# Patient Record
Sex: FEMALE | Race: BLACK OR AFRICAN AMERICAN | NOT HISPANIC OR LATINO | URBAN - METROPOLITAN AREA
[De-identification: names, ages, dates, MRNs, and addresses within clinical notes are randomized per-mention and may not be internally consistent; named-entity substitution may affect disease eponyms.]

---

## 2017-07-09 ENCOUNTER — INPATIENT (INPATIENT)
Facility: HOSPITAL | Age: 52
LOS: 4 days | Discharge: NURSING, REHAB OR LONG-TERM CARE | End: 2017-07-14
Attending: HOSPITALIST | Admitting: HOSPITALIST
Payer: MEDICAID

## 2017-07-09 VITALS
RESPIRATION RATE: 18 BRPM | SYSTOLIC BLOOD PRESSURE: 177 MMHG | OXYGEN SATURATION: 99 % | DIASTOLIC BLOOD PRESSURE: 89 MMHG | HEART RATE: 67 BPM | TEMPERATURE: 98 F

## 2017-07-09 DIAGNOSIS — N17.9 ACUTE KIDNEY FAILURE, UNSPECIFIED: ICD-10-CM

## 2017-07-09 LAB
ALBUMIN SERPL ELPH-MCNC: 3.3 G/DL — SIGNIFICANT CHANGE UP (ref 3.3–5)
ALP SERPL-CCNC: 107 U/L — SIGNIFICANT CHANGE UP (ref 40–120)
ALT FLD-CCNC: 11 U/L — SIGNIFICANT CHANGE UP (ref 4–33)
APPEARANCE UR: CLEAR — SIGNIFICANT CHANGE UP
AST SERPL-CCNC: 13 U/L — SIGNIFICANT CHANGE UP (ref 4–32)
BACTERIA # UR AUTO: SIGNIFICANT CHANGE UP
BASOPHILS # BLD AUTO: 0.04 K/UL — SIGNIFICANT CHANGE UP (ref 0–0.2)
BASOPHILS NFR BLD AUTO: 0.7 % — SIGNIFICANT CHANGE UP (ref 0–2)
BILIRUB SERPL-MCNC: 0.2 MG/DL — SIGNIFICANT CHANGE UP (ref 0.2–1.2)
BILIRUB UR-MCNC: NEGATIVE — SIGNIFICANT CHANGE UP
BLOOD UR QL VISUAL: HIGH
BUN SERPL-MCNC: 45 MG/DL — HIGH (ref 7–23)
CALCIUM SERPL-MCNC: 9.2 MG/DL — SIGNIFICANT CHANGE UP (ref 8.4–10.5)
CHLORIDE SERPL-SCNC: 105 MMOL/L — SIGNIFICANT CHANGE UP (ref 98–107)
CHLORIDE UR-SCNC: 83 MMOL/L — SIGNIFICANT CHANGE UP
CK MB BLD-MCNC: 2.73 NG/ML — SIGNIFICANT CHANGE UP (ref 1–4.7)
CK MB BLD-MCNC: SIGNIFICANT CHANGE UP (ref 0–2.5)
CK SERPL-CCNC: 87 U/L — SIGNIFICANT CHANGE UP (ref 25–170)
CO2 SERPL-SCNC: 19 MMOL/L — LOW (ref 22–31)
COLOR SPEC: SIGNIFICANT CHANGE UP
CREAT ?TM UR-MCNC: 53.38 MG/DL — SIGNIFICANT CHANGE UP
CREAT SERPL-MCNC: 4.7 MG/DL — HIGH (ref 0.5–1.3)
EOSINOPHIL # BLD AUTO: 0.31 K/UL — SIGNIFICANT CHANGE UP (ref 0–0.5)
EOSINOPHIL NFR BLD AUTO: 5.4 % — SIGNIFICANT CHANGE UP (ref 0–6)
GLUCOSE SERPL-MCNC: 306 MG/DL — HIGH (ref 70–99)
GLUCOSE UR-MCNC: 1000 — HIGH
HCT VFR BLD CALC: 34.2 % — LOW (ref 34.5–45)
HGB BLD-MCNC: 11.1 G/DL — LOW (ref 11.5–15.5)
IMM GRANULOCYTES # BLD AUTO: 0.02 # — SIGNIFICANT CHANGE UP
IMM GRANULOCYTES NFR BLD AUTO: 0.3 % — SIGNIFICANT CHANGE UP (ref 0–1.5)
KETONES UR-MCNC: NEGATIVE — SIGNIFICANT CHANGE UP
LEUKOCYTE ESTERASE UR-ACNC: NEGATIVE — SIGNIFICANT CHANGE UP
LYMPHOCYTES # BLD AUTO: 1.74 K/UL — SIGNIFICANT CHANGE UP (ref 1–3.3)
LYMPHOCYTES # BLD AUTO: 30.4 % — SIGNIFICANT CHANGE UP (ref 13–44)
MCHC RBC-ENTMCNC: 28.8 PG — SIGNIFICANT CHANGE UP (ref 27–34)
MCHC RBC-ENTMCNC: 32.5 % — SIGNIFICANT CHANGE UP (ref 32–36)
MCV RBC AUTO: 88.6 FL — SIGNIFICANT CHANGE UP (ref 80–100)
MONOCYTES # BLD AUTO: 0.34 K/UL — SIGNIFICANT CHANGE UP (ref 0–0.9)
MONOCYTES NFR BLD AUTO: 5.9 % — SIGNIFICANT CHANGE UP (ref 2–14)
NEUTROPHILS # BLD AUTO: 3.28 K/UL — SIGNIFICANT CHANGE UP (ref 1.8–7.4)
NEUTROPHILS NFR BLD AUTO: 57.3 % — SIGNIFICANT CHANGE UP (ref 43–77)
NITRITE UR-MCNC: NEGATIVE — SIGNIFICANT CHANGE UP
NRBC # FLD: 0 — SIGNIFICANT CHANGE UP
PH UR: 7 — SIGNIFICANT CHANGE UP (ref 4.6–8)
PLATELET # BLD AUTO: 197 K/UL — SIGNIFICANT CHANGE UP (ref 150–400)
PMV BLD: 11.1 FL — SIGNIFICANT CHANGE UP (ref 7–13)
POTASSIUM SERPL-MCNC: 5.6 MMOL/L — HIGH (ref 3.5–5.3)
POTASSIUM SERPL-SCNC: 5.6 MMOL/L — HIGH (ref 3.5–5.3)
PROT SERPL-MCNC: 7.2 G/DL — SIGNIFICANT CHANGE UP (ref 6–8.3)
PROT UR-MCNC: 481.5 MG/DL — SIGNIFICANT CHANGE UP
PROT UR-MCNC: >600 — SIGNIFICANT CHANGE UP
RBC # BLD: 3.86 M/UL — SIGNIFICANT CHANGE UP (ref 3.8–5.2)
RBC # FLD: 12.6 % — SIGNIFICANT CHANGE UP (ref 10.3–14.5)
RBC CASTS # UR COMP ASSIST: SIGNIFICANT CHANGE UP (ref 0–?)
SODIUM SERPL-SCNC: 140 MMOL/L — SIGNIFICANT CHANGE UP (ref 135–145)
SODIUM UR-SCNC: 104 MEQ/L — SIGNIFICANT CHANGE UP
SP GR SPEC: 1.01 — SIGNIFICANT CHANGE UP (ref 1–1.03)
SQUAMOUS # UR AUTO: SIGNIFICANT CHANGE UP
TROPONIN T SERPL-MCNC: < 0.06 NG/ML — SIGNIFICANT CHANGE UP (ref 0–0.06)
UROBILINOGEN FLD QL: NORMAL E.U. — SIGNIFICANT CHANGE UP (ref 0.1–0.2)
UUN UR-MCNC: 303.9 MG/DL — SIGNIFICANT CHANGE UP
WBC # BLD: 5.73 K/UL — SIGNIFICANT CHANGE UP (ref 3.8–10.5)
WBC # FLD AUTO: 5.73 K/UL — SIGNIFICANT CHANGE UP (ref 3.8–10.5)
WBC UR QL: SIGNIFICANT CHANGE UP (ref 0–?)

## 2017-07-09 PROCEDURE — 70450 CT HEAD/BRAIN W/O DYE: CPT | Mod: 26

## 2017-07-09 PROCEDURE — 76770 US EXAM ABDO BACK WALL COMP: CPT | Mod: 26

## 2017-07-09 PROCEDURE — 71020: CPT | Mod: 26

## 2017-07-09 PROCEDURE — 71100 X-RAY EXAM RIBS UNI 2 VIEWS: CPT | Mod: 26,RT

## 2017-07-09 RX ORDER — INSULIN LISPRO 100/ML
VIAL (ML) SUBCUTANEOUS AT BEDTIME
Qty: 0 | Refills: 0 | Status: DISCONTINUED | OUTPATIENT
Start: 2017-07-09 | End: 2017-07-14

## 2017-07-09 RX ORDER — ALLOPURINOL 300 MG
100 TABLET ORAL DAILY
Qty: 0 | Refills: 0 | Status: DISCONTINUED | OUTPATIENT
Start: 2017-07-09 | End: 2017-07-10

## 2017-07-09 RX ORDER — HEPARIN SODIUM 5000 [USP'U]/ML
5000 INJECTION INTRAVENOUS; SUBCUTANEOUS EVERY 8 HOURS
Qty: 0 | Refills: 0 | Status: DISCONTINUED | OUTPATIENT
Start: 2017-07-09 | End: 2017-07-14

## 2017-07-09 RX ORDER — QUETIAPINE FUMARATE 200 MG/1
50 TABLET, FILM COATED ORAL DAILY
Qty: 0 | Refills: 0 | Status: DISCONTINUED | OUTPATIENT
Start: 2017-07-09 | End: 2017-07-14

## 2017-07-09 RX ORDER — DEXTROSE 50 % IN WATER 50 %
1 SYRINGE (ML) INTRAVENOUS ONCE
Qty: 0 | Refills: 0 | Status: DISCONTINUED | OUTPATIENT
Start: 2017-07-09 | End: 2017-07-14

## 2017-07-09 RX ORDER — SIMVASTATIN 20 MG/1
40 TABLET, FILM COATED ORAL AT BEDTIME
Qty: 0 | Refills: 0 | Status: DISCONTINUED | OUTPATIENT
Start: 2017-07-09 | End: 2017-07-14

## 2017-07-09 RX ORDER — DEXTROSE 50 % IN WATER 50 %
12.5 SYRINGE (ML) INTRAVENOUS ONCE
Qty: 0 | Refills: 0 | Status: DISCONTINUED | OUTPATIENT
Start: 2017-07-09 | End: 2017-07-14

## 2017-07-09 RX ORDER — DEXTROSE 50 % IN WATER 50 %
25 SYRINGE (ML) INTRAVENOUS ONCE
Qty: 0 | Refills: 0 | Status: DISCONTINUED | OUTPATIENT
Start: 2017-07-09 | End: 2017-07-14

## 2017-07-09 RX ORDER — ACETAMINOPHEN 500 MG
650 TABLET ORAL EVERY 6 HOURS
Qty: 0 | Refills: 0 | Status: DISCONTINUED | OUTPATIENT
Start: 2017-07-09 | End: 2017-07-14

## 2017-07-09 RX ORDER — ASPIRIN/CALCIUM CARB/MAGNESIUM 324 MG
81 TABLET ORAL DAILY
Qty: 0 | Refills: 0 | Status: DISCONTINUED | OUTPATIENT
Start: 2017-07-09 | End: 2017-07-14

## 2017-07-09 RX ORDER — AMLODIPINE BESYLATE 2.5 MG/1
10 TABLET ORAL DAILY
Qty: 0 | Refills: 0 | Status: DISCONTINUED | OUTPATIENT
Start: 2017-07-09 | End: 2017-07-09

## 2017-07-09 RX ORDER — CLOPIDOGREL BISULFATE 75 MG/1
75 TABLET, FILM COATED ORAL DAILY
Qty: 0 | Refills: 0 | Status: DISCONTINUED | OUTPATIENT
Start: 2017-07-09 | End: 2017-07-14

## 2017-07-09 RX ORDER — CARVEDILOL PHOSPHATE 80 MG/1
12.5 CAPSULE, EXTENDED RELEASE ORAL EVERY 12 HOURS
Qty: 0 | Refills: 0 | Status: DISCONTINUED | OUTPATIENT
Start: 2017-07-09 | End: 2017-07-10

## 2017-07-09 RX ORDER — SODIUM CHLORIDE 9 MG/ML
1000 INJECTION INTRAMUSCULAR; INTRAVENOUS; SUBCUTANEOUS ONCE
Qty: 0 | Refills: 0 | Status: COMPLETED | OUTPATIENT
Start: 2017-07-09 | End: 2017-07-09

## 2017-07-09 RX ORDER — SODIUM CHLORIDE 9 MG/ML
1000 INJECTION, SOLUTION INTRAVENOUS
Qty: 0 | Refills: 0 | Status: DISCONTINUED | OUTPATIENT
Start: 2017-07-09 | End: 2017-07-14

## 2017-07-09 RX ORDER — ACETAMINOPHEN 500 MG
650 TABLET ORAL ONCE
Qty: 0 | Refills: 0 | Status: COMPLETED | OUTPATIENT
Start: 2017-07-09 | End: 2017-07-09

## 2017-07-09 RX ORDER — SERTRALINE 25 MG/1
200 TABLET, FILM COATED ORAL DAILY
Qty: 0 | Refills: 0 | Status: DISCONTINUED | OUTPATIENT
Start: 2017-07-09 | End: 2017-07-14

## 2017-07-09 RX ORDER — INSULIN LISPRO 100/ML
VIAL (ML) SUBCUTANEOUS
Qty: 0 | Refills: 0 | Status: DISCONTINUED | OUTPATIENT
Start: 2017-07-09 | End: 2017-07-14

## 2017-07-09 RX ORDER — GLUCAGON INJECTION, SOLUTION 0.5 MG/.1ML
1 INJECTION, SOLUTION SUBCUTANEOUS ONCE
Qty: 0 | Refills: 0 | Status: DISCONTINUED | OUTPATIENT
Start: 2017-07-09 | End: 2017-07-14

## 2017-07-09 RX ORDER — AMLODIPINE BESYLATE 2.5 MG/1
10 TABLET ORAL DAILY
Qty: 0 | Refills: 0 | Status: DISCONTINUED | OUTPATIENT
Start: 2017-07-09 | End: 2017-07-14

## 2017-07-09 RX ADMIN — Medication 650 MILLIGRAM(S): at 18:27

## 2017-07-09 RX ADMIN — SODIUM CHLORIDE 1000 MILLILITER(S): 9 INJECTION INTRAMUSCULAR; INTRAVENOUS; SUBCUTANEOUS at 18:27

## 2017-07-09 RX ADMIN — AMLODIPINE BESYLATE 10 MILLIGRAM(S): 2.5 TABLET ORAL at 23:19

## 2017-07-09 RX ADMIN — SIMVASTATIN 40 MILLIGRAM(S): 20 TABLET, FILM COATED ORAL at 23:19

## 2017-07-09 RX ADMIN — Medication 650 MILLIGRAM(S): at 23:58

## 2017-07-09 RX ADMIN — QUETIAPINE FUMARATE 50 MILLIGRAM(S): 200 TABLET, FILM COATED ORAL at 23:58

## 2017-07-09 NOTE — ED PROVIDER NOTE - CARE PLAN
Principal Discharge DX:	ABIH (acute kidney injury)  Secondary Diagnosis:	Near syncope Principal Discharge DX:	ABHI (acute kidney injury)  Secondary Diagnosis:	Near syncope

## 2017-07-09 NOTE — H&P ADULT - PROBLEM SELECTOR PLAN 7
- Continue sertraline and quetiapine - No acute issues presently  - Continue sertraline and quetiapine

## 2017-07-09 NOTE — H&P ADULT - ASSESSMENT
52 F with hx of CVA (residual L sided weakness), DMT2, HTN, Gout, CHF, CAD, depression, and ?CKD presenting after a fall. 52 F with hx of CVA (residual L sided weakness), DMT2, HTN, Gout, CHF, CAD, depression, and ?CKD presenting after a fall found to have acute renal failure.

## 2017-07-09 NOTE — ED PROVIDER NOTE - ATTENDING CONTRIBUTION TO CARE
DR Bloch- Patient interviewed and examined,  felt unsteady and fell on to right chest, did't hit head or lose consciousness. Has had intermittent unsteadiness and slurred speech, not taking insulin regularly.Well appearing NAD, HEENT nml Lungs clear, mild right lat mid rib pain. PERRL 2-12 intact, heent nml, mildly slurred speech. abd soft , nontender, ext no weakness able to ambulate, no cerebellar findings,

## 2017-07-09 NOTE — ED PROVIDER NOTE - PROGRESS NOTE DETAILS
Orthostatics: supine - /87, HR 61, standing - /99, HR 70. ; will give fluids for hyperglycemia. Gollogly: Attempted to call pt's PMD Dr Anderson Garcia at [facility] - told by staff there is no physician on call. I do not have an alternate number for Dr Garcia. Pt's Cr is 4.6 with  unknown baseline, will admit for suspected acute renal failure and near syncope. Will start workup to eval for cause of elevated Cr.

## 2017-07-09 NOTE — H&P ADULT - PROBLEM SELECTOR PLAN 5
- Patient is unaware of insulin dosage, will given Lantus 10 given glucose of 300 on BMP  - Will need proper medication reconciliation in AM  - Continue with ISS and FS  - Ha1c pending  - Will give gabapentin 300 once a day (instead of three times a day for renal dosing) - Uncontrolled, with significant glucosauria  - Patient is unaware of insulin dosage, will give Lantus 10 given glucose of 300 on BMP  - Will need proper medication reconciliation in AM  - Continue with ISS and FS  - Ha1c pending  - Will give gabapentin 300 once a day (instead of three times a day for renal dosing)

## 2017-07-09 NOTE — H&P ADULT - PROBLEM SELECTOR PLAN 3
Unknown type of heart failure  - Transthoracic echocardiogram to assess EF  - Continue Torsemide   - Strict I/O  - Daily Weight  - Head elevation Unknown type of heart failure  - Transthoracic echocardiogram to assess EF  - Continue Torsemide 20 mg BID (quite likely may need increased dosage in the setting of CKD)  - Strict I/O  - Daily Weight  - Head of bed elevation

## 2017-07-09 NOTE — H&P ADULT - PROBLEM SELECTOR PLAN 1
may have been a mechanical fall, however, given prodrome of lightheadedness and dizziness, must rule out cardiac etiology, especially with known CAD and CHF. Unlikely vasovagal (no hypotension) or neurologic/ seizure related. Head CT negative.   - Will check second set of cardiac enzymes  - Echocardiogram to assess for structural heart disease   - Follow up Rib xray  - Fall precautions  - Tylenol as needed for pain May be chronic kidney disease as patient reports she has known kidney disease. FeUrea 59.6 suggestive of intrinsic disease. Renal failure may be due to HTN or DM  - Renal ultrasound pending  - S/P 1 liter NS bolus in the ED  - Will continue Torsemide 20 BID  - Nephrology consult in the AM (please call)

## 2017-07-09 NOTE — H&P ADULT - NSHPPHYSICALEXAM_GEN_ALL_CORE
PHYSICAL EXAM:  GENERAL: NAD, well-groomed, well-developed  HEAD:  Atraumatic, Normocephalic  EYES: EOMI, conjunctiva and sclera clear  ENMT:  Moist mucous membranes  CHEST/LUNG: Clear to percussion bilaterally; No rales, rhonchi, wheezing, or rubs  HEART: Regular rate and rhythm; No murmurs, rubs, or gallops  ABDOMEN: Soft, Nontender, Nondistended; Bowel sounds present  EXTREMITIES:  No clubbing, cyanosis. 1+ nonpitting edema to knees   NERVOUS SYSTEM:  Alert & Oriented X3, Good concentration; Motor Strength 4/5 B/L upper and lower extremities; DTRs 2+ intact and symmetric PHYSICAL EXAM:  GENERAL: NAD, well-groomed, well-developed  HEAD:  Atraumatic, Normocephalic  EYES: EOMI, conjunctiva and sclera clear  ENMT:  Moist mucous membranes  CHEST/LUNG: Clear to percussion bilaterally; No rales, rhonchi, wheezing, or rubs  HEART: Regular rate and rhythm; No murmurs, rubs, or gallops  ABDOMEN: Soft, Nontender, Nondistended; Bowel sounds present  EXTREMITIES:  No clubbing, cyanosis. 1+ nonpitting edema to knees   NERVOUS SYSTEM:  Alert & Oriented X3, Good concentration; Motor Strength 4/5 B/L upper and lower extremities

## 2017-07-09 NOTE — ED ADULT NURSE NOTE - OBJECTIVE STATEMENT
pt in i. alert,oriented x3. states tripped on rug,fell on stairs. c/o back pain,l arm oain , med as ordered.  iv access,labs sent. eval by md at present. will continue to monitor

## 2017-07-09 NOTE — ED PROVIDER NOTE - OBJECTIVE STATEMENT
52F h/o HTN, HLD, DM, CHF (no lasix), CVA 1 year ago affecting L side p/w fall. Pt was walking up the stairs and felt dizzy (variously described as lightheadedness and vertigo), fell backwards landing on her R flank. No prodrome of palpitations, SOB, or CP. No head trauma or LOC. Pt takes ASA 81mg daily. Pt initially had a HA but this has resolved. Pt takes lantus once daily at night, took her usual dose last night, has not eaten anything all day. 52F h/o HTN, HLD, DM, CHF (no lasix), CKD, CVA 1 year ago affecting L side p/w fall. Pt was walking up the stairs and felt dizzy (variously described as lightheadedness and vertigo), fell backwards landing on her R flank. No prodrome of palpitations, SOB, or CP. No head trauma or LOC. Pt takes ASA 81mg daily. Pt initially had a HA but this has resolved. Pt takes lantus once daily at night, took her usual dose last night, has not eaten anything all day.

## 2017-07-09 NOTE — ED PROVIDER NOTE - PMH
CHF (congestive heart failure)    CVA (cerebral vascular accident)    DM (diabetes mellitus)    HTN (hypertension)

## 2017-07-09 NOTE — H&P ADULT - PROBLEM SELECTOR PLAN 2
May be chronic kidney disease as patient reports she has known kidney disease. FeUrea 59.6 suggestive of intrinsic disease. Renal failure may be due to HTN or DM  - Renal ultrasound pending  - Will continue Torsemide 20 BID may have been a mechanical fall.  However, given prodrome of lightheadedness and dizziness, must rule out cardiac etiology, especially with known CAD and CHF. Unlikely vasovagal (no hypotension) or neurologic/ seizure related. Head CT negative.   - Will check second set of cardiac enzymes  - Echocardiogram to assess for structural heart disease   - Follow up Rib xray  - Fall precautions  - Tylenol as needed for pain

## 2017-07-09 NOTE — H&P ADULT - HISTORY OF PRESENT ILLNESS
52 F with hx of CVA (residual L sided weakness), DMT2, HTN, Gout, CHF, CAD, depression, and ?CKD presenting after a fall. Patient was going up the stairs when the rug slipped from underneath her and she fell on her back. Patient reports she did not hit her head. Associated symptoms at the time of fall were lightheadedness and dizziness. Patient reports she has R back pain. Denies blurry vision, LOC, chest pain, SOB, palpitations, seizure-like activity urinary or fecal incontinence. Patient reports she had a heart attack previously and that no stents or interventions were done. Patient reports she has heart failure. At baseline, patient has a dry cough, lower extremity swelling, orthopnea, and PND. Patient reports she has kidney problems and was scheduled to see a nephrologist this week. Reports she was recently found to have blood in her urine and has taken 7 day course of unknown antibiotic. Patient is a poor historian and does not know the details of her medical problems.    In the ED, T 98.2 HR 67 /89--> 156/77 RR 18 SpO2 99. Patient was given 1L NS bolus and Tylenol

## 2017-07-09 NOTE — H&P ADULT - NSHPLABSRESULTS_GEN_ALL_CORE
140  |  105  |  45<H>  ----------------------------<  306<H>  5.6<H>   |  19<L>  |  4.70<H>    Ca    9.2      2017 18:20    TPro  7.2  /  Alb  3.3  /  TBili  0.2  /  DBili  x   /  AST  13  /  ALT  11  /  AlkPhos  107                      Urinalysis Basic - ( 2017 21:00 )    Color: COLORL / Appearance: CLEAR / S.008 / pH: 7.0  Gluc: 1000 / Ketone: NEGATIVE  / Bili: NEGATIVE / Urobili: NORMAL E.U.   Blood: SMALL / Protein: >600 / Nitrite: NEGATIVE   Leuk Esterase: NEGATIVE / RBC: 0-2 / WBC 2-5   Sq Epi: OCC / Non Sq Epi: x / Bacteria: FEW                              11.1   5.73  )-----------( 197      ( 2017 18:20 )             34.2     CAPILLARY BLOOD GLUCOSE  310 (2017 17:47)      < from: CT Head No Cont (17 @ 19:45) >    No acute intracranial hemorrhage or mass effect.     < end of copied text >     140  |  105  |  45<H>  ----------------------------<  306<H>  5.6<H>   |  19<L>  |  4.70<H>    Ca    9.2      2017 18:20    TPro  7.2  /  Alb  3.3  /  TBili  0.2  /  DBili  x   /  AST  13  /  ALT  11  /  AlkPhos  107                      Urinalysis Basic - ( 2017 21:00 )    Color: COLORL / Appearance: CLEAR / S.008 / pH: 7.0  Gluc: 1000 / Ketone: NEGATIVE  / Bili: NEGATIVE / Urobili: NORMAL E.U.   Blood: SMALL / Protein: >600 / Nitrite: NEGATIVE   Leuk Esterase: NEGATIVE / RBC: 0-2 / WBC 2-5   Sq Epi: OCC / Non Sq Epi: x / Bacteria: FEW                              11.1   5.73  )-----------( 197      ( 2017 18:20 )             34.2     CAPILLARY BLOOD GLUCOSE  310 (2017 17:47)      < from: CT Head No Cont (17 @ 19:45) >    No acute intracranial hemorrhage or mass effect.     < end of copied text >    EKG: normal sinus, T wave inversions of V5 and V6     140  |  105  |  45<H>  ----------------------------<  306<H>  5.6<H>   |  19<L>  |  4.70<H>    Ca    9.2      2017 18:20    TPro  7.2  /  Alb  3.3  /  TBili  0.2  /  DBili  x   /  AST  13  /  ALT  11  /  AlkPhos  107                      Urinalysis Basic - ( 2017 21:00 )    Color: COLORL / Appearance: CLEAR / S.008 / pH: 7.0  Gluc: 1000 / Ketone: NEGATIVE  / Bili: NEGATIVE / Urobili: NORMAL E.U.   Blood: SMALL / Protein: >600 / Nitrite: NEGATIVE   Leuk Esterase: NEGATIVE / RBC: 0-2 / WBC 2-5   Sq Epi: OCC / Non Sq Epi: x / Bacteria: FEW                              11.1   5.73  )-----------( 197      ( 2017 18:20 )             34.2     CAPILLARY BLOOD GLUCOSE  310 (2017 17:47)      < from: CT Head No Cont (17 @ 19:45) >    No acute intracranial hemorrhage or mass effect.     < end of copied text >    EKG: normal sinus at 68 pm, QTc = 428, T wave inversions of V5 and V6

## 2017-07-09 NOTE — H&P ADULT - PROBLEM SELECTOR PLAN 4
- Continuing aspirin, Plavix, carvedilol, simvastatin   - No ACE as patient has renal failure - Continuing aspirin, Plavix, carvedilol, simvastatin   - No ACE for now, as patient has renal failure

## 2017-07-09 NOTE — H&P ADULT - PMH
CHF (congestive heart failure)    Coronary artery disease involving native coronary artery of native heart without angina pectoris    CVA (cerebral vascular accident)    Depression    DM (diabetes mellitus)    Gout    HTN (hypertension)

## 2017-07-09 NOTE — ED PROVIDER NOTE - MEDICAL DECISION MAKING DETAILS
52F with fall, possibly due to hypoglycemia, dehydration, less likely arrythmia. Presentation not consistent with ACS. Pt with HA after the fall, takes ASA 81mg, concern for ICH as well as posterior rib fx. Plan: stat finger stick, labs, gentle fluids (possible dehydration but hx of CHF), CXR, rib series, CTH, pain control, EKG, reassess.

## 2017-07-09 NOTE — H&P ADULT - NSHPREVIEWOFSYSTEMS_GEN_ALL_CORE
REVIEW OF SYSTEMS:  CONSTITUTIONAL: No fever, weight loss, or fatigue  EYES: No eye pain, visual disturbances, or discharge  ENMT: No sinus or throat pain  RESPIRATORY: No wheezing, chills or hemoptysis; No shortness of breath  CARDIOVASCULAR: No chest pain, palpitations  GASTROINTESTINAL: No abdominal or epigastric pain. No nausea, vomiting; No diarrhea or constipation. No melena or hematochezia.  GENITOURINARY: No dysuria, frequency, hematuria, or incontinence  NEUROLOGICAL: No headaches, memory loss, loss of strength, numbness, or tremors  SKIN: No itching, burning, rashes, or lesions   LYMPH NODES: No enlarged glands  ENDOCRINE: No heat or cold intolerance; No hair loss  MUSCULOSKELETAL: No joint pain or swelling; No muscle, back, or extremity pain  ALLERY AND IMMUNOLOGIC: No hives or eczema

## 2017-07-10 DIAGNOSIS — F32.9 MAJOR DEPRESSIVE DISORDER, SINGLE EPISODE, UNSPECIFIED: ICD-10-CM

## 2017-07-10 DIAGNOSIS — Z29.9 ENCOUNTER FOR PROPHYLACTIC MEASURES, UNSPECIFIED: ICD-10-CM

## 2017-07-10 DIAGNOSIS — I10 ESSENTIAL (PRIMARY) HYPERTENSION: ICD-10-CM
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
## 2017-07-10 DIAGNOSIS — I25.10 ATHEROSCLEROTIC HEART DISEASE OF NATIVE CORONARY ARTERY WITHOUT ANGINA PECTORIS: ICD-10-CM

## 2017-07-10 DIAGNOSIS — N17.9 ACUTE KIDNEY FAILURE, UNSPECIFIED: ICD-10-CM

## 2017-07-10 DIAGNOSIS — M10.9 GOUT, UNSPECIFIED: ICD-10-CM

## 2017-07-10 DIAGNOSIS — E11.21 TYPE 2 DIABETES MELLITUS WITH DIABETIC NEPHROPATHY: ICD-10-CM

## 2017-07-10 DIAGNOSIS — I50.9 HEART FAILURE, UNSPECIFIED: ICD-10-CM

## 2017-07-10 LAB
BUN SERPL-MCNC: 45 MG/DL — HIGH (ref 7–23)
CALCIUM SERPL-MCNC: 9.1 MG/DL — SIGNIFICANT CHANGE UP (ref 8.4–10.5)
CHLORIDE SERPL-SCNC: 109 MMOL/L — HIGH (ref 98–107)
CK MB BLD-MCNC: 2.88 NG/ML — SIGNIFICANT CHANGE UP (ref 1–4.7)
CK SERPL-CCNC: 142 U/L — SIGNIFICANT CHANGE UP (ref 25–170)
CO2 SERPL-SCNC: 20 MMOL/L — LOW (ref 22–31)
CREAT SERPL-MCNC: 4.59 MG/DL — HIGH (ref 0.5–1.3)
GLUCOSE SERPL-MCNC: 203 MG/DL — HIGH (ref 70–99)
HBA1C BLD-MCNC: 11.2 % — HIGH (ref 4–5.6)
HCT VFR BLD CALC: 29.9 % — LOW (ref 34.5–45)
HGB BLD-MCNC: 9.7 G/DL — LOW (ref 11.5–15.5)
MAGNESIUM SERPL-MCNC: 1.7 MG/DL — SIGNIFICANT CHANGE UP (ref 1.6–2.6)
MCHC RBC-ENTMCNC: 28.6 PG — SIGNIFICANT CHANGE UP (ref 27–34)
MCHC RBC-ENTMCNC: 32.4 % — SIGNIFICANT CHANGE UP (ref 32–36)
MCV RBC AUTO: 88.2 FL — SIGNIFICANT CHANGE UP (ref 80–100)
NRBC # FLD: 0 — SIGNIFICANT CHANGE UP
PHOSPHATE SERPL-MCNC: 4.4 MG/DL — SIGNIFICANT CHANGE UP (ref 2.5–4.5)
PLATELET # BLD AUTO: 185 K/UL — SIGNIFICANT CHANGE UP (ref 150–400)
PMV BLD: 11.6 FL — SIGNIFICANT CHANGE UP (ref 7–13)
POTASSIUM SERPL-MCNC: 4.5 MMOL/L — SIGNIFICANT CHANGE UP (ref 3.5–5.3)
POTASSIUM SERPL-SCNC: 4.5 MMOL/L — SIGNIFICANT CHANGE UP (ref 3.5–5.3)
RBC # BLD: 3.39 M/UL — LOW (ref 3.8–5.2)
RBC # FLD: 12.8 % — SIGNIFICANT CHANGE UP (ref 10.3–14.5)
SODIUM SERPL-SCNC: 143 MMOL/L — SIGNIFICANT CHANGE UP (ref 135–145)
TROPONIN T SERPL-MCNC: < 0.06 NG/ML — SIGNIFICANT CHANGE UP (ref 0–0.06)
WBC # BLD: 5.52 K/UL — SIGNIFICANT CHANGE UP (ref 3.8–10.5)
WBC # FLD AUTO: 5.52 K/UL — SIGNIFICANT CHANGE UP (ref 3.8–10.5)

## 2017-07-10 PROCEDURE — 99223 1ST HOSP IP/OBS HIGH 75: CPT | Mod: GC

## 2017-07-10 PROCEDURE — 99233 SBSQ HOSP IP/OBS HIGH 50: CPT

## 2017-07-10 RX ORDER — GABAPENTIN 400 MG/1
300 CAPSULE ORAL AT BEDTIME
Qty: 0 | Refills: 0 | Status: DISCONTINUED | OUTPATIENT
Start: 2017-07-10 | End: 2017-07-14

## 2017-07-10 RX ORDER — CARVEDILOL PHOSPHATE 80 MG/1
12.5 CAPSULE, EXTENDED RELEASE ORAL EVERY 12 HOURS
Qty: 0 | Refills: 0 | Status: DISCONTINUED | OUTPATIENT
Start: 2017-07-10 | End: 2017-07-14

## 2017-07-10 RX ORDER — INSULIN GLARGINE 100 [IU]/ML
10 INJECTION, SOLUTION SUBCUTANEOUS AT BEDTIME
Qty: 0 | Refills: 0 | Status: DISCONTINUED | OUTPATIENT
Start: 2017-07-10 | End: 2017-07-14

## 2017-07-10 RX ADMIN — HEPARIN SODIUM 5000 UNIT(S): 5000 INJECTION INTRAVENOUS; SUBCUTANEOUS at 12:57

## 2017-07-10 RX ADMIN — Medication 1: at 17:49

## 2017-07-10 RX ADMIN — GABAPENTIN 300 MILLIGRAM(S): 400 CAPSULE ORAL at 00:28

## 2017-07-10 RX ADMIN — Medication 650 MILLIGRAM(S): at 15:22

## 2017-07-10 RX ADMIN — Medication 1: at 09:18

## 2017-07-10 RX ADMIN — Medication 81 MILLIGRAM(S): at 12:56

## 2017-07-10 RX ADMIN — SERTRALINE 200 MILLIGRAM(S): 25 TABLET, FILM COATED ORAL at 12:56

## 2017-07-10 RX ADMIN — INSULIN GLARGINE 10 UNIT(S): 100 INJECTION, SOLUTION SUBCUTANEOUS at 22:40

## 2017-07-10 RX ADMIN — CARVEDILOL PHOSPHATE 12.5 MILLIGRAM(S): 80 CAPSULE, EXTENDED RELEASE ORAL at 17:49

## 2017-07-10 RX ADMIN — Medication 650 MILLIGRAM(S): at 14:32

## 2017-07-10 RX ADMIN — INSULIN GLARGINE 10 UNIT(S): 100 INJECTION, SOLUTION SUBCUTANEOUS at 00:50

## 2017-07-10 RX ADMIN — Medication 20 MILLIGRAM(S): at 00:28

## 2017-07-10 RX ADMIN — Medication 650 MILLIGRAM(S): at 00:45

## 2017-07-10 RX ADMIN — AMLODIPINE BESYLATE 10 MILLIGRAM(S): 2.5 TABLET ORAL at 12:57

## 2017-07-10 RX ADMIN — QUETIAPINE FUMARATE 50 MILLIGRAM(S): 200 TABLET, FILM COATED ORAL at 12:57

## 2017-07-10 RX ADMIN — HEPARIN SODIUM 5000 UNIT(S): 5000 INJECTION INTRAVENOUS; SUBCUTANEOUS at 05:36

## 2017-07-10 RX ADMIN — HEPARIN SODIUM 5000 UNIT(S): 5000 INJECTION INTRAVENOUS; SUBCUTANEOUS at 22:40

## 2017-07-10 RX ADMIN — Medication 1: at 12:57

## 2017-07-10 RX ADMIN — GABAPENTIN 300 MILLIGRAM(S): 400 CAPSULE ORAL at 22:40

## 2017-07-10 RX ADMIN — SIMVASTATIN 40 MILLIGRAM(S): 20 TABLET, FILM COATED ORAL at 22:40

## 2017-07-10 RX ADMIN — CARVEDILOL PHOSPHATE 12.5 MILLIGRAM(S): 80 CAPSULE, EXTENDED RELEASE ORAL at 00:28

## 2017-07-10 RX ADMIN — CLOPIDOGREL BISULFATE 75 MILLIGRAM(S): 75 TABLET, FILM COATED ORAL at 12:56

## 2017-07-10 NOTE — DISCHARGE NOTE ADULT - HOSPITAL COURSE
53 y/o Female, with a PmHx of CVA (residual L sided weakness), DMT2, HTN, Gout, CHF, CAD, depression, and ?CKD, presenting after a fall. Patient was going up the stairs when the rug slipped from underneath her and she fell on her back. Patient reports she did not hit her head. Associated symptoms at the time of fall were lightheadedness and dizziness. Patient reports she has R back pain. Denies blurry vision, LOC, chest pain, SOB, palpitations, seizure-like activity urinary or fecal incontinence. Patient reports she had a heart attack previously and that no stents or interventions were done. Patient reports she has heart failure. At baseline, patient has a dry cough, lower extremity swelling, orthopnea, and PND. Patient reports she has kidney problems and was scheduled to see a nephrologist this week. Reports she was recently found to have blood in her urine and has taken 7 day course of unknown antibiotic. Patient is a poor historian and does not know the details of her medical problems.    In the ED, T 98.2 HR 67 /89--> 156/77 RR 18 SpO2 99. Patient was given 1L NS bolus and Tylenol, On admission, EKG done showed NSR @ 65, T inv inferiorly and laterally. CE x 1 neg. Renal US done showed parenchymal disease.  CT head done showed no acute disease. Echo done showed an EF of 55%, Normal mitral valve. Mild mitral regurgitation. Normal left ventricular internal dimensions and wall thicknesses. Endocardium not well visualized; grossly low normal left ventricular systolic function. The right ventricle is not well visualized; grossly normal right ventricular systolic function. Pt was seen by Nephrology Dr. Francisco. Pt comfortable at this time and is medically cleared for discharge home. Outpatient physical therapy to be done. Follow up Dr. Francisco. 53 y/o Female, with a PmHx of CVA (residual L sided weakness), DMT2, HTN, Gout, CHF, CAD, depression, and ?CKD, presenting after a fall. Patient was going up the stairs when the rug slipped from underneath her and she fell on her back. Patient reports she did not hit her head. Associated symptoms at the time of fall were lightheadedness and dizziness. Patient reports she has R back pain. Denies blurry vision, LOC, chest pain, SOB, palpitations, seizure-like activity urinary or fecal incontinence. Patient reports she had a heart attack previously and that no stents or interventions were done. Patient reports she has heart failure. At baseline, patient has a dry cough, lower extremity swelling, orthopnea, and PND. Patient reports she has kidney problems and was scheduled to see a nephrologist this week. Reports she was recently found to have blood in her urine and has taken 7 day course of unknown antibiotic. Patient is a poor historian and does not know the details of her medical problems.    In the ED, T 98.2 HR 67 /89--> 156/77 RR 18 SpO2 99. Patient was given 1L NS bolus and Tylenol, On admission, EKG done showed NSR @ 65, T inv inferiorly and laterally. CE x 1 neg. Renal US done showed parenchymal disease.  CT head done showed no acute disease. Echo done showed an EF of 55%, Normal mitral valve. Mild mitral regurgitation. Normal left ventricular internal dimensions and wall thicknesses. Endocardium not well visualized; grossly low normal left ventricular systolic function. The right ventricle is not well visualized; grossly normal right ventricular systolic function. Pt was seen by Nephrology Dr. Francisco. Pt comfortable at this time and is medically cleared for discharge home. Outpatient physical therapy to be done. Follow up Dr. Francisco.    medically optimized for discharge.

## 2017-07-10 NOTE — DISCHARGE NOTE ADULT - CARE PLAN
Principal Discharge DX:	Fall, initial encounter  Goal:	Atypical. Not likely Cardiac.  Instructions for follow-up, activity and diet:	Follow up with your Primary Care Doctor.  Secondary Diagnosis:	Essential hypertension  Goal:	Monitor blood pressure. Continue medications as prescribed.  Instructions for follow-up, activity and diet:	Low sodium diet.  Secondary Diagnosis:	DM (diabetes mellitus)  Goal:	Monitor finger sticks. Continue Insulin as prescribed.  Instructions for follow-up, activity and diet:	Low sugar diet.  Secondary Diagnosis:	Depression  Goal:	Continue medications as prescribed.  Secondary Diagnosis:	ABHI (acute kidney injury)  Goal:	Avoid Nephrotoxic Agents.  Instructions for follow-up, activity and diet:	Follow up with your Nephrologist  Secondary Diagnosis:	Chronic congestive heart failure, unspecified congestive heart failure type  Goal:	Continue to hold Torsemide for now secondary to ABHI.  Instructions for follow-up, activity and diet:	Follow up with your Cardiologist  Low sodium diet.  Secondary Diagnosis:	Gout  Goal:	Hold allopurinol for now secondary to ABHI

## 2017-07-10 NOTE — PROGRESS NOTE ADULT - PROBLEM SELECTOR PLAN 3
Unknown type of heart failure  - Transthoracic echocardiogram to assess EF  - Strict I/O  - Daily Weight  - Head of bed elevation

## 2017-07-10 NOTE — PROGRESS NOTE ADULT - PROBLEM SELECTOR PLAN 4
- Continuing aspirin, Plavix, carvedilol, simvastatin   - No ACE for now, as patient has renal failure

## 2017-07-10 NOTE — PROGRESS NOTE ADULT - PROBLEM SELECTOR PLAN 5
- Uncontrolled, with significant glucosauria  - Patient is unaware of insulin dosage, will give Lantus 10 given glucose of 300 on BMP  - Will need proper medication reconciliation in AM  - Continue with ISS and FS  - A1c high  - Will give gabapentin 300 once a day (instead of three times a day for renal dosing)

## 2017-07-10 NOTE — DISCHARGE NOTE ADULT - ADDITIONAL INSTRUCTIONS
follow up with your doctor in a week Dr Garcia [phone] follow up with your doctor in a week Dr Garcia [phone]  Follow up with Dr. Francisco (Nephrologist)

## 2017-07-10 NOTE — PHYSICAL THERAPY INITIAL EVALUATION ADULT - DIAGNOSIS, PT EVAL
Pt admitted for fall; pt presents with decreased strength, decreased balance, and decreased safety awareness

## 2017-07-10 NOTE — PROGRESS NOTE ADULT - SUBJECTIVE AND OBJECTIVE BOX
CC: Follow up for syncope    SUBJECTIVE / OVERNIGHT EVENTS:  Patient states that she feels better compared when first admitted.  Able to tolerate PO intake, conscious about drinking fluids.  Still feels uneasy on her feet but able to stand up.   No F/C, N/V, CP, SOB, Cough, lightheadedness, dizziness, abdominal pain, diarrhea, dysuria.    MEDICATIONS  (STANDING):  heparin  Injectable 5000 Unit(s) SubCutaneous every 8 hours  insulin lispro (HumaLOG) corrective regimen sliding scale   SubCutaneous three times a day before meals  insulin lispro (HumaLOG) corrective regimen sliding scale   SubCutaneous at bedtime  dextrose 5%. 1000 milliLiter(s) (50 mL/Hr) IV Continuous <Continuous>  dextrose 50% Injectable 12.5 Gram(s) IV Push once  dextrose 50% Injectable 25 Gram(s) IV Push once  dextrose 50% Injectable 25 Gram(s) IV Push once  aspirin enteric coated 81 milliGRAM(s) Oral daily  sertraline 200 milliGRAM(s) Oral daily  clopidogrel Tablet 75 milliGRAM(s) Oral daily  QUEtiapine 50 milliGRAM(s) Oral daily  simvastatin 40 milliGRAM(s) Oral at bedtime  amLODIPine   Tablet 10 milliGRAM(s) Oral daily  insulin glargine Injectable (LANTUS) 10 Unit(s) SubCutaneous at bedtime  gabapentin 300 milliGRAM(s) Oral at bedtime  carvedilol 12.5 milliGRAM(s) Oral every 12 hours    MEDICATIONS  (PRN):  dextrose Gel 1 Dose(s) Oral once PRN Blood Glucose LESS THAN 70 milliGRAM(s)/deciliter  glucagon  Injectable 1 milliGRAM(s) IntraMuscular once PRN Glucose LESS THAN 70 milligrams/deciliter  acetaminophen   Tablet. 650 milliGRAM(s) Oral every 6 hours PRN mild to severe pain      Vital Signs Last 24 Hrs  T(C): 36.8 (07-10-17 @ 05:35), Max: 36.8 (17 @ 16:13)  HR: 74 (07-10-17 @ 05:35) (63 - 74)  BP: 143/69 (07-10-17 @ 05:35) (143/69 - 180/98)  RR: 18 (07-10-17 @ 05:35) (16 - 18)  SpO2: 97% (07-10-17 @ 05:35) (97% - 100%)  CAPILLARY BLOOD GLUCOSE  191 (10 Jul 2017 12:19)  182 (10 Jul 2017 08:39)  234 (10 Jul 2017 00:15)  310 (2017 17:47)        I&O's Summary      PHYSICAL EXAM:  GENERAL: NAD, well-developed, obese  HEAD:  Atraumatic, Normocephalic  EYES: EOMI, PERRLA, conjunctiva and sclera clear  NECK: Supple, No JVD  CHEST/LUNG: Clear to auscultation bilaterally; No wheeze  HEART: Regular rate and rhythm; No murmurs, rubs, or gallops  ABDOMEN: Soft, Nontender, Nondistended; Bowel sounds present  EXTREMITIES:  2+ Peripheral Pulses, No clubbing, cyanosis, or edema  PSYCH: Calm  NEUROLOGY: A/Ox3,  SKIN: No rashes or lesions    LABS:                        9.7    5.52  )-----------( 185      ( 10 Jul 2017 06:38 )             29.9     07-10    143  |  109<H>  |  45<H>  ----------------------------<  203<H>  4.5   |  20<L>  |  4.59<H>    Ca    9.1      10 Jul 2017 06:38  Phos  4.4     07-10  Mg     1.7     07-10    TPro  7.2  /  Alb  3.3  /  TBili  0.2  /  DBili  x   /  AST  13  /  ALT  11  /  AlkPhos  107  07-09      CARDIAC MARKERS ( 10 Jul 2017 00:50 )  x     / < 0.06 ng/mL / 142 u/L / 2.88 ng/mL / x      CARDIAC MARKERS ( 2017 18:20 )  x     / < 0.06 ng/mL / 87 u/L / 2.73 ng/mL / x          Urinalysis Basic - ( 2017 21:00 )    Color: COLORL / Appearance: CLEAR / S.008 / pH: 7.0  Gluc: 1000 / Ketone: NEGATIVE  / Bili: NEGATIVE / Urobili: NORMAL E.U.   Blood: SMALL / Protein: >600 / Nitrite: NEGATIVE   Leuk Esterase: NEGATIVE / RBC: 0-2 / WBC 2-5   Sq Epi: OCC / Non Sq Epi: x / Bacteria: FEW        RADIOLOGY & ADDITIONAL TESTS:  < from: US Kidney and Bladder (17 @ 21:24) >  EXAM:  US KIDNEYS AND BLADDER        PROCEDURE DATE:  2017         INTERPRETATION:  CLINICAL INFORMATION: 52-year-old female with a K I.    COMPARISON: None available.    TECHNIQUE: Sonography of the kidneys and bladder.     FINDINGS:    Right kidney:  10.9 cm. No renal mass, hydronephrosis or calculi.    Left kidney:  10.9 cm. No renal mass, hydronephrosis or calculi.    Parenchymal renal disease.    Urinary bladder: Within normal limits.    IMPRESSION:     Parenchymal renal disease.                  WES ALCANTAR M.D., ATTENDING RADIOLOGIST  This document has been electronically signed. Jul 10 2017  8:22AM    < end of copied text >    Imaging Personally Reviewed:Yes    Consultant(s) Notes Reviewed:      Care Discussed with Consultants/Other Providers:

## 2017-07-10 NOTE — PHYSICAL THERAPY INITIAL EVALUATION ADULT - CRITERIA FOR SKILLED THERAPEUTIC INTERVENTIONS
rehab potential/impairments found/functional limitations in following categories/risk reduction/prevention

## 2017-07-10 NOTE — DISCHARGE NOTE ADULT - CARE PROVIDER_API CALL
Wale Francisco), Internal Medicine; Nephrology  [address] [address]  Phone: [phone]  Fax: [phone]

## 2017-07-10 NOTE — DISCHARGE NOTE ADULT - PATIENT PORTAL LINK FT
“You can access the [facility] Patient Portal, offered by [facility], by registering with the following website: [email or web address]”

## 2017-07-10 NOTE — PROGRESS NOTE ADULT - ASSESSMENT
52 F with hx of CVA (residual L sided weakness), DMT2, HTN, Gout, CHF, CAD, depression, and ?CKD presenting after a fall found to have acute renal failure.

## 2017-07-10 NOTE — CONSULT NOTE ADULT - SUBJECTIVE AND OBJECTIVE BOX
HPI:  Ms. Herr is a 52 year-old woman with history of hypertension, type 2 diabetes mellitus, stroke, and coronary artery disease, and congestive heart failure (?EF), who yesterday presented to the [facility] ER s/p mechanical fall; she does admit to dizziness directly prior to falling. She was noted on admission to have a creatinine in the 4s; despite a liter of normal saline in the ER, the creatinine remained in the 4s today. Therefore a renal consultation was requested.    In the ED, T 98.2 HR 67 /89--> 156/77 RR 18 SpO2 99. Patient was given 1L NS bolus and Tylenol (2017 22:41)      PAST MEDICAL & SURGICAL HISTORY:  Depression  Gout  Coronary artery disease involving native coronary artery of native heart without angina pectoris  CVA (cerebral vascular accident)  CHF (congestive heart failure)  DM (diabetes mellitus)  HTN (hypertension)      MEDICATIONS  (STANDING):  heparin  Injectable 5000 Unit(s) SubCutaneous every 8 hours  insulin lispro (HumaLOG) corrective regimen sliding scale   SubCutaneous three times a day before meals  insulin lispro (HumaLOG) corrective regimen sliding scale   SubCutaneous at bedtime  dextrose 5%. 1000 milliLiter(s) (50 mL/Hr) IV Continuous <Continuous>  dextrose 50% Injectable 12.5 Gram(s) IV Push once  dextrose 50% Injectable 25 Gram(s) IV Push once  dextrose 50% Injectable 25 Gram(s) IV Push once  aspirin enteric coated 81 milliGRAM(s) Oral daily  sertraline 200 milliGRAM(s) Oral daily  clopidogrel Tablet 75 milliGRAM(s) Oral daily  QUEtiapine 50 milliGRAM(s) Oral daily  simvastatin 40 milliGRAM(s) Oral at bedtime  amLODIPine   Tablet 10 milliGRAM(s) Oral daily  insulin glargine Injectable (LANTUS) 10 Unit(s) SubCutaneous at bedtime  gabapentin 300 milliGRAM(s) Oral at bedtime  carvedilol 12.5 milliGRAM(s) Oral every 12 hours      Allergies  Seafood (Unknown)    SOCIAL HISTORY:  Denies ETOh,Smoking,     FAMILY HISTORY:  Family history of heart attack (Father)      REVIEW OF SYSTEMS:  CONSTITUTIONAL: No weakness, fevers or chills  EYES/ENT: No visual changes;  No vertigo or throat pain   NECK: No pain or stiffness  RESPIRATORY: (+)SOB, (+)cough - chronic  CARDIOVASCULAR: No chest pain or palpitations; (+)dizziness, (+)swelling  GASTROINTESTINAL: No abdominal or epigastric pain. No nausea, vomiting, or hematemesis; No diarrhea or constipation. No melena or hematochezia.  GENITOURINARY: No dysuria, frequency or hematuria  NEUROLOGICAL: (+)left-sided weakness (chronic)  SKIN: No itching, burning, rashes, or lesions   MUSCULOSKELETAL: (+)back pain  All other review of systems is negative unless indicated above.    VITAL:  T(C): , Max: 36.8 (07-10-17 @ 05:35)  T(F): , Max: 98.2 (07-10-17 @ 05:35)  HR: 73 (07-10-17 @ 17:45)  BP: 146/88 (07-10-17 @ 17:45)  RR: 18 (07-10-17 @ 17:45)  SpO2: 99% (07-10-17 @ 17:45)      PHYSICAL EXAM:  Constitutional: NAD, Alert  HEENT: NCAT, MMM  Neck: Supple, No JVD  Respiratory: CTA-b/l  Cardiovascular: RRR s1s2, no m/r/g  Gastrointestinal: BS+, soft, NT/ND  Extremities: No peripheral edema b/l  Neurological: no focal deficits; strength grossly intact  Psychiatric: Normal mood, normal affect  Back: no CVAT b/l  Skin: No rashes, no nevi    LABS:                        9.7    5.52  )-----------( 185      ( 10 Jul 2017 06:38 )             29.9     Na(143)/K(4.5)/Cl(109)/HCO3(20)/BUN(45)/Cr(4.59)Glu(203)/Ca(9.1)/Mg(1.7)/PO4(4.4)    07-10 @ 06:38  Na(140)/K(5.6)/Cl(105)/HCO3(19)/BUN(45)/Cr(4.70)Glu(306)/Ca(9.2)/Mg(--)/PO4(--)     @ 18:20    Urinalysis Basic - ( 2017 21:00 )    Color: COLORL / Appearance: CLEAR / S.008 / pH: 7.0  Gluc: 1000 / Ketone: NEGATIVE  / Bili: NEGATIVE / Urobili: NORMAL E.U.   Blood: SMALL / Protein: >600 / Nitrite: NEGATIVE   Leuk Esterase: NEGATIVE / RBC: 0-2 / WBC 2-5   Sq Epi: OCC / Non Sq Epi: x / Bacteria: FEW      Creatinine, Random Urine: 53.38 mg/dL ( @ 21:00)  Sodium, Random Urine: 104 meq/L ( @ 21:00)  Protein, Random Urine: 481.5 mg/dL ( @ 21:00)  Chloride, Random Urine: 83 mmol/L ( @ 21:00)    a1c 11.2      RENAL ULTRASOUND (17) - 10.9/10.9; b/l parenchymal disease    IMPRESSION:    (1)Renal -       RECOMMEND:    (1) HPI:  Ms. Herr is a 52 year-old woman with history of hypertension, type 2 diabetes mellitus, stroke, and coronary artery disease, and congestive heart failure (?EF), who yesterday presented to the [facility] ER s/p mechanical fall; she does admit to dizziness directly prior to falling. She was noted on admission to have a creatinine in the 4s; despite a liter of normal saline in the ER, the creatinine remained in the 4s today. Therefore a renal consultation was requested.    Per patient, she does not have a nephrologist. She is aware that she has kidney impairment, but does not know how impaired her function is. Her PCP is Dr. Anderson Garcia.    Given her limited cognition, I asked her if I could call a family member; she states that I can call her mother. Spoke to her mother Gia [phone]. Gia tells me that she is 75 years old and does her best to take care of the patient, but it is quite difficult given her own medical issues. Gia, rather than Ninoska, is the primary caregiver for Ninoska's 19-year old son. Gia brings up that Ninoska does not take care of herself - she eats all the wrong foods...she does not take responsibility for herself medically, financially, etc.      PAST MEDICAL & SURGICAL HISTORY:  Depression  Gout  Coronary artery disease involving native coronary artery of native heart without angina pectoris  CVA (cerebral vascular accident)  CHF (congestive heart failure)  DM (diabetes mellitus)  HTN (hypertension)      MEDICATIONS  (STANDING):  heparin  Injectable 5000 Unit(s) SubCutaneous every 8 hours  insulin lispro (HumaLOG) corrective regimen sliding scale   SubCutaneous three times a day before meals  insulin lispro (HumaLOG) corrective regimen sliding scale   SubCutaneous at bedtime  dextrose 5%. 1000 milliLiter(s) (50 mL/Hr) IV Continuous <Continuous>  dextrose 50% Injectable 12.5 Gram(s) IV Push once  dextrose 50% Injectable 25 Gram(s) IV Push once  dextrose 50% Injectable 25 Gram(s) IV Push once  aspirin enteric coated 81 milliGRAM(s) Oral daily  sertraline 200 milliGRAM(s) Oral daily  clopidogrel Tablet 75 milliGRAM(s) Oral daily  QUEtiapine 50 milliGRAM(s) Oral daily  simvastatin 40 milliGRAM(s) Oral at bedtime  amLODIPine   Tablet 10 milliGRAM(s) Oral daily  insulin glargine Injectable (LANTUS) 10 Unit(s) SubCutaneous at bedtime  gabapentin 300 milliGRAM(s) Oral at bedtime  carvedilol 12.5 milliGRAM(s) Oral every 12 hours      Allergies  Seafood (Unknown)    SOCIAL HISTORY:  Denies ETOh,Smoking,     FAMILY HISTORY:  Family history of heart attack (Father)      REVIEW OF SYSTEMS:  CONSTITUTIONAL: No weakness, fevers or chills  EYES/ENT: No visual changes;  No vertigo or throat pain   NECK: No pain or stiffness  RESPIRATORY: (+)SOB, (+)cough - chronic  CARDIOVASCULAR: No chest pain or palpitations; (+)dizziness, (+)swelling  GASTROINTESTINAL: No abdominal or epigastric pain. No nausea, vomiting, or hematemesis; No diarrhea or constipation. No melena or hematochezia.  GENITOURINARY: No dysuria, frequency or hematuria  NEUROLOGICAL: (+)left-sided weakness (chronic)  SKIN: No itching, burning, rashes, or lesions   MUSCULOSKELETAL: (+)back pain  All other review of systems is negative unless indicated above.    VITAL:  T(C): , Max: 36.8 (07-10-17 @ 05:35)  T(F): , Max: 98.2 (07-10-17 @ 05:35)  HR: 73 (07-10-17 @ 17:45)  BP: 146/88 (07-10-17 @ 17:45)  RR: 18 (07-10-17 @ 17:45)  SpO2: 99% (07-10-17 @ 17:45)      PHYSICAL EXAM:  Constitutional: Mildly demented, lethargic but alert, drooling.  HEENT: NCAT, DMM  Neck: Supple, No JVD  Respiratory: CTA-b/l  Cardiovascular: RRR s1s2, no m/r/g  Gastrointestinal: BS+, soft, NT/ND  Extremities: No peripheral edema b/l  Neurological: (+)left hemiparesis  Psychiatric: Flat affect  Back: no CVAT b/l  Skin: No rashes, no nevi    LABS:                        9.7    5.52  )-----------( 185      ( 10 Jul 2017 06:38 )             29.9     Na(143)/K(4.5)/Cl(109)/HCO3(20)/BUN(45)/Cr(4.59)Glu(203)/Ca(9.1)/Mg(1.7)/PO4(4.4)    07-10 @ 06:38  Na(140)/K(5.6)/Cl(105)/HCO3(19)/BUN(45)/Cr(4.70)Glu(306)/Ca(9.2)/Mg(--)/PO4(--)     @ 18:20    Urinalysis Basic - ( 2017 21:00 )    Color: COLORL / Appearance: CLEAR / S.008 / pH: 7.0  Gluc: 1000 / Ketone: NEGATIVE  / Bili: NEGATIVE / Urobili: NORMAL E.U.   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 Does not require dialysis at present, but will likely need it within the next several months. Needs to follow up chronically with a nephrologist from this point forward.    (2)CV - CHF, ?EF - awaiting TTE. Volume status seems acceptable for now    (3)Hyperkalemia - on admission - now improved. On renal diet.    (4Anemia - CKD-associated?    (5)Endo - poor glycemic control    RECOMMEND:    (1)Continue low-K+ diet for now  (2)No ACEI/No ARB  (3)F/u TTE  (4)Endo eval inpt vs outpt  (5)Could check iron studies while admitted  (6)Dose new meds for GFR 15ml/min  (7)Could f/u at my office 2-4 weeks post discharge.    Discussed with mother regarding patient's likely need for dialysis within next several months - will discuss this with patient within next couple days, hopefully at which time she will be more capable of absorbing this information.    Thank you for involving me in this patient's care.    With warm regards,    Wale Francisco MD  [facility]  [phone]

## 2017-07-10 NOTE — DISCHARGE NOTE ADULT - MEDICATION SUMMARY - MEDICATIONS TO TAKE
I will START or STAY ON the medications listed below when I get home from the hospital:    aspirin 81 mg oral delayed release tablet  -- 1 tab(s) by mouth once a day  -- Indication: For Need for prophylactic measure    gabapentin 300 mg oral capsule  -- 1 cap(s) by mouth once a day (at bedtime)  -- Indication: For Neuropathy    sertraline 100 mg oral tablet  -- 2 tab(s) by mouth once a day  -- Indication: For Depression    insulin glargine  -- 12 unit(s) subcutaneous once a day (at bedtime)  -- Indication: For DM (diabetes mellitus)    simvastatin 40 mg oral tablet  -- 1 tab(s) by mouth once a day (at bedtime)  -- Indication: For Hyperlipidemia    clopidogrel 75 mg oral tablet  -- 1 tab(s) by mouth once a day  -- Indication: For Coronary artery disease involving native coronary artery of native heart without angina pectoris    QUEtiapine 50 mg oral tablet  -- 1 tab(s) by mouth once a day  -- Indication: For Depression    carvedilol 12.5 mg oral tablet  -- 1 tab(s) by mouth every 12 hours  -- Indication: For Essential hypertension    amLODIPine 10 mg oral tablet  -- 1 tab(s) by mouth once a day  -- Indication: For Essential hypertension    pantoprazole 40 mg oral delayed release tablet  -- 1 tab(s) by mouth once a day (before a meal)  -- Indication: For Gerd    tolterodine 4 mg oral capsule, extended release  -- 1 cap(s) by mouth once a day  -- Indication: For Urinary Spasms    ergocalciferol 50,000 intl units (1.25 mg) oral capsule  -- 1 cap(s) by mouth once a week  -- Indication: For Supplement I will START or STAY ON the medications listed below when I get home from the hospital:    Outpatient Physical Therapy  -- Indication: For weakness    aspirin 81 mg oral delayed release tablet  -- 1 tab(s) by mouth once a day  -- Indication: For Need for prophylactic measure    gabapentin 300 mg oral capsule  -- 1 cap(s) by mouth once a day (at bedtime)  -- Indication: For Neuropathy    sertraline 100 mg oral tablet  -- 2 tab(s) by mouth once a day  -- Indication: For Depression    insulin glargine  -- 12 unit(s) subcutaneous once a day (at bedtime)  -- Indication: For DM (diabetes mellitus)    simvastatin 40 mg oral tablet  -- 1 tab(s) by mouth once a day (at bedtime)  -- Indication: For Hyperlipidemia    clopidogrel 75 mg oral tablet  -- 1 tab(s) by mouth once a day  -- Indication: For Coronary artery disease involving native coronary artery of native heart without angina pectoris    QUEtiapine 50 mg oral tablet  -- 1 tab(s) by mouth once a day  -- Indication: For Depression    carvedilol 12.5 mg oral tablet  -- 1 tab(s) by mouth every 12 hours  -- Indication: For Essential hypertension    amLODIPine 10 mg oral tablet  -- 1 tab(s) by mouth once a day  -- Indication: For Essential hypertension    pantoprazole 40 mg oral delayed release tablet  -- 1 tab(s) by mouth once a day (before a meal)  -- Indication: For Gerd    tolterodine 4 mg oral capsule, extended release  -- 1 cap(s) by mouth once a day  -- Indication: For Urinary Spasms    ergocalciferol 50,000 intl units (1.25 mg) oral capsule  -- 1 cap(s) by mouth once a week  -- Indication: For Supplement

## 2017-07-10 NOTE — DISCHARGE NOTE ADULT - PLAN OF CARE
Follow up with your Primary Care Doctor. Atypical. Not likely Cardiac. Monitor blood pressure. Continue medications as prescribed. Low sodium diet. Monitor finger sticks. Continue Insulin as prescribed. Low sugar diet. Continue medications as prescribed. Avoid Nephrotoxic Agents. Follow up with your Nephrologist Continue to hold Torsemide for now secondary to ABHI. Follow up with your Cardiologist  Low sodium diet. Hold allopurinol for now secondary to ABHI

## 2017-07-10 NOTE — PHYSICAL THERAPY INITIAL EVALUATION ADULT - ADDITIONAL COMMENTS
Pt reports that she lives in a private house with her family with ~4STE; and a flight of stairs to negotiate to bedroom. Prior to hospital admission pt ambulated using single axis cane both independently/with assistance. Pt had a Home Health aide 5 days a week (Monday-Friday) from the hours of 9AM-12PM; however has been without Home Health aide for 2 weeks now. Pt reports that her most recent fall was in April of 2017.    Pt left comfortable in bed, NAD, all lines intact, all precautions maintained, with call bell in reach, bed alarm on, and RN aware of PT evaluation.

## 2017-07-10 NOTE — PROGRESS NOTE ADULT - PROBLEM SELECTOR PLAN 2
may have been a mechanical fall.  However, given prodrome of lightheadedness and dizziness, must rule out cardiac etiology, especially with known CAD and CHF. Unlikely vasovagal (no hypotension) or neurologic/ seizure related. Head CT negative.   - Will check second set of cardiac enzymes  - Echocardiogram to assess for structural heart disease   - Fall precautions  - Tylenol as needed for pain

## 2017-07-10 NOTE — PHYSICAL THERAPY INITIAL EVALUATION ADULT - PATIENT PROFILE REVIEW, REHAB EVAL
yes/ACTIVITY: Ambulate with Assistance; spoke with JACQUELYN Joel prior to PT evaluation--> Pt OK for ambulation

## 2017-07-10 NOTE — PROGRESS NOTE ADULT - PROBLEM SELECTOR PLAN 1
May be chronic kidney disease as patient reports she has known kidney disease. FeUrea 59.6 suggestive of intrinsic disease. Renal failure may be due to HTN or DM  - Renal ultrasound confirms intrinsic disease - likely chronic.  - S/P 1 liter NS bolus in the ED  - Will monitor off Torsemide.  - Nephrology consult

## 2017-07-10 NOTE — DISCHARGE NOTE ADULT - MEDICATION SUMMARY - MEDICATIONS TO STOP TAKING
I will STOP taking the medications listed below when I get home from the hospital:    torsemide 20 mg oral tablet  -- 1 tab(s) by mouth 2 times a day    allopurinol 100 mg oral tablet  -- 1 tab(s) by mouth once a day    famotidine 20 mg oral tablet  -- 1 tab(s) by mouth 2 times a day

## 2017-07-10 NOTE — DISCHARGE NOTE ADULT - SECONDARY DIAGNOSIS.
Essential hypertension DM (diabetes mellitus) Depression ABHI (acute kidney injury) Chronic congestive heart failure, unspecified congestive heart failure type Gout

## 2017-07-11 LAB
BASOPHILS # BLD AUTO: 0.05 K/UL — SIGNIFICANT CHANGE UP (ref 0–0.2)
BASOPHILS NFR BLD AUTO: 0.9 % — SIGNIFICANT CHANGE UP (ref 0–2)
BUN SERPL-MCNC: 46 MG/DL — HIGH (ref 7–23)
CALCIUM SERPL-MCNC: 9 MG/DL — SIGNIFICANT CHANGE UP (ref 8.4–10.5)
CHLORIDE SERPL-SCNC: 107 MMOL/L — SIGNIFICANT CHANGE UP (ref 98–107)
CO2 SERPL-SCNC: 22 MMOL/L — SIGNIFICANT CHANGE UP (ref 22–31)
CREAT SERPL-MCNC: 4.8 MG/DL — HIGH (ref 0.5–1.3)
EOSINOPHIL # BLD AUTO: 0.32 K/UL — SIGNIFICANT CHANGE UP (ref 0–0.5)
EOSINOPHIL NFR BLD AUTO: 6 % — SIGNIFICANT CHANGE UP (ref 0–6)
FERRITIN SERPL-MCNC: 280.8 NG/ML — HIGH (ref 15–150)
GLUCOSE SERPL-MCNC: 123 MG/DL — HIGH (ref 70–99)
HCT VFR BLD CALC: 35.4 % — SIGNIFICANT CHANGE UP (ref 34.5–45)
HGB BLD-MCNC: 11.2 G/DL — LOW (ref 11.5–15.5)
IMM GRANULOCYTES # BLD AUTO: 0.01 # — SIGNIFICANT CHANGE UP
IMM GRANULOCYTES NFR BLD AUTO: 0.2 % — SIGNIFICANT CHANGE UP (ref 0–1.5)
IRON SATN MFR SERPL: 207 UG/DL — SIGNIFICANT CHANGE UP (ref 140–530)
IRON SATN MFR SERPL: 69 UG/DL — SIGNIFICANT CHANGE UP (ref 30–160)
LYMPHOCYTES # BLD AUTO: 1.8 K/UL — SIGNIFICANT CHANGE UP (ref 1–3.3)
LYMPHOCYTES # BLD AUTO: 33.5 % — SIGNIFICANT CHANGE UP (ref 13–44)
MAGNESIUM SERPL-MCNC: 1.8 MG/DL — SIGNIFICANT CHANGE UP (ref 1.6–2.6)
MCHC RBC-ENTMCNC: 27.4 PG — SIGNIFICANT CHANGE UP (ref 27–34)
MCHC RBC-ENTMCNC: 31.6 % — LOW (ref 32–36)
MCV RBC AUTO: 86.6 FL — SIGNIFICANT CHANGE UP (ref 80–100)
MONOCYTES # BLD AUTO: 0.32 K/UL — SIGNIFICANT CHANGE UP (ref 0–0.9)
MONOCYTES NFR BLD AUTO: 6 % — SIGNIFICANT CHANGE UP (ref 2–14)
NEUTROPHILS # BLD AUTO: 2.87 K/UL — SIGNIFICANT CHANGE UP (ref 1.8–7.4)
NEUTROPHILS NFR BLD AUTO: 53.4 % — SIGNIFICANT CHANGE UP (ref 43–77)
NRBC # FLD: 0 — SIGNIFICANT CHANGE UP
PLATELET # BLD AUTO: 221 K/UL — SIGNIFICANT CHANGE UP (ref 150–400)
PMV BLD: 11.2 FL — SIGNIFICANT CHANGE UP (ref 7–13)
POTASSIUM SERPL-MCNC: 4.7 MMOL/L — SIGNIFICANT CHANGE UP (ref 3.5–5.3)
POTASSIUM SERPL-SCNC: 4.7 MMOL/L — SIGNIFICANT CHANGE UP (ref 3.5–5.3)
RBC # BLD: 4.09 M/UL — SIGNIFICANT CHANGE UP (ref 3.8–5.2)
RBC # FLD: 12.7 % — SIGNIFICANT CHANGE UP (ref 10.3–14.5)
SODIUM SERPL-SCNC: 144 MMOL/L — SIGNIFICANT CHANGE UP (ref 135–145)
TSH SERPL-MCNC: 1.15 UIU/ML — SIGNIFICANT CHANGE UP (ref 0.27–4.2)
UIBC SERPL-MCNC: 138 UG/DL — SIGNIFICANT CHANGE UP (ref 110–370)
WBC # BLD: 5.37 K/UL — SIGNIFICANT CHANGE UP (ref 3.8–10.5)
WBC # FLD AUTO: 5.37 K/UL — SIGNIFICANT CHANGE UP (ref 3.8–10.5)

## 2017-07-11 PROCEDURE — 99233 SBSQ HOSP IP/OBS HIGH 50: CPT

## 2017-07-11 RX ADMIN — Medication 81 MILLIGRAM(S): at 12:06

## 2017-07-11 RX ADMIN — Medication 650 MILLIGRAM(S): at 14:01

## 2017-07-11 RX ADMIN — Medication 650 MILLIGRAM(S): at 14:45

## 2017-07-11 RX ADMIN — SIMVASTATIN 40 MILLIGRAM(S): 20 TABLET, FILM COATED ORAL at 21:50

## 2017-07-11 RX ADMIN — CARVEDILOL PHOSPHATE 12.5 MILLIGRAM(S): 80 CAPSULE, EXTENDED RELEASE ORAL at 16:54

## 2017-07-11 RX ADMIN — SERTRALINE 200 MILLIGRAM(S): 25 TABLET, FILM COATED ORAL at 12:05

## 2017-07-11 RX ADMIN — INSULIN GLARGINE 10 UNIT(S): 100 INJECTION, SOLUTION SUBCUTANEOUS at 21:50

## 2017-07-11 RX ADMIN — HEPARIN SODIUM 5000 UNIT(S): 5000 INJECTION INTRAVENOUS; SUBCUTANEOUS at 21:50

## 2017-07-11 RX ADMIN — GABAPENTIN 300 MILLIGRAM(S): 400 CAPSULE ORAL at 21:50

## 2017-07-11 RX ADMIN — HEPARIN SODIUM 5000 UNIT(S): 5000 INJECTION INTRAVENOUS; SUBCUTANEOUS at 14:01

## 2017-07-11 RX ADMIN — CARVEDILOL PHOSPHATE 12.5 MILLIGRAM(S): 80 CAPSULE, EXTENDED RELEASE ORAL at 05:14

## 2017-07-11 RX ADMIN — CLOPIDOGREL BISULFATE 75 MILLIGRAM(S): 75 TABLET, FILM COATED ORAL at 12:06

## 2017-07-11 RX ADMIN — AMLODIPINE BESYLATE 10 MILLIGRAM(S): 2.5 TABLET ORAL at 05:16

## 2017-07-11 RX ADMIN — QUETIAPINE FUMARATE 50 MILLIGRAM(S): 200 TABLET, FILM COATED ORAL at 12:06

## 2017-07-11 RX ADMIN — HEPARIN SODIUM 5000 UNIT(S): 5000 INJECTION INTRAVENOUS; SUBCUTANEOUS at 05:15

## 2017-07-11 NOTE — PROGRESS NOTE ADULT - PROBLEM SELECTOR PLAN 5
- Uncontrolled, with significant glucosauria  - Patient is unaware of insulin dosage, continue Lantus 10   - Continue with ISS and FS  - A1c high  - Will give gabapentin 300 once a day (instead of three times a day for renal dosing)

## 2017-07-11 NOTE — PROGRESS NOTE ADULT - PROBLEM SELECTOR PLAN 1
May be chronic kidney disease as patient reports she has known kidney disease. FeUrea 59.6 suggestive of intrinsic disease. Renal failure may be due to HTN or DM  - Renal ultrasound confirms intrinsic disease - likely chronic.  - Will monitor off Torsemide.  - Nephrology consult appreciated  - avoid nephrotoxic agents.

## 2017-07-11 NOTE — PROGRESS NOTE ADULT - SUBJECTIVE AND OBJECTIVE BOX
CC: Follow up for syncope    SUBJECTIVE / OVERNIGHT EVENTS:  Feels more stronger today compared to yesterday.  More energetic.   No F/C, N/V, CP, SOB, Cough, lightheadedness, dizziness, abdominal pain, diarrhea, dysuria.    MEDICATIONS  (STANDING):  heparin  Injectable 5000 Unit(s) SubCutaneous every 8 hours  insulin lispro (HumaLOG) corrective regimen sliding scale   SubCutaneous three times a day before meals  insulin lispro (HumaLOG) corrective regimen sliding scale   SubCutaneous at bedtime  dextrose 5%. 1000 milliLiter(s) (50 mL/Hr) IV Continuous <Continuous>  dextrose 50% Injectable 12.5 Gram(s) IV Push once  dextrose 50% Injectable 25 Gram(s) IV Push once  dextrose 50% Injectable 25 Gram(s) IV Push once  aspirin enteric coated 81 milliGRAM(s) Oral daily  sertraline 200 milliGRAM(s) Oral daily  clopidogrel Tablet 75 milliGRAM(s) Oral daily  QUEtiapine 50 milliGRAM(s) Oral daily  simvastatin 40 milliGRAM(s) Oral at bedtime  amLODIPine   Tablet 10 milliGRAM(s) Oral daily  insulin glargine Injectable (LANTUS) 10 Unit(s) SubCutaneous at bedtime  gabapentin 300 milliGRAM(s) Oral at bedtime  carvedilol 12.5 milliGRAM(s) Oral every 12 hours    MEDICATIONS  (PRN):  dextrose Gel 1 Dose(s) Oral once PRN Blood Glucose LESS THAN 70 milliGRAM(s)/deciliter  glucagon  Injectable 1 milliGRAM(s) IntraMuscular once PRN Glucose LESS THAN 70 milligrams/deciliter  acetaminophen   Tablet. 650 milliGRAM(s) Oral every 6 hours PRN mild to severe pain      Vital Signs Last 24 Hrs  T(C): 36.6 (17 @ 14:19), Max: 36.7 (17 @ 05:13)  HR: 74 (17 @ 14:19) (70 - 74)  BP: 133/84 (17 @ 14:19) (133/84 - 161/98)  RR: 18 (17 @ 14:19) (18 - 18)  SpO2: 100% (17 @ 14:19) (99% - 100%)  CAPILLARY BLOOD GLUCOSE  132 (2017 12:36)  130 (2017 08:55)  192 (10 Jul 2017 22:35)  193 (10 Jul 2017 17:45)        I&O's Summary    10 Jul 2017 07:01  -  2017 07:00  --------------------------------------------------------  IN: 450 mL / OUT: 300 mL / NET: 150 mL        PHYSICAL EXAM:  GENERAL: NAD, well-developed  HEAD:  Atraumatic, Normocephalic  EYES: EOMI, PERRLA, conjunctiva and sclera clear  NECK: Supple, No JVD  CHEST/LUNG: Clear to auscultation bilaterally; No wheeze  HEART: Regular rate and rhythm; No murmurs, rubs, or gallops  ABDOMEN: Soft, Nontender, Nondistended; Bowel sounds present  EXTREMITIES:  2+ Peripheral Pulses, No clubbing, cyanosis, or edema  PSYCH: Calm  NEUROLOGY: A/Ox3,  SKIN: No rashes or lesions    LABS:                        11.2   5.37  )-----------( 221      ( 2017 11:25 )             35.4     07-11    144  |  107  |  46<H>  ----------------------------<  123<H>  4.7   |  22  |  4.80<H>    Ca    9.0      2017 06:50  Phos  4.4     07-10  Mg     1.8     07-11    TPro  7.2  /  Alb  3.3  /  TBili  0.2  /  DBili  x   /  AST  13  /  ALT  11  /  AlkPhos  107  07-09      CARDIAC MARKERS ( 10 Jul 2017 00:50 )  x     / < 0.06 ng/mL / 142 u/L / 2.88 ng/mL / x      CARDIAC MARKERS ( 2017 18:20 )  x     / < 0.06 ng/mL / 87 u/L / 2.73 ng/mL / x          Urinalysis Basic - ( 2017 21:00 )    Color: COLORL / Appearance: CLEAR / S.008 / pH: 7.0  Gluc: 1000 / Ketone: NEGATIVE  / Bili: NEGATIVE / Urobili: NORMAL E.U.   Blood: SMALL / Protein: >600 / Nitrite: NEGATIVE   Leuk Esterase: NEGATIVE / RBC: 0-2 / WBC 2-5   Sq Epi: OCC / Non Sq Epi: x / Bacteria: FEW        RADIOLOGY & ADDITIONAL TESTS:    Imaging Personally Reviewed:    Consultant(s) Notes Reviewed:      Care Discussed with Consultants/Other Providers:

## 2017-07-11 NOTE — PROGRESS NOTE ADULT - PROBLEM SELECTOR PLAN 2
may have been a mechanical fall.  However, given prodrome of lightheadedness and dizziness, must rule out cardiac etiology, especially with known CAD and CHF. Unlikely vasovagal (no hypotension) or neurologic/ seizure related. Head CT negative.   - Echocardiogram to assess for structural heart disease   - Fall precautions  - Tylenol as needed for pain  PT eval - FRANKIE once medically optimized for discharge.

## 2017-07-11 NOTE — PROGRESS NOTE ADULT - SUBJECTIVE AND OBJECTIVE BOX
No pain, no shortness of breath      MEDICATIONS  (STANDING):  heparin  Injectable 5000 Unit(s) SubCutaneous every 8 hours  insulin lispro (HumaLOG) corrective regimen sliding scale   SubCutaneous three times a day before meals  insulin lispro (HumaLOG) corrective regimen sliding scale   SubCutaneous at bedtime  dextrose 5%. 1000 milliLiter(s) (50 mL/Hr) IV Continuous <Continuous>  dextrose 50% Injectable 12.5 Gram(s) IV Push once  dextrose 50% Injectable 25 Gram(s) IV Push once  dextrose 50% Injectable 25 Gram(s) IV Push once  aspirin enteric coated 81 milliGRAM(s) Oral daily  sertraline 200 milliGRAM(s) Oral daily  clopidogrel Tablet 75 milliGRAM(s) Oral daily  QUEtiapine 50 milliGRAM(s) Oral daily  simvastatin 40 milliGRAM(s) Oral at bedtime  amLODIPine   Tablet 10 milliGRAM(s) Oral daily  insulin glargine Injectable (LANTUS) 10 Unit(s) SubCutaneous at bedtime  gabapentin 300 milliGRAM(s) Oral at bedtime  carvedilol 12.5 milliGRAM(s) Oral every 12 hours      VITAL:  T(C): , Max: 36.7 (17 @ 05:13)  T(F): , Max: 98 (17 @ 05:13)  HR: 71 (17 @ 05:13)  BP: 140/84 (17 @ 05:13)  RR: 18 (17 @ 05:13)  SpO2: 100% (17 @ 05:13)      PHYSICAL EXAM:  Constitutional: Mildly demented, lethargic but alert, drooling.  HEENT: NCAT, DMM  Neck: Supple, No JVD  Respiratory: CTA-b/l  Cardiovascular: RRR s1s2, no m/r/g  Gastrointestinal: BS+, soft, NT/ND  Extremities: No peripheral edema b/l      LABS:                        9.7    5.52  )-----------( 185      ( 10 Jul 2017 06:38 )             29.9     Na(143)/K(4.5)/Cl(109)/HCO3(20)/BUN(45)/Cr(4.59)Glu(203)/Ca(9.1)/Mg(1.7)/PO4(4.4)    07-10 @ 06:38  Na(140)/K(5.6)/Cl(105)/HCO3(19)/BUN(45)/Cr(4.70)Glu(306)/Ca(9.2)/Mg(--)/PO4(--)     @ 18:20    Urinalysis Basic - ( 2017 21:00 )    Color: COLORL / Appearance: CLEAR / S.008 / pH: 7.0  Gluc: 1000 / Ketone: NEGATIVE  / Bili: NEGATIVE / Urobili: NORMAL E.U.   Blood: SMALL / Protein: >600 / Nitrite: NEGATIVE   Leuk Esterase: NEGATIVE / RBC: 0-2 / WBC 2-5   Sq Epi: OCC / Non Sq Epi: x / Bacteria: FEW      Creatinine, Random Urine: 53.38 mg/dL ( @ 21:00)  Sodium, Random Urine: 104 meq/L ( @ 21:00)  Protein, Random Urine: 481.5 mg/dL ( @ 21:00)  Chloride, Random Urine: 83 mmol/L ( @ 21:00)          IMPRESSION: 52F w/ HTN, DM2, CVA, CAD, HF?EF, and CKD4-5, 17 admitted s/p mechanical fall.    (1)Renal - CKD4-5; GFR ~15ml/min. Does not require dialysis at present, but will likely need it within the next several months. Needs to follow up chronically with a nephrologist from this point forward.    (2)CV - CHF, ?EF - awaiting TTE. Volume status seems acceptable for now    (3)Hyperkalemia - on admission - improved as of yesterday; labs today are pending.    (4Anemia - CKD-associated? Iron studies pending.    (5)Endo - poor glycemic control    RECOMMEND:    (1)Continue low-K+ diet for now  (2)No ACEI/No ARB  (3)F/u TTE  (4)Endo eval inpt vs outpt  (5)F/U iron studies  (6)Dose new meds for GFR 15ml/min  (7)Could f/u at my office 2-4 weeks post discharge.      Wale Francisco MD  [facility]  [phone] No pain, no shortness of breath      VITAL:  T(C): , Max: 36.7 (17 @ 05:13)  T(F): , Max: 98 (17 @ 05:13)  HR: 71 (17 @ 05:13)  BP: 140/84 (17 @ 05:13)  RR: 18 (17 @ 05:13)  SpO2: 100% (17 @ 05:13)      PHYSICAL EXAM:  Constitutional: More alert/energetic than yesterday.   HEENT: NCAT, DMM  Neck: Supple, No JVD  Respiratory: CTA-b/l  Cardiovascular: RRR s1s2, no m/r/g  Gastrointestinal: BS+, soft, NT/ND  Extremities: No peripheral edema b/l      LABS:                        9.7    5.52  )-----------( 185      ( 10 Jul 2017 06:38 )             29.9     Na(143)/K(4.5)/Cl(109)/HCO3(20)/BUN(45)/Cr(4.59)Glu(203)/Ca(9.1)/Mg(1.7)/PO4(4.4)    07-10 @ 06:38  Na(140)/K(5.6)/Cl(105)/HCO3(19)/BUN(45)/Cr(4.70)Glu(306)/Ca(9.2)/Mg(--)/PO4(--)     @ 18:20    Urinalysis Basic - ( 2017 21:00 )    Color: COLORL / Appearance: CLEAR / S.008 / pH: 7.0  Gluc: 1000 / Ketone: NEGATIVE  / Bili: NEGATIVE / Urobili: NORMAL E.U.   Blood: SMALL / Protein: >600 / Nitrite: NEGATIVE   Leuk Esterase: NEGATIVE / RBC: 0-2 / WBC 2-5   Sq Epi: OCC / Non Sq Epi: x / Bacteria: FEW      Creatinine, Random Urine: 53.38 mg/dL ( @ 21:00)  Sodium, Random Urine: 104 meq/L ( @ 21:00)  Protein, Random Urine: 481.5 mg/dL ( @ 21:00)  Chloride, Random Urine: 83 mmol/L ( @ 21:00)          IMPRESSION: 52F w/ HTN, DM2, CVA, CAD, HF?EF, and CKD4-5, 17 admitted s/p mechanical fall.    (1)Renal - CKD4-5; GFR ~15ml/min. Does not require dialysis at present, but will likely need it within the next several months. Needs to follow up chronically with a nephrologist from this point forward. Discussed with patient today regarding likely need for HD soon, and regarding benefit of AVF placement sooner rather than later.    (2)CV - CHF, ?EF - awaiting TTE. Volume status seems acceptable for now    (3)Hyperkalemia - on admission - improved as of yesterday; labs today are pending.    (4Anemia - CKD-associated? Iron studies pending.    (5)Endo - poor glycemic control    RECOMMEND:    (1)Continue low-K+ diet for now  (2)No ACEI/No ARB  (3)F/u TTE  (4)Endo eval inpt vs outpt  (5)F/U iron studies  (6)Dose new meds for GFR 15ml/min  (7)Could f/u at my office 2-4 weeks post discharge.      Wale Francisco MD  [facility]  [phone]

## 2017-07-12 LAB
24R-OH-CALCIDIOL SERPL-MCNC: 11.8 NG/ML — LOW (ref 30–100)
BASOPHILS # BLD AUTO: 0.03 K/UL — SIGNIFICANT CHANGE UP (ref 0–0.2)
BASOPHILS NFR BLD AUTO: 0.6 % — SIGNIFICANT CHANGE UP (ref 0–2)
BUN SERPL-MCNC: 52 MG/DL — HIGH (ref 7–23)
CALCIUM SERPL-MCNC: 8.8 MG/DL — SIGNIFICANT CHANGE UP (ref 8.4–10.5)
CHLORIDE SERPL-SCNC: 104 MMOL/L — SIGNIFICANT CHANGE UP (ref 98–107)
CHLORIDE UR-SCNC: 32 MMOL/L — SIGNIFICANT CHANGE UP
CO2 SERPL-SCNC: 21 MMOL/L — LOW (ref 22–31)
CREAT ?TM UR-MCNC: 81.56 MG/DL — SIGNIFICANT CHANGE UP
CREAT SERPL-MCNC: 5.27 MG/DL — HIGH (ref 0.5–1.3)
EOSINOPHIL # BLD AUTO: 0.29 K/UL — SIGNIFICANT CHANGE UP (ref 0–0.5)
EOSINOPHIL NFR BLD AUTO: 5.7 % — SIGNIFICANT CHANGE UP (ref 0–6)
GLUCOSE SERPL-MCNC: 135 MG/DL — HIGH (ref 70–99)
HCT VFR BLD CALC: 32.1 % — LOW (ref 34.5–45)
HGB BLD-MCNC: 10.2 G/DL — LOW (ref 11.5–15.5)
IMM GRANULOCYTES # BLD AUTO: 0.01 # — SIGNIFICANT CHANGE UP
IMM GRANULOCYTES NFR BLD AUTO: 0.2 % — SIGNIFICANT CHANGE UP (ref 0–1.5)
LYMPHOCYTES # BLD AUTO: 2.43 K/UL — SIGNIFICANT CHANGE UP (ref 1–3.3)
LYMPHOCYTES # BLD AUTO: 47.6 % — HIGH (ref 13–44)
MAGNESIUM SERPL-MCNC: 1.7 MG/DL — SIGNIFICANT CHANGE UP (ref 1.6–2.6)
MCHC RBC-ENTMCNC: 27.6 PG — SIGNIFICANT CHANGE UP (ref 27–34)
MCHC RBC-ENTMCNC: 31.8 % — LOW (ref 32–36)
MCV RBC AUTO: 86.8 FL — SIGNIFICANT CHANGE UP (ref 80–100)
MONOCYTES # BLD AUTO: 0.37 K/UL — SIGNIFICANT CHANGE UP (ref 0–0.9)
MONOCYTES NFR BLD AUTO: 7.3 % — SIGNIFICANT CHANGE UP (ref 2–14)
NEUTROPHILS # BLD AUTO: 1.97 K/UL — SIGNIFICANT CHANGE UP (ref 1.8–7.4)
NEUTROPHILS NFR BLD AUTO: 38.6 % — LOW (ref 43–77)
NRBC # FLD: 0 — SIGNIFICANT CHANGE UP
PLATELET # BLD AUTO: 190 K/UL — SIGNIFICANT CHANGE UP (ref 150–400)
PMV BLD: 11.3 FL — SIGNIFICANT CHANGE UP (ref 7–13)
POTASSIUM SERPL-MCNC: 4.4 MMOL/L — SIGNIFICANT CHANGE UP (ref 3.5–5.3)
POTASSIUM SERPL-SCNC: 4.4 MMOL/L — SIGNIFICANT CHANGE UP (ref 3.5–5.3)
POTASSIUM UR-SCNC: 22.1 MEQ/L — SIGNIFICANT CHANGE UP
RBC # BLD: 3.7 M/UL — LOW (ref 3.8–5.2)
RBC # FLD: 12.8 % — SIGNIFICANT CHANGE UP (ref 10.3–14.5)
SODIUM SERPL-SCNC: 140 MMOL/L — SIGNIFICANT CHANGE UP (ref 135–145)
SODIUM UR-SCNC: 49 MEQ/L — SIGNIFICANT CHANGE UP
WBC # BLD: 5.1 K/UL — SIGNIFICANT CHANGE UP (ref 3.8–10.5)
WBC # FLD AUTO: 5.1 K/UL — SIGNIFICANT CHANGE UP (ref 3.8–10.5)

## 2017-07-12 PROCEDURE — 99232 SBSQ HOSP IP/OBS MODERATE 35: CPT

## 2017-07-12 RX ORDER — SODIUM CHLORIDE 9 MG/ML
1000 INJECTION INTRAMUSCULAR; INTRAVENOUS; SUBCUTANEOUS
Qty: 0 | Refills: 0 | Status: DISCONTINUED | OUTPATIENT
Start: 2017-07-12 | End: 2017-07-14

## 2017-07-12 RX ADMIN — INSULIN GLARGINE 10 UNIT(S): 100 INJECTION, SOLUTION SUBCUTANEOUS at 22:06

## 2017-07-12 RX ADMIN — QUETIAPINE FUMARATE 50 MILLIGRAM(S): 200 TABLET, FILM COATED ORAL at 12:13

## 2017-07-12 RX ADMIN — HEPARIN SODIUM 5000 UNIT(S): 5000 INJECTION INTRAVENOUS; SUBCUTANEOUS at 06:08

## 2017-07-12 RX ADMIN — SIMVASTATIN 40 MILLIGRAM(S): 20 TABLET, FILM COATED ORAL at 22:06

## 2017-07-12 RX ADMIN — SERTRALINE 200 MILLIGRAM(S): 25 TABLET, FILM COATED ORAL at 12:13

## 2017-07-12 RX ADMIN — GABAPENTIN 300 MILLIGRAM(S): 400 CAPSULE ORAL at 22:06

## 2017-07-12 RX ADMIN — CLOPIDOGREL BISULFATE 75 MILLIGRAM(S): 75 TABLET, FILM COATED ORAL at 12:13

## 2017-07-12 RX ADMIN — Medication 1: at 12:42

## 2017-07-12 RX ADMIN — SODIUM CHLORIDE 50 MILLILITER(S): 9 INJECTION INTRAMUSCULAR; INTRAVENOUS; SUBCUTANEOUS at 10:34

## 2017-07-12 RX ADMIN — CARVEDILOL PHOSPHATE 12.5 MILLIGRAM(S): 80 CAPSULE, EXTENDED RELEASE ORAL at 17:53

## 2017-07-12 RX ADMIN — Medication 81 MILLIGRAM(S): at 12:13

## 2017-07-12 RX ADMIN — HEPARIN SODIUM 5000 UNIT(S): 5000 INJECTION INTRAVENOUS; SUBCUTANEOUS at 12:13

## 2017-07-12 RX ADMIN — HEPARIN SODIUM 5000 UNIT(S): 5000 INJECTION INTRAVENOUS; SUBCUTANEOUS at 22:06

## 2017-07-12 RX ADMIN — AMLODIPINE BESYLATE 10 MILLIGRAM(S): 2.5 TABLET ORAL at 06:08

## 2017-07-12 RX ADMIN — CARVEDILOL PHOSPHATE 12.5 MILLIGRAM(S): 80 CAPSULE, EXTENDED RELEASE ORAL at 06:08

## 2017-07-12 NOTE — PROGRESS NOTE ADULT - PROBLEM SELECTOR PLAN 1
May be chronic kidney disease as patient reports she has known kidney disease. FeUrea 59.6 suggestive of intrinsic disease. Renal failure may be due to HTN or DM  - Renal ultrasound confirms intrinsic disease - likely chronic.- Will monitor off Torsemide. - Nephrology consult appreciated - avoid nephrotoxic agents.

## 2017-07-12 NOTE — PROGRESS NOTE ADULT - SUBJECTIVE AND OBJECTIVE BOX
CC: Follow up for ABHI and syncope.    SUBJECTIVE / OVERNIGHT EVENTS:  No new complaints.  No F/C, N/V, CP, SOB, Cough, lightheadedness, dizziness, abdominal pain, diarrhea, dysuria.    MEDICATIONS  (STANDING):  heparin  Injectable 5000 Unit(s) SubCutaneous every 8 hours  insulin lispro (HumaLOG) corrective regimen sliding scale   SubCutaneous three times a day before meals  insulin lispro (HumaLOG) corrective regimen sliding scale   SubCutaneous at bedtime  dextrose 5%. 1000 milliLiter(s) (50 mL/Hr) IV Continuous <Continuous>  dextrose 50% Injectable 12.5 Gram(s) IV Push once  dextrose 50% Injectable 25 Gram(s) IV Push once  dextrose 50% Injectable 25 Gram(s) IV Push once  aspirin enteric coated 81 milliGRAM(s) Oral daily  sertraline 200 milliGRAM(s) Oral daily  clopidogrel Tablet 75 milliGRAM(s) Oral daily  QUEtiapine 50 milliGRAM(s) Oral daily  simvastatin 40 milliGRAM(s) Oral at bedtime  amLODIPine   Tablet 10 milliGRAM(s) Oral daily  insulin glargine Injectable (LANTUS) 10 Unit(s) SubCutaneous at bedtime  gabapentin 300 milliGRAM(s) Oral at bedtime  carvedilol 12.5 milliGRAM(s) Oral every 12 hours  sodium chloride 0.9%. 1000 milliLiter(s) (50 mL/Hr) IV Continuous <Continuous>    MEDICATIONS  (PRN):  dextrose Gel 1 Dose(s) Oral once PRN Blood Glucose LESS THAN 70 milliGRAM(s)/deciliter  glucagon  Injectable 1 milliGRAM(s) IntraMuscular once PRN Glucose LESS THAN 70 milligrams/deciliter  acetaminophen   Tablet. 650 milliGRAM(s) Oral every 6 hours PRN mild to severe pain      Vital Signs Last 24 Hrs  T(C): 36.7 (07-12-17 @ 13:22), Max: 37.1 (07-11-17 @ 21:35)  HR: 75 (07-12-17 @ 13:22) (71 - 75)  BP: 124/83 (07-12-17 @ 13:22) (124/83 - 150/76)  RR: 18 (07-12-17 @ 13:22) (17 - 18)  SpO2: 100% (07-12-17 @ 13:22) (98% - 100%)  CAPILLARY BLOOD GLUCOSE  157 (12 Jul 2017 12:30)  144 (12 Jul 2017 08:51)  161 (11 Jul 2017 21:12)  144 (11 Jul 2017 17:16)        I&O's Summary      PHYSICAL EXAM:  GENERAL: NAD, well-developed  HEAD:  Atraumatic, Normocephalic  EYES: EOMI, PERRLA, conjunctiva and sclera clear  NECK: Supple, No JVD  CHEST/LUNG: Clear to auscultation bilaterally; No wheeze  HEART: Regular rate and rhythm; No murmurs, rubs, or gallops  ABDOMEN: Soft, Nontender, Nondistended; Bowel sounds present  EXTREMITIES:  2+ Peripheral Pulses, No clubbing, cyanosis, or edema  PSYCH: Calm  NEUROLOGY: A/Ox3,  SKIN: No rashes or lesions    LABS:                        10.2   5.10  )-----------( 190      ( 12 Jul 2017 06:45 )             32.1     07-12    140  |  104  |  52<H>  ----------------------------<  135<H>  4.4   |  21<L>  |  5.27<H>    Ca    8.8      12 Jul 2017 06:45  Mg     1.7     07-12                RADIOLOGY & ADDITIONAL TESTS:  CC:    SUBJECTIVE / OVERNIGHT EVENTS:    No F/C, N/V, CP, SOB, Cough, lightheadedness, dizziness, abdominal pain, diarrhea, dysuria.    MEDICATIONS  (STANDING):  heparin  Injectable 5000 Unit(s) SubCutaneous every 8 hours  insulin lispro (HumaLOG) corrective regimen sliding scale   SubCutaneous three times a day before meals  insulin lispro (HumaLOG) corrective regimen sliding scale   SubCutaneous at bedtime  dextrose 5%. 1000 milliLiter(s) (50 mL/Hr) IV Continuous <Continuous>  dextrose 50% Injectable 12.5 Gram(s) IV Push once  dextrose 50% Injectable 25 Gram(s) IV Push once  dextrose 50% Injectable 25 Gram(s) IV Push once  aspirin enteric coated 81 milliGRAM(s) Oral daily  sertraline 200 milliGRAM(s) Oral daily  clopidogrel Tablet 75 milliGRAM(s) Oral daily  QUEtiapine 50 milliGRAM(s) Oral daily  simvastatin 40 milliGRAM(s) Oral at bedtime  amLODIPine   Tablet 10 milliGRAM(s) Oral daily  insulin glargine Injectable (LANTUS) 10 Unit(s) SubCutaneous at bedtime  gabapentin 300 milliGRAM(s) Oral at bedtime  carvedilol 12.5 milliGRAM(s) Oral every 12 hours  sodium chloride 0.9%. 1000 milliLiter(s) (50 mL/Hr) IV Continuous <Continuous>    MEDICATIONS  (PRN):  dextrose Gel 1 Dose(s) Oral once PRN Blood Glucose LESS THAN 70 milliGRAM(s)/deciliter  glucagon  Injectable 1 milliGRAM(s) IntraMuscular once PRN Glucose LESS THAN 70 milligrams/deciliter  acetaminophen   Tablet. 650 milliGRAM(s) Oral every 6 hours PRN mild to severe pain      Vital Signs Last 24 Hrs  T(C): 36.7 (07-12-17 @ 13:22), Max: 37.1 (07-11-17 @ 21:35)  HR: 75 (07-12-17 @ 13:22) (71 - 75)  BP: 124/83 (07-12-17 @ 13:22) (124/83 - 150/76)  RR: 18 (07-12-17 @ 13:22) (17 - 18)  SpO2: 100% (07-12-17 @ 13:22) (98% - 100%)  CAPILLARY BLOOD GLUCOSE  157 (12 Jul 2017 12:30)  144 (12 Jul 2017 08:51)  161 (11 Jul 2017 21:12)  144 (11 Jul 2017 17:16)        I&O's Summary      PHYSICAL EXAM:  GENERAL: NAD, well-developed  HEAD:  Atraumatic, Normocephalic  EYES: EOMI, PERRLA, conjunctiva and sclera clear  NECK: Supple, No JVD  CHEST/LUNG: Clear to auscultation bilaterally; No wheeze  HEART: Regular rate and rhythm; No murmurs, rubs, or gallops  ABDOMEN: Soft, Nontender, Nondistended; Bowel sounds present  EXTREMITIES:  2+ Peripheral Pulses, No clubbing, cyanosis, or edema  PSYCH: Calm  NEUROLOGY: A/Ox3,  SKIN: No rashes or lesions    LABS:                        10.2   5.10  )-----------( 190      ( 12 Jul 2017 06:45 )             32.1     07-12    140  |  104  |  52<H>  ----------------------------<  135<H>  4.4   |  21<L>  |  5.27<H>    Ca    8.8      12 Jul 2017 06:45  Mg     1.7     07-12                RADIOLOGY & ADDITIONAL TESTS:    Imaging Personally Reviewed:    Consultant(s) Notes Reviewed:  Renal    Care Discussed with Consultants/Other Providers:     Imaging Personally Reviewed:    Consultant(s) Notes Reviewed:      Care Discussed with Consultants/Other Providers:

## 2017-07-12 NOTE — PROGRESS NOTE ADULT - PROBLEM SELECTOR PLAN 2
may have been a mechanical fall.  However, given prodrome of lightheadedness and dizziness, must rule out cardiac etiology, especially with known CAD and CHF. Unlikely vasovagal (no hypotension) or neurologic/ seizure related. Head CT negative.   - Echocardiogram to assess for structural heart disease - Fall precautions  - Tylenol as needed for pain. PT eval - FRANKIE once medically optimized for discharge.

## 2017-07-12 NOTE — PROGRESS NOTE ADULT - SUBJECTIVE AND OBJECTIVE BOX
No pain, no SOB    VITAL:  T(C): , Max: 37.1 (07-11-17 @ 21:35)  T(F): , Max: 98.7 (07-11-17 @ 21:35)  HR: 75 (07-12-17 @ 06:06)  BP: 150/76 (07-12-17 @ 06:06)  RR: 18 (07-12-17 @ 06:06)  SpO2: 99% (07-12-17 @ 06:06)      PHYSICAL EXAM:  Constitutional: More alert/energetic than yesterday.   HEENT: NCAT, DMM  Neck: Supple, No JVD  Respiratory: CTA-b/l  Cardiovascular: RRR s1s2, no m/r/g  Gastrointestinal: BS+, soft, NT/ND  Extremities: No peripheral edema b/l    LABS:                        10.2   5.10  )-----------( 190      ( 12 Jul 2017 06:45 )             32.1     Na(140)/K(4.4)/Cl(104)/HCO3(21)/BUN(52)/Cr(5.27)Glu(135)/Ca(8.8)/Mg(1.7)/PO4(--)    07-12 @ 06:45  Na(144)/K(4.7)/Cl(107)/HCO3(22)/BUN(46)/Cr(4.80)Glu(123)/Ca(9.0)/Mg(1.8)/PO4(--)    07-11 @ 06:50  Na(143)/K(4.5)/Cl(109)/HCO3(20)/BUN(45)/Cr(4.59)Glu(203)/Ca(9.1)/Mg(1.7)/PO4(4.4)    07-10 @ 06:38  Na(140)/K(5.6)/Cl(105)/HCO3(19)/BUN(45)/Cr(4.70)Glu(306)/Ca(9.2)/Mg(--)/PO4(--)    07-09 @ 18:20    TSat 69/207    IMPRESSION: 52F w/ HTN, DM2, CVA, CAD, HF?EF, and CKD4-5, 7/9/17 admitted s/p mechanical fall.    (1)Renal - CKD4-5; GFR ~15ml/min.  Superimposed mild ABHI based on bloodwork from today - likely prerenally mediated    (2)CV - CHF, ?EF - awaiting TTE. Hypovolemia leading to rising creatinine?    (3Anemia - High TSat - would be indicated for epogen if the Hb were low; however as of today it is up above 10.      RECOMMEND:    (1)F/u TTE  (2)NS 50cc/h x 10h                Wale Francisco MD  [facility]  [phone] No pain, no SOB    VITAL:  T(C): , Max: 37.1 (07-11-17 @ 21:35)  T(F): , Max: 98.7 (07-11-17 @ 21:35)  HR: 75 (07-12-17 @ 06:06)  BP: 150/76 (07-12-17 @ 06:06)  RR: 18 (07-12-17 @ 06:06)  SpO2: 99% (07-12-17 @ 06:06)      PHYSICAL EXAM:  Constitutional: More alert/energetic than yesterday.   HEENT: NCAT, DMM  Neck: Supple, No JVD  Respiratory: CTA-b/l  Cardiovascular: RRR s1s2, no m/r/g  Gastrointestinal: BS+, soft, NT/ND  Extremities: No peripheral edema b/l    LABS:                        10.2   5.10  )-----------( 190      ( 12 Jul 2017 06:45 )             32.1     Na(140)/K(4.4)/Cl(104)/HCO3(21)/BUN(52)/Cr(5.27)Glu(135)/Ca(8.8)/Mg(1.7)/PO4(--)    07-12 @ 06:45  Na(144)/K(4.7)/Cl(107)/HCO3(22)/BUN(46)/Cr(4.80)Glu(123)/Ca(9.0)/Mg(1.8)/PO4(--)    07-11 @ 06:50  Na(143)/K(4.5)/Cl(109)/HCO3(20)/BUN(45)/Cr(4.59)Glu(203)/Ca(9.1)/Mg(1.7)/PO4(4.4)    07-10 @ 06:38  Na(140)/K(5.6)/Cl(105)/HCO3(19)/BUN(45)/Cr(4.70)Glu(306)/Ca(9.2)/Mg(--)/PO4(--)    07-09 @ 18:20    TSat 69/207    IMPRESSION: 52F w/ HTN, DM2, CVA, CAD, HF?EF, and CKD4-5, 7/9/17 admitted s/p mechanical fall.    (1)Renal - CKD4-5; GFR ~15ml/min.  Superimposed mild ABHI based on bloodwork from today - likely prerenally mediated    (2)CV - CHF, ?EF - awaiting TTE. Hypovolemia leading to rising creatinine?    (3Anemia - High TSat - would be indicated for epogen if the Hb were low; however as of today it is up above 10.      RECOMMEND:    (1)F/u TTE  (2)NS 50cc/h x 10h  (3)Check urine lytes and creat            Wale Francisco MD  [facility]  [phone] No pain, no SOB    VITAL:  T(C): , Max: 37.1 (07-11-17 @ 21:35)  T(F): , Max: 98.7 (07-11-17 @ 21:35)  HR: 75 (07-12-17 @ 06:06)  BP: 150/76 (07-12-17 @ 06:06)  RR: 18 (07-12-17 @ 06:06)  SpO2: 99% (07-12-17 @ 06:06)      PHYSICAL EXAM:  Constitutional: NAD, obese  HEENT: NCAT, DMM  Neck: Supple, No JVD  Respiratory: CTA-b/l  Cardiovascular: RRR s1s2, no m/r/g  Gastrointestinal: BS+, soft, NT/ND  Extremities: No peripheral edema b/l    LABS:                        10.2   5.10  )-----------( 190      ( 12 Jul 2017 06:45 )             32.1     Na(140)/K(4.4)/Cl(104)/HCO3(21)/BUN(52)/Cr(5.27)Glu(135)/Ca(8.8)/Mg(1.7)/PO4(--)    07-12 @ 06:45  Na(144)/K(4.7)/Cl(107)/HCO3(22)/BUN(46)/Cr(4.80)Glu(123)/Ca(9.0)/Mg(1.8)/PO4(--)    07-11 @ 06:50  Na(143)/K(4.5)/Cl(109)/HCO3(20)/BUN(45)/Cr(4.59)Glu(203)/Ca(9.1)/Mg(1.7)/PO4(4.4)    07-10 @ 06:38  Na(140)/K(5.6)/Cl(105)/HCO3(19)/BUN(45)/Cr(4.70)Glu(306)/Ca(9.2)/Mg(--)/PO4(--)    07-09 @ 18:20    TSat 69/207    IMPRESSION: 52F w/ HTN, DM2, CVA, CAD, HF?EF, and CKD4-5, 7/9/17 admitted s/p mechanical fall.    (1)Renal - CKD4-5; GFR ~15ml/min.  Superimposed mild ABHI based on bloodwork from today - likely prerenally mediated    (2)CV - CHF, ?EF - awaiting TTE. Hypovolemia leading to rising creatinine?    (3Anemia - High TSat - would be indicated for epogen if the Hb were low; however as of today it is up above 10.      RECOMMEND:    (1)F/u TTE  (2)NS 50cc/h x 10h  (3)Check urine lytes and creat            Wale Francisco MD  [facility]  [phone]

## 2017-07-12 NOTE — PROGRESS NOTE ADULT - PROBLEM SELECTOR PLAN 3
Unknown type of heart failure. - Transthoracic echocardiogram to assess EF - will attempt to expedite - Strict I/O - Daily Weight - Head of bed elevation

## 2017-07-13 DIAGNOSIS — K29.00 ACUTE GASTRITIS WITHOUT BLEEDING: ICD-10-CM

## 2017-07-13 LAB
BASOPHILS # BLD AUTO: 0.03 K/UL — SIGNIFICANT CHANGE UP (ref 0–0.2)
BASOPHILS NFR BLD AUTO: 0.6 % — SIGNIFICANT CHANGE UP (ref 0–2)
BUN SERPL-MCNC: 55 MG/DL — HIGH (ref 7–23)
CALCIUM SERPL-MCNC: 8.8 MG/DL — SIGNIFICANT CHANGE UP (ref 8.4–10.5)
CHLORIDE SERPL-SCNC: 107 MMOL/L — SIGNIFICANT CHANGE UP (ref 98–107)
CO2 SERPL-SCNC: 19 MMOL/L — LOW (ref 22–31)
CREAT SERPL-MCNC: 5.43 MG/DL — HIGH (ref 0.5–1.3)
EOSINOPHIL # BLD AUTO: 0.33 K/UL — SIGNIFICANT CHANGE UP (ref 0–0.5)
EOSINOPHIL NFR BLD AUTO: 6.2 % — HIGH (ref 0–6)
GLUCOSE SERPL-MCNC: 140 MG/DL — HIGH (ref 70–99)
HCT VFR BLD CALC: 31.8 % — LOW (ref 34.5–45)
HGB BLD-MCNC: 10.1 G/DL — LOW (ref 11.5–15.5)
IMM GRANULOCYTES # BLD AUTO: 0.02 # — SIGNIFICANT CHANGE UP
IMM GRANULOCYTES NFR BLD AUTO: 0.4 % — SIGNIFICANT CHANGE UP (ref 0–1.5)
LYMPHOCYTES # BLD AUTO: 1.89 K/UL — SIGNIFICANT CHANGE UP (ref 1–3.3)
LYMPHOCYTES # BLD AUTO: 35.7 % — SIGNIFICANT CHANGE UP (ref 13–44)
MAGNESIUM SERPL-MCNC: 1.8 MG/DL — SIGNIFICANT CHANGE UP (ref 1.6–2.6)
MCHC RBC-ENTMCNC: 27.9 PG — SIGNIFICANT CHANGE UP (ref 27–34)
MCHC RBC-ENTMCNC: 31.8 % — LOW (ref 32–36)
MCV RBC AUTO: 87.8 FL — SIGNIFICANT CHANGE UP (ref 80–100)
MONOCYTES # BLD AUTO: 0.43 K/UL — SIGNIFICANT CHANGE UP (ref 0–0.9)
MONOCYTES NFR BLD AUTO: 8.1 % — SIGNIFICANT CHANGE UP (ref 2–14)
NEUTROPHILS # BLD AUTO: 2.59 K/UL — SIGNIFICANT CHANGE UP (ref 1.8–7.4)
NEUTROPHILS NFR BLD AUTO: 49 % — SIGNIFICANT CHANGE UP (ref 43–77)
NRBC # FLD: 0 — SIGNIFICANT CHANGE UP
PHOSPHATE SERPL-MCNC: 4.2 MG/DL — SIGNIFICANT CHANGE UP (ref 2.5–4.5)
PLATELET # BLD AUTO: 190 K/UL — SIGNIFICANT CHANGE UP (ref 150–400)
PMV BLD: 11.8 FL — SIGNIFICANT CHANGE UP (ref 7–13)
POTASSIUM SERPL-MCNC: 4.6 MMOL/L — SIGNIFICANT CHANGE UP (ref 3.5–5.3)
POTASSIUM SERPL-SCNC: 4.6 MMOL/L — SIGNIFICANT CHANGE UP (ref 3.5–5.3)
RBC # BLD: 3.62 M/UL — LOW (ref 3.8–5.2)
RBC # FLD: 12.8 % — SIGNIFICANT CHANGE UP (ref 10.3–14.5)
SODIUM SERPL-SCNC: 139 MMOL/L — SIGNIFICANT CHANGE UP (ref 135–145)
WBC # BLD: 5.29 K/UL — SIGNIFICANT CHANGE UP (ref 3.8–10.5)
WBC # FLD AUTO: 5.29 K/UL — SIGNIFICANT CHANGE UP (ref 3.8–10.5)

## 2017-07-13 PROCEDURE — 93010 ELECTROCARDIOGRAM REPORT: CPT

## 2017-07-13 PROCEDURE — 93306 TTE W/DOPPLER COMPLETE: CPT | Mod: 26

## 2017-07-13 PROCEDURE — 99233 SBSQ HOSP IP/OBS HIGH 50: CPT

## 2017-07-13 RX ORDER — PANTOPRAZOLE SODIUM 20 MG/1
40 TABLET, DELAYED RELEASE ORAL
Qty: 0 | Refills: 0 | Status: DISCONTINUED | OUTPATIENT
Start: 2017-07-13 | End: 2017-07-14

## 2017-07-13 RX ADMIN — AMLODIPINE BESYLATE 10 MILLIGRAM(S): 2.5 TABLET ORAL at 05:25

## 2017-07-13 RX ADMIN — Medication 2: at 12:53

## 2017-07-13 RX ADMIN — HEPARIN SODIUM 5000 UNIT(S): 5000 INJECTION INTRAVENOUS; SUBCUTANEOUS at 11:25

## 2017-07-13 RX ADMIN — QUETIAPINE FUMARATE 50 MILLIGRAM(S): 200 TABLET, FILM COATED ORAL at 11:26

## 2017-07-13 RX ADMIN — INSULIN GLARGINE 10 UNIT(S): 100 INJECTION, SOLUTION SUBCUTANEOUS at 22:38

## 2017-07-13 RX ADMIN — GABAPENTIN 300 MILLIGRAM(S): 400 CAPSULE ORAL at 22:39

## 2017-07-13 RX ADMIN — CLOPIDOGREL BISULFATE 75 MILLIGRAM(S): 75 TABLET, FILM COATED ORAL at 11:26

## 2017-07-13 RX ADMIN — CARVEDILOL PHOSPHATE 12.5 MILLIGRAM(S): 80 CAPSULE, EXTENDED RELEASE ORAL at 17:22

## 2017-07-13 RX ADMIN — SERTRALINE 200 MILLIGRAM(S): 25 TABLET, FILM COATED ORAL at 11:25

## 2017-07-13 RX ADMIN — Medication 1: at 10:01

## 2017-07-13 RX ADMIN — Medication 81 MILLIGRAM(S): at 11:26

## 2017-07-13 RX ADMIN — CARVEDILOL PHOSPHATE 12.5 MILLIGRAM(S): 80 CAPSULE, EXTENDED RELEASE ORAL at 05:25

## 2017-07-13 RX ADMIN — HEPARIN SODIUM 5000 UNIT(S): 5000 INJECTION INTRAVENOUS; SUBCUTANEOUS at 05:25

## 2017-07-13 RX ADMIN — SIMVASTATIN 40 MILLIGRAM(S): 20 TABLET, FILM COATED ORAL at 22:39

## 2017-07-13 RX ADMIN — HEPARIN SODIUM 5000 UNIT(S): 5000 INJECTION INTRAVENOUS; SUBCUTANEOUS at 22:38

## 2017-07-13 NOTE — PROGRESS NOTE ADULT - SUBJECTIVE AND OBJECTIVE BOX
No pain, no shortness of breath      VITAL:  T(C): , Max: 37 (07-12-17 @ 22:03)  T(F): , Max: 98.6 (07-12-17 @ 22:03)  HR: 68 (07-13-17 @ 05:23)  BP: 152/86 (07-13-17 @ 05:23)  BP(mean): --  RR: 18 (07-13-17 @ 05:23)  SpO2: 97% (07-13-17 @ 05:23)      PHYSICAL EXAM:  Constitutional: NAD, obese  HEENT: NCAT, DMM  Neck: Supple, No JVD  Respiratory: CTA-b/l  Cardiovascular: RRR s1s2, no m/r/g  Gastrointestinal: BS+, soft, NT/ND  Extremities: No peripheral edema b/l      LABS:                        10.1   5.29  )-----------( 190      ( 13 Jul 2017 06:35 )             31.8     Na(139)/K(4.6)/Cl(107)/HCO3(19)/BUN(55)/Cr(5.43)Glu(140)/Ca(8.8)/Mg(1.8)/PO4(4.2)    07-13 @ 06:35  Na(140)/K(4.4)/Cl(104)/HCO3(21)/BUN(52)/Cr(5.27)Glu(135)/Ca(8.8)/Mg(1.7)/PO4(--)    07-12 @ 06:45  Na(144)/K(4.7)/Cl(107)/HCO3(22)/BUN(46)/Cr(4.80)Glu(123)/Ca(9.0)/Mg(1.8)/PO4(--)    07-11 @ 06:50      Sodium, Random Urine: 49 meq/L (07-12 @ 13:00)  Potassium, Random Urine: 22.1 meq/L (07-12 @ 13:00)  Chloride, Random Urine: 32 mmol/L (07-12 @ 13:00)  Creatinine, Random Urine: 81.56 mg/dL (07-12 @ 13:00)      IMPRESSION: 52F w/ HTN, DM2, CVA, CAD, HF?EF, and CKD4-5, 7/9/17 admitted s/p mechanical fall.    (1)Renal - CKD4-5; GFR ~15ml/min.  Superimposed mild ABHI based on bloodwork from today - urine studies not suggestive of prerenal azotemia. Mild ischemic ATN? Renal function very slowly worsening.    (2)CV - CHF, ?EF - awaiting TTE. Hypovolemia leading to rising creatinine?        RECOMMEND:    (1)F/u TTE  (2)No IVF/No diuretics for now  (3)No HD for now  (4)Dose new meds for GFR 10-15ml/min          Wale Francisco MD  [facility]  [phone] Admits to mild chest pain. No shortness of breath      VITAL:  T(C): , Max: 37 (07-12-17 @ 22:03)  T(F): , Max: 98.6 (07-12-17 @ 22:03)  HR: 68 (07-13-17 @ 05:23)  BP: 152/86 (07-13-17 @ 05:23)  BP(mean): --  RR: 18 (07-13-17 @ 05:23)  SpO2: 97% (07-13-17 @ 05:23)      PHYSICAL EXAM:  Constitutional: NAD, obese  HEENT: NCAT, DMM  Neck: Supple, No JVD  Respiratory: CTA-b/l  Cardiovascular: RRR s1s2, no m/r/g  Gastrointestinal: BS+, soft, NT/ND  Extremities: No peripheral edema b/l      LABS:                        10.1   5.29  )-----------( 190      ( 13 Jul 2017 06:35 )             31.8     Na(139)/K(4.6)/Cl(107)/HCO3(19)/BUN(55)/Cr(5.43)Glu(140)/Ca(8.8)/Mg(1.8)/PO4(4.2)    07-13 @ 06:35  Na(140)/K(4.4)/Cl(104)/HCO3(21)/BUN(52)/Cr(5.27)Glu(135)/Ca(8.8)/Mg(1.7)/PO4(--)    07-12 @ 06:45  Na(144)/K(4.7)/Cl(107)/HCO3(22)/BUN(46)/Cr(4.80)Glu(123)/Ca(9.0)/Mg(1.8)/PO4(--)    07-11 @ 06:50      Sodium, Random Urine: 49 meq/L (07-12 @ 13:00)  Potassium, Random Urine: 22.1 meq/L (07-12 @ 13:00)  Chloride, Random Urine: 32 mmol/L (07-12 @ 13:00)  Creatinine, Random Urine: 81.56 mg/dL (07-12 @ 13:00)      IMPRESSION: 52F w/ HTN, DM2, CVA, CAD, HF?EF, and CKD4-5, 7/9/17 admitted s/p mechanical fall.    (1)Renal - CKD4-5; GFR ~15ml/min.  Superimposed mild ABHI based on bloodwork from today - urine studies not suggestive of prerenal azotemia. Mild ischemic ATN? Renal function very slowly worsening.    (2)CV - CHF, ?EF - awaiting TTE. Hypovolemia leading to rising creatinine?    (3)Chest pain    RECOMMEND:    (1)F/u TTE  (2)No IVF/No diuretics for now  (3)No HD for now  (4)Dose new meds for GFR 10-15ml/min  (5)Workup of chest pain per primary team/Cardiology    Informed staff of chest pain.      Wale Francisco MD  [facility]  [phone]

## 2017-07-13 NOTE — PROGRESS NOTE ADULT - SUBJECTIVE AND OBJECTIVE BOX
CC: Chest pain    SUBJECTIVE / OVERNIGHT EVENTS:  Complained of chest pain 8/10 in severity, acute onset, not brought on by activity when she woke up.  Resolved after eating.  No F/C, N/V, SOB, Cough, lightheadedness, dizziness, abdominal pain, diarrhea, dysuria.    MEDICATIONS  (STANDING):  heparin  Injectable 5000 Unit(s) SubCutaneous every 8 hours  insulin lispro (HumaLOG) corrective regimen sliding scale   SubCutaneous three times a day before meals  insulin lispro (HumaLOG) corrective regimen sliding scale   SubCutaneous at bedtime  dextrose 5%. 1000 milliLiter(s) (50 mL/Hr) IV Continuous <Continuous>  dextrose 50% Injectable 12.5 Gram(s) IV Push once  dextrose 50% Injectable 25 Gram(s) IV Push once  dextrose 50% Injectable 25 Gram(s) IV Push once  aspirin enteric coated 81 milliGRAM(s) Oral daily  sertraline 200 milliGRAM(s) Oral daily  clopidogrel Tablet 75 milliGRAM(s) Oral daily  QUEtiapine 50 milliGRAM(s) Oral daily  simvastatin 40 milliGRAM(s) Oral at bedtime  amLODIPine   Tablet 10 milliGRAM(s) Oral daily  insulin glargine Injectable (LANTUS) 10 Unit(s) SubCutaneous at bedtime  gabapentin 300 milliGRAM(s) Oral at bedtime  carvedilol 12.5 milliGRAM(s) Oral every 12 hours  sodium chloride 0.9%. 1000 milliLiter(s) (50 mL/Hr) IV Continuous <Continuous>    MEDICATIONS  (PRN):  dextrose Gel 1 Dose(s) Oral once PRN Blood Glucose LESS THAN 70 milliGRAM(s)/deciliter  glucagon  Injectable 1 milliGRAM(s) IntraMuscular once PRN Glucose LESS THAN 70 milligrams/deciliter  acetaminophen   Tablet. 650 milliGRAM(s) Oral every 6 hours PRN mild to severe pain      Vital Signs Last 24 Hrs  T(C): 36.9 (07-13-17 @ 13:54), Max: 37 (07-12-17 @ 22:03)  HR: 98 (07-13-17 @ 13:54) (68 - 98)  BP: 132/78 (07-13-17 @ 13:54) (124/70 - 152/86)  RR: 18 (07-13-17 @ 13:54) (18 - 18)  SpO2: 99% (07-13-17 @ 13:54) (97% - 99%)  CAPILLARY BLOOD GLUCOSE  236 (13 Jul 2017 12:42)  157 (13 Jul 2017 08:38)  168 (12 Jul 2017 22:30)  129 (12 Jul 2017 17:52)        I&O's Summary    12 Jul 2017 07:01  -  13 Jul 2017 07:00  --------------------------------------------------------  IN: 840 mL / OUT: 1100 mL / NET: -260 mL        PHYSICAL EXAM:  GENERAL: NAD, well-developed  HEAD:  Atraumatic, Normocephalic  EYES: EOMI, PERRLA, conjunctiva and sclera clear  NECK: Supple, No JVD  CHEST/LUNG: Clear to auscultation bilaterally; No wheeze  HEART: Regular rate and rhythm; No murmurs, rubs, or gallops  ABDOMEN: Soft, Nontender, Nondistended; Bowel sounds present  EXTREMITIES:  2+ Peripheral Pulses, No clubbing, cyanosis, or edema  PSYCH: Calm  NEUROLOGY: A/Ox3,  SKIN: No rashes or lesions    LABS:                        10.1   5.29  )-----------( 190      ( 13 Jul 2017 06:35 )             31.8     07-13    139  |  107  |  55<H>  ----------------------------<  140<H>  4.6   |  19<L>  |  5.43<H>    Ca    8.8      13 Jul 2017 06:35  Phos  4.2     07-13  Mg     1.8     07-13                RADIOLOGY & ADDITIONAL TESTS:    Imaging Personally Reviewed:    Consultant(s) Notes Reviewed:  Renal - Dr. Francisco    Care Discussed with Consultants/Other Providers:

## 2017-07-13 NOTE — CHART NOTE - NSCHARTNOTEFT_GEN_A_CORE
Called by RN to see pt for chest pain. CP started while sleeping, left sided non radiating, pressure/burning 5/10 continuous since onset for almost 1 hr. No alleviating or aggravating factors. No  SOB, PND, orthopnea, palpitations, diaphoresis, lightheadedness, dizziness, syncope, fever, chills, malaise, myalgias, weight changes, night sweats, abd pain, nausea, vomiting, constipation, diarrhea, BRBPR, melena, urinary symptoms, HA visual changes, paraesthesias, numbness, weakness, paralysis, ataxia, dysarthria.     Physical Exam  Vital Signs Last 24 Hrs  T(C): 36.7 (2017 09:45), Max: 37 (2017 22:03)  T(F): 98 (2017 09:45), Max: 98.6 (2017 22:03)  HR: 77 (2017 09:45) (68 - 77)  BP: 124/70 (2017 09:45) (124/70 - 152/86)  BP(mean): --  RR: 18 (2017 09:45) (18 - 18)  SpO2: 98% (2017 09:45) (97% - 100%)  Appearance: Normal	  HEENT:   Normal oral mucosa, PERRL, EOMI	  Lymphatic: No lymphadenopathy  Cardiovascular: Normal S1 S2, No JVD, No murmurs, No edema  Respiratory: Lungs clear to auscultation	  Psychiatry: A & O x 3, Mood & affect appropriate  Gastrointestinal:  Soft, Non-tender, + BS	  Skin: No rashes, No ecchymoses, No cyanosis  Neurologic: Non-focal  Extremities: Normal range of motion, No clubbing, cyanosis or edema  Vascular: Peripheral pulses palpable 2+ bilaterally    TELEMETRY: 	    ECG:    LABS                        10.1<L> [11.5 - 15.5]  5.29 [3.8 - 10.5] )-----------( 190 [150 - 400]    ( 2017 06:35 )             31.8<L> [34.5 - 45.0]    07-13    139  |  107  |  55<H>  ----------------------------<  140<H>  4.6   |  19<L>  |  5.43<H>    Ca    8.8      2017 06:35  Phos  4.2     07-13  Mg     1.8     07-13    CAPILLARY BLOOD GLUCOSE  157 (2017 08:38)  168 (2017 22:30)  129 (2017 17:52)  157 (2017 12:30)        I&O's Detail    2017 07:01  -  2017 07:00  --------------------------------------------------------  IN:    Oral Fluid: 840 mL  Total IN: 840 mL    OUT:    Voided: 1100 mL  Total OUT: 1100 mL    Total NET: -260 mL        Daily     Daily Weight in k.3 (2017 05:23)  Allergies    No Known Drug Allergies  Seafood (Unknown)    Intolerances      MEDICATIONS  (STANDING):  heparin  Injectable 5000 Unit(s) SubCutaneous every 8 hours  insulin lispro (HumaLOG) corrective regimen sliding scale   SubCutaneous three times a day before meals  insulin lispro (HumaLOG) corrective regimen sliding scale   SubCutaneous at bedtime  dextrose 5%. 1000 milliLiter(s) (50 mL/Hr) IV Continuous <Continuous>  dextrose 50% Injectable 12.5 Gram(s) IV Push once  dextrose 50% Injectable 25 Gram(s) IV Push once  dextrose 50% Injectable 25 Gram(s) IV Push once  aspirin enteric coated 81 milliGRAM(s) Oral daily  sertraline 200 milliGRAM(s) Oral daily  clopidogrel Tablet 75 milliGRAM(s) Oral daily  QUEtiapine 50 milliGRAM(s) Oral daily  simvastatin 40 milliGRAM(s) Oral at bedtime  amLODIPine   Tablet 10 milliGRAM(s) Oral daily  insulin glargine Injectable (LANTUS) 10 Unit(s) SubCutaneous at bedtime  gabapentin 300 milliGRAM(s) Oral at bedtime  carvedilol 12.5 milliGRAM(s) Oral every 12 hours  sodium chloride 0.9%. 1000 milliLiter(s) (50 mL/Hr) IV Continuous <Continuous>    MEDICATIONS  (PRN):  dextrose Gel 1 Dose(s) Oral once PRN Blood Glucose LESS THAN 70 milliGRAM(s)/deciliter  glucagon  Injectable 1 milliGRAM(s) IntraMuscular once PRN Glucose LESS THAN 70 milligrams/deciliter  acetaminophen   Tablet. 650 milliGRAM(s) Oral every 6 hours PRN mild to severe pain    53 yo F h/o CVA, DM, HTN, gout, CHF, CAD, depression, CKD a/w mechanical fall with chest pain    1)CP 2/2 unstable angina vs GERD- now resolved. Currently asymptomatic and stable, asa, plavix, coreg, zocor  2)Fall- CT head neg. for rehab or home. await ECHO  3)CKD- Dr Francisco following. hold torsemide, allopurinol ACE/ARB  4)HTN-amlodipine  5)DM- FS Q6 NISS. lantus, novolog  6)Gout- stable  7) h/o CVA- asa, plavix  8) Depression- seroquel, sertraline  Case d/w Dr Beltrán

## 2017-07-13 NOTE — PROGRESS NOTE ADULT - PROBLEM SELECTOR PLAN 5
Unknown type of heart failure. - Transthoracic echocardiogram to assess EF - will attempt to expedite - Strict I/O - Daily Weight - Head of bed elevation. Still awaiting TTE - escalated the situation to .

## 2017-07-13 NOTE — PROGRESS NOTE ADULT - PROBLEM SELECTOR PLAN 2
- Continuing aspirin, Plavix, carvedilol, simvastatin - No ACE for now, as patient has renal failure.

## 2017-07-14 VITALS
DIASTOLIC BLOOD PRESSURE: 72 MMHG | TEMPERATURE: 98 F | SYSTOLIC BLOOD PRESSURE: 130 MMHG | OXYGEN SATURATION: 100 % | HEART RATE: 67 BPM | RESPIRATION RATE: 18 BRPM

## 2017-07-14 LAB
BASOPHILS # BLD AUTO: 0.04 K/UL — SIGNIFICANT CHANGE UP (ref 0–0.2)
BASOPHILS NFR BLD AUTO: 0.7 % — SIGNIFICANT CHANGE UP (ref 0–2)
BUN SERPL-MCNC: 52 MG/DL — HIGH (ref 7–23)
CALCIUM SERPL-MCNC: 9.1 MG/DL — SIGNIFICANT CHANGE UP (ref 8.4–10.5)
CHLORIDE SERPL-SCNC: 107 MMOL/L — SIGNIFICANT CHANGE UP (ref 98–107)
CO2 SERPL-SCNC: 21 MMOL/L — LOW (ref 22–31)
CREAT SERPL-MCNC: 5.27 MG/DL — HIGH (ref 0.5–1.3)
EOSINOPHIL # BLD AUTO: 0.28 K/UL — SIGNIFICANT CHANGE UP (ref 0–0.5)
EOSINOPHIL NFR BLD AUTO: 4.9 % — SIGNIFICANT CHANGE UP (ref 0–6)
GLUCOSE SERPL-MCNC: 132 MG/DL — HIGH (ref 70–99)
HCT VFR BLD CALC: 32.9 % — LOW (ref 34.5–45)
HGB BLD-MCNC: 10.5 G/DL — LOW (ref 11.5–15.5)
IMM GRANULOCYTES # BLD AUTO: 0.01 # — SIGNIFICANT CHANGE UP
IMM GRANULOCYTES NFR BLD AUTO: 0.2 % — SIGNIFICANT CHANGE UP (ref 0–1.5)
LYMPHOCYTES # BLD AUTO: 2.06 K/UL — SIGNIFICANT CHANGE UP (ref 1–3.3)
LYMPHOCYTES # BLD AUTO: 35.9 % — SIGNIFICANT CHANGE UP (ref 13–44)
MAGNESIUM SERPL-MCNC: 1.9 MG/DL — SIGNIFICANT CHANGE UP (ref 1.6–2.6)
MCHC RBC-ENTMCNC: 27.6 PG — SIGNIFICANT CHANGE UP (ref 27–34)
MCHC RBC-ENTMCNC: 31.9 % — LOW (ref 32–36)
MCV RBC AUTO: 86.4 FL — SIGNIFICANT CHANGE UP (ref 80–100)
MONOCYTES # BLD AUTO: 0.45 K/UL — SIGNIFICANT CHANGE UP (ref 0–0.9)
MONOCYTES NFR BLD AUTO: 7.8 % — SIGNIFICANT CHANGE UP (ref 2–14)
NEUTROPHILS # BLD AUTO: 2.9 K/UL — SIGNIFICANT CHANGE UP (ref 1.8–7.4)
NEUTROPHILS NFR BLD AUTO: 50.5 % — SIGNIFICANT CHANGE UP (ref 43–77)
NRBC # FLD: 0 — SIGNIFICANT CHANGE UP
PLATELET # BLD AUTO: 198 K/UL — SIGNIFICANT CHANGE UP (ref 150–400)
PMV BLD: 11.5 FL — SIGNIFICANT CHANGE UP (ref 7–13)
POTASSIUM SERPL-MCNC: 4.9 MMOL/L — SIGNIFICANT CHANGE UP (ref 3.5–5.3)
POTASSIUM SERPL-SCNC: 4.9 MMOL/L — SIGNIFICANT CHANGE UP (ref 3.5–5.3)
RBC # BLD: 3.81 M/UL — SIGNIFICANT CHANGE UP (ref 3.8–5.2)
RBC # FLD: 12.9 % — SIGNIFICANT CHANGE UP (ref 10.3–14.5)
SODIUM SERPL-SCNC: 140 MMOL/L — SIGNIFICANT CHANGE UP (ref 135–145)
WBC # BLD: 5.74 K/UL — SIGNIFICANT CHANGE UP (ref 3.8–10.5)
WBC # FLD AUTO: 5.74 K/UL — SIGNIFICANT CHANGE UP (ref 3.8–10.5)

## 2017-07-14 PROCEDURE — 99239 HOSP IP/OBS DSCHRG MGMT >30: CPT

## 2017-07-14 RX ORDER — PANTOPRAZOLE SODIUM 20 MG/1
1 TABLET, DELAYED RELEASE ORAL
Qty: 0 | Refills: 0 | COMMUNITY
Start: 2017-07-14

## 2017-07-14 RX ORDER — GABAPENTIN 400 MG/1
1 CAPSULE ORAL
Qty: 0 | Refills: 0 | COMMUNITY
Start: 2017-07-14

## 2017-07-14 RX ORDER — PANTOPRAZOLE SODIUM 20 MG/1
1 TABLET, DELAYED RELEASE ORAL
Qty: 30 | Refills: 0 | OUTPATIENT
Start: 2017-07-14 | End: 2017-08-13

## 2017-07-14 RX ORDER — ASPIRIN/CALCIUM CARB/MAGNESIUM 324 MG
1 TABLET ORAL
Qty: 0 | Refills: 0 | DISCHARGE
Start: 2017-07-14

## 2017-07-14 RX ORDER — SERTRALINE 25 MG/1
2 TABLET, FILM COATED ORAL
Qty: 0 | Refills: 0 | COMMUNITY
Start: 2017-07-14

## 2017-07-14 RX ORDER — CARVEDILOL PHOSPHATE 80 MG/1
1 CAPSULE, EXTENDED RELEASE ORAL
Qty: 0 | Refills: 0 | COMMUNITY
Start: 2017-07-14

## 2017-07-14 RX ORDER — INSULIN GLARGINE 100 [IU]/ML
12 INJECTION, SOLUTION SUBCUTANEOUS AT BEDTIME
Qty: 0 | Refills: 0 | Status: DISCONTINUED | OUTPATIENT
Start: 2017-07-14 | End: 2017-07-14

## 2017-07-14 RX ORDER — INSULIN GLARGINE 100 [IU]/ML
12 INJECTION, SOLUTION SUBCUTANEOUS
Qty: 0 | Refills: 0 | COMMUNITY
Start: 2017-07-14

## 2017-07-14 RX ORDER — CLOPIDOGREL BISULFATE 75 MG/1
1 TABLET, FILM COATED ORAL
Qty: 0 | Refills: 0 | COMMUNITY
Start: 2017-07-14

## 2017-07-14 RX ORDER — AMLODIPINE BESYLATE 2.5 MG/1
1 TABLET ORAL
Qty: 0 | Refills: 0 | COMMUNITY
Start: 2017-07-14

## 2017-07-14 RX ORDER — SIMVASTATIN 20 MG/1
1 TABLET, FILM COATED ORAL
Qty: 0 | Refills: 0 | COMMUNITY
Start: 2017-07-14

## 2017-07-14 RX ADMIN — SERTRALINE 200 MILLIGRAM(S): 25 TABLET, FILM COATED ORAL at 12:57

## 2017-07-14 RX ADMIN — Medication 81 MILLIGRAM(S): at 12:58

## 2017-07-14 RX ADMIN — CARVEDILOL PHOSPHATE 12.5 MILLIGRAM(S): 80 CAPSULE, EXTENDED RELEASE ORAL at 05:37

## 2017-07-14 RX ADMIN — HEPARIN SODIUM 5000 UNIT(S): 5000 INJECTION INTRAVENOUS; SUBCUTANEOUS at 12:58

## 2017-07-14 RX ADMIN — CLOPIDOGREL BISULFATE 75 MILLIGRAM(S): 75 TABLET, FILM COATED ORAL at 12:58

## 2017-07-14 RX ADMIN — Medication 1: at 12:56

## 2017-07-14 RX ADMIN — HEPARIN SODIUM 5000 UNIT(S): 5000 INJECTION INTRAVENOUS; SUBCUTANEOUS at 05:37

## 2017-07-14 RX ADMIN — AMLODIPINE BESYLATE 10 MILLIGRAM(S): 2.5 TABLET ORAL at 05:37

## 2017-07-14 RX ADMIN — PANTOPRAZOLE SODIUM 40 MILLIGRAM(S): 20 TABLET, DELAYED RELEASE ORAL at 05:36

## 2017-07-14 NOTE — PROGRESS NOTE ADULT - ATTENDING COMMENTS
Patient medically optimized for discharge to home.  Discharge planning 40 minutes - discussed with patient and consultants.

## 2017-07-14 NOTE — PROGRESS NOTE ADULT - PROBLEM SELECTOR PLAN 10
DVT ppx: heparin DQ Diet: DASH, renal, CC Dispo: PT consulted
DVT ppx: heparin DQ Diet: DASH, renal, CC Dispo: PT consulted

## 2017-07-14 NOTE — PROGRESS NOTE ADULT - SUBJECTIVE AND OBJECTIVE BOX
No pain, no shortness of breath      VITAL:  T(C): , Max: 36.9 (07-13-17 @ 13:54)  T(F): , Max: 98.4 (07-13-17 @ 13:54)  HR: 72 (07-14-17 @ 05:35)  BP: 148/81 (07-14-17 @ 05:35)  BP(mean): --  RR: 18 (07-14-17 @ 05:35)  SpO2: 100% (07-14-17 @ 05:35)      PHYSICAL EXAM:  Constitutional: NAD, obese  HEENT: NCAT, DMM  Neck: Supple, No JVD  Respiratory: CTA-b/l  Cardiovascular: RRR s1s2, no m/r/g  Gastrointestinal: BS+, soft, NT/ND  Extremities: No peripheral edema b/l      LABS:                        10.5   5.74  )-----------( 198      ( 14 Jul 2017 06:05 )             32.9     Na(140)/K(4.9)/Cl(107)/HCO3(21)/BUN(52)/Cr(5.27)Glu(132)/Ca(9.1)/Mg(1.9)/PO4(--)    07-14 @ 06:05  Na(139)/K(4.6)/Cl(107)/HCO3(19)/BUN(55)/Cr(5.43)Glu(140)/Ca(8.8)/Mg(1.8)/PO4(4.2)    07-13 @ 06:35  Na(140)/K(4.4)/Cl(104)/HCO3(21)/BUN(52)/Cr(5.27)Glu(135)/Ca(8.8)/Mg(1.7)/PO4(--)    07-12 @ 06:45    IMPRESSION: 52F w/ HTN, DM2, CVA, CAD, HF?EF, and CKD4-5, 7/9/17 admitted s/p mechanical fall.    (1)Renal - CKD4-5; GFR ~15ml/min. Fluctuating hemodynamics based on volume status.    (2)CV - CHF, ?EF - awaiting TTE.     (3)Lytes - acceptable    (4)Chest pain from yesterday; resolved.    RECOMMEND:    (1)F/u TTE  (2)No IVF/No diuretics for now  (3)No HD for now  (4)Dose new meds for GFR 10-15ml/min  (5)Could f/u at my office 2-4 weeks post discharge          Wale Francisco MD  [facility]  [phone] No pain, no shortness of breath      VITAL:  T(C): , Max: 36.9 (07-13-17 @ 13:54)  T(F): , Max: 98.4 (07-13-17 @ 13:54)  HR: 72 (07-14-17 @ 05:35)  BP: 148/81 (07-14-17 @ 05:35)  RR: 18 (07-14-17 @ 05:35)  SpO2: 100% (07-14-17 @ 05:35)      PHYSICAL EXAM:  Constitutional: NAD, obese  HEENT: NCAT, DMM  Neck: Supple, No JVD  Respiratory: CTA-b/l  Cardiovascular: RRR s1s2, no m/r/g  Gastrointestinal: BS+, soft, NT/ND  Extremities: No peripheral edema b/l      LABS:                        10.5   5.74  )-----------( 198      ( 14 Jul 2017 06:05 )             32.9     Na(140)/K(4.9)/Cl(107)/HCO3(21)/BUN(52)/Cr(5.27)Glu(132)/Ca(9.1)/Mg(1.9)/PO4(--)    07-14 @ 06:05  Na(139)/K(4.6)/Cl(107)/HCO3(19)/BUN(55)/Cr(5.43)Glu(140)/Ca(8.8)/Mg(1.8)/PO4(4.2)    07-13 @ 06:35  Na(140)/K(4.4)/Cl(104)/HCO3(21)/BUN(52)/Cr(5.27)Glu(135)/Ca(8.8)/Mg(1.7)/PO4(--)    07-12 @ 06:45    IMPRESSION: 52F w/ HTN, DM2, CVA, CAD, HF?EF, and CKD4-5, 7/9/17 admitted s/p mechanical fall.    (1)Renal - CKD4-5; GFR ~15ml/min. Fluctuating hemodynamics based on volume status.    (2)CV - CHF, ?EF - awaiting TTE.     (3)Lytes - acceptable    (4)Chest pain from yesterday; resolved.    RECOMMEND:  (1)No IVF/No diuretics for now  (2)No HD for now  (3)Dose new meds for GFR 10-15ml/min  (4)Could f/u at my office 2-4 weeks post discharge          Wale Francisco MD  [facility]  [phone] No pain, no shortness of breath      VITAL:  T(C): , Max: 36.9 (07-13-17 @ 13:54)  T(F): , Max: 98.4 (07-13-17 @ 13:54)  HR: 72 (07-14-17 @ 05:35)  BP: 148/81 (07-14-17 @ 05:35)  RR: 18 (07-14-17 @ 05:35)  SpO2: 100% (07-14-17 @ 05:35)      PHYSICAL EXAM:  Constitutional: NAD, obese  HEENT: NCAT, DMM  Neck: Supple, No JVD  Respiratory: CTA-b/l  Cardiovascular: RRR s1s2, no m/r/g  Gastrointestinal: BS+, soft, NT/ND  Extremities: No peripheral edema b/l      LABS:                        10.5   5.74  )-----------( 198      ( 14 Jul 2017 06:05 )             32.9     Na(140)/K(4.9)/Cl(107)/HCO3(21)/BUN(52)/Cr(5.27)Glu(132)/Ca(9.1)/Mg(1.9)/PO4(--)    07-14 @ 06:05  Na(139)/K(4.6)/Cl(107)/HCO3(19)/BUN(55)/Cr(5.43)Glu(140)/Ca(8.8)/Mg(1.8)/PO4(4.2)    07-13 @ 06:35  Na(140)/K(4.4)/Cl(104)/HCO3(21)/BUN(52)/Cr(5.27)Glu(135)/Ca(8.8)/Mg(1.7)/PO4(--)    07-12 @ 06:45    IMPRESSION: 52F w/ HTN, DM2, CVA, CAD, HF?EF, and CKD4-5, 7/9/17 admitted s/p mechanical fall.    (1)Renal - CKD4-5; GFR ~15ml/min. Fluctuating hemodynamics based on volume status.    (2)CV - CHF, ?EF - awaiting TTE.     (3)Lytes - acceptable    (4)Chest pain from yesterday; resolved.    RECOMMEND:  (1)No IVF/No diuretics for now  (2)No HD for now  (3)Dose new meds for GFR 10-15ml/min  (4)F/U with outside nephrologist 2-4 weeks post discharge          Wale Francisco MD  [facility]  [phone]

## 2017-07-14 NOTE — PROGRESS NOTE ADULT - PROBLEM SELECTOR PLAN 3
May be chronic kidney disease as patient reports she has known kidney disease. FeUrea 59.6 suggestive of intrinsic disease. Renal failure may be due to HTN or DM  - Renal ultrasound confirms intrinsic disease - likely chronic.- Will monitor off Torsemide. - Nephrology consult appreciated - avoid nephrotoxic agents.  Outpatient follow up.

## 2017-07-14 NOTE — PROGRESS NOTE ADULT - PROBLEM SELECTOR PLAN 2
- Continuing aspirin, Plavix, carvedilol, simvastatin - No ACE for now, as patient has renal failure. TTE reviewed.

## 2017-07-14 NOTE — PROGRESS NOTE ADULT - SUBJECTIVE AND OBJECTIVE BOX
CC: Follow up for chest pain.    SUBJECTIVE / OVERNIGHT EVENTS:  No new complaints.  Tolerated TTE well.  No F/C, N/V, CP, SOB, Cough, lightheadedness, dizziness, abdominal pain, diarrhea, dysuria.    MEDICATIONS  (STANDING):  heparin  Injectable 5000 Unit(s) SubCutaneous every 8 hours  insulin lispro (HumaLOG) corrective regimen sliding scale   SubCutaneous three times a day before meals  insulin lispro (HumaLOG) corrective regimen sliding scale   SubCutaneous at bedtime  dextrose 5%. 1000 milliLiter(s) (50 mL/Hr) IV Continuous <Continuous>  dextrose 50% Injectable 12.5 Gram(s) IV Push once  dextrose 50% Injectable 25 Gram(s) IV Push once  dextrose 50% Injectable 25 Gram(s) IV Push once  aspirin enteric coated 81 milliGRAM(s) Oral daily  sertraline 200 milliGRAM(s) Oral daily  clopidogrel Tablet 75 milliGRAM(s) Oral daily  QUEtiapine 50 milliGRAM(s) Oral daily  simvastatin 40 milliGRAM(s) Oral at bedtime  amLODIPine   Tablet 10 milliGRAM(s) Oral daily  gabapentin 300 milliGRAM(s) Oral at bedtime  carvedilol 12.5 milliGRAM(s) Oral every 12 hours  pantoprazole    Tablet 40 milliGRAM(s) Oral before breakfast  insulin glargine Injectable (LANTUS) 12 Unit(s) SubCutaneous at bedtime    MEDICATIONS  (PRN):  dextrose Gel 1 Dose(s) Oral once PRN Blood Glucose LESS THAN 70 milliGRAM(s)/deciliter  glucagon  Injectable 1 milliGRAM(s) IntraMuscular once PRN Glucose LESS THAN 70 milligrams/deciliter  acetaminophen   Tablet. 650 milliGRAM(s) Oral every 6 hours PRN mild to severe pain      Vital Signs Last 24 Hrs  T(C): 36.7 (07-14-17 @ 05:35), Max: 36.7 (07-13-17 @ 22:35)  HR: 72 (07-14-17 @ 05:35) (69 - 83)  BP: 148/81 (07-14-17 @ 05:35) (128/73 - 148/81)  RR: 18 (07-14-17 @ 05:35) (18 - 18)  SpO2: 100% (07-14-17 @ 05:35) (98% - 100%)  CAPILLARY BLOOD GLUCOSE  191 (14 Jul 2017 12:51)  140 (14 Jul 2017 08:55)  202 (13 Jul 2017 22:26)  140 (13 Jul 2017 17:13)        I&O's Summary      PHYSICAL EXAM:  GENERAL: NAD, well-developed  HEAD:  Atraumatic, Normocephalic  EYES: EOMI, PERRLA, conjunctiva and sclera clear  NECK: Supple, No JVD  CHEST/LUNG: Clear to auscultation bilaterally; No wheeze  HEART: Regular rate and rhythm; No murmurs, rubs, or gallops  ABDOMEN: Soft, Nontender, Nondistended; Bowel sounds present  EXTREMITIES:  2+ Peripheral Pulses, No clubbing, cyanosis, or edema  PSYCH: Calm  NEUROLOGY: A/Ox3,  SKIN: No rashes or lesions    LABS:                        10.5   5.74  )-----------( 198      ( 14 Jul 2017 06:05 )             32.9     07-14    140  |  107  |  52<H>  ----------------------------<  132<H>  4.9   |  21<L>  |  5.27<H>    Ca    9.1      14 Jul 2017 06:05  Phos  4.2     07-13  Mg     1.9     07-14                RADIOLOGY & ADDITIONAL TESTS:  < from: Transthoracic Echocardiogram (07.13.17 @ 15:55) >  Patient name: MIMI HICKS  YOB: 1965   Age: 52 (F)   MR#: [id number]  Study Date: 7/13/2017  Location: [address] kSonographer: Michelle Seth RDCS  Study quality: Technically Fair  Referring Physician: Isabela Pierson MD  Blood Pressure: 142/84 mmHg  Height: 160 cm  Weight: 100 kg  BSA: 2 m2  ------------------------------------------------------------------------  PROCEDURE: Transthoracic echocardiogram with 2-D, M-Mode  and complete spectral and color flow Doppler.  INDICATION: Atherosclerotic heart disease of native  coronary artery without angina pectoris (I25.10), Essential  (primary) hypertension (I10), Unspecified combined systolic  (congestive) and diastolic (congestive) heart failure  (I50.40)  ------------------------------------------------------------------------  DIMENSIONS:  Dimensions:     Normal Values:  LA:     3.6 cm    2.0 - 4.0 cm  Ao:     3.5 cm    2.0 - 3.8 cm  SEPTUM: 0.7 cm    0.6 - 1.2 cm  PWT:    0.8 cm    0.6 - 1.1 cm  LVIDd:  5.5 cm    3.0- 5.6 cm  LVIDs:  3.9 cm    1.8 - 4.0 cm  Derived Variables:  LVMI: 73 g/m2  RWT: 0.29  Fractional short: 29 %  Ejection Fraction (Teicholtz): 55 %  ------------------------------------------------------------------------  OBSERVATIONS:  Mitral Valve: Normal mitral valve. Mild mitral  regurgitation.  Aortic Root: Normal aortic root.  Aortic Valve: Normal trileaflet aortic valve.  Left Atrium: Normal left atrium.  Left Ventricle: Endocardium not well visualized; grossly  low normal left ventricular systolic function. Normal left  ventricular internal dimensions and wall thicknesses.  Right Heart: Normal right atrium. The right ventricle is  not well visualized; grossly normal right ventricular  systolic function. Normal tricuspid valve.  Minimal  tricuspid regurgitation. Normal pulmonic valve. Minimal  pulmonic regurgitation.  Pericardium/PleuraNormal pericardium with no pericardial  effusion.  ------------------------------------------------------------------------  CONCLUSIONS:  1. Normal mitral valve. Mild mitral regurgitation.  2. Normal left ventricular internal dimensions and wall  thicknesses.  3. Endocardium not well visualized; grossly low normal left  ventricular systolic function.  4. The right ventricle is not well visualized; grossly  normal right ventricular systolic function.  ------------------------------------------------------------------------  Confirmed on  7/13/2017 - 16:58:28 by Uvaldo Hickey M.D.  ------------------------------------------------------------------------    < end of copied text >    Imaging Personally Reviewed:yes    Consultant(s) Notes Reviewed:  renal - dr. powers    Care Discussed with Consultants/Other Providers:

## 2017-07-24 RX ORDER — ALLOPURINOL 300 MG
1 TABLET ORAL
Qty: 0 | Refills: 0 | COMMUNITY

## 2017-07-24 RX ORDER — AMLODIPINE BESYLATE 2.5 MG/1
1 TABLET ORAL
Qty: 0 | Refills: 0 | COMMUNITY

## 2017-07-24 RX ORDER — FAMOTIDINE 10 MG/ML
1 INJECTION INTRAVENOUS
Qty: 0 | Refills: 0 | COMMUNITY

## 2017-07-24 RX ORDER — CLOPIDOGREL BISULFATE 75 MG/1
1 TABLET, FILM COATED ORAL
Qty: 0 | Refills: 0 | COMMUNITY

## 2017-07-24 RX ORDER — ENOXAPARIN SODIUM 100 MG/ML
0 INJECTION SUBCUTANEOUS
Qty: 0 | Refills: 0 | COMMUNITY

## 2017-07-24 RX ORDER — CARVEDILOL PHOSPHATE 80 MG/1
1 CAPSULE, EXTENDED RELEASE ORAL
Qty: 0 | Refills: 0 | COMMUNITY

## 2017-07-24 RX ORDER — SERTRALINE 25 MG/1
2 TABLET, FILM COATED ORAL
Qty: 0 | Refills: 0 | COMMUNITY

## 2017-07-24 RX ORDER — SIMVASTATIN 20 MG/1
1 TABLET, FILM COATED ORAL
Qty: 0 | Refills: 0 | COMMUNITY

## 2017-07-24 RX ORDER — ASPIRIN/CALCIUM CARB/MAGNESIUM 324 MG
1 TABLET ORAL
Qty: 0 | Refills: 0 | COMMUNITY

## 2017-07-24 RX ORDER — GABAPENTIN 400 MG/1
1 CAPSULE ORAL
Qty: 0 | Refills: 0 | COMMUNITY

## 2017-09-02 ENCOUNTER — EMERGENCY (EMERGENCY)
Facility: HOSPITAL | Age: 52
LOS: 1 days | Discharge: HOME | End: 2017-09-02
Attending: EMERGENCY MEDICINE | Admitting: EMERGENCY MEDICINE
Payer: MEDICAID

## 2017-09-02 VITALS
OXYGEN SATURATION: 100 % | TEMPERATURE: 98 F | SYSTOLIC BLOOD PRESSURE: 174 MMHG | HEART RATE: 74 BPM | DIASTOLIC BLOOD PRESSURE: 84 MMHG | RESPIRATION RATE: 16 BRPM

## 2017-09-02 VITALS
OXYGEN SATURATION: 100 % | HEART RATE: 90 BPM | DIASTOLIC BLOOD PRESSURE: 103 MMHG | TEMPERATURE: 98 F | SYSTOLIC BLOOD PRESSURE: 179 MMHG | RESPIRATION RATE: 16 BRPM

## 2017-09-02 LAB
ALBUMIN SERPL ELPH-MCNC: 3.1 G/DL — LOW (ref 3.3–5)
ALP SERPL-CCNC: 96 U/L — SIGNIFICANT CHANGE UP (ref 40–120)
ALT FLD-CCNC: 12 U/L — SIGNIFICANT CHANGE UP (ref 4–33)
APPEARANCE UR: CLEAR — SIGNIFICANT CHANGE UP
AST SERPL-CCNC: 24 U/L — SIGNIFICANT CHANGE UP (ref 4–32)
B-OH-BUTYR SERPL-SCNC: 0.2 MMOL/L — SIGNIFICANT CHANGE UP (ref 0–0.4)
BASE EXCESS BLDV CALC-SCNC: -5.6 MMOL/L — SIGNIFICANT CHANGE UP
BASOPHILS # BLD AUTO: 0.07 K/UL — SIGNIFICANT CHANGE UP (ref 0–0.2)
BASOPHILS NFR BLD AUTO: 1.2 % — SIGNIFICANT CHANGE UP (ref 0–2)
BILIRUB SERPL-MCNC: < 0.2 MG/DL — LOW (ref 0.2–1.2)
BILIRUB UR-MCNC: NEGATIVE — SIGNIFICANT CHANGE UP
BLOOD GAS VENOUS - CREATININE: 5.97 MG/DL — HIGH (ref 0.5–1.3)
BLOOD UR QL VISUAL: HIGH
BUN SERPL-MCNC: 34 MG/DL — HIGH (ref 7–23)
CALCIUM SERPL-MCNC: 8.6 MG/DL — SIGNIFICANT CHANGE UP (ref 8.4–10.5)
CHLORIDE BLDV-SCNC: 106 MMOL/L — SIGNIFICANT CHANGE UP (ref 96–108)
CHLORIDE SERPL-SCNC: 103 MMOL/L — SIGNIFICANT CHANGE UP (ref 98–107)
CO2 SERPL-SCNC: 17 MMOL/L — LOW (ref 22–31)
COLOR SPEC: SIGNIFICANT CHANGE UP
CREAT SERPL-MCNC: 5.87 MG/DL — HIGH (ref 0.5–1.3)
EOSINOPHIL # BLD AUTO: 0.21 K/UL — SIGNIFICANT CHANGE UP (ref 0–0.5)
EOSINOPHIL NFR BLD AUTO: 3.7 % — SIGNIFICANT CHANGE UP (ref 0–6)
GAS PNL BLDV: 134 MMOL/L — LOW (ref 136–146)
GLUCOSE BLDV-MCNC: 447 — HIGH (ref 70–99)
GLUCOSE SERPL-MCNC: 448 MG/DL — HIGH (ref 70–99)
GLUCOSE UR-MCNC: >1000 — SIGNIFICANT CHANGE UP
HBA1C BLD-MCNC: 11 % — HIGH (ref 4–5.6)
HCO3 BLDV-SCNC: 19 MMOL/L — LOW (ref 20–27)
HCT VFR BLD CALC: 33.4 % — LOW (ref 34.5–45)
HCT VFR BLDV CALC: 33 % — LOW (ref 34.5–45)
HGB BLD-MCNC: 10.7 G/DL — LOW (ref 11.5–15.5)
HGB BLDV-MCNC: 10.7 G/DL — LOW (ref 11.5–15.5)
HYALINE CASTS # UR AUTO: SIGNIFICANT CHANGE UP (ref 0–?)
IMM GRANULOCYTES # BLD AUTO: 0.01 # — SIGNIFICANT CHANGE UP
IMM GRANULOCYTES NFR BLD AUTO: 0.2 % — SIGNIFICANT CHANGE UP (ref 0–1.5)
KETONES UR-MCNC: NEGATIVE — SIGNIFICANT CHANGE UP
LACTATE BLDV-MCNC: 1.9 MMOL/L — SIGNIFICANT CHANGE UP (ref 0.5–2)
LEUKOCYTE ESTERASE UR-ACNC: NEGATIVE — SIGNIFICANT CHANGE UP
LYMPHOCYTES # BLD AUTO: 1.44 K/UL — SIGNIFICANT CHANGE UP (ref 1–3.3)
LYMPHOCYTES # BLD AUTO: 25.6 % — SIGNIFICANT CHANGE UP (ref 13–44)
MCHC RBC-ENTMCNC: 27.9 PG — SIGNIFICANT CHANGE UP (ref 27–34)
MCHC RBC-ENTMCNC: 32 % — SIGNIFICANT CHANGE UP (ref 32–36)
MCV RBC AUTO: 87 FL — SIGNIFICANT CHANGE UP (ref 80–100)
MONOCYTES # BLD AUTO: 0.39 K/UL — SIGNIFICANT CHANGE UP (ref 0–0.9)
MONOCYTES NFR BLD AUTO: 6.9 % — SIGNIFICANT CHANGE UP (ref 2–14)
MUCOUS THREADS # UR AUTO: SIGNIFICANT CHANGE UP
NEUTROPHILS # BLD AUTO: 3.5 K/UL — SIGNIFICANT CHANGE UP (ref 1.8–7.4)
NEUTROPHILS NFR BLD AUTO: 62.4 % — SIGNIFICANT CHANGE UP (ref 43–77)
NITRITE UR-MCNC: NEGATIVE — SIGNIFICANT CHANGE UP
NRBC # FLD: 0.02 — SIGNIFICANT CHANGE UP
PCO2 BLDV: 49 MMHG — SIGNIFICANT CHANGE UP (ref 41–51)
PH BLDV: 7.24 PH — LOW (ref 7.32–7.43)
PH UR: 7 — SIGNIFICANT CHANGE UP (ref 4.6–8)
PLATELET # BLD AUTO: 188 K/UL — SIGNIFICANT CHANGE UP (ref 150–400)
PMV BLD: 11.9 FL — SIGNIFICANT CHANGE UP (ref 7–13)
PO2 BLDV: 57 MMHG — HIGH (ref 35–40)
POTASSIUM BLDV-SCNC: 5 MMOL/L — HIGH (ref 3.4–4.5)
POTASSIUM SERPL-MCNC: 5.5 MMOL/L — HIGH (ref 3.5–5.3)
POTASSIUM SERPL-SCNC: 5.5 MMOL/L — HIGH (ref 3.5–5.3)
PROT SERPL-MCNC: 6.8 G/DL — SIGNIFICANT CHANGE UP (ref 6–8.3)
PROT UR-MCNC: 500 — HIGH
RBC # BLD: 3.84 M/UL — SIGNIFICANT CHANGE UP (ref 3.8–5.2)
RBC # FLD: 13.7 % — SIGNIFICANT CHANGE UP (ref 10.3–14.5)
RBC CASTS # UR COMP ASSIST: SIGNIFICANT CHANGE UP (ref 0–?)
SAO2 % BLDV: 87.8 % — HIGH (ref 60–85)
SODIUM SERPL-SCNC: 137 MMOL/L — SIGNIFICANT CHANGE UP (ref 135–145)
SP GR SPEC: 1.01 — SIGNIFICANT CHANGE UP (ref 1–1.03)
SQUAMOUS # UR AUTO: SIGNIFICANT CHANGE UP
UROBILINOGEN FLD QL: NORMAL E.U. — SIGNIFICANT CHANGE UP (ref 0.1–0.2)
WBC # BLD: 5.62 K/UL — SIGNIFICANT CHANGE UP (ref 3.8–10.5)
WBC # FLD AUTO: 5.62 K/UL — SIGNIFICANT CHANGE UP (ref 3.8–10.5)
WBC UR QL: SIGNIFICANT CHANGE UP (ref 0–?)

## 2017-09-02 PROCEDURE — 99283 EMERGENCY DEPT VISIT LOW MDM: CPT

## 2017-09-02 RX ORDER — INSULIN LISPRO 100/ML
4 VIAL (ML) SUBCUTANEOUS ONCE
Qty: 0 | Refills: 0 | Status: COMPLETED | OUTPATIENT
Start: 2017-09-02 | End: 2017-09-02

## 2017-09-02 RX ORDER — SODIUM CHLORIDE 9 MG/ML
1000 INJECTION INTRAMUSCULAR; INTRAVENOUS; SUBCUTANEOUS ONCE
Qty: 0 | Refills: 0 | Status: COMPLETED | OUTPATIENT
Start: 2017-09-02 | End: 2017-09-02

## 2017-09-02 RX ADMIN — SODIUM CHLORIDE 1000 MILLILITER(S): 9 INJECTION INTRAMUSCULAR; INTRAVENOUS; SUBCUTANEOUS at 16:20

## 2017-09-02 RX ADMIN — Medication 4 UNIT(S): at 17:24

## 2017-09-02 NOTE — ED PROVIDER NOTE - CARE PLAN
Principal Discharge DX:	Chronic kidney disease, unspecified CKD stage Principal Discharge DX:	Chronic kidney disease, unspecified CKD stage  Instructions for follow-up, activity and diet:	PCP/renal in 2 days

## 2017-09-02 NOTE — ED PROVIDER NOTE - PMH
CHF (congestive heart failure)    Coronary artery disease involving native coronary artery of native heart without angina pectoris    CVA (cerebral vascular accident)    CVA (cerebral vascular accident)    Depression    Diabetes    DM (diabetes mellitus)    Enlarged heart    Glaucoma    Gout    Gout    HTN (hypertension)    HTN (hypertension)

## 2017-09-02 NOTE — ED ADULT NURSE NOTE - OBJECTIVE STATEMENT
Pt a&ox3 sent in by PCP for r/o kidney failure. Pt denies any medical complaints at this time. Pt breathing even and unlabored. Pt denies cp/discomfort, denies headache/dizziness. Abdomen soft, non-tender, non-distended. Skin is cool dry and intact. IVL placed, labs sent. Will continue to monitor.

## 2017-09-02 NOTE — ED PROVIDER NOTE - OBJECTIVE STATEMENT
53 yo F hx MI/CVA/HTN/DM/CKD presents for acute on chronic renal failure. She was seen by her PCP and referred to the ED due to worsening renal function. She is unsure if this is for initiation of dialysis, but knows dialysis has been discussed. She has no complaints, include no urinary frequency 51 yo F hx MI/CVA/HTN/DM/CKD presents for acute on chronic renal failure. She was seen by her PCP and referred to the ED due to worsening renal function. She is unsure if this is for initiation of dialysis, but knows dialysis has been discussed. She has no complaints, include no urinary frequency, polyuria, confusion, abd pain, vomiting, diarrhea, cp, dyspnea. No hx HD.

## 2017-09-02 NOTE — ED PROVIDER NOTE - CHIEF COMPLAINT
The patient is a 52y Female complaining of The patient is a 52y Female presenting with acute on chronic kidney failure

## 2017-09-02 NOTE — ED PROVIDER NOTE - ATTENDING CONTRIBUTION TO CARE
52F with pmh MI, CVA, HTN, poorly controlled IDDM presents with "kidney failure." pt told by her nephrologist that she needs to go to hospital for treatment. she denies f/c, n/v/d, dysuria, hematuria, abd pain, lightheadedness, dizziness, cp, sob.    PE: obese female in NAD, NCAT, MMM, Trachea midline, Normal conjunctiva, lungs CTAB, S1/S2 RRR, Normal perfusion, Abdomen Soft, NTND, No rebound/guarding, No LE edema, No deformity of extremities, No rashes,  No focal motor or sensory deficits.    52F with multiple medical problems, poorly controlled diabetes, presents with "kidney failure," hyperglycemia to 400s. concern for mavis, electrolyte imbalance, potential DKA given hyperglycemia. will obtain cbc, cmp, acetone, vbg, give ivf, re-assess. - Monroe James MD

## 2017-09-02 NOTE — ED ADULT TRIAGE NOTE - CHIEF COMPLAINT QUOTE
Pt w/ hx of DM, HTN, Kidney failure BIBEMS, advised by PMD to come to ED immediately d/t kidney failure per PT.  Pt is hyperglycemic  in triage and hypertensive.  Pt p/w NAD, denies pain, dizziness, feeling light headed.  VS as noted.

## 2017-09-04 PROBLEM — E11.9 TYPE 2 DIABETES MELLITUS WITHOUT COMPLICATIONS: Chronic | Status: ACTIVE | Noted: 2017-07-09

## 2017-09-04 PROBLEM — I10 ESSENTIAL (PRIMARY) HYPERTENSION: Chronic | Status: ACTIVE | Noted: 2017-07-09

## 2017-09-18 ENCOUNTER — EMERGENCY (EMERGENCY)
Facility: HOSPITAL | Age: 52
LOS: 1 days | Discharge: HOME | End: 2017-09-18
Attending: EMERGENCY MEDICINE | Admitting: EMERGENCY MEDICINE
Payer: MEDICAID

## 2017-09-18 VITALS
OXYGEN SATURATION: 100 % | SYSTOLIC BLOOD PRESSURE: 111 MMHG | RESPIRATION RATE: 18 BRPM | TEMPERATURE: 98 F | DIASTOLIC BLOOD PRESSURE: 72 MMHG | HEART RATE: 72 BPM

## 2017-09-18 PROCEDURE — 99284 EMERGENCY DEPT VISIT MOD MDM: CPT | Mod: 25

## 2017-09-18 PROCEDURE — 93010 ELECTROCARDIOGRAM REPORT: CPT

## 2017-09-18 RX ORDER — TETANUS TOXOID, REDUCED DIPHTHERIA TOXOID AND ACELLULAR PERTUSSIS VACCINE, ADSORBED 5; 2.5; 8; 8; 2.5 [IU]/.5ML; [IU]/.5ML; UG/.5ML; UG/.5ML; UG/.5ML
0.5 SUSPENSION INTRAMUSCULAR ONCE
Qty: 0 | Refills: 0 | Status: COMPLETED | OUTPATIENT
Start: 2017-09-18 | End: 2017-09-18

## 2017-09-18 RX ADMIN — TETANUS TOXOID, REDUCED DIPHTHERIA TOXOID AND ACELLULAR PERTUSSIS VACCINE, ADSORBED 0.5 MILLILITER(S): 5; 2.5; 8; 8; 2.5 SUSPENSION INTRAMUSCULAR at 16:04

## 2017-09-18 NOTE — ED PROVIDER NOTE - PHYSICAL EXAMINATION
walking around ED on phone in no distress  abrasions over left elbow; FROM of elbow; no laceration  chest: no contusion; tenderness to palpation

## 2017-09-18 NOTE — ED ADULT TRIAGE NOTE - CHIEF COMPLAINT QUOTE
pt BIBA from street.  pt tripped and fell in the street.  [place] LOC.  pt has abraision on left elbow.

## 2017-09-18 NOTE — ED PROVIDER NOTE - MEDICAL DECISION MAKING DETAILS
have considered more sinister causes but pt elicits a clear hx of tripping and falling; elbow has FROM; CP which was short time as she landed on arms and chest has totally resolved; lung sounds clear

## 2017-09-18 NOTE — ED PROVIDER NOTE - OBJECTIVE STATEMENT
53 yo F hx MI/CVA/HTN/DM/CKD who presents with following hx; was in store and had to really use restroom.  Told son would go home to use it and really needed to go so would run.  States tripped and fell hitting left elbow and chest.  Initially mild pain over chest and sustained abrasions to left elbow.  Currently denies CP/SOB.  At no point before fall did pt have any CP, dizziness, diaphoresis.  Denies LOC.

## 2017-10-16 ENCOUNTER — INPATIENT (INPATIENT)
Facility: HOSPITAL | Age: 52
LOS: 29 days | Discharge: HOME | End: 2017-11-15
Attending: INTERNAL MEDICINE | Admitting: INTERNAL MEDICINE
Payer: MEDICAID

## 2017-10-16 VITALS
SYSTOLIC BLOOD PRESSURE: 151 MMHG | HEART RATE: 82 BPM | DIASTOLIC BLOOD PRESSURE: 90 MMHG | OXYGEN SATURATION: 97 % | TEMPERATURE: 98 F | RESPIRATION RATE: 18 BRPM

## 2017-10-16 DIAGNOSIS — N18.6 END STAGE RENAL DISEASE: ICD-10-CM

## 2017-10-16 DIAGNOSIS — E11.9 TYPE 2 DIABETES MELLITUS WITHOUT COMPLICATIONS: ICD-10-CM

## 2017-10-16 DIAGNOSIS — R07.9 CHEST PAIN, UNSPECIFIED: ICD-10-CM

## 2017-10-16 DIAGNOSIS — I10 ESSENTIAL (PRIMARY) HYPERTENSION: ICD-10-CM

## 2017-10-16 DIAGNOSIS — I50.30 UNSPECIFIED DIASTOLIC (CONGESTIVE) HEART FAILURE: ICD-10-CM

## 2017-10-16 DIAGNOSIS — Z29.9 ENCOUNTER FOR PROPHYLACTIC MEASURES, UNSPECIFIED: ICD-10-CM

## 2017-10-16 DIAGNOSIS — R07.89 OTHER CHEST PAIN: ICD-10-CM

## 2017-10-16 LAB
ALBUMIN SERPL ELPH-MCNC: 3.2 G/DL — LOW (ref 3.3–5)
ALP SERPL-CCNC: 95 U/L — SIGNIFICANT CHANGE UP (ref 40–120)
ALT FLD-CCNC: 10 U/L — SIGNIFICANT CHANGE UP (ref 4–33)
APPEARANCE UR: CLEAR — SIGNIFICANT CHANGE UP
AST SERPL-CCNC: 18 U/L — SIGNIFICANT CHANGE UP (ref 4–32)
BACTERIA # UR AUTO: SIGNIFICANT CHANGE UP
BASE EXCESS BLDV CALC-SCNC: -5.2 MMOL/L — SIGNIFICANT CHANGE UP
BASOPHILS # BLD AUTO: 0.03 K/UL — SIGNIFICANT CHANGE UP (ref 0–0.2)
BASOPHILS NFR BLD AUTO: 0.5 % — SIGNIFICANT CHANGE UP (ref 0–2)
BILIRUB SERPL-MCNC: 0.2 MG/DL — SIGNIFICANT CHANGE UP (ref 0.2–1.2)
BILIRUB UR-MCNC: NEGATIVE — SIGNIFICANT CHANGE UP
BLOOD GAS VENOUS - CREATININE: 6.14 MG/DL — HIGH (ref 0.5–1.3)
BLOOD UR QL VISUAL: HIGH
BUN SERPL-MCNC: 38 MG/DL — HIGH (ref 7–23)
CALCIUM SERPL-MCNC: 8.5 MG/DL — SIGNIFICANT CHANGE UP (ref 8.4–10.5)
CHLORIDE BLDV-SCNC: 109 MMOL/L — HIGH (ref 96–108)
CHLORIDE SERPL-SCNC: 102 MMOL/L — SIGNIFICANT CHANGE UP (ref 98–107)
CK MB BLD-MCNC: 3.08 NG/ML — SIGNIFICANT CHANGE UP (ref 1–4.7)
CK MB BLD-MCNC: 3.22 NG/ML — SIGNIFICANT CHANGE UP (ref 1–4.7)
CK MB BLD-MCNC: SIGNIFICANT CHANGE UP (ref 0–2.5)
CK MB BLD-MCNC: SIGNIFICANT CHANGE UP (ref 0–2.5)
CK SERPL-CCNC: 103 U/L — SIGNIFICANT CHANGE UP (ref 25–170)
CK SERPL-CCNC: 119 U/L — SIGNIFICANT CHANGE UP (ref 25–170)
CO2 SERPL-SCNC: 18 MMOL/L — LOW (ref 22–31)
COLOR SPEC: SIGNIFICANT CHANGE UP
CREAT SERPL-MCNC: 6.38 MG/DL — HIGH (ref 0.5–1.3)
EOSINOPHIL # BLD AUTO: 0.23 K/UL — SIGNIFICANT CHANGE UP (ref 0–0.5)
EOSINOPHIL NFR BLD AUTO: 3.6 % — SIGNIFICANT CHANGE UP (ref 0–6)
GAS PNL BLDV: 133 MMOL/L — LOW (ref 136–146)
GLUCOSE BLDV-MCNC: 323 — HIGH (ref 70–99)
GLUCOSE SERPL-MCNC: 329 MG/DL — HIGH (ref 70–99)
GLUCOSE UR-MCNC: >1000 — SIGNIFICANT CHANGE UP
HCO3 BLDV-SCNC: 19 MMOL/L — LOW (ref 20–27)
HCT VFR BLD CALC: 31.6 % — LOW (ref 34.5–45)
HCT VFR BLDV CALC: 31.8 % — LOW (ref 34.5–45)
HGB BLD-MCNC: 10 G/DL — LOW (ref 11.5–15.5)
HGB BLDV-MCNC: 10.3 G/DL — LOW (ref 11.5–15.5)
IMM GRANULOCYTES # BLD AUTO: 0.01 # — SIGNIFICANT CHANGE UP
IMM GRANULOCYTES NFR BLD AUTO: 0.2 % — SIGNIFICANT CHANGE UP (ref 0–1.5)
KETONES UR-MCNC: NEGATIVE — SIGNIFICANT CHANGE UP
LACTATE BLDV-MCNC: 1.3 MMOL/L — SIGNIFICANT CHANGE UP (ref 0.5–2)
LEUKOCYTE ESTERASE UR-ACNC: NEGATIVE — SIGNIFICANT CHANGE UP
LYMPHOCYTES # BLD AUTO: 1.59 K/UL — SIGNIFICANT CHANGE UP (ref 1–3.3)
LYMPHOCYTES # BLD AUTO: 25 % — SIGNIFICANT CHANGE UP (ref 13–44)
MAGNESIUM SERPL-MCNC: 1.6 MG/DL — SIGNIFICANT CHANGE UP (ref 1.6–2.6)
MCHC RBC-ENTMCNC: 28.1 PG — SIGNIFICANT CHANGE UP (ref 27–34)
MCHC RBC-ENTMCNC: 31.6 % — LOW (ref 32–36)
MCV RBC AUTO: 88.8 FL — SIGNIFICANT CHANGE UP (ref 80–100)
MONOCYTES # BLD AUTO: 0.32 K/UL — SIGNIFICANT CHANGE UP (ref 0–0.9)
MONOCYTES NFR BLD AUTO: 5 % — SIGNIFICANT CHANGE UP (ref 2–14)
MUCOUS THREADS # UR AUTO: SIGNIFICANT CHANGE UP
NEUTROPHILS # BLD AUTO: 4.17 K/UL — SIGNIFICANT CHANGE UP (ref 1.8–7.4)
NEUTROPHILS NFR BLD AUTO: 65.7 % — SIGNIFICANT CHANGE UP (ref 43–77)
NITRITE UR-MCNC: NEGATIVE — SIGNIFICANT CHANGE UP
NON-SQ EPI CELLS # UR AUTO: <1 — SIGNIFICANT CHANGE UP
NRBC # FLD: 0 — SIGNIFICANT CHANGE UP
PCO2 BLDV: 49 MMHG — SIGNIFICANT CHANGE UP (ref 41–51)
PH BLDV: 7.25 PH — LOW (ref 7.32–7.43)
PH UR: 7 — SIGNIFICANT CHANGE UP (ref 4.6–8)
PHOSPHATE SERPL-MCNC: 5.1 MG/DL — HIGH (ref 2.5–4.5)
PLATELET # BLD AUTO: 228 K/UL — SIGNIFICANT CHANGE UP (ref 150–400)
PMV BLD: 11.2 FL — SIGNIFICANT CHANGE UP (ref 7–13)
PO2 BLDV: 39 MMHG — SIGNIFICANT CHANGE UP (ref 35–40)
POTASSIUM BLDV-SCNC: 4.8 MMOL/L — HIGH (ref 3.4–4.5)
POTASSIUM SERPL-MCNC: 4.8 MMOL/L — SIGNIFICANT CHANGE UP (ref 3.5–5.3)
POTASSIUM SERPL-MCNC: 5.4 MMOL/L — HIGH (ref 3.5–5.3)
POTASSIUM SERPL-SCNC: 4.8 MMOL/L — SIGNIFICANT CHANGE UP (ref 3.5–5.3)
POTASSIUM SERPL-SCNC: 5.4 MMOL/L — HIGH (ref 3.5–5.3)
PROT SERPL-MCNC: 6.7 G/DL — SIGNIFICANT CHANGE UP (ref 6–8.3)
PROT UR-MCNC: >600 — SIGNIFICANT CHANGE UP
RBC # BLD: 3.56 M/UL — LOW (ref 3.8–5.2)
RBC # FLD: 13.1 % — SIGNIFICANT CHANGE UP (ref 10.3–14.5)
RBC CASTS # UR COMP ASSIST: SIGNIFICANT CHANGE UP (ref 0–?)
SAO2 % BLDV: 67.8 % — SIGNIFICANT CHANGE UP (ref 60–85)
SODIUM SERPL-SCNC: 135 MMOL/L — SIGNIFICANT CHANGE UP (ref 135–145)
SP GR SPEC: 1.01 — SIGNIFICANT CHANGE UP (ref 1–1.03)
SQUAMOUS # UR AUTO: SIGNIFICANT CHANGE UP
TROPONIN T SERPL-MCNC: < 0.06 NG/ML — SIGNIFICANT CHANGE UP (ref 0–0.06)
TROPONIN T SERPL-MCNC: < 0.06 NG/ML — SIGNIFICANT CHANGE UP (ref 0–0.06)
UROBILINOGEN FLD QL: NORMAL E.U. — SIGNIFICANT CHANGE UP (ref 0.1–0.2)
WBC # BLD: 6.35 K/UL — SIGNIFICANT CHANGE UP (ref 3.8–10.5)
WBC # FLD AUTO: 6.35 K/UL — SIGNIFICANT CHANGE UP (ref 3.8–10.5)
WBC UR QL: SIGNIFICANT CHANGE UP (ref 0–?)

## 2017-10-16 PROCEDURE — 74176 CT ABD & PELVIS W/O CONTRAST: CPT | Mod: 26

## 2017-10-16 PROCEDURE — 71020: CPT | Mod: 26

## 2017-10-16 RX ORDER — GLUCAGON INJECTION, SOLUTION 0.5 MG/.1ML
1 INJECTION, SOLUTION SUBCUTANEOUS ONCE
Qty: 0 | Refills: 0 | Status: DISCONTINUED | OUTPATIENT
Start: 2017-10-16 | End: 2017-10-20

## 2017-10-16 RX ORDER — DEXTROSE 50 % IN WATER 50 %
25 SYRINGE (ML) INTRAVENOUS ONCE
Qty: 0 | Refills: 0 | Status: DISCONTINUED | OUTPATIENT
Start: 2017-10-16 | End: 2017-10-20

## 2017-10-16 RX ORDER — AMLODIPINE BESYLATE 2.5 MG/1
10 TABLET ORAL DAILY
Qty: 0 | Refills: 0 | Status: DISCONTINUED | OUTPATIENT
Start: 2017-10-16 | End: 2017-10-20

## 2017-10-16 RX ORDER — PANTOPRAZOLE SODIUM 20 MG/1
40 TABLET, DELAYED RELEASE ORAL
Qty: 0 | Refills: 0 | Status: DISCONTINUED | OUTPATIENT
Start: 2017-10-16 | End: 2017-10-20

## 2017-10-16 RX ORDER — TOLTERODINE TARTRATE 1 MG/1
1 TABLET, FILM COATED ORAL
Qty: 0 | Refills: 0 | COMMUNITY

## 2017-10-16 RX ORDER — SIMVASTATIN 20 MG/1
40 TABLET, FILM COATED ORAL AT BEDTIME
Qty: 0 | Refills: 0 | Status: DISCONTINUED | OUTPATIENT
Start: 2017-10-16 | End: 2017-10-20

## 2017-10-16 RX ORDER — CHOLECALCIFEROL (VITAMIN D3) 125 MCG
400 CAPSULE ORAL DAILY
Qty: 0 | Refills: 0 | Status: DISCONTINUED | OUTPATIENT
Start: 2017-10-16 | End: 2017-10-20

## 2017-10-16 RX ORDER — DEXTROSE 50 % IN WATER 50 %
1 SYRINGE (ML) INTRAVENOUS ONCE
Qty: 0 | Refills: 0 | Status: DISCONTINUED | OUTPATIENT
Start: 2017-10-16 | End: 2017-10-20

## 2017-10-16 RX ORDER — GABAPENTIN 400 MG/1
300 CAPSULE ORAL AT BEDTIME
Qty: 0 | Refills: 0 | Status: DISCONTINUED | OUTPATIENT
Start: 2017-10-16 | End: 2017-10-20

## 2017-10-16 RX ORDER — SERTRALINE 25 MG/1
200 TABLET, FILM COATED ORAL DAILY
Qty: 0 | Refills: 0 | Status: DISCONTINUED | OUTPATIENT
Start: 2017-10-16 | End: 2017-10-20

## 2017-10-16 RX ORDER — ASPIRIN/CALCIUM CARB/MAGNESIUM 324 MG
81 TABLET ORAL DAILY
Qty: 0 | Refills: 0 | Status: DISCONTINUED | OUTPATIENT
Start: 2017-10-16 | End: 2017-10-20

## 2017-10-16 RX ORDER — INSULIN LISPRO 100/ML
VIAL (ML) SUBCUTANEOUS
Qty: 0 | Refills: 0 | Status: DISCONTINUED | OUTPATIENT
Start: 2017-10-16 | End: 2017-10-20

## 2017-10-16 RX ORDER — INSULIN GLARGINE 100 [IU]/ML
30 INJECTION, SOLUTION SUBCUTANEOUS AT BEDTIME
Qty: 0 | Refills: 0 | Status: DISCONTINUED | OUTPATIENT
Start: 2017-10-16 | End: 2017-10-20

## 2017-10-16 RX ORDER — DEXTROSE 50 % IN WATER 50 %
12.5 SYRINGE (ML) INTRAVENOUS ONCE
Qty: 0 | Refills: 0 | Status: DISCONTINUED | OUTPATIENT
Start: 2017-10-16 | End: 2017-10-20

## 2017-10-16 RX ORDER — HEPARIN SODIUM 5000 [USP'U]/ML
5000 INJECTION INTRAVENOUS; SUBCUTANEOUS EVERY 8 HOURS
Qty: 0 | Refills: 0 | Status: DISCONTINUED | OUTPATIENT
Start: 2017-10-16 | End: 2017-10-20

## 2017-10-16 RX ORDER — CLOPIDOGREL BISULFATE 75 MG/1
75 TABLET, FILM COATED ORAL DAILY
Qty: 0 | Refills: 0 | Status: DISCONTINUED | OUTPATIENT
Start: 2017-10-16 | End: 2017-10-17

## 2017-10-16 RX ORDER — QUETIAPINE FUMARATE 200 MG/1
50 TABLET, FILM COATED ORAL DAILY
Qty: 0 | Refills: 0 | Status: DISCONTINUED | OUTPATIENT
Start: 2017-10-16 | End: 2017-10-20

## 2017-10-16 RX ORDER — INSULIN LISPRO 100/ML
VIAL (ML) SUBCUTANEOUS AT BEDTIME
Qty: 0 | Refills: 0 | Status: DISCONTINUED | OUTPATIENT
Start: 2017-10-16 | End: 2017-10-20

## 2017-10-16 RX ORDER — FUROSEMIDE 40 MG
20 TABLET ORAL
Qty: 0 | Refills: 0 | Status: DISCONTINUED | OUTPATIENT
Start: 2017-10-16 | End: 2017-10-20

## 2017-10-16 RX ORDER — ERGOCALCIFEROL 1.25 MG/1
1 CAPSULE ORAL
Qty: 0 | Refills: 0 | COMMUNITY

## 2017-10-16 RX ADMIN — HEPARIN SODIUM 5000 UNIT(S): 5000 INJECTION INTRAVENOUS; SUBCUTANEOUS at 21:27

## 2017-10-16 RX ADMIN — SIMVASTATIN 40 MILLIGRAM(S): 20 TABLET, FILM COATED ORAL at 21:27

## 2017-10-16 RX ADMIN — GABAPENTIN 300 MILLIGRAM(S): 400 CAPSULE ORAL at 21:27

## 2017-10-16 RX ADMIN — INSULIN GLARGINE 30 UNIT(S): 100 INJECTION, SOLUTION SUBCUTANEOUS at 21:32

## 2017-10-16 RX ADMIN — Medication 20 MILLIGRAM(S): at 17:59

## 2017-10-16 NOTE — H&P ADULT - ASSESSMENT
53 yo female PMHx of ESRD NOT on Hemodialysis due to never followed up with Nephrologist, CAD, CHF, CVA with ataxia as residual, Depression, DM2, HTN and Gout presents with Left Flank pain radiating to epigastric area x2 days, found to have EKG changes, admitted to tele for Atypical Chest pain, r/o ACS.

## 2017-10-16 NOTE — H&P ADULT - NSHPLABSRESULTS_GEN_ALL_CORE
EKG: NSR @ 76 bpm with NEW TWI in I, AVL, V3-V5, II and AVF.  CT Abd+Pelvis  w/o con: No obstructive uropathy. No urinary calculi.  CXR: Clear lungs. No pleural effusions or pneumothorax.  Stable slightly prominent appearing cardiac and mediastinal silhouettes. Trachea midline. Unremarkable osseous structures. Right upper quadrant surgical clips again noted.  [place] x1:Neg  BUN/CR:   BUN/Cr: 38/6.38

## 2017-10-16 NOTE — H&P ADULT - PROBLEM SELECTOR PLAN 4
Daily glucose monitoring  insulin sliding scale  DASH 1800 ADA diet with 1500 cc Fluid restriction  continue lantus

## 2017-10-16 NOTE — H&P ADULT - RS GEN PE MLT RESP DETAILS PC
no chest wall tenderness/no rhonchi/clear to auscultation bilaterally/airway patent/breath sounds equal/no rales/good air movement/respirations non-labored

## 2017-10-16 NOTE — ED ADULT NURSE NOTE - CHIEF COMPLAINT QUOTE
pt BIBA from home, pt c/o left sided pain x 2 days with intermittent dizziness.  pt fell 2 weeks ago and had no injuries at time of fall.  pt is ambulatory to triage

## 2017-10-16 NOTE — H&P ADULT - PROBLEM SELECTOR PLAN 5
tele monitor  cardiac enzymes x 2 to R/O ACS.  serial EKGs  2G Sodium 1800 ADA diet with 1500 cc Fluid restriction  Strict I&Os plus Daily weights.  continue lasix PO

## 2017-10-16 NOTE — ED ADULT NURSE NOTE - PMH
CHF (congestive heart failure)    Coronary artery disease involving native coronary artery of native heart without angina pectoris    CVA (cerebral vascular accident)    Depression    DM (diabetes mellitus)    Enlarged heart    Glaucoma    Gout    Gout    HTN (hypertension)

## 2017-10-16 NOTE — ED PROVIDER NOTE - MEDICAL DECISION MAKING DETAILS
51 yo F with flank pain - renal colic vs MSK pain - pt also non-compliant with meds with new EKG changes with pain in the LUQ - will likely admit for ACS rule out, CT to rule out colic

## 2017-10-16 NOTE — ED ADULT NURSE NOTE - OBJECTIVE STATEMENT
Pt rec'd in 7 from Intake for EKG changes, c/o pain to left side of body x 3 days, worsening gradually. Pt denies N/V/D. Pt also reports intermittent dizziness x 2 weeks. Denies chest pain or SOB. Pt states she was supposed to start dialysis but hasn't yet.

## 2017-10-16 NOTE — ED PROVIDER NOTE - PROGRESS NOTE DETAILS
51 yo F with history of dm, htn, MI, CVA, depression presenting with left sided flank pain radiating into abdomen with urinary frequency. Denies blood in urine. Denies fevers, chills, cough, rhinorrhea, otorrhea, otalgia, nausea, vomiting, constipation, diarrhea, chest pain, shortness of breath. Pt states she is not adherent to medications and is being considered for dialysis Admitted to Dr. Estrada service. Attempts made x 2 to get in contact with Dr. Gan.

## 2017-10-16 NOTE — ED PROCEDURE NOTE - GENERAL PROCEDURE NAME
POCUS: Emergency Department Focused Ultrasound performed at patient's bedside.  The complete report can be found in the patient's chart.

## 2017-10-16 NOTE — ED PROVIDER NOTE - OBJECTIVE STATEMENT
LEFT flank/LUQ pain with N/V x 1 this morning.  Patient has past medical history of ESRD and was supposed to start on HD one month ago and DM, Hypertension, hyperlipidemia.  Patient states the pain has been constant x 2 days 10/10, non-radiating a/w urinary frequency. GIULIANO FIGUEROA  ATTENDING, MD: 53 yo F with past medical history of DM, Hypertension, hyperlipidemia, MI, depression, ESRD not on HD, presents with LEFT flank/LUQ pain with N/V x 1 this morning.  Patient has past medical history of ESRD and was supposed to start on HD one month ago.  Patient states the pain has been constant x 2 days 10/10, non-radiating a/w urinary frequency.

## 2017-10-16 NOTE — ED PROVIDER NOTE - CARE PLAN
Principal Discharge DX:	Flank pain Principal Discharge DX:	Chest pain Principal Discharge DX:	Flank pain, acute

## 2017-10-16 NOTE — H&P ADULT - ATTENDING COMMENTS
pt seen and examined agree with plan above  start HD this admission  Nuclear stress test for ischemia evaluation   2D echo

## 2017-10-16 NOTE — H&P ADULT - HISTORY OF PRESENT ILLNESS
51 yo female PMHx of ESRD NOT on Hemodialysis due to never followed up with Nephrologist, CAD, CHF, CVA with ataxia as residual, Depression, DM2, HTN and Gout presents with Left Flank pain radiating to epigastric area x2 days. Flank pain 10/10, constant,  positional, worse on lying down on left side better lying on the opposite side accompanied by nausea and vomiting x1 this morning. Pt denies chest pain, SOB, palpitations, diaphoresis, fever, chills, diaphoresis, dysuria or urinary frequency. Pt was getting evaluated for Hemodialysis in 7/2017 by Renal, Dr. Francisco and was instructed to follow up with him as outpatient. However, pt never followed up with him with no specific reason.

## 2017-10-16 NOTE — ED PROVIDER NOTE - ATTENDING CONTRIBUTION TO CARE
HPI as noted by GIULIANO DEY MD above.    GIULIANO FIGUEROA, ATTENDING NOTE:  Patient is awake and alert and in no acute distress.  Normocephalic/atraumatic.  Auricles are normal.  Neck supple.  Lungs CTAB, no wheeze, no rhonchi,  no rales.  Heart is regular rate and rhythm.  Abdomen is soft, not distended, LUQ tenderness to palpation, +BS.  Back is +LEFT CVAT.  Moving all 4 extremities.   Neurologically grossly intact.  Affect is appropriate.  DR. FIGUEROA, ATTENDING MD-  I performed a face to face bedside interview with patient regarding history of present illness, review of symptoms and past medical history. I completed an independent physical exam.  I have discussed patient's plan of care with the resident.   I agree with note as stated above, having amended the EMR as needed to reflect my findings. I have discussed the assessment and plan of care.  This includes during the time I functioned as the attending physician for this patient.

## 2017-10-16 NOTE — ED PROCEDURE NOTE - PROCEDURE ADDITIONAL DETAILS
Emergency Department Focused Ultrasound performed at patient's bedside for educational purposes. The study will have a follow up study performed or was performed in the direct supervision of an ultrasound trained attending. Emergency Department Focused Ultrasound performed at patient's bedside for educational purposes. The study will have a follow up study performed or was performed in the direct supervision of an ultrasound trained attending.  No evidence of AAA, however, technically limited study as the distal aorta was not visualized 2/2 body habitus.  Pt will be having a follow-up CT abdomen/pelvis. Suspicious for diverticulitis.

## 2017-10-16 NOTE — H&P ADULT - PROBLEM SELECTOR PLAN 1
tele monitor   cardiac enzymes x 2  Serial EKGs  DASH 1800 ADA diet with 1500 cc Fluid restriction  continue ASA, plavix and lipitor  consider NST

## 2017-10-17 LAB
APTT BLD: 26.8 SEC — LOW (ref 27.5–37.4)
BUN SERPL-MCNC: 39 MG/DL — HIGH (ref 7–23)
CALCIUM SERPL-MCNC: 8.5 MG/DL — SIGNIFICANT CHANGE UP (ref 8.4–10.5)
CHLORIDE SERPL-SCNC: 108 MMOL/L — HIGH (ref 98–107)
CHOLEST SERPL-MCNC: 217 MG/DL — HIGH (ref 120–199)
CO2 SERPL-SCNC: 21 MMOL/L — LOW (ref 22–31)
CREAT SERPL-MCNC: 6.2 MG/DL — HIGH (ref 0.5–1.3)
GLUCOSE SERPL-MCNC: 166 MG/DL — HIGH (ref 70–99)
HBV SURFACE AG SER-ACNC: NEGATIVE — SIGNIFICANT CHANGE UP
HCT VFR BLD CALC: 32.1 % — LOW (ref 34.5–45)
HDLC SERPL-MCNC: 38 MG/DL — LOW (ref 45–65)
HGB BLD-MCNC: 9.7 G/DL — LOW (ref 11.5–15.5)
INR BLD: 0.87 — LOW (ref 0.88–1.17)
LIPID PNL WITH DIRECT LDL SERPL: 134 MG/DL — SIGNIFICANT CHANGE UP
MAGNESIUM SERPL-MCNC: 1.8 MG/DL — SIGNIFICANT CHANGE UP (ref 1.6–2.6)
MCHC RBC-ENTMCNC: 27.6 PG — SIGNIFICANT CHANGE UP (ref 27–34)
MCHC RBC-ENTMCNC: 30.2 % — LOW (ref 32–36)
MCV RBC AUTO: 91.2 FL — SIGNIFICANT CHANGE UP (ref 80–100)
NRBC # FLD: 0 — SIGNIFICANT CHANGE UP
PHOSPHATE SERPL-MCNC: 5 MG/DL — HIGH (ref 2.5–4.5)
PLATELET # BLD AUTO: 228 K/UL — SIGNIFICANT CHANGE UP (ref 150–400)
PMV BLD: 11.1 FL — SIGNIFICANT CHANGE UP (ref 7–13)
POTASSIUM SERPL-MCNC: 4.6 MMOL/L — SIGNIFICANT CHANGE UP (ref 3.5–5.3)
POTASSIUM SERPL-SCNC: 4.6 MMOL/L — SIGNIFICANT CHANGE UP (ref 3.5–5.3)
PROTHROM AB SERPL-ACNC: 9.7 SEC — LOW (ref 9.8–13.1)
RBC # BLD: 3.52 M/UL — LOW (ref 3.8–5.2)
RBC # FLD: 13.4 % — SIGNIFICANT CHANGE UP (ref 10.3–14.5)
SODIUM SERPL-SCNC: 140 MMOL/L — SIGNIFICANT CHANGE UP (ref 135–145)
TRIGL SERPL-MCNC: 324 MG/DL — HIGH (ref 10–149)
WBC # BLD: 6.18 K/UL — SIGNIFICANT CHANGE UP (ref 3.8–10.5)
WBC # FLD AUTO: 6.18 K/UL — SIGNIFICANT CHANGE UP (ref 3.8–10.5)

## 2017-10-17 PROCEDURE — 99222 1ST HOSP IP/OBS MODERATE 55: CPT

## 2017-10-17 PROCEDURE — 93970 EXTREMITY STUDY: CPT | Mod: 26

## 2017-10-17 RX ORDER — INFLUENZA VIRUS VACCINE 15; 15; 15; 15 UG/.5ML; UG/.5ML; UG/.5ML; UG/.5ML
0.5 SUSPENSION INTRAMUSCULAR ONCE
Qty: 0 | Refills: 0 | Status: DISCONTINUED | OUTPATIENT
Start: 2017-10-17 | End: 2017-11-15

## 2017-10-17 RX ADMIN — Medication 20 MILLIGRAM(S): at 05:46

## 2017-10-17 RX ADMIN — HEPARIN SODIUM 5000 UNIT(S): 5000 INJECTION INTRAVENOUS; SUBCUTANEOUS at 05:46

## 2017-10-17 RX ADMIN — INSULIN GLARGINE 30 UNIT(S): 100 INJECTION, SOLUTION SUBCUTANEOUS at 23:37

## 2017-10-17 RX ADMIN — SERTRALINE 200 MILLIGRAM(S): 25 TABLET, FILM COATED ORAL at 13:51

## 2017-10-17 RX ADMIN — AMLODIPINE BESYLATE 10 MILLIGRAM(S): 2.5 TABLET ORAL at 05:46

## 2017-10-17 RX ADMIN — Medication 1: at 13:51

## 2017-10-17 RX ADMIN — Medication 81 MILLIGRAM(S): at 13:39

## 2017-10-17 RX ADMIN — CLOPIDOGREL BISULFATE 75 MILLIGRAM(S): 75 TABLET, FILM COATED ORAL at 13:39

## 2017-10-17 RX ADMIN — Medication 400 UNIT(S): at 13:40

## 2017-10-17 RX ADMIN — HEPARIN SODIUM 5000 UNIT(S): 5000 INJECTION INTRAVENOUS; SUBCUTANEOUS at 23:08

## 2017-10-17 RX ADMIN — SIMVASTATIN 40 MILLIGRAM(S): 20 TABLET, FILM COATED ORAL at 23:08

## 2017-10-17 RX ADMIN — QUETIAPINE FUMARATE 50 MILLIGRAM(S): 200 TABLET, FILM COATED ORAL at 13:54

## 2017-10-17 RX ADMIN — HEPARIN SODIUM 5000 UNIT(S): 5000 INJECTION INTRAVENOUS; SUBCUTANEOUS at 13:41

## 2017-10-17 RX ADMIN — GABAPENTIN 300 MILLIGRAM(S): 400 CAPSULE ORAL at 23:08

## 2017-10-17 RX ADMIN — Medication 20 MILLIGRAM(S): at 18:04

## 2017-10-17 NOTE — CONSULT NOTE ADULT - ATTENDING COMMENTS
Pt. was seen and examined. Agree with above. Plan d/w the team.  new onset of ESRD. Pt needs access for HD.   will plan for AVF creation on this admission once pt. is medically optimized  protect nondominant arm and obtain vein mapping  Risks and benefits of the procedure were discussed with pt. and she agrees to proceed. All questions were answered.

## 2017-10-17 NOTE — CONSULT NOTE ADULT - SUBJECTIVE AND OBJECTIVE BOX
HPI:  Ms. Herr is a 52 year-old woman with history of multiple medical problems including hypertension, type 2 diabetes mellitus, coronary artery disease, congestive heart failure, and advanced chronic kidney disease. I met her during her admission at [facility] in 2017. She presented last night to the [facility] ER with nausea, vomiting, epigastric pain, and left flank pain for 2 days.       PAST MEDICAL & SURGICAL HISTORY:  Depression  Gout  Coronary artery disease involving native coronary artery of native heart without angina pectoris  CVA (cerebral vascular accident)  CHF (congestive heart failure)  DM (diabetes mellitus)  HTN (hypertension)  Glaucoma  Enlarged heart  Gout  No significant past surgical history    Allergies  shellfish (Nausea (Mild to Mod); Hives (Mild to Mod))    SOCIAL HISTORY:  Denies ETOh,Smoking,     FAMILY HISTORY:  Family history of heart attack    REVIEW OF SYSTEMS:  CONSTITUTIONAL: No weakness, fevers or chills  EYES/ENT: No visual changes;  No vertigo or throat pain   NECK: No pain or stiffness  RESPIRATORY: No cough, wheezing, hemoptysis; No shortness of breath  CARDIOVASCULAR: No chest pain or palpitations  GASTROINTESTINAL: (+)nausea, (+)vomiting, (+)epigastric pain, (+)left flank pain  GENITOURINARY: No dysuria, frequency or hematuria  NEUROLOGICAL: No numbness or weakness  SKIN: No itching, burning, rashes, or lesions   All other review of systems is negative unless indicated above.    VITAL:  T(C): , Max: 36.9 (10-17-17 @ 05:47)  T(F): , Max: 98.5 (10-17-17 @ 05:47)  HR: 71 (10-17-17 @ 05:47)  BP: 158/85 (10-17-17 @ 05:47)  BP(mean): --  RR: 16 (10-17-17 @ 05:47)  SpO2: 100% (10-17-17 @ 05:47)    PHYSICAL EXAM:  Constitutional: Overweight, NAD, Alert  HEENT: NCAT, MMM  Neck: Supple, No JVD  Respiratory: CTA-b/l  Cardiovascular: RRR s1s2, no m/r/g  Gastrointestinal: BS+, soft, NT/ND  Extremities: No peripheral edema b/l  Neurological: no focal deficits; strength grossly intact  Psychiatric: Normal mood, normal affect  Back: no CVAT b/l  Skin: No rashes, no nevi    LABS:                        9.7    6.18  )-----------( 228      ( 17 Oct 2017 05:30 )             32.1     Na(140)/K(4.6)/Cl(108)/HCO3(21)/BUN(39)/Cr(6.20)Glu(166)/Ca(8.5)/Mg(1.8)/PO4(5.0)    10-17 @ 05:30  Na(--)/K(4.8)/Cl(--)/HCO3(--)/BUN(--)/Cr(--)Glu(--)/Ca(--)/Mg(--)/PO4(--)    10-16 @ 17:30  Na(135)/K(5.4)/Cl(102)/HCO3(18)/BUN(38)/Cr(6.38)Glu(329)/Ca(8.5)/Mg(1.6)/PO4(5.1)    10-16 @ 11:55    (17) - BUN/creatinine 34/5.87  (17) - BUN/creatinine 52/5.27    Urinalysis Basic - ( 16 Oct 2017 15:19 )  Color: PLYEL / Appearance: CLEAR / S.010 / pH: 7.0  Gluc: >1000 / Ketone: NEGATIVE  / Bili: NEGATIVE / Urobili: NORMAL E.U.   Blood: TRACE / Protein: >600 / Nitrite: NEGATIVE   Leuk Esterase: NEGATIVE / RBC: 2-5 / WBC 0-2   Sq Epi: OCC / Non Sq Epi: x / Bacteria: MOD  (17) - urine protein/creatinine 481/53  (~9g/g)    IMAGING:  TTE (2017) - EF 55%; grossly normal    CXR (10/16/17) - clear lungs      IMPRESSION:  (1)Renal - stage 5 chronic kidney disease - diabetic nephropathy - nephrotic-range proteinuria.     RECOMMEND:  (1)        Thank you for involving [facility] in this patient's care.    With warm regards,    Wale Francisco MD   [facility]  [phone] HPI:  Ms. Herr is a 52 year-old woman with history of multiple medical problems including hypertension, type 2 diabetes mellitus, coronary artery disease, congestive heart failure, and advanced chronic kidney disease. I met her during her admission at [facility] in 2017. She presented last night to the [facility] ER with nausea, vomiting, epigastric pain, and left flank pain for 2 days. She tells me that her left flank pain is severe, intermittent, and induced by positional changes. She denies associated fever, chills, dysuria, or gross hematuria.     Ms. Herr tells me that she wanted to see me at the office but that she was unable to figure out how to get the needed transportation. Admits to poor appetite for the past 2 days - good appetite prior to that. Good energy. No tremor. (+)pruritis of LEs. No chest pain; no shortness of breath.      PAST MEDICAL & SURGICAL HISTORY:  Depression  Gout  Coronary artery disease involving native coronary artery of native heart without angina pectoris  CVA (cerebral vascular accident)  CHF (congestive heart failure)  DM (diabetes mellitus)  HTN (hypertension)  Glaucoma  Enlarged heart  Gout  No significant past surgical history    Allergies  shellfish (Nausea (Mild to Mod); Hives (Mild to Mod))    SOCIAL HISTORY:  Denies ETOh,Smoking,     FAMILY HISTORY:  Family history of heart attack    REVIEW OF SYSTEMS:  CONSTITUTIONAL: No weakness, fatigue, fevers or chills; (+)loss of appetite  EYES/ENT: No visual changes;  No vertigo or throat pain   NECK: No pain or stiffness  RESPIRATORY: No cough, wheezing, hemoptysis; No shortness of breath  CARDIOVASCULAR: No chest pain or palpitations  GASTROINTESTINAL: (+)nausea, (+)vomiting, (+)epigastric pain, (+)left flank pain  GENITOURINARY: No dysuria, frequency or hematuria  NEUROLOGICAL: No numbness or weakness; no tremor  SKIN: No itching, burning, rashes, or lesions; (+)pruritis  All other review of systems is negative unless indicated above.    VITAL:  T(C): , Max: 36.9 (10-17-17 @ 05:47)  T(F): , Max: 98.5 (10-17-17 @ 05:47)  HR: 71 (10-17-17 @ 05:47)  BP: 158/85 (10-17-17 @ 05:47)  BP(mean): --  RR: 16 (10-17-17 @ 05:47)  SpO2: 100% (10-17-17 @ 05:47)    PHYSICAL EXAM:  Constitutional: Obese, NAD, Alert  HEENT: NCAT, DMM  Neck: Supple, No JVD  Respiratory: CTA-b/l  Cardiovascular: RRR s1s2, no m/r/g  Gastrointestinal: BS+, soft, NT/ND  Extremities: No peripheral edema b/l  Neurological: no focal deficits; strength grossly intact; no asterixis  Psychiatric: Normal mood, normal affect  Back: no CVAT b/l  Skin: No rashes, no nevi    LABS:                        9.7    6.18  )-----------( 228      ( 17 Oct 2017 05:30 )             32.1     Na(140)/K(4.6)/Cl(108)/HCO3(21)/BUN(39)/Cr(6.20)Glu(166)/Ca(8.5)/Mg(1.8)/PO4(5.0)    10-17 @ 05:30  Na(--)/K(4.8)/Cl(--)/HCO3(--)/BUN(--)/Cr(--)Glu(--)/Ca(--)/Mg(--)/PO4(--)    10-16 @ 17:30  Na(135)/K(5.4)/Cl(102)/HCO3(18)/BUN(38)/Cr(6.38)Glu(329)/Ca(8.5)/Mg(1.6)/PO4(5.1)    10-16 @ 11:55    (17) - BUN/creatinine 34/5.87  (17) - BUN/creatinine 52/5.27    Urinalysis Basic - ( 16 Oct 2017 15:19 )  Color: PLYEL / Appearance: CLEAR / S.010 / pH: 7.0  Gluc: >1000 / Ketone: NEGATIVE  / Bili: NEGATIVE / Urobili: NORMAL E.U.   Blood: TRACE / Protein: >600 / Nitrite: NEGATIVE   Leuk Esterase: NEGATIVE / RBC: 2-5 / WBC 0-2   Sq Epi: OCC / Non Sq Epi: x / Bacteria: MOD  (17) - urine protein/creatinine 481/53  (~9g/g)    IMAGING:  TTE (2017) - EF 55%; grossly normal    CXR (10/16/17) - clear lungs      IMPRESSION:  (1)Renal - stage 5 chronic kidney disease - diabetic nephropathy - nephrotic-range proteinuria. Although she has minimal uremic symptoms, in my opinion it is highly appropriate to start chronic HD at this point. Her GFR is quite low (<10ml/min). I expect that if she is discharged without HD, she will be lost to followup. If this happens, she is at very high risk for severe complications related to renal impairment (such as a ventricular tachyarrhythmia induced by hyperkalemia).     (2)Vascular - no access for HD at present.    (3)CV - hypertensive/hypervolemic - could diurese at this point...if we are to start dialysis, there is no reason for concern that the diuresis will worsen her renal function    (4)Flank pain - seems musculoskeletal.    RECOMMEND:  (1)HD initiation this admission        a. HBV/HCV panel        b. IR for catheter placement        c. Vascular evaluation for AVF/AVG placement    (2)No objection to IV lasix for diuresis  (3)Pain control per primary team  (4)Dose new meds for GFR<10/HD  (5)Will require setup of outpatient HD by Social Work - the [facility] seems a good option, given that she lives in [place]        Thank you for involving [facility] in this patient's care.    With warm regards,    Wale Francisco MD   [facility]  [phone]

## 2017-10-17 NOTE — CONSULT NOTE ADULT - SUBJECTIVE AND OBJECTIVE BOX
GENERAL SURGERY CONSULT NOTE    Patient is a 52y old  Female who presents with a chief complaint of Flank and epigastric pain (16 Oct 2017 17:53)    HPI:  51 yo female PMHx of ESRD NOT on Hemodialysis due to never followed up with Nephrologist, CAD, CHF, CVA with ataxia, Depression, DM2, HTN and Gout presents with Left Flank pain radiating to epigastric area x2 days. Pt was also found to have chest pain with EKG changes and was admitted to [facility] for r/o ACS. She was seen by nephrology this morning who recommended initiation of chronic HD and requested AV Fistula evaluation by vascular surgery.    PAST MEDICAL & SURGICAL HISTORY:  Depression  Gout  Coronary artery disease involving native coronary artery of native heart without angina pectoris  CVA (cerebral vascular accident)  CHF (congestive heart failure)  DM (diabetes mellitus)  HTN (hypertension)  Glaucoma  Enlarged heart  Gout  No significant past surgical history    [  ] No significant past history as reviewed with the patient and family    FAMILY HISTORY:  Family history of heart attack    [  ] Family history not pertinent as reviewed with the patient and family    SOCIAL HISTORY:    MEDICATIONS  (STANDING):  amLODIPine   Tablet 10 milliGRAM(s) Oral daily  aspirin enteric coated 81 milliGRAM(s) Oral daily  cholecalciferol 400 Unit(s) Oral daily  clopidogrel Tablet 75 milliGRAM(s) Oral daily  dextrose 50% Injectable 12.5 Gram(s) IV Push once  dextrose 50% Injectable 25 Gram(s) IV Push once  dextrose 50% Injectable 25 Gram(s) IV Push once  furosemide    Tablet 20 milliGRAM(s) Oral two times a day  gabapentin 300 milliGRAM(s) Oral at bedtime  heparin  Injectable 5000 Unit(s) SubCutaneous every 8 hours  influenza   Vaccine 0.5 milliLiter(s) IntraMuscular once  insulin glargine Injectable (LANTUS) 30 Unit(s) SubCutaneous at bedtime  insulin lispro (HumaLOG) corrective regimen sliding scale   SubCutaneous three times a day before meals  insulin lispro (HumaLOG) corrective regimen sliding scale   SubCutaneous at bedtime  pantoprazole    Tablet 40 milliGRAM(s) Oral before breakfast  QUEtiapine 50 milliGRAM(s) Oral daily  sertraline 200 milliGRAM(s) Oral daily  simvastatin 40 milliGRAM(s) Oral at bedtime    MEDICATIONS  (PRN):  dextrose Gel 1 Dose(s) Oral once PRN Blood Glucose LESS THAN 70 milliGRAM(s)/deciliter  glucagon  Injectable 1 milliGRAM(s) IntraMuscular once PRN Glucose LESS THAN 70 milligrams/deciliter    Allergies    No Known Drug Allergies  Seafood (Unknown)  shellfish (Nausea (Mild to Mod); Hives (Mild to Mod))    Intolerances        Vital Signs Last 24 Hrs  T(C): 36.9 (17 Oct 2017 05:47), Max: 36.9 (17 Oct 2017 05:47)  T(F): 98.5 (17 Oct 2017 05:47), Max: 98.5 (17 Oct 2017 05:47)  HR: 71 (17 Oct 2017 05:47) (68 - 75)  BP: 158/85 (17 Oct 2017 05:47) (135/81 - 167/115)  BP(mean): --  RR: 16 (17 Oct 2017 05:47) (15 - 18)  SpO2: 100% (17 Oct 2017 05:47) (99% - 100%)  Daily Height in cm: 162.56 (16 Oct 2017 17:53)    Daily     Exam:  General:  HEENT:  Resp:  Chest:   Abd:  Ext:  Neuro:                          9.7    6.18  )-----------( 228      ( 17 Oct 2017 05:30 )             32.1     10-17    140  |  108<H>  |  39<H>  ----------------------------<  166<H>  4.6   |  21<L>  |  6.20<H>    Ca    8.5      17 Oct 2017 05:30  Phos  5.0     10-17  Mg     1.8     10-17    TPro  6.7  /  Alb  3.2<L>  /  TBili  0.2  /  DBili  x   /  AST  18  /  ALT  10  /  AlkPhos  95  10-16      Urinalysis Basic - ( 16 Oct 2017 15:19 )    Color: PLYEL / Appearance: CLEAR / S.010 / pH: 7.0  Gluc: >1000 / Ketone: NEGATIVE  / Bili: NEGATIVE / Urobili: NORMAL E.U.   Blood: TRACE / Protein: >600 / Nitrite: NEGATIVE   Leuk Esterase: NEGATIVE / RBC: 2-5 / WBC 0-2   Sq Epi: OCC / Non Sq Epi: x / Bacteria: MOD GENERAL SURGERY CONSULT NOTE    Patient is a 52y old  Female who presents with a chief complaint of Flank and epigastric pain (16 Oct 2017 17:53)    HPI:  51 yo female PMHx of ESRD NOT on Hemodialysis due to never followed up with Nephrologist, CAD, CHF, CVA with ataxia, Depression, DM2, HTN and Gout presents with Left Flank pain radiating to epigastric area x2 days. Pt was also found to have chest pain with EKG changes and was admitted to [facility] for r/o ACS. She was seen by nephrology this morning who recommended initiation of chronic HD and requested AV Fistula evaluation by vascular surgery.    PAST MEDICAL & SURGICAL HISTORY:  Depression  Gout  Coronary artery disease involving native coronary artery of native heart without angina pectoris  CVA (cerebral vascular accident)  CHF (congestive heart failure)  DM (diabetes mellitus)  HTN (hypertension)  Glaucoma  Enlarged heart  Gout  No significant past surgical history    [  ] No significant past history as reviewed with the patient and family    FAMILY HISTORY:  Family history of heart attack    [  ] Family history not pertinent as reviewed with the patient and family    SOCIAL HISTORY:    MEDICATIONS  (STANDING):  amLODIPine   Tablet 10 milliGRAM(s) Oral daily  aspirin enteric coated 81 milliGRAM(s) Oral daily  cholecalciferol 400 Unit(s) Oral daily  clopidogrel Tablet 75 milliGRAM(s) Oral daily  dextrose 50% Injectable 12.5 Gram(s) IV Push once  dextrose 50% Injectable 25 Gram(s) IV Push once  dextrose 50% Injectable 25 Gram(s) IV Push once  furosemide    Tablet 20 milliGRAM(s) Oral two times a day  gabapentin 300 milliGRAM(s) Oral at bedtime  heparin  Injectable 5000 Unit(s) SubCutaneous every 8 hours  influenza   Vaccine 0.5 milliLiter(s) IntraMuscular once  insulin glargine Injectable (LANTUS) 30 Unit(s) SubCutaneous at bedtime  insulin lispro (HumaLOG) corrective regimen sliding scale   SubCutaneous three times a day before meals  insulin lispro (HumaLOG) corrective regimen sliding scale   SubCutaneous at bedtime  pantoprazole    Tablet 40 milliGRAM(s) Oral before breakfast  QUEtiapine 50 milliGRAM(s) Oral daily  sertraline 200 milliGRAM(s) Oral daily  simvastatin 40 milliGRAM(s) Oral at bedtime    MEDICATIONS  (PRN):  dextrose Gel 1 Dose(s) Oral once PRN Blood Glucose LESS THAN 70 milliGRAM(s)/deciliter  glucagon  Injectable 1 milliGRAM(s) IntraMuscular once PRN Glucose LESS THAN 70 milligrams/deciliter    Allergies    No Known Drug Allergies  Seafood (Unknown)  shellfish (Nausea (Mild to Mod); Hives (Mild to Mod))    Intolerances        Vital Signs Last 24 Hrs  T(C): 36.9 (17 Oct 2017 05:47), Max: 36.9 (17 Oct 2017 05:47)  T(F): 98.5 (17 Oct 2017 05:47), Max: 98.5 (17 Oct 2017 05:47)  HR: 71 (17 Oct 2017 05:47) (68 - 75)  BP: 158/85 (17 Oct 2017 05:47) (135/81 - 167/115)  BP(mean): --  RR: 16 (17 Oct 2017 05:47) (15 - 18)  SpO2: 100% (17 Oct 2017 05:47) (99% - 100%)  Daily Height in cm: 162.56 (16 Oct 2017 17:53)    Daily     Exam:  General: awake, alert, NAD  HEENT: NCAT  Resp: nonlabored  Chest: equal chest rise  Abd: soft, NT  Ext: BOBBY, LUE palpable radial, RUE palpable radial and ulnar, b/l LE with pitting edema and palpable DP   Neuro: intact                          9.7    6.18  )-----------( 228      ( 17 Oct 2017 05:30 )             32.1     10-17    140  |  108<H>  |  39<H>  ----------------------------<  166<H>  4.6   |  21<L>  |  6.20<H>    Ca    8.5      17 Oct 2017 05:30  Phos  5.0     10-17  Mg     1.8     10-17    TPro  6.7  /  Alb  3.2<L>  /  TBili  0.2  /  DBili  x   /  AST  18  /  ALT  10  /  AlkPhos  95  10-16      Urinalysis Basic - ( 16 Oct 2017 15:19 )    Color: PLYEL / Appearance: CLEAR / S.010 / pH: 7.0  Gluc: >1000 / Ketone: NEGATIVE  / Bili: NEGATIVE / Urobili: NORMAL E.U.   Blood: TRACE / Protein: >600 / Nitrite: NEGATIVE   Leuk Esterase: NEGATIVE / RBC: 2-5 / WBC 0-2   Sq Epi: OCC / Non Sq Epi: x / Bacteria: MOD GENERAL SURGERY CONSULT NOTE    Patient is a 52y old  Female who presents with a chief complaint of Flank and epigastric pain (16 Oct 2017 17:53)    HPI:  51 yo female PMHx of ESRD NOT on Hemodialysis due to never followed up with Nephrologist, CAD, CHF, CVA with ataxia, Depression, DM2, HTN and Gout presents with Left Flank pain radiating to epigastric area x2 days. Pt was also found to have chest pain with EKG changes and was admitted to [facility] for r/o ACS. She was seen by nephrology this morning who recommended initiation of chronic HD and requested AV Fistula evaluation by vascular surgery.    PAST MEDICAL & SURGICAL HISTORY:  Depression  Gout  Coronary artery disease involving native coronary artery of native heart without angina pectoris  CVA (cerebral vascular accident)  CHF (congestive heart failure)  DM (diabetes mellitus)  HTN (hypertension)  Glaucoma  Enlarged heart  Gout  No significant past surgical history    [  ] No significant past history as reviewed with the patient and family    FAMILY HISTORY:  Family history of heart attack    [  ] Family history not pertinent as reviewed with the patient and family    REVIEW OF SYSTEMS:  CONSTITUTIONAL: No weakness, fevers or chills  EYES/ENT: No visual changes;  No vertigo or throat pain   NECK: No pain or stiffness  RESPIRATORY: no shortness of breath  CARDIOVASCULAR: No chest pain or palpitations at present  GASTROINTESTINAL: No abdominal or epigastric pain. No nausea, vomiting, or hematemesis; No diarrhea or constipation. No melena or hematochezia.  GENITOURINARY: No dysuria, frequency, foamy urine, urinary urgency, incontinence or hematuria  NEUROLOGICAL: No numbness or weakness  SKIN: No itching, burning, rashes, or lesions   All other review of systems is negative unless indicated above.      MEDICATIONS  (STANDING):  amLODIPine   Tablet 10 milliGRAM(s) Oral daily  aspirin enteric coated 81 milliGRAM(s) Oral daily  cholecalciferol 400 Unit(s) Oral daily  clopidogrel Tablet 75 milliGRAM(s) Oral daily  dextrose 50% Injectable 12.5 Gram(s) IV Push once  dextrose 50% Injectable 25 Gram(s) IV Push once  dextrose 50% Injectable 25 Gram(s) IV Push once  furosemide    Tablet 20 milliGRAM(s) Oral two times a day  gabapentin 300 milliGRAM(s) Oral at bedtime  heparin  Injectable 5000 Unit(s) SubCutaneous every 8 hours  influenza   Vaccine 0.5 milliLiter(s) IntraMuscular once  insulin glargine Injectable (LANTUS) 30 Unit(s) SubCutaneous at bedtime  insulin lispro (HumaLOG) corrective regimen sliding scale   SubCutaneous three times a day before meals  insulin lispro (HumaLOG) corrective regimen sliding scale   SubCutaneous at bedtime  pantoprazole    Tablet 40 milliGRAM(s) Oral before breakfast  QUEtiapine 50 milliGRAM(s) Oral daily  sertraline 200 milliGRAM(s) Oral daily  simvastatin 40 milliGRAM(s) Oral at bedtime    MEDICATIONS  (PRN):  dextrose Gel 1 Dose(s) Oral once PRN Blood Glucose LESS THAN 70 milliGRAM(s)/deciliter  glucagon  Injectable 1 milliGRAM(s) IntraMuscular once PRN Glucose LESS THAN 70 milligrams/deciliter    Allergies    No Known Drug Allergies  Seafood (Unknown)  shellfish (Nausea (Mild to Mod); Hives (Mild to Mod))    Intolerances        Vital Signs Last 24 Hrs  T(C): 36.9 (17 Oct 2017 05:47), Max: 36.9 (17 Oct 2017 05:47)  T(F): 98.5 (17 Oct 2017 05:47), Max: 98.5 (17 Oct 2017 05:47)  HR: 71 (17 Oct 2017 05:47) (68 - 75)  BP: 158/85 (17 Oct 2017 05:47) (135/81 - 167/115)  BP(mean): --  RR: 16 (17 Oct 2017 05:47) (15 - 18)  SpO2: 100% (17 Oct 2017 05:47) (99% - 100%)  Daily Height in cm: 162.56 (16 Oct 2017 17:53)    Daily     Exam:  General: awake, alert, NAD  HEENT: NCAT  Resp: nonlabored  Chest: equal chest rise  Abd: soft, NT  Ext: BOBBY, LUE palpable radial, RUE palpable radial and ulnar, b/l LE with pitting edema and palpable DP   Neuro: intact                          9.7    6.18  )-----------( 228      ( 17 Oct 2017 05:30 )             32.1     10-17    140  |  108<H>  |  39<H>  ----------------------------<  166<H>  4.6   |  21<L>  |  6.20<H>    Ca    8.5      17 Oct 2017 05:30  Phos  5.0     10-17  Mg     1.8     10-17    TPro  6.7  /  Alb  3.2<L>  /  TBili  0.2  /  DBili  x   /  AST  18  /  ALT  10  /  AlkPhos  95  10-16      Urinalysis Basic - ( 16 Oct 2017 15:19 )    Color: PLYEL / Appearance: CLEAR / S.010 / pH: 7.0  Gluc: >1000 / Ketone: NEGATIVE  / Bili: NEGATIVE / Urobili: NORMAL E.U.   Blood: TRACE / Protein: >600 / Nitrite: NEGATIVE   Leuk Esterase: NEGATIVE / RBC: 2-5 / WBC 0-2   Sq Epi: OCC / Non Sq Epi: x / Bacteria: MOD

## 2017-10-17 NOTE — CONSULT NOTE ADULT - ASSESSMENT
53yo F with ESRD with plans for initiation of chronic HD    - please get vein mapping in preparation for AV Fistula placement  - no blood draws or IV access on non-dominant UE  - would recommend permacath placement for non-emergent chronic HD however if you need an emergent shiley placed please call vascular surgery for assistance at [phone]  - please medically optimize for AV fistula placement    discussed with Dr. Génesis HARKINS Team surgery (Vascular) [id number] 51yo F with ESRD with plans for initiation of chronic HD    - please get vein mapping in preparation for AV Fistula placement  - no blood draws or IV access on non-dominant UE  - would recommend permacath placement for non-emergent chronic HD however if you need an emergent shiley placed please call vascular surgery for assistance at [phone]  - please medically optimize for AV fistula placement    discussed with Dr. Capps and primary team  C Team surgery (Vascular) [id number]

## 2017-10-18 LAB
BACTERIA UR CULT: SIGNIFICANT CHANGE UP
BUN SERPL-MCNC: 46 MG/DL — HIGH (ref 7–23)
CALCIUM SERPL-MCNC: 8.3 MG/DL — LOW (ref 8.4–10.5)
CHLORIDE SERPL-SCNC: 109 MMOL/L — HIGH (ref 98–107)
CO2 SERPL-SCNC: 19 MMOL/L — LOW (ref 22–31)
CREAT SERPL-MCNC: 6.53 MG/DL — HIGH (ref 0.5–1.3)
GLUCOSE SERPL-MCNC: 75 MG/DL — SIGNIFICANT CHANGE UP (ref 70–99)
HCT VFR BLD CALC: 29.4 % — LOW (ref 34.5–45)
HGB BLD-MCNC: 9.3 G/DL — LOW (ref 11.5–15.5)
MCHC RBC-ENTMCNC: 28.4 PG — SIGNIFICANT CHANGE UP (ref 27–34)
MCHC RBC-ENTMCNC: 31.6 % — LOW (ref 32–36)
MCV RBC AUTO: 89.6 FL — SIGNIFICANT CHANGE UP (ref 80–100)
NRBC # FLD: 0 — SIGNIFICANT CHANGE UP
PLATELET # BLD AUTO: 219 K/UL — SIGNIFICANT CHANGE UP (ref 150–400)
PMV BLD: 10.8 FL — SIGNIFICANT CHANGE UP (ref 7–13)
POTASSIUM SERPL-MCNC: 4.3 MMOL/L — SIGNIFICANT CHANGE UP (ref 3.5–5.3)
POTASSIUM SERPL-SCNC: 4.3 MMOL/L — SIGNIFICANT CHANGE UP (ref 3.5–5.3)
RBC # BLD: 3.28 M/UL — LOW (ref 3.8–5.2)
RBC # FLD: 13.2 % — SIGNIFICANT CHANGE UP (ref 10.3–14.5)
SODIUM SERPL-SCNC: 141 MMOL/L — SIGNIFICANT CHANGE UP (ref 135–145)
SPECIMEN SOURCE: SIGNIFICANT CHANGE UP
WBC # BLD: 5.07 K/UL — SIGNIFICANT CHANGE UP (ref 3.8–10.5)
WBC # FLD AUTO: 5.07 K/UL — SIGNIFICANT CHANGE UP (ref 3.8–10.5)

## 2017-10-18 PROCEDURE — 77001 FLUOROGUIDE FOR VEIN DEVICE: CPT | Mod: 26,GC

## 2017-10-18 PROCEDURE — 36556 INSERT NON-TUNNEL CV CATH: CPT

## 2017-10-18 PROCEDURE — 76937 US GUIDE VASCULAR ACCESS: CPT | Mod: 26

## 2017-10-18 RX ORDER — DEXTROSE 50 % IN WATER 50 %
12.5 SYRINGE (ML) INTRAVENOUS ONCE
Qty: 0 | Refills: 0 | Status: COMPLETED | OUTPATIENT
Start: 2017-10-18 | End: 2017-10-18

## 2017-10-18 RX ORDER — ERYTHROPOIETIN 10000 [IU]/ML
4000 INJECTION, SOLUTION INTRAVENOUS; SUBCUTANEOUS
Qty: 0 | Refills: 0 | Status: DISCONTINUED | OUTPATIENT
Start: 2017-10-18 | End: 2017-10-20

## 2017-10-18 RX ADMIN — GABAPENTIN 300 MILLIGRAM(S): 400 CAPSULE ORAL at 21:09

## 2017-10-18 RX ADMIN — INSULIN GLARGINE 30 UNIT(S): 100 INJECTION, SOLUTION SUBCUTANEOUS at 22:29

## 2017-10-18 RX ADMIN — Medication 20 MILLIGRAM(S): at 20:28

## 2017-10-18 RX ADMIN — Medication 20 MILLIGRAM(S): at 06:47

## 2017-10-18 RX ADMIN — QUETIAPINE FUMARATE 50 MILLIGRAM(S): 200 TABLET, FILM COATED ORAL at 20:26

## 2017-10-18 RX ADMIN — ERYTHROPOIETIN 4000 UNIT(S): 10000 INJECTION, SOLUTION INTRAVENOUS; SUBCUTANEOUS at 18:18

## 2017-10-18 RX ADMIN — PANTOPRAZOLE SODIUM 40 MILLIGRAM(S): 20 TABLET, DELAYED RELEASE ORAL at 06:47

## 2017-10-18 RX ADMIN — HEPARIN SODIUM 5000 UNIT(S): 5000 INJECTION INTRAVENOUS; SUBCUTANEOUS at 06:47

## 2017-10-18 RX ADMIN — Medication 12.5 GRAM(S): at 08:53

## 2017-10-18 RX ADMIN — AMLODIPINE BESYLATE 10 MILLIGRAM(S): 2.5 TABLET ORAL at 06:47

## 2017-10-18 RX ADMIN — SIMVASTATIN 40 MILLIGRAM(S): 20 TABLET, FILM COATED ORAL at 21:08

## 2017-10-18 RX ADMIN — SERTRALINE 200 MILLIGRAM(S): 25 TABLET, FILM COATED ORAL at 20:27

## 2017-10-18 NOTE — PROGRESS NOTE ADULT - ASSESSMENT
51yo F with ESRD with plans for initiation of chronic HD    - Anticipate OR for LEFT AVF / AVG on Friday 10/20.  - no blood draws or IV access on LEFT UE; pink armband in place  - Discussed dialysis plan with Nephrologist, Dr. Francisco. Patient will be initiated on daily dialysis (10/18, 10/19, 10/20). Will plan for late dialysis after OR Friday.   - Preop labs Friday morning: CBC, BMP, coags, Type and Screen, Serum bHCG  - IR today for permacath placement  - Discussed with Vascular Attending, Dr. Génesis Max MD PGY2  C Team surgery (Vascular) [id number]

## 2017-10-18 NOTE — PROGRESS NOTE ADULT - SUBJECTIVE AND OBJECTIVE BOX
No pain, no shortness of breath      VITAL:  T(C): , Max: 37.1 (10-17-17 @ 13:39)  T(F): , Max: 98.7 (10-17-17 @ 13:39)  HR: 70 (10-18-17 @ 06:42)  BP: 130/78 (10-18-17 @ 06:42)  BP(mean): --  RR: 17 (10-18-17 @ 06:42)  SpO2: 100% (10-18-17 @ 06:42)      PHYSICAL EXAM:  Constitutional: Obese, NAD, Alert  HEENT: NCAT, DMM  Neck: Supple, No JVD  Respiratory: CTA-b/l  Cardiovascular: RRR s1s2, no m/r/g  Gastrointestinal: BS+, soft, NT/ND  Extremities: No peripheral edema b/l  Neurological: no focal deficits; strength grossly intact; no asterixis  Psychiatric: Normal mood, normal affect  Back: no CVAT b/l  Skin: No rashes, no nevi      LABS:                        9.3    5.07  )-----------( 219      ( 18 Oct 2017 06:00 )             29.4     Na(141)/K(4.3)/Cl(109)/HCO3(19)/BUN(46)/Cr(6.53)Glu(75)/Ca(8.3)/Mg(--)/PO4(--)    10-18 @ 06:00  Na(140)/K(4.6)/Cl(108)/HCO3(21)/BUN(39)/Cr(6.20)Glu(166)/Ca(8.5)/Mg(1.8)/PO4(5.0)    10-17 @ 05:30  Na(--)/K(4.8)/Cl(--)/HCO3(--)/BUN(--)/Cr(--)Glu(--)/Ca(--)/Mg(--)/PO4(--)    10-16 @ 17:30  Na(135)/K(5.4)/Cl(102)/HCO3(18)/BUN(38)/Cr(6.38)Glu(329)/Ca(8.5)/Mg(1.6)/PO4(5.1)    10-16 @ 11:55        IMPRESSION: 52F w/ HTN, DM2, CAD, and CKD5, 10/16/17 a/w vomiting/epigastric pain/flank pain    (1)Renal - CKD5 - indicated for initiation of chronic HD initiation; risk of complications from HD initiation at this time outweighed by risk of complications from poor outpatient followup in setting of her advanced CKD    (2)Vascular - no access at present for HD. Vascular, Dr. Radha Capps, on board for AV access creation. Awaiting catheter placement today by IR    (3)Anemia - indicated for Epogen with HD      RECOMMEND:  (1)Catheter placement today by IR (favor tunneled catheter if possible; patient will need to be discharged with a tunneled catheter)  (2)AV access creation per Vascular  (3)HD x 3 consecutive days (10/18-10/20)  (4)Epogen with HD  (5)SW setup of outpatient HD ([facility]?)          Wale Francisco MD  [facility]  [phone] No pain, no shortness of breath      VITAL:  T(C): , Max: 37.1 (10-17-17 @ 13:39)  T(F): , Max: 98.7 (10-17-17 @ 13:39)  HR: 70 (10-18-17 @ 06:42)  BP: 130/78 (10-18-17 @ 06:42)  BP(mean): --  RR: 17 (10-18-17 @ 06:42)  SpO2: 100% (10-18-17 @ 06:42)      PHYSICAL EXAM:  Constitutional: Obese, NAD, Alert  HEENT: NCAT, DMM  Neck: Supple, No JVD  Respiratory: CTA-b/l  Cardiovascular: RRR s1s2, no m/r/g  Gastrointestinal: BS+, soft, NT/ND  Extremities: 2+ b/l LE edema  Neurological: no focal deficits; strength grossly intact; no asterixis  Psychiatric: Normal mood, normal affect  Back: no CVAT b/l  Skin: No rashes, no nevi      LABS:                        9.3    5.07  )-----------( 219      ( 18 Oct 2017 06:00 )             29.4     Na(141)/K(4.3)/Cl(109)/HCO3(19)/BUN(46)/Cr(6.53)Glu(75)/Ca(8.3)/Mg(--)/PO4(--)    10-18 @ 06:00  Na(140)/K(4.6)/Cl(108)/HCO3(21)/BUN(39)/Cr(6.20)Glu(166)/Ca(8.5)/Mg(1.8)/PO4(5.0)    10-17 @ 05:30  Na(--)/K(4.8)/Cl(--)/HCO3(--)/BUN(--)/Cr(--)Glu(--)/Ca(--)/Mg(--)/PO4(--)    10-16 @ 17:30  Na(135)/K(5.4)/Cl(102)/HCO3(18)/BUN(38)/Cr(6.38)Glu(329)/Ca(8.5)/Mg(1.6)/PO4(5.1)    10-16 @ 11:55        IMPRESSION: 52F w/ HTN, DM2, CAD, and CKD5, 10/16/17 a/w vomiting/epigastric pain/flank pain    (1)Renal - CKD5 - indicated for initiation of chronic HD initiation; risk of complications from HD initiation at this time outweighed by risk of complications from poor outpatient followup in setting of her advanced CKD    (2)Vascular - no access at present for HD. Vascular, Dr. Radha Capps, on board for AV access creation. Awaiting catheter placement today by IR    (3)Anemia - indicated for Epogen with HD      RECOMMEND:  (1)Catheter placement today by IR (favor tunneled catheter if possible; patient will need to be discharged with a tunneled catheter)  (2)AV access creation per Vascular  (3)HD x 3 consecutive days (10/18-10/20)  (4)Epogen with HD  (5)SW setup of outpatient HD ([facility]?)          Wale Francisco MD  [facility]  [phone]

## 2017-10-18 NOTE — PROGRESS NOTE ADULT - SUBJECTIVE AND OBJECTIVE BOX
Chaz Freitas MD  Interventional Cardiology  [place] Office : [phone] [place] [address] [address]  Tel:   [place] Office : [address] [facility] [place] [address]  Tel: [phone]  Cell : [phone]    Subjective : Pt lying in bed comfortable, not in distress, denies any chest pain or SOB s/p shiley  	  MEDICATIONS:  amLODIPine   Tablet 10 milliGRAM(s) Oral daily  aspirin enteric coated 81 milliGRAM(s) Oral daily  furosemide    Tablet 20 milliGRAM(s) Oral two times a day  heparin  Injectable 5000 Unit(s) SubCutaneous every 8 hours        gabapentin 300 milliGRAM(s) Oral at bedtime  QUEtiapine 50 milliGRAM(s) Oral daily  sertraline 200 milliGRAM(s) Oral daily    pantoprazole    Tablet 40 milliGRAM(s) Oral before breakfast    dextrose 50% Injectable 12.5 Gram(s) IV Push once  dextrose 50% Injectable 25 Gram(s) IV Push once  dextrose 50% Injectable 25 Gram(s) IV Push once  dextrose Gel 1 Dose(s) Oral once PRN  glucagon  Injectable 1 milliGRAM(s) IntraMuscular once PRN  insulin glargine Injectable (LANTUS) 30 Unit(s) SubCutaneous at bedtime  insulin lispro (HumaLOG) corrective regimen sliding scale   SubCutaneous three times a day before meals  insulin lispro (HumaLOG) corrective regimen sliding scale   SubCutaneous at bedtime  simvastatin 40 milliGRAM(s) Oral at bedtime    cholecalciferol 400 Unit(s) Oral daily  epoetin karla Injectable 4000 Unit(s) IV Push <User Schedule>  influenza   Vaccine 0.5 milliLiter(s) IntraMuscular once      PHYSICAL EXAM:  T(C): 36.8 (10-18-17 @ 06:42), Max: 36.9 (10-17-17 @ 17:47)  HR: 70 (10-18-17 @ 06:42) (70 - 83)  BP: 130/78 (10-18-17 @ 06:42) (130/73 - 160/90)  RR: 17 (10-18-17 @ 06:42) (16 - 17)  SpO2: 100% (10-18-17 @ 06:42) (98% - 100%)  Wt(kg): --  I&O's Summary    17 Oct 2017 07:01  -  18 Oct 2017 07:00  --------------------------------------------------------  IN: 150 mL / OUT: 100 mL / NET: 50 mL      Height (cm): 162.56 (10-17 @ 21:31)  Weight (kg): 95.8 (10-17 @ 21:31)  BMI (kg/m2): 36.3 (10-17 @ 21:31)  BSA (m2): 2 (10-17 @ 21:31)    Appearance: Normal	  HEENT:   Normal oral mucosa, PERRL, EOMI	  Cardiovascular: Normal S1 S2, No JVD, No murmurs, No edema  Respiratory: Lungs clear to auscultation	  Gastrointestinal:  Soft, Non-tender, + BS	  Extremities: Normal range of motion, No clubbing, cyanosis or edema                                    9.3    5.07  )-----------( 219      ( 18 Oct 2017 06:00 )             29.4     10-18    141  |  109<H>  |  46<H>  ----------------------------<  75  4.3   |  19<L>  |  6.53<H>    Ca    8.3<L>      18 Oct 2017 06:00  Phos  5.0     10-17  Mg     1.8     10-17      proBNP:   Lipid Profile:   HgA1c:   TSH:

## 2017-10-18 NOTE — PROGRESS NOTE ADULT - SUBJECTIVE AND OBJECTIVE BOX
VASCULAR SURGERY PROGRESS NOTE:   Pager: [phone]    Interim Events: Patient without complaints this morning; awaiting IR permacath placement.     Physical Exam  Vital Signs Last 24 Hrs  T(C): 36.8 (18 Oct 2017 06:42), Max: 37.1 (17 Oct 2017 13:39)  T(F): 98.2 (18 Oct 2017 06:42), Max: 98.7 (17 Oct 2017 13:39)  HR: 70 (18 Oct 2017 06:42) (70 - 83)  BP: 130/78 (18 Oct 2017 06:42) (130/73 - 160/90)  BP(mean): --  RR: 17 (18 Oct 2017 06:42) (16 - 17)  SpO2: 100% (18 Oct 2017 06:42) (98% - 100%)    Gen: NAD,  HEENT: normocephalic, atraumatic, no scleral icterus  Ext: LEFT radial palp, ulnar palp. Pink armband in place.     I&O's Detail    17 Oct 2017 07:01  -  18 Oct 2017 07:00  --------------------------------------------------------  IN:    Oral Fluid: 150 mL  Total IN: 150 mL    OUT:    Voided: 100 mL  Total OUT: 100 mL    Total NET: 50 mL          Labs:                        9.3    5.07  )-----------( 219      ( 18 Oct 2017 06:00 )             29.4     10-18    141  |  109<H>  |  46<H>  ----------------------------<  75  4.3   |  19<L>  |  6.53<H>    Ca    8.3<L>      18 Oct 2017 06:00  Phos  5.0     10-17  Mg     1.8     10-17      PT/INR - ( 17 Oct 2017 13:30 )   PT: 9.7 SEC;   INR: 0.87          PTT - ( 17 Oct 2017 13:30 )  PTT:26.8 SEC  Urinalysis Basic - ( 16 Oct 2017 15:19 )    Color: PLYEL / Appearance: CLEAR / S.010 / pH: 7.0  Gluc: >1000 / Ketone: NEGATIVE  / Bili: NEGATIVE / Urobili: NORMAL E.U.   Blood: TRACE / Protein: >600 / Nitrite: NEGATIVE   Leuk Esterase: NEGATIVE / RBC: 2-5 / WBC 0-2   Sq Epi: OCC / Non Sq Epi: x / Bacteria: MOD

## 2017-10-18 NOTE — PROGRESS NOTE ADULT - ASSESSMENT
1) Epigastric pain - T wave inversions, nuclear stress test tomorrow, cont asa    2) ESRD - s/p shiley permacath postponed sec to pt was on plavix    3) h/o CVA - cont asa and zocor

## 2017-10-19 ENCOUNTER — TRANSCRIPTION ENCOUNTER (OUTPATIENT)
Age: 52
End: 2017-10-19

## 2017-10-19 LAB
BLD GP AB SCN SERPL QL: NEGATIVE — SIGNIFICANT CHANGE UP
BUN SERPL-MCNC: 35 MG/DL — HIGH (ref 7–23)
CALCIUM SERPL-MCNC: 8.4 MG/DL — SIGNIFICANT CHANGE UP (ref 8.4–10.5)
CHLORIDE SERPL-SCNC: 105 MMOL/L — SIGNIFICANT CHANGE UP (ref 98–107)
CO2 SERPL-SCNC: 23 MMOL/L — SIGNIFICANT CHANGE UP (ref 22–31)
CREAT SERPL-MCNC: 5.42 MG/DL — HIGH (ref 0.5–1.3)
GLUCOSE SERPL-MCNC: 57 MG/DL — LOW (ref 70–99)
HCG SERPL-ACNC: < 5 MIU/ML — SIGNIFICANT CHANGE UP
HCT VFR BLD CALC: 32 % — LOW (ref 34.5–45)
HGB BLD-MCNC: 10 G/DL — LOW (ref 11.5–15.5)
MAGNESIUM SERPL-MCNC: 1.7 MG/DL — SIGNIFICANT CHANGE UP (ref 1.6–2.6)
MCHC RBC-ENTMCNC: 27.5 PG — SIGNIFICANT CHANGE UP (ref 27–34)
MCHC RBC-ENTMCNC: 31.3 % — LOW (ref 32–36)
MCV RBC AUTO: 87.9 FL — SIGNIFICANT CHANGE UP (ref 80–100)
NRBC # FLD: 0 — SIGNIFICANT CHANGE UP
PLATELET # BLD AUTO: 214 K/UL — SIGNIFICANT CHANGE UP (ref 150–400)
PMV BLD: 11.1 FL — SIGNIFICANT CHANGE UP (ref 7–13)
POTASSIUM SERPL-MCNC: 4.1 MMOL/L — SIGNIFICANT CHANGE UP (ref 3.5–5.3)
POTASSIUM SERPL-SCNC: 4.1 MMOL/L — SIGNIFICANT CHANGE UP (ref 3.5–5.3)
RBC # BLD: 3.64 M/UL — LOW (ref 3.8–5.2)
RBC # FLD: 13.3 % — SIGNIFICANT CHANGE UP (ref 10.3–14.5)
RH IG SCN BLD-IMP: POSITIVE — SIGNIFICANT CHANGE UP
SODIUM SERPL-SCNC: 141 MMOL/L — SIGNIFICANT CHANGE UP (ref 135–145)
WBC # BLD: 6.72 K/UL — SIGNIFICANT CHANGE UP (ref 3.8–10.5)
WBC # FLD AUTO: 6.72 K/UL — SIGNIFICANT CHANGE UP (ref 3.8–10.5)

## 2017-10-19 PROCEDURE — 93018 CV STRESS TEST I&R ONLY: CPT | Mod: GC

## 2017-10-19 PROCEDURE — 93016 CV STRESS TEST SUPVJ ONLY: CPT | Mod: GC

## 2017-10-19 PROCEDURE — 78452 HT MUSCLE IMAGE SPECT MULT: CPT | Mod: 26

## 2017-10-19 RX ORDER — SODIUM CHLORIDE 9 MG/ML
1000 INJECTION, SOLUTION INTRAVENOUS
Qty: 0 | Refills: 0 | Status: DISCONTINUED | OUTPATIENT
Start: 2017-10-19 | End: 2017-10-20

## 2017-10-19 RX ADMIN — SIMVASTATIN 40 MILLIGRAM(S): 20 TABLET, FILM COATED ORAL at 22:22

## 2017-10-19 RX ADMIN — INSULIN GLARGINE 30 UNIT(S): 100 INJECTION, SOLUTION SUBCUTANEOUS at 22:22

## 2017-10-19 RX ADMIN — PANTOPRAZOLE SODIUM 40 MILLIGRAM(S): 20 TABLET, DELAYED RELEASE ORAL at 06:01

## 2017-10-19 RX ADMIN — Medication 20 MILLIGRAM(S): at 06:00

## 2017-10-19 RX ADMIN — AMLODIPINE BESYLATE 10 MILLIGRAM(S): 2.5 TABLET ORAL at 05:59

## 2017-10-19 RX ADMIN — HEPARIN SODIUM 5000 UNIT(S): 5000 INJECTION INTRAVENOUS; SUBCUTANEOUS at 06:00

## 2017-10-19 RX ADMIN — GABAPENTIN 300 MILLIGRAM(S): 400 CAPSULE ORAL at 22:22

## 2017-10-19 RX ADMIN — HEPARIN SODIUM 5000 UNIT(S): 5000 INJECTION INTRAVENOUS; SUBCUTANEOUS at 22:22

## 2017-10-19 RX ADMIN — Medication 1: at 22:26

## 2017-10-19 NOTE — PROGRESS NOTE ADULT - SUBJECTIVE AND OBJECTIVE BOX
Surgery Preop Note    Patient is a 52y old  Female with ESRD requiring HD    Diagnosis: ESRD  Procedure: AVF fistula, possible graft  Surgeon: Dr. Capps                          10.0   6.72  )-----------( 214      ( 19 Oct 2017 07:00 )             32.0     10-19    141  |  105  |  35<H>  ----------------------------<  57<L>  4.1   |  23  |  5.42<H>    Ca    8.4      19 Oct 2017 07:00  Mg     1.7     10-19        ABO Interpretation: B (10-19 @ 06:00)      Chest X RAY  Xray Chest 2 Views PA/Lat (07.09.17 @ 19:14)  No acute appearing displaced rib fractures or other gross rib pathology.    Intact remaining imaged osseous structures.    Clear visualized lungs. No pleural effusionand no pneumothorax.  Cardiac and mediastinal silhouettes within normal limits.  Trachea midline.  Right lateral thoracic disc margin degenerative osteophytes.    Right upper quadrant surgical clips present.        EKG  12 Lead ECG (10.16.17 @ 11:28)  Diagnosis Line Normal sinus rhythm  T wave abnormality, consider lateral ischemia  Prolonged QT  Abnormal ECG         MEDICATIONS  (STANDING):  amLODIPine   Tablet 10 milliGRAM(s) Oral daily  aspirin enteric coated 81 milliGRAM(s) Oral daily  cholecalciferol 400 Unit(s) Oral daily  dextrose 50% Injectable 12.5 Gram(s) IV Push once  dextrose 50% Injectable 25 Gram(s) IV Push once  dextrose 50% Injectable 25 Gram(s) IV Push once  epoetin karla Injectable 4000 Unit(s) IV Push <User Schedule>  furosemide    Tablet 20 milliGRAM(s) Oral two times a day  gabapentin 300 milliGRAM(s) Oral at bedtime  heparin  Injectable 5000 Unit(s) SubCutaneous every 8 hours  influenza   Vaccine 0.5 milliLiter(s) IntraMuscular once  insulin glargine Injectable (LANTUS) 30 Unit(s) SubCutaneous at bedtime  insulin lispro (HumaLOG) corrective regimen sliding scale   SubCutaneous three times a day before meals  insulin lispro (HumaLOG) corrective regimen sliding scale   SubCutaneous at bedtime  pantoprazole    Tablet 40 milliGRAM(s) Oral before breakfast  QUEtiapine 50 milliGRAM(s) Oral daily  sertraline 200 milliGRAM(s) Oral daily  simvastatin 40 milliGRAM(s) Oral at bedtime  sodium chloride 0.45%. 1000 milliLiter(s) (30 mL/Hr) IV Continuous <Continuous>    MEDICATIONS  (PRN):  dextrose Gel 1 Dose(s) Oral once PRN Blood Glucose LESS THAN 70 milliGRAM(s)/deciliter  glucagon  Injectable 1 milliGRAM(s) IntraMuscular once PRN Glucose LESS THAN 70 milligrams/deciliter

## 2017-10-19 NOTE — PROGRESS NOTE ADULT - SUBJECTIVE AND OBJECTIVE BOX
No pain, no shortness of breath      VITAL:  T(C): , Max: 36.8 (10-18-17 @ 16:20)  T(F): , Max: 98.2 (10-18-17 @ 16:20)  HR: 79 (10-19-17 @ 13:05)  BP: 128/67 (10-19-17 @ 13:05)  BP(mean): --  RR: 18 (10-19-17 @ 13:05)  SpO2: 99% (10-19-17 @ 13:05)      PHYSICAL EXAM:  Constitutional: Obese, NAD, Alert  HEENT: NCAT, DMM  Neck: Supple, No JVD  Respiratory: CTA-b/l  Cardiovascular: RRR s1s2, no m/r/g  Gastrointestinal: BS+, soft, NT/ND  Extremities: 2+ b/l LE edema  Neurological: no focal deficits; strength grossly intact; no asterixis  Psychiatric: Normal mood, normal affect  Back: no CVAT b/l  Skin: No rashes, no nevi      LABS:                        10.0   6.72  )-----------( 214      ( 19 Oct 2017 07:00 )             32.0     Na(141)/K(4.1)/Cl(105)/HCO3(23)/BUN(35)/Cr(5.42)Glu(57)/Ca(8.4)/Mg(1.7)/PO4(--)    10-19 @ 07:00  Na(141)/K(4.3)/Cl(109)/HCO3(19)/BUN(46)/Cr(6.53)Glu(75)/Ca(8.3)/Mg(--)/PO4(--)    10-18 @ 06:00  Na(140)/K(4.6)/Cl(108)/HCO3(21)/BUN(39)/Cr(6.20)Glu(166)/Ca(8.5)/Mg(1.8)/PO4(5.0)    10-17 @ 05:30  Na(--)/K(4.8)/Cl(--)/HCO3(--)/BUN(--)/Cr(--)Glu(--)/Ca(--)/Mg(--)/PO4(--)    10-16 @ 17:30      IMPRESSION: 52F w/ HTN, DM2, CAD, and CKD5, 10/16/17 a/w vomiting/epigastric pain/flank pain    (1)Renal - newly ESRD-HD as of this admission; s/p 1st HD yesterday; 2nd HD today; 3rd HD tomorrow    (2)Vascular - Now with nontunneled HD catheter. Planned for AVF/AVG tomorrow. Will need tunneled cath early next week    (3)SW - needing setup of outpatient HD      RECOMMEND:  (1)HD today as ordered; HD tomorrow after AVF/AVG surgery  (2)Conversion of catheter from tunneled to nontunneled by IR  (3)SW setup of outpatient HD ([facility]?)                  Wale Francisco MD  [facility]  [phone] No pain, no shortness of breath      VITAL:  T(C): , Max: 36.8 (10-18-17 @ 16:20)  T(F): , Max: 98.2 (10-18-17 @ 16:20)  HR: 79 (10-19-17 @ 13:05)  BP: 128/67 (10-19-17 @ 13:05)  BP(mean): --  RR: 18 (10-19-17 @ 13:05)  SpO2: 99% (10-19-17 @ 13:05)      PHYSICAL EXAM:  Constitutional: Obese, NAD, Alert  HEENT: NCAT, DMM  Neck: Supple, No JVD  Respiratory: CTA-b/l  Cardiovascular: RRR s1s2, no m/r/g  Gastrointestinal: BS+, soft, NT/ND  Extremities: 2+ b/l LE edema  Neurological: no focal deficits; strength grossly intact; no asterixis  Psychiatric: Normal mood, normal affect  Back: no CVAT b/l  Skin: No rashes, no nevi      LABS:                        10.0   6.72  )-----------( 214      ( 19 Oct 2017 07:00 )             32.0     Na(141)/K(4.1)/Cl(105)/HCO3(23)/BUN(35)/Cr(5.42)Glu(57)/Ca(8.4)/Mg(1.7)/PO4(--)    10-19 @ 07:00  Na(141)/K(4.3)/Cl(109)/HCO3(19)/BUN(46)/Cr(6.53)Glu(75)/Ca(8.3)/Mg(--)/PO4(--)    10-18 @ 06:00  Na(140)/K(4.6)/Cl(108)/HCO3(21)/BUN(39)/Cr(6.20)Glu(166)/Ca(8.5)/Mg(1.8)/PO4(5.0)    10-17 @ 05:30  Na(--)/K(4.8)/Cl(--)/HCO3(--)/BUN(--)/Cr(--)Glu(--)/Ca(--)/Mg(--)/PO4(--)    10-16 @ 17:30      IMPRESSION: 52F w/ HTN, DM2, CAD, and CKD5, 10/16/17 a/w vomiting/epigastric pain/flank pain    (1)Renal - newly ESRD-HD as of this admission; s/p 1st HD yesterday; 2nd HD today; 3rd HD tomorrow    (2)Vascular - Now with nontunneled HD catheter. Planned for AVF/AVG tomorrow. Will need tunneled cath early next week    (3)SW - needing setup of outpatient HD      RECOMMEND:  (1)HD today as ordered; HD tomorrow after AVF/AVG surgery  (2)Conversion of catheter from tunneled to nontunneled by IR  (3)SW setup of outpatient HD ([facility]?)  (4)HBSAb/HBCAb/HCVAb                  Wale Francisco MD  [facility]  [phone]

## 2017-10-19 NOTE — CONSULT NOTE ADULT - ASSESSMENT
Problem/Plan - 1:  ·  Problem: Atypical chest pain.  Plan: tele monitor   MANAGEMENT AS PER CARDS    Problem/Plan - 2:  ·  Problem: ESRD (end stage renal disease).  Plan: Monitor electrolytes  renal fu  avf and permacth pending    Problem/Plan - 3:  ·  Problem: HTN (hypertension).  Plan: Monitor BP daily  DASH diet  Continue  bp meds.     Problem/Plan - 4:  ·  Problem: DM (diabetes mellitus).  Plan: Daily glucose monitoring  insulin sliding scale  DASH 1800 ADA diet with 1500 cc Fluid restriction  continue lantus.     Problem/Plan - 5:  ·  Problem: Diastolic heart failure.  Plan: tele monitor  Strict I&Os plus Daily weights.  cards fu

## 2017-10-19 NOTE — PROGRESS NOTE ADULT - ASSESSMENT
52y Female with ESRD, requiring HD, planned for AVF tomorrow (10/19), cleared by cards  - NPO after midnight, IVF  - Pain Control  - DVT prophylaxis

## 2017-10-19 NOTE — PROGRESS NOTE ADULT - ASSESSMENT
1) Epigastric pain - T wave inversions, nuclear stress test today,  cont asa    2) ESRD - s/p shiley permacath postponed sec to pt was on plavix, permacath placement on monday    3) h/o CVA - cont asa and zocor 1) Epigastric pain - T wave inversions, nuclear stress test shows no ischemia EF 47%,  cont asa    2) ESRD - s/p shiley permacath postponed sec to pt was on plavix, permacath placement on monday, AVF tomorrow pt is moderate risk for procedure    3) h/o CVA - cont asa and zocor

## 2017-10-19 NOTE — CONSULT NOTE ADULT - SUBJECTIVE AND OBJECTIVE BOX
cc chest pain  [place] 53 yo female PMHx of ESRD NOT on Hemodialysis due to never followed up with Nephrologist, CAD, CHF, CVA with ataxia as residual, Depression, DM2, HTN and Gout presents with Left Flank pain radiating to epigastric area x2 days. Flank pain 10/10, constant,  positional, worse on lying down on left side better lying on the opposite side accompanied by nausea and vomiting x1 this morning. Pt denies chest pain, SOB, palpitations, diaphoresis, fever, chills, diaphoresis, dysuria or urinary frequency. Pt was getting evaluated for Hemodialysis in 7/2017 by Renal, Dr. Francisco and was instructed to follow up with him as outpatient. However, pt never followed up with him with no specific reason.  Allergies NKDA    REVIEW OF SYSTEMS:    CONSTITUTIONAL: No weakness, fevers or chills  EYES/ENT: No visual changes;  No vertigo or throat pain   NECK: No pain or stiffness  RESPIRATORY: No cough, wheezing, hemoptysis; No shortness of breath  CARDIOVASCULAR: CHEST PAIN  GASTROINTESTINAL: No abdominal or epigastric pain. No nausea, vomiting, or hematemesis; No diarrhea or constipation. No melena or hematochezia.  GENITOURINARY: No dysuria, frequency or hematuria  NEUROLOGICAL: No numbness or weakness  SKIN: No itching, burning, rashes, or lesions   All other review of systems is negative unless indicated above.    Home Medications:   * Patient Currently Takes Medications as of 16-Oct-2017 17:48 documented in Structured Notes  · 	simvastatin 40 mg oral tablet: 1 tab(s) orally once a day (at bedtime), Last Dose Taken:    · 	aspirin 81 mg oral delayed release tablet: 1 tab(s) orally once a day, Last Dose Taken:    · 	clopidogrel 75 mg oral tablet: 1 tab(s) orally once a day, Last Dose Taken:    · 	amLODIPine 10 mg oral tablet: 1 tab(s) orally once a day, Last Dose Taken:    · 	pantoprazole 40 mg oral delayed release tablet: 1 tab(s) orally once a day (before a meal), Last Dose Taken:    · 	gabapentin 300 mg oral capsule: 1 cap(s) orally once a day (at bedtime), Last Dose Taken:    · 	sertraline 100 mg oral tablet: 2 tab(s) orally once a day, Last Dose Taken:    · 	furosemide 20 mg oral tablet: 1 tab(s) orally 2 times a day, Last Dose Taken:    · 	Basaglar KwikPen 100 units/mL subcutaneous solution: 30 unit(s) subcutaneous once a day (at bedtime), Last Dose Taken:    · 	Vitamin D3 400 intl units oral capsule: 1 cap(s) orally once a day, Last Dose Taken:    · 	QUEtiapine 50 mg oral tablet: 1 tab(s) orally once a day, Last Dose Taken:      Patient History:   Past Medical History:  CHF (congestive heart failure)    Coronary artery disease involving native coronary artery of native heart without angina pectoris    CVA (cerebral vascular accident)    Depression    DM (diabetes mellitus)    Enlarged heart    Glaucoma    Gout    Gout    HTN (hypertension).    Past Surgical History:  No significant past surgical history.    Social History:  Social History (marital status, living situation, occupation, tobacco use, alcohol and drug use, and sexual history): , lives with mother. Denies smoking, drinking or drugs.	    Tobacco Screening:  · Core Measure Site	No	  · Has the patient used tobacco in the past 30 days?	No	    PHYSICAL EXAM  General: WN/WD NAD  Neurology: A&Ox3, nonfocal, BOBBY x 4  Eyes: PERRLA/ EOMI, Gross vision intact  ENT/Neck: Neck supple, trachea midline, No JVD, Gross hearing intact  Respiratory: CTA B/L, No wheezing, rales, rhonchi  CV: RRR, S1S2, no murmurs, rubs or gallops  Abdominal: Soft, NT, ND +BS,   Extremities: No edema, + peripheral pulses  Skin: No Rashes, Hematoma, Ecchymosis                            10.0   6.72  )-----------( 214      ( 19 Oct 2017 07:00 )             32.0       10-19    141  |  105  |  35<H>  ----------------------------<  57<L>  4.1   |  23  |  5.42<H>    Ca    8.4      19 Oct 2017 07:00  Mg     1.7     10-19                        Lactate Trend            CAPILLARY BLOOD GLUCOSE      POCT Blood Glucose.: 135 mg/dL (19 Oct 2017 16:55)        Radiology personally reviewed.

## 2017-10-19 NOTE — PROGRESS NOTE ADULT - SUBJECTIVE AND OBJECTIVE BOX
Chaz Freitas MD  Interventional Cardiology  [place] Office : [address] [place] [address]  Tel:   [place] Office : [address] [facility] [place] [address]  Tel: [phone]  Cell : [phone]    Subjective : Pt lying in bed comfortable, not in distress, denies any chest pain or SOB  	  MEDICATIONS:  amLODIPine   Tablet 10 milliGRAM(s) Oral daily  aspirin enteric coated 81 milliGRAM(s) Oral daily  furosemide    Tablet 20 milliGRAM(s) Oral two times a day  heparin  Injectable 5000 Unit(s) SubCutaneous every 8 hours        gabapentin 300 milliGRAM(s) Oral at bedtime  QUEtiapine 50 milliGRAM(s) Oral daily  sertraline 200 milliGRAM(s) Oral daily    pantoprazole    Tablet 40 milliGRAM(s) Oral before breakfast    dextrose 50% Injectable 12.5 Gram(s) IV Push once  dextrose 50% Injectable 25 Gram(s) IV Push once  dextrose 50% Injectable 25 Gram(s) IV Push once  dextrose Gel 1 Dose(s) Oral once PRN  glucagon  Injectable 1 milliGRAM(s) IntraMuscular once PRN  insulin glargine Injectable (LANTUS) 30 Unit(s) SubCutaneous at bedtime  insulin lispro (HumaLOG) corrective regimen sliding scale   SubCutaneous three times a day before meals  insulin lispro (HumaLOG) corrective regimen sliding scale   SubCutaneous at bedtime  simvastatin 40 milliGRAM(s) Oral at bedtime    cholecalciferol 400 Unit(s) Oral daily  epoetin karla Injectable 4000 Unit(s) IV Push <User Schedule>  influenza   Vaccine 0.5 milliLiter(s) IntraMuscular once  sodium chloride 0.45%. 1000 milliLiter(s) IV Continuous <Continuous>      PHYSICAL EXAM:  T(C): 36.7 (10-19-17 @ 05:25), Max: 36.8 (10-18-17 @ 16:20)  HR: 73 (10-19-17 @ 05:25) (73 - 78)  BP: 149/91 (10-19-17 @ 05:25) (149/91 - 153/98)  RR: 18 (10-19-17 @ 05:25) (16 - 18)  SpO2: 99% (10-19-17 @ 05:25) (99% - 99%)  Wt(kg): --  I&O's Summary    18 Oct 2017 07:01  -  19 Oct 2017 07:00  --------------------------------------------------------  IN: 850 mL / OUT: 2750 mL / NET: -1900 mL          Appearance: Normal	  HEENT:   Normal oral mucosa, PERRL, EOMI	  Cardiovascular: Normal S1 S2, No JVD, No murmurs, No edema  Respiratory: Lungs clear to auscultation	  Gastrointestinal:  Soft, Non-tender, + BS	  Extremities: Normal range of motion, No clubbing, cyanosis or edema                                    10.0   6.72  )-----------( 214      ( 19 Oct 2017 07:00 )             32.0     10-19    141  |  105  |  35<H>  ----------------------------<  57<L>  4.1   |  23  |  5.42<H>    Ca    8.4      19 Oct 2017 07:00  Mg     1.7     10-19      proBNP:   Lipid Profile:   HgA1c:   TSH: Chaz Freitas MD  Interventional Cardiology  [place] Office : [address] [place] [address]  Tel:   [place] Office : [address] [facility] [place] [address]  Tel: [phone]  Cell : [phone]    Subjective : Pt lying in bed comfortable seen in am, not in distress, denies any chest pain or SOB  	  MEDICATIONS:  amLODIPine   Tablet 10 milliGRAM(s) Oral daily  aspirin enteric coated 81 milliGRAM(s) Oral daily  furosemide    Tablet 20 milliGRAM(s) Oral two times a day  heparin  Injectable 5000 Unit(s) SubCutaneous every 8 hours  gabapentin 300 milliGRAM(s) Oral at bedtime  QUEtiapine 50 milliGRAM(s) Oral daily  sertraline 200 milliGRAM(s) Oral daily  pantoprazole    Tablet 40 milliGRAM(s) Oral before breakfast  dextrose 50% Injectable 12.5 Gram(s) IV Push once  dextrose 50% Injectable 25 Gram(s) IV Push once  dextrose 50% Injectable 25 Gram(s) IV Push once  dextrose Gel 1 Dose(s) Oral once PRN  glucagon  Injectable 1 milliGRAM(s) IntraMuscular once PRN  insulin glargine Injectable (LANTUS) 30 Unit(s) SubCutaneous at bedtime  insulin lispro (HumaLOG) corrective regimen sliding scale   SubCutaneous three times a day before meals  insulin lispro (HumaLOG) corrective regimen sliding scale   SubCutaneous at bedtime  simvastatin 40 milliGRAM(s) Oral at bedtime  cholecalciferol 400 Unit(s) Oral daily  epoetin karla Injectable 4000 Unit(s) IV Push <User Schedule>  influenza   Vaccine 0.5 milliLiter(s) IntraMuscular once  sodium chloride 0.45%. 1000 milliLiter(s) IV Continuous <Continuous>      PHYSICAL EXAM:  T(C): 36.7 (10-19-17 @ 05:25), Max: 36.8 (10-18-17 @ 16:20)  HR: 73 (10-19-17 @ 05:25) (73 - 78)  BP: 149/91 (10-19-17 @ 05:25) (149/91 - 153/98)  RR: 18 (10-19-17 @ 05:25) (16 - 18)  SpO2: 99% (10-19-17 @ 05:25) (99% - 99%)  Wt(kg): --  I&O's Summary    18 Oct 2017 07:01  -  19 Oct 2017 07:00  --------------------------------------------------------  IN: 850 mL / OUT: 2750 mL / NET: -1900 mL          Appearance: Normal	  HEENT:   Normal oral mucosa, PERRL, EOMI	  Cardiovascular: Normal S1 S2, No JVD, No murmurs, No edema  Respiratory: Lungs clear to auscultation	  Gastrointestinal:  Soft, Non-tender, + BS	  Extremities: no edema                                    10.0   6.72  )-----------( 214      ( 19 Oct 2017 07:00 )             32.0     10-19    141  |  105  |  35<H>  ----------------------------<  57<L>  4.1   |  23  |  5.42<H>    Ca    8.4      19 Oct 2017 07:00  Mg     1.7     10-19      proBNP:   Lipid Profile:   HgA1c:   TSH:

## 2017-10-20 LAB
APTT BLD: 29.3 SEC — SIGNIFICANT CHANGE UP (ref 27.5–37.4)
BLD GP AB SCN SERPL QL: NEGATIVE — SIGNIFICANT CHANGE UP
BUN SERPL-MCNC: 31 MG/DL — HIGH (ref 7–23)
CALCIUM SERPL-MCNC: 8.5 MG/DL — SIGNIFICANT CHANGE UP (ref 8.4–10.5)
CHLORIDE SERPL-SCNC: 102 MMOL/L — SIGNIFICANT CHANGE UP (ref 98–107)
CO2 SERPL-SCNC: 25 MMOL/L — SIGNIFICANT CHANGE UP (ref 22–31)
CREAT SERPL-MCNC: 5.16 MG/DL — HIGH (ref 0.5–1.3)
GLUCOSE SERPL-MCNC: 181 MG/DL — HIGH (ref 70–99)
HBV CORE AB SER-ACNC: REACTIVE — SIGNIFICANT CHANGE UP
HBV SURFACE AB SER-ACNC: REACTIVE — SIGNIFICANT CHANGE UP
HCG SERPL-ACNC: < 5 MIU/ML — SIGNIFICANT CHANGE UP
HCT VFR BLD CALC: 30.9 % — LOW (ref 34.5–45)
HCV AB S/CO SERPL IA: 0.09 S/CO — SIGNIFICANT CHANGE UP
HCV AB SERPL-IMP: SIGNIFICANT CHANGE UP
HGB BLD-MCNC: 9.8 G/DL — LOW (ref 11.5–15.5)
INR BLD: 0.92 — SIGNIFICANT CHANGE UP (ref 0.88–1.17)
MAGNESIUM SERPL-MCNC: 1.8 MG/DL — SIGNIFICANT CHANGE UP (ref 1.6–2.6)
MCHC RBC-ENTMCNC: 28.2 PG — SIGNIFICANT CHANGE UP (ref 27–34)
MCHC RBC-ENTMCNC: 31.7 % — LOW (ref 32–36)
MCV RBC AUTO: 89 FL — SIGNIFICANT CHANGE UP (ref 80–100)
NRBC # FLD: 0 — SIGNIFICANT CHANGE UP
PHOSPHATE SERPL-MCNC: 4.1 MG/DL — SIGNIFICANT CHANGE UP (ref 2.5–4.5)
PLATELET # BLD AUTO: 189 K/UL — SIGNIFICANT CHANGE UP (ref 150–400)
PMV BLD: 11.3 FL — SIGNIFICANT CHANGE UP (ref 7–13)
POTASSIUM SERPL-MCNC: 4.4 MMOL/L — SIGNIFICANT CHANGE UP (ref 3.5–5.3)
POTASSIUM SERPL-SCNC: 4.4 MMOL/L — SIGNIFICANT CHANGE UP (ref 3.5–5.3)
PROTHROM AB SERPL-ACNC: 10.3 SEC — SIGNIFICANT CHANGE UP (ref 9.8–13.1)
RBC # BLD: 3.47 M/UL — LOW (ref 3.8–5.2)
RBC # FLD: 13.1 % — SIGNIFICANT CHANGE UP (ref 10.3–14.5)
RH IG SCN BLD-IMP: POSITIVE — SIGNIFICANT CHANGE UP
SODIUM SERPL-SCNC: 138 MMOL/L — SIGNIFICANT CHANGE UP (ref 135–145)
WBC # BLD: 7.01 K/UL — SIGNIFICANT CHANGE UP (ref 3.8–10.5)
WBC # FLD AUTO: 7.01 K/UL — SIGNIFICANT CHANGE UP (ref 3.8–10.5)

## 2017-10-20 PROCEDURE — 36821 AV FUSION DIRECT ANY SITE: CPT | Mod: LT

## 2017-10-20 RX ORDER — SERTRALINE 25 MG/1
200 TABLET, FILM COATED ORAL DAILY
Qty: 0 | Refills: 0 | Status: DISCONTINUED | OUTPATIENT
Start: 2017-10-20 | End: 2017-11-15

## 2017-10-20 RX ORDER — FENTANYL CITRATE 50 UG/ML
25 INJECTION INTRAVENOUS
Qty: 0 | Refills: 0 | Status: DISCONTINUED | OUTPATIENT
Start: 2017-10-20 | End: 2017-10-20

## 2017-10-20 RX ORDER — GABAPENTIN 400 MG/1
300 CAPSULE ORAL AT BEDTIME
Qty: 0 | Refills: 0 | Status: DISCONTINUED | OUTPATIENT
Start: 2017-10-20 | End: 2017-11-15

## 2017-10-20 RX ORDER — QUETIAPINE FUMARATE 200 MG/1
50 TABLET, FILM COATED ORAL DAILY
Qty: 0 | Refills: 0 | Status: DISCONTINUED | OUTPATIENT
Start: 2017-10-20 | End: 2017-11-15

## 2017-10-20 RX ORDER — ONDANSETRON 8 MG/1
4 TABLET, FILM COATED ORAL ONCE
Qty: 0 | Refills: 0 | Status: DISCONTINUED | OUTPATIENT
Start: 2017-10-20 | End: 2017-10-20

## 2017-10-20 RX ORDER — INSULIN LISPRO 100/ML
VIAL (ML) SUBCUTANEOUS
Qty: 0 | Refills: 0 | Status: DISCONTINUED | OUTPATIENT
Start: 2017-10-20 | End: 2017-11-15

## 2017-10-20 RX ORDER — FUROSEMIDE 40 MG
20 TABLET ORAL
Qty: 0 | Refills: 0 | Status: DISCONTINUED | OUTPATIENT
Start: 2017-10-20 | End: 2017-11-15

## 2017-10-20 RX ORDER — ASPIRIN/CALCIUM CARB/MAGNESIUM 324 MG
81 TABLET ORAL DAILY
Qty: 0 | Refills: 0 | Status: DISCONTINUED | OUTPATIENT
Start: 2017-10-20 | End: 2017-11-15

## 2017-10-20 RX ORDER — PANTOPRAZOLE SODIUM 20 MG/1
40 TABLET, DELAYED RELEASE ORAL
Qty: 0 | Refills: 0 | Status: DISCONTINUED | OUTPATIENT
Start: 2017-10-20 | End: 2017-11-15

## 2017-10-20 RX ORDER — ERYTHROPOIETIN 10000 [IU]/ML
4000 INJECTION, SOLUTION INTRAVENOUS; SUBCUTANEOUS
Qty: 0 | Refills: 0 | Status: DISCONTINUED | OUTPATIENT
Start: 2017-10-20 | End: 2017-10-29

## 2017-10-20 RX ORDER — INSULIN GLARGINE 100 [IU]/ML
30 INJECTION, SOLUTION SUBCUTANEOUS AT BEDTIME
Qty: 0 | Refills: 0 | Status: DISCONTINUED | OUTPATIENT
Start: 2017-10-20 | End: 2017-10-22

## 2017-10-20 RX ORDER — AMLODIPINE BESYLATE 2.5 MG/1
10 TABLET ORAL DAILY
Qty: 0 | Refills: 0 | Status: DISCONTINUED | OUTPATIENT
Start: 2017-10-20 | End: 2017-11-15

## 2017-10-20 RX ORDER — HEPARIN SODIUM 5000 [USP'U]/ML
5000 INJECTION INTRAVENOUS; SUBCUTANEOUS EVERY 8 HOURS
Qty: 0 | Refills: 0 | Status: DISCONTINUED | OUTPATIENT
Start: 2017-10-20 | End: 2017-11-15

## 2017-10-20 RX ORDER — OXYCODONE HYDROCHLORIDE 5 MG/1
5 TABLET ORAL EVERY 4 HOURS
Qty: 0 | Refills: 0 | Status: DISCONTINUED | OUTPATIENT
Start: 2017-10-20 | End: 2017-10-20

## 2017-10-20 RX ORDER — INSULIN LISPRO 100/ML
VIAL (ML) SUBCUTANEOUS AT BEDTIME
Qty: 0 | Refills: 0 | Status: DISCONTINUED | OUTPATIENT
Start: 2017-10-20 | End: 2017-11-15

## 2017-10-20 RX ADMIN — AMLODIPINE BESYLATE 10 MILLIGRAM(S): 2.5 TABLET ORAL at 05:49

## 2017-10-20 RX ADMIN — PANTOPRAZOLE SODIUM 40 MILLIGRAM(S): 20 TABLET, DELAYED RELEASE ORAL at 14:05

## 2017-10-20 RX ADMIN — Medication 81 MILLIGRAM(S): at 14:06

## 2017-10-20 RX ADMIN — GABAPENTIN 300 MILLIGRAM(S): 400 CAPSULE ORAL at 21:00

## 2017-10-20 RX ADMIN — SODIUM CHLORIDE 30 MILLILITER(S): 9 INJECTION, SOLUTION INTRAVENOUS at 01:14

## 2017-10-20 RX ADMIN — QUETIAPINE FUMARATE 50 MILLIGRAM(S): 200 TABLET, FILM COATED ORAL at 14:05

## 2017-10-20 RX ADMIN — Medication 20 MILLIGRAM(S): at 05:49

## 2017-10-20 RX ADMIN — HEPARIN SODIUM 5000 UNIT(S): 5000 INJECTION INTRAVENOUS; SUBCUTANEOUS at 21:00

## 2017-10-20 RX ADMIN — HEPARIN SODIUM 5000 UNIT(S): 5000 INJECTION INTRAVENOUS; SUBCUTANEOUS at 05:50

## 2017-10-20 RX ADMIN — SERTRALINE 200 MILLIGRAM(S): 25 TABLET, FILM COATED ORAL at 14:05

## 2017-10-20 RX ADMIN — Medication 20 MILLIGRAM(S): at 21:00

## 2017-10-20 RX ADMIN — INSULIN GLARGINE 30 UNIT(S): 100 INJECTION, SOLUTION SUBCUTANEOUS at 23:21

## 2017-10-20 NOTE — PROGRESS NOTE ADULT - SUBJECTIVE AND OBJECTIVE BOX
C Team Surgery Post Op Note     Patient seen and evaluated once returned to medical floor. No pain at surgical site currently. Patient denies headache, fever, chills, chest pain, SOB, nausea and vomiting. Tolerating diet.     Vitals:  Afebrile. HR: 72, BP: 122/67, RR: 18, 100% RA       I&O's Detail    19 Oct 2017 07:01  -  20 Oct 2017 07:00  --------------------------------------------------------  IN:    Other: 400 mL  Total IN: 400 mL    OUT:    Other: 1500 mL  Total OUT: 1500 mL    Total NET: -1100 mL      PHYSICAL EXAM:  Constitutional: Well-developed, well-nourished, NAD.  Neuro: AOx3, no focal deficits  Respiratory:  Lungs CTA, B/L, no rales , no wheezing, no rhonchi.  Cardiovascular:  S1, S2, Reg rate  Extremities: No edema, no calf tenderness. Left arm incision C/D/I. No hematoma.   Vascular:  Left hand warm, + thrill over surgical site.       LABS:                        9.8    7.01  )-----------( 189      ( 20 Oct 2017 06:20 )             30.9     10-20    138  |  102  |  31<H>  ----------------------------<  181<H>  4.4   |  25  |  5.16<H>    Ca    8.5      20 Oct 2017 06:20  Phos  4.1     10-20  Mg     1.8     10-20      PT/INR - ( 20 Oct 2017 06:20 )   PT: 10.3 SEC;   INR: 0.92          PTT - ( 20 Oct 2017 06:20 )  PTT:29.3 SEC

## 2017-10-20 NOTE — BRIEF OPERATIVE NOTE - PROCEDURE
<<-----Click on this checkbox to enter Procedure Arteriovenous anastomosis  10/20/2017    Active  GIORGIO

## 2017-10-20 NOTE — PROGRESS NOTE ADULT - ASSESSMENT
1) Epigastric pain - T wave inversions, nuclear stress test shows no ischemia EF 47%,  cont asa    2) ESRD - s/p shiley permacath postponed sec to pt was on plavix, permacath placement on monday, AVF today    3) h/o CVA - cont asa and zocor

## 2017-10-20 NOTE — PROGRESS NOTE ADULT - SUBJECTIVE AND OBJECTIVE BOX
No pain, no shortness of breath      VITAL:  T(C): , Max: 37.3 (10-19-17 @ 22:04)  T(F): , Max: 99.1 (10-19-17 @ 22:04)  HR: 79 (10-20-17 @ 11:00)  BP: 131/78 (10-20-17 @ 11:00)  BP(mean): --  RR: 12 (10-20-17 @ 11:00)  SpO2: 99% (10-20-17 @ 11:00)      PHYSICAL EXAM:  Constitutional: Obese, NAD, Alert  HEENT: NCAT, DMM  Neck: Supple, No JVD  Respiratory: CTA-b/l  Cardiovascular: RRR s1s2, no m/r/g  Gastrointestinal: BS+, soft, NT/ND  Extremities: 2+ b/l LE edema  Neurological: no focal deficits; strength grossly intact; no asterixis  Psychiatric: Normal mood, normal affect  Back: no CVAT b/l  Skin: No rashes, no nevi      LABS:                        9.8    7.01  )-----------( 189      ( 20 Oct 2017 06:20 )             30.9     Na(138)/K(4.4)/Cl(102)/HCO3(25)/BUN(31)/Cr(5.16)Glu(181)/Ca(8.5)/Mg(1.8)/PO4(4.1)    10-20 @ 06:20  Na(141)/K(4.1)/Cl(105)/HCO3(23)/BUN(35)/Cr(5.42)Glu(57)/Ca(8.4)/Mg(1.7)/PO4(--)    10-19 @ 07:00  Na(141)/K(4.3)/Cl(109)/HCO3(19)/BUN(46)/Cr(6.53)Glu(75)/Ca(8.3)/Mg(--)/PO4(--)    10-18 @ 06:00      IMPRESSION: 52F w/ HTN, DM2, CAD, and CKD5, 10/16/17 a/w vomiting/epigastric pain/flank pain    (1)Renal - newly ESRD-HD as of this admission; s/p 1st HD yesterday; 2nd HD today; 3rd HD tomorrow    (2)Vascular - POD#0 s/p left radiocephalic AVF creation by Dr. Radha Capps. Also with nontunneled HD catheter. Will need tunneled cath early next week    (3)SW - needing setup of outpatient HD      RECOMMEND:  (1)HD today as ordered; next HD after today would be Monday 10/23/17  (2)Conversion of catheter from tunneled to nontunneled by IR  (3)SW setup of outpatient HD ([facility]?)  (4)No needlesticks DAVID Francisco MD  [facility]  [phone] No pain, no shortness of breath; (+)left arm tingling      VITAL:  T(C): , Max: 37.3 (10-19-17 @ 22:04)  T(F): , Max: 99.1 (10-19-17 @ 22:04)  HR: 79 (10-20-17 @ 11:00)  BP: 131/78 (10-20-17 @ 11:00)  BP(mean): --  RR: 12 (10-20-17 @ 11:00)  SpO2: 99% (10-20-17 @ 11:00)      PHYSICAL EXAM:  Constitutional: Obese, NAD, Alert  HEENT: NCAT, DMM  Neck: Supple, No JVD  Respiratory: CTA-b/l  Cardiovascular: RRR s1s2, no m/r/g  Gastrointestinal: BS+, soft, NT/ND  Extremities: 2+ b/l LE edema  Neurological: no focal deficits; strength grossly intact; no asterixis  Psychiatric: Normal mood, normal affect  Back: no CVAT b/l  Skin: No rashes, no nevi  Access: GLENN MCDONALD (+)thrill    LABS:                        9.8    7.01  )-----------( 189      ( 20 Oct 2017 06:20 )             30.9     Na(138)/K(4.4)/Cl(102)/HCO3(25)/BUN(31)/Cr(5.16)Glu(181)/Ca(8.5)/Mg(1.8)/PO4(4.1)    10-20 @ 06:20  Na(141)/K(4.1)/Cl(105)/HCO3(23)/BUN(35)/Cr(5.42)Glu(57)/Ca(8.4)/Mg(1.7)/PO4(--)    10-19 @ 07:00  Na(141)/K(4.3)/Cl(109)/HCO3(19)/BUN(46)/Cr(6.53)Glu(75)/Ca(8.3)/Mg(--)/PO4(--)    10-18 @ 06:00      IMPRESSION: 52F w/ HTN, DM2, CAD, and CKD5, 10/16/17 a/w vomiting/epigastric pain/flank pain    (1)Renal - newly ESRD-HD as of this admission; s/p 1st HD yesterday; 2nd HD today; 3rd HD tomorrow    (2)Vascular - POD#0 s/p left radiocephalic AVF creation by Dr. Radha Capps. Also with nontunneled HD catheter. Will need tunneled cath early next week    (3)SW - needing setup of outpatient HD      RECOMMEND:  (1)HD today as ordered; next HD after today would be Monday 10/23/17  (2)Conversion of catheter from tunneled to nontunneled by IR  (3)SW setup of outpatient HD ([facility]?)  (4)No needlesticks DAVID Francisco MD  [facility]  [phone]

## 2017-10-20 NOTE — PRE-OP CHECKLIST - TO WHOM
efraín alberts rn 4n to shin hercules rn holding area efraín alberts rn 4n to [facility] rn holding area to [facility]

## 2017-10-20 NOTE — PROGRESS NOTE ADULT - SUBJECTIVE AND OBJECTIVE BOX
Chaz Freitas MD  Interventional Cardiology  [place] Office : [address] [place] [address]  Tel:   [place] Office : [address] [facility] [place] [address]  Tel: [phone]  Cell : [phone]    Subjective : Pt lying in bed comfortable, not in distress, denies any chest pain or SOB  	  MEDICATIONS:  amLODIPine   Tablet 10 milliGRAM(s) Oral daily  aspirin enteric coated 81 milliGRAM(s) Oral daily  furosemide    Tablet 20 milliGRAM(s) Oral two times a day  heparin  Injectable 5000 Unit(s) SubCutaneous every 8 hours        fentaNYL    Injectable 25 MICROGram(s) IV Push every 5 minutes PRN  gabapentin 300 milliGRAM(s) Oral at bedtime  ondansetron Injectable 4 milliGRAM(s) IV Push once PRN  oxyCODONE    IR 5 milliGRAM(s) Oral every 4 hours PRN  QUEtiapine 50 milliGRAM(s) Oral daily  sertraline 200 milliGRAM(s) Oral daily    pantoprazole    Tablet 40 milliGRAM(s) Oral before breakfast    insulin glargine Injectable (LANTUS) 30 Unit(s) SubCutaneous at bedtime  insulin lispro (HumaLOG) corrective regimen sliding scale   SubCutaneous three times a day before meals  insulin lispro (HumaLOG) corrective regimen sliding scale   SubCutaneous at bedtime    epoetin karla Injectable 4000 Unit(s) IV Push <User Schedule>  influenza   Vaccine 0.5 milliLiter(s) IntraMuscular once      PHYSICAL EXAM:  T(C): 36.8 (10-20-17 @ 11:00), Max: 37.3 (10-19-17 @ 22:04)  HR: 79 (10-20-17 @ 11:00) (70 - 88)  BP: 131/78 (10-20-17 @ 11:00) (122/80 - 158/88)  RR: 12 (10-20-17 @ 11:00) (12 - 20)  SpO2: 99% (10-20-17 @ 11:00) (98% - 100%)  Wt(kg): --  I&O's Summary    19 Oct 2017 07:01  -  20 Oct 2017 07:00  --------------------------------------------------------  IN: 400 mL / OUT: 1500 mL / NET: -1100 mL      Height (cm): 162.56 (10-20 @ 07:51)  Weight (kg): 95.8 (10-20 @ 07:51)  BMI (kg/m2): 36.3 (10-20 @ 07:51)  BSA (m2): 2 (10-20 @ 07:51)    Appearance: Normal	  HEENT:   Normal oral mucosa, PERRL, EOMI	  Cardiovascular: Normal S1 S2, No JVD, No murmurs, No edema  Respiratory: Lungs clear to auscultation	  Gastrointestinal:  Soft, Non-tender, + BS	  Extremities: Normal range of motion, No clubbing, cyanosis or edema                                    9.8    7.01  )-----------( 189      ( 20 Oct 2017 06:20 )             30.9     10-20    138  |  102  |  31<H>  ----------------------------<  181<H>  4.4   |  25  |  5.16<H>    Ca    8.5      20 Oct 2017 06:20  Phos  4.1     10-20  Mg     1.8     10-20      proBNP:   Lipid Profile:   HgA1c:   TSH:

## 2017-10-20 NOTE — PROGRESS NOTE ADULT - ASSESSMENT
A/P: s/p LUE AVF, POD #0 - doing well post-op    - oral pain control once local anesthetic diminishes  - diet as tolerated  - further plan as per Renal and medical team         C Team Surgery   [id number]

## 2017-10-20 NOTE — PROGRESS NOTE ADULT - ASSESSMENT
Problem/Plan - 1:  ·  Problem: Atypical chest pain.  Plan: tele monitor   MANAGEMENT AS PER CARDS    Problem/Plan - 2:  ·  Problem: ESRD (end stage renal disease).  Plan: Monitor electrolytes  renal fu   sp avf and permacth pending    Problem/Plan - 3:  ·  Problem: HTN (hypertension).  Plan: Monitor BP daily  DASH diet  Continue  bp meds.     Problem/Plan - 4:  ·  Problem: DM (diabetes mellitus).  Plan: Daily glucose monitoring  insulin sliding scale  DASH 1800 ADA diet with 1500 cc Fluid restriction  continue lantus.     Problem/Plan - 5:  ·  Problem: Diastolic heart failure.  Plan: tele monitor  Strict I&Os plus Daily weights.

## 2017-10-20 NOTE — PROGRESS NOTE ADULT - SUBJECTIVE AND OBJECTIVE BOX
Patient is a 52y old  Female who presents with a chief complaint of Flank and epigastric pain (16 Oct 2017 17:53)  NAD      INTERVAL HPI/OVERNIGHT EVENTS:  T(C): 36.8 (10-20-17 @ 13:23), Max: 37.3 (10-19-17 @ 22:04)  HR: 72 (10-20-17 @ 13:23) (70 - 88)  BP: 119/72 (10-20-17 @ 13:23) (119/72 - 158/88)  RR: 18 (10-20-17 @ 13:23) (12 - 20)  SpO2: 100% (10-20-17 @ 13:23) (99% - 100%)  Wt(kg): --  I&O's Summary    19 Oct 2017 07:01  -  20 Oct 2017 07:00  --------------------------------------------------------  IN: 400 mL / OUT: 1500 mL / NET: -1100 mL        LABS:                        9.8    7.01  )-----------( 189      ( 20 Oct 2017 06:20 )             30.9     10-20    138  |  102  |  31<H>  ----------------------------<  181<H>  4.4   |  25  |  5.16<H>    Ca    8.5      20 Oct 2017 06:20  Phos  4.1     10-20  Mg     1.8     10-20      PT/INR - ( 20 Oct 2017 06:20 )   PT: 10.3 SEC;   INR: 0.92          PTT - ( 20 Oct 2017 06:20 )  PTT:29.3 SEC    CAPILLARY BLOOD GLUCOSE  184 (20 Oct 2017 05:45)      POCT Blood Glucose.: 125 mg/dL (20 Oct 2017 12:42)  POCT Blood Glucose.: 184 mg/dL (20 Oct 2017 05:47)  POCT Blood Glucose.: 275 mg/dL (19 Oct 2017 21:47)  POCT Blood Glucose.: 135 mg/dL (19 Oct 2017 16:55)            MEDICATIONS  (STANDING):  amLODIPine   Tablet 10 milliGRAM(s) Oral daily  aspirin enteric coated 81 milliGRAM(s) Oral daily  epoetin karla Injectable 4000 Unit(s) IV Push <User Schedule>  furosemide    Tablet 20 milliGRAM(s) Oral two times a day  gabapentin 300 milliGRAM(s) Oral at bedtime  heparin  Injectable 5000 Unit(s) SubCutaneous every 8 hours  influenza   Vaccine 0.5 milliLiter(s) IntraMuscular once  insulin glargine Injectable (LANTUS) 30 Unit(s) SubCutaneous at bedtime  insulin lispro (HumaLOG) corrective regimen sliding scale   SubCutaneous three times a day before meals  insulin lispro (HumaLOG) corrective regimen sliding scale   SubCutaneous at bedtime  pantoprazole    Tablet 40 milliGRAM(s) Oral before breakfast  QUEtiapine 50 milliGRAM(s) Oral daily  sertraline 200 milliGRAM(s) Oral daily    MEDICATIONS  (PRN):  fentaNYL    Injectable 25 MICROGram(s) IV Push every 5 minutes PRN Moderate Pain  ondansetron Injectable 4 milliGRAM(s) IV Push once PRN Nausea and/or Vomiting  oxyCODONE    IR 5 milliGRAM(s) Oral every 4 hours PRN Mild Pain (1 - 3)          PHYSICAL EXAM:  GENERAL: NAD, well-groomed, well-developed  HEAD:  Atraumatic, Normocephalic  CHEST/LUNG: Clear to percussion bilaterally; No rales, rhonchi, wheezing, or rubs  HEART: Regular rate and rhythm; No murmurs, rubs, or gallops  ABDOMEN: Soft, Nontender, Nondistended; Bowel sounds present  EXTREMITIES:  2+ Peripheral Pulses, No clubbing, cyanosis, or edema  LYMPH: No lymphadenopathy noted  SKIN: No rashes or lesions    Care Discussed with Consultants/Other Providers [ +] YES  [ ] NO

## 2017-10-21 LAB
BUN SERPL-MCNC: 44 MG/DL — HIGH (ref 7–23)
CALCIUM SERPL-MCNC: 8.5 MG/DL — SIGNIFICANT CHANGE UP (ref 8.4–10.5)
CHLORIDE SERPL-SCNC: 102 MMOL/L — SIGNIFICANT CHANGE UP (ref 98–107)
CO2 SERPL-SCNC: 24 MMOL/L — SIGNIFICANT CHANGE UP (ref 22–31)
CREAT SERPL-MCNC: 6.29 MG/DL — HIGH (ref 0.5–1.3)
GLUCOSE SERPL-MCNC: 150 MG/DL — HIGH (ref 70–99)
HCT VFR BLD CALC: 29.9 % — LOW (ref 34.5–45)
HGB BLD-MCNC: 9.2 G/DL — LOW (ref 11.5–15.5)
MAGNESIUM SERPL-MCNC: 1.9 MG/DL — SIGNIFICANT CHANGE UP (ref 1.6–2.6)
MCHC RBC-ENTMCNC: 27.6 PG — SIGNIFICANT CHANGE UP (ref 27–34)
MCHC RBC-ENTMCNC: 30.8 % — LOW (ref 32–36)
MCV RBC AUTO: 89.8 FL — SIGNIFICANT CHANGE UP (ref 80–100)
NRBC # FLD: 0 — SIGNIFICANT CHANGE UP
PHOSPHATE SERPL-MCNC: 4.4 MG/DL — SIGNIFICANT CHANGE UP (ref 2.5–4.5)
PLATELET # BLD AUTO: 209 K/UL — SIGNIFICANT CHANGE UP (ref 150–400)
PMV BLD: 11.5 FL — SIGNIFICANT CHANGE UP (ref 7–13)
POTASSIUM SERPL-MCNC: 4.7 MMOL/L — SIGNIFICANT CHANGE UP (ref 3.5–5.3)
POTASSIUM SERPL-SCNC: 4.7 MMOL/L — SIGNIFICANT CHANGE UP (ref 3.5–5.3)
RBC # BLD: 3.33 M/UL — LOW (ref 3.8–5.2)
RBC # FLD: 13.3 % — SIGNIFICANT CHANGE UP (ref 10.3–14.5)
SODIUM SERPL-SCNC: 140 MMOL/L — SIGNIFICANT CHANGE UP (ref 135–145)
WBC # BLD: 7.93 K/UL — SIGNIFICANT CHANGE UP (ref 3.8–10.5)
WBC # FLD AUTO: 7.93 K/UL — SIGNIFICANT CHANGE UP (ref 3.8–10.5)

## 2017-10-21 RX ORDER — DOCUSATE SODIUM 100 MG
100 CAPSULE ORAL
Qty: 0 | Refills: 0 | Status: DISCONTINUED | OUTPATIENT
Start: 2017-10-21 | End: 2017-11-15

## 2017-10-21 RX ORDER — POLYETHYLENE GLYCOL 3350 17 G/17G
17 POWDER, FOR SOLUTION ORAL DAILY
Qty: 0 | Refills: 0 | Status: DISCONTINUED | OUTPATIENT
Start: 2017-10-21 | End: 2017-11-15

## 2017-10-21 RX ORDER — ACETAMINOPHEN 500 MG
650 TABLET ORAL EVERY 6 HOURS
Qty: 0 | Refills: 0 | Status: DISCONTINUED | OUTPATIENT
Start: 2017-10-21 | End: 2017-11-15

## 2017-10-21 RX ADMIN — HEPARIN SODIUM 5000 UNIT(S): 5000 INJECTION INTRAVENOUS; SUBCUTANEOUS at 06:39

## 2017-10-21 RX ADMIN — PANTOPRAZOLE SODIUM 40 MILLIGRAM(S): 20 TABLET, DELAYED RELEASE ORAL at 06:41

## 2017-10-21 RX ADMIN — Medication 81 MILLIGRAM(S): at 09:40

## 2017-10-21 RX ADMIN — GABAPENTIN 300 MILLIGRAM(S): 400 CAPSULE ORAL at 21:53

## 2017-10-21 RX ADMIN — HEPARIN SODIUM 5000 UNIT(S): 5000 INJECTION INTRAVENOUS; SUBCUTANEOUS at 21:53

## 2017-10-21 RX ADMIN — Medication: at 22:44

## 2017-10-21 RX ADMIN — Medication 650 MILLIGRAM(S): at 12:39

## 2017-10-21 RX ADMIN — SERTRALINE 200 MILLIGRAM(S): 25 TABLET, FILM COATED ORAL at 17:26

## 2017-10-21 RX ADMIN — Medication 20 MILLIGRAM(S): at 17:26

## 2017-10-21 RX ADMIN — Medication 650 MILLIGRAM(S): at 09:40

## 2017-10-21 RX ADMIN — INSULIN GLARGINE 30 UNIT(S): 100 INJECTION, SOLUTION SUBCUTANEOUS at 23:13

## 2017-10-21 RX ADMIN — QUETIAPINE FUMARATE 50 MILLIGRAM(S): 200 TABLET, FILM COATED ORAL at 17:26

## 2017-10-21 NOTE — PROGRESS NOTE ADULT - SUBJECTIVE AND OBJECTIVE BOX
Chaz Freitas MD  Interventional Cardiology  [place] Office : [address] [place] [address] [facility] [address]  Tel:   [place] Office : [address] [facility] [place] [address]  Tel: [phone]  Cell : [phone]    Subjective : Pt lying in bed comfortable, not in distress, denies any chest pain or SOB s/p AVF  	  MEDICATIONS:  amLODIPine   Tablet 10 milliGRAM(s) Oral daily  aspirin enteric coated 81 milliGRAM(s) Oral daily  furosemide    Tablet 20 milliGRAM(s) Oral two times a day  heparin  Injectable 5000 Unit(s) SubCutaneous every 8 hours        acetaminophen   Tablet. 650 milliGRAM(s) Oral every 6 hours PRN  gabapentin 300 milliGRAM(s) Oral at bedtime  QUEtiapine 50 milliGRAM(s) Oral daily  sertraline 200 milliGRAM(s) Oral daily    docusate sodium 100 milliGRAM(s) Oral two times a day  pantoprazole    Tablet 40 milliGRAM(s) Oral before breakfast  polyethylene glycol 3350 17 Gram(s) Oral daily    insulin glargine Injectable (LANTUS) 30 Unit(s) SubCutaneous at bedtime  insulin lispro (HumaLOG) corrective regimen sliding scale   SubCutaneous three times a day before meals  insulin lispro (HumaLOG) corrective regimen sliding scale   SubCutaneous at bedtime    epoetin karla Injectable 4000 Unit(s) IV Push <User Schedule>  influenza   Vaccine 0.5 milliLiter(s) IntraMuscular once      PHYSICAL EXAM:  T(C): 36.8 (10-21-17 @ 06:49), Max: 36.8 (10-21-17 @ 06:49)  HR: 93 (10-21-17 @ 06:49) (72 - 93)  BP: 136/71 (10-21-17 @ 06:49) (122/67 - 136/71)  RR: 18 (10-21-17 @ 06:49) (18 - 18)  SpO2: 100% (10-21-17 @ 06:49) (95% - 100%)  Wt(kg): --  I&O's Summary    20 Oct 2017 07:01  -  21 Oct 2017 07:00  --------------------------------------------------------  IN: 0 mL / OUT: 300 mL / NET: -300 mL          Appearance: Normal	  HEENT:   Normal oral mucosa, PERRL, EOMI	  Cardiovascular: Normal S1 S2, No JVD, No murmurs, No edema  Respiratory: Lungs clear to auscultation	  Gastrointestinal:  Soft, Non-tender, + BS	  Extremities: LEFT ARM S/P avf                                  9.2    7.93  )-----------( 209      ( 21 Oct 2017 12:30 )             29.9     10-21    140  |  102  |  44<H>  ----------------------------<  150<H>  4.7   |  24  |  6.29<H>    Ca    8.5      21 Oct 2017 12:30  Phos  4.4     10-21  Mg     1.9     10-21      proBNP:   Lipid Profile:   HgA1c:   TSH:

## 2017-10-21 NOTE — PROGRESS NOTE ADULT - ASSESSMENT
1) Epigastric pain - T wave inversions, nuclear stress test shows no ischemia EF 47%,  cont asa    2) ESRD - s/p shiley permacath postponed sec to pt was on plavix, permacath placement on monday, S/P AVF    3) h/o CVA - cont asa and zocor

## 2017-10-21 NOTE — PROGRESS NOTE ADULT - SUBJECTIVE AND OBJECTIVE BOX
Patient is a 52y old  Female who presents with a chief complaint of Flank and epigastric pain (16 Oct 2017 17:53)  NAD      INTERVAL HPI/OVERNIGHT EVENTS:  T(C): 37 (10-21-17 @ 15:40), Max: 37 (10-21-17 @ 12:03)  HR: 80 (10-21-17 @ 17:23) (76 - 93)  BP: 112/84 (10-21-17 @ 17:23) (112/84 - 139/83)  RR: 18 (10-21-17 @ 17:23) (15 - 18)  SpO2: 97% (10-21-17 @ 17:23) (95% - 100%)  Wt(kg): --  I&O's Summary    20 Oct 2017 07:01  -  21 Oct 2017 07:00  --------------------------------------------------------  IN: 0 mL / OUT: 300 mL / NET: -300 mL    21 Oct 2017 07:01  -  21 Oct 2017 18:50  --------------------------------------------------------  IN: 400 mL / OUT: 2000 mL / NET: -1600 mL        LABS:                        9.2    7.93  )-----------( 209      ( 21 Oct 2017 12:30 )             29.9     10-21    140  |  102  |  44<H>  ----------------------------<  150<H>  4.7   |  24  |  6.29<H>    Ca    8.5      21 Oct 2017 12:30  Phos  4.4     10-21  Mg     1.9     10-21      PT/INR - ( 20 Oct 2017 06:20 )   PT: 10.3 SEC;   INR: 0.92          PTT - ( 20 Oct 2017 06:20 )  PTT:29.3 SEC    CAPILLARY BLOOD GLUCOSE  207 (20 Oct 2017 21:51)      POCT Blood Glucose.: 302 mg/dL (21 Oct 2017 16:54)  POCT Blood Glucose.: 107 mg/dL (21 Oct 2017 08:14)            MEDICATIONS  (STANDING):  amLODIPine   Tablet 10 milliGRAM(s) Oral daily  aspirin enteric coated 81 milliGRAM(s) Oral daily  docusate sodium 100 milliGRAM(s) Oral two times a day  epoetin karla Injectable 4000 Unit(s) IV Push <User Schedule>  furosemide    Tablet 20 milliGRAM(s) Oral two times a day  gabapentin 300 milliGRAM(s) Oral at bedtime  heparin  Injectable 5000 Unit(s) SubCutaneous every 8 hours  influenza   Vaccine 0.5 milliLiter(s) IntraMuscular once  insulin glargine Injectable (LANTUS) 30 Unit(s) SubCutaneous at bedtime  insulin lispro (HumaLOG) corrective regimen sliding scale   SubCutaneous three times a day before meals  insulin lispro (HumaLOG) corrective regimen sliding scale   SubCutaneous at bedtime  pantoprazole    Tablet 40 milliGRAM(s) Oral before breakfast  polyethylene glycol 3350 17 Gram(s) Oral daily  QUEtiapine 50 milliGRAM(s) Oral daily  sertraline 200 milliGRAM(s) Oral daily    MEDICATIONS  (PRN):  acetaminophen   Tablet. 650 milliGRAM(s) Oral every 6 hours PRN mild and Moderate          PHYSICAL EXAM:  GENERAL: NAD, well-groomed, well-developed  HEAD:  Atraumatic, Normocephalic  CHEST/LUNG: Clear to percussion bilaterally; No rales, rhonchi, wheezing, or rubs  HEART: Regular rate and rhythm; No murmurs, rubs, or gallops  ABDOMEN: Soft, Nontender, Nondistended; Bowel sounds present  EXTREMITIES:  2+ Peripheral Pulses, No clubbing, cyanosis, or edema  LYMPH: No lymphadenopathy noted  SKIN: No rashes or lesions    Care Discussed with Consultants/Other Providers [ ] YES  [ ] NO

## 2017-10-21 NOTE — PROGRESS NOTE ADULT - SUBJECTIVE AND OBJECTIVE BOX
Seen on HD today. Complaints of constipation.                             9.8    7.01  )-----------( 189      ( 20 Oct 2017 06:20 )             30.9                           9.8    7.01  )-----------( 189      ( 20 Oct 2017 06:20 )             30.9     10-20    138  |  102  |  31<H>  ----------------------------<  181<H>  4.4   |  25  |  5.16<H>    Ca    8.5      20 Oct 2017 06:20  Phos  4.1     10-20  Mg     1.8     10-20        I&O's Detail    20 Oct 2017 07:01  -  21 Oct 2017 07:00  --------------------------------------------------------  IN:  Total IN: 0 mL    OUT:    Voided: 300 mL  Total OUT: 300 mL    Total NET: -300 mL          I&O's Summary    20 Oct 2017 07:01  -  21 Oct 2017 07:00  --------------------------------------------------------  IN: 0 mL / OUT: 300 mL / NET: -300 mL        Daily     Daily Weight in k (21 Oct 2017 07:20)    Vital Signs Last 24 Hrs  T(C): 36.8 (21 Oct 2017 06:49), Max: 36.8 (20 Oct 2017 13:23)  T(F): 98.3 (21 Oct 2017 06:49), Max: 98.3 (21 Oct 2017 06:49)  HR: 93 (21 Oct 2017 06:49) (72 - 93)  BP: 136/71 (21 Oct 2017 06:49) (119/72 - 136/71)  BP(mean): --  RR: 18 (21 Oct 2017 06:49) (18 - 18)  SpO2: 100% (21 Oct 2017 06:49) (95% - 100%)      MEDICATIONS  (STANDING):  amLODIPine   Tablet 10 milliGRAM(s) Oral daily  aspirin enteric coated 81 milliGRAM(s) Oral daily  epoetin karla Injectable 4000 Unit(s) IV Push <User Schedule>  furosemide    Tablet 20 milliGRAM(s) Oral two times a day  gabapentin 300 milliGRAM(s) Oral at bedtime  heparin  Injectable 5000 Unit(s) SubCutaneous every 8 hours  influenza   Vaccine 0.5 milliLiter(s) IntraMuscular once  insulin glargine Injectable (LANTUS) 30 Unit(s) SubCutaneous at bedtime  insulin lispro (HumaLOG) corrective regimen sliding scale   SubCutaneous three times a day before meals  insulin lispro (HumaLOG) corrective regimen sliding scale   SubCutaneous at bedtime  pantoprazole    Tablet 40 milliGRAM(s) Oral before breakfast  QUEtiapine 50 milliGRAM(s) Oral daily  sertraline 200 milliGRAM(s) Oral daily    MEDICATIONS  (PRN):  acetaminophen   Tablet. 650 milliGRAM(s) Oral every 6 hours PRN mild and Moderate

## 2017-10-21 NOTE — PROGRESS NOTE ADULT - ASSESSMENT
52F w/ HTN, DM2, CAD, and CKD5, 10/16/17 a/w vomiting/epigastric pain/flank pain    (1) ESRD- new HD as of this admission    (2) Vascular - s/p left radiocephalic AVF creation     RECOMMEND:  (1)HD now  (2)Next HD Monday  (3)permacath next week  (4)SW to help setup of outpatient HD  (5)Start colace 100 mg po BID and miralax daily, for constipation

## 2017-10-22 LAB
BUN SERPL-MCNC: 33 MG/DL — HIGH (ref 7–23)
CALCIUM SERPL-MCNC: 8.8 MG/DL — SIGNIFICANT CHANGE UP (ref 8.4–10.5)
CHLORIDE SERPL-SCNC: 103 MMOL/L — SIGNIFICANT CHANGE UP (ref 98–107)
CO2 SERPL-SCNC: 27 MMOL/L — SIGNIFICANT CHANGE UP (ref 22–31)
CREAT SERPL-MCNC: 5.19 MG/DL — HIGH (ref 0.5–1.3)
GLUCOSE SERPL-MCNC: 79 MG/DL — SIGNIFICANT CHANGE UP (ref 70–99)
HCT VFR BLD CALC: 31.2 % — LOW (ref 34.5–45)
HGB BLD-MCNC: 9.7 G/DL — LOW (ref 11.5–15.5)
MAGNESIUM SERPL-MCNC: 2 MG/DL — SIGNIFICANT CHANGE UP (ref 1.6–2.6)
MCHC RBC-ENTMCNC: 28 PG — SIGNIFICANT CHANGE UP (ref 27–34)
MCHC RBC-ENTMCNC: 31.1 % — LOW (ref 32–36)
MCV RBC AUTO: 89.9 FL — SIGNIFICANT CHANGE UP (ref 80–100)
NRBC # FLD: 0 — SIGNIFICANT CHANGE UP
PHOSPHATE SERPL-MCNC: 4.5 MG/DL — SIGNIFICANT CHANGE UP (ref 2.5–4.5)
PLATELET # BLD AUTO: 183 K/UL — SIGNIFICANT CHANGE UP (ref 150–400)
PMV BLD: 11.3 FL — SIGNIFICANT CHANGE UP (ref 7–13)
POTASSIUM SERPL-MCNC: 4.2 MMOL/L — SIGNIFICANT CHANGE UP (ref 3.5–5.3)
POTASSIUM SERPL-SCNC: 4.2 MMOL/L — SIGNIFICANT CHANGE UP (ref 3.5–5.3)
RBC # BLD: 3.47 M/UL — LOW (ref 3.8–5.2)
RBC # FLD: 13.3 % — SIGNIFICANT CHANGE UP (ref 10.3–14.5)
SODIUM SERPL-SCNC: 141 MMOL/L — SIGNIFICANT CHANGE UP (ref 135–145)
WBC # BLD: 6.88 K/UL — SIGNIFICANT CHANGE UP (ref 3.8–10.5)
WBC # FLD AUTO: 6.88 K/UL — SIGNIFICANT CHANGE UP (ref 3.8–10.5)

## 2017-10-22 RX ORDER — INSULIN GLARGINE 100 [IU]/ML
20 INJECTION, SOLUTION SUBCUTANEOUS AT BEDTIME
Qty: 0 | Refills: 0 | Status: DISCONTINUED | OUTPATIENT
Start: 2017-10-22 | End: 2017-11-15

## 2017-10-22 RX ADMIN — AMLODIPINE BESYLATE 10 MILLIGRAM(S): 2.5 TABLET ORAL at 05:24

## 2017-10-22 RX ADMIN — SERTRALINE 200 MILLIGRAM(S): 25 TABLET, FILM COATED ORAL at 12:54

## 2017-10-22 RX ADMIN — Medication 81 MILLIGRAM(S): at 12:54

## 2017-10-22 RX ADMIN — Medication 100 MILLIGRAM(S): at 18:59

## 2017-10-22 RX ADMIN — Medication 100 MILLIGRAM(S): at 05:24

## 2017-10-22 RX ADMIN — HEPARIN SODIUM 5000 UNIT(S): 5000 INJECTION INTRAVENOUS; SUBCUTANEOUS at 12:54

## 2017-10-22 RX ADMIN — HEPARIN SODIUM 5000 UNIT(S): 5000 INJECTION INTRAVENOUS; SUBCUTANEOUS at 05:24

## 2017-10-22 RX ADMIN — POLYETHYLENE GLYCOL 3350 17 GRAM(S): 17 POWDER, FOR SOLUTION ORAL at 12:54

## 2017-10-22 RX ADMIN — PANTOPRAZOLE SODIUM 40 MILLIGRAM(S): 20 TABLET, DELAYED RELEASE ORAL at 08:54

## 2017-10-22 RX ADMIN — Medication 20 MILLIGRAM(S): at 05:24

## 2017-10-22 RX ADMIN — Medication 20 MILLIGRAM(S): at 18:59

## 2017-10-22 RX ADMIN — GABAPENTIN 300 MILLIGRAM(S): 400 CAPSULE ORAL at 21:42

## 2017-10-22 RX ADMIN — Medication 2: at 22:52

## 2017-10-22 RX ADMIN — HEPARIN SODIUM 5000 UNIT(S): 5000 INJECTION INTRAVENOUS; SUBCUTANEOUS at 21:42

## 2017-10-22 RX ADMIN — QUETIAPINE FUMARATE 50 MILLIGRAM(S): 200 TABLET, FILM COATED ORAL at 12:54

## 2017-10-22 RX ADMIN — INSULIN GLARGINE 20 UNIT(S): 100 INJECTION, SOLUTION SUBCUTANEOUS at 22:52

## 2017-10-22 RX ADMIN — Medication 1: at 18:58

## 2017-10-22 NOTE — PROGRESS NOTE ADULT - SUBJECTIVE AND OBJECTIVE BOX
Chaz Freitas MD  Interventional Cardiology  [place] Office : [address] [place] [address] [address] [address]  Tel:   [place] Office : [address] [facility] [place] [address]  Tel: [phone]  Cell : [phone]    Subjective : Pt lying in bed comfortable, not in distress, denies any chest pain or SOB  	  MEDICATIONS:  amLODIPine   Tablet 10 milliGRAM(s) Oral daily  aspirin enteric coated 81 milliGRAM(s) Oral daily  furosemide    Tablet 20 milliGRAM(s) Oral two times a day  heparin  Injectable 5000 Unit(s) SubCutaneous every 8 hours        acetaminophen   Tablet. 650 milliGRAM(s) Oral every 6 hours PRN  gabapentin 300 milliGRAM(s) Oral at bedtime  QUEtiapine 50 milliGRAM(s) Oral daily  sertraline 200 milliGRAM(s) Oral daily    docusate sodium 100 milliGRAM(s) Oral two times a day  pantoprazole    Tablet 40 milliGRAM(s) Oral before breakfast  polyethylene glycol 3350 17 Gram(s) Oral daily    insulin glargine Injectable (LANTUS) 20 Unit(s) SubCutaneous at bedtime  insulin lispro (HumaLOG) corrective regimen sliding scale   SubCutaneous three times a day before meals  insulin lispro (HumaLOG) corrective regimen sliding scale   SubCutaneous at bedtime    epoetin karla Injectable 4000 Unit(s) IV Push <User Schedule>  influenza   Vaccine 0.5 milliLiter(s) IntraMuscular once      PHYSICAL EXAM:  T(C): 37.1 (10-22-17 @ 05:21), Max: 37.1 (10-22-17 @ 05:21)  HR: 75 (10-22-17 @ 05:21) (75 - 80)  BP: 142/81 (10-22-17 @ 05:21) (112/84 - 142/81)  RR: 18 (10-22-17 @ 05:21) (16 - 18)  SpO2: 95% (10-22-17 @ 05:21) (95% - 97%)  Wt(kg): --  I&O's Summary    21 Oct 2017 07:01  -  22 Oct 2017 07:00  --------------------------------------------------------  IN: 400 mL / OUT: 2000 mL / NET: -1600 mL          Appearance: Normal	  HEENT:   Normal oral mucosa, PERRL, EOMI	  Cardiovascular: Normal S1 S2, No JVD, No murmurs, No edema  Respiratory: Lungs clear to auscultation	  Gastrointestinal:  Soft, Non-tender, + BS	  Extremities: s/p left AVF                                    9.7    6.88  )-----------( 183      ( 22 Oct 2017 07:00 )             31.2     10-22    141  |  103  |  33<H>  ----------------------------<  79  4.2   |  27  |  5.19<H>    Ca    8.8      22 Oct 2017 07:00  Phos  4.5     10-22  Mg     2.0     10-22      proBNP:   Lipid Profile:   HgA1c:   TSH:

## 2017-10-22 NOTE — PROGRESS NOTE ADULT - SUBJECTIVE AND OBJECTIVE BOX
Patient is a 52y old  Female who presents with a chief complaint of Flank and epigastric pain (16 Oct 2017 17:53)  NAD      INTERVAL HPI/OVERNIGHT EVENTS:  T(C): 37.1 (10-22-17 @ 05:21), Max: 37.1 (10-22-17 @ 05:21)  HR: 75 (10-22-17 @ 05:21) (75 - 80)  BP: 142/81 (10-22-17 @ 05:21) (112/84 - 142/81)  RR: 18 (10-22-17 @ 05:21) (15 - 18)  SpO2: 95% (10-22-17 @ 05:21) (95% - 97%)  Wt(kg): --  I&O's Summary    21 Oct 2017 07:01  -  22 Oct 2017 07:00  --------------------------------------------------------  IN: 400 mL / OUT: 2000 mL / NET: -1600 mL        LABS:                        9.7    6.88  )-----------( 183      ( 22 Oct 2017 07:00 )             31.2     10-22    141  |  103  |  33<H>  ----------------------------<  79  4.2   |  27  |  5.19<H>    Ca    8.8      22 Oct 2017 07:00  Phos  4.5     10-22  Mg     2.0     10-22          CAPILLARY BLOOD GLUCOSE      POCT Blood Glucose.: 85 mg/dL (22 Oct 2017 08:34)  POCT Blood Glucose.: 353 mg/dL (21 Oct 2017 23:05)  POCT Blood Glucose.: 471 mg/dL (21 Oct 2017 22:22)  POCT Blood Glucose.: >600 mg/dL (21 Oct 2017 22:20)  POCT Blood Glucose.: 302 mg/dL (21 Oct 2017 16:54)            MEDICATIONS  (STANDING):  amLODIPine   Tablet 10 milliGRAM(s) Oral daily  aspirin enteric coated 81 milliGRAM(s) Oral daily  docusate sodium 100 milliGRAM(s) Oral two times a day  epoetin karla Injectable 4000 Unit(s) IV Push <User Schedule>  furosemide    Tablet 20 milliGRAM(s) Oral two times a day  gabapentin 300 milliGRAM(s) Oral at bedtime  heparin  Injectable 5000 Unit(s) SubCutaneous every 8 hours  influenza   Vaccine 0.5 milliLiter(s) IntraMuscular once  insulin glargine Injectable (LANTUS) 20 Unit(s) SubCutaneous at bedtime  insulin lispro (HumaLOG) corrective regimen sliding scale   SubCutaneous three times a day before meals  insulin lispro (HumaLOG) corrective regimen sliding scale   SubCutaneous at bedtime  pantoprazole    Tablet 40 milliGRAM(s) Oral before breakfast  polyethylene glycol 3350 17 Gram(s) Oral daily  QUEtiapine 50 milliGRAM(s) Oral daily  sertraline 200 milliGRAM(s) Oral daily    MEDICATIONS  (PRN):  acetaminophen   Tablet. 650 milliGRAM(s) Oral every 6 hours PRN mild and Moderate          PHYSICAL EXAM:  GENERAL: NAD, well-groomed, well-developed  HEAD:  Atraumatic, Normocephalic  CHEST/LUNG: Clear to percussion bilaterally; No rales, rhonchi, wheezing, or rubs  HEART: Regular rate and rhythm; No murmurs, rubs, or gallops  ABDOMEN: Soft, Nontender, Nondistended; Bowel sounds present  EXTREMITIES:  2+ Peripheral Pulses, No clubbing, cyanosis, or edema  LYMPH: No lymphadenopathy noted  SKIN: No rashes or lesions    Care Discussed with Consultants/Other Providers [+] YES  [ ] NO

## 2017-10-23 LAB
BUN SERPL-MCNC: 38 MG/DL — HIGH (ref 7–23)
CALCIUM SERPL-MCNC: 8.9 MG/DL — SIGNIFICANT CHANGE UP (ref 8.4–10.5)
CHLORIDE SERPL-SCNC: 105 MMOL/L — SIGNIFICANT CHANGE UP (ref 98–107)
CO2 SERPL-SCNC: 25 MMOL/L — SIGNIFICANT CHANGE UP (ref 22–31)
CREAT SERPL-MCNC: 6.13 MG/DL — HIGH (ref 0.5–1.3)
GLUCOSE SERPL-MCNC: 61 MG/DL — LOW (ref 70–99)
HCG SERPL-ACNC: < 5 MIU/ML — SIGNIFICANT CHANGE UP
HCT VFR BLD CALC: 31 % — LOW (ref 34.5–45)
HGB BLD-MCNC: 9.6 G/DL — LOW (ref 11.5–15.5)
MAGNESIUM SERPL-MCNC: 2.1 MG/DL — SIGNIFICANT CHANGE UP (ref 1.6–2.6)
MCHC RBC-ENTMCNC: 28.2 PG — SIGNIFICANT CHANGE UP (ref 27–34)
MCHC RBC-ENTMCNC: 31 % — LOW (ref 32–36)
MCV RBC AUTO: 90.9 FL — SIGNIFICANT CHANGE UP (ref 80–100)
NRBC # FLD: 0 — SIGNIFICANT CHANGE UP
PHOSPHATE SERPL-MCNC: 5.1 MG/DL — HIGH (ref 2.5–4.5)
PLATELET # BLD AUTO: 215 K/UL — SIGNIFICANT CHANGE UP (ref 150–400)
PMV BLD: 11.5 FL — SIGNIFICANT CHANGE UP (ref 7–13)
POTASSIUM SERPL-MCNC: 4.3 MMOL/L — SIGNIFICANT CHANGE UP (ref 3.5–5.3)
POTASSIUM SERPL-SCNC: 4.3 MMOL/L — SIGNIFICANT CHANGE UP (ref 3.5–5.3)
RBC # BLD: 3.41 M/UL — LOW (ref 3.8–5.2)
RBC # FLD: 13.4 % — SIGNIFICANT CHANGE UP (ref 10.3–14.5)
SODIUM SERPL-SCNC: 143 MMOL/L — SIGNIFICANT CHANGE UP (ref 135–145)
WBC # BLD: 5.88 K/UL — SIGNIFICANT CHANGE UP (ref 3.8–10.5)
WBC # FLD AUTO: 5.88 K/UL — SIGNIFICANT CHANGE UP (ref 3.8–10.5)

## 2017-10-23 PROCEDURE — 77001 FLUOROGUIDE FOR VEIN DEVICE: CPT | Mod: 26,GC

## 2017-10-23 PROCEDURE — 36558 INSERT TUNNELED CV CATH: CPT

## 2017-10-23 RX ORDER — DEXTROSE 50 % IN WATER 50 %
12.5 SYRINGE (ML) INTRAVENOUS ONCE
Qty: 0 | Refills: 0 | Status: DISCONTINUED | OUTPATIENT
Start: 2017-10-23 | End: 2017-11-15

## 2017-10-23 RX ORDER — DEXTROSE 50 % IN WATER 50 %
25 SYRINGE (ML) INTRAVENOUS ONCE
Qty: 0 | Refills: 0 | Status: DISCONTINUED | OUTPATIENT
Start: 2017-10-23 | End: 2017-11-15

## 2017-10-23 RX ORDER — SODIUM CHLORIDE 9 MG/ML
1000 INJECTION, SOLUTION INTRAVENOUS
Qty: 0 | Refills: 0 | Status: DISCONTINUED | OUTPATIENT
Start: 2017-10-23 | End: 2017-11-15

## 2017-10-23 RX ORDER — DEXTROSE 50 % IN WATER 50 %
1 SYRINGE (ML) INTRAVENOUS ONCE
Qty: 0 | Refills: 0 | Status: DISCONTINUED | OUTPATIENT
Start: 2017-10-23 | End: 2017-11-15

## 2017-10-23 RX ORDER — OXYCODONE AND ACETAMINOPHEN 5; 325 MG/1; MG/1
1 TABLET ORAL ONCE
Qty: 0 | Refills: 0 | Status: DISCONTINUED | OUTPATIENT
Start: 2017-10-23 | End: 2017-10-23

## 2017-10-23 RX ORDER — GLUCAGON INJECTION, SOLUTION 0.5 MG/.1ML
1 INJECTION, SOLUTION SUBCUTANEOUS ONCE
Qty: 0 | Refills: 0 | Status: DISCONTINUED | OUTPATIENT
Start: 2017-10-23 | End: 2017-11-15

## 2017-10-23 RX ORDER — DEXTROSE 50 % IN WATER 50 %
12.5 SYRINGE (ML) INTRAVENOUS ONCE
Qty: 0 | Refills: 0 | Status: COMPLETED | OUTPATIENT
Start: 2017-10-23 | End: 2017-10-23

## 2017-10-23 RX ADMIN — INSULIN GLARGINE 20 UNIT(S): 100 INJECTION, SOLUTION SUBCUTANEOUS at 22:20

## 2017-10-23 RX ADMIN — Medication 20 MILLIGRAM(S): at 18:42

## 2017-10-23 RX ADMIN — Medication 650 MILLIGRAM(S): at 20:32

## 2017-10-23 RX ADMIN — Medication 650 MILLIGRAM(S): at 21:20

## 2017-10-23 RX ADMIN — OXYCODONE AND ACETAMINOPHEN 1 TABLET(S): 5; 325 TABLET ORAL at 23:45

## 2017-10-23 RX ADMIN — QUETIAPINE FUMARATE 50 MILLIGRAM(S): 200 TABLET, FILM COATED ORAL at 21:39

## 2017-10-23 RX ADMIN — Medication 12.5 GRAM(S): at 08:55

## 2017-10-23 RX ADMIN — GABAPENTIN 300 MILLIGRAM(S): 400 CAPSULE ORAL at 21:39

## 2017-10-23 RX ADMIN — OXYCODONE AND ACETAMINOPHEN 1 TABLET(S): 5; 325 TABLET ORAL at 23:08

## 2017-10-23 RX ADMIN — SERTRALINE 200 MILLIGRAM(S): 25 TABLET, FILM COATED ORAL at 21:37

## 2017-10-23 NOTE — PROGRESS NOTE ADULT - ASSESSMENT
Problem/Plan - 1:  ·  Problem: Atypical chest pain.  Plan: tele monitor   management as per cards    Problem/Plan - 2:  ·  Problem: ESRD (end stage renal disease).  Plan: Monitor electrolytes  renal fu   sp avf .permacth pending    Problem/Plan - 3:  ·  Problem: HTN (hypertension).  Plan: Monitor BP daily  DASH diet  Continue  bp meds.     Problem/Plan - 4:  ·  Problem: DM (diabetes mellitus).  Plan: Daily glucose monitoring  insulin sliding scale  DASH 1800 ADA diet with 1500 cc Fluid restriction  continue lantus.     Problem/Plan - 5:  ·  Problem: Diastolic heart failure.  Plan: tele monitor  Strict I&Os plus Daily weights.

## 2017-10-23 NOTE — PROGRESS NOTE ADULT - SUBJECTIVE AND OBJECTIVE BOX
Patient is a 52y old  Female who presents with a chief complaint of Flank and epigastric pain (16 Oct 2017 17:53)  NAD    INTERVAL HPI/OVERNIGHT EVENTS:  T(C): 37.2 (10-23-17 @ 13:11), Max: 37.2 (10-22-17 @ 20:50)  HR: 68 (10-23-17 @ 13:11) (68 - 84)  BP: 129/84 (10-23-17 @ 13:11) (104/61 - 129/84)  RR: 18 (10-23-17 @ 13:11) (17 - 18)  SpO2: 98% (10-23-17 @ 13:11) (97% - 98%)  Wt(kg): --  I&O's Summary      LABS:                        9.6    5.88  )-----------( 215      ( 23 Oct 2017 05:30 )             31.0     10-23    143  |  105  |  38<H>  ----------------------------<  61<L>  4.3   |  25  |  6.13<H>    Ca    8.9      23 Oct 2017 05:30  Phos  5.1     10-23  Mg     2.1     10-23          CAPILLARY BLOOD GLUCOSE      POCT Blood Glucose.: 81 mg/dL (23 Oct 2017 12:01)  POCT Blood Glucose.: 122 mg/dL (23 Oct 2017 09:20)  POCT Blood Glucose.: 77 mg/dL (23 Oct 2017 08:21)  POCT Blood Glucose.: 304 mg/dL (22 Oct 2017 22:23)  POCT Blood Glucose.: 198 mg/dL (22 Oct 2017 17:00)            MEDICATIONS  (STANDING):  amLODIPine   Tablet 10 milliGRAM(s) Oral daily  aspirin enteric coated 81 milliGRAM(s) Oral daily  dextrose 5%. 1000 milliLiter(s) (50 mL/Hr) IV Continuous <Continuous>  dextrose 50% Injectable 12.5 Gram(s) IV Push once  dextrose 50% Injectable 25 Gram(s) IV Push once  dextrose 50% Injectable 25 Gram(s) IV Push once  docusate sodium 100 milliGRAM(s) Oral two times a day  epoetin karla Injectable 4000 Unit(s) IV Push <User Schedule>  furosemide    Tablet 20 milliGRAM(s) Oral two times a day  gabapentin 300 milliGRAM(s) Oral at bedtime  heparin  Injectable 5000 Unit(s) SubCutaneous every 8 hours  influenza   Vaccine 0.5 milliLiter(s) IntraMuscular once  insulin glargine Injectable (LANTUS) 20 Unit(s) SubCutaneous at bedtime  insulin lispro (HumaLOG) corrective regimen sliding scale   SubCutaneous three times a day before meals  insulin lispro (HumaLOG) corrective regimen sliding scale   SubCutaneous at bedtime  pantoprazole    Tablet 40 milliGRAM(s) Oral before breakfast  polyethylene glycol 3350 17 Gram(s) Oral daily  QUEtiapine 50 milliGRAM(s) Oral daily  sertraline 200 milliGRAM(s) Oral daily    MEDICATIONS  (PRN):  acetaminophen   Tablet. 650 milliGRAM(s) Oral every 6 hours PRN mild and Moderate  dextrose Gel 1 Dose(s) Oral once PRN Blood Glucose LESS THAN 70 milliGRAM(s)/deciliter  glucagon  Injectable 1 milliGRAM(s) IntraMuscular once PRN Glucose LESS THAN 70 milligrams/deciliter          PHYSICAL EXAM:  GENERAL: NAD, well-groomed, well-developed  HEAD:  Atraumatic, Normocephalic  CHEST/LUNG: Clear to percussion bilaterally; No rales, rhonchi, wheezing, or rubs  HEART: Regular rate and rhythm; No murmurs, rubs, or gallops  ABDOMEN: Soft, Nontender, Nondistended; Bowel sounds present  EXTREMITIES:  2+ Peripheral Pulses, No clubbing, cyanosis, or edema  LYMPH: No lymphadenopathy noted  SKIN: No rashes or lesions    Care Discussed with Consultants/Other Providers [ ] YES  [ ] NO

## 2017-10-23 NOTE — PROGRESS NOTE ADULT - SUBJECTIVE AND OBJECTIVE BOX
Chaz Freitas MD  Interventional Cardiology  [place] Office : [address] [place] [address] [address] [address]  Tel:   [place] Office : [address] [facility] [place] [address]  Tel: [phone]  Cell : [phone]    Subjective : Pt lying in bed comfortable, not in distress, denies any chest pain or SOB, for permacath  	  MEDICATIONS:  amLODIPine   Tablet 10 milliGRAM(s) Oral daily  aspirin enteric coated 81 milliGRAM(s) Oral daily  furosemide    Tablet 20 milliGRAM(s) Oral two times a day  heparin  Injectable 5000 Unit(s) SubCutaneous every 8 hours  acetaminophen   Tablet. 650 milliGRAM(s) Oral every 6 hours PRN  gabapentin 300 milliGRAM(s) Oral at bedtime  QUEtiapine 50 milliGRAM(s) Oral daily  sertraline 200 milliGRAM(s) Oral daily  docusate sodium 100 milliGRAM(s) Oral two times a day  pantoprazole    Tablet 40 milliGRAM(s) Oral before breakfast  polyethylene glycol 3350 17 Gram(s) Oral daily  dextrose 50% Injectable 12.5 Gram(s) IV Push once  dextrose 50% Injectable 25 Gram(s) IV Push once  dextrose 50% Injectable 25 Gram(s) IV Push once  dextrose Gel 1 Dose(s) Oral once PRN  glucagon  Injectable 1 milliGRAM(s) IntraMuscular once PRN  insulin glargine Injectable (LANTUS) 20 Unit(s) SubCutaneous at bedtime  insulin lispro (HumaLOG) corrective regimen sliding scale   SubCutaneous three times a day before meals  insulin lispro (HumaLOG) corrective regimen sliding scale   SubCutaneous at bedtime  dextrose 5%. 1000 milliLiter(s) IV Continuous <Continuous>  epoetin karla Injectable 4000 Unit(s) IV Push <User Schedule>  influenza   Vaccine 0.5 milliLiter(s) IntraMuscular once      PHYSICAL EXAM:  T(C): 37.2 (10-23-17 @ 13:11), Max: 37.2 (10-22-17 @ 20:50)  HR: 68 (10-23-17 @ 13:11) (68 - 84)  BP: 129/84 (10-23-17 @ 13:11) (104/61 - 129/84)  RR: 18 (10-23-17 @ 13:11) (17 - 18)  SpO2: 98% (10-23-17 @ 13:11) (97% - 98%)  Wt(kg): --  I&O's Summary        Appearance: Normal	  HEENT:   Normal oral mucosa, PERRL, EOMI	  Cardiovascular: Normal S1 S2, No JVD, No murmurs, No edema  Respiratory: Lungs clear to auscultation	  Gastrointestinal:  Soft, Non-tender, + BS	  Extremities: Normal range of motion, No clubbing, cyanosis or edema                                    9.6    5.88  )-----------( 215      ( 23 Oct 2017 05:30 )             31.0     10-23    143  |  105  |  38<H>  ----------------------------<  61<L>  4.3   |  25  |  6.13<H>    Ca    8.9      23 Oct 2017 05:30  Phos  5.1     10-23  Mg     2.1     10-23      proBNP:   Lipid Profile:   HgA1c:   TSH:

## 2017-10-23 NOTE — PROGRESS NOTE ADULT - ASSESSMENT
1) Epigastric pain - T wave inversions, nuclear stress test shows no ischemia EF 47%,  cont asa    2) ESRD - s/p shiley permacath postponed sec to pt was on plavix, permacath placement on monday, S/P AVF    3) h/o CVA - cont asa and zocor 1) Epigastric pain - T wave inversions, nuclear stress test shows no ischemia EF 47%,  cont asa    2) ESRD - s/p shiley permacath postponed sec to pt was on plavix, permacath placement on monday, S/P AVF    3) h/o CVA - cont asa and zocor    Dr Andrew will be covering me from 10/24/17 - 10/29/17

## 2017-10-23 NOTE — PROGRESS NOTE ADULT - SUBJECTIVE AND OBJECTIVE BOX
No pain, no shortness of breath      VITAL:  T(C): , Max: 37.2 (10-22-17 @ 20:50)  T(F): , Max: 99 (10-22-17 @ 20:50)  HR: 76 (10-23-17 @ 06:49)  BP: 122/70 (10-23-17 @ 06:49)  BP(mean): --  RR: 17 (10-23-17 @ 06:49)  SpO2: 97% (10-23-17 @ 06:49)      PHYSICAL EXAM:  Constitutional: Obese, NAD, Alert  HEENT: NCAT, DMM  Neck: Supple, No JVD  Respiratory: CTA-b/l  Cardiovascular: RRR s1s2, no m/r/g  Gastrointestinal: BS+, soft, NT/ND  Extremities: 2+ b/l LE edema  Neurological: no focal deficits; strength grossly intact; no asterixis  Psychiatric: Normal mood, normal affect  Back: no CVAT b/l  Skin: No rashes, no nevi  Access: GLENN puri; DAVID BAZZIF (+)thrill      LABS:                        9.6    5.88  )-----------( 215      ( 23 Oct 2017 05:30 )             31.0     Na(143)/K(4.3)/Cl(105)/HCO3(25)/BUN(38)/Cr(6.13)Glu(61)/Ca(8.9)/Mg(2.1)/PO4(5.1)    10-23 @ 05:30  Na(141)/K(4.2)/Cl(103)/HCO3(27)/BUN(33)/Cr(5.19)Glu(79)/Ca(8.8)/Mg(2.0)/PO4(4.5)    10-22 @ 07:00  Na(140)/K(4.7)/Cl(102)/HCO3(24)/BUN(44)/Cr(6.29)Glu(150)/Ca(8.5)/Mg(1.9)/PO4(4.4)    10-21 @ 12:30      IMPRESSION: 52F w/ HTN, DM2, CAD, and CKD5, 10/16/17 a/w vomiting/epigastric pain/flank pain    (1)Renal - newly ESRD-HD as of this admission; s/p 1st HD yesterday; 2nd HD today; 3rd HD tomorrow    (2)Vascular - s/p left radiocephalic AVF creation by Dr. Radha Capps last week. Also with nontunneled HD catheter. Needs conversion to tunneled cath by IR    (3)SW - awaiting acceptance into outpatient HD      RECOMMEND:  (1)HD today  (2)Conversion of catheter from tunneled to nontunneled by IR  (3)F/U with SW regarding outpatient HD              Wale Francisco MD  [facility]  [phone] No pain, no shortness of breath. Swelling improving.      VITAL:  T(C): , Max: 37.2 (10-22-17 @ 20:50)  T(F): , Max: 99 (10-22-17 @ 20:50)  HR: 76 (10-23-17 @ 06:49)  BP: 122/70 (10-23-17 @ 06:49)  RR: 17 (10-23-17 @ 06:49)  SpO2: 97% (10-23-17 @ 06:49)      PHYSICAL EXAM:  Constitutional: Obese, NAD, Alert  HEENT: NCAT, DMM  Neck: Supple, No JVD  Respiratory: CTA-b/l  Cardiovascular: RRR s1s2, no m/r/g  Gastrointestinal: BS+, soft, NT/ND  Extremities: 1+ b/l LE edema  Neurological: no focal deficits; strength grossly intact; no asterixis  Psychiatric: Normal mood, normal affect  Back: no CVAT b/l  Skin: No rashes, no nevi  Access: GLENN puri; DAVID MCDONALD (+)thrill      LABS:                        9.6    5.88  )-----------( 215      ( 23 Oct 2017 05:30 )             31.0     Na(143)/K(4.3)/Cl(105)/HCO3(25)/BUN(38)/Cr(6.13)Glu(61)/Ca(8.9)/Mg(2.1)/PO4(5.1)    10-23 @ 05:30  Na(141)/K(4.2)/Cl(103)/HCO3(27)/BUN(33)/Cr(5.19)Glu(79)/Ca(8.8)/Mg(2.0)/PO4(4.5)    10-22 @ 07:00  Na(140)/K(4.7)/Cl(102)/HCO3(24)/BUN(44)/Cr(6.29)Glu(150)/Ca(8.5)/Mg(1.9)/PO4(4.4)    10-21 @ 12:30      IMPRESSION: 52F w/ HTN, DM2, CAD, and CKD5, 10/16/17 a/w vomiting/epigastric pain/flank pain    (1)Renal - newly ESRD-HD as of this admission; due for HD today    (2)Vascular - s/p left radiocephalic AVF creation by Dr. Radha Capps last week. Also with nontunneled HD catheter. Needs conversion to tunneled cath by IR    (3)SW - awaiting acceptance into outpatient HD      RECOMMEND:  (1)HD today  (2)Conversion of catheter from tunneled to nontunneled by IR - today if possible  (3)F/U with SW regarding outpatient HD              Wale Francisco MD  [facility]  [phone]

## 2017-10-24 LAB
BUN SERPL-MCNC: 41 MG/DL — HIGH (ref 7–23)
CALCIUM SERPL-MCNC: 8.6 MG/DL — SIGNIFICANT CHANGE UP (ref 8.4–10.5)
CHLORIDE SERPL-SCNC: 102 MMOL/L — SIGNIFICANT CHANGE UP (ref 98–107)
CO2 SERPL-SCNC: 25 MMOL/L — SIGNIFICANT CHANGE UP (ref 22–31)
CREAT SERPL-MCNC: 6.5 MG/DL — HIGH (ref 0.5–1.3)
GLUCOSE SERPL-MCNC: 89 MG/DL — SIGNIFICANT CHANGE UP (ref 70–99)
HCT VFR BLD CALC: 30.2 % — LOW (ref 34.5–45)
HGB BLD-MCNC: 9.2 G/DL — LOW (ref 11.5–15.5)
MAGNESIUM SERPL-MCNC: 2.2 MG/DL — SIGNIFICANT CHANGE UP (ref 1.6–2.6)
MCHC RBC-ENTMCNC: 28 PG — SIGNIFICANT CHANGE UP (ref 27–34)
MCHC RBC-ENTMCNC: 30.5 % — LOW (ref 32–36)
MCV RBC AUTO: 91.8 FL — SIGNIFICANT CHANGE UP (ref 80–100)
NRBC # FLD: 0 — SIGNIFICANT CHANGE UP
PHOSPHATE SERPL-MCNC: 6.1 MG/DL — HIGH (ref 2.5–4.5)
PLATELET # BLD AUTO: 229 K/UL — SIGNIFICANT CHANGE UP (ref 150–400)
PMV BLD: 11.3 FL — SIGNIFICANT CHANGE UP (ref 7–13)
POTASSIUM SERPL-MCNC: 4.4 MMOL/L — SIGNIFICANT CHANGE UP (ref 3.5–5.3)
POTASSIUM SERPL-SCNC: 4.4 MMOL/L — SIGNIFICANT CHANGE UP (ref 3.5–5.3)
RBC # BLD: 3.29 M/UL — LOW (ref 3.8–5.2)
RBC # FLD: 13.5 % — SIGNIFICANT CHANGE UP (ref 10.3–14.5)
SODIUM SERPL-SCNC: 141 MMOL/L — SIGNIFICANT CHANGE UP (ref 135–145)
WBC # BLD: 5.71 K/UL — SIGNIFICANT CHANGE UP (ref 3.8–10.5)
WBC # FLD AUTO: 5.71 K/UL — SIGNIFICANT CHANGE UP (ref 3.8–10.5)

## 2017-10-24 RX ORDER — CLOPIDOGREL BISULFATE 75 MG/1
75 TABLET, FILM COATED ORAL DAILY
Qty: 0 | Refills: 0 | Status: DISCONTINUED | OUTPATIENT
Start: 2017-10-24 | End: 2017-11-15

## 2017-10-24 RX ORDER — OXYCODONE AND ACETAMINOPHEN 5; 325 MG/1; MG/1
1 TABLET ORAL ONCE
Qty: 0 | Refills: 0 | Status: DISCONTINUED | OUTPATIENT
Start: 2017-10-24 | End: 2017-10-24

## 2017-10-24 RX ORDER — SEVELAMER CARBONATE 2400 MG/1
800 POWDER, FOR SUSPENSION ORAL
Qty: 0 | Refills: 0 | Status: DISCONTINUED | OUTPATIENT
Start: 2017-10-24 | End: 2017-10-29

## 2017-10-24 RX ADMIN — HEPARIN SODIUM 5000 UNIT(S): 5000 INJECTION INTRAVENOUS; SUBCUTANEOUS at 13:20

## 2017-10-24 RX ADMIN — OXYCODONE AND ACETAMINOPHEN 1 TABLET(S): 5; 325 TABLET ORAL at 19:01

## 2017-10-24 RX ADMIN — Medication 81 MILLIGRAM(S): at 11:22

## 2017-10-24 RX ADMIN — ERYTHROPOIETIN 4000 UNIT(S): 10000 INJECTION, SOLUTION INTRAVENOUS; SUBCUTANEOUS at 07:34

## 2017-10-24 RX ADMIN — HEPARIN SODIUM 5000 UNIT(S): 5000 INJECTION INTRAVENOUS; SUBCUTANEOUS at 21:29

## 2017-10-24 RX ADMIN — PANTOPRAZOLE SODIUM 40 MILLIGRAM(S): 20 TABLET, DELAYED RELEASE ORAL at 05:32

## 2017-10-24 RX ADMIN — Medication 100 MILLIGRAM(S): at 18:22

## 2017-10-24 RX ADMIN — SERTRALINE 200 MILLIGRAM(S): 25 TABLET, FILM COATED ORAL at 11:22

## 2017-10-24 RX ADMIN — SEVELAMER CARBONATE 800 MILLIGRAM(S): 2400 POWDER, FOR SUSPENSION ORAL at 18:21

## 2017-10-24 RX ADMIN — OXYCODONE AND ACETAMINOPHEN 1 TABLET(S): 5; 325 TABLET ORAL at 11:22

## 2017-10-24 RX ADMIN — INSULIN GLARGINE 20 UNIT(S): 100 INJECTION, SOLUTION SUBCUTANEOUS at 21:29

## 2017-10-24 RX ADMIN — GABAPENTIN 300 MILLIGRAM(S): 400 CAPSULE ORAL at 21:29

## 2017-10-24 RX ADMIN — OXYCODONE AND ACETAMINOPHEN 1 TABLET(S): 5; 325 TABLET ORAL at 12:22

## 2017-10-24 RX ADMIN — OXYCODONE AND ACETAMINOPHEN 1 TABLET(S): 5; 325 TABLET ORAL at 18:22

## 2017-10-24 RX ADMIN — AMLODIPINE BESYLATE 10 MILLIGRAM(S): 2.5 TABLET ORAL at 11:22

## 2017-10-24 RX ADMIN — CLOPIDOGREL BISULFATE 75 MILLIGRAM(S): 75 TABLET, FILM COATED ORAL at 11:22

## 2017-10-24 RX ADMIN — QUETIAPINE FUMARATE 50 MILLIGRAM(S): 200 TABLET, FILM COATED ORAL at 11:23

## 2017-10-24 RX ADMIN — Medication 20 MILLIGRAM(S): at 18:22

## 2017-10-24 NOTE — PROGRESS NOTE ADULT - SUBJECTIVE AND OBJECTIVE BOX
Resting comfortably in bed.                           9.2    5.71  )-----------( 229      ( 24 Oct 2017 06:45 )             30.2                           9.2    5.71  )-----------( 229      ( 24 Oct 2017 06:45 )             30.2     10-24    141  |  102  |  41<H>  ----------------------------<  89  4.4   |  25  |  6.50<H>    Ca    8.6      24 Oct 2017 06:45  Phos  6.1     10-24  Mg     2.2     10-24        I&O's Detail    24 Oct 2017 07:  -  24 Oct 2017 14:09  --------------------------------------------------------  IN:    Other: 500 mL  Total IN: 500 mL    OUT:    Other: 1500 mL  Total OUT: 1500 mL    Total NET: -1000 mL          I&O's Summary    24 Oct 2017 07:  -  24 Oct 2017 14:10  --------------------------------------------------------  IN: 500 mL / OUT: 1500 mL / NET: -1000 mL        Daily     Daily Weight in k.4 (24 Oct 2017 12:28)    Vital Signs Last 24 Hrs  T(C): 36.8 (24 Oct 2017 12:44), Max: 36.8 (24 Oct 2017 09:00)  T(F): 98.2 (24 Oct 2017 12:44), Max: 98.2 (24 Oct 2017 09:00)  HR: 69 (24 Oct 2017 12:44) (68 - 105)  BP: 127/83 (24 Oct 2017 12:44) (127/83 - 150/51)  BP(mean): --  RR: 18 (24 Oct 2017 12:44) (18 - 18)  SpO2: 100% (24 Oct 2017 12:44) (96% - 100%)      MEDICATIONS  (STANDING):  amLODIPine   Tablet 10 milliGRAM(s) Oral daily  aspirin enteric coated 81 milliGRAM(s) Oral daily  clopidogrel Tablet 75 milliGRAM(s) Oral daily  dextrose 5%. 1000 milliLiter(s) (50 mL/Hr) IV Continuous <Continuous>  dextrose 50% Injectable 12.5 Gram(s) IV Push once  dextrose 50% Injectable 25 Gram(s) IV Push once  dextrose 50% Injectable 25 Gram(s) IV Push once  docusate sodium 100 milliGRAM(s) Oral two times a day  epoetin karla Injectable 4000 Unit(s) IV Push <User Schedule>  furosemide    Tablet 20 milliGRAM(s) Oral two times a day  gabapentin 300 milliGRAM(s) Oral at bedtime  heparin  Injectable 5000 Unit(s) SubCutaneous every 8 hours  influenza   Vaccine 0.5 milliLiter(s) IntraMuscular once  insulin glargine Injectable (LANTUS) 20 Unit(s) SubCutaneous at bedtime  insulin lispro (HumaLOG) corrective regimen sliding scale   SubCutaneous three times a day before meals  insulin lispro (HumaLOG) corrective regimen sliding scale   SubCutaneous at bedtime  pantoprazole    Tablet 40 milliGRAM(s) Oral before breakfast  polyethylene glycol 3350 17 Gram(s) Oral daily  QUEtiapine 50 milliGRAM(s) Oral daily  sertraline 200 milliGRAM(s) Oral daily    MEDICATIONS  (PRN):  acetaminophen   Tablet. 650 milliGRAM(s) Oral every 6 hours PRN mild and Moderate  dextrose Gel 1 Dose(s) Oral once PRN Blood Glucose LESS THAN 70 milliGRAM(s)/deciliter  glucagon  Injectable 1 milliGRAM(s) IntraMuscular once PRN Glucose LESS THAN 70 milligrams/deciliter

## 2017-10-24 NOTE — PROGRESS NOTE ADULT - SUBJECTIVE AND OBJECTIVE BOX
Coverage for Dr. Freitas    CC: no cp/sob    TELEMETRY:     PHYSICAL EXAM:    T(C): 36.7 (10-24-17 @ 11:15), Max: 37.2 (10-23-17 @ 13:11)  HR: 71 (10-24-17 @ 11:15) (68 - 105)  BP: 131/78 (10-24-17 @ 11:15) (129/84 - 150/51)  RR: 18 (10-24-17 @ 11:15) (18 - 18)  SpO2: 100% (10-24-17 @ 11:15) (96% - 100%)  Wt(kg): --  I&O's Summary    24 Oct 2017 07:01  -  24 Oct 2017 12:33  --------------------------------------------------------  IN: 500 mL / OUT: 1500 mL / NET: -1000 mL        Appearance: Normal	  Cardiovascular: Normal S1 S2,RRR, No JVD, No murmurs  Respiratory: Lungs clear to auscultation	  Gastrointestinal:  Soft, Non-tender, + BS	  Extremities: Normal range of motion, No clubbing, cyanosis or edema  Vascular: Peripheral pulses palpable 2+ bilaterally     LABS:	 	                          9.2    5.71  )-----------( 229      ( 24 Oct 2017 06:45 )             30.2     10-24    141  |  102  |  41<H>  ----------------------------<  89  4.4   |  25  |  6.50<H>    Ca    8.6      24 Oct 2017 06:45  Phos  6.1     10-24  Mg     2.2     10-24            CARDIAC MARKERS:

## 2017-10-24 NOTE — DIETITIAN INITIAL EVALUATION ADULT. - PERTINENT LABORATORY DATA
10-24 Na141 mmol/L Glu 89 mg/dL K+ 4.4 mmol/L Cr  6.50 mg/dL<H> BUN 41 mg/dL<H> Phos 6.1 mg/dL<H> Alb n/a   PAB n/a

## 2017-10-24 NOTE — PROGRESS NOTE ADULT - SUBJECTIVE AND OBJECTIVE BOX
Patient is a 52y old  Female who presents with a chief complaint of Flank and epigastric pain (16 Oct 2017 17:53)      INTERVAL HPI/OVERNIGHT EVENTS:  T(C): 36.8 (10-24-17 @ 12:44), Max: 36.8 (10-24-17 @ 09:00)  HR: 69 (10-24-17 @ 12:44) (68 - 105)  BP: 127/83 (10-24-17 @ 12:44) (127/83 - 150/51)  RR: 18 (10-24-17 @ 12:44) (18 - 18)  SpO2: 100% (10-24-17 @ 12:44) (96% - 100%)  Wt(kg): --  I&O's Summary    24 Oct 2017 07:01  -  24 Oct 2017 13:40  --------------------------------------------------------  IN: 500 mL / OUT: 1500 mL / NET: -1000 mL        LABS:                        9.2    5.71  )-----------( 229      ( 24 Oct 2017 06:45 )             30.2     10-24    141  |  102  |  41<H>  ----------------------------<  89  4.4   |  25  |  6.50<H>    Ca    8.6      24 Oct 2017 06:45  Phos  6.1     10-24  Mg     2.2     10-24          CAPILLARY BLOOD GLUCOSE  126 (23 Oct 2017 22:24)  103 (23 Oct 2017 18:59)      POCT Blood Glucose.: 94 mg/dL (24 Oct 2017 11:56)  POCT Blood Glucose.: 82 mg/dL (24 Oct 2017 08:08)  POCT Blood Glucose.: 76 mg/dL (24 Oct 2017 05:14)  POCT Blood Glucose.: 103 mg/dL (23 Oct 2017 18:51)  POCT Blood Glucose.: 69 mg/dL (23 Oct 2017 18:23)  POCT Blood Glucose.: 67 mg/dL (23 Oct 2017 18:22)            MEDICATIONS  (STANDING):  amLODIPine   Tablet 10 milliGRAM(s) Oral daily  aspirin enteric coated 81 milliGRAM(s) Oral daily  clopidogrel Tablet 75 milliGRAM(s) Oral daily  dextrose 5%. 1000 milliLiter(s) (50 mL/Hr) IV Continuous <Continuous>  dextrose 50% Injectable 12.5 Gram(s) IV Push once  dextrose 50% Injectable 25 Gram(s) IV Push once  dextrose 50% Injectable 25 Gram(s) IV Push once  docusate sodium 100 milliGRAM(s) Oral two times a day  epoetin karla Injectable 4000 Unit(s) IV Push <User Schedule>  furosemide    Tablet 20 milliGRAM(s) Oral two times a day  gabapentin 300 milliGRAM(s) Oral at bedtime  heparin  Injectable 5000 Unit(s) SubCutaneous every 8 hours  influenza   Vaccine 0.5 milliLiter(s) IntraMuscular once  insulin glargine Injectable (LANTUS) 20 Unit(s) SubCutaneous at bedtime  insulin lispro (HumaLOG) corrective regimen sliding scale   SubCutaneous three times a day before meals  insulin lispro (HumaLOG) corrective regimen sliding scale   SubCutaneous at bedtime  pantoprazole    Tablet 40 milliGRAM(s) Oral before breakfast  polyethylene glycol 3350 17 Gram(s) Oral daily  QUEtiapine 50 milliGRAM(s) Oral daily  sertraline 200 milliGRAM(s) Oral daily    MEDICATIONS  (PRN):  acetaminophen   Tablet. 650 milliGRAM(s) Oral every 6 hours PRN mild and Moderate  dextrose Gel 1 Dose(s) Oral once PRN Blood Glucose LESS THAN 70 milliGRAM(s)/deciliter  glucagon  Injectable 1 milliGRAM(s) IntraMuscular once PRN Glucose LESS THAN 70 milligrams/deciliter          PHYSICAL EXAM:  GENERAL: NAD, well-groomed, well-developed  HEAD:  Atraumatic, Normocephalic  CHEST/LUNG: Clear to percussion bilaterally; No rales, rhonchi, wheezing, or rubs  HEART: Regular rate and rhythm; No murmurs, rubs, or gallops  ABDOMEN: Soft, Nontender, Nondistended; Bowel sounds present  EXTREMITIES:  2+ Peripheral Pulses, No clubbing, cyanosis, or edema  LYMPH: No lymphadenopathy noted  SKIN: No rashes or lesions    Care Discussed with Consultants/Other Providers [ ] YES  [ ] NO

## 2017-10-24 NOTE — PROGRESS NOTE ADULT - ASSESSMENT
1) Epigastric pain - T wave inversions, nuclear stress test shows no ischemia EF 47%,  cont asa    2) ESRD - s/p shiley permacath postponed sec to pt was on plavix, permacath placement on monday, S/P AVF    3) h/o CVA - cont asa and zocor      DC today

## 2017-10-24 NOTE — PROGRESS NOTE ADULT - ASSESSMENT
52F w/ HTN, DM2, CAD, and CKD5, 10/16/17 a/w vomiting/epigastric pain/flank pain    (1)Renal - newly ESRD-HD as of this admission.    (2)Vascular - s/p left radiocephalic AVF creation.     (3)Hyperphosphatemia- rising      RECOMMEND:  (1)HD tomorrow  (2)awaiting permacath  (3)start renagel 800 mg po TID with meals  (4) d/c planning with outpatient HD

## 2017-10-24 NOTE — DIETITIAN INITIAL EVALUATION ADULT. - OTHER INFO
Initial Dietitian Evaluation 2/2 to extended length of stay. Patient reports good PO intake at present and prior to admission. No GI distress (nausea/vomiting/diarrhea/constipation.) Pt reports difficulties swallowing, but she is not able to discuss further.  Softer foods offered, but Pt declined.  Would suggest speech and swallow evaluation.  PO intake encouraged.  Extensive diet educations is not appropriate at this time given Pt's limited participation in interview. Pt maintained eyes closed  and appears sleepy.  Pt made aware that RDN remains available.

## 2017-10-24 NOTE — DIETITIAN INITIAL EVALUATION ADULT. - ADHERENCE
Pt reports good adherence to recommended diet modifications prior to admission, but she is not able to verbalize.  Pt maintained eyes closed throughout visit./good

## 2017-10-24 NOTE — DIETITIAN INITIAL EVALUATION ADULT. - DIET TYPE
dysphagia 1, pureed, nectar consistency fluid/consistent carbohydrate (no snacks)/DASH/TLC (sodium and cholesterol restricted diet)

## 2017-10-25 LAB
BUN SERPL-MCNC: 32 MG/DL — HIGH (ref 7–23)
CALCIUM SERPL-MCNC: 8.7 MG/DL — SIGNIFICANT CHANGE UP (ref 8.4–10.5)
CHLORIDE SERPL-SCNC: 100 MMOL/L — SIGNIFICANT CHANGE UP (ref 98–107)
CO2 SERPL-SCNC: 24 MMOL/L — SIGNIFICANT CHANGE UP (ref 22–31)
CREAT SERPL-MCNC: 5.96 MG/DL — HIGH (ref 0.5–1.3)
GLUCOSE SERPL-MCNC: 84 MG/DL — SIGNIFICANT CHANGE UP (ref 70–99)
HCT VFR BLD CALC: 32.8 % — LOW (ref 34.5–45)
HGB BLD-MCNC: 10 G/DL — LOW (ref 11.5–15.5)
MAGNESIUM SERPL-MCNC: 2.1 MG/DL — SIGNIFICANT CHANGE UP (ref 1.6–2.6)
MCHC RBC-ENTMCNC: 28.2 PG — SIGNIFICANT CHANGE UP (ref 27–34)
MCHC RBC-ENTMCNC: 30.5 % — LOW (ref 32–36)
MCV RBC AUTO: 92.7 FL — SIGNIFICANT CHANGE UP (ref 80–100)
NRBC # FLD: 0 — SIGNIFICANT CHANGE UP
PHOSPHATE SERPL-MCNC: 5.2 MG/DL — HIGH (ref 2.5–4.5)
PLATELET # BLD AUTO: 207 K/UL — SIGNIFICANT CHANGE UP (ref 150–400)
PMV BLD: 11.3 FL — SIGNIFICANT CHANGE UP (ref 7–13)
POTASSIUM SERPL-MCNC: 4.8 MMOL/L — SIGNIFICANT CHANGE UP (ref 3.5–5.3)
POTASSIUM SERPL-SCNC: 4.8 MMOL/L — SIGNIFICANT CHANGE UP (ref 3.5–5.3)
RBC # BLD: 3.54 M/UL — LOW (ref 3.8–5.2)
RBC # FLD: 13.2 % — SIGNIFICANT CHANGE UP (ref 10.3–14.5)
SODIUM SERPL-SCNC: 138 MMOL/L — SIGNIFICANT CHANGE UP (ref 135–145)
WBC # BLD: 7.99 K/UL — SIGNIFICANT CHANGE UP (ref 3.8–10.5)
WBC # FLD AUTO: 7.99 K/UL — SIGNIFICANT CHANGE UP (ref 3.8–10.5)

## 2017-10-25 RX ORDER — OXYCODONE AND ACETAMINOPHEN 5; 325 MG/1; MG/1
1 TABLET ORAL ONCE
Qty: 0 | Refills: 0 | Status: DISCONTINUED | OUTPATIENT
Start: 2017-10-25 | End: 2017-10-25

## 2017-10-25 RX ADMIN — INSULIN GLARGINE 20 UNIT(S): 100 INJECTION, SOLUTION SUBCUTANEOUS at 21:38

## 2017-10-25 RX ADMIN — HEPARIN SODIUM 5000 UNIT(S): 5000 INJECTION INTRAVENOUS; SUBCUTANEOUS at 15:07

## 2017-10-25 RX ADMIN — Medication 100 MILLIGRAM(S): at 17:55

## 2017-10-25 RX ADMIN — OXYCODONE AND ACETAMINOPHEN 1 TABLET(S): 5; 325 TABLET ORAL at 01:45

## 2017-10-25 RX ADMIN — SERTRALINE 200 MILLIGRAM(S): 25 TABLET, FILM COATED ORAL at 17:55

## 2017-10-25 RX ADMIN — HEPARIN SODIUM 5000 UNIT(S): 5000 INJECTION INTRAVENOUS; SUBCUTANEOUS at 21:38

## 2017-10-25 RX ADMIN — SEVELAMER CARBONATE 800 MILLIGRAM(S): 2400 POWDER, FOR SUSPENSION ORAL at 09:17

## 2017-10-25 RX ADMIN — Medication 1: at 17:55

## 2017-10-25 RX ADMIN — Medication 81 MILLIGRAM(S): at 15:07

## 2017-10-25 RX ADMIN — CLOPIDOGREL BISULFATE 75 MILLIGRAM(S): 75 TABLET, FILM COATED ORAL at 15:07

## 2017-10-25 RX ADMIN — GABAPENTIN 300 MILLIGRAM(S): 400 CAPSULE ORAL at 21:38

## 2017-10-25 RX ADMIN — Medication 100 MILLIGRAM(S): at 06:18

## 2017-10-25 RX ADMIN — OXYCODONE AND ACETAMINOPHEN 1 TABLET(S): 5; 325 TABLET ORAL at 00:42

## 2017-10-25 RX ADMIN — AMLODIPINE BESYLATE 10 MILLIGRAM(S): 2.5 TABLET ORAL at 15:07

## 2017-10-25 RX ADMIN — PANTOPRAZOLE SODIUM 40 MILLIGRAM(S): 20 TABLET, DELAYED RELEASE ORAL at 06:18

## 2017-10-25 RX ADMIN — Medication 20 MILLIGRAM(S): at 06:18

## 2017-10-25 RX ADMIN — SEVELAMER CARBONATE 800 MILLIGRAM(S): 2400 POWDER, FOR SUSPENSION ORAL at 15:07

## 2017-10-25 RX ADMIN — HEPARIN SODIUM 5000 UNIT(S): 5000 INJECTION INTRAVENOUS; SUBCUTANEOUS at 06:18

## 2017-10-25 RX ADMIN — SEVELAMER CARBONATE 800 MILLIGRAM(S): 2400 POWDER, FOR SUSPENSION ORAL at 17:55

## 2017-10-25 RX ADMIN — OXYCODONE AND ACETAMINOPHEN 1 TABLET(S): 5; 325 TABLET ORAL at 11:38

## 2017-10-25 RX ADMIN — QUETIAPINE FUMARATE 50 MILLIGRAM(S): 200 TABLET, FILM COATED ORAL at 15:07

## 2017-10-25 RX ADMIN — Medication 20 MILLIGRAM(S): at 17:55

## 2017-10-25 RX ADMIN — OXYCODONE AND ACETAMINOPHEN 1 TABLET(S): 5; 325 TABLET ORAL at 11:14

## 2017-10-25 NOTE — PROGRESS NOTE ADULT - ATTENDING COMMENTS
Patient seen and examined, agree with the above assessment and plan by BOB Calvo.  CV status remains stable  DC planning

## 2017-10-25 NOTE — PROGRESS NOTE ADULT - ASSESSMENT
52F w/ HTN, DM2, DCHF, CAD, and CKD5, 10/16/17 a/w vomiting/epigastric pain/flank pain      1) atypical chest pain   no chest pain or sob   nuclear stress test shows no ischemia EF 47%   cont asa    2) ESRD  new HD   renal f.u   S/P AVF    3) h/o CVA   cont asa/ plavix / zocor    4) DCHF  volume status stable   fluid removal with HD/ lasix     DC today,  pending HD set up

## 2017-10-25 NOTE — PROGRESS NOTE ADULT - SUBJECTIVE AND OBJECTIVE BOX
No pain, no shortness of breath      VITAL:  T(C): , Max: 37 (10-24-17 @ 20:00)  T(F): , Max: 98.6 (10-24-17 @ 20:00)  HR: 72 (10-25-17 @ 05:48)  BP: 125/81 (10-25-17 @ 05:48)  BP(mean): --  RR: 18 (10-25-17 @ 05:48)  SpO2: 98% (10-25-17 @ 05:48)      PHYSICAL EXAM:  Constitutional: Obese, NAD, Alert  HEENT: NCAT, DMM  Neck: Supple, No JVD  Respiratory: CTA-b/l  Cardiovascular: RRR s1s2, no m/r/g  Gastrointestinal: BS+, soft, NT/ND  Extremities: 1+ b/l LE edema  Neurological: no focal deficits; strength grossly intact; no asterixis  Psychiatric: Normal mood, normal affect  Back: no CVAT b/l  Skin: No rashes, no nevi  Access: GLENN puri; DAVID BAZZIF (+)thrill      LABS:                        10.0   7.99  )-----------( 207      ( 25 Oct 2017 06:15 )             32.8     Na(138)/K(4.8)/Cl(100)/HCO3(24)/BUN(32)/Cr(5.96)Glu(84)/Ca(8.7)/Mg(2.1)/PO4(5.2)    10-25 @ 06:15  Na(141)/K(4.4)/Cl(102)/HCO3(25)/BUN(41)/Cr(6.50)Glu(89)/Ca(8.6)/Mg(2.2)/PO4(6.1)    10-24 @ 06:45  Na(143)/K(4.3)/Cl(105)/HCO3(25)/BUN(38)/Cr(6.13)Glu(61)/Ca(8.9)/Mg(2.1)/PO4(5.1)    10-23 @ 05:30      IMPRESSION: 52F w/ HTN, DM2, CAD, and CKD5, 10/16/17 a/w vomiting/epigastric pain/flank pain    (1)Renal - newly ESRD-HD as of this admission; last dialyzed yesterday - can plan for next HD tomorrow    (2)Vascular - s/p left radiocephalic AVF creation by Dr. Radha Capps last week. Also with nontunneled HD catheter. Needs conversion to tunneled cath by IR    (3)SW - awaiting acceptance into outpatient HD      RECOMMEND:  (1)Next HD tomorrow  (2)Conversion of catheter from tunneled to nontunneled by IR - today if possible  (3)F/U with SW regarding outpatient HD                Wale Francisco MD  [facility]  [phone] No pain, no shortness of breath. S/p tunneled catheter placement yesterday      VITAL:  T(C): , Max: 37 (10-24-17 @ 20:00)  T(F): , Max: 98.6 (10-24-17 @ 20:00)  HR: 72 (10-25-17 @ 05:48)  BP: 125/81 (10-25-17 @ 05:48)  BP(mean): --  RR: 18 (10-25-17 @ 05:48)  SpO2: 98% (10-25-17 @ 05:48)      PHYSICAL EXAM:  Constitutional: Obese, NAD, Alert  HEENT: NCAT, DMM  Neck: Supple, No JVD  Respiratory: CTA-b/l  Cardiovascular: RRR s1s2, no m/r/g  Gastrointestinal: BS+, soft, NT/ND  Extremities: 1+ b/l LE edema  Neurological: no focal deficits; strength grossly intact; no asterixis  Psychiatric: Normal mood, normal affect  Back: no CVAT b/l  Skin: No rashes, no nevi  Access: RIJ tunneled cath; LUE AVF (+)thrill      LABS:                        10.0   7.99  )-----------( 207      ( 25 Oct 2017 06:15 )             32.8     Na(138)/K(4.8)/Cl(100)/HCO3(24)/BUN(32)/Cr(5.96)Glu(84)/Ca(8.7)/Mg(2.1)/PO4(5.2)    10-25 @ 06:15  Na(141)/K(4.4)/Cl(102)/HCO3(25)/BUN(41)/Cr(6.50)Glu(89)/Ca(8.6)/Mg(2.2)/PO4(6.1)    10-24 @ 06:45  Na(143)/K(4.3)/Cl(105)/HCO3(25)/BUN(38)/Cr(6.13)Glu(61)/Ca(8.9)/Mg(2.1)/PO4(5.1)    10-23 @ 05:30      IMPRESSION: 52F w/ HTN, DM2, CAD, and CKD5, 10/16/17 a/w vomiting/epigastric pain/flank pain    (1)Renal - newly ESRD-HD as of this admission; last dialyzed yesterday - can plan for next HD tomorrow    (2)Vascular - s/p left radiocephalic AVF creation by Dr. Radha Capps last week. S/P tunneled cath placement yesterday by IR    (3)SW - awaiting acceptance into outpatient HD      RECOMMEND:  (1)Next HD tomorrow if still admitted  (2)F/U with SW regarding outpatient HD                Wale Francisco MD  [facility]  [phone]

## 2017-10-25 NOTE — PROGRESS NOTE ADULT - SUBJECTIVE AND OBJECTIVE BOX
CARDIOLOGY FOLLOW UP - Dr. Andrew ( Coverage for Dr. Freitas )    CC no chest pain or sob        PHYSICAL EXAM:  T(C): 36.6 (10-25-17 @ 14:34), Max: 37 (10-24-17 @ 20:00)  HR: 64 (10-25-17 @ 14:34) (64 - 82)  BP: 119/74 (10-25-17 @ 14:34) (118/59 - 125/81)  RR: 18 (10-25-17 @ 14:34) (18 - 18)  SpO2: 99% (10-25-17 @ 14:34) (98% - 99%)  Wt(kg): --  I&O's Summary    24 Oct 2017 07:01  -  25 Oct 2017 07:00  --------------------------------------------------------  IN: 500 mL / OUT: 1500 mL / NET: -1000 mL    25 Oct 2017 07:01  -  25 Oct 2017 17:12  --------------------------------------------------------  IN: 250 mL / OUT: 1000 mL / NET: -750 mL        Appearance: Normal	  Cardiovascular: Normal S1 S2,RRR, No JVD, No murmurs  Respiratory: Lungs clear to auscultation	  Gastrointestinal:  Soft, Non-tender, + BS	  Extremities: Normal range of motion, No clubbing, cyanosis or edema        MEDICATIONS  (STANDING):  amLODIPine   Tablet 10 milliGRAM(s) Oral daily  aspirin enteric coated 81 milliGRAM(s) Oral daily  clopidogrel Tablet 75 milliGRAM(s) Oral daily  dextrose 5%. 1000 milliLiter(s) (50 mL/Hr) IV Continuous <Continuous>  dextrose 50% Injectable 12.5 Gram(s) IV Push once  dextrose 50% Injectable 25 Gram(s) IV Push once  dextrose 50% Injectable 25 Gram(s) IV Push once  docusate sodium 100 milliGRAM(s) Oral two times a day  epoetin karla Injectable 4000 Unit(s) IV Push <User Schedule>  furosemide    Tablet 20 milliGRAM(s) Oral two times a day  gabapentin 300 milliGRAM(s) Oral at bedtime  heparin  Injectable 5000 Unit(s) SubCutaneous every 8 hours  influenza   Vaccine 0.5 milliLiter(s) IntraMuscular once  insulin glargine Injectable (LANTUS) 20 Unit(s) SubCutaneous at bedtime  insulin lispro (HumaLOG) corrective regimen sliding scale   SubCutaneous three times a day before meals  insulin lispro (HumaLOG) corrective regimen sliding scale   SubCutaneous at bedtime  pantoprazole    Tablet 40 milliGRAM(s) Oral before breakfast  polyethylene glycol 3350 17 Gram(s) Oral daily  QUEtiapine 50 milliGRAM(s) Oral daily  sertraline 200 milliGRAM(s) Oral daily  sevelamer hydrochloride 800 milliGRAM(s) Oral three times a day with meals      TELEMETRY: 	    ECG:  	  RADIOLOGY:   DIAGNOSTIC TESTING:  [ ] Echocardiogram:  [ ]  Catheterization:  [ ] Stress Test:    OTHER: 	    LABS:	 	                                10.0   7.99  )-----------( 207      ( 25 Oct 2017 06:15 )             32.8     10-25    138  |  100  |  32<H>  ----------------------------<  84  4.8   |  24  |  5.96<H>    Ca    8.7      25 Oct 2017 06:15  Phos  5.2     10-25  Mg     2.1     10-25

## 2017-10-25 NOTE — PROGRESS NOTE ADULT - SUBJECTIVE AND OBJECTIVE BOX
Patient is a 52y old  Female who presents with a chief complaint of Flank and epigastric pain (16 Oct 2017 17:53)      INTERVAL HPI/OVERNIGHT EVENTS:  T(C): 36.8 (10-25-17 @ 05:48), Max: 37 (10-24-17 @ 20:00)  HR: 72 (10-25-17 @ 05:48) (72 - 82)  BP: 125/81 (10-25-17 @ 05:48) (118/59 - 125/81)  RR: 18 (10-25-17 @ 05:48) (18 - 18)  SpO2: 98% (10-25-17 @ 05:48) (98% - 98%)  Wt(kg): --  I&O's Summary    24 Oct 2017 07:01  -  25 Oct 2017 07:00  --------------------------------------------------------  IN: 500 mL / OUT: 1500 mL / NET: -1000 mL        LABS:                        10.0   7.99  )-----------( 207      ( 25 Oct 2017 06:15 )             32.8     10-25    138  |  100  |  32<H>  ----------------------------<  84  4.8   |  24  |  5.96<H>    Ca    8.7      25 Oct 2017 06:15  Phos  5.2     10-25  Mg     2.1     10-25          CAPILLARY BLOOD GLUCOSE      POCT Blood Glucose.: 112 mg/dL (25 Oct 2017 11:53)  POCT Blood Glucose.: 83 mg/dL (25 Oct 2017 08:28)  POCT Blood Glucose.: 161 mg/dL (24 Oct 2017 21:25)  POCT Blood Glucose.: 76 mg/dL (24 Oct 2017 16:32)            MEDICATIONS  (STANDING):  amLODIPine   Tablet 10 milliGRAM(s) Oral daily  aspirin enteric coated 81 milliGRAM(s) Oral daily  clopidogrel Tablet 75 milliGRAM(s) Oral daily  dextrose 5%. 1000 milliLiter(s) (50 mL/Hr) IV Continuous <Continuous>  dextrose 50% Injectable 12.5 Gram(s) IV Push once  dextrose 50% Injectable 25 Gram(s) IV Push once  dextrose 50% Injectable 25 Gram(s) IV Push once  docusate sodium 100 milliGRAM(s) Oral two times a day  epoetin karla Injectable 4000 Unit(s) IV Push <User Schedule>  furosemide    Tablet 20 milliGRAM(s) Oral two times a day  gabapentin 300 milliGRAM(s) Oral at bedtime  heparin  Injectable 5000 Unit(s) SubCutaneous every 8 hours  influenza   Vaccine 0.5 milliLiter(s) IntraMuscular once  insulin glargine Injectable (LANTUS) 20 Unit(s) SubCutaneous at bedtime  insulin lispro (HumaLOG) corrective regimen sliding scale   SubCutaneous three times a day before meals  insulin lispro (HumaLOG) corrective regimen sliding scale   SubCutaneous at bedtime  pantoprazole    Tablet 40 milliGRAM(s) Oral before breakfast  polyethylene glycol 3350 17 Gram(s) Oral daily  QUEtiapine 50 milliGRAM(s) Oral daily  sertraline 200 milliGRAM(s) Oral daily  sevelamer hydrochloride 800 milliGRAM(s) Oral three times a day with meals    MEDICATIONS  (PRN):  acetaminophen   Tablet. 650 milliGRAM(s) Oral every 6 hours PRN mild and Moderate  dextrose Gel 1 Dose(s) Oral once PRN Blood Glucose LESS THAN 70 milliGRAM(s)/deciliter  glucagon  Injectable 1 milliGRAM(s) IntraMuscular once PRN Glucose LESS THAN 70 milligrams/deciliter          PHYSICAL EXAM:  GENERAL: NAD, well-groomed, well-developed  HEAD:  Atraumatic, Normocephalic  CHEST/LUNG: Clear to percussion bilaterally; No rales, rhonchi, wheezing, or rubs  HEART: Regular rate and rhythm; No murmurs, rubs, or gallops  ABDOMEN: Soft, Nontender, Nondistended; Bowel sounds present  EXTREMITIES:  2+ Peripheral Pulses, No clubbing, cyanosis, or edema  LYMPH: No lymphadenopathy noted  SKIN: No rashes or lesions    Care Discussed with Consultants/Other Providers [ ] YES  [ ] NO

## 2017-10-25 NOTE — PROGRESS NOTE ADULT - ASSESSMENT
Problem/Plan - 1:  ·  Problem: Atypical chest pain.  Plan: tele monitor   management as per cards    Problem/Plan - 2:  ·  Problem: ESRD (end stage renal disease).  Plan: Monitor electrolytes  renal fu   sp avf   Problem/Plan - 3:  ·  Problem: HTN (hypertension).  Plan: Monitor BP daily  DASH diet  Continue  bp meds.     Problem/Plan - 4:  ·  Problem: DM (diabetes mellitus).  Plan: Daily glucose monitoring  insulin sliding scale  DASH 1800 ADA diet with 1500 cc Fluid restriction  continue lantus.     Problem/Plan - 5:  ·  Problem: Diastolic heart failure.  Plan: tele monitor  Strict I&Os plus Daily weights.    dc planning per primary team

## 2017-10-26 LAB
BUN SERPL-MCNC: 44 MG/DL — HIGH (ref 7–23)
CALCIUM SERPL-MCNC: 8.8 MG/DL — SIGNIFICANT CHANGE UP (ref 8.4–10.5)
CHLORIDE SERPL-SCNC: 97 MMOL/L — LOW (ref 98–107)
CO2 SERPL-SCNC: 24 MMOL/L — SIGNIFICANT CHANGE UP (ref 22–31)
CREAT SERPL-MCNC: 6.74 MG/DL — HIGH (ref 0.5–1.3)
GLUCOSE SERPL-MCNC: 196 MG/DL — HIGH (ref 70–99)
HCT VFR BLD CALC: 33.3 % — LOW (ref 34.5–45)
HGB BLD-MCNC: 10.2 G/DL — LOW (ref 11.5–15.5)
MAGNESIUM SERPL-MCNC: 2.3 MG/DL — SIGNIFICANT CHANGE UP (ref 1.6–2.6)
MCHC RBC-ENTMCNC: 27.8 PG — SIGNIFICANT CHANGE UP (ref 27–34)
MCHC RBC-ENTMCNC: 30.6 % — LOW (ref 32–36)
MCV RBC AUTO: 90.7 FL — SIGNIFICANT CHANGE UP (ref 80–100)
NRBC # FLD: 0 — SIGNIFICANT CHANGE UP
PHOSPHATE SERPL-MCNC: 5.5 MG/DL — HIGH (ref 2.5–4.5)
PLATELET # BLD AUTO: 215 K/UL — SIGNIFICANT CHANGE UP (ref 150–400)
PMV BLD: 11.4 FL — SIGNIFICANT CHANGE UP (ref 7–13)
POTASSIUM SERPL-MCNC: 4.8 MMOL/L — SIGNIFICANT CHANGE UP (ref 3.5–5.3)
POTASSIUM SERPL-SCNC: 4.8 MMOL/L — SIGNIFICANT CHANGE UP (ref 3.5–5.3)
RBC # BLD: 3.67 M/UL — LOW (ref 3.8–5.2)
RBC # FLD: 13 % — SIGNIFICANT CHANGE UP (ref 10.3–14.5)
SODIUM SERPL-SCNC: 135 MMOL/L — SIGNIFICANT CHANGE UP (ref 135–145)
WBC # BLD: 7.47 K/UL — SIGNIFICANT CHANGE UP (ref 3.8–10.5)
WBC # FLD AUTO: 7.47 K/UL — SIGNIFICANT CHANGE UP (ref 3.8–10.5)

## 2017-10-26 RX ADMIN — PANTOPRAZOLE SODIUM 40 MILLIGRAM(S): 20 TABLET, DELAYED RELEASE ORAL at 05:58

## 2017-10-26 RX ADMIN — ERYTHROPOIETIN 4000 UNIT(S): 10000 INJECTION, SOLUTION INTRAVENOUS; SUBCUTANEOUS at 13:50

## 2017-10-26 RX ADMIN — Medication 650 MILLIGRAM(S): at 11:00

## 2017-10-26 RX ADMIN — Medication 1: at 22:01

## 2017-10-26 RX ADMIN — HEPARIN SODIUM 5000 UNIT(S): 5000 INJECTION INTRAVENOUS; SUBCUTANEOUS at 05:58

## 2017-10-26 RX ADMIN — AMLODIPINE BESYLATE 10 MILLIGRAM(S): 2.5 TABLET ORAL at 05:58

## 2017-10-26 RX ADMIN — QUETIAPINE FUMARATE 50 MILLIGRAM(S): 200 TABLET, FILM COATED ORAL at 17:49

## 2017-10-26 RX ADMIN — SERTRALINE 200 MILLIGRAM(S): 25 TABLET, FILM COATED ORAL at 17:49

## 2017-10-26 RX ADMIN — SEVELAMER CARBONATE 800 MILLIGRAM(S): 2400 POWDER, FOR SUSPENSION ORAL at 17:49

## 2017-10-26 RX ADMIN — INSULIN GLARGINE 20 UNIT(S): 100 INJECTION, SOLUTION SUBCUTANEOUS at 22:01

## 2017-10-26 RX ADMIN — Medication 20 MILLIGRAM(S): at 17:49

## 2017-10-26 RX ADMIN — SEVELAMER CARBONATE 800 MILLIGRAM(S): 2400 POWDER, FOR SUSPENSION ORAL at 10:07

## 2017-10-26 RX ADMIN — Medication 100 MILLIGRAM(S): at 17:49

## 2017-10-26 RX ADMIN — Medication 20 MILLIGRAM(S): at 05:58

## 2017-10-26 RX ADMIN — HEPARIN SODIUM 5000 UNIT(S): 5000 INJECTION INTRAVENOUS; SUBCUTANEOUS at 22:01

## 2017-10-26 RX ADMIN — Medication 100 MILLIGRAM(S): at 05:58

## 2017-10-26 RX ADMIN — Medication 1: at 17:48

## 2017-10-26 RX ADMIN — GABAPENTIN 300 MILLIGRAM(S): 400 CAPSULE ORAL at 22:01

## 2017-10-26 RX ADMIN — Medication 650 MILLIGRAM(S): at 10:06

## 2017-10-26 NOTE — PROGRESS NOTE ADULT - ASSESSMENT
52F w/ HTN, DM2, DCHF, CAD, and CKD5, 10/16/17 a/w vomiting/epigastric pain/flank pain      1) atypical chest pain   no chest pain or sob   nuclear stress test shows no ischemia EF 47%   cont asa    2) ESRD  new HD   renal f.u   S/P AVF    3) h/o CVA   cont asa/ plavix / zocor    4) DCHF  volume status stable   fluid removal with HD/ lasix     DC,  pending HD set up

## 2017-10-26 NOTE — PROGRESS NOTE ADULT - SUBJECTIVE AND OBJECTIVE BOX
No pain, no shortness of breath      VITAL:  T(C): , Max: 36.9 (10-25-17 @ 20:54)  T(F): , Max: 98.4 (10-25-17 @ 20:54)  HR: 75 (10-26-17 @ 05:55)  BP: 119/72 (10-26-17 @ 05:55)  BP(mean): --  RR: 17 (10-26-17 @ 05:55)  SpO2: 98% (10-26-17 @ 05:55)      PHYSICAL EXAM:  Constitutional: Obese, NAD, Alert  HEENT: NCAT, DMM  Neck: Supple, No JVD  Respiratory: CTA-b/l  Cardiovascular: RRR s1s2, no m/r/g  Gastrointestinal: BS+, soft, NT/ND  Extremities: 1+ b/l LE edema  Neurological: no focal deficits; strength grossly intact; no asterixis  Psychiatric: Normal mood, normal affect  Back: no CVAT b/l  Skin: No rashes, no nevi  Access: RIJ tunneled cath; LUE AVF (+)thrill      LABS:                        10.2   7.47  )-----------( 215      ( 26 Oct 2017 06:34 )             33.3     Na(135)/K(4.8)/Cl(97)/HCO3(24)/BUN(44)/Cr(6.74)Glu(196)/Ca(8.8)/Mg(2.3)/PO4(5.5)    10-26 @ 06:34  Na(138)/K(4.8)/Cl(100)/HCO3(24)/BUN(32)/Cr(5.96)Glu(84)/Ca(8.7)/Mg(2.1)/PO4(5.2)    10-25 @ 06:15  Na(141)/K(4.4)/Cl(102)/HCO3(25)/BUN(41)/Cr(6.50)Glu(89)/Ca(8.6)/Mg(2.2)/PO4(6.1)    10-24 @ 06:45      IMPRESSION: 52F w/ HTN, DM2, CAD, and CKD5, 10/16/17 a/w vomiting/epigastric pain/flank pain    (1)Renal - newly ESRD-HD as of this admission; last dialyzed yesterday - can plan for next HD tomorrow    (2)Vascular - s/p left radiocephalic AVF creation by Dr. Radha Capps last week. S/P tunneled cath placement yesterday by IR. Patient asks if what she is allowed to wash and what she cannot wash    (3)SW - awaiting acceptance into outpatient HD/homeless shelter      RECOMMEND:  (1)Next HD tomorrow if still admitted  (2)F/U with SW regarding outpatient HD/living arrangements  (3)Counseled patient that it is okay to wash (but not scrub) AVF site; not okay to get permacath exit site wet.                Wale Francisco MD  [facility]  [phone]

## 2017-10-26 NOTE — PROGRESS NOTE ADULT - SUBJECTIVE AND OBJECTIVE BOX
Patient is a 52y old  Female who presents with a chief complaint of Flank and epigastric pain (16 Oct 2017 17:53)  walking around the hallway, nad      INTERVAL HPI/OVERNIGHT EVENTS:  T(C): 36.6 (10-26-17 @ 11:55), Max: 36.9 (10-25-17 @ 20:54)  HR: 77 (10-26-17 @ 11:55) (75 - 79)  BP: 127/77 (10-26-17 @ 11:55) (119/72 - 127/77)  RR: 16 (10-26-17 @ 11:55) (16 - 18)  SpO2: 98% (10-26-17 @ 05:55) (98% - 100%)  Wt(kg): --  I&O's Summary    25 Oct 2017 07:01  -  26 Oct 2017 07:00  --------------------------------------------------------  IN: 250 mL / OUT: 1500 mL / NET: -1250 mL        LABS:                        10.2   7.47  )-----------( 215      ( 26 Oct 2017 06:34 )             33.3     10-26    135  |  97<L>  |  44<H>  ----------------------------<  196<H>  4.8   |  24  |  6.74<H>    Ca    8.8      26 Oct 2017 06:34  Phos  5.5     10-26  Mg     2.3     10-26          CAPILLARY BLOOD GLUCOSE  149 (25 Oct 2017 21:42)  164 (25 Oct 2017 17:30)      POCT Blood Glucose.: 123 mg/dL (26 Oct 2017 12:36)  POCT Blood Glucose.: 140 mg/dL (26 Oct 2017 08:34)  POCT Blood Glucose.: 149 mg/dL (25 Oct 2017 21:35)  POCT Blood Glucose.: 164 mg/dL (25 Oct 2017 17:00)            MEDICATIONS  (STANDING):  amLODIPine   Tablet 10 milliGRAM(s) Oral daily  aspirin enteric coated 81 milliGRAM(s) Oral daily  clopidogrel Tablet 75 milliGRAM(s) Oral daily  dextrose 5%. 1000 milliLiter(s) (50 mL/Hr) IV Continuous <Continuous>  dextrose 50% Injectable 12.5 Gram(s) IV Push once  dextrose 50% Injectable 25 Gram(s) IV Push once  dextrose 50% Injectable 25 Gram(s) IV Push once  docusate sodium 100 milliGRAM(s) Oral two times a day  epoetin karla Injectable 4000 Unit(s) IV Push <User Schedule>  furosemide    Tablet 20 milliGRAM(s) Oral two times a day  gabapentin 300 milliGRAM(s) Oral at bedtime  heparin  Injectable 5000 Unit(s) SubCutaneous every 8 hours  influenza   Vaccine 0.5 milliLiter(s) IntraMuscular once  insulin glargine Injectable (LANTUS) 20 Unit(s) SubCutaneous at bedtime  insulin lispro (HumaLOG) corrective regimen sliding scale   SubCutaneous three times a day before meals  insulin lispro (HumaLOG) corrective regimen sliding scale   SubCutaneous at bedtime  pantoprazole    Tablet 40 milliGRAM(s) Oral before breakfast  polyethylene glycol 3350 17 Gram(s) Oral daily  QUEtiapine 50 milliGRAM(s) Oral daily  sertraline 200 milliGRAM(s) Oral daily  sevelamer hydrochloride 800 milliGRAM(s) Oral three times a day with meals    MEDICATIONS  (PRN):  acetaminophen   Tablet. 650 milliGRAM(s) Oral every 6 hours PRN mild and Moderate  dextrose Gel 1 Dose(s) Oral once PRN Blood Glucose LESS THAN 70 milliGRAM(s)/deciliter  glucagon  Injectable 1 milliGRAM(s) IntraMuscular once PRN Glucose LESS THAN 70 milligrams/deciliter          PHYSICAL EXAM:  GENERAL: NAD, well-groomed, well-developed  HEAD:  Atraumatic, Normocephalic  CHEST/LUNG: Clear to percussion bilaterally; No rales, rhonchi, wheezing, or rubs  HEART: Regular rate and rhythm; No murmurs, rubs, or gallops  ABDOMEN: Soft, Nontender, Nondistended; Bowel sounds present  EXTREMITIES:  2+ Peripheral Pulses, No clubbing, cyanosis, or edema  LYMPH: No lymphadenopathy noted  SKIN: No rashes or lesions    Care Discussed with Consultants/Other Providers [ ] YES  [ ] NO

## 2017-10-26 NOTE — PROGRESS NOTE ADULT - SUBJECTIVE AND OBJECTIVE BOX
CARDIOLOGY FOLLOW UP - Dr. Andrew    CC no chest pain or sob        PHYSICAL EXAM:  T(C): 36.7 (10-26-17 @ 15:00), Max: 36.9 (10-25-17 @ 20:54)  HR: 73 (10-26-17 @ 15:00) (73 - 79)  BP: 126/87 (10-26-17 @ 15:00) (119/72 - 127/77)  RR: 16 (10-26-17 @ 15:00) (16 - 18)  SpO2: 98% (10-26-17 @ 05:55) (98% - 100%)  Wt(kg): --  I&O's Summary    25 Oct 2017 07:01  -  26 Oct 2017 07:00  --------------------------------------------------------  IN: 250 mL / OUT: 1500 mL / NET: -1250 mL    26 Oct 2017 07:01  -  26 Oct 2017 17:21  --------------------------------------------------------  IN: 500 mL / OUT: 1100 mL / NET: -600 mL        Appearance: Normal	  Cardiovascular: Normal S1 S2,RRR, No JVD, No murmurs  Respiratory: Lungs clear to auscultation	  Gastrointestinal:  Soft, Non-tender, + BS	  Extremities: Normal range of motion, No clubbing, cyanosis or edema        MEDICATIONS  (STANDING):  amLODIPine   Tablet 10 milliGRAM(s) Oral daily  aspirin enteric coated 81 milliGRAM(s) Oral daily  clopidogrel Tablet 75 milliGRAM(s) Oral daily  dextrose 5%. 1000 milliLiter(s) (50 mL/Hr) IV Continuous <Continuous>  dextrose 50% Injectable 12.5 Gram(s) IV Push once  dextrose 50% Injectable 25 Gram(s) IV Push once  dextrose 50% Injectable 25 Gram(s) IV Push once  docusate sodium 100 milliGRAM(s) Oral two times a day  epoetin karla Injectable 4000 Unit(s) IV Push <User Schedule>  furosemide    Tablet 20 milliGRAM(s) Oral two times a day  gabapentin 300 milliGRAM(s) Oral at bedtime  heparin  Injectable 5000 Unit(s) SubCutaneous every 8 hours  influenza   Vaccine 0.5 milliLiter(s) IntraMuscular once  insulin glargine Injectable (LANTUS) 20 Unit(s) SubCutaneous at bedtime  insulin lispro (HumaLOG) corrective regimen sliding scale   SubCutaneous three times a day before meals  insulin lispro (HumaLOG) corrective regimen sliding scale   SubCutaneous at bedtime  pantoprazole    Tablet 40 milliGRAM(s) Oral before breakfast  polyethylene glycol 3350 17 Gram(s) Oral daily  QUEtiapine 50 milliGRAM(s) Oral daily  sertraline 200 milliGRAM(s) Oral daily  sevelamer hydrochloride 800 milliGRAM(s) Oral three times a day with meals      TELEMETRY: 	    ECG:  	  RADIOLOGY:   DIAGNOSTIC TESTING:  [ ] Echocardiogram:  [ ]  Catheterization:  [ ] Stress Test:    OTHER: 	    LABS:	 	                                10.2   7.47  )-----------( 215      ( 26 Oct 2017 06:34 )             33.3     10-26    135  |  97<L>  |  44<H>  ----------------------------<  196<H>  4.8   |  24  |  6.74<H>    Ca    8.8      26 Oct 2017 06:34  Phos  5.5     10-26  Mg     2.3     10-26

## 2017-10-27 LAB
BUN SERPL-MCNC: 32 MG/DL — HIGH (ref 7–23)
CALCIUM SERPL-MCNC: 8.6 MG/DL — SIGNIFICANT CHANGE UP (ref 8.4–10.5)
CHLORIDE SERPL-SCNC: 100 MMOL/L — SIGNIFICANT CHANGE UP (ref 98–107)
CO2 SERPL-SCNC: 27 MMOL/L — SIGNIFICANT CHANGE UP (ref 22–31)
CREAT SERPL-MCNC: 5.49 MG/DL — HIGH (ref 0.5–1.3)
GLUCOSE SERPL-MCNC: 105 MG/DL — HIGH (ref 70–99)
HCT VFR BLD CALC: 32.4 % — LOW (ref 34.5–45)
HGB BLD-MCNC: 10.1 G/DL — LOW (ref 11.5–15.5)
MAGNESIUM SERPL-MCNC: 2.2 MG/DL — SIGNIFICANT CHANGE UP (ref 1.6–2.6)
MCHC RBC-ENTMCNC: 28 PG — SIGNIFICANT CHANGE UP (ref 27–34)
MCHC RBC-ENTMCNC: 31.2 % — LOW (ref 32–36)
MCV RBC AUTO: 89.8 FL — SIGNIFICANT CHANGE UP (ref 80–100)
NRBC # FLD: 0 — SIGNIFICANT CHANGE UP
PHOSPHATE SERPL-MCNC: 4.5 MG/DL — SIGNIFICANT CHANGE UP (ref 2.5–4.5)
PLATELET # BLD AUTO: 215 K/UL — SIGNIFICANT CHANGE UP (ref 150–400)
PMV BLD: 11.2 FL — SIGNIFICANT CHANGE UP (ref 7–13)
POTASSIUM SERPL-MCNC: 4.8 MMOL/L — SIGNIFICANT CHANGE UP (ref 3.5–5.3)
POTASSIUM SERPL-SCNC: 4.8 MMOL/L — SIGNIFICANT CHANGE UP (ref 3.5–5.3)
RBC # BLD: 3.61 M/UL — LOW (ref 3.8–5.2)
RBC # FLD: 13 % — SIGNIFICANT CHANGE UP (ref 10.3–14.5)
SODIUM SERPL-SCNC: 140 MMOL/L — SIGNIFICANT CHANGE UP (ref 135–145)
WBC # BLD: 7.59 K/UL — SIGNIFICANT CHANGE UP (ref 3.8–10.5)
WBC # FLD AUTO: 7.59 K/UL — SIGNIFICANT CHANGE UP (ref 3.8–10.5)

## 2017-10-27 RX ADMIN — SEVELAMER CARBONATE 800 MILLIGRAM(S): 2400 POWDER, FOR SUSPENSION ORAL at 17:19

## 2017-10-27 RX ADMIN — QUETIAPINE FUMARATE 50 MILLIGRAM(S): 200 TABLET, FILM COATED ORAL at 12:32

## 2017-10-27 RX ADMIN — HEPARIN SODIUM 5000 UNIT(S): 5000 INJECTION INTRAVENOUS; SUBCUTANEOUS at 21:25

## 2017-10-27 RX ADMIN — HEPARIN SODIUM 5000 UNIT(S): 5000 INJECTION INTRAVENOUS; SUBCUTANEOUS at 16:15

## 2017-10-27 RX ADMIN — HEPARIN SODIUM 5000 UNIT(S): 5000 INJECTION INTRAVENOUS; SUBCUTANEOUS at 05:31

## 2017-10-27 RX ADMIN — Medication 1: at 17:19

## 2017-10-27 RX ADMIN — AMLODIPINE BESYLATE 10 MILLIGRAM(S): 2.5 TABLET ORAL at 05:31

## 2017-10-27 RX ADMIN — SEVELAMER CARBONATE 800 MILLIGRAM(S): 2400 POWDER, FOR SUSPENSION ORAL at 12:32

## 2017-10-27 RX ADMIN — Medication 650 MILLIGRAM(S): at 11:00

## 2017-10-27 RX ADMIN — Medication 20 MILLIGRAM(S): at 05:31

## 2017-10-27 RX ADMIN — Medication 100 MILLIGRAM(S): at 05:31

## 2017-10-27 RX ADMIN — Medication 81 MILLIGRAM(S): at 12:32

## 2017-10-27 RX ADMIN — Medication 100 MILLIGRAM(S): at 17:19

## 2017-10-27 RX ADMIN — Medication 1: at 12:33

## 2017-10-27 RX ADMIN — CLOPIDOGREL BISULFATE 75 MILLIGRAM(S): 75 TABLET, FILM COATED ORAL at 12:32

## 2017-10-27 RX ADMIN — PANTOPRAZOLE SODIUM 40 MILLIGRAM(S): 20 TABLET, DELAYED RELEASE ORAL at 05:31

## 2017-10-27 RX ADMIN — INSULIN GLARGINE 20 UNIT(S): 100 INJECTION, SOLUTION SUBCUTANEOUS at 22:33

## 2017-10-27 RX ADMIN — Medication 1: at 08:38

## 2017-10-27 RX ADMIN — GABAPENTIN 300 MILLIGRAM(S): 400 CAPSULE ORAL at 21:25

## 2017-10-27 RX ADMIN — SERTRALINE 200 MILLIGRAM(S): 25 TABLET, FILM COATED ORAL at 12:32

## 2017-10-27 RX ADMIN — Medication 650 MILLIGRAM(S): at 12:00

## 2017-10-27 RX ADMIN — SEVELAMER CARBONATE 800 MILLIGRAM(S): 2400 POWDER, FOR SUSPENSION ORAL at 08:38

## 2017-10-27 RX ADMIN — Medication 20 MILLIGRAM(S): at 17:19

## 2017-10-27 NOTE — PROGRESS NOTE ADULT - SUBJECTIVE AND OBJECTIVE BOX
CARDIOLOGY FOLLOW UP - Dr. Andrew    CC no chest pain or sob       PHYSICAL EXAM:  T(C): 37.1 (10-27-17 @ 05:29), Max: 37.3 (10-26-17 @ 20:22)  HR: 80 (10-27-17 @ 08:00) (72 - 80)  BP: 124/77 (10-27-17 @ 05:29) (123/73 - 127/72)  RR: 16 (10-27-17 @ 08:00) (16 - 18)  SpO2: 97% (10-27-17 @ 05:29) (97% - 99%)  Wt(kg): --  I&O's Summary    26 Oct 2017 07:01  -  27 Oct 2017 07:00  --------------------------------------------------------  IN: 500 mL / OUT: 1100 mL / NET: -600 mL        Appearance: Normal	  Cardiovascular: Normal S1 S2,RRR, No JVD, No murmurs  Respiratory: Lungs clear to auscultation	  Gastrointestinal:  Soft, Non-tender, + BS	  Extremities: Normal range of motion, No clubbing, cyanosis or edema        MEDICATIONS  (STANDING):  amLODIPine   Tablet 10 milliGRAM(s) Oral daily  aspirin enteric coated 81 milliGRAM(s) Oral daily  clopidogrel Tablet 75 milliGRAM(s) Oral daily  dextrose 5%. 1000 milliLiter(s) (50 mL/Hr) IV Continuous <Continuous>  dextrose 50% Injectable 12.5 Gram(s) IV Push once  dextrose 50% Injectable 25 Gram(s) IV Push once  dextrose 50% Injectable 25 Gram(s) IV Push once  docusate sodium 100 milliGRAM(s) Oral two times a day  epoetin karla Injectable 4000 Unit(s) IV Push <User Schedule>  furosemide    Tablet 20 milliGRAM(s) Oral two times a day  gabapentin 300 milliGRAM(s) Oral at bedtime  heparin  Injectable 5000 Unit(s) SubCutaneous every 8 hours  influenza   Vaccine 0.5 milliLiter(s) IntraMuscular once  insulin glargine Injectable (LANTUS) 20 Unit(s) SubCutaneous at bedtime  insulin lispro (HumaLOG) corrective regimen sliding scale   SubCutaneous three times a day before meals  insulin lispro (HumaLOG) corrective regimen sliding scale   SubCutaneous at bedtime  pantoprazole    Tablet 40 milliGRAM(s) Oral before breakfast  polyethylene glycol 3350 17 Gram(s) Oral daily  QUEtiapine 50 milliGRAM(s) Oral daily  sertraline 200 milliGRAM(s) Oral daily  sevelamer hydrochloride 800 milliGRAM(s) Oral three times a day with meals      TELEMETRY: 	NSR     ECG:  	  RADIOLOGY:   DIAGNOSTIC TESTING:  [ ] Echocardiogram:  [ ]  Catheterization:  [ ] Stress Test:    OTHER: 	    LABS:	 	                                10.1   7.59  )-----------( 215      ( 27 Oct 2017 06:00 )             32.4     10-27    140  |  100  |  32<H>  ----------------------------<  105<H>  4.8   |  27  |  5.49<H>    Ca    8.6      27 Oct 2017 06:00  Phos  4.5     10-27  Mg     2.2     10-27

## 2017-10-27 NOTE — PROGRESS NOTE ADULT - SUBJECTIVE AND OBJECTIVE BOX
Patient is a 52y old  Female who presents with a chief complaint of Flank and epigastric pain (16 Oct 2017 17:53)      INTERVAL HPI/OVERNIGHT EVENTS:  T(C): 36.7 (10-27-17 @ 14:36), Max: 37.3 (10-26-17 @ 20:22)  HR: 74 (10-27-17 @ 14:36) (72 - 80)  BP: 118/71 (10-27-17 @ 14:36) (118/71 - 127/72)  RR: 17 (10-27-17 @ 14:36) (16 - 18)  SpO2: 100% (10-27-17 @ 14:36) (97% - 100%)  Wt(kg): --  I&O's Summary    26 Oct 2017 07:01  -  27 Oct 2017 07:00  --------------------------------------------------------  IN: 500 mL / OUT: 1100 mL / NET: -600 mL        LABS:                        10.1   7.59  )-----------( 215      ( 27 Oct 2017 06:00 )             32.4     10-27    140  |  100  |  32<H>  ----------------------------<  105<H>  4.8   |  27  |  5.49<H>    Ca    8.6      27 Oct 2017 06:00  Phos  4.5     10-27  Mg     2.2     10-27          CAPILLARY BLOOD GLUCOSE      POCT Blood Glucose.: 158 mg/dL (27 Oct 2017 12:15)  POCT Blood Glucose.: 195 mg/dL (27 Oct 2017 08:33)  POCT Blood Glucose.: 256 mg/dL (26 Oct 2017 21:44)  POCT Blood Glucose.: 184 mg/dL (26 Oct 2017 16:39)            MEDICATIONS  (STANDING):  amLODIPine   Tablet 10 milliGRAM(s) Oral daily  aspirin enteric coated 81 milliGRAM(s) Oral daily  clopidogrel Tablet 75 milliGRAM(s) Oral daily  dextrose 5%. 1000 milliLiter(s) (50 mL/Hr) IV Continuous <Continuous>  dextrose 50% Injectable 12.5 Gram(s) IV Push once  dextrose 50% Injectable 25 Gram(s) IV Push once  dextrose 50% Injectable 25 Gram(s) IV Push once  docusate sodium 100 milliGRAM(s) Oral two times a day  epoetin karla Injectable 4000 Unit(s) IV Push <User Schedule>  furosemide    Tablet 20 milliGRAM(s) Oral two times a day  gabapentin 300 milliGRAM(s) Oral at bedtime  heparin  Injectable 5000 Unit(s) SubCutaneous every 8 hours  influenza   Vaccine 0.5 milliLiter(s) IntraMuscular once  insulin glargine Injectable (LANTUS) 20 Unit(s) SubCutaneous at bedtime  insulin lispro (HumaLOG) corrective regimen sliding scale   SubCutaneous three times a day before meals  insulin lispro (HumaLOG) corrective regimen sliding scale   SubCutaneous at bedtime  pantoprazole    Tablet 40 milliGRAM(s) Oral before breakfast  polyethylene glycol 3350 17 Gram(s) Oral daily  QUEtiapine 50 milliGRAM(s) Oral daily  sertraline 200 milliGRAM(s) Oral daily  sevelamer hydrochloride 800 milliGRAM(s) Oral three times a day with meals    MEDICATIONS  (PRN):  acetaminophen   Tablet. 650 milliGRAM(s) Oral every 6 hours PRN mild and Moderate  dextrose Gel 1 Dose(s) Oral once PRN Blood Glucose LESS THAN 70 milliGRAM(s)/deciliter  glucagon  Injectable 1 milliGRAM(s) IntraMuscular once PRN Glucose LESS THAN 70 milligrams/deciliter          PHYSICAL EXAM:  GENERAL: NAD, well-groomed, well-developed  HEAD:  Atraumatic, Normocephalic  CHEST/LUNG: Clear to percussion bilaterally; No rales, rhonchi, wheezing, or rubs  HEART: Regular rate and rhythm; No murmurs, rubs, or gallops  ABDOMEN: Soft, Nontender, Nondistended; Bowel sounds present  EXTREMITIES:  2+ Peripheral Pulses, No clubbing, cyanosis, or edema  LYMPH: No lymphadenopathy noted  SKIN: No rashes or lesions    Care Discussed with Consultants/Other Providers [ ] YES  [ ] NO

## 2017-10-27 NOTE — PROGRESS NOTE ADULT - SUBJECTIVE AND OBJECTIVE BOX
NEPHROLOGY-[facility] [phone]    Patient seen and examined, tolerated dialysis yesterday well. C/O left sided abdominal discomfort and no BM x 5 days.     MEDICATIONS  (STANDING):  amLODIPine   Tablet 10 milliGRAM(s) Oral daily  aspirin enteric coated 81 milliGRAM(s) Oral daily  clopidogrel Tablet 75 milliGRAM(s) Oral daily  dextrose 5%. 1000 milliLiter(s) (50 mL/Hr) IV Continuous <Continuous>  dextrose 50% Injectable 12.5 Gram(s) IV Push once  dextrose 50% Injectable 25 Gram(s) IV Push once  dextrose 50% Injectable 25 Gram(s) IV Push once  docusate sodium 100 milliGRAM(s) Oral two times a day  epoetin karla Injectable 4000 Unit(s) IV Push <User Schedule>  furosemide    Tablet 20 milliGRAM(s) Oral two times a day  gabapentin 300 milliGRAM(s) Oral at bedtime  heparin  Injectable 5000 Unit(s) SubCutaneous every 8 hours  influenza   Vaccine 0.5 milliLiter(s) IntraMuscular once  insulin glargine Injectable (LANTUS) 20 Unit(s) SubCutaneous at bedtime  insulin lispro (HumaLOG) corrective regimen sliding scale   SubCutaneous three times a day before meals  insulin lispro (HumaLOG) corrective regimen sliding scale   SubCutaneous at bedtime  pantoprazole    Tablet 40 milliGRAM(s) Oral before breakfast  polyethylene glycol 3350 17 Gram(s) Oral daily  QUEtiapine 50 milliGRAM(s) Oral daily  sertraline 200 milliGRAM(s) Oral daily  sevelamer hydrochloride 800 milliGRAM(s) Oral three times a day with meals    VITAL:  T(C): , Max: 37.3 (10-26-17 @ 20:22)  T(F): , Max: 99.2 (10-26-17 @ 20:22)  HR: 80 (10-27-17 @ 08:00)  BP: 124/77 (10-27-17 @ 05:29)  BP(mean): --  RR: 16 (10-27-17 @ 08:00)  SpO2: 97% (10-27-17 @ 05:29)  Wt(kg): --  I and O's:    10-26 @ 07:01  -  10-27 @ 07:00  --------------------------------------------------------  IN: 500 mL / OUT: 1100 mL / NET: -600 mL    REVIEW OF SYSTEMS:  CONSTITUTIONAL: No fevers or chills  RESPIRATORY: No SOB/PICKARD  CARDIOVASCULAR: No CP or palpitations  GASTROINTESTINAL: No n/v/d +abd discomfort  GENITOURINARY: No dysuria, frequency or hematuria  NEUROLOGICAL: No numbness or weakness  All other review of systems is negative unless indicated above.    PHYSICAL EXAM:  Constitutional: NAD  Respiratory: CTA B/L  Cardiovascular: S1 and S2  Gastrointestinal: BS+, soft, NT/ND, obese  Extremities: + edema  Neurological: AAO x 3  : No John  Access: DAVID Wells (+thrill)    LABS:                        10.1   7.59  )-----------( 215      ( 27 Oct 2017 06:00 )             32.4     10-27    140  |  100  |  32<H>  ----------------------------<  105<H>  4.8   |  27  |  5.49<H>    Ca    8.6      27 Oct 2017 06:00  Phos  4.5     10-27  Mg     2.2     10-27

## 2017-10-27 NOTE — PROGRESS NOTE ADULT - ASSESSMENT
IMPRESSION: 52F w/ HTN, DM2, CAD, and CKD5, 10/16/17 a/w vomiting/epigastric pain/flank pain c/b initiation of dialysis and LUE AVF creation.  (1)Renal - ESRD (new) HD TTS while admitted  (2)Vascular - s/p left radiocephalic AVF creation and s/p R tunneled cath placement  (3)awaiting acceptance into outpatient HD     RECOMMEND:  - dialysis tomorrow as ordered via R chest wall HD catheter  - continue epogen as ordered  - social work and case management involvement for outpatient dialysis unit  - bowel regimen per primary team  - dose any medication for a CrCl <10 cc/min     Debi Johansen NP  [facility]  [phone]

## 2017-10-28 LAB
BUN SERPL-MCNC: 45 MG/DL — HIGH (ref 7–23)
CALCIUM SERPL-MCNC: 9.1 MG/DL — SIGNIFICANT CHANGE UP (ref 8.4–10.5)
CHLORIDE SERPL-SCNC: 97 MMOL/L — LOW (ref 98–107)
CO2 SERPL-SCNC: 27 MMOL/L — SIGNIFICANT CHANGE UP (ref 22–31)
CREAT SERPL-MCNC: 6.61 MG/DL — HIGH (ref 0.5–1.3)
GLUCOSE SERPL-MCNC: 101 MG/DL — HIGH (ref 70–99)
HCT VFR BLD CALC: 31.9 % — LOW (ref 34.5–45)
HGB BLD-MCNC: 9.9 G/DL — LOW (ref 11.5–15.5)
MAGNESIUM SERPL-MCNC: 2.2 MG/DL — SIGNIFICANT CHANGE UP (ref 1.6–2.6)
MCHC RBC-ENTMCNC: 27.8 PG — SIGNIFICANT CHANGE UP (ref 27–34)
MCHC RBC-ENTMCNC: 31 % — LOW (ref 32–36)
MCV RBC AUTO: 89.6 FL — SIGNIFICANT CHANGE UP (ref 80–100)
NRBC # FLD: 0 — SIGNIFICANT CHANGE UP
PHOSPHATE SERPL-MCNC: 5 MG/DL — HIGH (ref 2.5–4.5)
PLATELET # BLD AUTO: 251 K/UL — SIGNIFICANT CHANGE UP (ref 150–400)
PMV BLD: 11.5 FL — SIGNIFICANT CHANGE UP (ref 7–13)
POTASSIUM SERPL-MCNC: 4.2 MMOL/L — SIGNIFICANT CHANGE UP (ref 3.5–5.3)
POTASSIUM SERPL-SCNC: 4.2 MMOL/L — SIGNIFICANT CHANGE UP (ref 3.5–5.3)
RBC # BLD: 3.56 M/UL — LOW (ref 3.8–5.2)
RBC # FLD: 13.2 % — SIGNIFICANT CHANGE UP (ref 10.3–14.5)
SODIUM SERPL-SCNC: 137 MMOL/L — SIGNIFICANT CHANGE UP (ref 135–145)
WBC # BLD: 6.3 K/UL — SIGNIFICANT CHANGE UP (ref 3.8–10.5)
WBC # FLD AUTO: 6.3 K/UL — SIGNIFICANT CHANGE UP (ref 3.8–10.5)

## 2017-10-28 PROCEDURE — 71010: CPT | Mod: 26

## 2017-10-28 RX ORDER — ACETAMINOPHEN 500 MG
650 TABLET ORAL EVERY 6 HOURS
Qty: 0 | Refills: 0 | Status: DISCONTINUED | OUTPATIENT
Start: 2017-10-28 | End: 2017-11-15

## 2017-10-28 RX ADMIN — SEVELAMER CARBONATE 800 MILLIGRAM(S): 2400 POWDER, FOR SUSPENSION ORAL at 18:11

## 2017-10-28 RX ADMIN — Medication 100 MILLIGRAM(S): at 05:48

## 2017-10-28 RX ADMIN — Medication 20 MILLIGRAM(S): at 18:10

## 2017-10-28 RX ADMIN — Medication 1: at 13:30

## 2017-10-28 RX ADMIN — HEPARIN SODIUM 5000 UNIT(S): 5000 INJECTION INTRAVENOUS; SUBCUTANEOUS at 21:57

## 2017-10-28 RX ADMIN — CLOPIDOGREL BISULFATE 75 MILLIGRAM(S): 75 TABLET, FILM COATED ORAL at 13:29

## 2017-10-28 RX ADMIN — INSULIN GLARGINE 20 UNIT(S): 100 INJECTION, SOLUTION SUBCUTANEOUS at 22:03

## 2017-10-28 RX ADMIN — Medication 100 MILLIGRAM(S): at 18:10

## 2017-10-28 RX ADMIN — HEPARIN SODIUM 5000 UNIT(S): 5000 INJECTION INTRAVENOUS; SUBCUTANEOUS at 13:30

## 2017-10-28 RX ADMIN — PANTOPRAZOLE SODIUM 40 MILLIGRAM(S): 20 TABLET, DELAYED RELEASE ORAL at 05:48

## 2017-10-28 RX ADMIN — GABAPENTIN 300 MILLIGRAM(S): 400 CAPSULE ORAL at 21:57

## 2017-10-28 RX ADMIN — Medication 650 MILLIGRAM(S): at 18:46

## 2017-10-28 RX ADMIN — HEPARIN SODIUM 5000 UNIT(S): 5000 INJECTION INTRAVENOUS; SUBCUTANEOUS at 05:48

## 2017-10-28 RX ADMIN — QUETIAPINE FUMARATE 50 MILLIGRAM(S): 200 TABLET, FILM COATED ORAL at 13:29

## 2017-10-28 RX ADMIN — Medication 81 MILLIGRAM(S): at 13:29

## 2017-10-28 RX ADMIN — POLYETHYLENE GLYCOL 3350 17 GRAM(S): 17 POWDER, FOR SOLUTION ORAL at 13:35

## 2017-10-28 RX ADMIN — SERTRALINE 200 MILLIGRAM(S): 25 TABLET, FILM COATED ORAL at 13:29

## 2017-10-28 RX ADMIN — ERYTHROPOIETIN 4000 UNIT(S): 10000 INJECTION, SOLUTION INTRAVENOUS; SUBCUTANEOUS at 06:45

## 2017-10-28 RX ADMIN — SEVELAMER CARBONATE 800 MILLIGRAM(S): 2400 POWDER, FOR SUSPENSION ORAL at 13:29

## 2017-10-28 NOTE — PROGRESS NOTE ADULT - SUBJECTIVE AND OBJECTIVE BOX
Patient is a 52y old  Female who presents with a chief complaint of Flank and epigastric pain (16 Oct 2017 17:53)      INTERVAL HPI/OVERNIGHT EVENTS:  T(C): 38.3 (10-28-17 @ 17:56), Max: 38.3 (10-28-17 @ 17:56)  HR: 95 (10-28-17 @ 17:56) (69 - 97)  BP: 155/87 (10-28-17 @ 17:56) (128/70 - 157/82)  RR: 16 (10-28-17 @ 17:56) (16 - 18)  SpO2: 95% (10-28-17 @ 17:56) (95% - 98%)  Wt(kg): --  I&O's Summary    28 Oct 2017 07:01  -  28 Oct 2017 18:22  --------------------------------------------------------  IN: 500 mL / OUT: 1500 mL / NET: -1000 mL        LABS:                        9.9    6.30  )-----------( 251      ( 28 Oct 2017 07:23 )             31.9     10-28    137  |  97<L>  |  45<H>  ----------------------------<  101<H>  4.2   |  27  |  6.61<H>    Ca    9.1      28 Oct 2017 07:23  Phos  5.0     10-28  Mg     2.2     10-28          CAPILLARY BLOOD GLUCOSE      POCT Blood Glucose.: 157 mg/dL (28 Oct 2017 16:43)  POCT Blood Glucose.: 167 mg/dL (28 Oct 2017 12:17)  POCT Blood Glucose.: 93 mg/dL (28 Oct 2017 07:27)  POCT Blood Glucose.: 244 mg/dL (27 Oct 2017 22:19)            MEDICATIONS  (STANDING):  amLODIPine   Tablet 10 milliGRAM(s) Oral daily  aspirin enteric coated 81 milliGRAM(s) Oral daily  clopidogrel Tablet 75 milliGRAM(s) Oral daily  dextrose 5%. 1000 milliLiter(s) (50 mL/Hr) IV Continuous <Continuous>  dextrose 50% Injectable 12.5 Gram(s) IV Push once  dextrose 50% Injectable 25 Gram(s) IV Push once  dextrose 50% Injectable 25 Gram(s) IV Push once  docusate sodium 100 milliGRAM(s) Oral two times a day  epoetin karla Injectable 4000 Unit(s) IV Push <User Schedule>  furosemide    Tablet 20 milliGRAM(s) Oral two times a day  gabapentin 300 milliGRAM(s) Oral at bedtime  heparin  Injectable 5000 Unit(s) SubCutaneous every 8 hours  influenza   Vaccine 0.5 milliLiter(s) IntraMuscular once  insulin glargine Injectable (LANTUS) 20 Unit(s) SubCutaneous at bedtime  insulin lispro (HumaLOG) corrective regimen sliding scale   SubCutaneous three times a day before meals  insulin lispro (HumaLOG) corrective regimen sliding scale   SubCutaneous at bedtime  pantoprazole    Tablet 40 milliGRAM(s) Oral before breakfast  polyethylene glycol 3350 17 Gram(s) Oral daily  QUEtiapine 50 milliGRAM(s) Oral daily  sertraline 200 milliGRAM(s) Oral daily  sevelamer hydrochloride 800 milliGRAM(s) Oral three times a day with meals    MEDICATIONS  (PRN):  acetaminophen   Tablet. 650 milliGRAM(s) Oral every 6 hours PRN mild and Moderate  dextrose Gel 1 Dose(s) Oral once PRN Blood Glucose LESS THAN 70 milliGRAM(s)/deciliter  glucagon  Injectable 1 milliGRAM(s) IntraMuscular once PRN Glucose LESS THAN 70 milligrams/deciliter          PHYSICAL EXAM:  GENERAL: NAD, well-groomed, well-developed  HEAD:  Atraumatic, Normocephalic  CHEST/LUNG: Clear to percussion bilaterally; No rales, rhonchi, wheezing, or rubs  HEART: Regular rate and rhythm; No murmurs, rubs, or gallops  ABDOMEN: Soft, Nontender, Nondistended; Bowel sounds present  EXTREMITIES:  2+ Peripheral Pulses, No clubbing, cyanosis, or edema  LYMPH: No lymphadenopathy noted  SKIN: No rashes or lesions    Care Discussed with Consultants/Other Providers [ ] YES  [ ] NO

## 2017-10-28 NOTE — PROGRESS NOTE ADULT - SUBJECTIVE AND OBJECTIVE BOX
CARDIOLOGY FOLLOW UP NOTE - DR. DEE (for [facility])    Subjective:    no new complaints    PHYSICAL EXAM:  T(C): 37.1 (10-28-17 @ 09:44), Max: 37.1 (10-28-17 @ 09:44)  HR: 97 (10-28-17 @ 09:44) (69 - 97)  BP: 157/82 (10-28-17 @ 09:44) (118/71 - 157/82)  RR: 18 (10-28-17 @ 09:44) (17 - 18)  SpO2: 98% (10-28-17 @ 05:45) (97% - 100%)  Wt(kg): --  I&O's Summary    28 Oct 2017 07:01  -  28 Oct 2017 13:14  --------------------------------------------------------  IN: 500 mL / OUT: 1500 mL / NET: -1000 mL        Appearance: Normal	  Cardiovascular: Normal S1 S2,RRR, No JVD, No murmurs  Respiratory: Lungs clear to auscultation	  Gastrointestinal:  Soft, Non-tender, + BS	  Extremities: Normal range of motion, No clubbing, cyanosis or edema  Vascular: Peripheral pulses palpable 2+ bilaterally    MEDICATIONS  (STANDING):  amLODIPine   Tablet 10 milliGRAM(s) Oral daily  aspirin enteric coated 81 milliGRAM(s) Oral daily  clopidogrel Tablet 75 milliGRAM(s) Oral daily  dextrose 5%. 1000 milliLiter(s) (50 mL/Hr) IV Continuous <Continuous>  dextrose 50% Injectable 12.5 Gram(s) IV Push once  dextrose 50% Injectable 25 Gram(s) IV Push once  dextrose 50% Injectable 25 Gram(s) IV Push once  docusate sodium 100 milliGRAM(s) Oral two times a day  epoetin karla Injectable 4000 Unit(s) IV Push <User Schedule>  furosemide    Tablet 20 milliGRAM(s) Oral two times a day  gabapentin 300 milliGRAM(s) Oral at bedtime  heparin  Injectable 5000 Unit(s) SubCutaneous every 8 hours  influenza   Vaccine 0.5 milliLiter(s) IntraMuscular once  insulin glargine Injectable (LANTUS) 20 Unit(s) SubCutaneous at bedtime  insulin lispro (HumaLOG) corrective regimen sliding scale   SubCutaneous three times a day before meals  insulin lispro (HumaLOG) corrective regimen sliding scale   SubCutaneous at bedtime  pantoprazole    Tablet 40 milliGRAM(s) Oral before breakfast  polyethylene glycol 3350 17 Gram(s) Oral daily  QUEtiapine 50 milliGRAM(s) Oral daily  sertraline 200 milliGRAM(s) Oral daily  sevelamer hydrochloride 800 milliGRAM(s) Oral three times a day with meals      TELEMETRY: 	    ECG:  	  RADIOLOGY:   DIAGNOSTIC TESTING:  [ ] Echocardiogram:  [ ] Catheterization:  [ ] Stress Test:    OTHER: 	    LABS:	 	    CARDIAC MARKERS:                                9.9    6.30  )-----------( 251      ( 28 Oct 2017 07:23 )             31.9     10-28    137  |  97<L>  |  45<H>  ----------------------------<  101<H>  4.2   |  27  |  6.61<H>    Ca    9.1      28 Oct 2017 07:23  Phos  5.0     10-28  Mg     2.2     10-28      proBNP:     Lipid Profile:   HgA1c:

## 2017-10-28 NOTE — PROGRESS NOTE ADULT - SUBJECTIVE AND OBJECTIVE BOX
Patient seen and examined  Seen during HD  No complaints    REVIEW OF SYSTEMS:  As per HPI, otherwise 8 full 10 ROS were unremarkable    MEDICATIONS  (STANDING):  amLODIPine   Tablet 10 milliGRAM(s) Oral daily  aspirin enteric coated 81 milliGRAM(s) Oral daily  clopidogrel Tablet 75 milliGRAM(s) Oral daily  dextrose 5%. 1000 milliLiter(s) (50 mL/Hr) IV Continuous <Continuous>  dextrose 50% Injectable 12.5 Gram(s) IV Push once  dextrose 50% Injectable 25 Gram(s) IV Push once  dextrose 50% Injectable 25 Gram(s) IV Push once  docusate sodium 100 milliGRAM(s) Oral two times a day  epoetin karla Injectable 4000 Unit(s) IV Push <User Schedule>  furosemide    Tablet 20 milliGRAM(s) Oral two times a day  gabapentin 300 milliGRAM(s) Oral at bedtime  heparin  Injectable 5000 Unit(s) SubCutaneous every 8 hours  influenza   Vaccine 0.5 milliLiter(s) IntraMuscular once  insulin glargine Injectable (LANTUS) 20 Unit(s) SubCutaneous at bedtime  insulin lispro (HumaLOG) corrective regimen sliding scale   SubCutaneous three times a day before meals  insulin lispro (HumaLOG) corrective regimen sliding scale   SubCutaneous at bedtime  pantoprazole    Tablet 40 milliGRAM(s) Oral before breakfast  polyethylene glycol 3350 17 Gram(s) Oral daily  QUEtiapine 50 milliGRAM(s) Oral daily  sertraline 200 milliGRAM(s) Oral daily  sevelamer hydrochloride 800 milliGRAM(s) Oral three times a day with meals      VITAL:  T(C): , Max: 37.1 (10-28-17 @ 09:44)  T(F): , Max: 98.8 (10-28-17 @ 09:44)  HR: 97 (10-28-17 @ 09:44)  BP: 157/82 (10-28-17 @ 09:44)  BP(mean): --  RR: 18 (10-28-17 @ 09:44)  SpO2: 98% (10-28-17 @ 05:45)  Wt(kg): --    I and O's:    10-28 @ 07:01  -  10-28 @ 11:16  --------------------------------------------------------  IN: 500 mL / OUT: 1500 mL / NET: -1000 mL          PHYSICAL EXAM:    Constitutional: NAD  HEENT: PERRLA, EOMI,  MMM  Neck: No LAD, No JVD  Respiratory: CTAB  Cardiovascular: S1 and S2  Gastrointestinal: BS+, soft, NT/ND  Extremities: No peripheral edema  Neurological: A/O x 3, no focal deficits    LABS:                        9.9    6.30  )-----------( 251      ( 28 Oct 2017 07:23 )             31.9     10-28    137  |  97<L>  |  45<H>  ----------------------------<  101<H>  4.2   |  27  |  6.61<H>    Ca    9.1      28 Oct 2017 07:23  Phos  5.0     10-28  Mg     2.2     10-28        Assessment and Plan:   · Assessment		  IMPRESSION: 52F w/ HTN, DM2, CAD, and CKD5, 10/16/17 a/w vomiting/epigastric pain/flank pain c/b initiation of dialysis and LUE AVF creation.    (1)Renal - ESRD (new) HD TTS while admitted  (2)Vascular - s/p left radiocephalic AVF creation and s/p R tunneled cath placement  (3)awaiting acceptance into outpatient HD     RECOMMEND:  - HD Tuesday  - continue epogen as ordered  - social work and case management involvement for outpatient dialysis unit  - dose any medication for a CrCl <10 cc/min

## 2017-10-29 LAB
APPEARANCE UR: CLEAR — SIGNIFICANT CHANGE UP
BACTERIA # UR AUTO: SIGNIFICANT CHANGE UP
BILIRUB UR-MCNC: NEGATIVE — SIGNIFICANT CHANGE UP
BLOOD UR QL VISUAL: NEGATIVE — SIGNIFICANT CHANGE UP
BUN SERPL-MCNC: 33 MG/DL — HIGH (ref 7–23)
CALCIUM SERPL-MCNC: 8.6 MG/DL — SIGNIFICANT CHANGE UP (ref 8.4–10.5)
CHLORIDE SERPL-SCNC: 97 MMOL/L — LOW (ref 98–107)
CO2 SERPL-SCNC: 27 MMOL/L — SIGNIFICANT CHANGE UP (ref 22–31)
COLOR SPEC: SIGNIFICANT CHANGE UP
CREAT SERPL-MCNC: 6 MG/DL — HIGH (ref 0.5–1.3)
GLUCOSE SERPL-MCNC: 123 MG/DL — HIGH (ref 70–99)
GLUCOSE UR-MCNC: 100 — SIGNIFICANT CHANGE UP
HCT VFR BLD CALC: 32.1 % — LOW (ref 34.5–45)
HGB BLD-MCNC: 10.1 G/DL — LOW (ref 11.5–15.5)
KETONES UR-MCNC: NEGATIVE — SIGNIFICANT CHANGE UP
LEUKOCYTE ESTERASE UR-ACNC: NEGATIVE — SIGNIFICANT CHANGE UP
MAGNESIUM SERPL-MCNC: 2.2 MG/DL — SIGNIFICANT CHANGE UP (ref 1.6–2.6)
MCHC RBC-ENTMCNC: 28.5 PG — SIGNIFICANT CHANGE UP (ref 27–34)
MCHC RBC-ENTMCNC: 31.5 % — LOW (ref 32–36)
MCV RBC AUTO: 90.4 FL — SIGNIFICANT CHANGE UP (ref 80–100)
MUCOUS THREADS # UR AUTO: SIGNIFICANT CHANGE UP
NITRITE UR-MCNC: NEGATIVE — SIGNIFICANT CHANGE UP
NRBC # FLD: 0 — SIGNIFICANT CHANGE UP
PH UR: 6.5 — SIGNIFICANT CHANGE UP (ref 4.6–8)
PHOSPHATE SERPL-MCNC: 3.7 MG/DL — SIGNIFICANT CHANGE UP (ref 2.5–4.5)
PLATELET # BLD AUTO: 225 K/UL — SIGNIFICANT CHANGE UP (ref 150–400)
PMV BLD: 11 FL — SIGNIFICANT CHANGE UP (ref 7–13)
POTASSIUM SERPL-MCNC: 4.6 MMOL/L — SIGNIFICANT CHANGE UP (ref 3.5–5.3)
POTASSIUM SERPL-SCNC: 4.6 MMOL/L — SIGNIFICANT CHANGE UP (ref 3.5–5.3)
PROT UR-MCNC: >600 — SIGNIFICANT CHANGE UP
RBC # BLD: 3.55 M/UL — LOW (ref 3.8–5.2)
RBC # FLD: 13.4 % — SIGNIFICANT CHANGE UP (ref 10.3–14.5)
RBC CASTS # UR COMP ASSIST: SIGNIFICANT CHANGE UP (ref 0–?)
SODIUM SERPL-SCNC: 137 MMOL/L — SIGNIFICANT CHANGE UP (ref 135–145)
SP GR SPEC: 1.01 — SIGNIFICANT CHANGE UP (ref 1–1.03)
SPECIMEN SOURCE: SIGNIFICANT CHANGE UP
SPECIMEN SOURCE: SIGNIFICANT CHANGE UP
SQUAMOUS # UR AUTO: SIGNIFICANT CHANGE UP
UROBILINOGEN FLD QL: NORMAL E.U. — SIGNIFICANT CHANGE UP (ref 0.1–0.2)
WBC # BLD: 6.4 K/UL — SIGNIFICANT CHANGE UP (ref 3.8–10.5)
WBC # FLD AUTO: 6.4 K/UL — SIGNIFICANT CHANGE UP (ref 3.8–10.5)
WBC UR QL: SIGNIFICANT CHANGE UP (ref 0–?)

## 2017-10-29 RX ADMIN — Medication 20 MILLIGRAM(S): at 17:33

## 2017-10-29 RX ADMIN — Medication 20 MILLIGRAM(S): at 06:19

## 2017-10-29 RX ADMIN — HEPARIN SODIUM 5000 UNIT(S): 5000 INJECTION INTRAVENOUS; SUBCUTANEOUS at 23:04

## 2017-10-29 RX ADMIN — HEPARIN SODIUM 5000 UNIT(S): 5000 INJECTION INTRAVENOUS; SUBCUTANEOUS at 12:45

## 2017-10-29 RX ADMIN — Medication 81 MILLIGRAM(S): at 12:43

## 2017-10-29 RX ADMIN — Medication 100 MILLIGRAM(S): at 06:19

## 2017-10-29 RX ADMIN — SERTRALINE 200 MILLIGRAM(S): 25 TABLET, FILM COATED ORAL at 12:43

## 2017-10-29 RX ADMIN — AMLODIPINE BESYLATE 10 MILLIGRAM(S): 2.5 TABLET ORAL at 06:19

## 2017-10-29 RX ADMIN — SEVELAMER CARBONATE 800 MILLIGRAM(S): 2400 POWDER, FOR SUSPENSION ORAL at 08:52

## 2017-10-29 RX ADMIN — GABAPENTIN 300 MILLIGRAM(S): 400 CAPSULE ORAL at 23:04

## 2017-10-29 RX ADMIN — PANTOPRAZOLE SODIUM 40 MILLIGRAM(S): 20 TABLET, DELAYED RELEASE ORAL at 06:19

## 2017-10-29 RX ADMIN — INSULIN GLARGINE 20 UNIT(S): 100 INJECTION, SOLUTION SUBCUTANEOUS at 23:04

## 2017-10-29 RX ADMIN — Medication 100 MILLIGRAM(S): at 17:33

## 2017-10-29 RX ADMIN — HEPARIN SODIUM 5000 UNIT(S): 5000 INJECTION INTRAVENOUS; SUBCUTANEOUS at 06:19

## 2017-10-29 RX ADMIN — Medication 2: at 17:33

## 2017-10-29 RX ADMIN — CLOPIDOGREL BISULFATE 75 MILLIGRAM(S): 75 TABLET, FILM COATED ORAL at 12:43

## 2017-10-29 RX ADMIN — Medication 1: at 12:44

## 2017-10-29 RX ADMIN — QUETIAPINE FUMARATE 50 MILLIGRAM(S): 200 TABLET, FILM COATED ORAL at 12:43

## 2017-10-29 NOTE — PROGRESS NOTE ADULT - SUBJECTIVE AND OBJECTIVE BOX
Patient seen and examined  No complaints  Tolerated HD well yesterday with UF 1 L    REVIEW OF SYSTEMS:  As per HPI, otherwise 8 full 10 ROS were unremarkable    MEDICATIONS  (STANDING):  amLODIPine   Tablet 10 milliGRAM(s) Oral daily  aspirin enteric coated 81 milliGRAM(s) Oral daily  clopidogrel Tablet 75 milliGRAM(s) Oral daily  dextrose 5%. 1000 milliLiter(s) (50 mL/Hr) IV Continuous <Continuous>  dextrose 50% Injectable 12.5 Gram(s) IV Push once  dextrose 50% Injectable 25 Gram(s) IV Push once  dextrose 50% Injectable 25 Gram(s) IV Push once  docusate sodium 100 milliGRAM(s) Oral two times a day  epoetin karla Injectable 4000 Unit(s) IV Push <User Schedule>  furosemide    Tablet 20 milliGRAM(s) Oral two times a day  gabapentin 300 milliGRAM(s) Oral at bedtime  heparin  Injectable 5000 Unit(s) SubCutaneous every 8 hours  influenza   Vaccine 0.5 milliLiter(s) IntraMuscular once  insulin glargine Injectable (LANTUS) 20 Unit(s) SubCutaneous at bedtime  insulin lispro (HumaLOG) corrective regimen sliding scale   SubCutaneous three times a day before meals  insulin lispro (HumaLOG) corrective regimen sliding scale   SubCutaneous at bedtime  pantoprazole    Tablet 40 milliGRAM(s) Oral before breakfast  polyethylene glycol 3350 17 Gram(s) Oral daily  QUEtiapine 50 milliGRAM(s) Oral daily  sertraline 200 milliGRAM(s) Oral daily  sevelamer hydrochloride 800 milliGRAM(s) Oral three times a day with meals      VITAL:  T(C): , Max: 38.3 (10-28-17 @ 17:56)  T(F): , Max: 100.9 (10-28-17 @ 17:56)  HR: 77 (10-29-17 @ 05:37)  BP: 142/76 (10-29-17 @ 05:37)  BP(mean): --  RR: 18 (10-29-17 @ 05:37)  SpO2: 97% (10-29-17 @ 05:37)  Wt(kg): --    I and O's:    10-28 @ 07:01  -  10-29 @ 07:00  --------------------------------------------------------  IN: 500 mL / OUT: 1500 mL / NET: -1000 mL          PHYSICAL EXAM:    Constitutional: NAD  HEENT: PERRLA, EOMI,  MMM  Neck: No LAD, No JVD  Respiratory: CTAB  Cardiovascular: S1 and S2  Gastrointestinal: BS+, soft, NT/ND  Extremities: No peripheral edema  Neurological: A/O x 3, no focal deficits      LABS:                        10.1   6.40  )-----------( 225      ( 29 Oct 2017 06:16 )             32.1     10    137  |  97<L>  |  33<H>  ----------------------------<  123<H>  4.6   |  27  |  6.00<H>    Ca    8.6      29 Oct 2017 06:16  Phos  3.7     10-29  Mg     2.2     10-29        Urine Studies:  Urinalysis Basic - ( 29 Oct 2017 07:00 )    Color: PLYEL / Appearance: CLEAR / S.013 / pH: 6.5  Gluc: 100 / Ketone: NEGATIVE  / Bili: NEGATIVE / Urobili: NORMAL E.U.   Blood: NEGATIVE / Protein: >600 / Nitrite: NEGATIVE   Leuk Esterase: NEGATIVE / RBC: 0-2 / WBC 2-5   Sq Epi: OCC / Non Sq Epi: x / Bacteria: FEW        Assessment and Plan:   · Assessment		  IMPRESSION: 52F w/ HTN, DM2, CAD, and CKD5, 10/16/17 a/w vomiting/epigastric pain/flank pain c/b initiation of dialysis and LUE AVF creation.    (1)Renal - ESRD (new) HD TTS   (2) Anemia on Epo  (3) Hyperphosphatemia- controlled  (4)Vascular - s/p left radiocephalic AVF creation and s/p R tunneled cath placement  (5)awaiting acceptance into outpatient HD     RECOMMEND:  - HD Tuesday  - DC Epogen. Hgb > 10  - DC Renvela and trend serum Phos  - social work and case management involvement for outpatient dialysis unit  - dose any medication for a CrCl <10 cc/min

## 2017-10-29 NOTE — PROGRESS NOTE ADULT - SUBJECTIVE AND OBJECTIVE BOX
CARDIOLOGY FOLLOW UP NOTE - DR. DEE (for [facility])    Subjective:  no cp/sob    PHYSICAL EXAM:  T(C): 37.2 (10-29-17 @ 05:37), Max: 38.3 (10-28-17 @ 17:56)  HR: 77 (10-29-17 @ 05:37) (77 - 95)  BP: 142/76 (10-29-17 @ 05:37) (132/71 - 155/87)  RR: 18 (10-29-17 @ 05:37) (16 - 18)  SpO2: 97% (10-29-17 @ 05:37) (95% - 99%)  Wt(kg): --  I&O's Summary    28 Oct 2017 07:01  -  29 Oct 2017 07:00  --------------------------------------------------------  IN: 500 mL / OUT: 1500 mL / NET: -1000 mL        Appearance: Normal	  Cardiovascular: Normal S1 S2,RRR, No JVD, No murmurs  Respiratory: Lungs clear to auscultation	  Gastrointestinal:  Soft, Non-tender, + BS	  Extremities: Normal range of motion, No clubbing, cyanosis or edema  Vascular: Peripheral pulses palpable 2+ bilaterally    MEDICATIONS  (STANDING):  amLODIPine   Tablet 10 milliGRAM(s) Oral daily  aspirin enteric coated 81 milliGRAM(s) Oral daily  clopidogrel Tablet 75 milliGRAM(s) Oral daily  dextrose 5%. 1000 milliLiter(s) (50 mL/Hr) IV Continuous <Continuous>  dextrose 50% Injectable 12.5 Gram(s) IV Push once  dextrose 50% Injectable 25 Gram(s) IV Push once  dextrose 50% Injectable 25 Gram(s) IV Push once  docusate sodium 100 milliGRAM(s) Oral two times a day  epoetin karla Injectable 4000 Unit(s) IV Push <User Schedule>  furosemide    Tablet 20 milliGRAM(s) Oral two times a day  gabapentin 300 milliGRAM(s) Oral at bedtime  heparin  Injectable 5000 Unit(s) SubCutaneous every 8 hours  influenza   Vaccine 0.5 milliLiter(s) IntraMuscular once  insulin glargine Injectable (LANTUS) 20 Unit(s) SubCutaneous at bedtime  insulin lispro (HumaLOG) corrective regimen sliding scale   SubCutaneous three times a day before meals  insulin lispro (HumaLOG) corrective regimen sliding scale   SubCutaneous at bedtime  pantoprazole    Tablet 40 milliGRAM(s) Oral before breakfast  polyethylene glycol 3350 17 Gram(s) Oral daily  QUEtiapine 50 milliGRAM(s) Oral daily  sertraline 200 milliGRAM(s) Oral daily  sevelamer hydrochloride 800 milliGRAM(s) Oral three times a day with meals      TELEMETRY: 	    ECG:  	  RADIOLOGY:   DIAGNOSTIC TESTING:  [ ] Echocardiogram:  [ ] Catheterization:  [ ] Stress Test:    OTHER: 	    LABS:	 	    CARDIAC MARKERS:                                10.1   6.40  )-----------( 225      ( 29 Oct 2017 06:16 )             32.1     10-29    137  |  97<L>  |  33<H>  ----------------------------<  123<H>  4.6   |  27  |  6.00<H>    Ca    8.6      29 Oct 2017 06:16  Phos  3.7     10-29  Mg     2.2     10-29      proBNP:     Lipid Profile:   HgA1c:

## 2017-10-29 NOTE — PROGRESS NOTE ADULT - SUBJECTIVE AND OBJECTIVE BOX
Patient is a 52y old  Female who presents with a chief complaint of Flank and epigastric pain (16 Oct 2017 17:53)      INTERVAL HPI/OVERNIGHT EVENTS:  T(C): 36.4 (10-29-17 @ 13:40), Max: 38.3 (10-28-17 @ 17:56)  HR: 79 (10-29-17 @ 13:40) (77 - 95)  BP: 129/79 (10-29-17 @ 13:40) (129/79 - 155/87)  RR: 18 (10-29-17 @ 13:40) (16 - 18)  SpO2: 100% (10-29-17 @ 13:40) (95% - 100%)  Wt(kg): --  I&O's Summary    28 Oct 2017 07:01  -  29 Oct 2017 07:00  --------------------------------------------------------  IN: 500 mL / OUT: 1500 mL / NET: -1000 mL        LABS:                        10.1   6.40  )-----------( 225      ( 29 Oct 2017 06:16 )             32.1     10    137  |  97<L>  |  33<H>  ----------------------------<  123<H>  4.6   |  27  |  6.00<H>    Ca    8.6      29 Oct 2017 06:16  Phos  3.7     10-29  Mg     2.2     10-29        Urinalysis Basic - ( 29 Oct 2017 07:00 )    Color: PLYEL / Appearance: CLEAR / S.013 / pH: 6.5  Gluc: 100 / Ketone: NEGATIVE  / Bili: NEGATIVE / Urobili: NORMAL E.U.   Blood: NEGATIVE / Protein: >600 / Nitrite: NEGATIVE   Leuk Esterase: NEGATIVE / RBC: 0-2 / WBC 2-5   Sq Epi: OCC / Non Sq Epi: x / Bacteria: FEW      CAPILLARY BLOOD GLUCOSE      POCT Blood Glucose.: 249 mg/dL (29 Oct 2017 16:58)  POCT Blood Glucose.: 182 mg/dL (29 Oct 2017 12:03)  POCT Blood Glucose.: 111 mg/dL (29 Oct 2017 08:35)  POCT Blood Glucose.: 158 mg/dL (28 Oct 2017 22:01)        Urinalysis Basic - ( 29 Oct 2017 07:00 )    Color: PLYEL / Appearance: CLEAR / S.013 / pH: 6.5  Gluc: 100 / Ketone: NEGATIVE  / Bili: NEGATIVE / Urobili: NORMAL E.U.   Blood: NEGATIVE / Protein: >600 / Nitrite: NEGATIVE   Leuk Esterase: NEGATIVE / RBC: 0-2 / WBC 2-5   Sq Epi: OCC / Non Sq Epi: x / Bacteria: FEW        MEDICATIONS  (STANDING):  amLODIPine   Tablet 10 milliGRAM(s) Oral daily  aspirin enteric coated 81 milliGRAM(s) Oral daily  clopidogrel Tablet 75 milliGRAM(s) Oral daily  dextrose 5%. 1000 milliLiter(s) (50 mL/Hr) IV Continuous <Continuous>  dextrose 50% Injectable 12.5 Gram(s) IV Push once  dextrose 50% Injectable 25 Gram(s) IV Push once  dextrose 50% Injectable 25 Gram(s) IV Push once  docusate sodium 100 milliGRAM(s) Oral two times a day  furosemide    Tablet 20 milliGRAM(s) Oral two times a day  gabapentin 300 milliGRAM(s) Oral at bedtime  heparin  Injectable 5000 Unit(s) SubCutaneous every 8 hours  influenza   Vaccine 0.5 milliLiter(s) IntraMuscular once  insulin glargine Injectable (LANTUS) 20 Unit(s) SubCutaneous at bedtime  insulin lispro (HumaLOG) corrective regimen sliding scale   SubCutaneous three times a day before meals  insulin lispro (HumaLOG) corrective regimen sliding scale   SubCutaneous at bedtime  pantoprazole    Tablet 40 milliGRAM(s) Oral before breakfast  polyethylene glycol 3350 17 Gram(s) Oral daily  QUEtiapine 50 milliGRAM(s) Oral daily  sertraline 200 milliGRAM(s) Oral daily    MEDICATIONS  (PRN):  acetaminophen   Tablet 650 milliGRAM(s) Oral every 6 hours PRN For Temp greater than 38 C (100.4 F)  acetaminophen   Tablet. 650 milliGRAM(s) Oral every 6 hours PRN mild and Moderate  dextrose Gel 1 Dose(s) Oral once PRN Blood Glucose LESS THAN 70 milliGRAM(s)/deciliter  glucagon  Injectable 1 milliGRAM(s) IntraMuscular once PRN Glucose LESS THAN 70 milligrams/deciliter          PHYSICAL EXAM:  GENERAL: NAD, well-groomed, well-developed  HEAD:  Atraumatic, Normocephalic  CHEST/LUNG: Clear to percussion bilaterally; No rales, rhonchi, wheezing, or rubs  HEART: Regular rate and rhythm; No murmurs, rubs, or gallops  ABDOMEN: Soft, Nontender, Nondistended; Bowel sounds present  EXTREMITIES:  2+ Peripheral Pulses, No clubbing, cyanosis, or edema  LYMPH: No lymphadenopathy noted  SKIN: No rashes or lesions    Care Discussed with Consultants/Other Providers [ ] YES  [ ] NO

## 2017-10-30 LAB
BUN SERPL-MCNC: 42 MG/DL — HIGH (ref 7–23)
CALCIUM SERPL-MCNC: 9.1 MG/DL — SIGNIFICANT CHANGE UP (ref 8.4–10.5)
CHLORIDE SERPL-SCNC: 95 MMOL/L — LOW (ref 98–107)
CO2 SERPL-SCNC: 25 MMOL/L — SIGNIFICANT CHANGE UP (ref 22–31)
CREAT SERPL-MCNC: 6.75 MG/DL — HIGH (ref 0.5–1.3)
GLUCOSE SERPL-MCNC: 139 MG/DL — HIGH (ref 70–99)
HCT VFR BLD CALC: 34.5 % — SIGNIFICANT CHANGE UP (ref 34.5–45)
HGB BLD-MCNC: 10.4 G/DL — LOW (ref 11.5–15.5)
MAGNESIUM SERPL-MCNC: 2.3 MG/DL — SIGNIFICANT CHANGE UP (ref 1.6–2.6)
MCHC RBC-ENTMCNC: 27.7 PG — SIGNIFICANT CHANGE UP (ref 27–34)
MCHC RBC-ENTMCNC: 30.1 % — LOW (ref 32–36)
MCV RBC AUTO: 91.8 FL — SIGNIFICANT CHANGE UP (ref 80–100)
NRBC # FLD: 0 — SIGNIFICANT CHANGE UP
PHOSPHATE SERPL-MCNC: 4.1 MG/DL — SIGNIFICANT CHANGE UP (ref 2.5–4.5)
PLATELET # BLD AUTO: 260 K/UL — SIGNIFICANT CHANGE UP (ref 150–400)
PMV BLD: 11.2 FL — SIGNIFICANT CHANGE UP (ref 7–13)
POTASSIUM SERPL-MCNC: 4.7 MMOL/L — SIGNIFICANT CHANGE UP (ref 3.5–5.3)
POTASSIUM SERPL-SCNC: 4.7 MMOL/L — SIGNIFICANT CHANGE UP (ref 3.5–5.3)
RBC # BLD: 3.76 M/UL — LOW (ref 3.8–5.2)
RBC # FLD: 13.3 % — SIGNIFICANT CHANGE UP (ref 10.3–14.5)
SODIUM SERPL-SCNC: 135 MMOL/L — SIGNIFICANT CHANGE UP (ref 135–145)
SPECIMEN SOURCE: SIGNIFICANT CHANGE UP
WBC # BLD: 6.41 K/UL — SIGNIFICANT CHANGE UP (ref 3.8–10.5)
WBC # FLD AUTO: 6.41 K/UL — SIGNIFICANT CHANGE UP (ref 3.8–10.5)

## 2017-10-30 RX ADMIN — Medication 100 MILLIGRAM(S): at 06:47

## 2017-10-30 RX ADMIN — SERTRALINE 200 MILLIGRAM(S): 25 TABLET, FILM COATED ORAL at 11:31

## 2017-10-30 RX ADMIN — AMLODIPINE BESYLATE 10 MILLIGRAM(S): 2.5 TABLET ORAL at 06:44

## 2017-10-30 RX ADMIN — HEPARIN SODIUM 5000 UNIT(S): 5000 INJECTION INTRAVENOUS; SUBCUTANEOUS at 06:44

## 2017-10-30 RX ADMIN — INSULIN GLARGINE 20 UNIT(S): 100 INJECTION, SOLUTION SUBCUTANEOUS at 21:58

## 2017-10-30 RX ADMIN — POLYETHYLENE GLYCOL 3350 17 GRAM(S): 17 POWDER, FOR SOLUTION ORAL at 11:32

## 2017-10-30 RX ADMIN — QUETIAPINE FUMARATE 50 MILLIGRAM(S): 200 TABLET, FILM COATED ORAL at 11:31

## 2017-10-30 RX ADMIN — Medication 100 MILLIGRAM(S): at 18:38

## 2017-10-30 RX ADMIN — Medication 81 MILLIGRAM(S): at 11:31

## 2017-10-30 RX ADMIN — Medication 20 MILLIGRAM(S): at 18:38

## 2017-10-30 RX ADMIN — CLOPIDOGREL BISULFATE 75 MILLIGRAM(S): 75 TABLET, FILM COATED ORAL at 11:31

## 2017-10-30 RX ADMIN — HEPARIN SODIUM 5000 UNIT(S): 5000 INJECTION INTRAVENOUS; SUBCUTANEOUS at 21:54

## 2017-10-30 RX ADMIN — HEPARIN SODIUM 5000 UNIT(S): 5000 INJECTION INTRAVENOUS; SUBCUTANEOUS at 15:41

## 2017-10-30 RX ADMIN — PANTOPRAZOLE SODIUM 40 MILLIGRAM(S): 20 TABLET, DELAYED RELEASE ORAL at 06:44

## 2017-10-30 RX ADMIN — GABAPENTIN 300 MILLIGRAM(S): 400 CAPSULE ORAL at 21:54

## 2017-10-30 RX ADMIN — Medication 3: at 17:40

## 2017-10-30 RX ADMIN — Medication: at 13:06

## 2017-10-30 RX ADMIN — Medication 20 MILLIGRAM(S): at 06:44

## 2017-10-30 NOTE — PROGRESS NOTE ADULT - SUBJECTIVE AND OBJECTIVE BOX
Patient is a 52y old  Female who presents with a chief complaint of Flank and epigastric pain (16 Oct 2017 17:53)      INTERVAL HPI/OVERNIGHT EVENTS:  T(C): 37.2 (10-30-17 @ 13:44), Max: 37.2 (10-30-17 @ 13:44)  HR: 79 (10-30-17 @ 13:44) (72 - 79)  BP: 116/73 (10-30-17 @ 13:44) (116/73 - 129/70)  RR: 17 (10-30-17 @ 13:44) (17 - 17)  SpO2: 100% (10-30-17 @ 13:44) (100% - 100%)  Wt(kg): --  I&O's Summary      LABS:                        10.4   6.41  )-----------( 260      ( 30 Oct 2017 06:30 )             34.5     10-30    135  |  95<L>  |  42<H>  ----------------------------<  139<H>  4.7   |  25  |  6.75<H>    Ca    9.1      30 Oct 2017 06:30  Phos  4.1     10-30  Mg     2.3     10-30        Urinalysis Basic - ( 29 Oct 2017 07:00 )    Color: PLYEL / Appearance: CLEAR / S.013 / pH: 6.5  Gluc: 100 / Ketone: NEGATIVE  / Bili: NEGATIVE / Urobili: NORMAL E.U.   Blood: NEGATIVE / Protein: >600 / Nitrite: NEGATIVE   Leuk Esterase: NEGATIVE / RBC: 0-2 / WBC 2-5   Sq Epi: OCC / Non Sq Epi: x / Bacteria: FEW      CAPILLARY BLOOD GLUCOSE      POCT Blood Glucose.: 275 mg/dL (30 Oct 2017 16:54)  POCT Blood Glucose.: 168 mg/dL (30 Oct 2017 12:20)  POCT Blood Glucose.: 123 mg/dL (30 Oct 2017 08:57)  POCT Blood Glucose.: 111 mg/dL (29 Oct 2017 22:32)        Urinalysis Basic - ( 29 Oct 2017 07:00 )    Color: PLYEL / Appearance: CLEAR / S.013 / pH: 6.5  Gluc: 100 / Ketone: NEGATIVE  / Bili: NEGATIVE / Urobili: NORMAL E.U.   Blood: NEGATIVE / Protein: >600 / Nitrite: NEGATIVE   Leuk Esterase: NEGATIVE / RBC: 0-2 / WBC 2-5   Sq Epi: OCC / Non Sq Epi: x / Bacteria: FEW        MEDICATIONS  (STANDING):  amLODIPine   Tablet 10 milliGRAM(s) Oral daily  aspirin enteric coated 81 milliGRAM(s) Oral daily  clopidogrel Tablet 75 milliGRAM(s) Oral daily  dextrose 5%. 1000 milliLiter(s) (50 mL/Hr) IV Continuous <Continuous>  dextrose 50% Injectable 12.5 Gram(s) IV Push once  dextrose 50% Injectable 25 Gram(s) IV Push once  dextrose 50% Injectable 25 Gram(s) IV Push once  docusate sodium 100 milliGRAM(s) Oral two times a day  furosemide    Tablet 20 milliGRAM(s) Oral two times a day  gabapentin 300 milliGRAM(s) Oral at bedtime  heparin  Injectable 5000 Unit(s) SubCutaneous every 8 hours  influenza   Vaccine 0.5 milliLiter(s) IntraMuscular once  insulin glargine Injectable (LANTUS) 20 Unit(s) SubCutaneous at bedtime  insulin lispro (HumaLOG) corrective regimen sliding scale   SubCutaneous three times a day before meals  insulin lispro (HumaLOG) corrective regimen sliding scale   SubCutaneous at bedtime  pantoprazole    Tablet 40 milliGRAM(s) Oral before breakfast  polyethylene glycol 3350 17 Gram(s) Oral daily  QUEtiapine 50 milliGRAM(s) Oral daily  sertraline 200 milliGRAM(s) Oral daily    MEDICATIONS  (PRN):  acetaminophen   Tablet 650 milliGRAM(s) Oral every 6 hours PRN For Temp greater than 38 C (100.4 F)  acetaminophen   Tablet. 650 milliGRAM(s) Oral every 6 hours PRN mild and Moderate  dextrose Gel 1 Dose(s) Oral once PRN Blood Glucose LESS THAN 70 milliGRAM(s)/deciliter  glucagon  Injectable 1 milliGRAM(s) IntraMuscular once PRN Glucose LESS THAN 70 milligrams/deciliter          PHYSICAL EXAM:  GENERAL: NAD, well-groomed, well-developed  HEAD:  Atraumatic, Normocephalic  CHEST/LUNG: Clear to percussion bilaterally; No rales, rhonchi, wheezing, or rubs  HEART: Regular rate and rhythm; No murmurs, rubs, or gallops  ABDOMEN: Soft, Nontender, Nondistended; Bowel sounds present  EXTREMITIES:  2+ Peripheral Pulses, No clubbing, cyanosis, or edema  LYMPH: No lymphadenopathy noted  SKIN: No rashes or lesions    Care Discussed with Consultants/Other Providers [+ ] YES  [ ] NO

## 2017-10-30 NOTE — PROGRESS NOTE ADULT - SUBJECTIVE AND OBJECTIVE BOX
No pain, no shortness of breath      VITAL:  T(C): , Max: 36.5 (10-29-17 @ 23:02)  T(F): , Max: 97.7 (10-29-17 @ 23:02)  HR: 72 (10-30-17 @ 06:41)  BP: 121/77 (10-30-17 @ 06:41)  BP(mean): --  RR: 17 (10-30-17 @ 06:41)  SpO2: 100% (10-30-17 @ 06:41)  Wt(kg): --      PHYSICAL EXAM:  Constitutional: Obese, NAD, Alert  HEENT: NCAT, DMM  Neck: Supple, No JVD  Respiratory: CTA-b/l  Cardiovascular: RRR s1s2, no m/r/g  Gastrointestinal: BS+, soft, NT/ND  Extremities: 1+ b/l LE edema  Neurological: no focal deficits; strength grossly intact; no asterixis  Psychiatric: Normal mood, normal affect  Back: no CVAT b/l  Skin: No rashes, no nevi  Access: RIJ tunneled cath; LUE AVF (+)thrill      LABS:                        10.4   6.41  )-----------( 260      ( 30 Oct 2017 06:30 )             34.5     Na(135)/K(4.7)/Cl(95)/HCO3(25)/BUN(42)/Cr(6.75)Glu(139)/Ca(9.1)/Mg(2.3)/PO4(4.1)    10-30 @ 06:30  Na(137)/K(4.6)/Cl(97)/HCO3(27)/BUN(33)/Cr(6.00)Glu(123)/Ca(8.6)/Mg(2.2)/PO4(3.7)    10-29 @ 06:16  Na(137)/K(4.2)/Cl(97)/HCO3(27)/BUN(45)/Cr(6.61)Glu(101)/Ca(9.1)/Mg(2.2)/PO4(5.0)    10-28 @ 07:23    Urinalysis Basic - ( 29 Oct 2017 07:00 )  Color: PLYEL / Appearance: CLEAR / S.013 / pH: 6.5  Gluc: 100 / Ketone: NEGATIVE  / Bili: NEGATIVE / Urobili: NORMAL E.U.   Blood: NEGATIVE / Protein: >600 / Nitrite: NEGATIVE   Leuk Esterase: NEGATIVE / RBC: 0-2 / WBC 2-5   Sq Epi: OCC / Non Sq Epi: x / Bacteria: FEW      IMPRESSION: 52F w/ HTN, DM2, CAD, and CKD5, 10/16/17 a/w vomiting/epigastric pain/flank pain    (1)Renal - newly ESRD-HD as of this admission;  of late receiving HD TTS - due for next HD tomorrow    (2)Vascular - s/p left radiocephalic AVF creation by Dr. Radha Capps during this admmission. Now also with tunneled HD cath.     (3)SW - awaiting acceptance into outpatient HD/homeless shelter      RECOMMEND:  (1)Next HD tomorrow   (2)F/U with SW regarding outpatient HD/living arrangements              Wale Francisco MD  [facility]  [phone]

## 2017-10-30 NOTE — PROGRESS NOTE ADULT - SUBJECTIVE AND OBJECTIVE BOX
Chaz Freitas MD  Interventional Cardiology  [place] Office : [address] [place] [address] [address] [address]  Tel:   [place] Office : [address] [facility] [place] [address]  Tel: [phone]  Cell : [phone]    Subjective : Pt lying in bed comfortable, not in distress, denies any chest pain or SOB  	  MEDICATIONS:  amLODIPine   Tablet 10 milliGRAM(s) Oral daily  aspirin enteric coated 81 milliGRAM(s) Oral daily  clopidogrel Tablet 75 milliGRAM(s) Oral daily  furosemide    Tablet 20 milliGRAM(s) Oral two times a day  heparin  Injectable 5000 Unit(s) SubCutaneous every 8 hours        acetaminophen   Tablet 650 milliGRAM(s) Oral every 6 hours PRN  acetaminophen   Tablet. 650 milliGRAM(s) Oral every 6 hours PRN  gabapentin 300 milliGRAM(s) Oral at bedtime  QUEtiapine 50 milliGRAM(s) Oral daily  sertraline 200 milliGRAM(s) Oral daily    docusate sodium 100 milliGRAM(s) Oral two times a day  pantoprazole    Tablet 40 milliGRAM(s) Oral before breakfast  polyethylene glycol 3350 17 Gram(s) Oral daily    dextrose 50% Injectable 12.5 Gram(s) IV Push once  dextrose 50% Injectable 25 Gram(s) IV Push once  dextrose 50% Injectable 25 Gram(s) IV Push once  dextrose Gel 1 Dose(s) Oral once PRN  glucagon  Injectable 1 milliGRAM(s) IntraMuscular once PRN  insulin glargine Injectable (LANTUS) 20 Unit(s) SubCutaneous at bedtime  insulin lispro (HumaLOG) corrective regimen sliding scale   SubCutaneous three times a day before meals  insulin lispro (HumaLOG) corrective regimen sliding scale   SubCutaneous at bedtime    dextrose 5%. 1000 milliLiter(s) IV Continuous <Continuous>  influenza   Vaccine 0.5 milliLiter(s) IntraMuscular once      PHYSICAL EXAM:  T(C): 36.4 (10-30-17 @ 06:41), Max: 36.5 (10-29-17 @ 23:02)  HR: 72 (10-30-17 @ 06:41) (72 - 75)  BP: 121/77 (10-30-17 @ 06:41) (121/77 - 129/70)  RR: 17 (10-30-17 @ 06:41) (17 - 17)  SpO2: 100% (10-30-17 @ 06:41) (100% - 100%)  Wt(kg): --  I&O's Summary        Appearance: Normal	  HEENT:   Normal oral mucosa, PERRL, EOMI	  Cardiovascular: Normal S1 S2, No JVD, No murmurs, No edema  Respiratory: Lungs clear to auscultation	  Gastrointestinal:  Soft, Non-tender, + BS	  Extremities: left arm AVF ok                                    10.4   6.41  )-----------( 260      ( 30 Oct 2017 06:30 )             34.5     10-30    135  |  95<L>  |  42<H>  ----------------------------<  139<H>  4.7   |  25  |  6.75<H>    Ca    9.1      30 Oct 2017 06:30  Phos  4.1     10-30  Mg     2.3     10-30      proBNP:   Lipid Profile:   HgA1c:   TSH:

## 2017-10-31 LAB
-  AMIKACIN: SIGNIFICANT CHANGE UP
-  AMPICILLIN/SULBACTAM: SIGNIFICANT CHANGE UP
-  AMPICILLIN: SIGNIFICANT CHANGE UP
-  AZTREONAM: SIGNIFICANT CHANGE UP
-  CEFAZOLIN: SIGNIFICANT CHANGE UP
-  CEFEPIME: SIGNIFICANT CHANGE UP
-  CEFOXITIN: SIGNIFICANT CHANGE UP
-  CEFTAZIDIME: SIGNIFICANT CHANGE UP
-  CEFTRIAXONE: SIGNIFICANT CHANGE UP
-  CIPROFLOXACIN: SIGNIFICANT CHANGE UP
-  ERTAPENEM: SIGNIFICANT CHANGE UP
-  GENTAMICIN: SIGNIFICANT CHANGE UP
-  LEVOFLOXACIN: SIGNIFICANT CHANGE UP
-  MEROPENEM: SIGNIFICANT CHANGE UP
-  NITROFURANTOIN: SIGNIFICANT CHANGE UP
-  PIPERACILLIN/TAZOBACTAM: SIGNIFICANT CHANGE UP
-  TOBRAMYCIN: SIGNIFICANT CHANGE UP
-  TRIMETHOPRIM/SULFAMETHOXAZOLE: SIGNIFICANT CHANGE UP
BACTERIA UR CULT: SIGNIFICANT CHANGE UP
BUN SERPL-MCNC: 54 MG/DL — HIGH (ref 7–23)
CALCIUM SERPL-MCNC: 8.7 MG/DL — SIGNIFICANT CHANGE UP (ref 8.4–10.5)
CHLORIDE SERPL-SCNC: 94 MMOL/L — LOW (ref 98–107)
CO2 SERPL-SCNC: 24 MMOL/L — SIGNIFICANT CHANGE UP (ref 22–31)
CREAT SERPL-MCNC: 7.33 MG/DL — HIGH (ref 0.5–1.3)
GLUCOSE SERPL-MCNC: 252 MG/DL — HIGH (ref 70–99)
HCT VFR BLD CALC: 31 % — LOW (ref 34.5–45)
HGB BLD-MCNC: 9.8 G/DL — LOW (ref 11.5–15.5)
MAGNESIUM SERPL-MCNC: 2.2 MG/DL — SIGNIFICANT CHANGE UP (ref 1.6–2.6)
MCHC RBC-ENTMCNC: 28.4 PG — SIGNIFICANT CHANGE UP (ref 27–34)
MCHC RBC-ENTMCNC: 31.6 % — LOW (ref 32–36)
MCV RBC AUTO: 89.9 FL — SIGNIFICANT CHANGE UP (ref 80–100)
METHOD TYPE: SIGNIFICANT CHANGE UP
NRBC # FLD: 0 — SIGNIFICANT CHANGE UP
ORGANISM # SPEC MICROSCOPIC CNT: SIGNIFICANT CHANGE UP
ORGANISM # SPEC MICROSCOPIC CNT: SIGNIFICANT CHANGE UP
PHOSPHATE SERPL-MCNC: 4.6 MG/DL — HIGH (ref 2.5–4.5)
PLATELET # BLD AUTO: 259 K/UL — SIGNIFICANT CHANGE UP (ref 150–400)
PMV BLD: 11 FL — SIGNIFICANT CHANGE UP (ref 7–13)
POTASSIUM SERPL-MCNC: 4.8 MMOL/L — SIGNIFICANT CHANGE UP (ref 3.5–5.3)
POTASSIUM SERPL-SCNC: 4.8 MMOL/L — SIGNIFICANT CHANGE UP (ref 3.5–5.3)
RBC # BLD: 3.45 M/UL — LOW (ref 3.8–5.2)
RBC # FLD: 13.4 % — SIGNIFICANT CHANGE UP (ref 10.3–14.5)
SODIUM SERPL-SCNC: 132 MMOL/L — LOW (ref 135–145)
WBC # BLD: 6.8 K/UL — SIGNIFICANT CHANGE UP (ref 3.8–10.5)
WBC # FLD AUTO: 6.8 K/UL — SIGNIFICANT CHANGE UP (ref 3.8–10.5)

## 2017-10-31 RX ADMIN — Medication 2: at 14:05

## 2017-10-31 RX ADMIN — CLOPIDOGREL BISULFATE 75 MILLIGRAM(S): 75 TABLET, FILM COATED ORAL at 11:44

## 2017-10-31 RX ADMIN — Medication 20 MILLIGRAM(S): at 21:28

## 2017-10-31 RX ADMIN — AMLODIPINE BESYLATE 10 MILLIGRAM(S): 2.5 TABLET ORAL at 10:26

## 2017-10-31 RX ADMIN — HEPARIN SODIUM 5000 UNIT(S): 5000 INJECTION INTRAVENOUS; SUBCUTANEOUS at 05:45

## 2017-10-31 RX ADMIN — Medication 1: at 08:17

## 2017-10-31 RX ADMIN — HEPARIN SODIUM 5000 UNIT(S): 5000 INJECTION INTRAVENOUS; SUBCUTANEOUS at 21:29

## 2017-10-31 RX ADMIN — INSULIN GLARGINE 20 UNIT(S): 100 INJECTION, SOLUTION SUBCUTANEOUS at 22:27

## 2017-10-31 RX ADMIN — GABAPENTIN 300 MILLIGRAM(S): 400 CAPSULE ORAL at 21:28

## 2017-10-31 RX ADMIN — PANTOPRAZOLE SODIUM 40 MILLIGRAM(S): 20 TABLET, DELAYED RELEASE ORAL at 05:46

## 2017-10-31 RX ADMIN — Medication 2: at 17:13

## 2017-10-31 RX ADMIN — SERTRALINE 200 MILLIGRAM(S): 25 TABLET, FILM COATED ORAL at 11:44

## 2017-10-31 RX ADMIN — HEPARIN SODIUM 5000 UNIT(S): 5000 INJECTION INTRAVENOUS; SUBCUTANEOUS at 14:06

## 2017-10-31 RX ADMIN — QUETIAPINE FUMARATE 50 MILLIGRAM(S): 200 TABLET, FILM COATED ORAL at 11:44

## 2017-10-31 RX ADMIN — Medication 81 MILLIGRAM(S): at 11:44

## 2017-10-31 RX ADMIN — Medication 100 MILLIGRAM(S): at 05:46

## 2017-10-31 RX ADMIN — Medication 20 MILLIGRAM(S): at 10:25

## 2017-10-31 NOTE — PROGRESS NOTE ADULT - SUBJECTIVE AND OBJECTIVE BOX
Chaz Freitas MD  Interventional Cardiology  [place] Office : [address] [place] [address] [address] [address]  Tel:   [place] Office : [address] [facility] [place] [address]  Tel: [phone]  Cell : [phone]    Subjective : Pt lying in bed comfortable, not in distress, denies any chest pain or SOB  	  MEDICATIONS:  amLODIPine   Tablet 10 milliGRAM(s) Oral daily  aspirin enteric coated 81 milliGRAM(s) Oral daily  clopidogrel Tablet 75 milliGRAM(s) Oral daily  furosemide    Tablet 20 milliGRAM(s) Oral two times a day  heparin  Injectable 5000 Unit(s) SubCutaneous every 8 hours        acetaminophen   Tablet 650 milliGRAM(s) Oral every 6 hours PRN  acetaminophen   Tablet. 650 milliGRAM(s) Oral every 6 hours PRN  gabapentin 300 milliGRAM(s) Oral at bedtime  QUEtiapine 50 milliGRAM(s) Oral daily  sertraline 200 milliGRAM(s) Oral daily    docusate sodium 100 milliGRAM(s) Oral two times a day  pantoprazole    Tablet 40 milliGRAM(s) Oral before breakfast  polyethylene glycol 3350 17 Gram(s) Oral daily    dextrose 50% Injectable 12.5 Gram(s) IV Push once  dextrose 50% Injectable 25 Gram(s) IV Push once  dextrose 50% Injectable 25 Gram(s) IV Push once  dextrose Gel 1 Dose(s) Oral once PRN  glucagon  Injectable 1 milliGRAM(s) IntraMuscular once PRN  insulin glargine Injectable (LANTUS) 20 Unit(s) SubCutaneous at bedtime  insulin lispro (HumaLOG) corrective regimen sliding scale   SubCutaneous three times a day before meals  insulin lispro (HumaLOG) corrective regimen sliding scale   SubCutaneous at bedtime    dextrose 5%. 1000 milliLiter(s) IV Continuous <Continuous>  influenza   Vaccine 0.5 milliLiter(s) IntraMuscular once      PHYSICAL EXAM:  T(C): 36.8 (10-31-17 @ 10:20), Max: 37.2 (10-30-17 @ 13:44)  HR: 81 (10-31-17 @ 10:20) (67 - 81)  BP: 119/71 (10-31-17 @ 10:20) (116/73 - 158/86)  RR: 18 (10-31-17 @ 10:20) (16 - 18)  SpO2: 100% (10-31-17 @ 10:20) (100% - 100%)  Wt(kg): --  I&O's Summary    31 Oct 2017 07:01  -  31 Oct 2017 13:22  --------------------------------------------------------  IN: 500 mL / OUT: 1500 mL / NET: -1000 mL          Appearance: Normal	  HEENT:   Normal oral mucosa, PERRL, EOMI	  Cardiovascular: Normal S1 S2, No JVD, No murmurs, No edema  Respiratory: Lungs clear to auscultation	  Gastrointestinal:  Soft, Non-tender, + BS	  Extremities: Normal range of motion, No clubbing, cyanosis or edema                                    9.8    6.80  )-----------( 259      ( 31 Oct 2017 06:30 )             31.0     10-31    132<L>  |  94<L>  |  54<H>  ----------------------------<  252<H>  4.8   |  24  |  7.33<H>    Ca    8.7      31 Oct 2017 07:30  Phos  4.6     10-31  Mg     2.2     10-31      proBNP:   Lipid Profile:   HgA1c:   TSH:

## 2017-10-31 NOTE — PROGRESS NOTE ADULT - SUBJECTIVE AND OBJECTIVE BOX
Patient is a 52y old  Female who presents with a chief complaint of Flank and epigastric pain (16 Oct 2017 17:53)      INTERVAL HPI/OVERNIGHT EVENTS:  T(C): 36.8 (10-31-17 @ 10:20), Max: 36.9 (10-31-17 @ 09:30)  HR: 81 (10-31-17 @ 10:20) (67 - 81)  BP: 119/71 (10-31-17 @ 10:20) (119/71 - 158/86)  RR: 18 (10-31-17 @ 10:20) (16 - 18)  SpO2: 100% (10-31-17 @ 10:20) (100% - 100%)  Wt(kg): --  I&O's Summary    31 Oct 2017 07:01  -  31 Oct 2017 14:50  --------------------------------------------------------  IN: 500 mL / OUT: 1500 mL / NET: -1000 mL        LABS:                        9.8    6.80  )-----------( 259      ( 31 Oct 2017 06:30 )             31.0     10-31    132<L>  |  94<L>  |  54<H>  ----------------------------<  252<H>  4.8   |  24  |  7.33<H>    Ca    8.7      31 Oct 2017 07:30  Phos  4.6     10-31  Mg     2.2     10-31          CAPILLARY BLOOD GLUCOSE      POCT Blood Glucose.: 232 mg/dL (31 Oct 2017 11:56)  POCT Blood Glucose.: 160 mg/dL (31 Oct 2017 08:10)  POCT Blood Glucose.: 233 mg/dL (30 Oct 2017 21:33)  POCT Blood Glucose.: 275 mg/dL (30 Oct 2017 16:54)            MEDICATIONS  (STANDING):  amLODIPine   Tablet 10 milliGRAM(s) Oral daily  aspirin enteric coated 81 milliGRAM(s) Oral daily  clopidogrel Tablet 75 milliGRAM(s) Oral daily  dextrose 5%. 1000 milliLiter(s) (50 mL/Hr) IV Continuous <Continuous>  dextrose 50% Injectable 12.5 Gram(s) IV Push once  dextrose 50% Injectable 25 Gram(s) IV Push once  dextrose 50% Injectable 25 Gram(s) IV Push once  docusate sodium 100 milliGRAM(s) Oral two times a day  furosemide    Tablet 20 milliGRAM(s) Oral two times a day  gabapentin 300 milliGRAM(s) Oral at bedtime  heparin  Injectable 5000 Unit(s) SubCutaneous every 8 hours  influenza   Vaccine 0.5 milliLiter(s) IntraMuscular once  insulin glargine Injectable (LANTUS) 20 Unit(s) SubCutaneous at bedtime  insulin lispro (HumaLOG) corrective regimen sliding scale   SubCutaneous three times a day before meals  insulin lispro (HumaLOG) corrective regimen sliding scale   SubCutaneous at bedtime  pantoprazole    Tablet 40 milliGRAM(s) Oral before breakfast  polyethylene glycol 3350 17 Gram(s) Oral daily  QUEtiapine 50 milliGRAM(s) Oral daily  sertraline 200 milliGRAM(s) Oral daily    MEDICATIONS  (PRN):  acetaminophen   Tablet 650 milliGRAM(s) Oral every 6 hours PRN For Temp greater than 38 C (100.4 F)  acetaminophen   Tablet. 650 milliGRAM(s) Oral every 6 hours PRN mild and Moderate  dextrose Gel 1 Dose(s) Oral once PRN Blood Glucose LESS THAN 70 milliGRAM(s)/deciliter  glucagon  Injectable 1 milliGRAM(s) IntraMuscular once PRN Glucose LESS THAN 70 milligrams/deciliter          PHYSICAL EXAM:  GENERAL: NAD, well-groomed, well-developed  HEAD:  Atraumatic, Normocephalic  CHEST/LUNG: Clear to percussion bilaterally; No rales, rhonchi, wheezing, or rubs  HEART: Regular rate and rhythm; No murmurs, rubs, or gallops  ABDOMEN: Soft, Nontender, Nondistended; Bowel sounds present  EXTREMITIES:  2+ Peripheral Pulses, No clubbing, cyanosis, or edema  LYMPH: No lymphadenopathy noted  SKIN: No rashes or lesions    Care Discussed with Consultants/Other Providers [ _] YES  [ ] NO

## 2017-10-31 NOTE — PROGRESS NOTE ADULT - ASSESSMENT
52F w/ HTN, DM2, DCHF, CAD, and CKD5, 10/16/17 a/w vomiting/epigastric pain/flank pain      1) atypical chest pain   no chest pain or sob   nuclear stress test shows no ischemia EF 47%   cont asa    2) ESRD  new HD   renal f.u   S/P AVF    3) h/o CVA   cont asa/ plavix / zocor    4) DCHF  volume status stable   fluid removal with HD/ lasix     DC,  pending placement, pt is homeless

## 2017-10-31 NOTE — PROGRESS NOTE ADULT - SUBJECTIVE AND OBJECTIVE BOX
No pain, no shortness of breath      VITAL:  T(C): , Max: 37.2 (10-30-17 @ 13:44)  T(F): , Max: 98.9 (10-30-17 @ 13:44)  HR: 72 (10-31-17 @ 06:30)  BP: 158/86 (10-31-17 @ 06:30)  BP(mean): --  RR: 16 (10-31-17 @ 06:30)  SpO2: 100% (10-31-17 @ 05:43)      PHYSICAL EXAM:  Constitutional: Obese, NAD, Alert  HEENT: NCAT, DMM  Neck: Supple, No JVD  Respiratory: CTA-b/l  Cardiovascular: RRR s1s2, no m/r/g  Gastrointestinal: BS+, soft, NT/ND  Extremities: 1+ b/l LE edema  Neurological: no focal deficits; strength grossly intact; no asterixis  Psychiatric: Normal mood, normal affect  Back: no CVAT b/l  Skin: No rashes, no nevi  Access: RIJ tunneled cath; LUE AVF (+)thrill      LABS:                        9.8    6.80  )-----------( 259      ( 31 Oct 2017 06:30 )             31.0     Na(135)/K(4.7)/Cl(95)/HCO3(25)/BUN(42)/Cr(6.75)Glu(139)/Ca(9.1)/Mg(2.3)/PO4(4.1)    10-30 @ 06:30  Na(137)/K(4.6)/Cl(97)/HCO3(27)/BUN(33)/Cr(6.00)Glu(123)/Ca(8.6)/Mg(2.2)/PO4(3.7)    10-29 @ 06:16      IMPRESSION: 52F w/ HTN, DM2, CAD, and CKD5, 10/16/17 a/w vomiting/epigastric pain/flank pain    (1)Renal - newly ESRD-HD as of this admission;  of late receiving HD TTS - due for next HD today    (2)Vascular - s/p left radiocephalic AVF creation by Dr. Radha Capps during this admission. Now also with tunneled HD cath.     (3)SW - awaiting acceptance into outpatient HD/homeless shelter      RECOMMEND:  (1)Next HD today  (2)F/U with SW regarding outpatient HD/living arrangements                Wale Francisco MD  [facility]  [phone] No pain, no shortness of breath. Seen on HD.      VITAL:  T(C): , Max: 37.2 (10-30-17 @ 13:44)  T(F): , Max: 98.9 (10-30-17 @ 13:44)  HR: 72 (10-31-17 @ 06:30)  BP: 158/86 (10-31-17 @ 06:30)  BP(mean): --  RR: 16 (10-31-17 @ 06:30)  SpO2: 100% (10-31-17 @ 05:43)      PHYSICAL EXAM:  Constitutional: Obese, NAD, Alert  HEENT: NCAT, DMM  Neck: Supple, No JVD  Respiratory: CTA-b/l  Cardiovascular: RRR s1s2, no m/r/g  Gastrointestinal: BS+, soft, NT/ND  Extremities: 1+ b/l LE edema  Neurological: no focal deficits; strength grossly intact; no asterixis  Psychiatric: Normal mood, normal affect  Back: no CVAT b/l  Skin: No rashes, no nevi  Access: RIJ tunneled cath- accessed; LUE AVF (+)thrill      LABS:                        9.8    6.80  )-----------( 259      ( 31 Oct 2017 06:30 )             31.0     Na(135)/K(4.7)/Cl(95)/HCO3(25)/BUN(42)/Cr(6.75)Glu(139)/Ca(9.1)/Mg(2.3)/PO4(4.1)    10-30 @ 06:30  Na(137)/K(4.6)/Cl(97)/HCO3(27)/BUN(33)/Cr(6.00)Glu(123)/Ca(8.6)/Mg(2.2)/PO4(3.7)    10-29 @ 06:16      IMPRESSION: 52F w/ HTN, DM2, CAD, and CKD5, 10/16/17 a/w vomiting/epigastric pain/flank pain    (1)Renal - newly ESRD-HD as of this admission;  of late receiving HD TTS - tolerating HD now.    (2)Vascular - s/p left radiocephalic AVF creation by Dr. Radha Capps during this admission. Now also with tunneled HD cath.     (3)SW - awaiting acceptance into outpatient HD/homeless shelter      RECOMMEND:  (1)HD today as ordered  (2)F/U with SW regarding outpatient HD/living arrangements                Wale Francisco MD  [facility]  [phone]

## 2017-11-01 LAB
BUN SERPL-MCNC: 44 MG/DL — HIGH (ref 7–23)
CALCIUM SERPL-MCNC: 9.1 MG/DL — SIGNIFICANT CHANGE UP (ref 8.4–10.5)
CHLORIDE SERPL-SCNC: 98 MMOL/L — SIGNIFICANT CHANGE UP (ref 98–107)
CO2 SERPL-SCNC: 25 MMOL/L — SIGNIFICANT CHANGE UP (ref 22–31)
CREAT SERPL-MCNC: 6.08 MG/DL — HIGH (ref 0.5–1.3)
GLUCOSE SERPL-MCNC: 173 MG/DL — HIGH (ref 70–99)
HCT VFR BLD CALC: 34.4 % — LOW (ref 34.5–45)
HGB BLD-MCNC: 10.8 G/DL — LOW (ref 11.5–15.5)
MAGNESIUM SERPL-MCNC: 2.3 MG/DL — SIGNIFICANT CHANGE UP (ref 1.6–2.6)
MCHC RBC-ENTMCNC: 28.2 PG — SIGNIFICANT CHANGE UP (ref 27–34)
MCHC RBC-ENTMCNC: 31.4 % — LOW (ref 32–36)
MCV RBC AUTO: 89.8 FL — SIGNIFICANT CHANGE UP (ref 80–100)
NRBC # FLD: 0 — SIGNIFICANT CHANGE UP
PLATELET # BLD AUTO: 257 K/UL — SIGNIFICANT CHANGE UP (ref 150–400)
PMV BLD: 11 FL — SIGNIFICANT CHANGE UP (ref 7–13)
POTASSIUM SERPL-MCNC: 5 MMOL/L — SIGNIFICANT CHANGE UP (ref 3.5–5.3)
POTASSIUM SERPL-SCNC: 5 MMOL/L — SIGNIFICANT CHANGE UP (ref 3.5–5.3)
RBC # BLD: 3.83 M/UL — SIGNIFICANT CHANGE UP (ref 3.8–5.2)
RBC # FLD: 13.3 % — SIGNIFICANT CHANGE UP (ref 10.3–14.5)
SODIUM SERPL-SCNC: 138 MMOL/L — SIGNIFICANT CHANGE UP (ref 135–145)
WBC # BLD: 6.44 K/UL — SIGNIFICANT CHANGE UP (ref 3.8–10.5)
WBC # FLD AUTO: 6.44 K/UL — SIGNIFICANT CHANGE UP (ref 3.8–10.5)

## 2017-11-01 RX ADMIN — AMLODIPINE BESYLATE 10 MILLIGRAM(S): 2.5 TABLET ORAL at 06:01

## 2017-11-01 RX ADMIN — Medication 650 MILLIGRAM(S): at 01:41

## 2017-11-01 RX ADMIN — HEPARIN SODIUM 5000 UNIT(S): 5000 INJECTION INTRAVENOUS; SUBCUTANEOUS at 07:49

## 2017-11-01 RX ADMIN — Medication 20 MILLIGRAM(S): at 18:47

## 2017-11-01 RX ADMIN — PANTOPRAZOLE SODIUM 40 MILLIGRAM(S): 20 TABLET, DELAYED RELEASE ORAL at 06:00

## 2017-11-01 RX ADMIN — Medication 650 MILLIGRAM(S): at 00:40

## 2017-11-01 RX ADMIN — SERTRALINE 200 MILLIGRAM(S): 25 TABLET, FILM COATED ORAL at 16:17

## 2017-11-01 RX ADMIN — Medication 100 MILLIGRAM(S): at 18:47

## 2017-11-01 RX ADMIN — Medication 20 MILLIGRAM(S): at 06:01

## 2017-11-01 RX ADMIN — Medication 81 MILLIGRAM(S): at 12:54

## 2017-11-01 RX ADMIN — INSULIN GLARGINE 20 UNIT(S): 100 INJECTION, SOLUTION SUBCUTANEOUS at 22:00

## 2017-11-01 RX ADMIN — Medication 2: at 18:47

## 2017-11-01 RX ADMIN — Medication 1: at 21:59

## 2017-11-01 RX ADMIN — QUETIAPINE FUMARATE 50 MILLIGRAM(S): 200 TABLET, FILM COATED ORAL at 12:55

## 2017-11-01 RX ADMIN — GABAPENTIN 300 MILLIGRAM(S): 400 CAPSULE ORAL at 21:24

## 2017-11-01 RX ADMIN — Medication 1: at 12:56

## 2017-11-01 RX ADMIN — Medication 100 MILLIGRAM(S): at 06:01

## 2017-11-01 RX ADMIN — HEPARIN SODIUM 5000 UNIT(S): 5000 INJECTION INTRAVENOUS; SUBCUTANEOUS at 12:56

## 2017-11-01 RX ADMIN — CLOPIDOGREL BISULFATE 75 MILLIGRAM(S): 75 TABLET, FILM COATED ORAL at 12:54

## 2017-11-01 RX ADMIN — HEPARIN SODIUM 5000 UNIT(S): 5000 INJECTION INTRAVENOUS; SUBCUTANEOUS at 21:24

## 2017-11-01 NOTE — PROGRESS NOTE ADULT - SUBJECTIVE AND OBJECTIVE BOX
Chaz Freitas MD  Interventional Cardiology  [place] Office : [address] [place] [address] [address]  Tel:   [place] Office : [address] [facility] [place] [address]  Tel: [phone]  Cell : [phone]    Subjective : Pt lying in bed comfortable, not in distress, denies any chest pain or SOB  	  MEDICATIONS:  amLODIPine   Tablet 10 milliGRAM(s) Oral daily  aspirin enteric coated 81 milliGRAM(s) Oral daily  clopidogrel Tablet 75 milliGRAM(s) Oral daily  furosemide    Tablet 20 milliGRAM(s) Oral two times a day  heparin  Injectable 5000 Unit(s) SubCutaneous every 8 hours        acetaminophen   Tablet 650 milliGRAM(s) Oral every 6 hours PRN  acetaminophen   Tablet. 650 milliGRAM(s) Oral every 6 hours PRN  gabapentin 300 milliGRAM(s) Oral at bedtime  QUEtiapine 50 milliGRAM(s) Oral daily  sertraline 200 milliGRAM(s) Oral daily    docusate sodium 100 milliGRAM(s) Oral two times a day  pantoprazole    Tablet 40 milliGRAM(s) Oral before breakfast  polyethylene glycol 3350 17 Gram(s) Oral daily    dextrose 50% Injectable 12.5 Gram(s) IV Push once  dextrose 50% Injectable 25 Gram(s) IV Push once  dextrose 50% Injectable 25 Gram(s) IV Push once  dextrose Gel 1 Dose(s) Oral once PRN  glucagon  Injectable 1 milliGRAM(s) IntraMuscular once PRN  insulin glargine Injectable (LANTUS) 20 Unit(s) SubCutaneous at bedtime  insulin lispro (HumaLOG) corrective regimen sliding scale   SubCutaneous three times a day before meals  insulin lispro (HumaLOG) corrective regimen sliding scale   SubCutaneous at bedtime    dextrose 5%. 1000 milliLiter(s) IV Continuous <Continuous>  influenza   Vaccine 0.5 milliLiter(s) IntraMuscular once      PHYSICAL EXAM:  T(C): 36.8 (11-01-17 @ 06:29), Max: 37.2 (10-31-17 @ 21:32)  HR: 73 (11-01-17 @ 06:29) (71 - 75)  BP: 135/90 (11-01-17 @ 06:29) (124/72 - 142/76)  RR: 18 (11-01-17 @ 06:29) (18 - 18)  SpO2: 100% (11-01-17 @ 06:29) (100% - 100%)  Wt(kg): --  I&O's Summary    31 Oct 2017 07:01  -  01 Nov 2017 07:00  --------------------------------------------------------  IN: 500 mL / OUT: 1500 mL / NET: -1000 mL          Appearance: Normal	  HEENT:   Normal oral mucosa, PERRL, EOMI	  Cardiovascular: Normal S1 S2, No JVD, No murmurs, No edema  Respiratory: Lungs clear to auscultation	  Gastrointestinal:  Soft, Non-tender, + BS	  Extremities: Normal range of motion, No clubbing, cyanosis or edema                                    10.8   6.44  )-----------( 257      ( 01 Nov 2017 06:30 )             34.4     11-01    138  |  98  |  44<H>  ----------------------------<  173<H>  5.0   |  25  |  6.08<H>    Ca    9.1      01 Nov 2017 06:30  Phos  4.6     10-31  Mg     2.3     11-01      proBNP:   Lipid Profile:   HgA1c:   TSH:

## 2017-11-01 NOTE — PROGRESS NOTE ADULT - SUBJECTIVE AND OBJECTIVE BOX
Nephrology progress note    No events overnight  patient looks comfortable and not in distress  No complaints except some constipation  Eating well and able to walk around    VITAL:  T(C): , Max: 37.2 (10-30-17 @ 13:44)  T(F): , Max: 98.9 (10-30-17 @ 13:44)  HR: 72 (10-31-17 @ 06:30)  BP: 158/86 (10-31-17 @ 06:30)  BP(mean): --  RR: 16 (10-31-17 @ 06:30)  SpO2: 100% (10-31-17 @ 05:43)      PHYSICAL EXAM:  GA: Obese, NAD, Alert  HEENT: NCAT, DMM  Neck: Supple, No JVD  Lungs: CTA-b/l, normal effort  Heart: RRR s1s2, no m/r/g  Abd: BS+, soft, NT/ND  Ext: well perfused, no edema or cyanosis  Neurological: no focal deficits; strength grossly intact; no asterixis  Psychiatric: Normal mood, normal affect  Skin: No rashes on visible skin  Access: RIJ tunneled cath- accessed; LUE AVF (+)thrill      LABS:                        9.8    6.80  )-----------( 259      ( 31 Oct 2017 06:30 )             31.0     Na(135)/K(4.7)/Cl(95)/HCO3(25)/BUN(42)/Cr(6.75)Glu(139)/Ca(9.1)/Mg(2.3)/PO4(4.1)    10-30 @ 06:30  Na(137)/K(4.6)/Cl(97)/HCO3(27)/BUN(33)/Cr(6.00)Glu(123)/Ca(8.6)/Mg(2.2)/PO4(3.7)    10-29 @ 06:16      IMPRESSION: 52F w/ HTN, DM2, CAD, and CKD5, 10/16/17 a/w vomiting/epigastric pain/flank pain    1. New ESRD - on HD as of this admission;  on a TTS schedule, next session tomorrow    2. Vascular - s/p left radiocephalic AVF creation by Dr. Radha Capps during this admission. Now also with tunneled HD cath.     3. Anemia of CKD: H&H acceptable,     4. SW - awaiting acceptance into outpatient HD/homeless shelter      RECOMMEND:  - continue HD on a TTS schedule, next session tomorrow  - continue current management  - ESRD diet: low phos and low K, low salt  - F/U with SW regarding outpatient HD/living arrangements                Wale Francisco MD  [facility]  [phone]

## 2017-11-01 NOTE — PROGRESS NOTE ADULT - SUBJECTIVE AND OBJECTIVE BOX
Patient is a 52y old  Female who presents with a chief complaint of Flank and epigastric pain (16 Oct 2017 17:53)      INTERVAL HPI/OVERNIGHT EVENTS:  T(C): 36.4 (11-01-17 @ 12:55), Max: 37.2 (10-31-17 @ 21:32)  HR: 71 (11-01-17 @ 12:55) (71 - 73)  BP: 134/70 (11-01-17 @ 12:55) (124/72 - 142/76)  RR: 18 (11-01-17 @ 12:55) (18 - 18)  SpO2: 98% (11-01-17 @ 12:55) (98% - 100%)  Wt(kg): --  I&O's Summary    31 Oct 2017 07:01  -  01 Nov 2017 07:00  --------------------------------------------------------  IN: 500 mL / OUT: 1500 mL / NET: -1000 mL        LABS:                        10.8   6.44  )-----------( 257      ( 01 Nov 2017 06:30 )             34.4     11-01    138  |  98  |  44<H>  ----------------------------<  173<H>  5.0   |  25  |  6.08<H>    Ca    9.1      01 Nov 2017 06:30  Phos  4.6     10-31  Mg     2.3     11-01          CAPILLARY BLOOD GLUCOSE      POCT Blood Glucose.: 255 mg/dL (01 Nov 2017 17:27)  POCT Blood Glucose.: 178 mg/dL (01 Nov 2017 12:35)  POCT Blood Glucose.: 145 mg/dL (01 Nov 2017 08:32)  POCT Blood Glucose.: 182 mg/dL (31 Oct 2017 22:25)            MEDICATIONS  (STANDING):  amLODIPine   Tablet 10 milliGRAM(s) Oral daily  aspirin enteric coated 81 milliGRAM(s) Oral daily  clopidogrel Tablet 75 milliGRAM(s) Oral daily  dextrose 5%. 1000 milliLiter(s) (50 mL/Hr) IV Continuous <Continuous>  dextrose 50% Injectable 12.5 Gram(s) IV Push once  dextrose 50% Injectable 25 Gram(s) IV Push once  dextrose 50% Injectable 25 Gram(s) IV Push once  docusate sodium 100 milliGRAM(s) Oral two times a day  furosemide    Tablet 20 milliGRAM(s) Oral two times a day  gabapentin 300 milliGRAM(s) Oral at bedtime  heparin  Injectable 5000 Unit(s) SubCutaneous every 8 hours  influenza   Vaccine 0.5 milliLiter(s) IntraMuscular once  insulin glargine Injectable (LANTUS) 20 Unit(s) SubCutaneous at bedtime  insulin lispro (HumaLOG) corrective regimen sliding scale   SubCutaneous three times a day before meals  insulin lispro (HumaLOG) corrective regimen sliding scale   SubCutaneous at bedtime  pantoprazole    Tablet 40 milliGRAM(s) Oral before breakfast  polyethylene glycol 3350 17 Gram(s) Oral daily  QUEtiapine 50 milliGRAM(s) Oral daily  sertraline 200 milliGRAM(s) Oral daily    MEDICATIONS  (PRN):  acetaminophen   Tablet 650 milliGRAM(s) Oral every 6 hours PRN For Temp greater than 38 C (100.4 F)  acetaminophen   Tablet. 650 milliGRAM(s) Oral every 6 hours PRN mild and Moderate  dextrose Gel 1 Dose(s) Oral once PRN Blood Glucose LESS THAN 70 milliGRAM(s)/deciliter  glucagon  Injectable 1 milliGRAM(s) IntraMuscular once PRN Glucose LESS THAN 70 milligrams/deciliter          PHYSICAL EXAM:  GENERAL: NAD, well-groomed, well-developed  HEAD:  Atraumatic, Normocephalic  CHEST/LUNG: Clear to percussion bilaterally; No rales, rhonchi, wheezing, or rubs  HEART: Regular rate and rhythm; No murmurs, rubs, or gallops  ABDOMEN: Soft, Nontender, Nondistended; Bowel sounds present  EXTREMITIES:  2+ Peripheral Pulses, No clubbing, cyanosis, or edema  LYMPH: No lymphadenopathy noted  SKIN: No rashes or lesions    Care Discussed with Consultants/Other Providers [ ] YES  [ ] NO

## 2017-11-01 NOTE — CHART NOTE - NSCHARTNOTEFT_GEN_A_CORE
Source: Patient [X]    Family [ ]     other [x]    Current Diet : DASH (cholesterol & Na restricted); Renal; Consistent Carbohydrate; 1.2 fluids restriction.   Reported:  [ ] nausea  [ ] vomiting [ ] diarrhea [ ] constipation  [ x]chewing problems [x ] swallowing issues  [ ] other:   PO intake:  < 50% [ ] 50-75% [ ]   % [ x]  other : Pt. states that the amount of food that she received is not enough for her.   Current Weight: 208.1 pounds  11-1-17  Nutrition follow-up 2/2 to extended length of stay. Patient denies any nausea/vomiting/diarrhea/constipation.  Pt states that he foods is too dry for her making it difficult ot chew/swallow.  Despite Pt's reports, Pt does not want soft/mechanical soft diet.   Pt. remains a poor candidate for diet education 2/2 disinterest. Pt continued to watch TV in attempt to educate and she was not able to teach back points.     __________________ Pertinent Medications__________________   MEDICATIONS  (STANDING):  amLODIPine   Tablet 10 milliGRAM(s) Oral daily  aspirin enteric coated 81 milliGRAM(s) Oral daily  clopidogrel Tablet 75 milliGRAM(s) Oral daily  dextrose 5%. 1000 milliLiter(s) (50 mL/Hr) IV Continuous <Continuous>  dextrose 50% Injectable 12.5 Gram(s) IV Push once  dextrose 50% Injectable 25 Gram(s) IV Push once  dextrose 50% Injectable 25 Gram(s) IV Push once  docusate sodium 100 milliGRAM(s) Oral two times a day  furosemide    Tablet 20 milliGRAM(s) Oral two times a day  gabapentin 300 milliGRAM(s) Oral at bedtime  heparin  Injectable 5000 Unit(s) SubCutaneous every 8 hours  influenza   Vaccine 0.5 milliLiter(s) IntraMuscular once  insulin glargine Injectable (LANTUS) 20 Unit(s) SubCutaneous at bedtime  insulin lispro (HumaLOG) corrective regimen sliding scale   SubCutaneous three times a day before meals  insulin lispro (HumaLOG) corrective regimen sliding scale   SubCutaneous at bedtime  pantoprazole    Tablet 40 milliGRAM(s) Oral before breakfast  polyethylene glycol 3350 17 Gram(s) Oral daily  QUEtiapine 50 milliGRAM(s) Oral daily  sertraline 200 milliGRAM(s) Oral daily    MEDICATIONS  (PRN):  acetaminophen   Tablet 650 milliGRAM(s) Oral every 6 hours PRN For Temp greater than 38 C (100.4 F)  acetaminophen   Tablet. 650 milliGRAM(s) Oral every 6 hours PRN mild and Moderate  dextrose Gel 1 Dose(s) Oral once PRN Blood Glucose LESS THAN 70 milliGRAM(s)/deciliter  glucagon  Injectable 1 milliGRAM(s) IntraMuscular once PRN Glucose LESS THAN 70 milligrams/deciliter      __________________ Pertinent Labs__________________   11-01 Na138 mmol/L Glu 173 mg/dL<H> K+ 5.0 mmol/L Cr  6.08 mg/dL<H> BUN 44 mg/dL<H> Phos n/a   Alb n/a   PAB n/a       ** Nutrition recommendations**  1- Continue current diet order, which remains appropriate at this time.   2- Monitor weights, labs, BM's, skin integrity, p.o. intake. 3- Please Encourage po intake, assist with meals and menu selections, provide alternatives PRN.   3- Please Encourage po intake, assist with meals and menu selections, provide alternatives PRN. 40  4- RD remains available, re-consult as needed. Kimberly Falk RD Pager #[phone]       Monitoring and Evaluation:     [ ] PO intake [ ] Tolerance to diet prescription [ ] weights [ ] follow up per protocol  [ ] other:

## 2017-11-02 LAB
BACTERIA BLD CULT: SIGNIFICANT CHANGE UP
BACTERIA BLD CULT: SIGNIFICANT CHANGE UP
BUN SERPL-MCNC: 52 MG/DL — HIGH (ref 7–23)
CALCIUM SERPL-MCNC: 9.3 MG/DL — SIGNIFICANT CHANGE UP (ref 8.4–10.5)
CHLORIDE SERPL-SCNC: 99 MMOL/L — SIGNIFICANT CHANGE UP (ref 98–107)
CO2 SERPL-SCNC: 24 MMOL/L — SIGNIFICANT CHANGE UP (ref 22–31)
CREAT SERPL-MCNC: 6.61 MG/DL — HIGH (ref 0.5–1.3)
GLUCOSE SERPL-MCNC: 132 MG/DL — HIGH (ref 70–99)
HCT VFR BLD CALC: 34.3 % — LOW (ref 34.5–45)
HGB BLD-MCNC: 10.4 G/DL — LOW (ref 11.5–15.5)
MCHC RBC-ENTMCNC: 27.9 PG — SIGNIFICANT CHANGE UP (ref 27–34)
MCHC RBC-ENTMCNC: 30.3 % — LOW (ref 32–36)
MCV RBC AUTO: 92 FL — SIGNIFICANT CHANGE UP (ref 80–100)
NRBC # FLD: 0 — SIGNIFICANT CHANGE UP
PLATELET # BLD AUTO: 291 K/UL — SIGNIFICANT CHANGE UP (ref 150–400)
PMV BLD: 11 FL — SIGNIFICANT CHANGE UP (ref 7–13)
POTASSIUM SERPL-MCNC: 4.8 MMOL/L — SIGNIFICANT CHANGE UP (ref 3.5–5.3)
POTASSIUM SERPL-SCNC: 4.8 MMOL/L — SIGNIFICANT CHANGE UP (ref 3.5–5.3)
RBC # BLD: 3.73 M/UL — LOW (ref 3.8–5.2)
RBC # FLD: 13.4 % — SIGNIFICANT CHANGE UP (ref 10.3–14.5)
SODIUM SERPL-SCNC: 137 MMOL/L — SIGNIFICANT CHANGE UP (ref 135–145)
WBC # BLD: 6.16 K/UL — SIGNIFICANT CHANGE UP (ref 3.8–10.5)
WBC # FLD AUTO: 6.16 K/UL — SIGNIFICANT CHANGE UP (ref 3.8–10.5)

## 2017-11-02 RX ADMIN — Medication 100 MILLIGRAM(S): at 17:49

## 2017-11-02 RX ADMIN — SERTRALINE 200 MILLIGRAM(S): 25 TABLET, FILM COATED ORAL at 11:45

## 2017-11-02 RX ADMIN — CLOPIDOGREL BISULFATE 75 MILLIGRAM(S): 75 TABLET, FILM COATED ORAL at 11:45

## 2017-11-02 RX ADMIN — HEPARIN SODIUM 5000 UNIT(S): 5000 INJECTION INTRAVENOUS; SUBCUTANEOUS at 05:17

## 2017-11-02 RX ADMIN — Medication 100 MILLIGRAM(S): at 05:17

## 2017-11-02 RX ADMIN — Medication 1: at 22:28

## 2017-11-02 RX ADMIN — INSULIN GLARGINE 20 UNIT(S): 100 INJECTION, SOLUTION SUBCUTANEOUS at 22:10

## 2017-11-02 RX ADMIN — QUETIAPINE FUMARATE 50 MILLIGRAM(S): 200 TABLET, FILM COATED ORAL at 11:45

## 2017-11-02 RX ADMIN — HEPARIN SODIUM 5000 UNIT(S): 5000 INJECTION INTRAVENOUS; SUBCUTANEOUS at 22:10

## 2017-11-02 RX ADMIN — HEPARIN SODIUM 5000 UNIT(S): 5000 INJECTION INTRAVENOUS; SUBCUTANEOUS at 13:50

## 2017-11-02 RX ADMIN — PANTOPRAZOLE SODIUM 40 MILLIGRAM(S): 20 TABLET, DELAYED RELEASE ORAL at 05:17

## 2017-11-02 RX ADMIN — Medication 20 MILLIGRAM(S): at 17:49

## 2017-11-02 RX ADMIN — Medication 2: at 17:49

## 2017-11-02 RX ADMIN — GABAPENTIN 300 MILLIGRAM(S): 400 CAPSULE ORAL at 22:10

## 2017-11-02 RX ADMIN — Medication 20 MILLIGRAM(S): at 05:17

## 2017-11-02 RX ADMIN — Medication 81 MILLIGRAM(S): at 11:45

## 2017-11-02 RX ADMIN — AMLODIPINE BESYLATE 10 MILLIGRAM(S): 2.5 TABLET ORAL at 05:17

## 2017-11-02 NOTE — PROGRESS NOTE ADULT - SUBJECTIVE AND OBJECTIVE BOX
Nephrology progress note    No events overnight  patient looks comfortable and not in distress  No complaints except some constipation  Eating well and able to walk around  Will be dialyzed today afternoon    VITAL:  Reviewed; stable hemodynamics afebrile     PHYSICAL EXAM:  GA: Obese, NAD, Alert  HEENT: NCAT, DMM  Neck: Supple, No JVD  Lungs: CTA-b/l, normal effort  Heart: RRR s1s2, no m/r/g  Abd: BS+, soft, NT/ND  Ext: well perfused, no edema or cyanosis  Neurological: no focal deficits; strength grossly intact; no asterixis  Psychiatric: Normal mood, normal affect  Skin: No rashes on visible skin  Access: RIJ tunneled cath- accessed; LUE AVF (+)thrill      LABS:             Reviewed; anemia with stable H&H    IMPRESSION: 52F w/ HTN, DM2, CAD, and CKD5, 10/16/17 a/w vomiting/epigastric pain/flank pain    1. New ESRD - on HD as of this admission;  on a TTS schedule,     2. Vascular - s/p left radiocephalic AVF creation by Dr. Radha Capps during this admission. Now also with tunneled HD cath.     3. Anemia of CKD: H&H acceptable,     4. SW - awaiting acceptance into outpatient HD/homeless shelter      RECOMMEND:  - continue HD on a TTS schedule, dialyze today  - continue current management  - ESRD diet: low phos and low K, low salt  - F/U with SW regarding outpatient HD/living arrangements    I will follow with you              Wale Francisco MD  [facility]  [phone]

## 2017-11-02 NOTE — PROGRESS NOTE ADULT - SUBJECTIVE AND OBJECTIVE BOX
Patient is a 52y old  Female who presents with a chief complaint of Flank and epigastric pain (16 Oct 2017 17:53)      INTERVAL HPI/OVERNIGHT EVENTS:  T(C): 36.8 (11-02-17 @ 05:14), Max: 36.9 (11-01-17 @ 21:23)  HR: 73 (11-02-17 @ 05:14) (71 - 77)  BP: 135/87 (11-02-17 @ 05:14) (134/70 - 149/91)  RR: 16 (11-02-17 @ 05:14) (16 - 18)  SpO2: 98% (11-02-17 @ 05:14) (95% - 100%)  Wt(kg): --  I&O's Summary      LABS:                        10.4   6.16  )-----------( 291      ( 02 Nov 2017 06:00 )             34.3     11-02    137  |  99  |  52<H>  ----------------------------<  132<H>  4.8   |  24  |  6.61<H>    Ca    9.3      02 Nov 2017 06:00  Mg     2.3     11-01          CAPILLARY BLOOD GLUCOSE      POCT Blood Glucose.: 114 mg/dL (02 Nov 2017 08:26)  POCT Blood Glucose.: 267 mg/dL (01 Nov 2017 21:50)  POCT Blood Glucose.: 255 mg/dL (01 Nov 2017 17:27)  POCT Blood Glucose.: 178 mg/dL (01 Nov 2017 12:35)            MEDICATIONS  (STANDING):  amLODIPine   Tablet 10 milliGRAM(s) Oral daily  aspirin enteric coated 81 milliGRAM(s) Oral daily  clopidogrel Tablet 75 milliGRAM(s) Oral daily  dextrose 5%. 1000 milliLiter(s) (50 mL/Hr) IV Continuous <Continuous>  dextrose 50% Injectable 12.5 Gram(s) IV Push once  dextrose 50% Injectable 25 Gram(s) IV Push once  dextrose 50% Injectable 25 Gram(s) IV Push once  docusate sodium 100 milliGRAM(s) Oral two times a day  furosemide    Tablet 20 milliGRAM(s) Oral two times a day  gabapentin 300 milliGRAM(s) Oral at bedtime  heparin  Injectable 5000 Unit(s) SubCutaneous every 8 hours  influenza   Vaccine 0.5 milliLiter(s) IntraMuscular once  insulin glargine Injectable (LANTUS) 20 Unit(s) SubCutaneous at bedtime  insulin lispro (HumaLOG) corrective regimen sliding scale   SubCutaneous three times a day before meals  insulin lispro (HumaLOG) corrective regimen sliding scale   SubCutaneous at bedtime  pantoprazole    Tablet 40 milliGRAM(s) Oral before breakfast  polyethylene glycol 3350 17 Gram(s) Oral daily  QUEtiapine 50 milliGRAM(s) Oral daily  sertraline 200 milliGRAM(s) Oral daily    MEDICATIONS  (PRN):  acetaminophen   Tablet 650 milliGRAM(s) Oral every 6 hours PRN For Temp greater than 38 C (100.4 F)  acetaminophen   Tablet. 650 milliGRAM(s) Oral every 6 hours PRN mild and Moderate  dextrose Gel 1 Dose(s) Oral once PRN Blood Glucose LESS THAN 70 milliGRAM(s)/deciliter  glucagon  Injectable 1 milliGRAM(s) IntraMuscular once PRN Glucose LESS THAN 70 milligrams/deciliter          PHYSICAL EXAM:  GENERAL: NAD, well-groomed, well-developed  HEAD:  Atraumatic, Normocephalic  CHEST/LUNG: Clear to percussion bilaterally; No rales, rhonchi, wheezing, or rubs  HEART: Regular rate and rhythm; No murmurs, rubs, or gallops  ABDOMEN: Soft, Nontender, Nondistended; Bowel sounds present  EXTREMITIES:  2+ Peripheral Pulses, No clubbing, cyanosis, or edema  LYMPH: No lymphadenopathy noted  SKIN: No rashes or lesions    Care Discussed with Consultants/Other Providers [ ] YES  [ ] NO

## 2017-11-02 NOTE — PROGRESS NOTE ADULT - SUBJECTIVE AND OBJECTIVE BOX
Chaz Freitas MD  Interventional Cardiology  [place] Office : [address] [place] [address] [address]  Tel:   [place] Office : [address] [facility] [place] [address]  Tel: [phone]  Cell : [phone]    Subjective : Pt lying in bed comfortable seen in am , not in distress, denies any chest pain or SOB  	  MEDICATIONS:  amLODIPine   Tablet 10 milliGRAM(s) Oral daily  aspirin enteric coated 81 milliGRAM(s) Oral daily  clopidogrel Tablet 75 milliGRAM(s) Oral daily  furosemide    Tablet 20 milliGRAM(s) Oral two times a day  heparin  Injectable 5000 Unit(s) SubCutaneous every 8 hours  acetaminophen   Tablet 650 milliGRAM(s) Oral every 6 hours PRN  acetaminophen   Tablet. 650 milliGRAM(s) Oral every 6 hours PRN  gabapentin 300 milliGRAM(s) Oral at bedtime  QUEtiapine 50 milliGRAM(s) Oral daily  sertraline 200 milliGRAM(s) Oral daily  docusate sodium 100 milliGRAM(s) Oral two times a day  pantoprazole    Tablet 40 milliGRAM(s) Oral before breakfast  polyethylene glycol 3350 17 Gram(s) Oral daily  dextrose 50% Injectable 12.5 Gram(s) IV Push once  dextrose 50% Injectable 25 Gram(s) IV Push once  dextrose 50% Injectable 25 Gram(s) IV Push once  dextrose Gel 1 Dose(s) Oral once PRN  glucagon  Injectable 1 milliGRAM(s) IntraMuscular once PRN  insulin glargine Injectable (LANTUS) 20 Unit(s) SubCutaneous at bedtime  insulin lispro (HumaLOG) corrective regimen sliding scale   SubCutaneous three times a day before meals  insulin lispro (HumaLOG) corrective regimen sliding scale   SubCutaneous at bedtime  dextrose 5%. 1000 milliLiter(s) IV Continuous <Continuous>  influenza   Vaccine 0.5 milliLiter(s) IntraMuscular once      PHYSICAL EXAM:  T(C): 36.8 (11-02-17 @ 05:14), Max: 36.9 (11-01-17 @ 21:23)  HR: 73 (11-02-17 @ 05:14) (71 - 77)  BP: 135/87 (11-02-17 @ 05:14) (134/70 - 149/91)  RR: 16 (11-02-17 @ 05:14) (16 - 18)  SpO2: 98% (11-02-17 @ 05:14) (95% - 100%)  Wt(kg): --  I&O's Summary        Appearance: Normal	  HEENT:   Normal oral mucosa, PERRL, EOMI	  Cardiovascular: Normal S1 S2, No JVD, No murmurs, No edema  Respiratory: Lungs clear to auscultation	  Gastrointestinal:  Soft, Non-tender, + BS	  Extremities: Normal range of motion, No clubbing, cyanosis or edema                                    10.4   6.16  )-----------( 291      ( 02 Nov 2017 06:00 )             34.3     11-02    137  |  99  |  52<H>  ----------------------------<  132<H>  4.8   |  24  |  6.61<H>    Ca    9.3      02 Nov 2017 06:00  Mg     2.3     11-01      proBNP:   Lipid Profile:   HgA1c:   TSH:

## 2017-11-03 LAB
BUN SERPL-MCNC: 33 MG/DL — HIGH (ref 7–23)
CALCIUM SERPL-MCNC: 8.5 MG/DL — SIGNIFICANT CHANGE UP (ref 8.4–10.5)
CHLORIDE SERPL-SCNC: 100 MMOL/L — SIGNIFICANT CHANGE UP (ref 98–107)
CO2 SERPL-SCNC: 26 MMOL/L — SIGNIFICANT CHANGE UP (ref 22–31)
CREAT SERPL-MCNC: 4.79 MG/DL — HIGH (ref 0.5–1.3)
GLUCOSE SERPL-MCNC: 163 MG/DL — HIGH (ref 70–99)
HCT VFR BLD CALC: 31.8 % — LOW (ref 34.5–45)
HGB BLD-MCNC: 9.9 G/DL — LOW (ref 11.5–15.5)
MAGNESIUM SERPL-MCNC: 2.1 MG/DL — SIGNIFICANT CHANGE UP (ref 1.6–2.6)
MCHC RBC-ENTMCNC: 27.7 PG — SIGNIFICANT CHANGE UP (ref 27–34)
MCHC RBC-ENTMCNC: 31.1 % — LOW (ref 32–36)
MCV RBC AUTO: 88.8 FL — SIGNIFICANT CHANGE UP (ref 80–100)
NRBC # FLD: 0 — SIGNIFICANT CHANGE UP
PLATELET # BLD AUTO: 247 K/UL — SIGNIFICANT CHANGE UP (ref 150–400)
PMV BLD: 10.7 FL — SIGNIFICANT CHANGE UP (ref 7–13)
POTASSIUM SERPL-MCNC: 4.3 MMOL/L — SIGNIFICANT CHANGE UP (ref 3.5–5.3)
POTASSIUM SERPL-SCNC: 4.3 MMOL/L — SIGNIFICANT CHANGE UP (ref 3.5–5.3)
RBC # BLD: 3.58 M/UL — LOW (ref 3.8–5.2)
RBC # FLD: 13.4 % — SIGNIFICANT CHANGE UP (ref 10.3–14.5)
SODIUM SERPL-SCNC: 139 MMOL/L — SIGNIFICANT CHANGE UP (ref 135–145)
WBC # BLD: 5.53 K/UL — SIGNIFICANT CHANGE UP (ref 3.8–10.5)
WBC # FLD AUTO: 5.53 K/UL — SIGNIFICANT CHANGE UP (ref 3.8–10.5)

## 2017-11-03 RX ADMIN — PANTOPRAZOLE SODIUM 40 MILLIGRAM(S): 20 TABLET, DELAYED RELEASE ORAL at 05:58

## 2017-11-03 RX ADMIN — GABAPENTIN 300 MILLIGRAM(S): 400 CAPSULE ORAL at 22:24

## 2017-11-03 RX ADMIN — SERTRALINE 200 MILLIGRAM(S): 25 TABLET, FILM COATED ORAL at 12:32

## 2017-11-03 RX ADMIN — Medication 20 MILLIGRAM(S): at 17:46

## 2017-11-03 RX ADMIN — Medication 81 MILLIGRAM(S): at 12:33

## 2017-11-03 RX ADMIN — HEPARIN SODIUM 5000 UNIT(S): 5000 INJECTION INTRAVENOUS; SUBCUTANEOUS at 22:36

## 2017-11-03 RX ADMIN — Medication 2: at 12:33

## 2017-11-03 RX ADMIN — QUETIAPINE FUMARATE 50 MILLIGRAM(S): 200 TABLET, FILM COATED ORAL at 12:33

## 2017-11-03 RX ADMIN — HEPARIN SODIUM 5000 UNIT(S): 5000 INJECTION INTRAVENOUS; SUBCUTANEOUS at 13:29

## 2017-11-03 RX ADMIN — Medication 100 MILLIGRAM(S): at 05:58

## 2017-11-03 RX ADMIN — CLOPIDOGREL BISULFATE 75 MILLIGRAM(S): 75 TABLET, FILM COATED ORAL at 12:32

## 2017-11-03 RX ADMIN — INSULIN GLARGINE 20 UNIT(S): 100 INJECTION, SOLUTION SUBCUTANEOUS at 22:24

## 2017-11-03 RX ADMIN — Medication 1: at 17:46

## 2017-11-03 RX ADMIN — HEPARIN SODIUM 5000 UNIT(S): 5000 INJECTION INTRAVENOUS; SUBCUTANEOUS at 05:58

## 2017-11-03 RX ADMIN — AMLODIPINE BESYLATE 10 MILLIGRAM(S): 2.5 TABLET ORAL at 05:58

## 2017-11-03 RX ADMIN — Medication 20 MILLIGRAM(S): at 05:58

## 2017-11-03 NOTE — PROGRESS NOTE ADULT - SUBJECTIVE AND OBJECTIVE BOX
Chaz Freitas MD  Interventional Cardiology  [place] Office : [address] [place] [address] [address]. [address]  Tel:   [place] Office : [address] [facility] [place] [address]  Tel: [phone]  Cell : [phone]    Subjective : Pt lying in bed comfortable, not in distress, denies any chest pain or SOB  	  MEDICATIONS:  amLODIPine   Tablet 10 milliGRAM(s) Oral daily  aspirin enteric coated 81 milliGRAM(s) Oral daily  clopidogrel Tablet 75 milliGRAM(s) Oral daily  furosemide    Tablet 20 milliGRAM(s) Oral two times a day  heparin  Injectable 5000 Unit(s) SubCutaneous every 8 hours        acetaminophen   Tablet 650 milliGRAM(s) Oral every 6 hours PRN  acetaminophen   Tablet. 650 milliGRAM(s) Oral every 6 hours PRN  gabapentin 300 milliGRAM(s) Oral at bedtime  QUEtiapine 50 milliGRAM(s) Oral daily  sertraline 200 milliGRAM(s) Oral daily    docusate sodium 100 milliGRAM(s) Oral two times a day  pantoprazole    Tablet 40 milliGRAM(s) Oral before breakfast  polyethylene glycol 3350 17 Gram(s) Oral daily    dextrose 50% Injectable 12.5 Gram(s) IV Push once  dextrose 50% Injectable 25 Gram(s) IV Push once  dextrose 50% Injectable 25 Gram(s) IV Push once  dextrose Gel 1 Dose(s) Oral once PRN  glucagon  Injectable 1 milliGRAM(s) IntraMuscular once PRN  insulin glargine Injectable (LANTUS) 20 Unit(s) SubCutaneous at bedtime  insulin lispro (HumaLOG) corrective regimen sliding scale   SubCutaneous three times a day before meals  insulin lispro (HumaLOG) corrective regimen sliding scale   SubCutaneous at bedtime    dextrose 5%. 1000 milliLiter(s) IV Continuous <Continuous>  influenza   Vaccine 0.5 milliLiter(s) IntraMuscular once      PHYSICAL EXAM:  T(C): 36.7 (11-03-17 @ 12:09), Max: 36.8 (11-03-17 @ 01:40)  HR: 75 (11-03-17 @ 12:09) (72 - 81)  BP: 140/91 (11-03-17 @ 12:09) (133/90 - 164/85)  RR: 18 (11-03-17 @ 12:09) (16 - 18)  SpO2: 94% (11-03-17 @ 12:09) (94% - 99%)  Wt(kg): --  I&O's Summary    02 Nov 2017 07:01  -  03 Nov 2017 07:00  --------------------------------------------------------  IN: 400 mL / OUT: 1400 mL / NET: -1000 mL          Appearance: Normal	  HEENT:   Normal oral mucosa, PERRL, EOMI	  Cardiovascular: Normal S1 S2, No JVD, No murmurs, No edema  Respiratory: Lungs clear to auscultation	  Gastrointestinal:  Soft, Non-tender, + BS	  Extremities: Normal range of motion, No clubbing, cyanosis or edema                                    9.9    5.53  )-----------( 247      ( 03 Nov 2017 06:00 )             31.8     11-03    139  |  100  |  33<H>  ----------------------------<  163<H>  4.3   |  26  |  4.79<H>    Ca    8.5      03 Nov 2017 06:00  Mg     2.1     11-03      proBNP:   Lipid Profile:   HgA1c:   TSH:

## 2017-11-03 NOTE — PROGRESS NOTE ADULT - SUBJECTIVE AND OBJECTIVE BOX
Patient is a 52y old  Female who presents with a chief complaint of Flank and epigastric pain (16 Oct 2017 17:53)      INTERVAL HPI/OVERNIGHT EVENTS:  T(C): 36.7 (11-03-17 @ 12:09), Max: 36.8 (11-03-17 @ 01:40)  HR: 75 (11-03-17 @ 12:09) (72 - 81)  BP: 140/91 (11-03-17 @ 12:09) (133/90 - 164/85)  RR: 18 (11-03-17 @ 12:09) (16 - 18)  SpO2: 94% (11-03-17 @ 12:09) (94% - 99%)  Wt(kg): --  I&O's Summary    02 Nov 2017 07:01  -  03 Nov 2017 07:00  --------------------------------------------------------  IN: 400 mL / OUT: 1400 mL / NET: -1000 mL        LABS:                        9.9    5.53  )-----------( 247      ( 03 Nov 2017 06:00 )             31.8     11-03    139  |  100  |  33<H>  ----------------------------<  163<H>  4.3   |  26  |  4.79<H>    Ca    8.5      03 Nov 2017 06:00  Mg     2.1     11-03          CAPILLARY BLOOD GLUCOSE      POCT Blood Glucose.: 212 mg/dL (03 Nov 2017 11:53)  POCT Blood Glucose.: 125 mg/dL (03 Nov 2017 08:20)  POCT Blood Glucose.: 291 mg/dL (02 Nov 2017 22:08)  POCT Blood Glucose.: 209 mg/dL (02 Nov 2017 17:00)            MEDICATIONS  (STANDING):  amLODIPine   Tablet 10 milliGRAM(s) Oral daily  aspirin enteric coated 81 milliGRAM(s) Oral daily  clopidogrel Tablet 75 milliGRAM(s) Oral daily  dextrose 5%. 1000 milliLiter(s) (50 mL/Hr) IV Continuous <Continuous>  dextrose 50% Injectable 12.5 Gram(s) IV Push once  dextrose 50% Injectable 25 Gram(s) IV Push once  dextrose 50% Injectable 25 Gram(s) IV Push once  docusate sodium 100 milliGRAM(s) Oral two times a day  furosemide    Tablet 20 milliGRAM(s) Oral two times a day  gabapentin 300 milliGRAM(s) Oral at bedtime  heparin  Injectable 5000 Unit(s) SubCutaneous every 8 hours  influenza   Vaccine 0.5 milliLiter(s) IntraMuscular once  insulin glargine Injectable (LANTUS) 20 Unit(s) SubCutaneous at bedtime  insulin lispro (HumaLOG) corrective regimen sliding scale   SubCutaneous three times a day before meals  insulin lispro (HumaLOG) corrective regimen sliding scale   SubCutaneous at bedtime  pantoprazole    Tablet 40 milliGRAM(s) Oral before breakfast  polyethylene glycol 3350 17 Gram(s) Oral daily  QUEtiapine 50 milliGRAM(s) Oral daily  sertraline 200 milliGRAM(s) Oral daily    MEDICATIONS  (PRN):  acetaminophen   Tablet 650 milliGRAM(s) Oral every 6 hours PRN For Temp greater than 38 C (100.4 F)  acetaminophen   Tablet. 650 milliGRAM(s) Oral every 6 hours PRN mild and Moderate  dextrose Gel 1 Dose(s) Oral once PRN Blood Glucose LESS THAN 70 milliGRAM(s)/deciliter  glucagon  Injectable 1 milliGRAM(s) IntraMuscular once PRN Glucose LESS THAN 70 milligrams/deciliter          PHYSICAL EXAM:  GENERAL: NAD, well-groomed, well-developed  HEAD:  Atraumatic, Normocephalic  CHEST/LUNG: Clear to percussion bilaterally; No rales, rhonchi, wheezing, or rubs  HEART: Regular rate and rhythm; No murmurs, rubs, or gallops  ABDOMEN: Soft, Nontender, Nondistended; Bowel sounds present  EXTREMITIES:  2+ Peripheral Pulses, No clubbing, cyanosis, or edema  LYMPH: No lymphadenopathy noted  SKIN: No rashes or lesions    Care Discussed with Consultants/Other Providers [ ] YES  [ ] NO

## 2017-11-03 NOTE — PROGRESS NOTE ADULT - SUBJECTIVE AND OBJECTIVE BOX
Nephrology progress note    No events overnight  patient looks comfortable and not in distress  no complaints    VITAL:  Reviewed; stable hemodynamics afebrile   /85    PHYSICAL EXAM:  GA: Obese, NAD, Alert  HEENT: NCAT, DMM  Neck: Supple, No JVD  Lungs: CTA-b/l, normal effort  Heart: RRR s1s2, no m/r/g  Abd: BS+, soft, NT/ND  Ext: well perfused, no edema or cyanosis  Neurological: no focal deficits; strength grossly intact; no asterixis  Psychiatric: Normal mood, normal affect  Skin: No rashes on visible skin  Access: RIJ tunneled cath- accessed; LUE AVF (+)thrill      LABS:             Reviewed; anemia with stable H&H    IMPRESSION: 52F w/ HTN, DM2, CAD, and CKD5, 10/16/17 a/w vomiting/epigastric pain/flank pain    1. New ESRD - on HD as of this admission;  on a TTS schedule,   2. Vascular - s/p left radiocephalic AVF creation by Dr. Radha Capps during this admission. Now also with tunneled HD cath.   3. Anemia of CKD: H&H acceptable,   4. SW - awaiting acceptance into outpatient HD/homeless shelter      RECOMMEND:  - continue HD on a TTS schedule, next session tomorrow  - continue current management  - ESRD diet: low phos and low K, low salt  - F/U with SW regarding outpatient HD/living arrangements    I will follow with you              Wale Francisco MD  [facility]  [phone]

## 2017-11-04 LAB
BUN SERPL-MCNC: 49 MG/DL — HIGH (ref 7–23)
CALCIUM SERPL-MCNC: 8.8 MG/DL — SIGNIFICANT CHANGE UP (ref 8.4–10.5)
CHLORIDE SERPL-SCNC: 103 MMOL/L — SIGNIFICANT CHANGE UP (ref 98–107)
CO2 SERPL-SCNC: 26 MMOL/L — SIGNIFICANT CHANGE UP (ref 22–31)
CREAT SERPL-MCNC: 6.21 MG/DL — HIGH (ref 0.5–1.3)
GLUCOSE SERPL-MCNC: 185 MG/DL — HIGH (ref 70–99)
HCT VFR BLD CALC: 29.9 % — LOW (ref 34.5–45)
HGB BLD-MCNC: 9.3 G/DL — LOW (ref 11.5–15.5)
MAGNESIUM SERPL-MCNC: 2 MG/DL — SIGNIFICANT CHANGE UP (ref 1.6–2.6)
MCHC RBC-ENTMCNC: 28.1 PG — SIGNIFICANT CHANGE UP (ref 27–34)
MCHC RBC-ENTMCNC: 31.1 % — LOW (ref 32–36)
MCV RBC AUTO: 90.3 FL — SIGNIFICANT CHANGE UP (ref 80–100)
NRBC # FLD: 0 — SIGNIFICANT CHANGE UP
PLATELET # BLD AUTO: 249 K/UL — SIGNIFICANT CHANGE UP (ref 150–400)
PMV BLD: 11 FL — SIGNIFICANT CHANGE UP (ref 7–13)
POTASSIUM SERPL-MCNC: 4.6 MMOL/L — SIGNIFICANT CHANGE UP (ref 3.5–5.3)
POTASSIUM SERPL-SCNC: 4.6 MMOL/L — SIGNIFICANT CHANGE UP (ref 3.5–5.3)
RBC # BLD: 3.31 M/UL — LOW (ref 3.8–5.2)
RBC # FLD: 13.4 % — SIGNIFICANT CHANGE UP (ref 10.3–14.5)
SODIUM SERPL-SCNC: 142 MMOL/L — SIGNIFICANT CHANGE UP (ref 135–145)
WBC # BLD: 5.53 K/UL — SIGNIFICANT CHANGE UP (ref 3.8–10.5)
WBC # FLD AUTO: 5.53 K/UL — SIGNIFICANT CHANGE UP (ref 3.8–10.5)

## 2017-11-04 RX ADMIN — SERTRALINE 200 MILLIGRAM(S): 25 TABLET, FILM COATED ORAL at 18:39

## 2017-11-04 RX ADMIN — GABAPENTIN 300 MILLIGRAM(S): 400 CAPSULE ORAL at 21:31

## 2017-11-04 RX ADMIN — CLOPIDOGREL BISULFATE 75 MILLIGRAM(S): 75 TABLET, FILM COATED ORAL at 18:39

## 2017-11-04 RX ADMIN — Medication 81 MILLIGRAM(S): at 18:39

## 2017-11-04 RX ADMIN — Medication 2: at 18:42

## 2017-11-04 RX ADMIN — Medication 100 MILLIGRAM(S): at 05:08

## 2017-11-04 RX ADMIN — INSULIN GLARGINE 20 UNIT(S): 100 INJECTION, SOLUTION SUBCUTANEOUS at 21:31

## 2017-11-04 RX ADMIN — QUETIAPINE FUMARATE 50 MILLIGRAM(S): 200 TABLET, FILM COATED ORAL at 18:42

## 2017-11-04 RX ADMIN — Medication 20 MILLIGRAM(S): at 18:39

## 2017-11-04 RX ADMIN — HEPARIN SODIUM 5000 UNIT(S): 5000 INJECTION INTRAVENOUS; SUBCUTANEOUS at 21:31

## 2017-11-04 RX ADMIN — HEPARIN SODIUM 5000 UNIT(S): 5000 INJECTION INTRAVENOUS; SUBCUTANEOUS at 05:08

## 2017-11-04 RX ADMIN — Medication 2: at 08:51

## 2017-11-04 RX ADMIN — PANTOPRAZOLE SODIUM 40 MILLIGRAM(S): 20 TABLET, DELAYED RELEASE ORAL at 06:59

## 2017-11-04 NOTE — PROGRESS NOTE ADULT - SUBJECTIVE AND OBJECTIVE BOX
Patient is a 52y old  Female who presents with a chief complaint of Flank and epigastric pain (16 Oct 2017 17:53)      INTERVAL HPI/OVERNIGHT EVENTS:  T(C): 36.9 (11-04-17 @ 11:30), Max: 37.1 (11-04-17 @ 05:13)  HR: 86 (11-04-17 @ 11:30) (75 - 86)  BP: 125/78 (11-04-17 @ 11:30) (125/78 - 144/83)  RR: 18 (11-04-17 @ 11:30) (18 - 18)  SpO2: 100% (11-04-17 @ 05:13) (97% - 100%)  Wt(kg): --  I&O's Summary      LABS:                        9.3    5.53  )-----------( 249      ( 04 Nov 2017 11:45 )             29.9     11-04    142  |  103  |  49<H>  ----------------------------<  185<H>  4.6   |  26  |  6.21<H>    Ca    8.8      04 Nov 2017 11:45  Mg     2.0     11-04          CAPILLARY BLOOD GLUCOSE      POCT Blood Glucose.: 132 mg/dL (04 Nov 2017 13:19)  POCT Blood Glucose.: 224 mg/dL (04 Nov 2017 08:40)  POCT Blood Glucose.: 208 mg/dL (03 Nov 2017 21:36)  POCT Blood Glucose.: 157 mg/dL (03 Nov 2017 16:59)            MEDICATIONS  (STANDING):  amLODIPine   Tablet 10 milliGRAM(s) Oral daily  aspirin enteric coated 81 milliGRAM(s) Oral daily  clopidogrel Tablet 75 milliGRAM(s) Oral daily  dextrose 5%. 1000 milliLiter(s) (50 mL/Hr) IV Continuous <Continuous>  dextrose 50% Injectable 12.5 Gram(s) IV Push once  dextrose 50% Injectable 25 Gram(s) IV Push once  dextrose 50% Injectable 25 Gram(s) IV Push once  docusate sodium 100 milliGRAM(s) Oral two times a day  furosemide    Tablet 20 milliGRAM(s) Oral two times a day  gabapentin 300 milliGRAM(s) Oral at bedtime  heparin  Injectable 5000 Unit(s) SubCutaneous every 8 hours  influenza   Vaccine 0.5 milliLiter(s) IntraMuscular once  insulin glargine Injectable (LANTUS) 20 Unit(s) SubCutaneous at bedtime  insulin lispro (HumaLOG) corrective regimen sliding scale   SubCutaneous three times a day before meals  insulin lispro (HumaLOG) corrective regimen sliding scale   SubCutaneous at bedtime  pantoprazole    Tablet 40 milliGRAM(s) Oral before breakfast  polyethylene glycol 3350 17 Gram(s) Oral daily  QUEtiapine 50 milliGRAM(s) Oral daily  sertraline 200 milliGRAM(s) Oral daily    MEDICATIONS  (PRN):  acetaminophen   Tablet 650 milliGRAM(s) Oral every 6 hours PRN For Temp greater than 38 C (100.4 F)  acetaminophen   Tablet. 650 milliGRAM(s) Oral every 6 hours PRN mild and Moderate  dextrose Gel 1 Dose(s) Oral once PRN Blood Glucose LESS THAN 70 milliGRAM(s)/deciliter  glucagon  Injectable 1 milliGRAM(s) IntraMuscular once PRN Glucose LESS THAN 70 milligrams/deciliter          PHYSICAL EXAM:  GENERAL: NAD, well-groomed, well-developed  HEAD:  Atraumatic, Normocephalic  CHEST/LUNG: Clear to percussion bilaterally; No rales, rhonchi, wheezing, or rubs  HEART: Regular rate and rhythm; No murmurs, rubs, or gallops  ABDOMEN: Soft, Nontender, Nondistended; Bowel sounds present  EXTREMITIES:  2+ Peripheral Pulses, No clubbing, cyanosis, or edema  LYMPH: No lymphadenopathy noted  SKIN: No rashes or lesions    Care Discussed with Consultants/Other Providers [+] YES  [ ] NO

## 2017-11-04 NOTE — PROGRESS NOTE ADULT - SUBJECTIVE AND OBJECTIVE BOX
Chaz Freitas MD  Interventional Cardiology  [place] Office : [address] [place] [address] [address]  Tel:   [place] Office : [address] [facility] [place] [address]  Tel: [phone]  Cell : [phone]    Subjective : Pt lying in bed comfortable, not in distress, denies any chest pain or SOB  	  MEDICATIONS:  amLODIPine   Tablet 10 milliGRAM(s) Oral daily  aspirin enteric coated 81 milliGRAM(s) Oral daily  clopidogrel Tablet 75 milliGRAM(s) Oral daily  furosemide    Tablet 20 milliGRAM(s) Oral two times a day  heparin  Injectable 5000 Unit(s) SubCutaneous every 8 hours        acetaminophen   Tablet 650 milliGRAM(s) Oral every 6 hours PRN  acetaminophen   Tablet. 650 milliGRAM(s) Oral every 6 hours PRN  gabapentin 300 milliGRAM(s) Oral at bedtime  QUEtiapine 50 milliGRAM(s) Oral daily  sertraline 200 milliGRAM(s) Oral daily    docusate sodium 100 milliGRAM(s) Oral two times a day  pantoprazole    Tablet 40 milliGRAM(s) Oral before breakfast  polyethylene glycol 3350 17 Gram(s) Oral daily    dextrose 50% Injectable 12.5 Gram(s) IV Push once  dextrose 50% Injectable 25 Gram(s) IV Push once  dextrose 50% Injectable 25 Gram(s) IV Push once  dextrose Gel 1 Dose(s) Oral once PRN  glucagon  Injectable 1 milliGRAM(s) IntraMuscular once PRN  insulin glargine Injectable (LANTUS) 20 Unit(s) SubCutaneous at bedtime  insulin lispro (HumaLOG) corrective regimen sliding scale   SubCutaneous three times a day before meals  insulin lispro (HumaLOG) corrective regimen sliding scale   SubCutaneous at bedtime    dextrose 5%. 1000 milliLiter(s) IV Continuous <Continuous>  influenza   Vaccine 0.5 milliLiter(s) IntraMuscular once      PHYSICAL EXAM:  T(C): 37.1 (11-04-17 @ 05:13), Max: 37.1 (11-04-17 @ 05:13)  HR: 75 (11-04-17 @ 05:13) (75 - 78)  BP: 139/77 (11-04-17 @ 05:13) (139/77 - 144/83)  RR: 18 (11-04-17 @ 05:13) (18 - 18)  SpO2: 100% (11-04-17 @ 05:13) (94% - 100%)  Wt(kg): --  I&O's Summary        Appearance: Normal	  HEENT:   Normal oral mucosa, PERRL, EOMI	  Cardiovascular: Normal S1 S2, No JVD, No murmurs, No edema  Respiratory: Lungs clear to auscultation	  Gastrointestinal:  Soft, Non-tender, + BS	  Extremities: Normal range of motion, No clubbing, cyanosis or edema                                    9.9    5.53  )-----------( 247      ( 03 Nov 2017 06:00 )             31.8     11-03    139  |  100  |  33<H>  ----------------------------<  163<H>  4.3   |  26  |  4.79<H>    Ca    8.5      03 Nov 2017 06:00  Mg     2.1     11-03      proBNP:   Lipid Profile:   HgA1c:   TSH:

## 2017-11-05 LAB
BUN SERPL-MCNC: 41 MG/DL — HIGH (ref 7–23)
CALCIUM SERPL-MCNC: 9.1 MG/DL — SIGNIFICANT CHANGE UP (ref 8.4–10.5)
CHLORIDE SERPL-SCNC: 100 MMOL/L — SIGNIFICANT CHANGE UP (ref 98–107)
CO2 SERPL-SCNC: 26 MMOL/L — SIGNIFICANT CHANGE UP (ref 22–31)
CREAT SERPL-MCNC: 5.29 MG/DL — HIGH (ref 0.5–1.3)
GLUCOSE SERPL-MCNC: 116 MG/DL — HIGH (ref 70–99)
HCT VFR BLD CALC: 33.9 % — LOW (ref 34.5–45)
HGB BLD-MCNC: 10.2 G/DL — LOW (ref 11.5–15.5)
MAGNESIUM SERPL-MCNC: 2 MG/DL — SIGNIFICANT CHANGE UP (ref 1.6–2.6)
MCHC RBC-ENTMCNC: 27.6 PG — SIGNIFICANT CHANGE UP (ref 27–34)
MCHC RBC-ENTMCNC: 30.1 % — LOW (ref 32–36)
MCV RBC AUTO: 91.6 FL — SIGNIFICANT CHANGE UP (ref 80–100)
NRBC # FLD: 0 — SIGNIFICANT CHANGE UP
PHOSPHATE SERPL-MCNC: 4 MG/DL — SIGNIFICANT CHANGE UP (ref 2.5–4.5)
PLATELET # BLD AUTO: 248 K/UL — SIGNIFICANT CHANGE UP (ref 150–400)
PMV BLD: 10.7 FL — SIGNIFICANT CHANGE UP (ref 7–13)
POTASSIUM SERPL-MCNC: 4.3 MMOL/L — SIGNIFICANT CHANGE UP (ref 3.5–5.3)
POTASSIUM SERPL-SCNC: 4.3 MMOL/L — SIGNIFICANT CHANGE UP (ref 3.5–5.3)
RBC # BLD: 3.7 M/UL — LOW (ref 3.8–5.2)
RBC # FLD: 13.2 % — SIGNIFICANT CHANGE UP (ref 10.3–14.5)
SODIUM SERPL-SCNC: 140 MMOL/L — SIGNIFICANT CHANGE UP (ref 135–145)
WBC # BLD: 5.71 K/UL — SIGNIFICANT CHANGE UP (ref 3.8–10.5)
WBC # FLD AUTO: 5.71 K/UL — SIGNIFICANT CHANGE UP (ref 3.8–10.5)

## 2017-11-05 RX ADMIN — Medication 81 MILLIGRAM(S): at 12:54

## 2017-11-05 RX ADMIN — AMLODIPINE BESYLATE 10 MILLIGRAM(S): 2.5 TABLET ORAL at 05:19

## 2017-11-05 RX ADMIN — INSULIN GLARGINE 20 UNIT(S): 100 INJECTION, SOLUTION SUBCUTANEOUS at 22:28

## 2017-11-05 RX ADMIN — Medication 20 MILLIGRAM(S): at 17:19

## 2017-11-05 RX ADMIN — PANTOPRAZOLE SODIUM 40 MILLIGRAM(S): 20 TABLET, DELAYED RELEASE ORAL at 05:18

## 2017-11-05 RX ADMIN — Medication 1: at 12:53

## 2017-11-05 RX ADMIN — Medication 20 MILLIGRAM(S): at 05:19

## 2017-11-05 RX ADMIN — Medication 100 MILLIGRAM(S): at 17:19

## 2017-11-05 RX ADMIN — CLOPIDOGREL BISULFATE 75 MILLIGRAM(S): 75 TABLET, FILM COATED ORAL at 12:54

## 2017-11-05 RX ADMIN — Medication 1: at 17:25

## 2017-11-05 RX ADMIN — HEPARIN SODIUM 5000 UNIT(S): 5000 INJECTION INTRAVENOUS; SUBCUTANEOUS at 13:00

## 2017-11-05 RX ADMIN — Medication 2: at 22:27

## 2017-11-05 RX ADMIN — Medication 100 MILLIGRAM(S): at 05:18

## 2017-11-05 RX ADMIN — HEPARIN SODIUM 5000 UNIT(S): 5000 INJECTION INTRAVENOUS; SUBCUTANEOUS at 05:18

## 2017-11-05 RX ADMIN — HEPARIN SODIUM 5000 UNIT(S): 5000 INJECTION INTRAVENOUS; SUBCUTANEOUS at 22:27

## 2017-11-05 RX ADMIN — SERTRALINE 200 MILLIGRAM(S): 25 TABLET, FILM COATED ORAL at 12:54

## 2017-11-05 RX ADMIN — GABAPENTIN 300 MILLIGRAM(S): 400 CAPSULE ORAL at 22:28

## 2017-11-05 RX ADMIN — QUETIAPINE FUMARATE 50 MILLIGRAM(S): 200 TABLET, FILM COATED ORAL at 12:54

## 2017-11-05 NOTE — PROGRESS NOTE ADULT - SUBJECTIVE AND OBJECTIVE BOX
Chaz Freitas MD  Interventional Cardiology  [place] Office : [address] [place] [address] [address]  Tel:   [place] Office : [address] [facility] [place] [address]  Tel: [phone]  Cell : [phone]    Subjective : Pt lying in bed comfortable, not in distress, denies any chest pain or SOB  	  MEDICATIONS:  amLODIPine   Tablet 10 milliGRAM(s) Oral daily  aspirin enteric coated 81 milliGRAM(s) Oral daily  clopidogrel Tablet 75 milliGRAM(s) Oral daily  furosemide    Tablet 20 milliGRAM(s) Oral two times a day  heparin  Injectable 5000 Unit(s) SubCutaneous every 8 hours  acetaminophen   Tablet 650 milliGRAM(s) Oral every 6 hours PRN  acetaminophen   Tablet. 650 milliGRAM(s) Oral every 6 hours PRN  gabapentin 300 milliGRAM(s) Oral at bedtime  QUEtiapine 50 milliGRAM(s) Oral daily  sertraline 200 milliGRAM(s) Oral daily  docusate sodium 100 milliGRAM(s) Oral two times a day  pantoprazole    Tablet 40 milliGRAM(s) Oral before breakfast  polyethylene glycol 3350 17 Gram(s) Oral daily  dextrose 50% Injectable 12.5 Gram(s) IV Push once  dextrose 50% Injectable 25 Gram(s) IV Push once  dextrose 50% Injectable 25 Gram(s) IV Push once  dextrose Gel 1 Dose(s) Oral once PRN  glucagon  Injectable 1 milliGRAM(s) IntraMuscular once PRN  insulin glargine Injectable (LANTUS) 20 Unit(s) SubCutaneous at bedtime  insulin lispro (HumaLOG) corrective regimen sliding scale   SubCutaneous three times a day before meals  insulin lispro (HumaLOG) corrective regimen sliding scale   SubCutaneous at bedtime    dextrose 5%. 1000 milliLiter(s) IV Continuous <Continuous>  influenza   Vaccine 0.5 milliLiter(s) IntraMuscular once      PHYSICAL EXAM:  T(C): 36.9 (11-05-17 @ 05:15), Max: 37.1 (11-04-17 @ 21:03)  HR: 72 (11-05-17 @ 05:15) (69 - 74)  BP: 146/92 (11-05-17 @ 05:15) (133/79 - 166/96)  RR: 16 (11-05-17 @ 05:15) (16 - 18)  SpO2: 100% (11-05-17 @ 05:15) (99% - 100%)  Wt(kg): --  I&O's Summary    04 Nov 2017 08:01  -  05 Nov 2017 07:00  --------------------------------------------------------  IN: 400 mL / OUT: 1400 mL / NET: -1000 mL          Appearance: Normal	  HEENT:   Normal oral mucosa, PERRL, EOMI	  Cardiovascular: Normal S1 S2, No JVD, No murmurs, No edema  Respiratory: Lungs clear to auscultation	  Gastrointestinal:  Soft, Non-tender, + BS	  Extremities: Normal range of motion, No clubbing, cyanosis or edema                                    10.2   5.71  )-----------( 248      ( 05 Nov 2017 06:51 )             33.9     11-05    140  |  100  |  41<H>  ----------------------------<  116<H>  4.3   |  26  |  5.29<H>    Ca    9.1      05 Nov 2017 06:45  Phos  4.0     11-05  Mg     2.0     11-05      proBNP:   Lipid Profile:   HgA1c:   TSH:

## 2017-11-06 LAB
BUN SERPL-MCNC: 50 MG/DL — HIGH (ref 7–23)
CALCIUM SERPL-MCNC: 8.9 MG/DL — SIGNIFICANT CHANGE UP (ref 8.4–10.5)
CHLORIDE SERPL-SCNC: 97 MMOL/L — LOW (ref 98–107)
CO2 SERPL-SCNC: 24 MMOL/L — SIGNIFICANT CHANGE UP (ref 22–31)
CREAT SERPL-MCNC: 6.46 MG/DL — HIGH (ref 0.5–1.3)
GLUCOSE SERPL-MCNC: 190 MG/DL — HIGH (ref 70–99)
HCT VFR BLD CALC: 31.8 % — LOW (ref 34.5–45)
HGB BLD-MCNC: 9.9 G/DL — LOW (ref 11.5–15.5)
MAGNESIUM SERPL-MCNC: 2 MG/DL — SIGNIFICANT CHANGE UP (ref 1.6–2.6)
MCHC RBC-ENTMCNC: 27.9 PG — SIGNIFICANT CHANGE UP (ref 27–34)
MCHC RBC-ENTMCNC: 31.1 % — LOW (ref 32–36)
MCV RBC AUTO: 89.6 FL — SIGNIFICANT CHANGE UP (ref 80–100)
NRBC # FLD: 0 — SIGNIFICANT CHANGE UP
PHOSPHATE SERPL-MCNC: 4.6 MG/DL — HIGH (ref 2.5–4.5)
PLATELET # BLD AUTO: 239 K/UL — SIGNIFICANT CHANGE UP (ref 150–400)
PMV BLD: 10.9 FL — SIGNIFICANT CHANGE UP (ref 7–13)
POTASSIUM SERPL-MCNC: 4.5 MMOL/L — SIGNIFICANT CHANGE UP (ref 3.5–5.3)
POTASSIUM SERPL-SCNC: 4.5 MMOL/L — SIGNIFICANT CHANGE UP (ref 3.5–5.3)
RBC # BLD: 3.55 M/UL — LOW (ref 3.8–5.2)
RBC # FLD: 13.2 % — SIGNIFICANT CHANGE UP (ref 10.3–14.5)
SODIUM SERPL-SCNC: 136 MMOL/L — SIGNIFICANT CHANGE UP (ref 135–145)
WBC # BLD: 6.28 K/UL — SIGNIFICANT CHANGE UP (ref 3.8–10.5)
WBC # FLD AUTO: 6.28 K/UL — SIGNIFICANT CHANGE UP (ref 3.8–10.5)

## 2017-11-06 RX ADMIN — Medication 20 MILLIGRAM(S): at 17:41

## 2017-11-06 RX ADMIN — Medication 100 MILLIGRAM(S): at 17:41

## 2017-11-06 RX ADMIN — Medication 81 MILLIGRAM(S): at 13:08

## 2017-11-06 RX ADMIN — Medication 100 MILLIGRAM(S): at 06:57

## 2017-11-06 RX ADMIN — HEPARIN SODIUM 5000 UNIT(S): 5000 INJECTION INTRAVENOUS; SUBCUTANEOUS at 06:59

## 2017-11-06 RX ADMIN — CLOPIDOGREL BISULFATE 75 MILLIGRAM(S): 75 TABLET, FILM COATED ORAL at 13:08

## 2017-11-06 RX ADMIN — Medication 3: at 17:40

## 2017-11-06 RX ADMIN — HEPARIN SODIUM 5000 UNIT(S): 5000 INJECTION INTRAVENOUS; SUBCUTANEOUS at 21:50

## 2017-11-06 RX ADMIN — PANTOPRAZOLE SODIUM 40 MILLIGRAM(S): 20 TABLET, DELAYED RELEASE ORAL at 06:57

## 2017-11-06 RX ADMIN — QUETIAPINE FUMARATE 50 MILLIGRAM(S): 200 TABLET, FILM COATED ORAL at 13:08

## 2017-11-06 RX ADMIN — AMLODIPINE BESYLATE 10 MILLIGRAM(S): 2.5 TABLET ORAL at 06:57

## 2017-11-06 RX ADMIN — INSULIN GLARGINE 20 UNIT(S): 100 INJECTION, SOLUTION SUBCUTANEOUS at 21:50

## 2017-11-06 RX ADMIN — Medication 1: at 13:08

## 2017-11-06 RX ADMIN — Medication 20 MILLIGRAM(S): at 06:57

## 2017-11-06 RX ADMIN — HEPARIN SODIUM 5000 UNIT(S): 5000 INJECTION INTRAVENOUS; SUBCUTANEOUS at 13:08

## 2017-11-06 RX ADMIN — GABAPENTIN 300 MILLIGRAM(S): 400 CAPSULE ORAL at 21:50

## 2017-11-06 RX ADMIN — Medication 1: at 08:51

## 2017-11-06 RX ADMIN — Medication 2: at 21:50

## 2017-11-06 RX ADMIN — SERTRALINE 200 MILLIGRAM(S): 25 TABLET, FILM COATED ORAL at 13:08

## 2017-11-06 NOTE — PROGRESS NOTE ADULT - SUBJECTIVE AND OBJECTIVE BOX
Chaz Freitas MD  Interventional Cardiology  [place] Office : [address] [place] [address] [address]  Tel:   [place] Office : [address] [facility] [place] [address]  Tel: [phone]  Cell : [phone]    Subjective : Pt lying in bed comfortable, not in distress, denies any chest pain or SOB  	  MEDICATIONS:  amLODIPine   Tablet 10 milliGRAM(s) Oral daily  aspirin enteric coated 81 milliGRAM(s) Oral daily  clopidogrel Tablet 75 milliGRAM(s) Oral daily  furosemide    Tablet 20 milliGRAM(s) Oral two times a day  heparin  Injectable 5000 Unit(s) SubCutaneous every 8 hours        acetaminophen   Tablet 650 milliGRAM(s) Oral every 6 hours PRN  acetaminophen   Tablet. 650 milliGRAM(s) Oral every 6 hours PRN  gabapentin 300 milliGRAM(s) Oral at bedtime  QUEtiapine 50 milliGRAM(s) Oral daily  sertraline 200 milliGRAM(s) Oral daily    docusate sodium 100 milliGRAM(s) Oral two times a day  pantoprazole    Tablet 40 milliGRAM(s) Oral before breakfast  polyethylene glycol 3350 17 Gram(s) Oral daily    dextrose 50% Injectable 12.5 Gram(s) IV Push once  dextrose 50% Injectable 25 Gram(s) IV Push once  dextrose 50% Injectable 25 Gram(s) IV Push once  dextrose Gel 1 Dose(s) Oral once PRN  glucagon  Injectable 1 milliGRAM(s) IntraMuscular once PRN  insulin glargine Injectable (LANTUS) 20 Unit(s) SubCutaneous at bedtime  insulin lispro (HumaLOG) corrective regimen sliding scale   SubCutaneous three times a day before meals  insulin lispro (HumaLOG) corrective regimen sliding scale   SubCutaneous at bedtime    dextrose 5%. 1000 milliLiter(s) IV Continuous <Continuous>  influenza   Vaccine 0.5 milliLiter(s) IntraMuscular once      PHYSICAL EXAM:  T(C): 36.8 (11-06-17 @ 13:13), Max: 37.1 (11-05-17 @ 13:50)  HR: 81 (11-06-17 @ 13:13) (72 - 81)  BP: 139/75 (11-06-17 @ 13:13) (128/76 - 148/84)  RR: 18 (11-06-17 @ 13:13) (17 - 18)  SpO2: 99% (11-06-17 @ 13:13) (98% - 100%)  Wt(kg): --  I&O's Summary    05 Nov 2017 07:01  -  06 Nov 2017 07:00  --------------------------------------------------------  IN: 920 mL / OUT: 600 mL / NET: 320 mL          Appearance: Normal	  HEENT:   Normal oral mucosa, PERRL, EOMI	  Cardiovascular: Normal S1 S2, No JVD, No murmurs, No edema  Respiratory: Lungs clear to auscultation	  Gastrointestinal:  Soft, Non-tender, + BS	  Extremities: Normal range of motion, No clubbing, cyanosis or edema                                    9.9    6.28  )-----------( 239      ( 06 Nov 2017 06:50 )             31.8     11-06    136  |  97<L>  |  50<H>  ----------------------------<  190<H>  4.5   |  24  |  6.46<H>    Ca    8.9      06 Nov 2017 06:50  Phos  4.6     11-06  Mg     2.0     11-06      proBNP:   Lipid Profile:   HgA1c:   TSH:

## 2017-11-06 NOTE — PROGRESS NOTE ADULT - SUBJECTIVE AND OBJECTIVE BOX
Patient is a 52y old  Female who presents with a chief complaint of Flank and epigastric pain (16 Oct 2017 17:53)      INTERVAL HPI/OVERNIGHT EVENTS:  T(C): 36.8 (11-06-17 @ 13:13), Max: 36.8 (11-06-17 @ 06:56)  HR: 81 (11-06-17 @ 13:13) (72 - 81)  BP: 139/75 (11-06-17 @ 13:13) (139/75 - 143/74)  RR: 18 (11-06-17 @ 13:13) (17 - 18)  SpO2: 99% (11-06-17 @ 13:13) (98% - 100%)  Wt(kg): --  I&O's Summary    05 Nov 2017 07:01  -  06 Nov 2017 07:00  --------------------------------------------------------  IN: 920 mL / OUT: 600 mL / NET: 320 mL        LABS:                        9.9    6.28  )-----------( 239      ( 06 Nov 2017 06:50 )             31.8     11-06    136  |  97<L>  |  50<H>  ----------------------------<  190<H>  4.5   |  24  |  6.46<H>    Ca    8.9      06 Nov 2017 06:50  Phos  4.6     11-06  Mg     2.0     11-06          CAPILLARY BLOOD GLUCOSE      POCT Blood Glucose.: 178 mg/dL (06 Nov 2017 11:38)  POCT Blood Glucose.: 159 mg/dL (06 Nov 2017 08:30)  POCT Blood Glucose.: 347 mg/dL (05 Nov 2017 22:09)            MEDICATIONS  (STANDING):  amLODIPine   Tablet 10 milliGRAM(s) Oral daily  aspirin enteric coated 81 milliGRAM(s) Oral daily  clopidogrel Tablet 75 milliGRAM(s) Oral daily  dextrose 5%. 1000 milliLiter(s) (50 mL/Hr) IV Continuous <Continuous>  dextrose 50% Injectable 12.5 Gram(s) IV Push once  dextrose 50% Injectable 25 Gram(s) IV Push once  dextrose 50% Injectable 25 Gram(s) IV Push once  docusate sodium 100 milliGRAM(s) Oral two times a day  furosemide    Tablet 20 milliGRAM(s) Oral two times a day  gabapentin 300 milliGRAM(s) Oral at bedtime  heparin  Injectable 5000 Unit(s) SubCutaneous every 8 hours  influenza   Vaccine 0.5 milliLiter(s) IntraMuscular once  insulin glargine Injectable (LANTUS) 20 Unit(s) SubCutaneous at bedtime  insulin lispro (HumaLOG) corrective regimen sliding scale   SubCutaneous three times a day before meals  insulin lispro (HumaLOG) corrective regimen sliding scale   SubCutaneous at bedtime  pantoprazole    Tablet 40 milliGRAM(s) Oral before breakfast  polyethylene glycol 3350 17 Gram(s) Oral daily  QUEtiapine 50 milliGRAM(s) Oral daily  sertraline 200 milliGRAM(s) Oral daily    MEDICATIONS  (PRN):  acetaminophen   Tablet 650 milliGRAM(s) Oral every 6 hours PRN For Temp greater than 38 C (100.4 F)  acetaminophen   Tablet. 650 milliGRAM(s) Oral every 6 hours PRN mild and Moderate  dextrose Gel 1 Dose(s) Oral once PRN Blood Glucose LESS THAN 70 milliGRAM(s)/deciliter  glucagon  Injectable 1 milliGRAM(s) IntraMuscular once PRN Glucose LESS THAN 70 milligrams/deciliter          PHYSICAL EXAM:  GENERAL: NAD, well-groomed, well-developed  HEAD:  Atraumatic, Normocephalic  CHEST/LUNG: Clear to percussion bilaterally; No rales, rhonchi, wheezing, or rubs  HEART: Regular rate and rhythm; No murmurs, rubs, or gallops  ABDOMEN: Soft, Nontender, Nondistended; Bowel sounds present  EXTREMITIES:  2+ Peripheral Pulses, No clubbing, cyanosis, or edema  LYMPH: No lymphadenopathy noted  SKIN: No rashes or lesions    Care Discussed with Consultants/Other Providers [+ ] YES  [ ] NO

## 2017-11-06 NOTE — PROGRESS NOTE ADULT - SUBJECTIVE AND OBJECTIVE BOX
VITAL:  T(C): , Max: 37.1 (11-05-17 @ 13:50)  T(F): , Max: 98.8 (11-05-17 @ 17:18)  HR: 72 (11-06-17 @ 06:56)  BP: 143/74 (11-06-17 @ 06:56)  BP(mean): --  RR: 18 (11-06-17 @ 06:56)  SpO2: 100% (11-06-17 @ 06:56)      PHYSICAL EXAM:  GA: Obese, NAD, Alert  HEENT: NCAT, DMM  Neck: Supple, No JVD  Lungs: CTA-b/l, normal effort  Heart: RRR s1s2, no m/r/g  Abd: BS+, soft, NT/ND  Ext: well perfused, no edema or cyanosis  Neurological: no focal deficits; strength grossly intact; no asterixis  Psychiatric: Normal mood, normal affect  Skin: No rashes on visible skin  Access: RIJ tunneled cath- accessed; LUE AVF (+)thrill      LABS:                        9.9    6.28  )-----------( 239      ( 06 Nov 2017 06:50 )             31.8     Na(140)/K(4.3)/Cl(100)/HCO3(26)/BUN(41)/Cr(5.29)Glu(116)/Ca(9.1)/Mg(2.0)/PO4(4.0)    11-05 @ 06:45  Na(142)/K(4.6)/Cl(103)/HCO3(26)/BUN(49)/Cr(6.21)Glu(185)/Ca(8.8)/Mg(2.0)/PO4(--)    11-04 @ 11:45        IMPRESSION: 52F w/ HTN, DM2, CAD, and CKD5, 10/16/17 a/w vomiting/epigastric pain/flank pain    1. ESRD - new as of this admission;  on a TTS schedule, next session tomorrow    2. Vascular - s/p left radiocephalic AVF creation by Dr. Radha Capps during this admission. Now also with tunneled HD cath.     3. Anemia of CKD: H&H acceptable,     4. SW - awaiting acceptance into outpatient HD/homeless shelter      RECOMMEND:  - continue HD on a TTS schedule, next session tomorrow  - continue current management  - ESRD diet: low phos and low K, low salt  - F/U with SW regarding outpatient HD/living arrangements              Wale Francisco MD  [facility]  [phone] No pain, no sob    VITAL:  T(C): , Max: 37.1 (11-05-17 @ 13:50)  T(F): , Max: 98.8 (11-05-17 @ 17:18)  HR: 72 (11-06-17 @ 06:56)  BP: 143/74 (11-06-17 @ 06:56)  BP(mean): --  RR: 18 (11-06-17 @ 06:56)  SpO2: 100% (11-06-17 @ 06:56)      PHYSICAL EXAM:  GA: Obese, NAD, Alert  HEENT: NCAT, DMM  Neck: Supple, No JVD  Lungs: CTA-b/l, normal effort  Heart: RRR s1s2, no m/r/g  Abd: BS+, soft, NT/ND  Ext: well perfused, no edema or cyanosis  Neurological: no focal deficits; strength grossly intact; no asterixis  Psychiatric: Normal mood, normal affect  Skin: No rashes on visible skin  Access: RIJ tunneled cath- accessed; LUE AVF (+)thrill      LABS:                        9.9    6.28  )-----------( 239      ( 06 Nov 2017 06:50 )             31.8     Na(140)/K(4.3)/Cl(100)/HCO3(26)/BUN(41)/Cr(5.29)Glu(116)/Ca(9.1)/Mg(2.0)/PO4(4.0)    11-05 @ 06:45  Na(142)/K(4.6)/Cl(103)/HCO3(26)/BUN(49)/Cr(6.21)Glu(185)/Ca(8.8)/Mg(2.0)/PO4(--)    11-04 @ 11:45        IMPRESSION: 52F w/ HTN, DM2, CAD, and CKD5, 10/16/17 a/w vomiting/epigastric pain/flank pain    1. ESRD - new as of this admission;  on a TTS schedule, next session tomorrow    2. Vascular - s/p left radiocephalic AVF creation by Dr. Radha aCpps during this admission. Now also with tunneled HD cath.     3. Anemia of CKD: H&H acceptable,     4. SW - awaiting acceptance into outpatient HD/homeless shelter      RECOMMEND:  - continue HD on a TTS schedule, next session tomorrow  - continue current management  - ESRD diet: low phos and low K, low salt  - F/U with SW regarding outpatient HD/living arrangements              Wale Francisco MD  [facility]  [phone]

## 2017-11-07 RX ADMIN — Medication 100 MILLIGRAM(S): at 05:56

## 2017-11-07 RX ADMIN — PANTOPRAZOLE SODIUM 40 MILLIGRAM(S): 20 TABLET, DELAYED RELEASE ORAL at 05:56

## 2017-11-07 RX ADMIN — CLOPIDOGREL BISULFATE 75 MILLIGRAM(S): 75 TABLET, FILM COATED ORAL at 12:06

## 2017-11-07 RX ADMIN — Medication 2: at 18:18

## 2017-11-07 RX ADMIN — Medication 20 MILLIGRAM(S): at 10:37

## 2017-11-07 RX ADMIN — INSULIN GLARGINE 20 UNIT(S): 100 INJECTION, SOLUTION SUBCUTANEOUS at 22:53

## 2017-11-07 RX ADMIN — HEPARIN SODIUM 5000 UNIT(S): 5000 INJECTION INTRAVENOUS; SUBCUTANEOUS at 05:56

## 2017-11-07 RX ADMIN — QUETIAPINE FUMARATE 50 MILLIGRAM(S): 200 TABLET, FILM COATED ORAL at 12:06

## 2017-11-07 RX ADMIN — HEPARIN SODIUM 5000 UNIT(S): 5000 INJECTION INTRAVENOUS; SUBCUTANEOUS at 22:53

## 2017-11-07 RX ADMIN — Medication 100 MILLIGRAM(S): at 18:18

## 2017-11-07 RX ADMIN — Medication 20 MILLIGRAM(S): at 18:18

## 2017-11-07 RX ADMIN — Medication 1: at 12:34

## 2017-11-07 RX ADMIN — HEPARIN SODIUM 5000 UNIT(S): 5000 INJECTION INTRAVENOUS; SUBCUTANEOUS at 12:06

## 2017-11-07 RX ADMIN — SERTRALINE 200 MILLIGRAM(S): 25 TABLET, FILM COATED ORAL at 12:06

## 2017-11-07 RX ADMIN — GABAPENTIN 300 MILLIGRAM(S): 400 CAPSULE ORAL at 22:54

## 2017-11-07 RX ADMIN — Medication 81 MILLIGRAM(S): at 12:06

## 2017-11-07 NOTE — PROGRESS NOTE ADULT - SUBJECTIVE AND OBJECTIVE BOX
Nephrology progress Note    No events overnight  No complaints    VITAL:  T(C): , Max: 37.1 (11-05-17 @ 13:50)  T(F): , Max: 98.8 (11-05-17 @ 17:18)  HR: 72 (11-06-17 @ 06:56)  BP: 143/74 (11-06-17 @ 06:56)  BP(mean): --  RR: 18 (11-06-17 @ 06:56)  SpO2: 100% (11-06-17 @ 06:56)      PHYSICAL EXAM:  GA: Obese, NAD, Alert  HEENT: NCAT, MMM  Neck: Supple, No JVD  Lungs: CTA-b/l, normal effort  Heart: RRR s1s2, no m/r/g  Abd: BS+, soft, NT/ND  Ext: well perfused, no edema or cyanosis  Neurological: no focal deficits; strength grossly intact; no asterixis  Psychiatric: Normal mood, normal affect  Skin: No rashes on visible skin  Access: RIJ tunneled cath- accessed; LUE AVF (+)thrill      LABS:                        9.9    6.28  )-----------( 239      ( 06 Nov 2017 06:50 )             31.8     Na(140)/K(4.3)/Cl(100)/HCO3(26)/BUN(41)/Cr(5.29)Glu(116)/Ca(9.1)/Mg(2.0)/PO4(4.0)    11-05 @ 06:45  Na(142)/K(4.6)/Cl(103)/HCO3(26)/BUN(49)/Cr(6.21)Glu(185)/Ca(8.8)/Mg(2.0)/PO4(--)    11-04 @ 11:45        IMPRESSION: 52F w/ HTN, DM2, CAD, and CKD5, 10/16/17 a/w vomiting/epigastric pain/flank pain    1. ESRD - new as of this admission;  on a TTS schedule, next session tomorrow  2. Vascular - s/p left radiocephalic AVF creation by Dr. Radha Capps during this admission. Now also with tunneled HD cath.   3. Anemia of CKD: H&H acceptable,   4. SW - awaiting acceptance into outpatient HD/homeless shelter      RECOMMEND:  - continue HD on a TTS schedule, next session tomorrow  - continue current management  - ESRD diet: low phos and low K, low salt  - F/U with SW regarding outpatient HD/living arrangements    Abelardo Vazquez MD  [facility]  [phone] Nephrology progress Note    No events overnight  No complaints  Dialyzed today with no complications    VITAL:  T(C): , Max: 37.1 (11-05-17 @ 13:50)  T(F): , Max: 98.8 (11-05-17 @ 17:18)  HR: 72 (11-06-17 @ 06:56)  BP: 143/74 (11-06-17 @ 06:56)  BP(mean): --  RR: 18 (11-06-17 @ 06:56)  SpO2: 100% (11-06-17 @ 06:56)      PHYSICAL EXAM:  GA: Obese, NAD, Alert  HEENT: NCAT, MMM  Neck: Supple, No JVD  Lungs: CTA-b/l, normal effort  Heart: RRR s1s2, no m/r/g  Abd: BS+, soft, NT/ND  Ext: well perfused, no edema or cyanosis  Neurological: no focal deficits; strength grossly intact; no asterixis  Psychiatric: Normal mood, normal affect  Skin: No rashes on visible skin  Access: RIJ tunneled cath- accessed; LUE AVF (+)thrill      LABS:                        9.9    6.28  )-----------( 239      ( 06 Nov 2017 06:50 )             31.8     Na(140)/K(4.3)/Cl(100)/HCO3(26)/BUN(41)/Cr(5.29)Glu(116)/Ca(9.1)/Mg(2.0)/PO4(4.0)    11-05 @ 06:45  Na(142)/K(4.6)/Cl(103)/HCO3(26)/BUN(49)/Cr(6.21)Glu(185)/Ca(8.8)/Mg(2.0)/PO4(--)    11-04 @ 11:45        IMPRESSION: 52F w/ HTN, DM2, CAD, and CKD5, 10/16/17 a/w vomiting/epigastric pain/flank pain    1. ESRD - new as of this admission;  on a TTS schedule, next session tomorrow  2. Vascular - s/p left radiocephalic AVF creation by Dr. Radha Capps during this admission. Now also with tunneled HD cath.   3. Anemia of CKD: H&H acceptable,   4. SW - awaiting acceptance into outpatient HD/homeless shelter      RECOMMEND:  - continue HD on a TTS schedule, dialyzed today  - continue current management  - ESRD diet: low phos and low K, low salt  - F/U with SW regarding outpatient HD/living arrangements    Abelardo Vazquez MD  [facility]  [phone] Nephrology progress Note    No events overnight  No complaints  Dialyzed today with no complications    VITAL:  T(C): , Max: 37.1 (11-05-17 @ 13:50)  T(F): , Max: 98.8 (11-05-17 @ 17:18)  HR: 72 (11-06-17 @ 06:56)  BP: 143/74 (11-06-17 @ 06:56)  BP(mean): --  RR: 18 (11-06-17 @ 06:56)  SpO2: 100% (11-06-17 @ 06:56)      PHYSICAL EXAM:  GA: Obese, NAD, Alert  HEENT: NCAT, MMM  Neck: Supple, No JVD  Lungs: CTA-b/l, normal effort  Heart: RRR s1s2, no m/r/g  Abd: BS+, soft, NT/ND  Ext: well perfused, no edema or cyanosis  Neurological: no focal deficits; strength grossly intact; no asterixis  Psychiatric: Normal mood, normal affect  Skin: No rashes on visible skin  Access: RIJ tunneled cath- accessed; LUE AVF (+)thrill      LABS:                        9.9    6.28  )-----------( 239      ( 06 Nov 2017 06:50 )             31.8     Na(140)/K(4.3)/Cl(100)/HCO3(26)/BUN(41)/Cr(5.29)Glu(116)/Ca(9.1)/Mg(2.0)/PO4(4.0)    11-05 @ 06:45  Na(142)/K(4.6)/Cl(103)/HCO3(26)/BUN(49)/Cr(6.21)Glu(185)/Ca(8.8)/Mg(2.0)/PO4(--)    11-04 @ 11:45        IMPRESSION: 52F w/ HTN, DM2, CAD, and CKD5, 10/16/17 a/w vomiting/epigastric pain/flank pain    1. ESRD - new as of this admission;  on a TTS schedule,  2. Vascular - s/p left radiocephalic AVF creation by Dr. Radha Capps during this admission. HDTC accidentally pulled out. AVF ready to use  3. Anemia of CKD: H&H acceptable,   4. SW - awaiting acceptance into outpatient HD/homeless shelter    RECOMMEND:  - continue HD on a TTS schedule, dialyzed today  - continue current management  - ESRD diet: low phos and low K, low salt  - F/U with SW regarding outpatient HD/living arrangements    Abelardo Vazquez MD  [facility]  [phone]

## 2017-11-07 NOTE — PROGRESS NOTE ADULT - SUBJECTIVE AND OBJECTIVE BOX
Chaz Freitas MD  Interventional Cardiology  [place] Office : [phone] [place] [address] [address] [address]  Tel:   [place] Office : [address] [facility] [place] [address]  Tel: [phone]  Cell : [phone]    Subjective : Pt lying in bed comfortable, not in distress, denies any chest pain or SOB  	  MEDICATIONS:  amLODIPine   Tablet 10 milliGRAM(s) Oral daily  aspirin enteric coated 81 milliGRAM(s) Oral daily  clopidogrel Tablet 75 milliGRAM(s) Oral daily  furosemide    Tablet 20 milliGRAM(s) Oral two times a day  heparin  Injectable 5000 Unit(s) SubCutaneous every 8 hours  acetaminophen   Tablet 650 milliGRAM(s) Oral every 6 hours PRN  acetaminophen   Tablet. 650 milliGRAM(s) Oral every 6 hours PRN  gabapentin 300 milliGRAM(s) Oral at bedtime  QUEtiapine 50 milliGRAM(s) Oral daily  sertraline 200 milliGRAM(s) Oral daily    docusate sodium 100 milliGRAM(s) Oral two times a day  pantoprazole    Tablet 40 milliGRAM(s) Oral before breakfast  polyethylene glycol 3350 17 Gram(s) Oral daily    dextrose 50% Injectable 12.5 Gram(s) IV Push once  dextrose 50% Injectable 25 Gram(s) IV Push once  dextrose 50% Injectable 25 Gram(s) IV Push once  dextrose Gel 1 Dose(s) Oral once PRN  glucagon  Injectable 1 milliGRAM(s) IntraMuscular once PRN  insulin glargine Injectable (LANTUS) 20 Unit(s) SubCutaneous at bedtime  insulin lispro (HumaLOG) corrective regimen sliding scale   SubCutaneous three times a day before meals  insulin lispro (HumaLOG) corrective regimen sliding scale   SubCutaneous at bedtime    dextrose 5%. 1000 milliLiter(s) IV Continuous <Continuous>  influenza   Vaccine 0.5 milliLiter(s) IntraMuscular once      PHYSICAL EXAM:  T(C): 36.7 (11-07-17 @ 10:00), Max: 36.9 (11-06-17 @ 21:48)  HR: 77 (11-07-17 @ 10:34) (68 - 78)  BP: 146/91 (11-07-17 @ 10:34) (124/78 - 151/88)  RR: 18 (11-07-17 @ 10:34) (18 - 18)  SpO2: 99% (11-07-17 @ 10:34) (99% - 99%)  Wt(kg): --  I&O's Summary    07 Nov 2017 07:01  -  07 Nov 2017 13:17  --------------------------------------------------------  IN: 500 mL / OUT: 1500 mL / NET: -1000 mL          Appearance: Normal	  HEENT:   Normal oral mucosa, PERRL, EOMI	  Cardiovascular: Normal S1 S2, No JVD, No murmurs, No edema  Respiratory: Lungs clear to auscultation	  Gastrointestinal:  Soft, Non-tender, + BS	  Extremities: Normal range of motion, No clubbing, cyanosis or edema                                    9.9    6.28  )-----------( 239      ( 06 Nov 2017 06:50 )             31.8     11-06    136  |  97<L>  |  50<H>  ----------------------------<  190<H>  4.5   |  24  |  6.46<H>    Ca    8.9      06 Nov 2017 06:50  Phos  4.6     11-06  Mg     2.0     11-06      proBNP:   Lipid Profile:   HgA1c:   TSH:

## 2017-11-07 NOTE — PROGRESS NOTE ADULT - SUBJECTIVE AND OBJECTIVE BOX
Patient is a 52y old  Female who presents with a chief complaint of Flank and epigastric pain (16 Oct 2017 17:53)      INTERVAL HPI/OVERNIGHT EVENTS:  T(C): 36.7 (11-07-17 @ 10:00), Max: 36.9 (11-06-17 @ 21:48)  HR: 77 (11-07-17 @ 10:34) (68 - 81)  BP: 146/91 (11-07-17 @ 10:34) (124/78 - 151/88)  RR: 18 (11-07-17 @ 10:34) (18 - 18)  SpO2: 99% (11-07-17 @ 10:34) (99% - 99%)  Wt(kg): --  I&O's Summary    07 Nov 2017 07:01  -  07 Nov 2017 11:34  --------------------------------------------------------  IN: 500 mL / OUT: 1500 mL / NET: -1000 mL        LABS:                        9.9    6.28  )-----------( 239      ( 06 Nov 2017 06:50 )             31.8     11-06    136  |  97<L>  |  50<H>  ----------------------------<  190<H>  4.5   |  24  |  6.46<H>    Ca    8.9      06 Nov 2017 06:50  Phos  4.6     11-06  Mg     2.0     11-06          CAPILLARY BLOOD GLUCOSE      POCT Blood Glucose.: 103 mg/dL (07 Nov 2017 09:07)  POCT Blood Glucose.: 308 mg/dL (06 Nov 2017 21:16)  POCT Blood Glucose.: 260 mg/dL (06 Nov 2017 17:26)  POCT Blood Glucose.: 178 mg/dL (06 Nov 2017 11:38)            MEDICATIONS  (STANDING):  amLODIPine   Tablet 10 milliGRAM(s) Oral daily  aspirin enteric coated 81 milliGRAM(s) Oral daily  clopidogrel Tablet 75 milliGRAM(s) Oral daily  dextrose 5%. 1000 milliLiter(s) (50 mL/Hr) IV Continuous <Continuous>  dextrose 50% Injectable 12.5 Gram(s) IV Push once  dextrose 50% Injectable 25 Gram(s) IV Push once  dextrose 50% Injectable 25 Gram(s) IV Push once  docusate sodium 100 milliGRAM(s) Oral two times a day  furosemide    Tablet 20 milliGRAM(s) Oral two times a day  gabapentin 300 milliGRAM(s) Oral at bedtime  heparin  Injectable 5000 Unit(s) SubCutaneous every 8 hours  influenza   Vaccine 0.5 milliLiter(s) IntraMuscular once  insulin glargine Injectable (LANTUS) 20 Unit(s) SubCutaneous at bedtime  insulin lispro (HumaLOG) corrective regimen sliding scale   SubCutaneous three times a day before meals  insulin lispro (HumaLOG) corrective regimen sliding scale   SubCutaneous at bedtime  pantoprazole    Tablet 40 milliGRAM(s) Oral before breakfast  polyethylene glycol 3350 17 Gram(s) Oral daily  QUEtiapine 50 milliGRAM(s) Oral daily  sertraline 200 milliGRAM(s) Oral daily    MEDICATIONS  (PRN):  acetaminophen   Tablet 650 milliGRAM(s) Oral every 6 hours PRN For Temp greater than 38 C (100.4 F)  acetaminophen   Tablet. 650 milliGRAM(s) Oral every 6 hours PRN mild and Moderate  dextrose Gel 1 Dose(s) Oral once PRN Blood Glucose LESS THAN 70 milliGRAM(s)/deciliter  glucagon  Injectable 1 milliGRAM(s) IntraMuscular once PRN Glucose LESS THAN 70 milligrams/deciliter          PHYSICAL EXAM:  GENERAL: NAD, well-groomed, well-developed  HEAD:  Atraumatic, Normocephalic  CHEST/LUNG: Clear to percussion bilaterally; No rales, rhonchi, wheezing, or rubs  HEART: Regular rate and rhythm; No murmurs, rubs, or gallops  ABDOMEN: Soft, Nontender, Nondistended; Bowel sounds present  EXTREMITIES:  2+ Peripheral Pulses, No clubbing, cyanosis, or edema  LYMPH: No lymphadenopathy noted  SKIN: No rashes or lesions    Care Discussed with Consultants/Other Providers [+ ] YES  [ ] NO

## 2017-11-08 LAB
BUN SERPL-MCNC: 38 MG/DL — HIGH (ref 7–23)
CALCIUM SERPL-MCNC: 9 MG/DL — SIGNIFICANT CHANGE UP (ref 8.4–10.5)
CHLORIDE SERPL-SCNC: 96 MMOL/L — LOW (ref 98–107)
CO2 SERPL-SCNC: 26 MMOL/L — SIGNIFICANT CHANGE UP (ref 22–31)
CREAT SERPL-MCNC: 5.47 MG/DL — HIGH (ref 0.5–1.3)
GLUCOSE SERPL-MCNC: 133 MG/DL — HIGH (ref 70–99)
HCT VFR BLD CALC: 30.2 % — LOW (ref 34.5–45)
HGB BLD-MCNC: 9.4 G/DL — LOW (ref 11.5–15.5)
MAGNESIUM SERPL-MCNC: 2 MG/DL — SIGNIFICANT CHANGE UP (ref 1.6–2.6)
MCHC RBC-ENTMCNC: 27.4 PG — SIGNIFICANT CHANGE UP (ref 27–34)
MCHC RBC-ENTMCNC: 31.1 % — LOW (ref 32–36)
MCV RBC AUTO: 88 FL — SIGNIFICANT CHANGE UP (ref 80–100)
NRBC # FLD: 0 — SIGNIFICANT CHANGE UP
PLATELET # BLD AUTO: 241 K/UL — SIGNIFICANT CHANGE UP (ref 150–400)
PMV BLD: 11.5 FL — SIGNIFICANT CHANGE UP (ref 7–13)
POTASSIUM SERPL-MCNC: 4.5 MMOL/L — SIGNIFICANT CHANGE UP (ref 3.5–5.3)
POTASSIUM SERPL-SCNC: 4.5 MMOL/L — SIGNIFICANT CHANGE UP (ref 3.5–5.3)
RBC # BLD: 3.43 M/UL — LOW (ref 3.8–5.2)
RBC # FLD: 13 % — SIGNIFICANT CHANGE UP (ref 10.3–14.5)
SODIUM SERPL-SCNC: 135 MMOL/L — SIGNIFICANT CHANGE UP (ref 135–145)
WBC # BLD: 6.01 K/UL — SIGNIFICANT CHANGE UP (ref 3.8–10.5)
WBC # FLD AUTO: 6.01 K/UL — SIGNIFICANT CHANGE UP (ref 3.8–10.5)

## 2017-11-08 RX ORDER — SODIUM CHLORIDE 9 MG/ML
1000 INJECTION, SOLUTION INTRAVENOUS
Qty: 0 | Refills: 0 | Status: DISCONTINUED | OUTPATIENT
Start: 2017-11-08 | End: 2017-11-08

## 2017-11-08 RX ADMIN — Medication 20 MILLIGRAM(S): at 06:12

## 2017-11-08 RX ADMIN — Medication 81 MILLIGRAM(S): at 11:27

## 2017-11-08 RX ADMIN — POLYETHYLENE GLYCOL 3350 17 GRAM(S): 17 POWDER, FOR SOLUTION ORAL at 11:27

## 2017-11-08 RX ADMIN — PANTOPRAZOLE SODIUM 40 MILLIGRAM(S): 20 TABLET, DELAYED RELEASE ORAL at 06:12

## 2017-11-08 RX ADMIN — AMLODIPINE BESYLATE 10 MILLIGRAM(S): 2.5 TABLET ORAL at 11:27

## 2017-11-08 RX ADMIN — SODIUM CHLORIDE 50 MILLILITER(S): 9 INJECTION, SOLUTION INTRAVENOUS at 07:12

## 2017-11-08 RX ADMIN — CLOPIDOGREL BISULFATE 75 MILLIGRAM(S): 75 TABLET, FILM COATED ORAL at 11:27

## 2017-11-08 RX ADMIN — SERTRALINE 200 MILLIGRAM(S): 25 TABLET, FILM COATED ORAL at 11:27

## 2017-11-08 RX ADMIN — Medication 100 MILLIGRAM(S): at 06:12

## 2017-11-08 RX ADMIN — Medication 1: at 23:59

## 2017-11-08 RX ADMIN — HEPARIN SODIUM 5000 UNIT(S): 5000 INJECTION INTRAVENOUS; SUBCUTANEOUS at 06:12

## 2017-11-08 RX ADMIN — Medication 20 MILLIGRAM(S): at 17:27

## 2017-11-08 RX ADMIN — QUETIAPINE FUMARATE 50 MILLIGRAM(S): 200 TABLET, FILM COATED ORAL at 11:28

## 2017-11-08 RX ADMIN — HEPARIN SODIUM 5000 UNIT(S): 5000 INJECTION INTRAVENOUS; SUBCUTANEOUS at 11:28

## 2017-11-08 NOTE — PROVIDER CONTACT NOTE (OTHER) - SITUATION
Pt feeling dizzy and had slightly blurred vision after urinating.   Pt got OOB independently and made it back to bed safely.

## 2017-11-08 NOTE — CHART NOTE - NSCHARTNOTEFT_GEN_A_CORE
Called by RN to evaluate patient for dizziness. Patient stated that she got up from the bed to walk to the bathroom and felt dizzy.  She returned to her bed with no complaints of chest pain or SOB.  She was noted to be orthostatic positive.    Lying /85 HR 77  Sitting /85 HR 77  Standing /70 HR 86    Plan: will give gentle hydration for 5 hours and repeat orthostatics upon completion.

## 2017-11-08 NOTE — PROGRESS NOTE ADULT - SUBJECTIVE AND OBJECTIVE BOX
NEPHROLOGY-[facility] [phone]    Patient seen and examined, no complaints. Dialysis yesterday with some hypotension. Patient was dizzy overnight as well.     MEDICATIONS  (STANDING):  amLODIPine   Tablet 10 milliGRAM(s) Oral daily  aspirin enteric coated 81 milliGRAM(s) Oral daily  clopidogrel Tablet 75 milliGRAM(s) Oral daily  dextrose 5%. 1000 milliLiter(s) (50 mL/Hr) IV Continuous <Continuous>  dextrose 50% Injectable 12.5 Gram(s) IV Push once  dextrose 50% Injectable 25 Gram(s) IV Push once  dextrose 50% Injectable 25 Gram(s) IV Push once  docusate sodium 100 milliGRAM(s) Oral two times a day  furosemide    Tablet 20 milliGRAM(s) Oral two times a day  gabapentin 300 milliGRAM(s) Oral at bedtime  heparin  Injectable 5000 Unit(s) SubCutaneous every 8 hours  influenza   Vaccine 0.5 milliLiter(s) IntraMuscular once  insulin glargine Injectable (LANTUS) 20 Unit(s) SubCutaneous at bedtime  insulin lispro (HumaLOG) corrective regimen sliding scale   SubCutaneous three times a day before meals  insulin lispro (HumaLOG) corrective regimen sliding scale   SubCutaneous at bedtime  pantoprazole    Tablet 40 milliGRAM(s) Oral before breakfast  polyethylene glycol 3350 17 Gram(s) Oral daily  QUEtiapine 50 milliGRAM(s) Oral daily  sertraline 200 milliGRAM(s) Oral daily  sodium chloride 0.45%. 1000 milliLiter(s) (50 mL/Hr) IV Continuous <Continuous>    VITAL:  T(C): , Max: 37.1 (11-08-17 @ 13:24)  T(F): , Max: 98.7 (11-08-17 @ 13:24)  HR: 80 (11-08-17 @ 13:24)  BP: 117/74 (11-08-17 @ 13:24)  BP(mean): --  RR: 18 (11-08-17 @ 13:24)  SpO2: 100% (11-08-17 @ 13:24)  Wt(kg): --  I and O's:    11-07 @ 07:01  -  11-08 @ 07:00  --------------------------------------------------------  IN: 500 mL / OUT: 1500 mL / NET: -1000 mL    REVIEW OF SYSTEMS:  CONSTITUTIONAL: No fevers or chills  RESPIRATORY: No SOB/PICKARD  CARDIOVASCULAR: No CP or palpitations  GASTROINTESTINAL: No abd pain, n/v/d/constipation  GENITOURINARY: No dysuria, frequency or hematuria  NEUROLOGICAL: No numbness or weakness  All other review of systems is negative unless indicated above.    PHYSICAL EXAM:  Constitutional: NAD  Respiratory: CTA B/L  Cardiovascular: S1 and S2  Gastrointestinal: BS+, soft, NT/ND, obese  Extremities: + edema  Neurological: AAO x 3  : No John  Access: LUE AVF (+ thrill)    LABS:                        9.4    6.01  )-----------( 241      ( 08 Nov 2017 06:00 )             30.2     11-08    135  |  96<L>  |  38<H>  ----------------------------<  133<H>  4.5   |  26  |  5.47<H>    Ca    9.0      08 Nov 2017 06:15  Mg     2.0     11-08

## 2017-11-08 NOTE — PROVIDER CONTACT NOTE (OTHER) - ASSESSMENT
A&Ox4; no lethargy; no SOB  Dizziness reduced upon lying down  Orthostatic positive- Lying BP: 149/85  HR: 77  Sitting BP: 143/85  HR: 77  Standing BP: 112/70  HR: 86  FS: 125

## 2017-11-08 NOTE — PROGRESS NOTE ADULT - ASSESSMENT
IMPRESSION: 52F w/ HTN, DM2, CAD, and CKD5, 10/16/17 a/w vomiting/epigastric pain/flank pain c/b ESRD and AVF creation  - ESRD - new as of this admission;  on a TTS schedule while admitted  - Vascular - s/p left radiocephalic AVF creation by Dr. Radha Capps during this admission  - Anemia in ESRD: Hgb acceptable    RECOMMEND:  - dialysis tomorrow as ordered, will extend time to optimize fluid removal and prevent hypotension  - social work and case management involvement for discharge planning   - d/c IVF, check orthostatics    Debi Johansen NP  [facility]  [phone]

## 2017-11-08 NOTE — PROGRESS NOTE ADULT - SUBJECTIVE AND OBJECTIVE BOX
Patient is a 52y old  Female who presents with a chief complaint of Flank and epigastric pain (16 Oct 2017 17:53)      INTERVAL HPI/OVERNIGHT EVENTS:  T(C): 36.8 (11-08-17 @ 05:01), Max: 36.8 (11-07-17 @ 13:41)  HR: 75 (11-08-17 @ 05:01) (75 - 78)  BP: 150/93 (11-08-17 @ 05:01) (126/86 - 150/93)  RR: 17 (11-08-17 @ 05:01) (17 - 18)  SpO2: 100% (11-08-17 @ 05:01) (100% - 100%)  Wt(kg): --  I&O's Summary    07 Nov 2017 07:01  -  08 Nov 2017 07:00  --------------------------------------------------------  IN: 500 mL / OUT: 1500 mL / NET: -1000 mL        LABS:                        9.4    6.01  )-----------( 241      ( 08 Nov 2017 06:00 )             30.2     11-08    135  |  96<L>  |  38<H>  ----------------------------<  133<H>  4.5   |  26  |  5.47<H>    Ca    9.0      08 Nov 2017 06:15  Mg     2.0     11-08          CAPILLARY BLOOD GLUCOSE  125 (08 Nov 2017 05:01)      POCT Blood Glucose.: 132 mg/dL (08 Nov 2017 08:29)  POCT Blood Glucose.: 131 mg/dL (08 Nov 2017 06:01)  POCT Blood Glucose.: 125 mg/dL (08 Nov 2017 05:01)  POCT Blood Glucose.: 205 mg/dL (07 Nov 2017 22:32)  POCT Blood Glucose.: 220 mg/dL (07 Nov 2017 17:00)  POCT Blood Glucose.: 160 mg/dL (07 Nov 2017 12:25)            MEDICATIONS  (STANDING):  amLODIPine   Tablet 10 milliGRAM(s) Oral daily  aspirin enteric coated 81 milliGRAM(s) Oral daily  clopidogrel Tablet 75 milliGRAM(s) Oral daily  dextrose 5%. 1000 milliLiter(s) (50 mL/Hr) IV Continuous <Continuous>  dextrose 50% Injectable 12.5 Gram(s) IV Push once  dextrose 50% Injectable 25 Gram(s) IV Push once  dextrose 50% Injectable 25 Gram(s) IV Push once  docusate sodium 100 milliGRAM(s) Oral two times a day  furosemide    Tablet 20 milliGRAM(s) Oral two times a day  gabapentin 300 milliGRAM(s) Oral at bedtime  heparin  Injectable 5000 Unit(s) SubCutaneous every 8 hours  influenza   Vaccine 0.5 milliLiter(s) IntraMuscular once  insulin glargine Injectable (LANTUS) 20 Unit(s) SubCutaneous at bedtime  insulin lispro (HumaLOG) corrective regimen sliding scale   SubCutaneous three times a day before meals  insulin lispro (HumaLOG) corrective regimen sliding scale   SubCutaneous at bedtime  pantoprazole    Tablet 40 milliGRAM(s) Oral before breakfast  polyethylene glycol 3350 17 Gram(s) Oral daily  QUEtiapine 50 milliGRAM(s) Oral daily  sertraline 200 milliGRAM(s) Oral daily  sodium chloride 0.45%. 1000 milliLiter(s) (50 mL/Hr) IV Continuous <Continuous>    MEDICATIONS  (PRN):  acetaminophen   Tablet 650 milliGRAM(s) Oral every 6 hours PRN For Temp greater than 38 C (100.4 F)  acetaminophen   Tablet. 650 milliGRAM(s) Oral every 6 hours PRN mild and Moderate  dextrose Gel 1 Dose(s) Oral once PRN Blood Glucose LESS THAN 70 milliGRAM(s)/deciliter  glucagon  Injectable 1 milliGRAM(s) IntraMuscular once PRN Glucose LESS THAN 70 milligrams/deciliter          PHYSICAL EXAM:  GENERAL: NAD, well-groomed, well-developed  HEAD:  Atraumatic, Normocephalic  CHEST/LUNG: Clear to percussion bilaterally; No rales, rhonchi, wheezing, or rubs  HEART: Regular rate and rhythm; No murmurs, rubs, or gallops  ABDOMEN: Soft, Nontender, Nondistended; Bowel sounds present  EXTREMITIES:  2+ Peripheral Pulses, No clubbing, cyanosis, or edema  LYMPH: No lymphadenopathy noted  SKIN: No rashes or lesions    Care Discussed with Consultants/Other Providers [+ ] YES  [ ] NO

## 2017-11-08 NOTE — CHART NOTE - NSCHARTNOTEFT_GEN_A_CORE
Source: Patient [x ]    Family [ ]     other [ x] Review of the patient's medical chart, RN.     Current Diet : Consistent Carbohydrate, DASH (cholesterol & Na restricted) Fluid restriction 1.2L Renal.   Reported:  [ ] nausea  [ ] vomiting [ ] diarrhea [ ] constipation  [ ]chewing problems [ ] swallowing issues  [ ] other:   PO intake:  < 50% [ ] 50-75% [ ]   % [X]  other : and about 50% [employer].   Current Weight: 205 pounds 11/8, suggest wt. loss. Likely fluid related.   Nutrition follow-up 2/2 to extended length of stay. Patient reports good PO intake at present and denies any nausea/vomiting/diarrhea/constipation or difficulty chewing and swallowing. Recommended diet review with Pt; however, Pt remains disinterested and watching TV.  Pt awaiting insurance improvement for placement, Pt. is homeless.      __________________ Pertinent Medications__________________   MEDICATIONS  (STANDING):  amLODIPine   Tablet 10 milliGRAM(s) Oral daily  aspirin enteric coated 81 milliGRAM(s) Oral daily  clopidogrel Tablet 75 milliGRAM(s) Oral daily  dextrose 5%. 1000 milliLiter(s) (50 mL/Hr) IV Continuous <Continuous>  dextrose 50% Injectable 12.5 Gram(s) IV Push once  dextrose 50% Injectable 25 Gram(s) IV Push once  dextrose 50% Injectable 25 Gram(s) IV Push once  docusate sodium 100 milliGRAM(s) Oral two times a day  furosemide    Tablet 20 milliGRAM(s) Oral two times a day  gabapentin 300 milliGRAM(s) Oral at bedtime  heparin  Injectable 5000 Unit(s) SubCutaneous every 8 hours  influenza   Vaccine 0.5 milliLiter(s) IntraMuscular once  insulin glargine Injectable (LANTUS) 20 Unit(s) SubCutaneous at bedtime  insulin lispro (HumaLOG) corrective regimen sliding scale   SubCutaneous three times a day before meals  insulin lispro (HumaLOG) corrective regimen sliding scale   SubCutaneous at bedtime  pantoprazole    Tablet 40 milliGRAM(s) Oral before breakfast  polyethylene glycol 3350 17 Gram(s) Oral daily  QUEtiapine 50 milliGRAM(s) Oral daily  sertraline 200 milliGRAM(s) Oral daily  sodium chloride 0.45%. 1000 milliLiter(s) (50 mL/Hr) IV Continuous <Continuous>    MEDICATIONS  (PRN):  acetaminophen   Tablet 650 milliGRAM(s) Oral every 6 hours PRN For Temp greater than 38 C (100.4 F)  acetaminophen   Tablet. 650 milliGRAM(s) Oral every 6 hours PRN mild and Moderate  dextrose Gel 1 Dose(s) Oral once PRN Blood Glucose LESS THAN 70 milliGRAM(s)/deciliter  glucagon  Injectable 1 milliGRAM(s) IntraMuscular once PRN Glucose LESS THAN 70 milligrams/deciliter      __________________ Pertinent Labs__________________   11-08 Na135 mmol/L Glu 133 mg/dL<H> K+ 4.5 mmol/L Cr  5.47 mg/dL<H> BUN 38 mg/dL<H> Phos n/a   Alb n/a   PAB n/a           Skin:     Estimated Needs:   [ x] no change since previous assessment  [ ] recalculated:       Previous Nutrition Diagnosis:     [ ] Inadequate Energy Intake [ ]Inadequate Oral Intake [ ] Excessive Energy Intake   [ ] Underweight [ ] Increased Nutrient Needs [ ] Overweight/Obesity   [ ] Altered GI Function [ ] Unintended Weight Loss [ ] Food & Nutrition Related Knowledge Deficit [ ] Malnutrition     Nutrition Diagnosis is [ x] ongoing  [ ] resolved [ ] not applicable     New Nutrition Diagnosis: [ x] not applicable    [ ] Inadequate Protein Energy Intake   [ ]Inadequate Oral Intake   [ ] Excessive Energy Intake   [ ] Underweight   [ ] Increased Nutrient Needs   [ ] Overweight/Obesity   [ ] Altered GI Function   [ ] Unintended Weight Loss   [ ] Food & Nutrition Related Knowledge Deficit  [ ] Limited Adherence to nutrition related recommendations   [ ] Malnutrition    [ ] other:        **nutrition recommendations**  1- Continue current diet order, which remains appropriate at this time.  2- Monitor weights, labs, BM's, skin integrity, p.o. intake.   3- Please Encourage po intake, assist with meals and menu selections, provide alternatives PRN.  4- RD remains available, re-consult as needed. Kimberly Falk RD Pager #[phone] Source: Patient [x ]    Family [ ]     other [ x] Review of the patient's medical chart, RN.     Current Diet : Consistent Carbohydrate, DASH (cholesterol & Na restricted) Fluid restriction 1.2L Renal.   Reported:  [ ] nausea  [ ] vomiting [ ] diarrhea [ ] constipation  [ ]chewing problems [ ] swallowing issues  [ ] other:   PO intake:  < 50% [ ] 50-75% [ ]   % [X]  other :  Current Weight: 205 pounds 11/8, suggest wt. loss. Likely fluid related.   Nutrition follow-up 2/2 to extended length of stay. Patient reports good PO intake at present and denies any nausea/vomiting/diarrhea/constipation or difficulty chewing and swallowing. Recommended diet review with Pt; however, Pt remains disinterested and watching TV.  Pt awaiting insurance improvement for placement, Pt. is homeless.      __________________ Pertinent Medications__________________   MEDICATIONS  (STANDING):  amLODIPine   Tablet 10 milliGRAM(s) Oral daily  aspirin enteric coated 81 milliGRAM(s) Oral daily  clopidogrel Tablet 75 milliGRAM(s) Oral daily  dextrose 5%. 1000 milliLiter(s) (50 mL/Hr) IV Continuous <Continuous>  dextrose 50% Injectable 12.5 Gram(s) IV Push once  dextrose 50% Injectable 25 Gram(s) IV Push once  dextrose 50% Injectable 25 Gram(s) IV Push once  docusate sodium 100 milliGRAM(s) Oral two times a day  furosemide    Tablet 20 milliGRAM(s) Oral two times a day  gabapentin 300 milliGRAM(s) Oral at bedtime  heparin  Injectable 5000 Unit(s) SubCutaneous every 8 hours  influenza   Vaccine 0.5 milliLiter(s) IntraMuscular once  insulin glargine Injectable (LANTUS) 20 Unit(s) SubCutaneous at bedtime  insulin lispro (HumaLOG) corrective regimen sliding scale   SubCutaneous three times a day before meals  insulin lispro (HumaLOG) corrective regimen sliding scale   SubCutaneous at bedtime  pantoprazole    Tablet 40 milliGRAM(s) Oral before breakfast  polyethylene glycol 3350 17 Gram(s) Oral daily  QUEtiapine 50 milliGRAM(s) Oral daily  sertraline 200 milliGRAM(s) Oral daily  sodium chloride 0.45%. 1000 milliLiter(s) (50 mL/Hr) IV Continuous <Continuous>    MEDICATIONS  (PRN):  acetaminophen   Tablet 650 milliGRAM(s) Oral every 6 hours PRN For Temp greater than 38 C (100.4 F)  acetaminophen   Tablet. 650 milliGRAM(s) Oral every 6 hours PRN mild and Moderate  dextrose Gel 1 Dose(s) Oral once PRN Blood Glucose LESS THAN 70 milliGRAM(s)/deciliter  glucagon  Injectable 1 milliGRAM(s) IntraMuscular once PRN Glucose LESS THAN 70 milligrams/deciliter      __________________ Pertinent Labs__________________   11-08 Na135 mmol/L Glu 133 mg/dL<H> K+ 4.5 mmol/L Cr  5.47 mg/dL<H> BUN 38 mg/dL<H> Phos n/a   Alb n/a   PAB n/a           Skin:     Estimated Needs:   [ x] no change since previous assessment  [ ] recalculated:       Previous Nutrition Diagnosis:     [ ] Inadequate Energy Intake [ ]Inadequate Oral Intake [ ] Excessive Energy Intake   [ ] Underweight [ ] Increased Nutrient Needs [ ] Overweight/Obesity   [ ] Altered GI Function [ ] Unintended Weight Loss [ ] Food & Nutrition Related Knowledge Deficit [ ] Malnutrition     Nutrition Diagnosis is [ x] ongoing  [ ] resolved [ ] not applicable     New Nutrition Diagnosis: [ x] not applicable    [ ] Inadequate Protein Energy Intake   [ ]Inadequate Oral Intake   [ ] Excessive Energy Intake   [ ] Underweight   [ ] Increased Nutrient Needs   [ ] Overweight/Obesity   [ ] Altered GI Function   [ ] Unintended Weight Loss   [ ] Food & Nutrition Related Knowledge Deficit  [ ] Limited Adherence to nutrition related recommendations   [ ] Malnutrition    [ ] other:        **nutrition recommendations**  1- Continue current diet order, which remains appropriate at this time.  2- Monitor weights, labs, BM's, skin integrity, p.o. intake.   3- Please Encourage po intake, assist with meals and menu selections, provide alternatives PRN.  4- RD remains available, re-consult as needed. Kimberly Falk RD Pager #[phone]

## 2017-11-08 NOTE — PROGRESS NOTE ADULT - SUBJECTIVE AND OBJECTIVE BOX
Chaz Freitas MD  Interventional Cardiology  [place] Office : [address] [place] [address]. [address]  Tel:   [place] Office : [address] [facility] [place] [address]  Tel: [phone]  Cell : [phone]    Subjective : Pt lying in bed comfortable, not in distress, denies any chest pain or SOB  	  MEDICATIONS:  amLODIPine   Tablet 10 milliGRAM(s) Oral daily  aspirin enteric coated 81 milliGRAM(s) Oral daily  clopidogrel Tablet 75 milliGRAM(s) Oral daily  furosemide    Tablet 20 milliGRAM(s) Oral two times a day  heparin  Injectable 5000 Unit(s) SubCutaneous every 8 hours  acetaminophen   Tablet 650 milliGRAM(s) Oral every 6 hours PRN  acetaminophen   Tablet. 650 milliGRAM(s) Oral every 6 hours PRN  gabapentin 300 milliGRAM(s) Oral at bedtime  QUEtiapine 50 milliGRAM(s) Oral daily  sertraline 200 milliGRAM(s) Oral daily  docusate sodium 100 milliGRAM(s) Oral two times a day  pantoprazole    Tablet 40 milliGRAM(s) Oral before breakfast  polyethylene glycol 3350 17 Gram(s) Oral daily  dextrose 50% Injectable 12.5 Gram(s) IV Push once  dextrose 50% Injectable 25 Gram(s) IV Push once  dextrose 50% Injectable 25 Gram(s) IV Push once  dextrose Gel 1 Dose(s) Oral once PRN  glucagon  Injectable 1 milliGRAM(s) IntraMuscular once PRN  insulin glargine Injectable (LANTUS) 20 Unit(s) SubCutaneous at bedtime  insulin lispro (HumaLOG) corrective regimen sliding scale   SubCutaneous three times a day before meals  insulin lispro (HumaLOG) corrective regimen sliding scale   SubCutaneous at bedtime    dextrose 5%. 1000 milliLiter(s) IV Continuous <Continuous>  influenza   Vaccine 0.5 milliLiter(s) IntraMuscular once  sodium chloride 0.45%. 1000 milliLiter(s) IV Continuous <Continuous>      PHYSICAL EXAM:  T(C): 36.8 (11-08-17 @ 05:01), Max: 36.8 (11-07-17 @ 13:41)  HR: 75 (11-08-17 @ 05:01) (75 - 78)  BP: 150/93 (11-08-17 @ 05:01) (126/86 - 150/93)  RR: 17 (11-08-17 @ 05:01) (17 - 18)  SpO2: 100% (11-08-17 @ 05:01) (100% - 100%)  Wt(kg): --  I&O's Summary    07 Nov 2017 07:01  -  08 Nov 2017 07:00  --------------------------------------------------------  IN: 500 mL / OUT: 1500 mL / NET: -1000 mL          Appearance: Normal	  HEENT:   Normal oral mucosa, PERRL, EOMI	  Cardiovascular: Normal S1 S2, No JVD, No murmurs, No edema  Respiratory: Lungs clear to auscultation	  Gastrointestinal:  Soft, Non-tender, + BS	  Extremities: Normal range of motion, No clubbing, cyanosis or edema                                    9.4    6.01  )-----------( 241      ( 08 Nov 2017 06:00 )             30.2     11-08    135  |  96<L>  |  38<H>  ----------------------------<  133<H>  4.5   |  26  |  5.47<H>    Ca    9.0      08 Nov 2017 06:15  Mg     2.0     11-08      proBNP:   Lipid Profile:   HgA1c:   TSH:

## 2017-11-09 LAB
BUN SERPL-MCNC: 46 MG/DL — HIGH (ref 7–23)
CALCIUM SERPL-MCNC: 9 MG/DL — SIGNIFICANT CHANGE UP (ref 8.4–10.5)
CHLORIDE SERPL-SCNC: 95 MMOL/L — LOW (ref 98–107)
CO2 SERPL-SCNC: 25 MMOL/L — SIGNIFICANT CHANGE UP (ref 22–31)
CREAT SERPL-MCNC: 6.67 MG/DL — HIGH (ref 0.5–1.3)
GLUCOSE SERPL-MCNC: 156 MG/DL — HIGH (ref 70–99)
HCT VFR BLD CALC: 30.3 % — LOW (ref 34.5–45)
HGB BLD-MCNC: 9.6 G/DL — LOW (ref 11.5–15.5)
MAGNESIUM SERPL-MCNC: 2 MG/DL — SIGNIFICANT CHANGE UP (ref 1.6–2.6)
MCHC RBC-ENTMCNC: 28 PG — SIGNIFICANT CHANGE UP (ref 27–34)
MCHC RBC-ENTMCNC: 31.7 % — LOW (ref 32–36)
MCV RBC AUTO: 88.3 FL — SIGNIFICANT CHANGE UP (ref 80–100)
NRBC # FLD: 0 — SIGNIFICANT CHANGE UP
PHOSPHATE SERPL-MCNC: 4.9 MG/DL — HIGH (ref 2.5–4.5)
PLATELET # BLD AUTO: 263 K/UL — SIGNIFICANT CHANGE UP (ref 150–400)
PMV BLD: 11.5 FL — SIGNIFICANT CHANGE UP (ref 7–13)
POTASSIUM SERPL-MCNC: 4.5 MMOL/L — SIGNIFICANT CHANGE UP (ref 3.5–5.3)
POTASSIUM SERPL-SCNC: 4.5 MMOL/L — SIGNIFICANT CHANGE UP (ref 3.5–5.3)
RBC # BLD: 3.43 M/UL — LOW (ref 3.8–5.2)
RBC # FLD: 13.2 % — SIGNIFICANT CHANGE UP (ref 10.3–14.5)
SODIUM SERPL-SCNC: 132 MMOL/L — LOW (ref 135–145)
WBC # BLD: 5.93 K/UL — SIGNIFICANT CHANGE UP (ref 3.8–10.5)
WBC # FLD AUTO: 5.93 K/UL — SIGNIFICANT CHANGE UP (ref 3.8–10.5)

## 2017-11-09 RX ORDER — ERYTHROPOIETIN 10000 [IU]/ML
10000 INJECTION, SOLUTION INTRAVENOUS; SUBCUTANEOUS
Qty: 0 | Refills: 0 | Status: DISCONTINUED | OUTPATIENT
Start: 2017-11-11 | End: 2017-11-15

## 2017-11-09 RX ADMIN — QUETIAPINE FUMARATE 50 MILLIGRAM(S): 200 TABLET, FILM COATED ORAL at 10:15

## 2017-11-09 RX ADMIN — Medication 3: at 12:25

## 2017-11-09 RX ADMIN — Medication 100 MILLIGRAM(S): at 17:06

## 2017-11-09 RX ADMIN — AMLODIPINE BESYLATE 10 MILLIGRAM(S): 2.5 TABLET ORAL at 10:15

## 2017-11-09 RX ADMIN — POLYETHYLENE GLYCOL 3350 17 GRAM(S): 17 POWDER, FOR SOLUTION ORAL at 10:15

## 2017-11-09 RX ADMIN — HEPARIN SODIUM 5000 UNIT(S): 5000 INJECTION INTRAVENOUS; SUBCUTANEOUS at 00:00

## 2017-11-09 RX ADMIN — Medication 2: at 17:06

## 2017-11-09 RX ADMIN — GABAPENTIN 300 MILLIGRAM(S): 400 CAPSULE ORAL at 00:00

## 2017-11-09 RX ADMIN — Medication 650 MILLIGRAM(S): at 15:06

## 2017-11-09 RX ADMIN — Medication 650 MILLIGRAM(S): at 16:36

## 2017-11-09 RX ADMIN — SERTRALINE 200 MILLIGRAM(S): 25 TABLET, FILM COATED ORAL at 10:15

## 2017-11-09 RX ADMIN — INSULIN GLARGINE 20 UNIT(S): 100 INJECTION, SOLUTION SUBCUTANEOUS at 21:50

## 2017-11-09 RX ADMIN — HEPARIN SODIUM 5000 UNIT(S): 5000 INJECTION INTRAVENOUS; SUBCUTANEOUS at 21:50

## 2017-11-09 RX ADMIN — PANTOPRAZOLE SODIUM 40 MILLIGRAM(S): 20 TABLET, DELAYED RELEASE ORAL at 05:48

## 2017-11-09 RX ADMIN — Medication 20 MILLIGRAM(S): at 10:15

## 2017-11-09 RX ADMIN — Medication 20 MILLIGRAM(S): at 21:50

## 2017-11-09 RX ADMIN — Medication 100 MILLIGRAM(S): at 05:48

## 2017-11-09 RX ADMIN — HEPARIN SODIUM 5000 UNIT(S): 5000 INJECTION INTRAVENOUS; SUBCUTANEOUS at 12:27

## 2017-11-09 RX ADMIN — HEPARIN SODIUM 5000 UNIT(S): 5000 INJECTION INTRAVENOUS; SUBCUTANEOUS at 05:48

## 2017-11-09 RX ADMIN — GABAPENTIN 300 MILLIGRAM(S): 400 CAPSULE ORAL at 21:50

## 2017-11-09 RX ADMIN — CLOPIDOGREL BISULFATE 75 MILLIGRAM(S): 75 TABLET, FILM COATED ORAL at 10:15

## 2017-11-09 RX ADMIN — INSULIN GLARGINE 20 UNIT(S): 100 INJECTION, SOLUTION SUBCUTANEOUS at 00:00

## 2017-11-09 RX ADMIN — Medication 81 MILLIGRAM(S): at 10:15

## 2017-11-09 NOTE — PROGRESS NOTE ADULT - SUBJECTIVE AND OBJECTIVE BOX
Chaz Freitas MD  Interventional Cardiology  [place] Office : [address] [place] [address]  Tel:   [place] Office : [address] [facility] [place] [address]  Tel: [phone]  Cell : [phone]    Subjective : Pt lying in bed comfortable, not in distress, denies any chest pain or SOB  	  MEDICATIONS:  amLODIPine   Tablet 10 milliGRAM(s) Oral daily  aspirin enteric coated 81 milliGRAM(s) Oral daily  clopidogrel Tablet 75 milliGRAM(s) Oral daily  furosemide    Tablet 20 milliGRAM(s) Oral two times a day  heparin  Injectable 5000 Unit(s) SubCutaneous every 8 hours        acetaminophen   Tablet 650 milliGRAM(s) Oral every 6 hours PRN  acetaminophen   Tablet. 650 milliGRAM(s) Oral every 6 hours PRN  gabapentin 300 milliGRAM(s) Oral at bedtime  QUEtiapine 50 milliGRAM(s) Oral daily  sertraline 200 milliGRAM(s) Oral daily    docusate sodium 100 milliGRAM(s) Oral two times a day  pantoprazole    Tablet 40 milliGRAM(s) Oral before breakfast  polyethylene glycol 3350 17 Gram(s) Oral daily    dextrose 50% Injectable 12.5 Gram(s) IV Push once  dextrose 50% Injectable 25 Gram(s) IV Push once  dextrose 50% Injectable 25 Gram(s) IV Push once  dextrose Gel 1 Dose(s) Oral once PRN  glucagon  Injectable 1 milliGRAM(s) IntraMuscular once PRN  insulin glargine Injectable (LANTUS) 20 Unit(s) SubCutaneous at bedtime  insulin lispro (HumaLOG) corrective regimen sliding scale   SubCutaneous three times a day before meals  insulin lispro (HumaLOG) corrective regimen sliding scale   SubCutaneous at bedtime    dextrose 5%. 1000 milliLiter(s) IV Continuous <Continuous>  influenza   Vaccine 0.5 milliLiter(s) IntraMuscular once      PHYSICAL EXAM:  T(C): 37 (11-09-17 @ 10:03), Max: 37 (11-09-17 @ 10:03)  HR: 75 (11-09-17 @ 15:02) (69 - 75)  BP: 131/74 (11-09-17 @ 15:02) (131/74 - 157/88)  RR: 18 (11-09-17 @ 15:02) (16 - 19)  SpO2: 100% (11-09-17 @ 15:02) (100% - 100%)  Wt(kg): --  I&O's Summary    09 Nov 2017 07:01  -  09 Nov 2017 18:21  --------------------------------------------------------  IN: 500 mL / OUT: 2500 mL / NET: -2000 mL          Appearance: Normal	  HEENT:   Normal oral mucosa, PERRL, EOMI	  Cardiovascular: Normal S1 S2, No JVD, No murmurs, No edema  Respiratory: Lungs clear to auscultation	  Gastrointestinal:  Soft, Non-tender, + BS	  Extremities: Normal range of motion, No clubbing, cyanosis or edema                                    9.6    5.93  )-----------( 263      ( 09 Nov 2017 06:30 )             30.3     11-09    132<L>  |  95<L>  |  46<H>  ----------------------------<  156<H>  4.5   |  25  |  6.67<H>    Ca    9.0      09 Nov 2017 06:30  Phos  4.9     11-09  Mg     2.0     11-09      proBNP:   Lipid Profile:   HgA1c:   TSH:

## 2017-11-09 NOTE — PROGRESS NOTE ADULT - SUBJECTIVE AND OBJECTIVE BOX
Patient is a 52y old  Female who presents with a chief complaint of Flank and epigastric pain (16 Oct 2017 17:53)      INTERVAL HPI/OVERNIGHT EVENTS:  T(C): 37 (11-09-17 @ 10:03), Max: 37.1 (11-08-17 @ 13:24)  HR: 69 (11-09-17 @ 10:03) (69 - 80)  BP: 134/79 (11-09-17 @ 10:03) (117/74 - 157/88)  RR: 16 (11-09-17 @ 10:03) (16 - 19)  SpO2: 100% (11-09-17 @ 10:03) (100% - 100%)  Wt(kg): --  I&O's Summary    09 Nov 2017 07:01  -  09 Nov 2017 11:32  --------------------------------------------------------  IN: 500 mL / OUT: 2500 mL / NET: -2000 mL        LABS:                        9.6    5.93  )-----------( 263      ( 09 Nov 2017 06:30 )             30.3     11-09    132<L>  |  95<L>  |  46<H>  ----------------------------<  156<H>  4.5   |  25  |  6.67<H>    Ca    9.0      09 Nov 2017 06:30  Phos  4.9     11-09  Mg     2.0     11-09          CAPILLARY BLOOD GLUCOSE  182 (09 Nov 2017 05:46)  266 (08 Nov 2017 22:36)      POCT Blood Glucose.: 119 mg/dL (09 Nov 2017 08:45)  POCT Blood Glucose.: 182 mg/dL (09 Nov 2017 05:44)  POCT Blood Glucose.: 266 mg/dL (08 Nov 2017 22:37)  POCT Blood Glucose.: 178 mg/dL (08 Nov 2017 17:34)  POCT Blood Glucose.: 129 mg/dL (08 Nov 2017 12:27)            MEDICATIONS  (STANDING):  amLODIPine   Tablet 10 milliGRAM(s) Oral daily  aspirin enteric coated 81 milliGRAM(s) Oral daily  clopidogrel Tablet 75 milliGRAM(s) Oral daily  dextrose 5%. 1000 milliLiter(s) (50 mL/Hr) IV Continuous <Continuous>  dextrose 50% Injectable 12.5 Gram(s) IV Push once  dextrose 50% Injectable 25 Gram(s) IV Push once  dextrose 50% Injectable 25 Gram(s) IV Push once  docusate sodium 100 milliGRAM(s) Oral two times a day  furosemide    Tablet 20 milliGRAM(s) Oral two times a day  gabapentin 300 milliGRAM(s) Oral at bedtime  heparin  Injectable 5000 Unit(s) SubCutaneous every 8 hours  influenza   Vaccine 0.5 milliLiter(s) IntraMuscular once  insulin glargine Injectable (LANTUS) 20 Unit(s) SubCutaneous at bedtime  insulin lispro (HumaLOG) corrective regimen sliding scale   SubCutaneous three times a day before meals  insulin lispro (HumaLOG) corrective regimen sliding scale   SubCutaneous at bedtime  pantoprazole    Tablet 40 milliGRAM(s) Oral before breakfast  polyethylene glycol 3350 17 Gram(s) Oral daily  QUEtiapine 50 milliGRAM(s) Oral daily  sertraline 200 milliGRAM(s) Oral daily    MEDICATIONS  (PRN):  acetaminophen   Tablet 650 milliGRAM(s) Oral every 6 hours PRN For Temp greater than 38 C (100.4 F)  acetaminophen   Tablet. 650 milliGRAM(s) Oral every 6 hours PRN mild and Moderate  dextrose Gel 1 Dose(s) Oral once PRN Blood Glucose LESS THAN 70 milliGRAM(s)/deciliter  glucagon  Injectable 1 milliGRAM(s) IntraMuscular once PRN Glucose LESS THAN 70 milligrams/deciliter          PHYSICAL EXAM:  GENERAL: NAD, well-groomed, well-developed  HEAD:  Atraumatic, Normocephalic  CHEST/LUNG: Clear to percussion bilaterally; No rales, rhonchi, wheezing, or rubs  HEART: Regular rate and rhythm; No murmurs, rubs, or gallops  ABDOMEN: Soft, Nontender, Nondistended; Bowel sounds present  EXTREMITIES:  2+ Peripheral Pulses, No clubbing, cyanosis, or edema  LYMPH: No lymphadenopathy noted  SKIN: No rashes or lesions    Care Discussed with Consultants/Other Providers [+ ] YES  [ ] NO

## 2017-11-09 NOTE — PROGRESS NOTE ADULT - SUBJECTIVE AND OBJECTIVE BOX
No pain, no shortness of breath. Seen on HD.      VITAL:  T(C): , Max: 37.1 (11-08-17 @ 13:24)  T(F): , Max: 98.7 (11-08-17 @ 13:24)  HR: 72 (11-09-17 @ 06:06)  BP: 157/88 (11-09-17 @ 06:06)  BP(mean): --  RR: 16 (11-09-17 @ 06:06)  SpO2: 100% (11-09-17 @ 05:46)  Wt(kg): --      PHYSICAL EXAM:  GA: Obese, NAD, Alert  HEENT: NCAT, MMM  Neck: Supple, No JVD  Lungs: CTA-b/l, normal effort  Heart: RRR s1s2, no m/r/g  Abd: BS+, soft, NT/ND  Ext: well perfused, no edema or cyanosis  Neurological: no focal deficits; strength grossly intact; no asterixis  Psychiatric: Normal mood, normal affect  Skin: No rashes on visible skin  Access: RIJ tunneled cath- accessed; LUE AVF (+)thrill      LABS:                        9.6    5.93  )-----------( 263      ( 09 Nov 2017 06:30 )             30.3     Na(135)/K(4.5)/Cl(96)/HCO3(26)/BUN(38)/Cr(5.47)Glu(133)/Ca(9.0)/Mg(2.0)/PO4(--)    11-08 @ 06:15        IMPRESSION: 52F w/ HTN, DM2, CAD, and CKD5, 10/16/17 a/w vomiting/epigastric pain/flank pain    1. ESRD - new as of this admission;  on a TTS schedule,  2. Vascular - s/p left radiocephalic AVF creation by Dr. Radha Capps during this admission. HDTC accidentally pulled out. AVF ready to use  3. Anemia of CKD - indicated that we start giving Epogen with HD   4. SW - awaiting acceptance into outpatient HD/homeless shelter    RECOMMEND:  - HD today as ordered  - Epogen with HD starting 11/11  - F/U with SW regarding outpatient HD/living arrangements              Wale Francisco MD  [facility]  [phone] No pain, no shortness of breath. Seen on HD.      VITAL:  T(C): , Max: 37.1 (11-08-17 @ 13:24)  T(F): , Max: 98.7 (11-08-17 @ 13:24)  HR: 72 (11-09-17 @ 06:06)  BP: 157/88 (11-09-17 @ 06:06)  BP(mean): --  RR: 16 (11-09-17 @ 06:06)  SpO2: 100% (11-09-17 @ 05:46)  Wt(kg): --      PHYSICAL EXAM:  GA: Obese, NAD, Alert  HEENT: NCAT, MMM  Neck: Supple, No JVD  Lungs: CTA-b/l, normal effort  Heart: RRR s1s2, no m/r/g  Abd: BS+, soft, NT/ND  Ext: well perfused, no edema or cyanosis  Neurological: no focal deficits; strength grossly intact; no asterixis  Psychiatric: Normal mood, normal affect  Skin: No rashes on visible skin  Access: RIJ tunneled cath- accessed; LUE AVF (+)thrill      LABS:                        9.6    5.93  )-----------( 263      ( 09 Nov 2017 06:30 )             30.3     Na(135)/K(4.5)/Cl(96)/HCO3(26)/BUN(38)/Cr(5.47)Glu(133)/Ca(9.0)/Mg(2.0)/PO4(--)    11-08 @ 06:15        IMPRESSION: 52F w/ HTN, DM2, CAD, and CKD5, 10/16/17 a/w vomiting/epigastric pain/flank pain    1. ESRD - new as of this admission;  on a TTS schedule,  2. Vascular - s/p left radiocephalic AVF creation by Dr. Radha Capps during this admission. HDTC accidentally pulled out. AVF ready to use  3. Anemia of CKD - indicated that we start giving Epogen with HD   4. SW - likely to get accepted into [facility] within next few days, per SW     RECOMMEND:  - HD today as ordered  - Epogen with HD starting 11/11  - F/U with SW regarding outpatient HD/living arrangements              Wale Francisco MD  [facility]  [phone]

## 2017-11-10 LAB
BUN SERPL-MCNC: 39 MG/DL — HIGH (ref 7–23)
CALCIUM SERPL-MCNC: 9.3 MG/DL — SIGNIFICANT CHANGE UP (ref 8.4–10.5)
CHLORIDE SERPL-SCNC: 96 MMOL/L — LOW (ref 98–107)
CO2 SERPL-SCNC: 24 MMOL/L — SIGNIFICANT CHANGE UP (ref 22–31)
CREAT SERPL-MCNC: 5.7 MG/DL — HIGH (ref 0.5–1.3)
GLUCOSE SERPL-MCNC: 129 MG/DL — HIGH (ref 70–99)
HCT VFR BLD CALC: 34.1 % — LOW (ref 34.5–45)
HGB BLD-MCNC: 10.7 G/DL — LOW (ref 11.5–15.5)
MAGNESIUM SERPL-MCNC: 2.1 MG/DL — SIGNIFICANT CHANGE UP (ref 1.6–2.6)
MCHC RBC-ENTMCNC: 28 PG — SIGNIFICANT CHANGE UP (ref 27–34)
MCHC RBC-ENTMCNC: 31.4 % — LOW (ref 32–36)
MCV RBC AUTO: 89.3 FL — SIGNIFICANT CHANGE UP (ref 80–100)
NRBC # FLD: 0 — SIGNIFICANT CHANGE UP
PHOSPHATE SERPL-MCNC: 4.5 MG/DL — SIGNIFICANT CHANGE UP (ref 2.5–4.5)
PLATELET # BLD AUTO: 267 K/UL — SIGNIFICANT CHANGE UP (ref 150–400)
PMV BLD: 11.1 FL — SIGNIFICANT CHANGE UP (ref 7–13)
POTASSIUM SERPL-MCNC: 4.4 MMOL/L — SIGNIFICANT CHANGE UP (ref 3.5–5.3)
POTASSIUM SERPL-SCNC: 4.4 MMOL/L — SIGNIFICANT CHANGE UP (ref 3.5–5.3)
RBC # BLD: 3.82 M/UL — SIGNIFICANT CHANGE UP (ref 3.8–5.2)
RBC # FLD: 13.1 % — SIGNIFICANT CHANGE UP (ref 10.3–14.5)
SODIUM SERPL-SCNC: 136 MMOL/L — SIGNIFICANT CHANGE UP (ref 135–145)
WBC # BLD: 5.89 K/UL — SIGNIFICANT CHANGE UP (ref 3.8–10.5)
WBC # FLD AUTO: 5.89 K/UL — SIGNIFICANT CHANGE UP (ref 3.8–10.5)

## 2017-11-10 RX ADMIN — Medication 100 MILLIGRAM(S): at 05:42

## 2017-11-10 RX ADMIN — PANTOPRAZOLE SODIUM 40 MILLIGRAM(S): 20 TABLET, DELAYED RELEASE ORAL at 05:42

## 2017-11-10 RX ADMIN — Medication 20 MILLIGRAM(S): at 17:20

## 2017-11-10 RX ADMIN — SERTRALINE 200 MILLIGRAM(S): 25 TABLET, FILM COATED ORAL at 12:42

## 2017-11-10 RX ADMIN — Medication 100 MILLIGRAM(S): at 17:20

## 2017-11-10 RX ADMIN — Medication 20 MILLIGRAM(S): at 05:42

## 2017-11-10 RX ADMIN — QUETIAPINE FUMARATE 50 MILLIGRAM(S): 200 TABLET, FILM COATED ORAL at 15:16

## 2017-11-10 RX ADMIN — Medication 2: at 17:21

## 2017-11-10 RX ADMIN — INSULIN GLARGINE 20 UNIT(S): 100 INJECTION, SOLUTION SUBCUTANEOUS at 22:07

## 2017-11-10 RX ADMIN — CLOPIDOGREL BISULFATE 75 MILLIGRAM(S): 75 TABLET, FILM COATED ORAL at 12:42

## 2017-11-10 RX ADMIN — AMLODIPINE BESYLATE 10 MILLIGRAM(S): 2.5 TABLET ORAL at 05:41

## 2017-11-10 RX ADMIN — HEPARIN SODIUM 5000 UNIT(S): 5000 INJECTION INTRAVENOUS; SUBCUTANEOUS at 22:07

## 2017-11-10 RX ADMIN — HEPARIN SODIUM 5000 UNIT(S): 5000 INJECTION INTRAVENOUS; SUBCUTANEOUS at 05:41

## 2017-11-10 RX ADMIN — GABAPENTIN 300 MILLIGRAM(S): 400 CAPSULE ORAL at 22:07

## 2017-11-10 RX ADMIN — Medication 1: at 12:42

## 2017-11-10 RX ADMIN — Medication 1: at 08:59

## 2017-11-10 RX ADMIN — Medication 1: at 22:07

## 2017-11-10 RX ADMIN — Medication 81 MILLIGRAM(S): at 12:42

## 2017-11-10 RX ADMIN — HEPARIN SODIUM 5000 UNIT(S): 5000 INJECTION INTRAVENOUS; SUBCUTANEOUS at 12:43

## 2017-11-10 NOTE — PROGRESS NOTE ADULT - SUBJECTIVE AND OBJECTIVE BOX
No pain, no shortness of breath      VITAL:  T(C): , Max: 37.1 (11-10-17 @ 05:39)  T(F): , Max: 98.7 (11-10-17 @ 05:39)  HR: 73 (11-10-17 @ 05:39)  BP: 118/70 (11-10-17 @ 05:39)  BP(mean): --  RR: 16 (11-10-17 @ 05:39)  SpO2: 100% (11-10-17 @ 05:39)      PHYSICAL EXAM:  GA: Obese, NAD, Alert  HEENT: NCAT, MMM  Neck: Supple, No JVD  Lungs: CTA-b/l, normal effort  Heart: RRR s1s2, no m/r/g  Abd: BS+, soft, NT/ND  Ext: well perfused, no edema or cyanosis  Neurological: no focal deficits; strength grossly intact; no asterixis  Psychiatric: Normal mood, normal affect  Skin: No rashes on visible skin  Access: RIJ tunneled cath- accessed; LUE AVF (+)thrill      LABS:                        10.7   5.89  )-----------( 267      ( 10 Nov 2017 06:00 )             34.1     Na(136)/K(4.4)/Cl(96)/HCO3(24)/BUN(39)/Cr(5.70)Glu(129)/Ca(9.3)/Mg(2.1)/PO4(4.5)    11-10 @ 06:08  Na(132)/K(4.5)/Cl(95)/HCO3(25)/BUN(46)/Cr(6.67)Glu(156)/Ca(9.0)/Mg(2.0)/PO4(4.9)    11-09 @ 06:30  Na(135)/K(4.5)/Cl(96)/HCO3(26)/BUN(38)/Cr(5.47)Glu(133)/Ca(9.0)/Mg(2.0)/PO4(--)    11-08 @ 06:15      IMPRESSION: 52F w/ HTN, DM2, CAD, and CKD5, 10/16/17 a/w vomiting/epigastric pain/flank pain  1. ESRD - new as of this admission;  on a TTS schedule,  2. Vascular - s/p left radiocephalic AVF creation by Dr. Radha Capps during this admission. Also with tunneled HD catheter.  3. Anemia of CKD - indicated that we start giving Epogen with HD   4. SW - likely to get accepted into [facility] within next few days, per SW     RECOMMEND:  - Next HD tomorrow as ordered  - Epogen with HD starting 11/11  - F/U with SW regarding outpatient HD/living arrangements          Wale Francicso MD  [facility]  [phone] No pain, no shortness of breath      VITAL:  T(C): , Max: 37.1 (11-10-17 @ 05:39)  T(F): , Max: 98.7 (11-10-17 @ 05:39)  HR: 73 (11-10-17 @ 05:39)  BP: 118/70 (11-10-17 @ 05:39)  BP(mean): --  RR: 16 (11-10-17 @ 05:39)  SpO2: 100% (11-10-17 @ 05:39)      PHYSICAL EXAM:  GA: Obese, NAD, Alert  HEENT: NCAT, MMM  Neck: Supple, No JVD  Lungs: CTA-b/l, normal effort  Heart: RRR s1s2, no m/r/g  Abd: BS+, soft, NT/ND  Ext: well perfused, no edema or cyanosis  Neurological: no focal deficits; strength grossly intact; no asterixis  Psychiatric: Normal mood, normal affect  Skin: No rashes on visible skin  Access: RIJ tunneled cath- accessed; LUE AVF (+)thrill      LABS:                        10.7   5.89  )-----------( 267      ( 10 Nov 2017 06:00 )             34.1     Na(136)/K(4.4)/Cl(96)/HCO3(24)/BUN(39)/Cr(5.70)Glu(129)/Ca(9.3)/Mg(2.1)/PO4(4.5)    11-10 @ 06:08  Na(132)/K(4.5)/Cl(95)/HCO3(25)/BUN(46)/Cr(6.67)Glu(156)/Ca(9.0)/Mg(2.0)/PO4(4.9)    11-09 @ 06:30  Na(135)/K(4.5)/Cl(96)/HCO3(26)/BUN(38)/Cr(5.47)Glu(133)/Ca(9.0)/Mg(2.0)/PO4(--)    11-08 @ 06:15      IMPRESSION: 52F w/ HTN, DM2, CAD, and CKD5, 10/16/17 a/w vomiting/epigastric pain/flank pain  1. ESRD - new as of this admission;  on a TTS schedule,  2. Vascular - s/p left radiocephalic AVF creation by Dr. Radha Capps during this admission; AVF is immature. Also with tunneled HD catheter.  3. Anemia of CKD - indicated that we start giving Epogen with HD   4. SW - likely to get accepted into [facility] within next few days, per SW     RECOMMEND:  - Next HD tomorrow as ordered, via tunneled cath  - Epogen with HD starting tomorrow  - F/U with SW regarding outpatient HD/living arrangements          Wale Francisoc MD  [facility]  [phone]

## 2017-11-10 NOTE — PROGRESS NOTE ADULT - SUBJECTIVE AND OBJECTIVE BOX
Chaz Freitas MD  Interventional Cardiology  [place] Office : [address] [place] [address] [place]. [address]  Tel:   [place] Office : [address] [facility] [place] [address]  Tel: [phone]  Cell : [phone]    Subjective : Pt lying in bed comfortable, seen in am,  not in distress, denies any chest pain or SOB  	  MEDICATIONS:  amLODIPine   Tablet 10 milliGRAM(s) Oral daily  aspirin enteric coated 81 milliGRAM(s) Oral daily  clopidogrel Tablet 75 milliGRAM(s) Oral daily  furosemide    Tablet 20 milliGRAM(s) Oral two times a day  heparin  Injectable 5000 Unit(s) SubCutaneous every 8 hours        acetaminophen   Tablet 650 milliGRAM(s) Oral every 6 hours PRN  acetaminophen   Tablet. 650 milliGRAM(s) Oral every 6 hours PRN  gabapentin 300 milliGRAM(s) Oral at bedtime  QUEtiapine 50 milliGRAM(s) Oral daily  sertraline 200 milliGRAM(s) Oral daily    docusate sodium 100 milliGRAM(s) Oral two times a day  pantoprazole    Tablet 40 milliGRAM(s) Oral before breakfast  polyethylene glycol 3350 17 Gram(s) Oral daily    dextrose 50% Injectable 12.5 Gram(s) IV Push once  dextrose 50% Injectable 25 Gram(s) IV Push once  dextrose 50% Injectable 25 Gram(s) IV Push once  dextrose Gel 1 Dose(s) Oral once PRN  glucagon  Injectable 1 milliGRAM(s) IntraMuscular once PRN  insulin glargine Injectable (LANTUS) 20 Unit(s) SubCutaneous at bedtime  insulin lispro (HumaLOG) corrective regimen sliding scale   SubCutaneous three times a day before meals  insulin lispro (HumaLOG) corrective regimen sliding scale   SubCutaneous at bedtime    dextrose 5%. 1000 milliLiter(s) IV Continuous <Continuous>  influenza   Vaccine 0.5 milliLiter(s) IntraMuscular once      PHYSICAL EXAM:  T(C): 36.3 (11-10-17 @ 12:16), Max: 37.1 (11-10-17 @ 05:39)  HR: 73 (11-10-17 @ 17:19) (70 - 73)  BP: 123/67 (11-10-17 @ 17:19) (118/70 - 136/75)  RR: 16 (11-10-17 @ 12:16) (16 - 17)  SpO2: 100% (11-10-17 @ 12:16) (100% - 100%)  Wt(kg): --  I&O's Summary    09 Nov 2017 07:01  -  10 Nov 2017 07:00  --------------------------------------------------------  IN: 500 mL / OUT: 2500 mL / NET: -2000 mL          Appearance: Normal	  HEENT:   Normal oral mucosa, PERRL, EOMI	  Cardiovascular: Normal S1 S2, No JVD, No murmurs, No edema  Respiratory: Lungs clear to auscultation	  Gastrointestinal:  Soft, Non-tender, + BS	  Extremities: Normal range of motion, No clubbing, cyanosis or edema                                    10.7   5.89  )-----------( 267      ( 10 Nov 2017 06:00 )             34.1     11-10    136  |  96<L>  |  39<H>  ----------------------------<  129<H>  4.4   |  24  |  5.70<H>    Ca    9.3      10 Nov 2017 06:08  Phos  4.5     11-10  Mg     2.1     11-10      proBNP:   Lipid Profile:   HgA1c:   TSH:

## 2017-11-10 NOTE — PROGRESS NOTE ADULT - SUBJECTIVE AND OBJECTIVE BOX
Patient is a 52y old  Female who presents with a chief complaint of Flank and epigastric pain (16 Oct 2017 17:53)      INTERVAL HPI/OVERNIGHT EVENTS:  T(C): 36.3 (11-10-17 @ 12:16), Max: 37.1 (11-10-17 @ 05:39)  HR: 73 (11-10-17 @ 17:19) (70 - 73)  BP: 123/67 (11-10-17 @ 17:19) (118/70 - 136/75)  RR: 16 (11-10-17 @ 12:16) (16 - 17)  SpO2: 100% (11-10-17 @ 12:16) (100% - 100%)  Wt(kg): --  I&O's Summary    09 Nov 2017 07:01  -  10 Nov 2017 07:00  --------------------------------------------------------  IN: 500 mL / OUT: 2500 mL / NET: -2000 mL        LABS:                        10.7   5.89  )-----------( 267      ( 10 Nov 2017 06:00 )             34.1     11-10    136  |  96<L>  |  39<H>  ----------------------------<  129<H>  4.4   |  24  |  5.70<H>    Ca    9.3      10 Nov 2017 06:08  Phos  4.5     11-10  Mg     2.1     11-10          CAPILLARY BLOOD GLUCOSE      POCT Blood Glucose.: 250 mg/dL (10 Nov 2017 16:55)  POCT Blood Glucose.: 184 mg/dL (10 Nov 2017 12:12)  POCT Blood Glucose.: 152 mg/dL (10 Nov 2017 08:26)  POCT Blood Glucose.: 220 mg/dL (09 Nov 2017 21:35)            MEDICATIONS  (STANDING):  amLODIPine   Tablet 10 milliGRAM(s) Oral daily  aspirin enteric coated 81 milliGRAM(s) Oral daily  clopidogrel Tablet 75 milliGRAM(s) Oral daily  dextrose 5%. 1000 milliLiter(s) (50 mL/Hr) IV Continuous <Continuous>  dextrose 50% Injectable 12.5 Gram(s) IV Push once  dextrose 50% Injectable 25 Gram(s) IV Push once  dextrose 50% Injectable 25 Gram(s) IV Push once  docusate sodium 100 milliGRAM(s) Oral two times a day  furosemide    Tablet 20 milliGRAM(s) Oral two times a day  gabapentin 300 milliGRAM(s) Oral at bedtime  heparin  Injectable 5000 Unit(s) SubCutaneous every 8 hours  influenza   Vaccine 0.5 milliLiter(s) IntraMuscular once  insulin glargine Injectable (LANTUS) 20 Unit(s) SubCutaneous at bedtime  insulin lispro (HumaLOG) corrective regimen sliding scale   SubCutaneous three times a day before meals  insulin lispro (HumaLOG) corrective regimen sliding scale   SubCutaneous at bedtime  pantoprazole    Tablet 40 milliGRAM(s) Oral before breakfast  polyethylene glycol 3350 17 Gram(s) Oral daily  QUEtiapine 50 milliGRAM(s) Oral daily  sertraline 200 milliGRAM(s) Oral daily    MEDICATIONS  (PRN):  acetaminophen   Tablet 650 milliGRAM(s) Oral every 6 hours PRN For Temp greater than 38 C (100.4 F)  acetaminophen   Tablet. 650 milliGRAM(s) Oral every 6 hours PRN mild and Moderate  dextrose Gel 1 Dose(s) Oral once PRN Blood Glucose LESS THAN 70 milliGRAM(s)/deciliter  glucagon  Injectable 1 milliGRAM(s) IntraMuscular once PRN Glucose LESS THAN 70 milligrams/deciliter          PHYSICAL EXAM:  GENERAL: NAD, well-groomed, well-developed  HEAD:  Atraumatic, Normocephalic  CHEST/LUNG: Clear to percussion bilaterally; No rales, rhonchi, wheezing, or rubs  HEART: Regular rate and rhythm; No murmurs, rubs, or gallops  ABDOMEN: Soft, Nontender, Nondistended; Bowel sounds present  EXTREMITIES:  2+ Peripheral Pulses, No clubbing, cyanosis, or edema  LYMPH: No lymphadenopathy noted  SKIN: No rashes or lesions    Care Discussed with Consultants/Other Providers [ ] YES  [ ] NO

## 2017-11-11 LAB
BUN SERPL-MCNC: 55 MG/DL — HIGH (ref 7–23)
CALCIUM SERPL-MCNC: 9 MG/DL — SIGNIFICANT CHANGE UP (ref 8.4–10.5)
CHLORIDE SERPL-SCNC: 94 MMOL/L — LOW (ref 98–107)
CO2 SERPL-SCNC: 25 MMOL/L — SIGNIFICANT CHANGE UP (ref 22–31)
CREAT SERPL-MCNC: 6.82 MG/DL — HIGH (ref 0.5–1.3)
GLUCOSE SERPL-MCNC: 201 MG/DL — HIGH (ref 70–99)
HCT VFR BLD CALC: 30.1 % — LOW (ref 34.5–45)
HGB BLD-MCNC: 9.7 G/DL — LOW (ref 11.5–15.5)
MAGNESIUM SERPL-MCNC: 2.2 MG/DL — SIGNIFICANT CHANGE UP (ref 1.6–2.6)
MCHC RBC-ENTMCNC: 28.9 PG — SIGNIFICANT CHANGE UP (ref 27–34)
MCHC RBC-ENTMCNC: 32.2 % — SIGNIFICANT CHANGE UP (ref 32–36)
MCV RBC AUTO: 89.6 FL — SIGNIFICANT CHANGE UP (ref 80–100)
NRBC # FLD: 0 — SIGNIFICANT CHANGE UP
PLATELET # BLD AUTO: 239 K/UL — SIGNIFICANT CHANGE UP (ref 150–400)
PMV BLD: 11.3 FL — SIGNIFICANT CHANGE UP (ref 7–13)
POTASSIUM SERPL-MCNC: 4.6 MMOL/L — SIGNIFICANT CHANGE UP (ref 3.5–5.3)
POTASSIUM SERPL-SCNC: 4.6 MMOL/L — SIGNIFICANT CHANGE UP (ref 3.5–5.3)
RBC # BLD: 3.36 M/UL — LOW (ref 3.8–5.2)
RBC # FLD: 13 % — SIGNIFICANT CHANGE UP (ref 10.3–14.5)
SODIUM SERPL-SCNC: 132 MMOL/L — LOW (ref 135–145)
WBC # BLD: 5.68 K/UL — SIGNIFICANT CHANGE UP (ref 3.8–10.5)
WBC # FLD AUTO: 5.68 K/UL — SIGNIFICANT CHANGE UP (ref 3.8–10.5)

## 2017-11-11 RX ADMIN — HEPARIN SODIUM 5000 UNIT(S): 5000 INJECTION INTRAVENOUS; SUBCUTANEOUS at 05:23

## 2017-11-11 RX ADMIN — AMLODIPINE BESYLATE 10 MILLIGRAM(S): 2.5 TABLET ORAL at 05:23

## 2017-11-11 RX ADMIN — HEPARIN SODIUM 5000 UNIT(S): 5000 INJECTION INTRAVENOUS; SUBCUTANEOUS at 14:11

## 2017-11-11 RX ADMIN — Medication 81 MILLIGRAM(S): at 12:46

## 2017-11-11 RX ADMIN — SERTRALINE 200 MILLIGRAM(S): 25 TABLET, FILM COATED ORAL at 12:46

## 2017-11-11 RX ADMIN — ERYTHROPOIETIN 10000 UNIT(S): 10000 INJECTION, SOLUTION INTRAVENOUS; SUBCUTANEOUS at 17:16

## 2017-11-11 RX ADMIN — INSULIN GLARGINE 20 UNIT(S): 100 INJECTION, SOLUTION SUBCUTANEOUS at 22:34

## 2017-11-11 RX ADMIN — Medication 1: at 08:44

## 2017-11-11 RX ADMIN — Medication 20 MILLIGRAM(S): at 05:23

## 2017-11-11 RX ADMIN — Medication 100 MILLIGRAM(S): at 05:23

## 2017-11-11 RX ADMIN — PANTOPRAZOLE SODIUM 40 MILLIGRAM(S): 20 TABLET, DELAYED RELEASE ORAL at 05:23

## 2017-11-11 RX ADMIN — GABAPENTIN 300 MILLIGRAM(S): 400 CAPSULE ORAL at 22:35

## 2017-11-11 RX ADMIN — Medication 3: at 12:46

## 2017-11-11 RX ADMIN — QUETIAPINE FUMARATE 50 MILLIGRAM(S): 200 TABLET, FILM COATED ORAL at 12:46

## 2017-11-11 RX ADMIN — CLOPIDOGREL BISULFATE 75 MILLIGRAM(S): 75 TABLET, FILM COATED ORAL at 12:46

## 2017-11-11 RX ADMIN — Medication 2: at 17:14

## 2017-11-11 NOTE — PROGRESS NOTE ADULT - SUBJECTIVE AND OBJECTIVE BOX
Chaz Freitas MD  Interventional Cardiology  [place] Office : [address] [place] [address] [place] [address]  Tel:   [place] Office : [address] [facility] [place] [address]  Tel: [phone]  Cell : [phone]    Subjective : Pt lying in bed comfortable, not in distress, denies any chest pain or SOB  	  MEDICATIONS:  amLODIPine   Tablet 10 milliGRAM(s) Oral daily  aspirin enteric coated 81 milliGRAM(s) Oral daily  clopidogrel Tablet 75 milliGRAM(s) Oral daily  furosemide    Tablet 20 milliGRAM(s) Oral two times a day  heparin  Injectable 5000 Unit(s) SubCutaneous every 8 hours        acetaminophen   Tablet 650 milliGRAM(s) Oral every 6 hours PRN  acetaminophen   Tablet. 650 milliGRAM(s) Oral every 6 hours PRN  gabapentin 300 milliGRAM(s) Oral at bedtime  QUEtiapine 50 milliGRAM(s) Oral daily  sertraline 200 milliGRAM(s) Oral daily    docusate sodium 100 milliGRAM(s) Oral two times a day  pantoprazole    Tablet 40 milliGRAM(s) Oral before breakfast  polyethylene glycol 3350 17 Gram(s) Oral daily    dextrose 50% Injectable 12.5 Gram(s) IV Push once  dextrose 50% Injectable 25 Gram(s) IV Push once  dextrose 50% Injectable 25 Gram(s) IV Push once  dextrose Gel 1 Dose(s) Oral once PRN  glucagon  Injectable 1 milliGRAM(s) IntraMuscular once PRN  insulin glargine Injectable (LANTUS) 20 Unit(s) SubCutaneous at bedtime  insulin lispro (HumaLOG) corrective regimen sliding scale   SubCutaneous three times a day before meals  insulin lispro (HumaLOG) corrective regimen sliding scale   SubCutaneous at bedtime    dextrose 5%. 1000 milliLiter(s) IV Continuous <Continuous>  epoetin karla Injectable 89298 Unit(s) IV Push <User Schedule>  influenza   Vaccine 0.5 milliLiter(s) IntraMuscular once      PHYSICAL EXAM:  T(C): 36.8 (11-11-17 @ 05:20), Max: 36.8 (11-11-17 @ 05:20)  HR: 74 (11-11-17 @ 05:20) (70 - 74)  BP: 131/65 (11-11-17 @ 05:20) (123/67 - 136/75)  RR: 15 (11-11-17 @ 05:20) (15 - 16)  SpO2: 96% (11-11-17 @ 05:20) (96% - 100%)  Wt(kg): --  I&O's Summary        Appearance: Normal	  HEENT:   Normal oral mucosa, PERRL, EOMI	  Cardiovascular: Normal S1 S2, No JVD, No murmurs, No edema  Respiratory: Lungs clear to auscultation	  Gastrointestinal:  Soft, Non-tender, + BS	  Extremities: Normal range of motion, No clubbing, cyanosis or edema                                    9.7    5.68  )-----------( 239      ( 11 Nov 2017 07:05 )             30.1     11-11    132<L>  |  94<L>  |  55<H>  ----------------------------<  201<H>  4.6   |  25  |  6.82<H>    Ca    9.0      11 Nov 2017 07:05  Phos  4.5     11-10  Mg     2.2     11-11      proBNP:   Lipid Profile:   HgA1c:   TSH:

## 2017-11-11 NOTE — PROGRESS NOTE ADULT - SUBJECTIVE AND OBJECTIVE BOX
Patient is a 52y old  Female who presents with a chief complaint of Flank and epigastric pain (16 Oct 2017 17:53)  NAD    INTERVAL HPI/OVERNIGHT EVENTS:  T(C): 36.8 (11-11-17 @ 05:20), Max: 36.8 (11-11-17 @ 05:20)  HR: 74 (11-11-17 @ 05:20) (70 - 74)  BP: 131/65 (11-11-17 @ 05:20) (123/67 - 134/73)  RR: 15 (11-11-17 @ 05:20) (15 - 16)  SpO2: 96% (11-11-17 @ 05:20) (96% - 100%)  Wt(kg): --  I&O's Summary      LABS:                        9.7    5.68  )-----------( 239      ( 11 Nov 2017 07:05 )             30.1     11-11    132<L>  |  94<L>  |  55<H>  ----------------------------<  201<H>  4.6   |  25  |  6.82<H>    Ca    9.0      11 Nov 2017 07:05  Phos  4.5     11-10  Mg     2.2     11-11          CAPILLARY BLOOD GLUCOSE      POCT Blood Glucose.: 257 mg/dL (11 Nov 2017 12:09)  POCT Blood Glucose.: 202 mg/dL (11 Nov 2017 10:35)  POCT Blood Glucose.: 199 mg/dL (11 Nov 2017 08:31)  POCT Blood Glucose.: 252 mg/dL (10 Nov 2017 21:33)  POCT Blood Glucose.: 250 mg/dL (10 Nov 2017 16:55)            MEDICATIONS  (STANDING):  amLODIPine   Tablet 10 milliGRAM(s) Oral daily  aspirin enteric coated 81 milliGRAM(s) Oral daily  clopidogrel Tablet 75 milliGRAM(s) Oral daily  dextrose 5%. 1000 milliLiter(s) (50 mL/Hr) IV Continuous <Continuous>  dextrose 50% Injectable 12.5 Gram(s) IV Push once  dextrose 50% Injectable 25 Gram(s) IV Push once  dextrose 50% Injectable 25 Gram(s) IV Push once  docusate sodium 100 milliGRAM(s) Oral two times a day  epoetin karla Injectable 99001 Unit(s) IV Push <User Schedule>  furosemide    Tablet 20 milliGRAM(s) Oral two times a day  gabapentin 300 milliGRAM(s) Oral at bedtime  heparin  Injectable 5000 Unit(s) SubCutaneous every 8 hours  influenza   Vaccine 0.5 milliLiter(s) IntraMuscular once  insulin glargine Injectable (LANTUS) 20 Unit(s) SubCutaneous at bedtime  insulin lispro (HumaLOG) corrective regimen sliding scale   SubCutaneous three times a day before meals  insulin lispro (HumaLOG) corrective regimen sliding scale   SubCutaneous at bedtime  pantoprazole    Tablet 40 milliGRAM(s) Oral before breakfast  polyethylene glycol 3350 17 Gram(s) Oral daily  QUEtiapine 50 milliGRAM(s) Oral daily  sertraline 200 milliGRAM(s) Oral daily    MEDICATIONS  (PRN):  acetaminophen   Tablet 650 milliGRAM(s) Oral every 6 hours PRN For Temp greater than 38 C (100.4 F)  acetaminophen   Tablet. 650 milliGRAM(s) Oral every 6 hours PRN mild and Moderate  dextrose Gel 1 Dose(s) Oral once PRN Blood Glucose LESS THAN 70 milliGRAM(s)/deciliter  glucagon  Injectable 1 milliGRAM(s) IntraMuscular once PRN Glucose LESS THAN 70 milligrams/deciliter          PHYSICAL EXAM:  GENERAL: NAD, well-groomed, well-developed  HEAD:  Atraumatic, Normocephalic  CHEST/LUNG: Clear to percussion bilaterally; No rales, rhonchi, wheezing, or rubs  HEART: Regular rate and rhythm; No murmurs, rubs, or gallops  ABDOMEN: Soft, Nontender, Nondistended; Bowel sounds present  EXTREMITIES:  2+ Peripheral Pulses, No clubbing, cyanosis, or edema  LYMPH: No lymphadenopathy noted  SKIN: No rashes or lesions    Care Discussed with Consultants/Other Providers [ +] YES  [ ] NO

## 2017-11-12 LAB
BUN SERPL-MCNC: 43 MG/DL — HIGH (ref 7–23)
CALCIUM SERPL-MCNC: 8.6 MG/DL — SIGNIFICANT CHANGE UP (ref 8.4–10.5)
CHLORIDE SERPL-SCNC: 93 MMOL/L — LOW (ref 98–107)
CO2 SERPL-SCNC: 24 MMOL/L — SIGNIFICANT CHANGE UP (ref 22–31)
CREAT SERPL-MCNC: 5.43 MG/DL — HIGH (ref 0.5–1.3)
GLUCOSE SERPL-MCNC: 303 MG/DL — HIGH (ref 70–99)
HCT VFR BLD CALC: 30.3 % — LOW (ref 34.5–45)
HGB BLD-MCNC: 9.7 G/DL — LOW (ref 11.5–15.5)
MAGNESIUM SERPL-MCNC: 2 MG/DL — SIGNIFICANT CHANGE UP (ref 1.6–2.6)
MCHC RBC-ENTMCNC: 28.7 PG — SIGNIFICANT CHANGE UP (ref 27–34)
MCHC RBC-ENTMCNC: 32 % — SIGNIFICANT CHANGE UP (ref 32–36)
MCV RBC AUTO: 89.6 FL — SIGNIFICANT CHANGE UP (ref 80–100)
NRBC # FLD: 0 — SIGNIFICANT CHANGE UP
PLATELET # BLD AUTO: 250 K/UL — SIGNIFICANT CHANGE UP (ref 150–400)
PMV BLD: 11.7 FL — SIGNIFICANT CHANGE UP (ref 7–13)
POTASSIUM SERPL-MCNC: 4.2 MMOL/L — SIGNIFICANT CHANGE UP (ref 3.5–5.3)
POTASSIUM SERPL-SCNC: 4.2 MMOL/L — SIGNIFICANT CHANGE UP (ref 3.5–5.3)
RBC # BLD: 3.38 M/UL — LOW (ref 3.8–5.2)
RBC # FLD: 13.2 % — SIGNIFICANT CHANGE UP (ref 10.3–14.5)
SODIUM SERPL-SCNC: 132 MMOL/L — LOW (ref 135–145)
WBC # BLD: 7.93 K/UL — SIGNIFICANT CHANGE UP (ref 3.8–10.5)
WBC # FLD AUTO: 7.93 K/UL — SIGNIFICANT CHANGE UP (ref 3.8–10.5)

## 2017-11-12 RX ADMIN — Medication 2: at 08:39

## 2017-11-12 RX ADMIN — SERTRALINE 200 MILLIGRAM(S): 25 TABLET, FILM COATED ORAL at 13:27

## 2017-11-12 RX ADMIN — Medication 20 MILLIGRAM(S): at 05:52

## 2017-11-12 RX ADMIN — AMLODIPINE BESYLATE 10 MILLIGRAM(S): 2.5 TABLET ORAL at 05:52

## 2017-11-12 RX ADMIN — Medication 100 MILLIGRAM(S): at 17:16

## 2017-11-12 RX ADMIN — Medication 3: at 13:26

## 2017-11-12 RX ADMIN — QUETIAPINE FUMARATE 50 MILLIGRAM(S): 200 TABLET, FILM COATED ORAL at 13:27

## 2017-11-12 RX ADMIN — Medication 100 MILLIGRAM(S): at 05:52

## 2017-11-12 RX ADMIN — Medication 3: at 23:18

## 2017-11-12 RX ADMIN — CLOPIDOGREL BISULFATE 75 MILLIGRAM(S): 75 TABLET, FILM COATED ORAL at 13:27

## 2017-11-12 RX ADMIN — Medication 20 MILLIGRAM(S): at 17:16

## 2017-11-12 RX ADMIN — HEPARIN SODIUM 5000 UNIT(S): 5000 INJECTION INTRAVENOUS; SUBCUTANEOUS at 13:27

## 2017-11-12 RX ADMIN — GABAPENTIN 300 MILLIGRAM(S): 400 CAPSULE ORAL at 22:45

## 2017-11-12 RX ADMIN — Medication 81 MILLIGRAM(S): at 13:27

## 2017-11-12 RX ADMIN — Medication 5: at 17:16

## 2017-11-12 RX ADMIN — PANTOPRAZOLE SODIUM 40 MILLIGRAM(S): 20 TABLET, DELAYED RELEASE ORAL at 06:48

## 2017-11-12 RX ADMIN — INSULIN GLARGINE 20 UNIT(S): 100 INJECTION, SOLUTION SUBCUTANEOUS at 22:45

## 2017-11-12 RX ADMIN — HEPARIN SODIUM 5000 UNIT(S): 5000 INJECTION INTRAVENOUS; SUBCUTANEOUS at 22:45

## 2017-11-12 RX ADMIN — HEPARIN SODIUM 5000 UNIT(S): 5000 INJECTION INTRAVENOUS; SUBCUTANEOUS at 05:52

## 2017-11-12 NOTE — PROGRESS NOTE ADULT - SUBJECTIVE AND OBJECTIVE BOX
Patient is a 52y old  Female who presents with a chief complaint of Flank and epigastric pain (16 Oct 2017 17:53)      INTERVAL HPI/OVERNIGHT EVENTS:  T(C): 37.1 (11-12-17 @ 04:50), Max: 37.1 (11-12-17 @ 04:50)  HR: 82 (11-12-17 @ 04:50) (72 - 82)  BP: 140/58 (11-12-17 @ 04:50) (133/65 - 141/71)  RR: 189 (11-12-17 @ 04:50) (16 - 189)  SpO2: 97% (11-12-17 @ 04:50) (97% - 100%)  Wt(kg): --  I&O's Summary    11 Nov 2017 07:01  -  12 Nov 2017 07:00  --------------------------------------------------------  IN: 400 mL / OUT: 1400 mL / NET: -1000 mL        LABS:                        9.7    7.93  )-----------( 250      ( 12 Nov 2017 06:00 )             30.3     11-12    132<L>  |  93<L>  |  43<H>  ----------------------------<  303<H>  4.2   |  24  |  5.43<H>    Ca    8.6      12 Nov 2017 06:00  Mg     2.0     11-12          CAPILLARY BLOOD GLUCOSE      POCT Blood Glucose.: 227 mg/dL (12 Nov 2017 08:23)  POCT Blood Glucose.: 240 mg/dL (11 Nov 2017 22:18)  POCT Blood Glucose.: 201 mg/dL (11 Nov 2017 17:03)  POCT Blood Glucose.: 257 mg/dL (11 Nov 2017 12:09)            MEDICATIONS  (STANDING):  amLODIPine   Tablet 10 milliGRAM(s) Oral daily  aspirin enteric coated 81 milliGRAM(s) Oral daily  clopidogrel Tablet 75 milliGRAM(s) Oral daily  dextrose 5%. 1000 milliLiter(s) (50 mL/Hr) IV Continuous <Continuous>  dextrose 50% Injectable 12.5 Gram(s) IV Push once  dextrose 50% Injectable 25 Gram(s) IV Push once  dextrose 50% Injectable 25 Gram(s) IV Push once  docusate sodium 100 milliGRAM(s) Oral two times a day  epoetin karla Injectable 99878 Unit(s) IV Push <User Schedule>  furosemide    Tablet 20 milliGRAM(s) Oral two times a day  gabapentin 300 milliGRAM(s) Oral at bedtime  heparin  Injectable 5000 Unit(s) SubCutaneous every 8 hours  influenza   Vaccine 0.5 milliLiter(s) IntraMuscular once  insulin glargine Injectable (LANTUS) 20 Unit(s) SubCutaneous at bedtime  insulin lispro (HumaLOG) corrective regimen sliding scale   SubCutaneous three times a day before meals  insulin lispro (HumaLOG) corrective regimen sliding scale   SubCutaneous at bedtime  pantoprazole    Tablet 40 milliGRAM(s) Oral before breakfast  polyethylene glycol 3350 17 Gram(s) Oral daily  QUEtiapine 50 milliGRAM(s) Oral daily  sertraline 200 milliGRAM(s) Oral daily    MEDICATIONS  (PRN):  acetaminophen   Tablet 650 milliGRAM(s) Oral every 6 hours PRN For Temp greater than 38 C (100.4 F)  acetaminophen   Tablet. 650 milliGRAM(s) Oral every 6 hours PRN mild and Moderate  dextrose Gel 1 Dose(s) Oral once PRN Blood Glucose LESS THAN 70 milliGRAM(s)/deciliter  glucagon  Injectable 1 milliGRAM(s) IntraMuscular once PRN Glucose LESS THAN 70 milligrams/deciliter          PHYSICAL EXAM:  GENERAL: NAD, well-groomed, well-developed  HEAD:  Atraumatic, Normocephalic  CHEST/LUNG: Clear to percussion bilaterally; No rales, rhonchi, wheezing, or rubs  HEART: Regular rate and rhythm; No murmurs, rubs, or gallops  ABDOMEN: Soft, Nontender, Nondistended; Bowel sounds present  EXTREMITIES:  2+ Peripheral Pulses, No clubbing, cyanosis, or edema  LYMPH: No lymphadenopathy noted  SKIN: No rashes or lesions    Care Discussed with Consultants/Other Providers [+ ] YES  [ ] NO

## 2017-11-12 NOTE — PROGRESS NOTE ADULT - SUBJECTIVE AND OBJECTIVE BOX
Chaz Freitas MD  Interventional Cardiology  [place] Office : [address] [place] [address] [address]  Tel:   [place] Office : [address] [facility] [place] [address]  Tel: [phone]  Cell : [phone]    Subjective : Pt lying in bed comfortable, not in distress, denies any chest pain or SOB  	  MEDICATIONS:  amLODIPine   Tablet 10 milliGRAM(s) Oral daily  aspirin enteric coated 81 milliGRAM(s) Oral daily  clopidogrel Tablet 75 milliGRAM(s) Oral daily  furosemide    Tablet 20 milliGRAM(s) Oral two times a day  heparin  Injectable 5000 Unit(s) SubCutaneous every 8 hours        acetaminophen   Tablet 650 milliGRAM(s) Oral every 6 hours PRN  acetaminophen   Tablet. 650 milliGRAM(s) Oral every 6 hours PRN  gabapentin 300 milliGRAM(s) Oral at bedtime  QUEtiapine 50 milliGRAM(s) Oral daily  sertraline 200 milliGRAM(s) Oral daily    docusate sodium 100 milliGRAM(s) Oral two times a day  pantoprazole    Tablet 40 milliGRAM(s) Oral before breakfast  polyethylene glycol 3350 17 Gram(s) Oral daily    dextrose 50% Injectable 12.5 Gram(s) IV Push once  dextrose 50% Injectable 25 Gram(s) IV Push once  dextrose 50% Injectable 25 Gram(s) IV Push once  dextrose Gel 1 Dose(s) Oral once PRN  glucagon  Injectable 1 milliGRAM(s) IntraMuscular once PRN  insulin glargine Injectable (LANTUS) 20 Unit(s) SubCutaneous at bedtime  insulin lispro (HumaLOG) corrective regimen sliding scale   SubCutaneous three times a day before meals  insulin lispro (HumaLOG) corrective regimen sliding scale   SubCutaneous at bedtime    dextrose 5%. 1000 milliLiter(s) IV Continuous <Continuous>  epoetin karla Injectable 50961 Unit(s) IV Push <User Schedule>  influenza   Vaccine 0.5 milliLiter(s) IntraMuscular once      PHYSICAL EXAM:  T(C): 37.1 (11-12-17 @ 04:50), Max: 37.1 (11-12-17 @ 04:50)  HR: 82 (11-12-17 @ 04:50) (72 - 82)  BP: 140/58 (11-12-17 @ 04:50) (133/65 - 141/71)  RR: 189 (11-12-17 @ 04:50) (16 - 189)  SpO2: 97% (11-12-17 @ 04:50) (97% - 100%)  Wt(kg): --  I&O's Summary    11 Nov 2017 07:01  -  12 Nov 2017 07:00  --------------------------------------------------------  IN: 400 mL / OUT: 1400 mL / NET: -1000 mL          Appearance: Normal	  HEENT:   Normal oral mucosa, PERRL, EOMI	  Cardiovascular: Normal S1 S2, No JVD, No murmurs, No edema  Respiratory: Lungs clear to auscultation	  Gastrointestinal:  Soft, Non-tender, + BS	  Extremities: Normal range of motion, No clubbing, cyanosis or edema                                    9.7    7.93  )-----------( 250      ( 12 Nov 2017 06:00 )             30.3     11-12    132<L>  |  93<L>  |  43<H>  ----------------------------<  303<H>  4.2   |  24  |  5.43<H>    Ca    8.6      12 Nov 2017 06:00  Mg     2.0     11-12      proBNP:   Lipid Profile:   HgA1c:   TSH:

## 2017-11-13 LAB
BUN SERPL-MCNC: 56 MG/DL — HIGH (ref 7–23)
CALCIUM SERPL-MCNC: 9 MG/DL — SIGNIFICANT CHANGE UP (ref 8.4–10.5)
CHLORIDE SERPL-SCNC: 97 MMOL/L — LOW (ref 98–107)
CO2 SERPL-SCNC: 24 MMOL/L — SIGNIFICANT CHANGE UP (ref 22–31)
CREAT SERPL-MCNC: 7.01 MG/DL — HIGH (ref 0.5–1.3)
GLUCOSE SERPL-MCNC: 239 MG/DL — HIGH (ref 70–99)
HCT VFR BLD CALC: 31.3 % — LOW (ref 34.5–45)
HGB BLD-MCNC: 9.8 G/DL — LOW (ref 11.5–15.5)
MAGNESIUM SERPL-MCNC: 2.2 MG/DL — SIGNIFICANT CHANGE UP (ref 1.6–2.6)
MCHC RBC-ENTMCNC: 27.8 PG — SIGNIFICANT CHANGE UP (ref 27–34)
MCHC RBC-ENTMCNC: 31.3 % — LOW (ref 32–36)
MCV RBC AUTO: 88.7 FL — SIGNIFICANT CHANGE UP (ref 80–100)
NRBC # FLD: 0 — SIGNIFICANT CHANGE UP
PLATELET # BLD AUTO: 263 K/UL — SIGNIFICANT CHANGE UP (ref 150–400)
PMV BLD: 11.4 FL — SIGNIFICANT CHANGE UP (ref 7–13)
POTASSIUM SERPL-MCNC: 5 MMOL/L — SIGNIFICANT CHANGE UP (ref 3.5–5.3)
POTASSIUM SERPL-SCNC: 5 MMOL/L — SIGNIFICANT CHANGE UP (ref 3.5–5.3)
RBC # BLD: 3.53 M/UL — LOW (ref 3.8–5.2)
RBC # FLD: 13.2 % — SIGNIFICANT CHANGE UP (ref 10.3–14.5)
SODIUM SERPL-SCNC: 135 MMOL/L — SIGNIFICANT CHANGE UP (ref 135–145)
WBC # BLD: 8.04 K/UL — SIGNIFICANT CHANGE UP (ref 3.8–10.5)
WBC # FLD AUTO: 8.04 K/UL — SIGNIFICANT CHANGE UP (ref 3.8–10.5)

## 2017-11-13 RX ADMIN — INSULIN GLARGINE 20 UNIT(S): 100 INJECTION, SOLUTION SUBCUTANEOUS at 21:19

## 2017-11-13 RX ADMIN — HEPARIN SODIUM 5000 UNIT(S): 5000 INJECTION INTRAVENOUS; SUBCUTANEOUS at 21:18

## 2017-11-13 RX ADMIN — AMLODIPINE BESYLATE 10 MILLIGRAM(S): 2.5 TABLET ORAL at 06:35

## 2017-11-13 RX ADMIN — Medication 20 MILLIGRAM(S): at 06:35

## 2017-11-13 RX ADMIN — GABAPENTIN 300 MILLIGRAM(S): 400 CAPSULE ORAL at 21:21

## 2017-11-13 RX ADMIN — Medication 3: at 21:18

## 2017-11-13 RX ADMIN — HEPARIN SODIUM 5000 UNIT(S): 5000 INJECTION INTRAVENOUS; SUBCUTANEOUS at 06:39

## 2017-11-13 RX ADMIN — Medication 100 MILLIGRAM(S): at 06:35

## 2017-11-13 RX ADMIN — Medication 100 MILLIGRAM(S): at 17:57

## 2017-11-13 RX ADMIN — Medication 5: at 08:32

## 2017-11-13 RX ADMIN — HEPARIN SODIUM 5000 UNIT(S): 5000 INJECTION INTRAVENOUS; SUBCUTANEOUS at 13:05

## 2017-11-13 RX ADMIN — PANTOPRAZOLE SODIUM 40 MILLIGRAM(S): 20 TABLET, DELAYED RELEASE ORAL at 06:35

## 2017-11-13 RX ADMIN — CLOPIDOGREL BISULFATE 75 MILLIGRAM(S): 75 TABLET, FILM COATED ORAL at 13:05

## 2017-11-13 RX ADMIN — Medication 81 MILLIGRAM(S): at 13:05

## 2017-11-13 RX ADMIN — Medication: at 13:12

## 2017-11-13 RX ADMIN — Medication 20 MILLIGRAM(S): at 17:56

## 2017-11-13 RX ADMIN — SERTRALINE 200 MILLIGRAM(S): 25 TABLET, FILM COATED ORAL at 17:56

## 2017-11-13 RX ADMIN — QUETIAPINE FUMARATE 50 MILLIGRAM(S): 200 TABLET, FILM COATED ORAL at 13:05

## 2017-11-13 RX ADMIN — Medication 4: at 17:55

## 2017-11-13 NOTE — PROGRESS NOTE ADULT - SUBJECTIVE AND OBJECTIVE BOX
Patient is a 52y old  Female who presents with a chief complaint of Flank and epigastric pain (16 Oct 2017 17:53)      INTERVAL HPI/OVERNIGHT EVENTS:  T(C): 36.4 (11-13-17 @ 13:23), Max: 37.2 (11-13-17 @ 06:41)  HR: 78 (11-13-17 @ 13:23) (72 - 78)  BP: 133/67 (11-13-17 @ 13:23) (123/77 - 133/67)  RR: 18 (11-13-17 @ 13:23) (18 - 18)  SpO2: 98% (11-13-17 @ 13:23) (98% - 99%)  Wt(kg): --  I&O's Summary      LABS:                        9.8    8.04  )-----------( 263      ( 13 Nov 2017 06:30 )             31.3     11-13    135  |  97<L>  |  56<H>  ----------------------------<  239<H>  5.0   |  24  |  7.01<H>    Ca    9.0      13 Nov 2017 06:30  Mg     2.2     11-13          CAPILLARY BLOOD GLUCOSE  275 (13 Nov 2017 12:40)  334 (13 Nov 2017 08:33)      POCT Blood Glucose.: 345 mg/dL (13 Nov 2017 16:43)  POCT Blood Glucose.: 275 mg/dL (13 Nov 2017 12:28)  POCT Blood Glucose.: 334 mg/dL (13 Nov 2017 08:24)  POCT Blood Glucose.: 359 mg/dL (12 Nov 2017 22:35)            MEDICATIONS  (STANDING):  amLODIPine   Tablet 10 milliGRAM(s) Oral daily  aspirin enteric coated 81 milliGRAM(s) Oral daily  clopidogrel Tablet 75 milliGRAM(s) Oral daily  dextrose 5%. 1000 milliLiter(s) (50 mL/Hr) IV Continuous <Continuous>  dextrose 50% Injectable 12.5 Gram(s) IV Push once  dextrose 50% Injectable 25 Gram(s) IV Push once  dextrose 50% Injectable 25 Gram(s) IV Push once  docusate sodium 100 milliGRAM(s) Oral two times a day  epoetin karla Injectable 82485 Unit(s) IV Push <User Schedule>  furosemide    Tablet 20 milliGRAM(s) Oral two times a day  gabapentin 300 milliGRAM(s) Oral at bedtime  heparin  Injectable 5000 Unit(s) SubCutaneous every 8 hours  influenza   Vaccine 0.5 milliLiter(s) IntraMuscular once  insulin glargine Injectable (LANTUS) 20 Unit(s) SubCutaneous at bedtime  insulin lispro (HumaLOG) corrective regimen sliding scale   SubCutaneous three times a day before meals  insulin lispro (HumaLOG) corrective regimen sliding scale   SubCutaneous at bedtime  pantoprazole    Tablet 40 milliGRAM(s) Oral before breakfast  polyethylene glycol 3350 17 Gram(s) Oral daily  QUEtiapine 50 milliGRAM(s) Oral daily  sertraline 200 milliGRAM(s) Oral daily    MEDICATIONS  (PRN):  acetaminophen   Tablet 650 milliGRAM(s) Oral every 6 hours PRN For Temp greater than 38 C (100.4 F)  acetaminophen   Tablet. 650 milliGRAM(s) Oral every 6 hours PRN mild and Moderate  dextrose Gel 1 Dose(s) Oral once PRN Blood Glucose LESS THAN 70 milliGRAM(s)/deciliter  glucagon  Injectable 1 milliGRAM(s) IntraMuscular once PRN Glucose LESS THAN 70 milligrams/deciliter          PHYSICAL EXAM:  GENERAL: NAD, well-groomed, well-developed  HEAD:  Atraumatic, Normocephalic  CHEST/LUNG: Clear to percussion bilaterally; No rales, rhonchi, wheezing, or rubs  HEART: Regular rate and rhythm; No murmurs, rubs, or gallops  ABDOMEN: Soft, Nontender, Nondistended; Bowel sounds present  EXTREMITIES:  2+ Peripheral Pulses, No clubbing, cyanosis, or edema  LYMPH: No lymphadenopathy noted  SKIN: No rashes or lesions    Care Discussed with Consultants/Other Providers [ +] YES  [ ] NO

## 2017-11-13 NOTE — PROGRESS NOTE ADULT - SUBJECTIVE AND OBJECTIVE BOX
Chaz Freitas MD  Interventional Cardiology  [place] Office : [address] [place] [address] [place] [address]  Tel:   [place] Office : [address] [facility] [place] [address]  Tel: [phone]  Cell : [phone]    Subjective : Pt lying in bed comfortable, not in distress, denies any chest pain or SOB, awaiting placement  	  MEDICATIONS:  amLODIPine   Tablet 10 milliGRAM(s) Oral daily  aspirin enteric coated 81 milliGRAM(s) Oral daily  clopidogrel Tablet 75 milliGRAM(s) Oral daily  furosemide    Tablet 20 milliGRAM(s) Oral two times a day  heparin  Injectable 5000 Unit(s) SubCutaneous every 8 hours        acetaminophen   Tablet 650 milliGRAM(s) Oral every 6 hours PRN  acetaminophen   Tablet. 650 milliGRAM(s) Oral every 6 hours PRN  gabapentin 300 milliGRAM(s) Oral at bedtime  QUEtiapine 50 milliGRAM(s) Oral daily  sertraline 200 milliGRAM(s) Oral daily    docusate sodium 100 milliGRAM(s) Oral two times a day  pantoprazole    Tablet 40 milliGRAM(s) Oral before breakfast  polyethylene glycol 3350 17 Gram(s) Oral daily    dextrose 50% Injectable 12.5 Gram(s) IV Push once  dextrose 50% Injectable 25 Gram(s) IV Push once  dextrose 50% Injectable 25 Gram(s) IV Push once  dextrose Gel 1 Dose(s) Oral once PRN  glucagon  Injectable 1 milliGRAM(s) IntraMuscular once PRN  insulin glargine Injectable (LANTUS) 20 Unit(s) SubCutaneous at bedtime  insulin lispro (HumaLOG) corrective regimen sliding scale   SubCutaneous three times a day before meals  insulin lispro (HumaLOG) corrective regimen sliding scale   SubCutaneous at bedtime    dextrose 5%. 1000 milliLiter(s) IV Continuous <Continuous>  epoetin karla Injectable 59886 Unit(s) IV Push <User Schedule>  influenza   Vaccine 0.5 milliLiter(s) IntraMuscular once      PHYSICAL EXAM:  T(C): 37.2 (11-13-17 @ 06:41), Max: 37.2 (11-13-17 @ 06:41)  HR: 72 (11-13-17 @ 06:41) (72 - 76)  BP: 125/73 (11-13-17 @ 06:41) (119/73 - 125/73)  RR: 18 (11-13-17 @ 06:41) (18 - 18)  SpO2: 98% (11-13-17 @ 06:41) (98% - 99%)  Wt(kg): --  I&O's Summary        Appearance: Normal	  HEENT:   Normal oral mucosa, PERRL, EOMI	  Cardiovascular: Normal S1 S2, No JVD, No murmurs, No edema  Respiratory: Lungs clear to auscultation	  Gastrointestinal:  Soft, Non-tender, + BS	  Extremities: Normal range of motion, No clubbing, cyanosis or edema                                    9.8    8.04  )-----------( 263      ( 13 Nov 2017 06:30 )             31.3     11-13    135  |  97<L>  |  56<H>  ----------------------------<  239<H>  5.0   |  24  |  7.01<H>    Ca    9.0      13 Nov 2017 06:30  Mg     2.2     11-13      proBNP:   Lipid Profile:   HgA1c:   TSH:

## 2017-11-13 NOTE — PROGRESS NOTE ADULT - SUBJECTIVE AND OBJECTIVE BOX
No pain, no shortness of breath      VITAL:  T(C): , Max: 37.2 (11-13-17 @ 06:41)  T(F): , Max: 99 (11-13-17 @ 06:41)  HR: 72 (11-13-17 @ 06:41)  BP: 125/73 (11-13-17 @ 06:41)  BP(mean): --  RR: 18 (11-13-17 @ 06:41)  SpO2: 98% (11-13-17 @ 06:41)  Wt(kg): --      PHYSICAL EXAM:  GA: Obese, NAD, Alert  HEENT: NCAT, MMM  Neck: Supple, No JVD  Lungs: CTA-b/l, normal effort  Heart: RRR s1s2, no m/r/g  Abd: BS+, soft, NT/ND  Ext: well perfused, no edema or cyanosis  Neurological: no focal deficits; strength grossly intact; no asterixis  Psychiatric: Normal mood, normal affect  Skin: No rashes on visible skin  Access: RIJ tunneled cath- accessed; LUE AVF (+)thrill      LABS:                        9.8    8.04  )-----------( 263      ( 13 Nov 2017 06:30 )             31.3     Na(135)/K(5.0)/Cl(97)/HCO3(24)/BUN(56)/Cr(7.01)Glu(239)/Ca(9.0)/Mg(2.2)/PO4(--)    11-13 @ 06:30  Na(132)/K(4.2)/Cl(93)/HCO3(24)/BUN(43)/Cr(5.43)Glu(303)/Ca(8.6)/Mg(2.0)/PO4(--)    11-12 @ 06:00  Na(132)/K(4.6)/Cl(94)/HCO3(25)/BUN(55)/Cr(6.82)Glu(201)/Ca(9.0)/Mg(2.2)/PO4(--)    11-11 @ 07:05      IMPRESSION: 52F w/ HTN, DM2, CAD, and CKD5, 10/16/17 a/w vomiting/epigastric pain/flank pain  1. ESRD - new as of this admission;  on a TTS schedule,  2. Vascular - s/p left radiocephalic AVF creation by Dr. Radha Capps during this admission; AVF is immature. Also with tunneled HD catheter.  3. Anemia of CKD - now on Epogen with HD  4. SW - awaiting acceptance into living facility    RECOMMEND:  (1) Next HD tomorrow as ordered, via tunneled cath; Epogen with HD  (2) F/U with SW regarding outpatient HD/living arrangements                Wale Francisco MD  [facility]  [phone]

## 2017-11-14 LAB — HBV SURFACE AG SER-ACNC: NEGATIVE — SIGNIFICANT CHANGE UP

## 2017-11-14 RX ADMIN — Medication 100 MILLIGRAM(S): at 15:14

## 2017-11-14 RX ADMIN — Medication 1: at 09:06

## 2017-11-14 RX ADMIN — GABAPENTIN 300 MILLIGRAM(S): 400 CAPSULE ORAL at 21:12

## 2017-11-14 RX ADMIN — CLOPIDOGREL BISULFATE 75 MILLIGRAM(S): 75 TABLET, FILM COATED ORAL at 15:10

## 2017-11-14 RX ADMIN — Medication 100 MILLIGRAM(S): at 05:31

## 2017-11-14 RX ADMIN — HEPARIN SODIUM 5000 UNIT(S): 5000 INJECTION INTRAVENOUS; SUBCUTANEOUS at 05:31

## 2017-11-14 RX ADMIN — QUETIAPINE FUMARATE 50 MILLIGRAM(S): 200 TABLET, FILM COATED ORAL at 15:11

## 2017-11-14 RX ADMIN — PANTOPRAZOLE SODIUM 40 MILLIGRAM(S): 20 TABLET, DELAYED RELEASE ORAL at 05:30

## 2017-11-14 RX ADMIN — INSULIN GLARGINE 20 UNIT(S): 100 INJECTION, SOLUTION SUBCUTANEOUS at 21:12

## 2017-11-14 RX ADMIN — ERYTHROPOIETIN 10000 UNIT(S): 10000 INJECTION, SOLUTION INTRAVENOUS; SUBCUTANEOUS at 12:26

## 2017-11-14 RX ADMIN — Medication 1: at 21:11

## 2017-11-14 RX ADMIN — SERTRALINE 200 MILLIGRAM(S): 25 TABLET, FILM COATED ORAL at 15:10

## 2017-11-14 RX ADMIN — HEPARIN SODIUM 5000 UNIT(S): 5000 INJECTION INTRAVENOUS; SUBCUTANEOUS at 21:12

## 2017-11-14 RX ADMIN — Medication 81 MILLIGRAM(S): at 15:11

## 2017-11-14 RX ADMIN — Medication 20 MILLIGRAM(S): at 15:13

## 2017-11-14 NOTE — PROGRESS NOTE ADULT - SUBJECTIVE AND OBJECTIVE BOX
Chaz Freitas MD  Interventional Cardiology  [place] Office : [phone] [place] [address]. [id number]  Tel:   [place] Office : [address] [facility] [place] [address]  Tel: [phone]  Cell : [phone]    Subjective : Pt lying in bed comfortable, not in distress, denies any chest pain or SOB  	  MEDICATIONS:  amLODIPine   Tablet 10 milliGRAM(s) Oral daily  aspirin enteric coated 81 milliGRAM(s) Oral daily  clopidogrel Tablet 75 milliGRAM(s) Oral daily  furosemide    Tablet 20 milliGRAM(s) Oral two times a day  heparin  Injectable 5000 Unit(s) SubCutaneous every 8 hours        acetaminophen   Tablet 650 milliGRAM(s) Oral every 6 hours PRN  acetaminophen   Tablet. 650 milliGRAM(s) Oral every 6 hours PRN  gabapentin 300 milliGRAM(s) Oral at bedtime  QUEtiapine 50 milliGRAM(s) Oral daily  sertraline 200 milliGRAM(s) Oral daily    docusate sodium 100 milliGRAM(s) Oral two times a day  pantoprazole    Tablet 40 milliGRAM(s) Oral before breakfast  polyethylene glycol 3350 17 Gram(s) Oral daily    dextrose 50% Injectable 12.5 Gram(s) IV Push once  dextrose 50% Injectable 25 Gram(s) IV Push once  dextrose 50% Injectable 25 Gram(s) IV Push once  dextrose Gel 1 Dose(s) Oral once PRN  glucagon  Injectable 1 milliGRAM(s) IntraMuscular once PRN  insulin glargine Injectable (LANTUS) 20 Unit(s) SubCutaneous at bedtime  insulin lispro (HumaLOG) corrective regimen sliding scale   SubCutaneous three times a day before meals  insulin lispro (HumaLOG) corrective regimen sliding scale   SubCutaneous at bedtime    dextrose 5%. 1000 milliLiter(s) IV Continuous <Continuous>  epoetin karla Injectable 62264 Unit(s) IV Push <User Schedule>  influenza   Vaccine 0.5 milliLiter(s) IntraMuscular once      PHYSICAL EXAM:  T(C): 37 (11-14-17 @ 11:02), Max: 37.1 (11-13-17 @ 21:16)  HR: 87 (11-14-17 @ 11:02) (78 - 87)  BP: 136/75 (11-14-17 @ 11:02) (134/79 - 146/69)  RR: 18 (11-14-17 @ 11:02) (18 - 18)  SpO2: 98% (11-14-17 @ 05:28) (96% - 98%)  Wt(kg): --  I&O's Summary        Appearance: Normal	  HEENT:   Normal oral mucosa, PERRL, EOMI	  Cardiovascular: Normal S1 S2, No JVD, No murmurs, No edema  Respiratory: Lungs clear to auscultation	  Gastrointestinal:  Soft, Non-tender, + BS	  Extremities: Normal range of motion, No clubbing, cyanosis or edema                                    9.8    8.04  )-----------( 263      ( 13 Nov 2017 06:30 )             31.3     11-13    135  |  97<L>  |  56<H>  ----------------------------<  239<H>  5.0   |  24  |  7.01<H>    Ca    9.0      13 Nov 2017 06:30  Mg     2.2     11-13      proBNP:   Lipid Profile:   HgA1c:   TSH:

## 2017-11-14 NOTE — PROGRESS NOTE ADULT - SUBJECTIVE AND OBJECTIVE BOX
No pain, no shortness of breath      VITAL:  T(C): , Max: 37.1 (11-13-17 @ 21:16)  T(F): , Max: 98.8 (11-13-17 @ 21:16)  HR: 83 (11-14-17 @ 05:28)  BP: 146/69 (11-14-17 @ 05:28)  BP(mean): --  RR: 18 (11-14-17 @ 05:28)  SpO2: 98% (11-14-17 @ 05:28)      PHYSICAL EXAM:  GA: Obese, NAD, Alert  HEENT: NCAT, MMM  Neck: Supple, No JVD  Lungs: CTA-b/l, normal effort  Heart: RRR s1s2, no m/r/g  Abd: BS+, soft, NT/ND  Ext: well perfused, no edema or cyanosis  Neurological: no focal deficits; strength grossly intact; no asterixis  Psychiatric: Normal mood, normal affect  Skin: No rashes on visible skin  Access: RIJ tunneled cath- accessed; LUE AVF (+)thrill      LABS:                        9.8    8.04  )-----------( 263      ( 13 Nov 2017 06:30 )             31.3     Na(135)/K(5.0)/Cl(97)/HCO3(24)/BUN(56)/Cr(7.01)Glu(239)/Ca(9.0)/Mg(2.2)/PO4(--)    11-13 @ 06:30  Na(132)/K(4.2)/Cl(93)/HCO3(24)/BUN(43)/Cr(5.43)Glu(303)/Ca(8.6)/Mg(2.0)/PO4(--)    11-12 @ 06:00      IMPRESSION: 52F w/ HTN, DM2, CAD, and CKD5, 10/16/17 a/w vomiting/epigastric pain/flank pain  1. ESRD - new as of this admission;  on a TTS schedule,  2. Vascular - s/p left radiocephalic AVF creation by Dr. Radha Capps during this admission; AVF is immature. Also with tunneled HD catheter.  3. Anemia of CKD - now on Epogen with HD  4. GI resolved N/V/abdominal pain from admission  5. SW - awaiting acceptance into living facility    RECOMMEND:  (1) Next HD today as ordered, via tunneled cath; Epogen with HD  (2) F/U with SW regarding outpatient HD/living arrangements                Wale Francisco MD  [facility]  [phone] No pain, no shortness of breath. Seen on HD.      VITAL:  T(C): , Max: 37.1 (11-13-17 @ 21:16)  T(F): , Max: 98.8 (11-13-17 @ 21:16)  HR: 83 (11-14-17 @ 05:28)  BP: 146/69 (11-14-17 @ 05:28)  BP(mean): --  RR: 18 (11-14-17 @ 05:28)  SpO2: 98% (11-14-17 @ 05:28)      PHYSICAL EXAM:  GA: Obese, NAD, Alert  HEENT: NCAT, MMM  Neck: Supple, No JVD  Lungs: CTA-b/l anteriorly  Heart: RRR s1s2, no m/r/g  Abd: BS+, soft, NT/ND  Ext: well perfused, no edema or cyanosis  Neurological: no focal deficits; strength grossly intact; no asterixis  Psychiatric: Normal mood, normal affect  Skin: No rashes on visible skin  Access: RIJ tunneled cath- accessed; LUE AVF (+)thrill      LABS:                        9.8    8.04  )-----------( 263      ( 13 Nov 2017 06:30 )             31.3     Na(135)/K(5.0)/Cl(97)/HCO3(24)/BUN(56)/Cr(7.01)Glu(239)/Ca(9.0)/Mg(2.2)/PO4(--)    11-13 @ 06:30  Na(132)/K(4.2)/Cl(93)/HCO3(24)/BUN(43)/Cr(5.43)Glu(303)/Ca(8.6)/Mg(2.0)/PO4(--)    11-12 @ 06:00      IMPRESSION: 52F w/ HTN, DM2, CAD, and CKD5, 10/16/17 a/w vomiting/epigastric pain/flank pain  1. ESRD - new as of this admission;  on a TTS schedule, well-tolerating HD at present.  2. Vascular - s/p left radiocephalic AVF creation by Dr. Radha Capps during this admission; AVF is immature. Also with tunneled HD catheter.  3. Anemia of CKD - now on Epogen with HD  4. GI resolved N/V/abdominal pain from admission  5. SW - awaiting acceptance into living facility    RECOMMEND:  (1) Next HD today as ordered, via tunneled cath; Epogen with HD  (2) F/U with SW regarding outpatient HD/living arrangements                Wale Francisco MD  [facility]  [phone]

## 2017-11-14 NOTE — PROGRESS NOTE ADULT - SUBJECTIVE AND OBJECTIVE BOX
Patient is a 52y old  Female who presents with a chief complaint of Flank and epigastric pain (16 Oct 2017 17:53)      INTERVAL HPI/OVERNIGHT EVENTS:  T(C): 36.8 (11-14-17 @ 14:18), Max: 37.1 (11-13-17 @ 21:16)  HR: 80 (11-14-17 @ 14:18) (78 - 87)  BP: 150/80 (11-14-17 @ 14:18) (136/62 - 150/80)  RR: 18 (11-14-17 @ 14:18) (18 - 18)  SpO2: 98% (11-14-17 @ 05:28) (96% - 98%)  Wt(kg): --  I&O's Summary    14 Nov 2017 07:01  -  14 Nov 2017 18:59  --------------------------------------------------------  IN: 500 mL / OUT: 1500 mL / NET: -1000 mL        LABS:                        9.8    8.04  )-----------( 263      ( 13 Nov 2017 06:30 )             31.3     11-13    135  |  97<L>  |  56<H>  ----------------------------<  239<H>  5.0   |  24  |  7.01<H>    Ca    9.0      13 Nov 2017 06:30  Mg     2.2     11-13          CAPILLARY BLOOD GLUCOSE      POCT Blood Glucose.: 238 mg/dL (14 Nov 2017 16:40)  POCT Blood Glucose.: 136 mg/dL (14 Nov 2017 13:01)  POCT Blood Glucose.: 151 mg/dL (14 Nov 2017 11:52)  POCT Blood Glucose.: 184 mg/dL (14 Nov 2017 08:32)  POCT Blood Glucose.: 369 mg/dL (13 Nov 2017 20:51)            MEDICATIONS  (STANDING):  amLODIPine   Tablet 10 milliGRAM(s) Oral daily  aspirin enteric coated 81 milliGRAM(s) Oral daily  clopidogrel Tablet 75 milliGRAM(s) Oral daily  dextrose 5%. 1000 milliLiter(s) (50 mL/Hr) IV Continuous <Continuous>  dextrose 50% Injectable 12.5 Gram(s) IV Push once  dextrose 50% Injectable 25 Gram(s) IV Push once  dextrose 50% Injectable 25 Gram(s) IV Push once  docusate sodium 100 milliGRAM(s) Oral two times a day  epoetin karla Injectable 38559 Unit(s) IV Push <User Schedule>  furosemide    Tablet 20 milliGRAM(s) Oral two times a day  gabapentin 300 milliGRAM(s) Oral at bedtime  heparin  Injectable 5000 Unit(s) SubCutaneous every 8 hours  influenza   Vaccine 0.5 milliLiter(s) IntraMuscular once  insulin glargine Injectable (LANTUS) 20 Unit(s) SubCutaneous at bedtime  insulin lispro (HumaLOG) corrective regimen sliding scale   SubCutaneous three times a day before meals  insulin lispro (HumaLOG) corrective regimen sliding scale   SubCutaneous at bedtime  pantoprazole    Tablet 40 milliGRAM(s) Oral before breakfast  polyethylene glycol 3350 17 Gram(s) Oral daily  QUEtiapine 50 milliGRAM(s) Oral daily  sertraline 200 milliGRAM(s) Oral daily    MEDICATIONS  (PRN):  acetaminophen   Tablet 650 milliGRAM(s) Oral every 6 hours PRN For Temp greater than 38 C (100.4 F)  acetaminophen   Tablet. 650 milliGRAM(s) Oral every 6 hours PRN mild and Moderate  dextrose Gel 1 Dose(s) Oral once PRN Blood Glucose LESS THAN 70 milliGRAM(s)/deciliter  glucagon  Injectable 1 milliGRAM(s) IntraMuscular once PRN Glucose LESS THAN 70 milligrams/deciliter          PHYSICAL EXAM:  GENERAL: NAD, well-groomed, well-developed  HEAD:  Atraumatic, Normocephalic  CHEST/LUNG: Clear to percussion bilaterally; No rales, rhonchi, wheezing, or rubs  HEART: Regular rate and rhythm; No murmurs, rubs, or gallops  ABDOMEN: Soft, Nontender, Nondistended; Bowel sounds present  EXTREMITIES:  2+ Peripheral Pulses, No clubbing, cyanosis, or edema  LYMPH: No lymphadenopathy noted  SKIN: No rashes or lesions    Care Discussed with Consultants/Other Providers [+ ] YES  [ ] NO

## 2017-11-15 ENCOUNTER — TRANSCRIPTION ENCOUNTER (OUTPATIENT)
Age: 52
End: 2017-11-15

## 2017-11-15 VITALS
HEART RATE: 84 BPM | RESPIRATION RATE: 18 BRPM | TEMPERATURE: 99 F | DIASTOLIC BLOOD PRESSURE: 70 MMHG | SYSTOLIC BLOOD PRESSURE: 98 MMHG | OXYGEN SATURATION: 100 %

## 2017-11-15 RX ORDER — SERTRALINE 25 MG/1
2 TABLET, FILM COATED ORAL
Qty: 60 | Refills: 0 | OUTPATIENT
Start: 2017-11-15 | End: 2017-12-15

## 2017-11-15 RX ORDER — QUETIAPINE FUMARATE 200 MG/1
1 TABLET, FILM COATED ORAL
Qty: 0 | Refills: 0 | COMMUNITY
Start: 2017-11-15 | End: 2017-12-15

## 2017-11-15 RX ORDER — GABAPENTIN 400 MG/1
1 CAPSULE ORAL
Qty: 30 | Refills: 0 | OUTPATIENT
Start: 2017-11-15 | End: 2017-12-15

## 2017-11-15 RX ORDER — CHOLECALCIFEROL (VITAMIN D3) 125 MCG
1 CAPSULE ORAL
Qty: 30 | Refills: 0 | OUTPATIENT
Start: 2017-11-15 | End: 2017-12-15

## 2017-11-15 RX ORDER — CLOPIDOGREL BISULFATE 75 MG/1
1 TABLET, FILM COATED ORAL
Qty: 30 | Refills: 0
Start: 2017-11-15 | End: 2017-12-15

## 2017-11-15 RX ORDER — SIMVASTATIN 20 MG/1
1 TABLET, FILM COATED ORAL
Qty: 30 | Refills: 0 | OUTPATIENT
Start: 2017-11-15 | End: 2017-12-15

## 2017-11-15 RX ORDER — FUROSEMIDE 40 MG
1 TABLET ORAL
Qty: 0 | Refills: 0 | COMMUNITY

## 2017-11-15 RX ORDER — QUETIAPINE FUMARATE 200 MG/1
1 TABLET, FILM COATED ORAL
Qty: 30 | Refills: 0 | OUTPATIENT
Start: 2017-11-15 | End: 2017-12-15

## 2017-11-15 RX ORDER — PANTOPRAZOLE SODIUM 20 MG/1
1 TABLET, DELAYED RELEASE ORAL
Qty: 30 | Refills: 0 | OUTPATIENT
Start: 2017-11-15 | End: 2017-12-15

## 2017-11-15 RX ORDER — QUETIAPINE FUMARATE 200 MG/1
1 TABLET, FILM COATED ORAL
Qty: 0 | Refills: 0 | COMMUNITY

## 2017-11-15 RX ORDER — AMLODIPINE BESYLATE 2.5 MG/1
1 TABLET ORAL
Qty: 30 | Refills: 0 | OUTPATIENT
Start: 2017-11-15 | End: 2017-12-15

## 2017-11-15 RX ORDER — FUROSEMIDE 40 MG
1 TABLET ORAL
Qty: 60 | Refills: 0 | OUTPATIENT
Start: 2017-11-15 | End: 2017-12-15

## 2017-11-15 RX ADMIN — Medication 1: at 09:17

## 2017-11-15 RX ADMIN — SERTRALINE 200 MILLIGRAM(S): 25 TABLET, FILM COATED ORAL at 10:18

## 2017-11-15 RX ADMIN — HEPARIN SODIUM 5000 UNIT(S): 5000 INJECTION INTRAVENOUS; SUBCUTANEOUS at 05:00

## 2017-11-15 RX ADMIN — QUETIAPINE FUMARATE 50 MILLIGRAM(S): 200 TABLET, FILM COATED ORAL at 10:19

## 2017-11-15 RX ADMIN — PANTOPRAZOLE SODIUM 40 MILLIGRAM(S): 20 TABLET, DELAYED RELEASE ORAL at 05:00

## 2017-11-15 RX ADMIN — Medication 100 MILLIGRAM(S): at 05:00

## 2017-11-15 RX ADMIN — Medication 81 MILLIGRAM(S): at 10:19

## 2017-11-15 RX ADMIN — Medication 3: at 13:32

## 2017-11-15 RX ADMIN — CLOPIDOGREL BISULFATE 75 MILLIGRAM(S): 75 TABLET, FILM COATED ORAL at 10:18

## 2017-11-15 RX ADMIN — Medication 20 MILLIGRAM(S): at 05:00

## 2017-11-15 RX ADMIN — AMLODIPINE BESYLATE 10 MILLIGRAM(S): 2.5 TABLET ORAL at 05:00

## 2017-11-15 NOTE — DISCHARGE NOTE ADULT - PATIENT PORTAL LINK FT
“You can access the Follow[employer] Patient Portal, offered by [facility], by registering with the following website: [email or web address]”

## 2017-11-15 NOTE — DISCHARGE NOTE ADULT - CARE PLAN
Principal Discharge DX:	Atypical chest pain  Goal:	prevent further progression of coronary artery disease  Instructions for follow-up, activity and diet:	FU cardio Dr Chaves 2 weeks ,continue  medications and diet  Secondary Diagnosis:	ESRD (end stage renal disease)  Instructions for follow-up, activity and diet:	pt started on HD [facility] to start on Friday 11/17  Secondary Diagnosis:	DM (diabetes mellitus)  Instructions for follow-up, activity and diet:	cont current medications and diet FU with PCP 1 to 2 weeks Dr Garcia  Secondary Diagnosis:	Diastolic heart failure  Instructions for follow-up, activity and diet:	cont medications ,diet and daily weights  Secondary Diagnosis:	HTN (hypertension)  Instructions for follow-up, activity and diet:	cont medications and diet  Secondary Diagnosis:	Depression  Instructions for follow-up, activity and diet:	cont medications

## 2017-11-15 NOTE — DISCHARGE NOTE ADULT - HOSPITAL COURSE
51 y/o female, with a PmHx of ESRD (not on Hemodialysis due to never followed up with Nephrologist), CAD, CHF, CVA with ataxia as residual, Depression, DM2, HTN and Gout, presents with Left Flank pain radiating to epigastric area x 2 days, found to have EKG changes, admitted to tele for Atypical Chest pain, r/o ACS.    + Atypical chest pain - NST without ischemia   + New HD - s/p shiley placed by IR 10/18 removed 10/23, initiated HD 10/18, s/p AVF by vascular 10/20, s/p permacath 10/23 Renal (Dr. Francisco) following    11/5- tele dc'd  EKG: NSR @ 76 bpm with NEW TWI in I, AVL, V3-V5, II and AVF  CE x 2 neg              Bun/Cr: 38/6.38  10/16 CT Abd/Pelvis w/o con - No obstructive uropathy. No urinary calculi.  10/16 CXR - Clear lungs. No pleural effusions or pneumothorax.  Stable slightly prominent appearing cardiac and mediastinal silhouettes. Trachea midline.Unremarkable osseous structures. Right upper quadrant surgical clips again noted.  10/17 Renal c/s Dr. Francisco - new HD to be set up  10/17 Vas Surgery c/s Radha York - please get vein mapping in preparation for AV Fistula placement - no blood draws or IV access on non-dominant UE, would recommend permacath placement for non-emergent chronic HD however if you need an emergent shiley placed please call vascular surgery [place] assistance at [phone], please medically optimize for AV fistula placement  10/17 Bilateral UE vein mapping - No evidence of thrombosis or significant wall thickening visualized in the right and left cephalic and basilic veins. Vessel diameters as noted above.  10/18 Renal f/u - Catheter placement today by IR (favor tunneled catheter if possible; patient will need to be discharged with a tunneled catheter) (2)AV access creation per Vascular (3)HD x 3 consecutive days (10/18-10/20) (4)Epogen with HD (5)SW setup of outpatient HD ([facility]?)  10/18 Vasc f/u - Anticipate OR for LEFT AVF / AVG on Friday 10/20. - no blood draws or IV access on LEFT UE; pink armband in place - Discussed dialysis plan with Nephrologist, Dr. Francisco. Patient will be initiated on daily dialysis (10/18, 10/19, 10/20). Will plan for late dialysis after OR Friday. - Preop labs Friday morning: CBC, BMP, coags, Type and Screen, Serum bHCG - IR today for permacath placement  10/18 Cards - Epigastric pain - T wave inversions, nuclear stress test tomorrow, cont asa, ESRD - s/p shiley permacath postponed sec to pt was on plavix 3) h/o CVA - cont asa and zocor  10/19 NST - Abnormal Study * Myocardial Perfusion SPECT results are mildly abnormal. * Normal myocardial perfusion,with no evidence of infarction or inducible ischemia. * However, findings suggest cardiomyopathy. The LV was mildly enlarged. Post-stress gated wall motion analysis was performed (LVEF = 47 %;LVEDV = 142 ml.), revealing mild diffuse hypokinesis. RV function appeared normal.  10/21 Renal f/u - ESRD, new HD as of this admission, Vascular - s/p left radiocephalic AVF creation, HD now, Next HD Monday, permacath next week, SW to help setup of outpatient HD, Start colace 100 mg po BID and miralax daily, for constipation  10/22 Med - Atypical chest pain. tele monitor  management as per cards, ESRD on HD  - renal following, sp avf and permacth pending, c/w current meds  10/22 Cardio f/u - Epigastric pain - T wave inversions, nuclear stress test shows no ischemia EF 47%,  cont asa,  ESRD - s/p shiley permacath postponed sec to pt was on plavix, permacath placement on monday, S/P AVF,  h/o CVA - cont asa and zocor  10/23 Renal f/u - HD today, need conversion to tubbeled cath by IR, awaiting outpt HD acceptance     10/24 Cards f/u - nuclear with no ischemia continue asa, s/p shiley permacath postponed sec to pt was on plavix, permacath placement on monday, S/P AVF, dc today       team  10/28 Cardio f/u - atypical CP--> no chest pain or sob  nuclear stress test shows no ischemia EF 47%  cont asa,  ESRD  new HD  renal f.u --> S/P AVF, h/o CVA  cont asa/ plavix / zocor,  DCHF volume status stable  fluid removal with HD/ lasix  DC,  pending HD set up   10/28 Renal f/u - a/w vomiting/epigastric pain/flank pain c/b initiation of dialysis and LUE AVF creation.  ESRD (new) HD TTS while admitted Vascular - s/p left radiocephalic AVF creation and s/p R tunneled cath placement,  awaiting acceptance into outpatient HD, for  HD Tuesday - continue epogen as ordered - social work and case management involvement for outpatient dialysis unit - dose any medication for a CrCl <10 cc/min   10/29 Renal f/u - HD Tuesday - DC Epogen. Hgb > 10 - DC Renvela and trend serum Phos, social work and case management involvement for outpatient dialysis unit, dose any medication for a CrCl <10 cc/min   10/29 Cardio f/u - atypical chest pain  no chest pain or sob  nuclear stress test shows no ischemia EF 47%  cont asa, ESRD  new HD  renal f.u  S/P AVF, h/o CVA   cont asa/ plavix / zocor, DCHF volume status stable  fluid removal with HD/ lasix   DC,  pending HD set up     10/30 Cardio:  atypical chest pain  no chest pain or sob  nuclear stress test shows no ischemia EF 47%  cont asa,  ESRD  new HD  renal f.u  S/P AVF,  h/o CVA  cont asa/ plavix / zocor,  DCHF volume status stable  fluid removal with HD/ lasix  DC,  pending HD set up   10/31 Renal f/u - HD today as ordered, F/U with SW regarding outpatient HD/living arrangements  10/31 Cardio f/u - DC,  pending placement, pt is homeless    11/1 Renal f/u - Con't HD on a TTS schedule, next session tomorrow, con't current management, ESRD diet; low phos and low k, low salt, f/u with sw regarding outpatient HD/living arrangements  11/1 Cardio f/u - atypical chest pain, no chest pain or sob, nuclear stress test shows no ischemia EF 47%, con't asa, ESRD new HD, renal f/u s/p AVF; d/c pending placement, pt is homeless  11/2_Nephro:  continue HD on a TTS schedule, HD today, ESRD diet: low phos and low K, low salt,   11/3 Renal: - continue HD on a TTS schedule, next session tomorrow  - continue current management  - ESRD diet: low phos and low K, low salt  - F/U with SW regarding outpatient HD/living arrangements  11/3 Med: dc planning per primary team  11/3 Cardio: c/w ASA, DCHF- volume status stable, fluid removal with HD/ lasix. DC,  pending placement, pt is homeless  11/8 Cards: D/C pending placement  11/8 Nephro: IVF, Orthostatics, d/c planning  11/10 Nephro: Next hd tomorrow  11/11 Cardio: DC,  pending placement, pt is homeless  11/15 pt stable for d/c home today for HD Friday

## 2017-11-15 NOTE — DISCHARGE NOTE ADULT - MEDICATION SUMMARY - MEDICATIONS TO TAKE
I will START or STAY ON the medications listed below when I get home from the hospital:    aspirin 81 mg oral delayed release tablet  -- 1 tab(s) by mouth once a day  -- Indication: For Coronary artery disease    gabapentin 300 mg oral capsule  -- 1 cap(s) by mouth once a day (at bedtime)  -- Indication: For pain    sertraline 100 mg oral tablet  -- 2 tab(s) by mouth once a day  -- Indication: For Depression     Basaglar KwikPen 100 units/mL subcutaneous solution  -- 30 unit(s) subcutaneous once a day (at bedtime)  -- Indication: For DM (diabetes mellitus)    simvastatin 40 mg oral tablet  -- 1 tab(s) by mouth once a day (at bedtime)  -- Indication: For Coronary artery disease    clopidogrel 75 mg oral tablet  -- 1 tab(s) by mouth once a day  -- Indication: For Coronary artery disease    QUEtiapine 50 mg oral tablet  -- 1 tab(s) by mouth once a day  -- Indication: For Depression     amLODIPine 10 mg oral tablet  -- 1 tab(s) by mouth once a day  -- Indication: For HTN (hypertension)    furosemide 20 mg oral tablet  -- 1 tab(s) by mouth 2 times a day  -- Indication: For Diastolic heart failure    pantoprazole 40 mg oral delayed release tablet  -- 1 tab(s) by mouth once a day (before a meal)  -- Indication: For GERD    Vitamin D3 400 intl units oral capsule  -- 1 cap(s) by mouth once a day  -- Indication: For Nutrition

## 2017-11-15 NOTE — PROGRESS NOTE ADULT - SUBJECTIVE AND OBJECTIVE BOX
Patient is a 52y old  Female who presents with a chief complaint of chest pain (15 Nov 2017 11:06)      INTERVAL HPI/OVERNIGHT EVENTS:  T(C): 37 (11-15-17 @ 12:49), Max: 37.1 (11-14-17 @ 21:09)  HR: 84 (11-15-17 @ 12:49) (81 - 84)  BP: 98/70 (11-15-17 @ 12:49) (98/70 - 139/72)  RR: 18 (11-15-17 @ 12:49) (18 - 18)  SpO2: 100% (11-15-17 @ 12:49) (98% - 100%)  Wt(kg): --  I&O's Summary    14 Nov 2017 07:01  -  15 Nov 2017 07:00  --------------------------------------------------------  IN: 500 mL / OUT: 1500 mL / NET: -1000 mL        LABS:              CAPILLARY BLOOD GLUCOSE      POCT Blood Glucose.: 274 mg/dL (15 Nov 2017 12:35)  POCT Blood Glucose.: 189 mg/dL (15 Nov 2017 08:32)  POCT Blood Glucose.: 291 mg/dL (14 Nov 2017 20:42)            MEDICATIONS  (STANDING):  amLODIPine   Tablet 10 milliGRAM(s) Oral daily  aspirin enteric coated 81 milliGRAM(s) Oral daily  clopidogrel Tablet 75 milliGRAM(s) Oral daily  dextrose 5%. 1000 milliLiter(s) (50 mL/Hr) IV Continuous <Continuous>  dextrose 50% Injectable 12.5 Gram(s) IV Push once  dextrose 50% Injectable 25 Gram(s) IV Push once  dextrose 50% Injectable 25 Gram(s) IV Push once  docusate sodium 100 milliGRAM(s) Oral two times a day  epoetin karla Injectable 20934 Unit(s) IV Push <User Schedule>  furosemide    Tablet 20 milliGRAM(s) Oral two times a day  gabapentin 300 milliGRAM(s) Oral at bedtime  heparin  Injectable 5000 Unit(s) SubCutaneous every 8 hours  influenza   Vaccine 0.5 milliLiter(s) IntraMuscular once  insulin glargine Injectable (LANTUS) 20 Unit(s) SubCutaneous at bedtime  insulin lispro (HumaLOG) corrective regimen sliding scale   SubCutaneous three times a day before meals  insulin lispro (HumaLOG) corrective regimen sliding scale   SubCutaneous at bedtime  pantoprazole    Tablet 40 milliGRAM(s) Oral before breakfast  polyethylene glycol 3350 17 Gram(s) Oral daily  QUEtiapine 50 milliGRAM(s) Oral daily  sertraline 200 milliGRAM(s) Oral daily    MEDICATIONS  (PRN):  acetaminophen   Tablet 650 milliGRAM(s) Oral every 6 hours PRN For Temp greater than 38 C (100.4 F)  acetaminophen   Tablet. 650 milliGRAM(s) Oral every 6 hours PRN mild and Moderate  dextrose Gel 1 Dose(s) Oral once PRN Blood Glucose LESS THAN 70 milliGRAM(s)/deciliter  glucagon  Injectable 1 milliGRAM(s) IntraMuscular once PRN Glucose LESS THAN 70 milligrams/deciliter          PHYSICAL EXAM:  GENERAL: NAD, well-groomed, well-developed  HEAD:  Atraumatic, Normocephalic  CHEST/LUNG: Clear to percussion bilaterally; No rales, rhonchi, wheezing, or rubs  HEART: Regular rate and rhythm; No murmurs, rubs, or gallops  ABDOMEN: Soft, Nontender, Nondistended; Bowel sounds present  EXTREMITIES:  2+ Peripheral Pulses, No clubbing, cyanosis, or edema  LYMPH: No lymphadenopathy noted  SKIN: No rashes or lesions    Care Discussed with Consultants/Other Providers [ ] YES  [ ] NO

## 2017-11-15 NOTE — PROGRESS NOTE ADULT - SUBJECTIVE AND OBJECTIVE BOX
Patient seen and examined in bed with no new complaints.  Discharge planning today.    REVIEW OF SYSTEMS:  As per HPI, otherwise 8 full 10 ROS were unremarkable    MEDICATIONS  (STANDING):  amLODIPine   Tablet 10 milliGRAM(s) Oral daily  aspirin enteric coated 81 milliGRAM(s) Oral daily  clopidogrel Tablet 75 milliGRAM(s) Oral daily  dextrose 5%. 1000 milliLiter(s) (50 mL/Hr) IV Continuous <Continuous>  dextrose 50% Injectable 12.5 Gram(s) IV Push once  dextrose 50% Injectable 25 Gram(s) IV Push once  dextrose 50% Injectable 25 Gram(s) IV Push once  docusate sodium 100 milliGRAM(s) Oral two times a day  epoetin karla Injectable 81792 Unit(s) IV Push <User Schedule>  furosemide    Tablet 20 milliGRAM(s) Oral two times a day  gabapentin 300 milliGRAM(s) Oral at bedtime  heparin  Injectable 5000 Unit(s) SubCutaneous every 8 hours  influenza   Vaccine 0.5 milliLiter(s) IntraMuscular once  insulin glargine Injectable (LANTUS) 20 Unit(s) SubCutaneous at bedtime  insulin lispro (HumaLOG) corrective regimen sliding scale   SubCutaneous three times a day before meals  insulin lispro (HumaLOG) corrective regimen sliding scale   SubCutaneous at bedtime  pantoprazole    Tablet 40 milliGRAM(s) Oral before breakfast  polyethylene glycol 3350 17 Gram(s) Oral daily  QUEtiapine 50 milliGRAM(s) Oral daily  sertraline 200 milliGRAM(s) Oral daily      VITAL:  T(C): , Max: 37.1 (11-14-17 @ 21:09)  T(F): , Max: 98.7 (11-14-17 @ 21:09)  HR: 84 (11-15-17 @ 12:49)  BP: 98/70 (11-15-17 @ 12:49)  BP(mean): --  RR: 18 (11-15-17 @ 12:49)  SpO2: 100% (11-15-17 @ 12:49)  Wt(kg): --    I and O's:    11-14 @ 07:01  -  11-15 @ 07:00  --------------------------------------------------------  IN: 500 mL / OUT: 1500 mL / NET: -1000 mL          PHYSICAL EXAM:    Constitutional: NAD  HEENT: PERRLA, EOMI,  MMM  Neck: No LAD, No JVD  Respiratory: CTAB  Cardiovascular: S1 and S2  Gastrointestinal: BS+, soft, NT/ND  Extremities: No peripheral edema  Neurological: A/O x 3, no focal deficits  Psychiatric: Normal mood, normal affect  : No John  Skin: No rashes  Access: Not applicable  Access: RIJ tunneled cath- accessed; LUE AVF (+)thrill    IMPRESSION: 52F w/ HTN, DM2, CAD, and CKD5, 10/16/17 a/w vomiting/epigastric pain/flank pain  1. ESRD - new as of this admission;  on a TTS schedule, well- s/p HD yesterday; tolerating well; 1.5 kg removed  2. Vascular - s/p left radiocephalic AVF creation by Dr. Radha Capps during this admission; AVF is immature. Also with tunneled HD catheter.  3. Anemia of CKD - now on Epogen with HD; Hgb stable as of late; no new labs  4. GI resolved N/V/abdominal pain from admission  5. SW - awaiting acceptance into living facility; discharge planning today    RECOMMEND:  1.  Plan for HD tomorrow if unable to find appropriate placement for discharge today  	             -continue to use tunneled catheter for HD                           - continue Epogen with HD as ordered  2.  No renal objection to discharge patient today; f/u SW regard outpatient arrangement Patient seen and examined in bed with no new complaints.  Discharge planning today.    REVIEW OF SYSTEMS:  As per HPI, otherwise 8 full 10 ROS were unremarkable    MEDICATIONS  (STANDING):  amLODIPine   Tablet 10 milliGRAM(s) Oral daily  aspirin enteric coated 81 milliGRAM(s) Oral daily  clopidogrel Tablet 75 milliGRAM(s) Oral daily  dextrose 5%. 1000 milliLiter(s) (50 mL/Hr) IV Continuous <Continuous>  dextrose 50% Injectable 12.5 Gram(s) IV Push once  dextrose 50% Injectable 25 Gram(s) IV Push once  dextrose 50% Injectable 25 Gram(s) IV Push once  docusate sodium 100 milliGRAM(s) Oral two times a day  epoetin karla Injectable 78738 Unit(s) IV Push <User Schedule>  furosemide    Tablet 20 milliGRAM(s) Oral two times a day  gabapentin 300 milliGRAM(s) Oral at bedtime  heparin  Injectable 5000 Unit(s) SubCutaneous every 8 hours  influenza   Vaccine 0.5 milliLiter(s) IntraMuscular once  insulin glargine Injectable (LANTUS) 20 Unit(s) SubCutaneous at bedtime  insulin lispro (HumaLOG) corrective regimen sliding scale   SubCutaneous three times a day before meals  insulin lispro (HumaLOG) corrective regimen sliding scale   SubCutaneous at bedtime  pantoprazole    Tablet 40 milliGRAM(s) Oral before breakfast  polyethylene glycol 3350 17 Gram(s) Oral daily  QUEtiapine 50 milliGRAM(s) Oral daily  sertraline 200 milliGRAM(s) Oral daily      VITAL:  T(C): , Max: 37.1 (11-14-17 @ 21:09)  T(F): , Max: 98.7 (11-14-17 @ 21:09)  HR: 84 (11-15-17 @ 12:49)  BP: 98/70 (11-15-17 @ 12:49)  BP(mean): --  RR: 18 (11-15-17 @ 12:49)  SpO2: 100% (11-15-17 @ 12:49)  Wt(kg): --    I and O's:    11-14 @ 07:01  -  11-15 @ 07:00  --------------------------------------------------------  IN: 500 mL / OUT: 1500 mL / NET: -1000 mL          PHYSICAL EXAM:    Constitutional: NAD  HEENT: PERRLA, EOMI,  MMM  Neck: No LAD, No JVD  Respiratory: CTAB  Cardiovascular: S1 and S2  Gastrointestinal: BS+, soft, NT/ND  Extremities: No peripheral edema  Neurological: A/O x 3, no focal deficits  Psychiatric: Normal mood, normal affect  : No John  Skin: No rashes  Access: Not applicable  Access: RIJ tunneled cath- accessed; LUE AVF (+)thrill    IMPRESSION: 52F w/ HTN, DM2, CAD, and CKD5, 10/16/17 a/w vomiting/epigastric pain/flank pain  1. ESRD - new as of this admission;  on a TTS schedule, well- s/p HD yesterday; tolerating well; 1.5 kg removed  2. Vascular - s/p left radiocephalic AVF creation by Dr. Radha Capps during this admission; AVF is immature. Also with tunneled HD catheter.  3. Anemia of CKD - now on Epogen with HD; Hgb stable as of late; no new labs  4. GI resolved N/V/abdominal pain from admission  5. SW - awaiting acceptance into living facility; discharge planning today  6. BPs:  transient hypotension noted this AM but overall acceptable    RECOMMEND:  1.  Plan for HD tomorrow if unable to find appropriate placement for discharge today  	             -continue to use tunneled catheter for HD                           - continue Epogen with HD as ordered  2.  No renal objection to discharge patient today; f/u SW regard outpatient arrangement Patient seen and examined in bed with no new complaints.  Discharge planning today.    REVIEW OF SYSTEMS:  As per HPI, otherwise 8 full 10 ROS were unremarkable    MEDICATIONS  (STANDING):  amLODIPine   Tablet 10 milliGRAM(s) Oral daily  aspirin enteric coated 81 milliGRAM(s) Oral daily  clopidogrel Tablet 75 milliGRAM(s) Oral daily  dextrose 5%. 1000 milliLiter(s) (50 mL/Hr) IV Continuous <Continuous>  dextrose 50% Injectable 12.5 Gram(s) IV Push once  dextrose 50% Injectable 25 Gram(s) IV Push once  dextrose 50% Injectable 25 Gram(s) IV Push once  docusate sodium 100 milliGRAM(s) Oral two times a day  epoetin karla Injectable 77284 Unit(s) IV Push <User Schedule>  furosemide    Tablet 20 milliGRAM(s) Oral two times a day  gabapentin 300 milliGRAM(s) Oral at bedtime  heparin  Injectable 5000 Unit(s) SubCutaneous every 8 hours  influenza   Vaccine 0.5 milliLiter(s) IntraMuscular once  insulin glargine Injectable (LANTUS) 20 Unit(s) SubCutaneous at bedtime  insulin lispro (HumaLOG) corrective regimen sliding scale   SubCutaneous three times a day before meals  insulin lispro (HumaLOG) corrective regimen sliding scale   SubCutaneous at bedtime  pantoprazole    Tablet 40 milliGRAM(s) Oral before breakfast  polyethylene glycol 3350 17 Gram(s) Oral daily  QUEtiapine 50 milliGRAM(s) Oral daily  sertraline 200 milliGRAM(s) Oral daily      VITAL:  T(C): , Max: 37.1 (11-14-17 @ 21:09)  T(F): , Max: 98.7 (11-14-17 @ 21:09)  HR: 84 (11-15-17 @ 12:49)  BP: 98/70 (11-15-17 @ 12:49)  BP(mean): --  RR: 18 (11-15-17 @ 12:49)  SpO2: 100% (11-15-17 @ 12:49)  Wt(kg): --    I and O's:    11-14 @ 07:01  -  11-15 @ 07:00  --------------------------------------------------------  IN: 500 mL / OUT: 1500 mL / NET: -1000 mL          PHYSICAL EXAM:    Constitutional: NAD  HEENT: PERRLA, EOMI,  MMM  Neck: No LAD, No JVD  Respiratory: CTAB  Cardiovascular: S1 and S2  Gastrointestinal: BS+, soft, NT/ND  Extremities: No peripheral edema  Neurological: A/O x 3, no focal deficits  Psychiatric: Normal mood, normal affect  : No John  Skin: No rashes  Access: Not applicable  Access: RIJ tunneled cath- accessed; LUE AVF (+)thrill    IMPRESSION: 52F w/ HTN, DM2, CAD, and CKD5, 10/16/17 a/w vomiting/epigastric pain/flank pain  1. ESRD - new as of this admission;  on a TTS schedule, well- s/p HD yesterday; tolerating well; 1.5 kg removed  2. Vascular - s/p left radiocephalic AVF creation by Dr. Radha Capps during this admission; AVF is immature. Also with tunneled HD catheter.  3. Anemia of CKD - now on Epogen with HD; Hgb stable as of late; no new labs  4. GI resolved N/V/abdominal pain from admission  5. SW - awaiting acceptance into living facility; discharge planning today  6. BPs:  transient hypotension noted this AM but overall acceptable    RECOMMEND:  1.  Plan for HD tomorrow if unable to find appropriate placement for discharge today  	             -continue to use tunneled catheter for HD                           - continue Epogen with HD as ordered  2.  No renal objection to discharge patient today; f/u SW regard outpatient arrangement    ***************************RENAL ATTENDING****************************************************************  Seen/examined with NP. I agree with NP assessment as above.    General: NAD, A+Ox3  Pulm: CTA-b/l  Card: RRR s1s2  Ext: no c/c/e      ASSESSMENT:  (1)ESRD-HD - okay to hold off until Friday for next HD    RECOMMEND:  (1)D/C today with HD Friday at [facility].      Wale Francisco MD  [facility]  [phone]

## 2017-11-15 NOTE — DISCHARGE NOTE ADULT - SECONDARY DIAGNOSIS.
ESRD (end stage renal disease) DM (diabetes mellitus) Diastolic heart failure HTN (hypertension) Depression

## 2017-11-15 NOTE — DISCHARGE NOTE ADULT - CARE PROVIDER_API CALL
Wale Francisco), Internal Medicine; Nephrology  [address] [address]  Phone: [phone]  Fax: [phone]

## 2017-11-15 NOTE — DISCHARGE NOTE ADULT - PLAN OF CARE
prevent further progression of coronary artery disease JUAN ALBERTO cardio Dr Chaves 2 weeks ,continue  medications and diet pt started on HD [facility] to start on Friday 11/17 cont current medications and diet FU with PCP 1 to 2 weeks Dr Garcia cont medications ,diet and daily weights cont medications and diet cont medications

## 2017-11-15 NOTE — PROGRESS NOTE ADULT - PROVIDER SPECIALTY LIST ADULT
CCU
Cardiology
Hospitalist
Nephrology
Vascular Surgery
Hospitalist
Nephrology
Nephrology
Hospitalist
Cardiology

## 2017-11-15 NOTE — PROGRESS NOTE ADULT - SUBJECTIVE AND OBJECTIVE BOX
Chaz Freitas MD  Interventional Cardiology  [place] Office : [address] [place] [address] [place]. [address]  Tel:   [place] Office : [address] [facility] [place] [address]  Tel: [phone]  Cell : [phone]    Subjective : Pt lying in bed comfortable, not in distress, denies any chest pain or SOB  	  MEDICATIONS:  amLODIPine   Tablet 10 milliGRAM(s) Oral daily  aspirin enteric coated 81 milliGRAM(s) Oral daily  clopidogrel Tablet 75 milliGRAM(s) Oral daily  furosemide    Tablet 20 milliGRAM(s) Oral two times a day  heparin  Injectable 5000 Unit(s) SubCutaneous every 8 hours        acetaminophen   Tablet 650 milliGRAM(s) Oral every 6 hours PRN  acetaminophen   Tablet. 650 milliGRAM(s) Oral every 6 hours PRN  gabapentin 300 milliGRAM(s) Oral at bedtime  QUEtiapine 50 milliGRAM(s) Oral daily  sertraline 200 milliGRAM(s) Oral daily    docusate sodium 100 milliGRAM(s) Oral two times a day  pantoprazole    Tablet 40 milliGRAM(s) Oral before breakfast  polyethylene glycol 3350 17 Gram(s) Oral daily    dextrose 50% Injectable 12.5 Gram(s) IV Push once  dextrose 50% Injectable 25 Gram(s) IV Push once  dextrose 50% Injectable 25 Gram(s) IV Push once  dextrose Gel 1 Dose(s) Oral once PRN  glucagon  Injectable 1 milliGRAM(s) IntraMuscular once PRN  insulin glargine Injectable (LANTUS) 20 Unit(s) SubCutaneous at bedtime  insulin lispro (HumaLOG) corrective regimen sliding scale   SubCutaneous three times a day before meals  insulin lispro (HumaLOG) corrective regimen sliding scale   SubCutaneous at bedtime    dextrose 5%. 1000 milliLiter(s) IV Continuous <Continuous>  epoetin karla Injectable 64797 Unit(s) IV Push <User Schedule>  influenza   Vaccine 0.5 milliLiter(s) IntraMuscular once      PHYSICAL EXAM:  T(C): 37 (11-15-17 @ 12:49), Max: 37.1 (11-14-17 @ 21:09)  HR: 84 (11-15-17 @ 12:49) (80 - 84)  BP: 98/70 (11-15-17 @ 12:49) (98/70 - 150/80)  RR: 18 (11-15-17 @ 12:49) (18 - 18)  SpO2: 100% (11-15-17 @ 12:49) (98% - 100%)  Wt(kg): --  I&O's Summary    14 Nov 2017 07:01  -  15 Nov 2017 07:00  --------------------------------------------------------  IN: 500 mL / OUT: 1500 mL / NET: -1000 mL          Appearance: Normal	  HEENT:   Normal oral mucosa, PERRL, EOMI	  Cardiovascular: Normal S1 S2, No JVD, No murmurs, No edema  Respiratory: Lungs clear to auscultation	  Gastrointestinal:  Soft, Non-tender, + BS	  Extremities: Normal range of motion, No clubbing, cyanosis or edema                      proBNP:   Lipid Profile:   HgA1c:   TSH:

## 2017-12-31 ENCOUNTER — EMERGENCY (EMERGENCY)
Facility: HOSPITAL | Age: 52
LOS: 1 days | Discharge: HOME | End: 2017-12-31
Attending: EMERGENCY MEDICINE | Admitting: EMERGENCY MEDICINE
Payer: MEDICAID

## 2017-12-31 VITALS
TEMPERATURE: 98 F | SYSTOLIC BLOOD PRESSURE: 197 MMHG | HEART RATE: 75 BPM | DIASTOLIC BLOOD PRESSURE: 100 MMHG | OXYGEN SATURATION: 100 % | WEIGHT: 210.1 LBS | RESPIRATION RATE: 18 BRPM | HEIGHT: 64 IN

## 2017-12-31 PROCEDURE — 99283 EMERGENCY DEPT VISIT LOW MDM: CPT | Mod: 25

## 2017-12-31 NOTE — ED ADULT TRIAGE NOTE - CHIEF COMPLAINT QUOTE
right eye redness    c/o right eye redness, itchiness 3-4 days. was visiting son who was being eval in  and decided to have eye checked out. pt is on hd- was dialyzed today.  hasn't taken her htn meds for tonight yet

## 2018-01-01 VITALS
SYSTOLIC BLOOD PRESSURE: 171 MMHG | OXYGEN SATURATION: 100 % | RESPIRATION RATE: 18 BRPM | DIASTOLIC BLOOD PRESSURE: 67 MMHG | HEART RATE: 81 BPM

## 2018-01-01 RX ORDER — OFLOXACIN 0.3 %
1 DROPS OPHTHALMIC (EYE) ONCE
Qty: 0 | Refills: 0 | Status: COMPLETED | OUTPATIENT
Start: 2018-01-01 | End: 2018-01-01

## 2018-01-01 RX ORDER — OFLOXACIN 0.3 %
1 DROPS OPHTHALMIC (EYE) ONCE
Qty: 0 | Refills: 0 | Status: DISCONTINUED | OUTPATIENT
Start: 2018-01-01 | End: 2018-01-01

## 2018-01-01 RX ORDER — GLYCERIN 1 %
1 DROPS OPHTHALMIC (EYE) ONCE
Qty: 0 | Refills: 0 | Status: COMPLETED | OUTPATIENT
Start: 2018-01-01 | End: 2018-01-01

## 2018-01-01 RX ADMIN — Medication 1 DROP(S): at 00:34

## 2018-01-01 RX ADMIN — Medication 1 DROP(S): at 00:35

## 2018-01-01 NOTE — ED PROVIDER NOTE - ATTENDING CONTRIBUTION TO CARE
[place]   pt c/o redness and irritation rt eye  small am discharge   Pt with baseline poor vision s/p surgery  sees only light and this has not changed  exam  mild conjunctival irritation  no FB seen  no cornal abrasion  no flare  no clouding  IOP 10/22/23   plan topical antihistamine and antibiotic  Pt with appt with her opthalmologist this week

## 2018-01-01 NOTE — ED PROVIDER NOTE - PLAN OF CARE
Rest, drink plenty of fluids.  Advance activity as tolerated.  Continue all previously prescribed medications as directed.  Follow up with your primary care physician and your Ophthalmologist in 48-72 hours. Apply Ocuflox 1-2 drops in right eye every 2-4 hours for 2 days, then 1-2 drops four times a day for 5 days. Apply Visine-A 1-2 drops in right eye 3 to 4 times a day for 3 days as needed for eye itchiness.  Return to the ER for worsening, eye pain, vision changes or persistent symptoms, and/or ANY NEW OR CONCERNING SYMPTOMS. If you have issues obtaining follow up, please call: [phone] to obtain a doctor or specialist who takes your insurance in your area.

## 2018-01-01 NOTE — ED PROVIDER NOTE - MEDICAL DECISION MAKING DETAILS
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 Bobby unimproved vision, presents to ER c/o redness of right eye associated with itchiness and discharge since Wednesday, x5 days.   -Likely conjunctivitis, will flurosence stain to r/o abrasion, slit lamp eval, tonal pen, and reassess. 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 Bobby w/ unimproved vision, presents to ER c/o redness of right eye associated with itchiness and discharge since Wednesday, x5 days.   -R eye redness with itchiness and discharge c/w conjunctivitis, +blurriness & poor vision baseline w/ no improvement after x2 surgery, no acute changes of vision as per patient, will perform fluorescein eye stain to r/o abrasion, slit lamp eval, tonal pen, and reassess.  Pt's high blood pressure noted in Triage, pt didn't take her BP medication today, will reassess BP.

## 2018-01-01 NOTE — ED PROVIDER NOTE - PROGRESS NOTE DETAILS
RUFINA DEVI: patient with unremarkable finding on slit lamp exam, neg fluorescein eye stain, tonal pen w/ pressure measurement of 10, 20, and 23. Pt can dispo with Ocuflox and Visine-A for itchiness, and f/u with PMD and her ophthalmologist. The patient was given verbal and written discharge instructions. Specifically, instructions when to return to the ED and when to seek follow-up from their pcp was discussed. Any specialty follow-up was discussed, including how to make an appointment.  Instructions were discussed in simple, plain language and was understood by the patient. The patient understands that should their symptoms worsen or any new symptoms arise, they should return to the ED immediately for further evaluation. RUFINA DEVI: patient with unremarkable finding on slit lamp exam, neg fluorescein eye stain, tonal pen w/ pressure measurement of 10, 20, and 23. Pt can dispo home with Ocuflox and Visine-A for itchiness, and f/u with PMD and her ophthalmologist. The patient was given verbal and written discharge instructions. Specifically, instructions when to return to the ED and when to seek follow-up from their pcp was discussed. Any specialty follow-up was discussed, including how to make an appointment.  Instructions were discussed in simple, plain language and was understood by the patient. The patient understands that should their symptoms worsen or any new symptoms arise, they should return to the ED immediately for further evaluation.

## 2018-01-01 NOTE — ED PROVIDER NOTE - OBJECTIVE STATEMENT
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 Bobby unimproved vision, presents to ER c/o redness of right eye associated with itchiness and discharge since Wednesday, x5 days. Pt states she woke up with redness in right eye, intense itchiness and greenish discharge. Denies any direct eye trauma, foreign body. 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 Bobby unimproved vision, presents to ER c/o redness of right eye associated with itchiness and discharge since Wednesday, x5 days. Pt states she woke up with redness in right eye, intense itchiness and greenish discharge. Denies any direct eye trauma or foreign body. Admits she can't see through her right eyes since the surgery w/ blurry vision, can see light and color of my jacket. Admits sneezing due to her allergy. States she last saw her ophthalmologist in september. Reports she came to the ER because her son was at  and told by a doctor that she has pink eye.  States she had HD today, and didn't get to take her blood pressure medication. 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 Bobby w/ persistent poor vision, presents to ER c/o redness of right eye associated with itchiness and discharge since Wednesday, x5 days. Pt states she woke up with redness in right eye, intense itchiness and greenish discharge. Denies any direct eye trauma or foreign body. Admits she can't see through her right eyes since the surgery w/ blurry vision, can see light and color of my jacket. Admits sneezing due to her allergy. States she last saw her ophthalmologist in september. Reports she came to the ER because she was picking up her son at  and wanted to get her eye check out.  States she had HD today, and didn't get to take her blood pressure medication. Denies any fever, chills, headache, dizziness, n/v/c/d, chest pain, sob, dysuria, syncope, recent travel, sick contacts, or any other complaints.

## 2018-01-01 NOTE — ED PROVIDER NOTE - CARE PLAN
Principal Discharge DX:	Conjunctivitis of right eye  Instructions for follow-up, activity and diet:	Rest, drink plenty of fluids.  Advance activity as tolerated.  Continue all previously prescribed medications as directed.  Follow up with your primary care physician and your Ophthalmologist in 48-72 hours. Apply Ocuflox 1-2 drops in right eye every 2-4 hours for 2 days, then 1-2 drops four times a day for 5 days. Apply Visine-A 1-2 drops in right eye 3 to 4 times a day for 3 days as needed for eye itchiness.  Return to the ER for worsening, eye pain, vision changes or persistent symptoms, and/or ANY NEW OR CONCERNING SYMPTOMS. If you have issues obtaining follow up, please call: [phone] to obtain a doctor or specialist who takes your insurance in your area.

## 2018-01-03 ENCOUNTER — INPATIENT (INPATIENT)
Facility: HOSPITAL | Age: 53
LOS: 1 days | Discharge: HOME | DRG: 308 | End: 2018-01-05
Attending: HOSPITALIST | Admitting: HOSPITALIST
Payer: MEDICAID

## 2018-01-03 VITALS
DIASTOLIC BLOOD PRESSURE: 84 MMHG | OXYGEN SATURATION: 97 % | RESPIRATION RATE: 16 BRPM | SYSTOLIC BLOOD PRESSURE: 166 MMHG | HEART RATE: 110 BPM

## 2018-01-03 DIAGNOSIS — R42 DIZZINESS AND GIDDINESS: ICD-10-CM

## 2018-01-03 DIAGNOSIS — R07.9 CHEST PAIN, UNSPECIFIED: ICD-10-CM

## 2018-01-03 DIAGNOSIS — Z29.9 ENCOUNTER FOR PROPHYLACTIC MEASURES, UNSPECIFIED: ICD-10-CM

## 2018-01-03 DIAGNOSIS — E11.9 TYPE 2 DIABETES MELLITUS WITHOUT COMPLICATIONS: ICD-10-CM

## 2018-01-03 DIAGNOSIS — I50.20 UNSPECIFIED SYSTOLIC (CONGESTIVE) HEART FAILURE: ICD-10-CM

## 2018-01-03 DIAGNOSIS — I10 ESSENTIAL (PRIMARY) HYPERTENSION: ICD-10-CM

## 2018-01-03 DIAGNOSIS — N18.6 END STAGE RENAL DISEASE: ICD-10-CM

## 2018-01-03 DIAGNOSIS — I63.9 CEREBRAL INFARCTION, UNSPECIFIED: ICD-10-CM

## 2018-01-03 LAB
ALBUMIN SERPL ELPH-MCNC: 3.4 G/DL — SIGNIFICANT CHANGE UP (ref 3.3–5)
ALP SERPL-CCNC: 101 U/L — SIGNIFICANT CHANGE UP (ref 40–120)
ALT FLD-CCNC: 10 U/L RC — SIGNIFICANT CHANGE UP (ref 10–45)
ANION GAP SERPL CALC-SCNC: 10 MMOL/L — SIGNIFICANT CHANGE UP (ref 5–17)
AST SERPL-CCNC: 13 U/L — SIGNIFICANT CHANGE UP (ref 10–40)
BASE EXCESS BLDV CALC-SCNC: 1.7 MMOL/L — SIGNIFICANT CHANGE UP (ref -2–2)
BASOPHILS # BLD AUTO: 0 K/UL — SIGNIFICANT CHANGE UP (ref 0–0.2)
BASOPHILS NFR BLD AUTO: 0.8 % — SIGNIFICANT CHANGE UP (ref 0–2)
BILIRUB SERPL-MCNC: 0.2 MG/DL — SIGNIFICANT CHANGE UP (ref 0.2–1.2)
BUN SERPL-MCNC: 23 MG/DL — SIGNIFICANT CHANGE UP (ref 7–23)
CA-I SERPL-SCNC: 1.15 MMOL/L — SIGNIFICANT CHANGE UP (ref 1.12–1.3)
CALCIUM SERPL-MCNC: 8.7 MG/DL — SIGNIFICANT CHANGE UP (ref 8.4–10.5)
CHLORIDE BLDV-SCNC: 104 MMOL/L — SIGNIFICANT CHANGE UP (ref 96–108)
CHLORIDE SERPL-SCNC: 98 MMOL/L — SIGNIFICANT CHANGE UP (ref 96–108)
CK MB BLD-MCNC: 2.2 % — SIGNIFICANT CHANGE UP (ref 0–3.5)
CK MB CFR SERPL CALC: 3.2 NG/ML — SIGNIFICANT CHANGE UP (ref 0–3.8)
CK SERPL-CCNC: 146 U/L — SIGNIFICANT CHANGE UP (ref 25–170)
CO2 BLDV-SCNC: 29 MMOL/L — SIGNIFICANT CHANGE UP (ref 22–30)
CO2 SERPL-SCNC: 25 MMOL/L — SIGNIFICANT CHANGE UP (ref 22–31)
CREAT SERPL-MCNC: 5.95 MG/DL — HIGH (ref 0.5–1.3)
EOSINOPHIL # BLD AUTO: 0 K/UL — SIGNIFICANT CHANGE UP (ref 0–0.5)
EOSINOPHIL NFR BLD AUTO: 0.9 % — SIGNIFICANT CHANGE UP (ref 0–6)
GAS PNL BLDV: 134 MMOL/L — LOW (ref 136–145)
GAS PNL BLDV: SIGNIFICANT CHANGE UP
GAS PNL BLDV: SIGNIFICANT CHANGE UP
GLUCOSE BLDV-MCNC: 443 MG/DL — HIGH (ref 70–99)
GLUCOSE SERPL-MCNC: 535 MG/DL — CRITICAL HIGH (ref 70–99)
HCO3 BLDV-SCNC: 27 MMOL/L — SIGNIFICANT CHANGE UP (ref 21–29)
HCT VFR BLD CALC: 37.5 % — SIGNIFICANT CHANGE UP (ref 34.5–45)
HCT VFR BLDA CALC: 36 % — LOW (ref 39–50)
HGB BLD CALC-MCNC: 11.6 G/DL — SIGNIFICANT CHANGE UP (ref 11.5–15.5)
HGB BLD-MCNC: 12 G/DL — SIGNIFICANT CHANGE UP (ref 11.5–15.5)
INR BLD: 0.98 RATIO — SIGNIFICANT CHANGE UP (ref 0.88–1.16)
LACTATE BLDV-MCNC: 2.6 MMOL/L — HIGH (ref 0.7–2)
LYMPHOCYTES # BLD AUTO: 1.8 K/UL — SIGNIFICANT CHANGE UP (ref 1–3.3)
LYMPHOCYTES # BLD AUTO: 32.7 % — SIGNIFICANT CHANGE UP (ref 13–44)
MCHC RBC-ENTMCNC: 29.3 PG — SIGNIFICANT CHANGE UP (ref 27–34)
MCHC RBC-ENTMCNC: 32 GM/DL — SIGNIFICANT CHANGE UP (ref 32–36)
MCV RBC AUTO: 91.6 FL — SIGNIFICANT CHANGE UP (ref 80–100)
MONOCYTES # BLD AUTO: 0.4 K/UL — SIGNIFICANT CHANGE UP (ref 0–0.9)
MONOCYTES NFR BLD AUTO: 7 % — SIGNIFICANT CHANGE UP (ref 2–14)
NEUTROPHILS # BLD AUTO: 3.1 K/UL — SIGNIFICANT CHANGE UP (ref 1.8–7.4)
NEUTROPHILS NFR BLD AUTO: 58.6 % — SIGNIFICANT CHANGE UP (ref 43–77)
OB PNL STL: NEGATIVE — SIGNIFICANT CHANGE UP
PCO2 BLDV: 50 MMHG — SIGNIFICANT CHANGE UP (ref 35–50)
PH BLDV: 7.35 — SIGNIFICANT CHANGE UP (ref 7.35–7.45)
PLATELET # BLD AUTO: 190 K/UL — SIGNIFICANT CHANGE UP (ref 150–400)
PO2 BLDV: 32 MMHG — SIGNIFICANT CHANGE UP (ref 25–45)
POTASSIUM BLDV-SCNC: 4 MMOL/L — SIGNIFICANT CHANGE UP (ref 3.5–5)
POTASSIUM SERPL-MCNC: 4.3 MMOL/L — SIGNIFICANT CHANGE UP (ref 3.5–5.3)
POTASSIUM SERPL-SCNC: 4.3 MMOL/L — SIGNIFICANT CHANGE UP (ref 3.5–5.3)
PROT SERPL-MCNC: 6.8 G/DL — SIGNIFICANT CHANGE UP (ref 6–8.3)
PROTHROM AB SERPL-ACNC: 10.6 SEC — SIGNIFICANT CHANGE UP (ref 9.8–12.7)
RBC # BLD: 4.09 M/UL — SIGNIFICANT CHANGE UP (ref 3.8–5.2)
RBC # FLD: 13.9 % — SIGNIFICANT CHANGE UP (ref 10.3–14.5)
SAO2 % BLDV: 54 % — LOW (ref 67–88)
SODIUM SERPL-SCNC: 133 MMOL/L — LOW (ref 135–145)
TROPONIN T SERPL-MCNC: 0.06 NG/ML — SIGNIFICANT CHANGE UP (ref 0–0.06)
WBC # BLD: 5.4 K/UL — SIGNIFICANT CHANGE UP (ref 3.8–10.5)
WBC # FLD AUTO: 5.4 K/UL — SIGNIFICANT CHANGE UP (ref 3.8–10.5)

## 2018-01-03 PROCEDURE — 99285 EMERGENCY DEPT VISIT HI MDM: CPT | Mod: 25

## 2018-01-03 PROCEDURE — 93010 ELECTROCARDIOGRAM REPORT: CPT

## 2018-01-03 PROCEDURE — 71046 X-RAY EXAM CHEST 2 VIEWS: CPT | Mod: 26

## 2018-01-03 RX ORDER — SODIUM CHLORIDE 9 MG/ML
500 INJECTION INTRAMUSCULAR; INTRAVENOUS; SUBCUTANEOUS ONCE
Qty: 0 | Refills: 0 | Status: COMPLETED | OUTPATIENT
Start: 2018-01-03 | End: 2018-01-03

## 2018-01-03 RX ORDER — ASPIRIN/CALCIUM CARB/MAGNESIUM 324 MG
324 TABLET ORAL ONCE
Qty: 0 | Refills: 0 | Status: COMPLETED | OUTPATIENT
Start: 2018-01-03 | End: 2018-01-03

## 2018-01-03 RX ORDER — ASPIRIN/CALCIUM CARB/MAGNESIUM 324 MG
325 TABLET ORAL ONCE
Qty: 0 | Refills: 0 | Status: DISCONTINUED | OUTPATIENT
Start: 2018-01-03 | End: 2018-01-03

## 2018-01-03 RX ADMIN — Medication 324 MILLIGRAM(S): at 22:15

## 2018-01-03 RX ADMIN — SODIUM CHLORIDE 500 MILLILITER(S): 9 INJECTION INTRAMUSCULAR; INTRAVENOUS; SUBCUTANEOUS at 22:02

## 2018-01-03 NOTE — ED PROVIDER NOTE - MUSCULOSKELETAL, MLM
Spine appears normal, range of motion is not limited, no muscle or joint tenderness. B/l pedal edema, not pitting. +bruit on volar left forearm. No surrounding erythema.

## 2018-01-03 NOTE — CONSULT NOTE ADULT - ASSESSMENT
52yr old female pmhx ESRD on HD, still make urine, CVA with weakness of left side upper and lower extremity, CHF, HTN, hyperlipidemia, DM, gout and bipolar disorder presents c/o dizziness since this morning.

## 2018-01-03 NOTE — ED PROVIDER NOTE - ATTENDING CONTRIBUTION TO CARE
52F hx HTN, ESRD (10/2016) on HD MWF, DM on lantus, CVA on plavix, CHF on lasix 20mg, gout, glaucoma present to ED after episode of chest pain mid sternal pressure-like non-radiating x 2 today, most recently this morning.  Pt additionally had HA this morning, resolved in a couple hours.  Original episode of CP associated with nausea/vomiting.  Last HD, yesterday 3/3.5h.  Was supposed to have HD completed today, however, there was a transportation issue.  Has not taken meds for 1 week. 52F hx HTN, ESRD (10/2016) on HD MWF, DM on lantus, CVA on plavix, CHF on lasix 20mg, gout, glaucoma present to ED after episode of chest pain mid sternal pressure-like non-radiating x 2 today, most recently this morning.  Pt additionally had HA this morning, resolved in a couple hours.  Original episode of CP associated with nausea/vomiting.  Last HD, yesterday 3/3.5h.  Was supposed to have HD completed today, however, there was a transportation issue.  Has not taken meds for 1 week.  No asymptomatic.   Denies fever/chills, cough/uri symptoms, abdominal pain, back pain, extremity paresthesias or weakness  VSS.  Finger stick 400s  (Attending - Gonzalez) NAD, AAOx3, NCAT, MMM, Trachea midline, PERRL, CTAB, Non-tachy, Normal perfusion b/l.  2+ R radial pulse, L fistual thrill palpated, 2+ b/l DP pulses, soft, NTND, No CVAT b/l, No edema, No deformity of extremities, Appropriate, Cooperative, No rashes, CN grossly intact, Normal coordination, No focal motor or sensory deficits.   Concern for ACS given CP will obtain ekg.   hyperglycemic with concern for DKA, will obtain labs including lytes, vbg, reasses.   Unlikely dissection given resolution of symptoms normal perfusion.will not pursue CTA at this time, given risk/benefit of dye load in setting of ESRD, will continue to monitor and reassess. 52F hx HTN, ESRD (10/2016) on HD MWF, DM on lantus, CVA on plavix, CHF on lasix 20mg, gout, glaucoma present to ED after episode of chest pain mid sternal pressure-like non-radiating x 2 today, most recently this morning.  Pt additionally had HA this morning, resolved in a couple hours similar in nature to previous headaches.  Original episode of CP associated with nausea/vomiting.  Last HD, yesterday 3/3.5h.  Was supposed to have HD completed today, however, there was a transportation issue.  Has not taken meds for 1 week.  No asymptomatic.   Denies fever/chills, cough/uri symptoms, abdominal pain, back pain, extremity paresthesias or weakness, dizziness  VSS.  Finger stick 400s  (Attending - Gonzalez) NAD, AAOx3, NCAT, MMM, Trachea midline, PERRL, CTAB, Non-tachy, Normal perfusion b/l.  2+ R radial pulse, L fistual thrill palpated, 2+ b/l DP pulses, soft, NTND, No CVAT b/l, No edema, No deformity of extremities, Appropriate, Cooperative, No rashes, CN grossly intact, Normal coordination, No focal motor or sensory deficits.   Concern for ACS given CP will obtain ekg.   hyperglycemic with concern for DKA, will obtain labs including lytes, vbg, reasses.   Unlikely dissection given resolution of symptoms normal perfusion.will not pursue CTA at this time, given risk/benefit of dye load in setting of ESRD, will continue to monitor and reassess.

## 2018-01-03 NOTE — CONSULT NOTE ADULT - ATTENDING COMMENTS
Thank you. Please call for any further questions.     Uri Mobley M.D, F.A.C.C  [facility]   [phone] Thank you. My team will follow.    Uri Mobley M.D, F.A.C.C  [facility]   [phone]
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
## 2018-01-03 NOTE — ED PROVIDER NOTE - PROGRESS NOTE DETAILS
Satya: Spoke to cardiology attending, will see pt at bedside. Satya: Patient reports improvement of pain. No blood in stool or urine. Satya: given pt's noncompliance with medications and HD as well as CP in setting of multiple comorbidites, will admit pt. Pt agrees.

## 2018-01-03 NOTE — CONSULT NOTE ADULT - PROBLEM SELECTOR RECOMMENDATION 2
Noncompliant with medications. I have counselled the patient on importance of compliance to meds.   Continue coreg 12.5mg po BID, not on ACE/ARB as an outpatient. Two sets of cardiac enzymes negative. Please check digoxin level. TTE today for evaluation of LV function. Patient is not in heart failure.   EPS consult Dr. Tommy Womack.

## 2018-01-03 NOTE — CONSULT NOTE ADULT - SUBJECTIVE AND OBJECTIVE BOX
CARDIOLOGY CONSULTATION NOTE                                                                                             Consult requested by:  Emergency department    Reason for Consultation: dizziness    History obtained by: Patient and medical record     obtained: No    Chief complaint:    Patient is a 52y old  Female who presents with a chief complaint of dizziness since this morning.     HPI:  52yr old female pmhx ESRD on HD, still make urine, CVA with weakness of left side upper and lower extremity, CHF, HTN, hyperlipidemia, DM, gout and bipolar disorder presents c/o dizziness since this morning. Patient states she was sitting at the table and suddenly felt dizzy which was associated with chest discomfort lasting a few minutes. No LOC, or palpitations, +headache. C/o nausea, multiple episodes of loose stool and vomiting since yesterday. No c/o difficulty with speech or weakness. No fever or chills. C/o shortness of breath since this morning also. Patient was supposed to go for HD today morning. She had a session of HD yesterday also. C/o Abdominal pain and generalized body aches. No decrease in exercise tolerance, no PND. Currently she is homeless. She admits to being noncompliant to her medications and did not have access to her meds for last one week.       REVIEW OF SYMPTOMS: Cardiovascular:  See HPI. + chest pain,  + dyspnea,  No syncope,  No palpitations, + dizziness, No Orthopnea,  No Paroxsymal nocturnal dyspnea  Respiratory:  + Dyspnea, No cough,     Genitourinary:  No dysuria, no hematuria  Gastrointestinal:  + nausea, + vomiting. No diarrhea.  + abdominal pain. No dark color stool, no melena    Neurological: + headache, + dizziness, no slurred speech  Psychiatric: No agitation, no anxiety.    ALL OTHER REVIEW OF SYSTEMS ARE NEGATIVE.    ALLERGIES: Seafood (Unknown)  shellfish (Nausea (Mild to Mod); Hives (Mild to Mod))    CURRENT MEDICATIONS:  None    HOME MEDICATIONS:  ASA 81mg po daily  plavix 75 mg  po daily  coreg 12.5mg po bid  simvastatin 40mg po daily  gabapentin 300mg po daily  norvasc 10mg po daily  protonix 40mg po daily  lasix 20mg po daily  sertraline 100mg po daily  quetiapine    PAST MEDICAL HISTORY  Depression  Gout  Coronary artery disease involving native coronary artery of native heart without angina pectoris  CVA (cerebral vascular accident)  CHF (congestive heart failure)  DM (diabetes mellitus)  HTN (hypertension)  CVA (cerebral vascular accident)  Glaucoma  Enlarged heart  HTN (hypertension)  Diabetes  Gout    PAST SURGICAL HISTORY  No significant past surgical history    FAMILY HISTORY:  Family history of heart attack (Father)    SOCIAL HISTORY:    Quit smoking 6yrs ago, was smoking 1PPD for 20yrs  drinks socially    Vital Signs Last 24 Hrs  T(C): 36.8 (03 Jan 2018 20:03), Max: 36.8 (03 Jan 2018 20:03)  T(F): 98.3 (03 Jan 2018 20:03), Max: 98.3 (03 Jan 2018 20:03)  HR: 111 (03 Jan 2018 20:03) (110 - 111)  BP: 125/83 (03 Jan 2018 20:03) (125/83 - 166/84)  BP(mean): --  RR: 17 (03 Jan 2018 20:03) (16 - 17)  SpO2: 98% (03 Jan 2018 20:03) (97% - 98%)    PHYSICAL EXAM:  Constitutional: Comfortable . No acute distress.   HEENT: Atraumatic and normcephalic , neck is supple . no JVD. No carotid bruit. PEERL   CNS: A&Ox3. No focal deficits. EOMI.   Lymph Nodes: Cervical : Not palpable.  Respiratory: CTAB  Cardiovascular: S1S2 RRR. No murmur/rubs or gallop.  Gastrointestinal: Soft non-tender and non distended . +Bowel sounds.   Extremities: No edema.   Psychiatric: Calm . no agitation.  Skin: No skin rash/ulcers visualized to face, hands or feet.    Intake and output:     LABS:                        12.0   5.4   )-----------( 190      ( 03 Jan 2018 20:42 )             37.5     01-03    133<L>  |  98  |  23  ----------------------------<  535<HH>  4.3   |  25  |  5.95<H>    Ca    8.7      03 Jan 2018 20:42    TPro  6.8  /  Alb  3.4  /  TBili  0.2  /  DBili  x   /  AST  13  /  ALT  10  /  AlkPhos  101  01-03    CARDIAC MARKERS ( 03 Jan 2018 20:42 )  x     / 0.06 ng/mL / 146 U/L / x     / 3.2 ng/mL    ;p-BNP=Serum Pro-Brain Natriuretic Peptide: 3737 pg/mL (01-03 @ 20:42)    PT/INR - ( 03 Jan 2018 20:42 )   PT: 10.6 sec;   INR: 0.98 ratio       INTERPRETATION OF TELEMETRY: Reviewed by me.   ECG: Reviewed by me. sinus tachycardia, T wave inversion lateral leads which are no different from EKG of Oct 2017    RADIOLOGY & ADDITIONAL STUDIES:    X-ray:  reviewed by me. cardiomegaly, no infiltrate, no effusion    ECHO:  < from: Transthoracic Echocardiogram (07.13.17 @ 15:55) >  1. Normal mitral valve. Mild mitral regurgitation.  2. Normal left ventricular internal dimensions and wall  thicknesses.  3. Endocardium not well visualized; grossly low normal left  ventricular systolic function.  4. The right ventricle is not well visualized; grossly  normal right ventricular systolic function.    < end of copied text >    < from: Nuclear Stress Test-Pharmacologic (10.19.17 @ 10:42) >  * Myocardial Perfusion SPECT results are mildly abnormal.  * Normal myocardial perfusion,with no evidence of  infarction or inducible ischemia.  * However, findings suggest cardiomyopathy. The LV was  mildly enlarged. Post-stress gated wall motion analysis  was performed (LVEF = 47 %;LVEDV = 142 ml.), revealing  mild diffuse hypokinesis. RV function appeared normal.    < end of copied text > CARDIOLOGY CONSULTATION NOTE                                                                                             Consult requested by:  Emergency department    Reason for Consultation: dizziness    History obtained by: Patient and medical record     obtained: No    Chief complaint:    Patient is a 52y old  Female who presents with a chief complaint of dizziness since this morning.     HPI:  52yr old female pmhx ESRD on HD, still make urine, CVA with weakness of left side upper and lower extremity, CHF, HTN, hyperlipidemia, DM, gout and bipolar disorder presents c/o dizziness since this morning. Patient states she was sitting at the table and suddenly felt dizzy which was associated with chest discomfort lasting a few minutes. No LOC, or palpitations, +headache. C/o nausea, multiple episodes of loose stool and vomiting since yesterday. No c/o difficulty with speech or weakness. No fever or chills. C/o shortness of breath since this morning also. Patient was supposed to go for HD today morning. She had a session of HD yesterday also. C/o Abdominal pain and generalized body aches. No decrease in exercise tolerance, no PND. Currently she is homeless. She admits to being noncompliant to her medications and did not have access to her meds for last one week.       REVIEW OF SYMPTOMS: Cardiovascular:  See HPI. + chest pain,  + dyspnea,  No syncope,  No palpitations, + dizziness, No Orthopnea,  No Paroxsymal nocturnal dyspnea  Respiratory:  + Dyspnea, No cough,     Genitourinary:  No dysuria, no hematuria  Gastrointestinal:  + nausea, + vomiting. No diarrhea.  + abdominal pain. No dark color stool, no melena    Neurological: + headache, + dizziness, no slurred speech  Psychiatric: No agitation, no anxiety.    ALL OTHER REVIEW OF SYSTEMS ARE NEGATIVE.    ALLERGIES: Seafood (Unknown)  shellfish (Nausea (Mild to Mod); Hives (Mild to Mod))    CURRENT MEDICATIONS:  None    HOME MEDICATIONS:  ASA 81mg po daily  plavix 75 mg  po daily  coreg 12.5mg po bid  simvastatin 40mg po daily  gabapentin 300mg po daily  norvasc 10mg po daily  protonix 40mg po daily  lasix 20mg po daily  sertraline 100mg po daily  quetiapine    PAST MEDICAL HISTORY  Depression  Gout  Coronary artery disease involving native coronary artery of native heart without angina pectoris  CVA (cerebral vascular accident)  CHF (congestive heart failure)  DM (diabetes mellitus)  HTN (hypertension)  CVA (cerebral vascular accident)  Glaucoma  Enlarged heart  HTN (hypertension)  Diabetes  Gout    PAST SURGICAL HISTORY  No significant past surgical history    FAMILY HISTORY:  Family history of heart attack (Father)    SOCIAL HISTORY:    Quit smoking 6yrs ago, was smoking 1PPD for 20yrs  drinks socially    Vital Signs Last 24 Hrs  T(C): 36.8 (03 Jan 2018 20:03), Max: 36.8 (03 Jan 2018 20:03)  T(F): 98.3 (03 Jan 2018 20:03), Max: 98.3 (03 Jan 2018 20:03)  HR: 111 (03 Jan 2018 20:03) (110 - 111)  BP: 125/83 (03 Jan 2018 20:03) (125/83 - 166/84)  BP(mean): --  RR: 17 (03 Jan 2018 20:03) (16 - 17)  SpO2: 98% (03 Jan 2018 20:03) (97% - 98%)    PHYSICAL EXAM:  Constitutional: Comfortable . No acute distress.   HEENT: Atraumatic and normcephalic , neck is supple . no JVD. No carotid bruit. PEERL   CNS: A&Ox3. No focal deficits. EOMI.   Lymph Nodes: Cervical : Not palpable.  Respiratory: CTAB  Cardiovascular: S1S2 RRR. No murmur/rubs or gallop.  Gastrointestinal: Soft non-tender and non distended . +Bowel sounds.   Extremities: No edema.   Psychiatric: Calm . no agitation.  Skin: No skin rash/ulcers visualized to face, hands or feet.    Intake and output:     LABS:                        12.0   5.4   )-----------( 190      ( 03 Jan 2018 20:42 )             37.5     01-03    133<L>  |  98  |  23  ----------------------------<  535<HH>  4.3   |  25  |  5.95<H>    Ca    8.7      03 Jan 2018 20:42    TPro  6.8  /  Alb  3.4  /  TBili  0.2  /  DBili  x   /  AST  13  /  ALT  10  /  AlkPhos  101  01-03    CARDIAC MARKERS ( 03 Jan 2018 20:42 )  x     / 0.06 ng/mL / 146 U/L / x     / 3.2 ng/mL    ;p-BNP=Serum Pro-Brain Natriuretic Peptide: 3737 pg/mL (01-03 @ 20:42)    PT/INR - ( 03 Jan 2018 20:42 )   PT: 10.6 sec;   INR: 0.98 ratio       INTERPRETATION OF TELEMETRY: Reviewed by me.   ECG: Reviewed by me. junctional tachycardia 110 BPM, inverted T waves inferior and lateral leads    RADIOLOGY & ADDITIONAL STUDIES:    X-ray:  reviewed by me. cardiomegaly, no infiltrate, no effusion    ECHO:  < from: Transthoracic Echocardiogram (07.13.17 @ 15:55) >  1. Normal mitral valve. Mild mitral regurgitation.  2. Normal left ventricular internal dimensions and wall  thicknesses.  3. Endocardium not well visualized; grossly low normal left  ventricular systolic function.  4. The right ventricle is not well visualized; grossly  normal right ventricular systolic function.    < end of copied text >    < from: Nuclear Stress Test-Pharmacologic (10.19.17 @ 10:42) >  * Myocardial Perfusion SPECT results are mildly abnormal.  * Normal myocardial perfusion,with no evidence of  infarction or inducible ischemia.  * However, findings suggest cardiomyopathy. The LV was  mildly enlarged. Post-stress gated wall motion analysis  was performed (LVEF = 47 %;LVEDV = 142 ml.), revealing  mild diffuse hypokinesis. RV function appeared normal.    < end of copied text >

## 2018-01-03 NOTE — CONSULT NOTE ADULT - PROBLEM SELECTOR RECOMMENDATION 3
Last HD session was yesterday evening. Noncompliant with medications. I have counselled the patient on importance of compliance to meds.   Continue coreg 12.5mg po BID, not on ACE/ARB as an outpatient.

## 2018-01-03 NOTE — ED ADULT NURSE NOTE - OBJECTIVE STATEMENT
Patient presented to ED via ambulance from home c/o hyperglycemia, HTN and h/a since this am. Patient stating having a pink eye and did not have dialysis today. Patient also stated she did not take her BP and other medications x 3 days because she runned out of them. Patient ambulatory. Son at bedside. FS in . Patient presented to ED via ambulance from home c/o hyperglycemia, HTN and h/a since this am. Patient stating having a pink eye and did not have dialysis today. Patient also stated she did not take her BP and other medications x a week because she run out of them, Patient got her medications this am and took them this am. Patient ambulatory. Son at bedside. FS in . Patient presented to ED via ambulance from home c/o hyperglycemia, HTN and h/a since this am, patient also c/o mid chest pain, no radiation. 2 episodes, pain resolved after eating some food. Patient stating having a pink eye and did not have dialysis today. Patient also stated she did not take her BP and other medications x a week because she run out of them, Patient got her medications this am and took them this am. Patient ambulatory. Son at bedside. FS in .

## 2018-01-03 NOTE — CONSULT NOTE ADULT - PROBLEM SELECTOR RECOMMENDATION 9
Patient is asymptomatic currently. CT head pending. Dizziness may be secondary to hypovolemia. Please do orthostatics on patient. D/C lasix, patient appears euvolemic. First set of cardiac enzyme is negative. Repeat second set of cardiac enzymes. If negative, patient may follow up with her primary cardiologist within a week.  TTE global hypokinesis and nuclear pharmacological stress test showed no ischemia in the last 6months. Patient is asymptomatic currently. CT head pending. Dizziness may be secondary to hypovolemia. Please do orthostatics on patient. D/C lasix, patient appears euvolemic. First set of cardiac enzyme is negative.  TTE global hypokinesis and nuclear pharmacological stress test showed no ischemia in the last 6months.

## 2018-01-04 DIAGNOSIS — I47.1 SUPRAVENTRICULAR TACHYCARDIA: ICD-10-CM

## 2018-01-04 DIAGNOSIS — R07.9 CHEST PAIN, UNSPECIFIED: ICD-10-CM

## 2018-01-04 DIAGNOSIS — E11.65 TYPE 2 DIABETES MELLITUS WITH HYPERGLYCEMIA: ICD-10-CM

## 2018-01-04 DIAGNOSIS — I63.9 CEREBRAL INFARCTION, UNSPECIFIED: ICD-10-CM

## 2018-01-04 DIAGNOSIS — I42.8 OTHER CARDIOMYOPATHIES: ICD-10-CM

## 2018-01-04 DIAGNOSIS — I10 ESSENTIAL (PRIMARY) HYPERTENSION: ICD-10-CM

## 2018-01-04 PROBLEM — I50.9 HEART FAILURE, UNSPECIFIED: Chronic | Status: INACTIVE | Noted: 2017-07-09 | Resolved: 2018-01-04

## 2018-01-04 LAB
ANION GAP SERPL CALC-SCNC: 11 MMOL/L — SIGNIFICANT CHANGE UP (ref 5–17)
ANION GAP SERPL CALC-SCNC: 14 MMOL/L — SIGNIFICANT CHANGE UP (ref 5–17)
BUN SERPL-MCNC: 20 MG/DL — SIGNIFICANT CHANGE UP (ref 7–23)
BUN SERPL-MCNC: 23 MG/DL — SIGNIFICANT CHANGE UP (ref 7–23)
CALCIUM SERPL-MCNC: 6.2 MG/DL — CRITICAL LOW (ref 8.4–10.5)
CALCIUM SERPL-MCNC: 6.9 MG/DL — LOW (ref 8.4–10.5)
CHLORIDE SERPL-SCNC: 107 MMOL/L — SIGNIFICANT CHANGE UP (ref 96–108)
CHLORIDE SERPL-SCNC: 114 MMOL/L — HIGH (ref 96–108)
CO2 SERPL-SCNC: 19 MMOL/L — LOW (ref 22–31)
CO2 SERPL-SCNC: 19 MMOL/L — LOW (ref 22–31)
CREAT SERPL-MCNC: 4.79 MG/DL — HIGH (ref 0.5–1.3)
CREAT SERPL-MCNC: 5.54 MG/DL — HIGH (ref 0.5–1.3)
GLUCOSE SERPL-MCNC: 201 MG/DL — HIGH (ref 70–99)
GLUCOSE SERPL-MCNC: 202 MG/DL — HIGH (ref 70–99)
HBA1C BLD-MCNC: 10 % — HIGH (ref 4–5.6)
LACTATE SERPL-SCNC: 1.2 MMOL/L — SIGNIFICANT CHANGE UP (ref 0.7–2)
MAGNESIUM SERPL-MCNC: 1.3 MG/DL — LOW (ref 1.6–2.6)
PHOSPHATE SERPL-MCNC: 3.7 MG/DL — SIGNIFICANT CHANGE UP (ref 2.5–4.5)
POTASSIUM SERPL-MCNC: 2.8 MMOL/L — CRITICAL LOW (ref 3.5–5.3)
POTASSIUM SERPL-MCNC: 3 MMOL/L — LOW (ref 3.5–5.3)
POTASSIUM SERPL-SCNC: 2.8 MMOL/L — CRITICAL LOW (ref 3.5–5.3)
POTASSIUM SERPL-SCNC: 3 MMOL/L — LOW (ref 3.5–5.3)
SODIUM SERPL-SCNC: 140 MMOL/L — SIGNIFICANT CHANGE UP (ref 135–145)
SODIUM SERPL-SCNC: 144 MMOL/L — SIGNIFICANT CHANGE UP (ref 135–145)
TROPONIN T SERPL-MCNC: 0.06 NG/ML — SIGNIFICANT CHANGE UP (ref 0–0.06)

## 2018-01-04 PROCEDURE — 99223 1ST HOSP IP/OBS HIGH 75: CPT

## 2018-01-04 PROCEDURE — 99223 1ST HOSP IP/OBS HIGH 75: CPT | Mod: GC

## 2018-01-04 PROCEDURE — 12345: CPT | Mod: NC

## 2018-01-04 RX ORDER — GABAPENTIN 400 MG/1
300 CAPSULE ORAL AT BEDTIME
Qty: 0 | Refills: 0 | Status: DISCONTINUED | OUTPATIENT
Start: 2018-01-04 | End: 2018-01-05

## 2018-01-04 RX ORDER — INSULIN GLARGINE 100 [IU]/ML
30 INJECTION, SOLUTION SUBCUTANEOUS ONCE
Qty: 0 | Refills: 0 | Status: COMPLETED | OUTPATIENT
Start: 2018-01-04 | End: 2018-01-04

## 2018-01-04 RX ORDER — DEXTROSE 50 % IN WATER 50 %
25 SYRINGE (ML) INTRAVENOUS ONCE
Qty: 0 | Refills: 0 | Status: DISCONTINUED | OUTPATIENT
Start: 2018-01-04 | End: 2018-01-04

## 2018-01-04 RX ORDER — HEPARIN SODIUM 5000 [USP'U]/ML
5000 INJECTION INTRAVENOUS; SUBCUTANEOUS EVERY 8 HOURS
Qty: 0 | Refills: 0 | Status: DISCONTINUED | OUTPATIENT
Start: 2018-01-04 | End: 2018-01-05

## 2018-01-04 RX ORDER — INSULIN GLARGINE 100 [IU]/ML
33 INJECTION, SOLUTION SUBCUTANEOUS AT BEDTIME
Qty: 0 | Refills: 0 | Status: DISCONTINUED | OUTPATIENT
Start: 2018-01-04 | End: 2018-01-05

## 2018-01-04 RX ORDER — MAGNESIUM SULFATE 500 MG/ML
2 VIAL (ML) INJECTION ONCE
Qty: 0 | Refills: 0 | Status: COMPLETED | OUTPATIENT
Start: 2018-01-04 | End: 2018-01-04

## 2018-01-04 RX ORDER — DEXTROSE 50 % IN WATER 50 %
12.5 SYRINGE (ML) INTRAVENOUS ONCE
Qty: 0 | Refills: 0 | Status: DISCONTINUED | OUTPATIENT
Start: 2018-01-04 | End: 2018-01-04

## 2018-01-04 RX ORDER — INSULIN LISPRO 100/ML
6 VIAL (ML) SUBCUTANEOUS
Qty: 0 | Refills: 0 | Status: DISCONTINUED | OUTPATIENT
Start: 2018-01-04 | End: 2018-01-05

## 2018-01-04 RX ORDER — ASPIRIN/CALCIUM CARB/MAGNESIUM 324 MG
81 TABLET ORAL DAILY
Qty: 0 | Refills: 0 | Status: DISCONTINUED | OUTPATIENT
Start: 2018-01-04 | End: 2018-01-05

## 2018-01-04 RX ORDER — INSULIN LISPRO 100/ML
VIAL (ML) SUBCUTANEOUS AT BEDTIME
Qty: 0 | Refills: 0 | Status: DISCONTINUED | OUTPATIENT
Start: 2018-01-04 | End: 2018-01-05

## 2018-01-04 RX ORDER — POTASSIUM BICARBONATE 978 MG/1
25 TABLET, EFFERVESCENT ORAL ONCE
Qty: 0 | Refills: 0 | Status: COMPLETED | OUTPATIENT
Start: 2018-01-04 | End: 2018-01-04

## 2018-01-04 RX ORDER — ATORVASTATIN CALCIUM 80 MG/1
40 TABLET, FILM COATED ORAL AT BEDTIME
Qty: 0 | Refills: 0 | Status: DISCONTINUED | OUTPATIENT
Start: 2018-01-04 | End: 2018-01-05

## 2018-01-04 RX ORDER — INSULIN GLARGINE 100 [IU]/ML
30 INJECTION, SOLUTION SUBCUTANEOUS AT BEDTIME
Qty: 0 | Refills: 0 | Status: DISCONTINUED | OUTPATIENT
Start: 2018-01-04 | End: 2018-01-04

## 2018-01-04 RX ORDER — AMLODIPINE BESYLATE 2.5 MG/1
10 TABLET ORAL DAILY
Qty: 0 | Refills: 0 | Status: DISCONTINUED | OUTPATIENT
Start: 2018-01-04 | End: 2018-01-05

## 2018-01-04 RX ORDER — OXYBUTYNIN CHLORIDE 5 MG
10 TABLET ORAL
Qty: 0 | Refills: 0 | Status: DISCONTINUED | OUTPATIENT
Start: 2018-01-04 | End: 2018-01-05

## 2018-01-04 RX ORDER — SIMVASTATIN 20 MG/1
40 TABLET, FILM COATED ORAL AT BEDTIME
Qty: 0 | Refills: 0 | Status: DISCONTINUED | OUTPATIENT
Start: 2018-01-04 | End: 2018-01-04

## 2018-01-04 RX ORDER — INSULIN LISPRO 100/ML
VIAL (ML) SUBCUTANEOUS ONCE
Qty: 0 | Refills: 0 | Status: COMPLETED | OUTPATIENT
Start: 2018-01-04 | End: 2018-01-04

## 2018-01-04 RX ORDER — SODIUM CHLORIDE 9 MG/ML
1000 INJECTION, SOLUTION INTRAVENOUS
Qty: 0 | Refills: 0 | Status: DISCONTINUED | OUTPATIENT
Start: 2018-01-04 | End: 2018-01-04

## 2018-01-04 RX ORDER — CARVEDILOL PHOSPHATE 80 MG/1
12.5 CAPSULE, EXTENDED RELEASE ORAL EVERY 12 HOURS
Qty: 0 | Refills: 0 | Status: DISCONTINUED | OUTPATIENT
Start: 2018-01-04 | End: 2018-01-05

## 2018-01-04 RX ORDER — GLUCAGON INJECTION, SOLUTION 0.5 MG/.1ML
1 INJECTION, SOLUTION SUBCUTANEOUS ONCE
Qty: 0 | Refills: 0 | Status: DISCONTINUED | OUTPATIENT
Start: 2018-01-04 | End: 2018-01-04

## 2018-01-04 RX ORDER — QUETIAPINE FUMARATE 200 MG/1
50 TABLET, FILM COATED ORAL DAILY
Qty: 0 | Refills: 0 | Status: DISCONTINUED | OUTPATIENT
Start: 2018-01-04 | End: 2018-01-05

## 2018-01-04 RX ORDER — PANTOPRAZOLE SODIUM 20 MG/1
40 TABLET, DELAYED RELEASE ORAL
Qty: 0 | Refills: 0 | Status: DISCONTINUED | OUTPATIENT
Start: 2018-01-04 | End: 2018-01-05

## 2018-01-04 RX ORDER — SERTRALINE 25 MG/1
200 TABLET, FILM COATED ORAL DAILY
Qty: 0 | Refills: 0 | Status: DISCONTINUED | OUTPATIENT
Start: 2018-01-04 | End: 2018-01-05

## 2018-01-04 RX ORDER — DEXTROSE 50 % IN WATER 50 %
1 SYRINGE (ML) INTRAVENOUS ONCE
Qty: 0 | Refills: 0 | Status: DISCONTINUED | OUTPATIENT
Start: 2018-01-04 | End: 2018-01-04

## 2018-01-04 RX ORDER — INSULIN LISPRO 100/ML
VIAL (ML) SUBCUTANEOUS
Qty: 0 | Refills: 0 | Status: DISCONTINUED | OUTPATIENT
Start: 2018-01-04 | End: 2018-01-05

## 2018-01-04 RX ORDER — CHOLECALCIFEROL (VITAMIN D3) 125 MCG
400 CAPSULE ORAL DAILY
Qty: 0 | Refills: 0 | Status: DISCONTINUED | OUTPATIENT
Start: 2018-01-04 | End: 2018-01-05

## 2018-01-04 RX ORDER — CLOPIDOGREL BISULFATE 75 MG/1
75 TABLET, FILM COATED ORAL DAILY
Qty: 0 | Refills: 0 | Status: DISCONTINUED | OUTPATIENT
Start: 2018-01-04 | End: 2018-01-05

## 2018-01-04 RX ORDER — SODIUM CHLORIDE 9 MG/ML
1000 INJECTION INTRAMUSCULAR; INTRAVENOUS; SUBCUTANEOUS
Qty: 0 | Refills: 0 | Status: DISCONTINUED | OUTPATIENT
Start: 2018-01-04 | End: 2018-01-05

## 2018-01-04 RX ADMIN — CARVEDILOL PHOSPHATE 12.5 MILLIGRAM(S): 80 CAPSULE, EXTENDED RELEASE ORAL at 18:33

## 2018-01-04 RX ADMIN — CLOPIDOGREL BISULFATE 75 MILLIGRAM(S): 75 TABLET, FILM COATED ORAL at 11:07

## 2018-01-04 RX ADMIN — POTASSIUM BICARBONATE 25 MILLIEQUIVALENT(S): 978 TABLET, EFFERVESCENT ORAL at 08:33

## 2018-01-04 RX ADMIN — Medication 3: at 01:27

## 2018-01-04 RX ADMIN — HEPARIN SODIUM 5000 UNIT(S): 5000 INJECTION INTRAVENOUS; SUBCUTANEOUS at 14:31

## 2018-01-04 RX ADMIN — CARVEDILOL PHOSPHATE 12.5 MILLIGRAM(S): 80 CAPSULE, EXTENDED RELEASE ORAL at 06:08

## 2018-01-04 RX ADMIN — Medication 10 MILLIGRAM(S): at 18:33

## 2018-01-04 RX ADMIN — SODIUM CHLORIDE 50 MILLILITER(S): 9 INJECTION INTRAMUSCULAR; INTRAVENOUS; SUBCUTANEOUS at 02:56

## 2018-01-04 RX ADMIN — GABAPENTIN 300 MILLIGRAM(S): 400 CAPSULE ORAL at 21:45

## 2018-01-04 RX ADMIN — Medication 81 MILLIGRAM(S): at 11:07

## 2018-01-04 RX ADMIN — INSULIN GLARGINE 33 UNIT(S): 100 INJECTION, SOLUTION SUBCUTANEOUS at 21:44

## 2018-01-04 RX ADMIN — PANTOPRAZOLE SODIUM 40 MILLIGRAM(S): 20 TABLET, DELAYED RELEASE ORAL at 06:08

## 2018-01-04 RX ADMIN — HEPARIN SODIUM 5000 UNIT(S): 5000 INJECTION INTRAVENOUS; SUBCUTANEOUS at 06:08

## 2018-01-04 RX ADMIN — POTASSIUM BICARBONATE 25 MILLIEQUIVALENT(S): 978 TABLET, EFFERVESCENT ORAL at 11:07

## 2018-01-04 RX ADMIN — Medication 50 GRAM(S): at 09:23

## 2018-01-04 RX ADMIN — QUETIAPINE FUMARATE 50 MILLIGRAM(S): 200 TABLET, FILM COATED ORAL at 11:07

## 2018-01-04 RX ADMIN — ATORVASTATIN CALCIUM 40 MILLIGRAM(S): 80 TABLET, FILM COATED ORAL at 21:45

## 2018-01-04 RX ADMIN — SERTRALINE 200 MILLIGRAM(S): 25 TABLET, FILM COATED ORAL at 11:07

## 2018-01-04 RX ADMIN — AMLODIPINE BESYLATE 10 MILLIGRAM(S): 2.5 TABLET ORAL at 06:08

## 2018-01-04 RX ADMIN — HEPARIN SODIUM 5000 UNIT(S): 5000 INJECTION INTRAVENOUS; SUBCUTANEOUS at 21:45

## 2018-01-04 RX ADMIN — Medication 2: at 07:47

## 2018-01-04 RX ADMIN — Medication 400 UNIT(S): at 18:33

## 2018-01-04 RX ADMIN — Medication 6 UNIT(S): at 11:43

## 2018-01-04 RX ADMIN — Medication 10 MILLIGRAM(S): at 06:08

## 2018-01-04 RX ADMIN — INSULIN GLARGINE 30 UNIT(S): 100 INJECTION, SOLUTION SUBCUTANEOUS at 01:26

## 2018-01-04 NOTE — CONSULT NOTE ADULT - SUBJECTIVE AND OBJECTIVE BOX
Date of Consult: 1/4/2018    CHIEF COMPLAINT: dizziness     HISTORY OF PRESENT ILLNESS:    Ninoska Herr is a 51 yo woman with PMHx significant for ESRD on HD, still make urine, CVA with weakness of left side upper and lower extremity, CHF, HTN, hyperlipidemia, DM, gout and bipolar disorder who presented to the ED with dizziness. Was found to be extremely hyperglycemic with glucose levels in the 500 range. This occurred in the setting of not taking her medications for a week. No fever or chills. She was also found to be in a 2:1 atrial tachycardia on presentation to the ED in this setting. Her metabolic issues were corrected and currently she is in sinus, without any symptoms.     Allergies    iodine (Unknown)  Seafood (Unknown)  shellfish (Nausea (Mild to Mod); Hives (Mild to Mod))    Intolerances    MEDICATIONS:  amLODIPine   Tablet 10 milliGRAM(s) Oral daily  aspirin enteric coated 81 milliGRAM(s) Oral daily  carvedilol 12.5 milliGRAM(s) Oral every 12 hours  clopidogrel Tablet 75 milliGRAM(s) Oral daily  heparin  Injectable 5000 Unit(s) SubCutaneous every 8 hours  gabapentin 300 milliGRAM(s) Oral at bedtime  QUEtiapine 50 milliGRAM(s) Oral daily  sertraline 200 milliGRAM(s) Oral daily  pantoprazole    Tablet 40 milliGRAM(s) Oral before breakfast  atorvastatin 40 milliGRAM(s) Oral at bedtime  insulin glargine Injectable (LANTUS) 33 Unit(s) SubCutaneous at bedtime  insulin lispro (HumaLOG) corrective regimen sliding scale   SubCutaneous three times a day before meals  insulin lispro (HumaLOG) corrective regimen sliding scale   SubCutaneous at bedtime  insulin lispro Injectable (HumaLOG) 6 Unit(s) SubCutaneous three times a day before meals  cholecalciferol 400 Unit(s) Oral daily  oxybutynin 10 milliGRAM(s) Oral two times a day  sodium chloride 0.9%. 1000 milliLiter(s) IV Continuous <Continuous>    PAST MEDICAL & SURGICAL HISTORY:  Depression  Gout  CVA (cerebral vascular accident)  DM (diabetes mellitus)  HTN (hypertension)  Glaucoma  No significant past surgical history    FAMILY HISTORY:  Family history of heart attack (Father)    SOCIAL HISTORY:    [ ] Non-smoker  [ ] Smoker  [ ] Alcohol      REVIEW OF SYSTEMS:  See HPI. Otherwise, 10 point ROS done and otherwise negative.    PHYSICAL EXAM:  T(C): 36.8 (01-04-18 @ 06:04), Max: 36.9 (01-04-18 @ 00:11)  HR: 80 (01-04-18 @ 11:00) (78 - 111)  BP: 117/99 (01-04-18 @ 11:00) (117/99 - 166/84)  RR: 16 (01-04-18 @ 06:04) (16 - 19)  SpO2: 99% (01-04-18 @ 06:04) (97% - 99%)  Wt(kg): --  I&O's Summary    03 Jan 2018 07:01  -  04 Jan 2018 07:00  --------------------------------------------------------  IN: 100 mL / OUT: 0 mL / NET: 100 mL    04 Jan 2018 07:01  -  04 Jan 2018 15:02  --------------------------------------------------------  IN: 240 mL / OUT: 0 mL / NET: 240 mL    Appearance: Normal	  HEENT:   Normal oral mucosa, PERRL, EOMI	  Lymphatic: No lymphadenopathy  Cardiovascular: Normal S1 S2, No JVD, No murmurs, No edema  Respiratory: Lungs clear to auscultation	  Psychiatry: A & O x 3, Mood & affect appropriate  Gastrointestinal:  Soft, Non-tender, + BS	  Skin: No rashes, No ecchymoses, No cyanosis	  Neurologic: Non-focal  Extremities: Normal range of motion, No clubbing, cyanosis or edema  Vascular: Peripheral pulses palpable 2+ bilaterally    LABS:	 	    CBC Full  -  ( 03 Jan 2018 20:42 )  WBC Count : 5.4 K/uL  Hemoglobin : 12.0 g/dL  Hematocrit : 37.5 %  Platelet Count - Automated : 190 K/uL  Mean Cell Volume : 91.6 fl  Mean Cell Hemoglobin : 29.3 pg  Mean Cell Hemoglobin Concentration : 32.0 gm/dL  Auto Neutrophil # : 3.1 K/uL  Auto Lymphocyte # : 1.8 K/uL  Auto Monocyte # : 0.4 K/uL  Auto Eosinophil # : 0.0 K/uL  Auto Basophil # : 0.0 K/uL  Auto Neutrophil % : 58.6 %  Auto Lymphocyte % : 32.7 %  Auto Monocyte % : 7.0 %  Auto Eosinophil % : 0.9 %  Auto Basophil % : 0.8 %    01-04    140  |  107  |  23  ----------------------------<  202<H>  3.0<L>   |  19<L>  |  5.54<H>  01-04    144  |  114<H>  |  20  ----------------------------<  201<H>  2.8<LL>   |  19<L>  |  4.79<H>    Ca    6.9<L>      04 Jan 2018 07:57  Ca    6.2<LL>      04 Jan 2018 06:04  Phos  3.7     01-04  Mg     1.5     01-04  Mg     1.3     01-04    TPro  6.8  /  Alb  3.4  /  TBili  0.2  /  DBili  x   /  AST  13  /  ALT  10  /  AlkPhos  101  01-03      proBNP: Serum Pro-Brain Natriuretic Peptide: 3737 pg/mL (01-03 @ 20:42)    Lipid Profile:   HgA1c: Hemoglobin A1C, Whole Blood: 10.0 % (01-04 @ 07:36)    TELEMETRY: sinus rhythm in 70-80  	    ECG:  	  RADIOLOGY:  OTHER: 	    PREVIOUS DIAGNOSTIC TESTING:    [ ] Echocardiogram:  < from: Transthoracic Echocardiogram (07.13.17 @ 15:55) >  CONCLUSIONS:  1. Normal mitral valve. Mild mitral regurgitation.  2. Normal left ventricular internal dimensions and wall  thicknesses.  3. Endocardium not well visualized; grossly low normal left  ventricular systolic function.  4. The right ventricle is not well visualized; grossly  normal right ventricular systolic function.    < end of copied text >    [ ]  Catheterization:  [ ] Stress Test:  	  	  ASSESSMENT/PLAN:

## 2018-01-04 NOTE — PROGRESS NOTE ADULT - ATTENDING COMMENTS
Dispo is pending clearance from EP w/ junctional tachycardia, MI r/o w/ serial negative CE. CP resolved. I'm okay with d/c if cardiology is though prior to leaving will need to have extensive discussion re: med adherence and SW will need to ensure she has access to meds.
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
## 2018-01-04 NOTE — H&P ADULT - NSHPOUTPATIENTPROVIDERS_GEN_ALL_CORE
Dr. Wale Francisco - Nephrologist Dr. Wale Francisco - Nephrologist  Anderson Garcia (PCP)  Dr. Mckenzie (Cardiologist)

## 2018-01-04 NOTE — PROGRESS NOTE ADULT - PROBLEM SELECTOR PLAN 3
-c/w Coreg 12.5mg BID, has room to titrate up though would prefer to start ACEi/ARB.  -Pt appears dry on exam, s/p gentle hydration. Will d/c snow.

## 2018-01-04 NOTE — PROGRESS NOTE ADULT - PROBLEM SELECTOR PLAN 2
-Unlikely to be anginal. Stress test in October negative and EKG findings are chronic. First set of enzymes negative, will trend until negative x2.  - Cardiology input appreciated, echo and EP consult in for today.   - f/u dig level

## 2018-01-04 NOTE — H&P ADULT - PROBLEM SELECTOR PLAN 2
-Unlikely to be anginal. Stress test in October negative and EKG findings are chronic. First set of enzymes negative, will trend until negative x2.  -Cardiology input appreciated.

## 2018-01-04 NOTE — H&P ADULT - ASSESSMENT
52F w/ HTN, T2DM, CVA (on DAPT no residual deficits), NICM, and ESRD on HD MWF p/w chest pain, dizziness, nausea, and vomiting found hyperglycemic to >500 due to medication non-compliance. Pt's chest pain unlikely to be anginal in nature as EKG findings present on past EKG.

## 2018-01-04 NOTE — H&P ADULT - NSHPPHYSICALEXAM_GEN_ALL_CORE
GENERAL APPEARANCE: Well developed, well nourished, alert and cooperative. NAD.   HEENT:  NC/AT, clear conjunctiva  NECK: Neck supple, non-tender without lymphadenopathy, masses  CARDIAC: Normal S1 and S2. Rhythm is regular.  LUNGS: Clear to auscultation and percussion without rales, rhonchi, wheezing or diminished breath sounds.  ABDOMEN: Soft, nondistended, nontender. No guarding or rebound.   MUSKULOSKELETAL: No joint erythema or tenderness.   EXTREMITIES: No edema. LUE AVF  NEUROLOGICAL: No focal neurologic deficit.  SKIN: Skin clean, dry, intact. RIJ permacath c/d/i  PSYCHIATRIC: AOx3.Normal affect and behavior.

## 2018-01-04 NOTE — PROGRESS NOTE ADULT - ASSESSMENT
Ms. Herr is a 53 yo woman w/ DM2 (a1c 10) , CVA (on DAPT no residual deficits), NICM, and ESRD on HD MWF p/w chest pain, vomiting found hyperglycemic to >500 due to medication non-adherence; patient's CP w/ junctional tachycardia, perhaps in the setting of ischemia though recent stress w/ out ischemia. Will check dig level per cardiology and get echo. Repleting K+ this AM, goal ~4, and mag, goal 2. Also looping in renal as she's on regular dialysis and has missed a session, no urgent need right now. With mild hypocalcemia (corrected 7.4), calcium carbonate supplementation will do her will from esrd phos and low calcium perspective, will d/w renal. a1c 10 w/ lack of access to meds, will loop in social work. Needs extensive education prior to d/c re importance of adherence. Ms. Herr is a 51 yo woman w/ DM2 (a1c 10) , CVA (on DAPT no residual deficits), NICM, and ESRD on HD MWF p/w chest pain, dizziness, vomiting found hyperglycemic to >500 due to medication non-adherence; patient's CP w/ junctional tachycardia, perhaps in the setting of ischemia though recent stress w/ out ischemia. Symptoms most likely in setting of dehydration w/ glucosuria. Will check dig level per cardiology and get echo. Repleting K+ this AM, goal ~4, and mag, goal 2. Also looping in renal as she's on regular dialysis and has missed a session, no urgent need right now. With mild hypocalcemia (corrected 7.4), calcium carbonate supplementation will do her will from esrd phos and low calcium perspective, will d/w renal. a1c 10 w/ lack of access to meds, will loop in social work. Needs extensive education prior to d/c re importance of adherence.

## 2018-01-04 NOTE — H&P ADULT - PROBLEM SELECTOR PLAN 3
-c/w Coreg 12.5mg BID. Not on ACEi/ARB.  -Pt appears dry on exam, will c/w gentle hydration. Resume PO lasix when volume status improves.

## 2018-01-04 NOTE — PROGRESS NOTE ADULT - PROBLEM SELECTOR PLAN 1
- escalate Lantus 30U to 33U, cont ISS.  - no metformin  - ASA/statin --> switch simva 40 to atorva 40, if well tolerated will bring up to 80.   - unsure why not on ACE vs. ARB given mortality benefits in ESRD w/ HD; would also help HF afterload rdxn. Will d/w renal.   - would benefit from sglt2 w/ heart failure as well, though being mindful of polypharm w/ nonadherent patient.   -unclear utility of gabapentin w/ polypharm.

## 2018-01-04 NOTE — H&P ADULT - PROBLEM SELECTOR PLAN 1
-No anion gap and pH wnl on VBG. FS much improved after resuming Lantus and insulin coverage w/ improvement in symptoms as well. Pt s/p 500cc bolus, will c/w gentle hydration w/ 50cc/hr for 10 hrs.   -c/w Lantus 30U and ISS.

## 2018-01-04 NOTE — CONSULT NOTE ADULT - SUBJECTIVE AND OBJECTIVE BOX
HPI:  Ms. Herr is a 52 year-old woman, well-known to me, with history of hypertension, type 2 diabetes mellitus, stroke, nonischemic cardiomyopathy, and ESRD-HD She presented overnight to the [facility] ER with intermittent nonradiating pressure-like chest pain for 1 day. She stated that she ran out of Lantus for about a week prior to coming to the ER. She usually gets HD MWF at the [facility] HD unit; she underwent HD Sunday and Tuesday this week. She has an immature AV fistula and has been receiving hemodialysis via a tunneled HD catheter.      PAST MEDICAL & SURGICAL HISTORY:  Depression  Gout  CVA (cerebral vascular accident)  DM (diabetes mellitus)  HTN (hypertension)  Glaucoma  RUE AVF      Allergies  iodine (Unknown)  shellfish (Nausea (Mild to Mod); Hives (Mild to Mod)    SOCIAL HISTORY:  Denies ETOh,Smoking,     FAMILY HISTORY:  Family history of heart attack (Father)    REVIEW OF SYSTEMS:  CONSTITUTIONAL: No weakness, fevers or chills;   EYES/ENT: No visual changes;  No vertigo or throat pain   NECK: No pain or stiffness  RESPIRATORY: No cough, wheezing, hemoptysis; No shortness of breath  CARDIOVASCULAR: no palpitations; (+)chest pain, (+)dizziness  GASTROINTESTINAL: No abdominal or epigastric pain. No nausea, vomiting, or hematemesis; No diarrhea or constipation. No melena or hematochezia.  GENITOURINARY: No dysuria, frequency or hematuria  NEUROLOGICAL: No numbness or weakness  SKIN: No itching, burning, rashes, or lesions   All other review of systems is negative unless indicated above.    VITAL:  T(C): , Max: 36.9 (01-04-18 @ 00:11)  T(F): , Max: 98.5 (01-04-18 @ 00:11)  HR: 80 (01-04-18 @ 11:00)  BP: 117/99 (01-04-18 @ 11:00)  BP(mean): --  RR: 16 (01-04-18 @ 06:04)  SpO2: 99% (01-04-18 @ 06:04)  Wt(kg): --    PHYSICAL EXAM:  Constitutional: NAD, Alert  HEENT: NCAT, MMM  Neck: Supple, No JVD  Respiratory: CTA-b/l  Cardiovascular: RRR s1s2, no m/r/g  Gastrointestinal: BS+, soft, NT/ND  Extremities: No peripheral edema b/l  Neurological: no focal deficits; strength grossly intact  Psychiatric: Normal mood, normal affect  Back: no CVAT b/l  Skin: No rashes, no nevi    LABS:                        12.0   5.4   )-----------( 190      ( 03 Jan 2018 20:42 )             37.5     Na(140)/K(3.0)/Cl(107)/HCO3(19)/BUN(23)/Cr(5.54)Glu(202)/Ca(6.9)/Mg(1.5)/PO4(--)    01-04 @ 07:57  Na(144)/K(2.8)/Cl(114)/HCO3(19)/BUN(20)/Cr(4.79)Glu(201)/Ca(6.2)/Mg(1.3)/PO4(3.7)    01-04 @ 06:04  Na(133)/K(4.3)/Cl(98)/HCO3(25)/BUN(23)/Cr(5.95)Glu(535)/Ca(8.7)/Mg(--)/PO4(--)    01-03 @ 20:42        IMAGING:  < from: Xray Chest 2 Views PA/Lat (01.03.18 @ 22:11) >  Clear lungs.      ASSESSMENT:  (1)Renal - ESRD-HD usually MWF; last dialyzed Tuesday; can plan for HD today and Saturday, with plan to revert back to MWF schedule next week  (2)Hypokalemia - etiology?   (3)CP/dizziness - etiology? EP on board. Were the arrhythmias that occured due to hypokalemia?    RECOMMEND:  (1)HD today - with high-K bath  (2)F/U EP input  (3)Can liberalize dietary K+ for now (low-salt, low-PO4 diabetic diet)  (4)No needlesticks RUE      Thank you for involving [facility] in this patient's care.    With warm regards,    Wale Francisco MD   [facility]  [phone] HPI:  Ms. Herr is a 52 year-old woman, well-known to me, with history of hypertension, type 2 diabetes mellitus, stroke, nonischemic cardiomyopathy, and ESRD-HD She presented overnight to the [facility] ER with intermittent nonradiating pressure-like chest pain for 1 day. She admits to palpitations as well; she states that the palpitations were relieved by eating. She stated that she ran out of Lantus for about a week prior to coming to the ER. She usually gets HD MWF at the [facility] HD unit; she underwent HD Sunday and Tuesday this week. She has an immature AV fistula and has been receiving hemodialysis via a tunneled HD catheter.      PAST MEDICAL & SURGICAL HISTORY:  Depression  Gout  CVA (cerebral vascular accident)  DM (diabetes mellitus)  HTN (hypertension)  Glaucoma  LUE AVF      Allergies  iodine (Unknown)  shellfish (Nausea (Mild to Mod); Hives (Mild to Mod)    SOCIAL HISTORY:  Denies ETOh,Smoking,     FAMILY HISTORY:  Family history of heart attack (Father)    REVIEW OF SYSTEMS:  CONSTITUTIONAL: No weakness, fevers or chills;   EYES/ENT: No visual changes;  No vertigo or throat pain   NECK: No pain or stiffness  RESPIRATORY: No cough, wheezing, hemoptysis; No shortness of breath  CARDIOVASCULAR: (+) palpitations; (+)chest pain, (+)dizziness  GASTROINTESTINAL: No abdominal or epigastric pain. No nausea, vomiting, or hematemesis; No diarrhea or constipation. No melena or hematochezia.  GENITOURINARY: No dysuria, frequency or hematuria  NEUROLOGICAL: No numbness or weakness  SKIN: No itching, burning, rashes, or lesions   All other review of systems is negative unless indicated above.    VITAL:  T(C): , Max: 36.9 (01-04-18 @ 00:11)  T(F): , Max: 98.5 (01-04-18 @ 00:11)  HR: 80 (01-04-18 @ 11:00)  BP: 117/99 (01-04-18 @ 11:00)  BP(mean): --  RR: 16 (01-04-18 @ 06:04)  SpO2: 99% (01-04-18 @ 06:04)  Wt(kg): --    PHYSICAL EXAM:  Constitutional: NAD, Alert, obese  HEENT: NCAT, DMM  Neck: Supple, No JVD  Respiratory: CTA-b/l  Cardiovascular: RRR s1s2, no m/r/g  Gastrointestinal: BS+, soft, NT/ND  Extremities: No peripheral edema b/l  Neurological: no focal deficits; strength grossly intact  Psychiatric: Normal mood, normal affect  Back: no CVAT b/l  Skin: No rashes, no nevi  Access: RIJ permacath; LUE AVF (+)bruit  LABS:                        12.0   5.4   )-----------( 190      ( 03 Jan 2018 20:42 )             37.5     Na(140)/K(3.0)/Cl(107)/HCO3(19)/BUN(23)/Cr(5.54)Glu(202)/Ca(6.9)/Mg(1.5)/PO4(--)    01-04 @ 07:57  Na(144)/K(2.8)/Cl(114)/HCO3(19)/BUN(20)/Cr(4.79)Glu(201)/Ca(6.2)/Mg(1.3)/PO4(3.7)    01-04 @ 06:04  Na(133)/K(4.3)/Cl(98)/HCO3(25)/BUN(23)/Cr(5.95)Glu(535)/Ca(8.7)/Mg(--)/PO4(--)    01-03 @ 20:42        IMAGING:  < from: Xray Chest 2 Views PA/Lat (01.03.18 @ 22:11) >  Clear lungs.      ASSESSMENT:  (1)Renal - ESRD-HD usually MWF; last dialyzed Tuesday; can plan for HD today and Saturday, with plan to revert back to MWF schedule next week  (2)Hypokalemia - etiology?   (3)CP/dizziness - etiology? EP on board. Were the arrhythmias that occured due to hypokalemia?    RECOMMEND:  (1)HD today - with high-K bath  (2)F/U EP input  (3)Can liberalize dietary K+ for now (low-salt, low-PO4 diabetic diet)  (4)No needlesticks LUSHANIQUA      Thank you for involving [facility] in this patient's care.    With warm regards,    Wale Francisco MD   [facility]  [phone]

## 2018-01-04 NOTE — H&P ADULT - NSHPLABSRESULTS_GEN_ALL_CORE
Labs and imaging personally reviewed                            12.0   5.4   )-----------( 190      ( 03 Jan 2018 20:42 )             37.5     01-03    133<L>  |  98  |  23  ----------------------------<  535<HH>  4.3   |  25  |  5.95<H>    Ca    8.7      03 Jan 2018 20:42    TPro  6.8  /  Alb  3.4  /  TBili  0.2  /  DBili  x   /  AST  13  /  ALT  10  /  AlkPhos  101  01-03    CARDIAC MARKERS ( 03 Jan 2018 20:42 )  x     / 0.06 ng/mL / 146 U/L / x     / 3.2 ng/mL        LIVER FUNCTIONS - ( 03 Jan 2018 20:42 )  Alb: 3.4 g/dL / Pro: 6.8 g/dL / ALK PHOS: 101 U/L / ALT: 10 U/L RC / AST: 13 U/L / GGT: x             PT/INR - ( 03 Jan 2018 20:42 )   PT: 10.6 sec;   INR: 0.98 ratio      EKG: NSR, TWI in V4-V6.    CXR: cardiomegaly, no consolidations      < from: Nuclear Stress Test-Pharmacologic (10.19.17 @ 10:42) >    IMPRESSIONS:Abnormal Study  * Myocardial Perfusion SPECT results are mildly abnormal.  * Normal myocardial perfusion,with no evidence of  infarction or inducible ischemia.  * However, findings suggest cardiomyopathy. The LV was  mildly enlarged. Post-stress gated wall motion analysis  was performed (LVEF = 47 %;LVEDV = 142 ml.), revealing  mild diffuse hypokinesis. RV function appeared normal.    < end of copied text >    < from: Transthoracic Echocardiogram (07.13.17 @ 15:55) >    CONCLUSIONS:  1. Normal mitral valve. Mild mitral regurgitation.  2. Normal left ventricular internal dimensions and wall  thicknesses.  3. Endocardium not well visualized; grossly low normal left  ventricular systolic function.  4. The right ventricle is not well visualized; grossly  normal right ventricular systolic function.    < end of copied text >

## 2018-01-04 NOTE — H&P ADULT - NSHPREVIEWOFSYSTEMS_GEN_ALL_CORE
CONSTITUTIONAL:  No fevers, sweats, or chills  HEENT:  Eyes:  No visual loss, blurred vision, double vision or yellow sclerae. Ears, Nose, Throat:  No hearing loss, sneezing, congestion, runny nose or sore throat.  SKIN:  No rash or itching.  CARDIOVASCULAR:  +chest pain, no palpitations or edema  RESPIRATORY:  No shortness of breath, cough or sputum.  GASTROINTESTINAL:  +nausea, + vomiting, + loose BM. No abd pain  GENITOURINARY:  Minimal urine output, no hematuria or dysuria  NEUROLOGICAL:  +dizziness, + headache  MUSCULOSKELETAL:  No muscle, back pain, joint pain or stiffness.  HEMATOLOGIC:  No bleeding or bruising.

## 2018-01-04 NOTE — H&P ADULT - PMH
CVA (cerebral vascular accident)    Depression    DM (diabetes mellitus)    Glaucoma    Gout    HTN (hypertension)

## 2018-01-04 NOTE — CONSULT NOTE ADULT - ASSESSMENT
51 yo woman with PMHx significant for ESRD on HD, still make urine, CVA with weakness of left side upper and lower extremity, CHF, HTN, hyperlipidemia, DM, gout and bipolar disorder presenting with metabolic derangements in setting of medication noncompliance, found to be in an 2: atrial tachycardia.     Atrial Tachycardia  Now resolved, in sinus, with metabolic derangements corrected and placed on her home medications, including beta blocker.   -c/w carvedilol 12.5 mg BID   -no further interventions now that patient is back in sinus  -rest of plan as per primary team     Please call [phone] for if any further questions arise until 5 pm. For after hours assistance, please call on call fellow at [phone].

## 2018-01-04 NOTE — PROGRESS NOTE ADULT - SUBJECTIVE AND OBJECTIVE BOX
REVIEW OF SYSTEMS:  CONSTITUTIONAL: No fever, weight loss, or fatigue  EYES: No eye pain, visual disturbances, or discharge  ENMT:  No difficulty hearing, tinnitus, vertigo; No sinus or throat pain  NECK: No pain or stiffness  BREASTS: No pain, masses, or nipple discharge  RESPIRATORY: No cough, wheezing, chills or hemoptysis; No shortness of breath  CARDIOVASCULAR: No chest pain, palpitations, dizziness, or leg swelling  GASTROINTESTINAL: No abdominal or epigastric pain. No nausea, vomiting, or hematemesis; No diarrhea or constipation. No melena or hematochezia.  GENITOURINARY: No dysuria, frequency, hematuria, or incontinence  NEUROLOGICAL: No headaches, memory loss, loss of strength, numbness, or tremors  SKIN: No itching, burning, rashes, or lesions   LYMPH NODES: No enlarged glands  ENDOCRINE: No heat or cold intolerance; No hair loss  MUSCULOSKELETAL: No joint pain or swelling; No muscle, back, or extremity pain  PSYCHIATRIC: No depression, anxiety, mood swings, or difficulty sleeping  HEME/LYMPH: No easy bruising, or bleeding gums  ALLERY AND IMMUNOLOGIC: No hives or eczema    T(C): 36.8 (01-04-18 @ 06:04), Max: 36.9 (01-04-18 @ 00:11)  HR: 80 (01-04-18 @ 11:00) (78 - 111)  BP: 117/99 (01-04-18 @ 11:00) (117/99 - 166/84)  RR: 16 (01-04-18 @ 06:04) (16 - 19)  SpO2: 99% (01-04-18 @ 06:04) (97% - 99%)  Wt(kg): --Vital Signs Last 24 Hrs  T(C): 36.8 (04 Jan 2018 06:04), Max: 36.9 (04 Jan 2018 00:11)  T(F): 98.3 (04 Jan 2018 06:04), Max: 98.5 (04 Jan 2018 00:11)  HR: 80 (04 Jan 2018 11:00) (78 - 111)  BP: 117/99 (04 Jan 2018 11:00) (117/99 - 166/84)  BP(mean): --  RR: 16 (04 Jan 2018 06:04) (16 - 19)  SpO2: 99% (04 Jan 2018 06:04) (97% - 99%)    PHYSICAL EXAM:  GENERAL: NAD, well-groomed, well-developed  HEAD:  Atraumatic, Normocephalic  EYES: EOMI, PERRLA, conjunctiva and sclera clear  ENMT: No tonsillar erythema, exudates, or enlargement; Moist mucous membranes, Good dentition, No lesions  NECK: Supple, No JVD, Normal thyroid  NERVOUS SYSTEM:  Alert & Oriented X3, Good concentration; Motor Strength 5/5 B/L upper and lower extremities; DTRs 2+ intact and symmetric  CHEST/LUNG: Clear to percussion bilaterally; No rales, rhonchi, wheezing, or rubs  HEART: Regular rate and rhythm; No murmurs, rubs, or gallops  ABDOMEN: Soft, Nontender, Nondistended; Bowel sounds present  EXTREMITIES:  2+ Peripheral Pulses, No clubbing, cyanosis, or edema  LYMPH: No lymphadenopathy noted  SKIN: No rashes or lesions    Consultant(s) Notes Reviewed:  [x ] YES  [ ] NO    LABS:  K3, Cr 5.54 (ESRD on HD), corrected calcium = 7.4. Mag 1.5.                         12.0   5.4   )-----------( 190      ( 03 Jan 2018 20:42 )             37.5     01-04    140  |  107  |  23  ----------------------------<  202<H>  3.0<L>   |  19<L>  |  5.54<H>    Ca    6.9<L>      04 Jan 2018 07:57  Phos  3.7     01-04  Mg     1.5     01-04    TPro  6.8  /  Alb  3.4  /  TBili  0.2  /  DBili  x   /  AST  13  /  ALT  10  /  AlkPhos  101  01-03    PT/INR - ( 03 Jan 2018 20:42 )   PT: 10.6 sec;   INR: 0.98 ratio      CAPILLARY BLOOD GLUCOSE    POCT Blood Glucose.: 146 mg/dL (04 Jan 2018 11:34)  POCT Blood Glucose.: 207 mg/dL (04 Jan 2018 07:10)  POCT Blood Glucose.: 258 mg/dL (04 Jan 2018 04:47)  POCT Blood Glucose.: 287 mg/dL (04 Jan 2018 01:25)  POCT Blood Glucose.: 353 mg/dL (04 Jan 2018 00:27)  POCT Blood Glucose.: 336 mg/dL (03 Jan 2018 23:58)  POCT Blood Glucose.: 481 mg/dL (03 Jan 2018 19:43)    RADIOLOGY & ADDITIONAL TESTS:    Imaging Personally Reviewed: yes chief concern: chest pain, dizziness.     Overnight w/ out acute events  This AM is resting, watching television w/ family.     REVIEW OF SYSTEMS:  CONSTITUTIONAL: No fever, weight loss, or fatigue  EYES: No eye pain, visual disturbances, or discharge  ENMT:  No difficulty hearing, tinnitus, vertigo; No sinus or throat pain  NECK: No pain or stiffness  BREASTS: No pain, masses, or nipple discharge  RESPIRATORY: No cough, wheezing, chills or hemoptysis; No shortness of breath  CARDIOVASCULAR: No chest pain, palpitations, dizziness, or leg swelling  GASTROINTESTINAL: No abdominal or epigastric pain. No nausea, vomiting, or hematemesis; No diarrhea or constipation. No melena or hematochezia.  GENITOURINARY: No dysuria, frequency, hematuria, or incontinence  NEUROLOGICAL: No headaches, memory loss, loss of strength, numbness, or tremors  SKIN: No itching, burning, rashes, or lesions   LYMPH NODES: No enlarged glands  ENDOCRINE: No heat or cold intolerance; No hair loss  MUSCULOSKELETAL: No joint pain or swelling; No muscle, back, or extremity pain  PSYCHIATRIC: No depression, anxiety, mood swings, or difficulty sleeping  HEME/LYMPH: No easy bruising, or bleeding gums  ALLERY AND IMMUNOLOGIC: No hives or eczema    T(C): 36.8 (01-04-18 @ 06:04), Max: 36.9 (01-04-18 @ 00:11)  HR: 80 (01-04-18 @ 11:00) (78 - 111)  BP: 117/99 (01-04-18 @ 11:00) (117/99 - 166/84)  RR: 16 (01-04-18 @ 06:04) (16 - 19)  SpO2: 99% (01-04-18 @ 06:04) (97% - 99%)  Wt(kg): --Vital Signs Last 24 Hrs  T(C): 36.8 (04 Jan 2018 06:04), Max: 36.9 (04 Jan 2018 00:11)  T(F): 98.3 (04 Jan 2018 06:04), Max: 98.5 (04 Jan 2018 00:11)  HR: 80 (04 Jan 2018 11:00) (78 - 111)  BP: 117/99 (04 Jan 2018 11:00) (117/99 - 166/84)  BP(mean): --  RR: 16 (04 Jan 2018 06:04) (16 - 19)  SpO2: 99% (04 Jan 2018 06:04) (97% - 99%)    PHYSICAL EXAM:  GENERAL: NAD, obese woman.   HEAD:  Atraumatic, Normocephalic  EYES: EOMI, PERRLA, conjunctiva and sclera clear  ENMT: No tonsillar erythema, exudates, or enlargement; Moist mucous membranes, Good dentition, No lesions  NECK: Supple, No JVD, Normal thyroid  NERVOUS SYSTEM:  Alert & Oriented X3, Good concentration; Motor Strength 5/5 B/L upper and lower extremities; DTRs 2+ intact and symmetric  CHEST/LUNG: Clear to percussion bilaterally; No rales, rhonchi, wheezing, or rubs  HEART: Regular rate and rhythm; No murmurs, rubs, or gallops  ABDOMEN: Soft, Nontender, distended; Bowel sounds present  EXTREMITIES:  2+ Peripheral Pulses, No clubbing, cyanosis, trace edema  LYMPH: No lymphadenopathy noted  SKIN: No rashes or lesions    Consultant(s) Notes Reviewed:  [x ] YES  [ ] NO    LABS:  K3, Cr 5.54 (ESRD on HD), corrected calcium = 7.4. Mag 1.5.                         12.0   5.4   )-----------( 190      ( 03 Jan 2018 20:42 )             37.5     01-04    140  |  107  |  23  ----------------------------<  202<H>  3.0<L>   |  19<L>  |  5.54<H>    Ca    6.9<L>      04 Jan 2018 07:57  Phos  3.7     01-04  Mg     1.5     01-04    TPro  6.8  /  Alb  3.4  /  TBili  0.2  /  DBili  x   /  AST  13  /  ALT  10  /  AlkPhos  101  01-03    PT/INR - ( 03 Jan 2018 20:42 )   PT: 10.6 sec;   INR: 0.98 ratio      CAPILLARY BLOOD GLUCOSE    POCT Blood Glucose.: 146 mg/dL (04 Jan 2018 11:34)  POCT Blood Glucose.: 207 mg/dL (04 Jan 2018 07:10)  POCT Blood Glucose.: 258 mg/dL (04 Jan 2018 04:47)  POCT Blood Glucose.: 287 mg/dL (04 Jan 2018 01:25)  POCT Blood Glucose.: 353 mg/dL (04 Jan 2018 00:27)  POCT Blood Glucose.: 336 mg/dL (03 Jan 2018 23:58)  POCT Blood Glucose.: 481 mg/dL (03 Jan 2018 19:43)    RADIOLOGY & ADDITIONAL TESTS:    Imaging Personally Reviewed: yes

## 2018-01-04 NOTE — H&P ADULT - ATTENDING COMMENTS
I have reviewed the labs, imaging and ekg. I agree with above unless otherwise indicated in my independent assessment below  52F w/ HTN, T2DM, CVA (on DAPT no residual deficits), NICM, and ESRD on HD MWF p/w chest pain, dizziness, nausea, and vomiting found to be severely hyperglycemic. Appreciate cardiology recommendations. Trend trop x2, if negative, outpt follow up. Patient will also need nephrology consult regarding possible HD given recent missed sessions. Resume lasix in AM if pt appears more euvolemic.

## 2018-01-05 ENCOUNTER — TRANSCRIPTION ENCOUNTER (OUTPATIENT)
Age: 53
End: 2018-01-05

## 2018-01-05 VITALS
RESPIRATION RATE: 17 BRPM | OXYGEN SATURATION: 98 % | HEART RATE: 65 BPM | SYSTOLIC BLOOD PRESSURE: 135 MMHG | DIASTOLIC BLOOD PRESSURE: 68 MMHG

## 2018-01-05 DIAGNOSIS — E78.5 HYPERLIPIDEMIA, UNSPECIFIED: ICD-10-CM

## 2018-01-05 LAB
ANION GAP SERPL CALC-SCNC: 12 MMOL/L — SIGNIFICANT CHANGE UP (ref 5–17)
BUN SERPL-MCNC: 16 MG/DL — SIGNIFICANT CHANGE UP (ref 7–23)
CALCIUM SERPL-MCNC: 8.6 MG/DL — SIGNIFICANT CHANGE UP (ref 8.4–10.5)
CHLORIDE SERPL-SCNC: 99 MMOL/L — SIGNIFICANT CHANGE UP (ref 96–108)
CO2 SERPL-SCNC: 26 MMOL/L — SIGNIFICANT CHANGE UP (ref 22–31)
CREAT SERPL-MCNC: 4.97 MG/DL — HIGH (ref 0.5–1.3)
DIGOXIN SERPL-MCNC: <0.4 NG/ML — LOW (ref 0.8–2)
GLUCOSE SERPL-MCNC: 140 MG/DL — HIGH (ref 70–99)
HCT VFR BLD CALC: 33.9 % — LOW (ref 34.5–45)
HGB BLD-MCNC: 11.1 G/DL — LOW (ref 11.5–15.5)
MCHC RBC-ENTMCNC: 29.9 PG — SIGNIFICANT CHANGE UP (ref 27–34)
MCHC RBC-ENTMCNC: 32.7 GM/DL — SIGNIFICANT CHANGE UP (ref 32–36)
MCV RBC AUTO: 91.4 FL — SIGNIFICANT CHANGE UP (ref 80–100)
PHOSPHATE SERPL-MCNC: 3.7 MG/DL — SIGNIFICANT CHANGE UP (ref 2.5–4.5)
PLATELET # BLD AUTO: 214 K/UL — SIGNIFICANT CHANGE UP (ref 150–400)
POTASSIUM SERPL-MCNC: 3.6 MMOL/L — SIGNIFICANT CHANGE UP (ref 3.5–5.3)
POTASSIUM SERPL-SCNC: 3.6 MMOL/L — SIGNIFICANT CHANGE UP (ref 3.5–5.3)
RBC # BLD: 3.7 M/UL — LOW (ref 3.8–5.2)
RBC # FLD: 14.2 % — SIGNIFICANT CHANGE UP (ref 10.3–14.5)
SODIUM SERPL-SCNC: 137 MMOL/L — SIGNIFICANT CHANGE UP (ref 135–145)
WBC # BLD: 5 K/UL — SIGNIFICANT CHANGE UP (ref 3.8–10.5)
WBC # FLD AUTO: 5 K/UL — SIGNIFICANT CHANGE UP (ref 3.8–10.5)

## 2018-01-05 PROCEDURE — 99239 HOSP IP/OBS DSCHRG MGMT >30: CPT

## 2018-01-05 PROCEDURE — 99223 1ST HOSP IP/OBS HIGH 75: CPT

## 2018-01-05 PROCEDURE — 93306 TTE W/DOPPLER COMPLETE: CPT | Mod: 26

## 2018-01-05 RX ORDER — INSULIN GLARGINE 100 [IU]/ML
28 INJECTION, SOLUTION SUBCUTANEOUS
Qty: 0 | Refills: 0 | COMMUNITY

## 2018-01-05 RX ORDER — ATORVASTATIN CALCIUM 80 MG/1
1 TABLET, FILM COATED ORAL
Qty: 30 | Refills: 0 | OUTPATIENT
Start: 2018-01-05 | End: 2018-02-03

## 2018-01-05 RX ORDER — CALCIUM ACETATE 667 MG
1 TABLET ORAL
Qty: 60 | Refills: 0 | OUTPATIENT
Start: 2018-01-05 | End: 2018-02-03

## 2018-01-05 RX ORDER — INSULIN GLARGINE 100 [IU]/ML
30 INJECTION, SOLUTION SUBCUTANEOUS AT BEDTIME
Qty: 0 | Refills: 0 | Status: DISCONTINUED | OUTPATIENT
Start: 2018-01-05 | End: 2018-01-05

## 2018-01-05 RX ORDER — LISINOPRIL 2.5 MG/1
5 TABLET ORAL DAILY
Qty: 0 | Refills: 0 | Status: DISCONTINUED | OUTPATIENT
Start: 2018-01-05 | End: 2018-01-05

## 2018-01-05 RX ORDER — INSULIN GLARGINE 100 [IU]/ML
30 INJECTION, SOLUTION SUBCUTANEOUS
Qty: 0 | Refills: 0 | COMMUNITY

## 2018-01-05 RX ORDER — REPAGLINIDE 1 MG/1
1 TABLET ORAL
Qty: 90 | Refills: 0 | OUTPATIENT
Start: 2018-01-05 | End: 2018-02-03

## 2018-01-05 RX ORDER — INSULIN LISPRO 100/ML
4 VIAL (ML) SUBCUTANEOUS
Qty: 0 | Refills: 0 | Status: DISCONTINUED | OUTPATIENT
Start: 2018-01-05 | End: 2018-01-05

## 2018-01-05 RX ORDER — CALCIUM ACETATE 667 MG
667 TABLET ORAL
Qty: 0 | Refills: 0 | Status: DISCONTINUED | OUTPATIENT
Start: 2018-01-05 | End: 2018-01-05

## 2018-01-05 RX ORDER — INSULIN GLARGINE 100 [IU]/ML
28 INJECTION, SOLUTION SUBCUTANEOUS AT BEDTIME
Qty: 0 | Refills: 0 | Status: DISCONTINUED | OUTPATIENT
Start: 2018-01-05 | End: 2018-01-05

## 2018-01-05 RX ORDER — LISINOPRIL 2.5 MG/1
1 TABLET ORAL
Qty: 30 | Refills: 0 | OUTPATIENT
Start: 2018-01-05 | End: 2018-02-03

## 2018-01-05 RX ADMIN — Medication 81 MILLIGRAM(S): at 11:05

## 2018-01-05 RX ADMIN — SERTRALINE 200 MILLIGRAM(S): 25 TABLET, FILM COATED ORAL at 11:05

## 2018-01-05 RX ADMIN — PANTOPRAZOLE SODIUM 40 MILLIGRAM(S): 20 TABLET, DELAYED RELEASE ORAL at 05:11

## 2018-01-05 RX ADMIN — Medication 10 MILLIGRAM(S): at 05:11

## 2018-01-05 RX ADMIN — CLOPIDOGREL BISULFATE 75 MILLIGRAM(S): 75 TABLET, FILM COATED ORAL at 11:05

## 2018-01-05 RX ADMIN — CARVEDILOL PHOSPHATE 12.5 MILLIGRAM(S): 80 CAPSULE, EXTENDED RELEASE ORAL at 16:47

## 2018-01-05 RX ADMIN — QUETIAPINE FUMARATE 50 MILLIGRAM(S): 200 TABLET, FILM COATED ORAL at 11:05

## 2018-01-05 RX ADMIN — AMLODIPINE BESYLATE 10 MILLIGRAM(S): 2.5 TABLET ORAL at 05:11

## 2018-01-05 RX ADMIN — Medication 667 MILLIGRAM(S): at 16:47

## 2018-01-05 RX ADMIN — Medication 400 UNIT(S): at 11:05

## 2018-01-05 RX ADMIN — CARVEDILOL PHOSPHATE 12.5 MILLIGRAM(S): 80 CAPSULE, EXTENDED RELEASE ORAL at 05:11

## 2018-01-05 RX ADMIN — Medication 6 UNIT(S): at 08:17

## 2018-01-05 RX ADMIN — Medication 30 MILLILITER(S): at 05:10

## 2018-01-05 RX ADMIN — Medication 10 MILLIGRAM(S): at 16:47

## 2018-01-05 RX ADMIN — HEPARIN SODIUM 5000 UNIT(S): 5000 INJECTION INTRAVENOUS; SUBCUTANEOUS at 05:11

## 2018-01-05 NOTE — DISCHARGE NOTE ADULT - SECONDARY DIAGNOSIS.
ESRD on dialysis Type 2 diabetes mellitus without complication, with long-term current use of insulin Hypertension, unspecified type

## 2018-01-05 NOTE — DISCHARGE NOTE ADULT - CARE PROVIDER_API CALL
Wale Francisco), Internal Medicine; Nephrology  [address] [address]  Phone: [phone]  Fax: [phone] Wale Francisco), Internal Medicine; Nephrology  [address] [facility] [address]  [place] [address]  Phone: [phone]  Fax: [phone]    Anderson angela  [address] [place] [address]  [phone]  Phone: [phone]  Fax: (   )    - Wale Francisco), Internal Medicine; Nephrology  [address] [facility] [address]  [place] [address]  Phone: [phone]  Fax: [phone]    Anderson angela  [address] [place] [address]  [phone]  Phone: [phone]  Fax: (   )    - Wale Francisco), Internal Medicine; Nephrology  [address] [facility] [address]  [place] [address]  Phone: [phone]  Fax: [phone]    Anderson angela  [address] [place] [address]  [phone]  Phone: [phone]  Fax: (   )    -    Uri Mobley), Cardiology  [address] [place] [address]  Phone: [phone]  Fax: [phone]

## 2018-01-05 NOTE — PROGRESS NOTE ADULT - PROBLEM SELECTOR PLAN 2
-Unlikely to be anginal. Stress test in October negative and EKG findings are chronic. Multiple sets of enzymes neg, echo w/ out focal deficits.   - Cardiology input appreciated, no barrier to d/c.   - dig level low.

## 2018-01-05 NOTE — DISCHARGE NOTE ADULT - PATIENT PORTAL LINK FT
“You can access the Follow[employer] Patient Portal, offered by [facility], by registering with the following website: [email or web address]”

## 2018-01-05 NOTE — DISCHARGE NOTE ADULT - MEDICATION SUMMARY - MEDICATIONS TO TAKE
I will START or STAY ON the medications listed below when I get home from the hospital:    aspirin 81 mg oral delayed release tablet  -- 1 tab(s) by mouth once a day  -- Indication: For Heart     lisinopril 5 mg oral tablet  -- 1 tab(s) by mouth once a day MDD:1  -- Indication: For Heart failure with reduced ejection fraction, NYHA class II    sertraline 100 mg oral tablet  -- 2 tab(s) by mouth once a day  -- Indication: For Depression    Basaglar KwikPen 100 units/mL subcutaneous solution  -- 30 unit(s) subcutaneous once a day (at bedtime)  -- Indication: For Diabetes    atorvastatin 40 mg oral tablet  -- 1 tab(s) by mouth once a day (at bedtime) MDD:1  -- Indication: For Cholesterol    clopidogrel 75 mg oral tablet  -- 1 tab(s) by mouth once a day  -- Indication: For Heart     QUEtiapine 50 mg oral tablet  -- 1 tab(s) by mouth once a day  -- Indication: For Depression    Coreg 12.5 mg oral tablet  -- 1 tab(s) by mouth 2 times a day  -- Indication: For Heart failure with reduced ejection fraction, NYHA class II    amLODIPine 10 mg oral tablet  -- 1 tab(s) by mouth once a day  -- Indication: For Blood Pressure    Lasix 20 mg oral tablet  -- 1 tab(s) by mouth once a day  -- Indication: For Heart failure with reduced ejection fraction, NYHA class II    calcium acetate 667 mg oral tablet  -- 1  by mouth 2 times a day MDD:2   -- Indication: For Renal    pantoprazole 40 mg oral delayed release tablet  -- 1 tab(s) by mouth once a day (before a meal)  -- Indication: For Reflux Disease    tolterodine 4 mg oral capsule, extended release  -- 1 cap(s) by mouth once a day  -- Indication: For Overactive Bladder    Vitamin D3 400 intl units oral capsule  -- 1 cap(s) by mouth once a day  -- Indication: For Supplement I will START or STAY ON the medications listed below when I get home from the hospital:    aspirin 81 mg oral delayed release tablet  -- 1 tab(s) by mouth once a day  -- Indication: For Heart     lisinopril 5 mg oral tablet  -- 1 tab(s) by mouth once a day MDD:1  -- Indication: For Heart failure with reduced ejection fraction, NYHA class II    sertraline 100 mg oral tablet  -- 2 tab(s) by mouth once a day  -- Indication: For Depression    Basaglar KwikPen 100 units/mL subcutaneous solution  -- 28 unit(s) subcutaneous once a day (at bedtime)  -- Indication: For Diabetes    Prandin 1 mg oral tablet  -- 1 tab(s) by mouth 3 times a day (before meals) MDD:3  -- Do not drink alcoholic beverages when taking this medication.  It is very important that you take or use this exactly as directed.  Do not skip doses or discontinue unless directed by your doctor.  Obtain medical advice before taking any non-prescription drugs as some may affect the action of this medication.    -- Indication: For Diabetes    atorvastatin 40 mg oral tablet  -- 1 tab(s) by mouth once a day (at bedtime) MDD:1  -- Indication: For Cholesterol    clopidogrel 75 mg oral tablet  -- 1 tab(s) by mouth once a day  -- Indication: For Heart     QUEtiapine 50 mg oral tablet  -- 1 tab(s) by mouth once a day  -- Indication: For Depression    Coreg 12.5 mg oral tablet  -- 1 tab(s) by mouth 2 times a day  -- Indication: For Heart failure with reduced ejection fraction, NYHA class II    amLODIPine 10 mg oral tablet  -- 1 tab(s) by mouth once a day  -- Indication: For Blood Pressure    Lasix 20 mg oral tablet  -- 1 tab(s) by mouth once a day  -- Indication: For Heart failure with reduced ejection fraction, NYHA class II    calcium acetate 667 mg oral tablet  -- 1  by mouth 2 times a day MDD:2   -- Indication: For Renal    pantoprazole 40 mg oral delayed release tablet  -- 1 tab(s) by mouth once a day (before a meal)  -- Indication: For Reflux Disease    tolterodine 4 mg oral capsule, extended release  -- 1 cap(s) by mouth once a day  -- Indication: For Overactive Bladder    Vitamin D3 400 intl units oral capsule  -- 1 cap(s) by mouth once a day  -- Indication: For Supplement

## 2018-01-05 NOTE — CONSULT NOTE ADULT - PROBLEM SELECTOR RECOMMENDATION 9
Diabetes Education and Nutrition Eval  Decrease Lantus from 33 to 28 units while in patient  Continue 6 units of Humalog with meals.   Low correction scale qac + bedtime  Goal glucose 100-180  Outpt. endo follow-up  Outpt. optho, podiatry, nephrology  Plan to d/c on basal insulin Basaglar 30 units qhs + Prandin 2mg with meals which can be titrated as outpatient up to 4 mg with meals if needed based on glucose values.

## 2018-01-05 NOTE — DISCHARGE NOTE ADULT - PLAN OF CARE
You will be free of chest pain or shortness of breath. Low salt, low fat diet.   Weight management.   Take medications as prescribed.    No smoking.  Follow up appointments with your doctor(s)  as instruced. Your hemoglobin A1C will be between 7-8 Continue to follow with your primary care MD or your endocrinologist.  Follow a heart healthy diabetic diet. If you check your fingerstick glucose at home, call your MD if it is greater than 250mg/dL on 2 occasions or less than 100mg/dL on 2 occasions. Know signs of low blood sugar, such as: dizziness, shakiness, sweating, confusion, hunger, nervousness-drink 4 ounces apple juice if occurs and call your doctor. Know early signs of high blood sugar, such as: frequent urination, increased thirst, blurry vision, fatigue, headache - call your doctor if this occurs. Follow with other practitioners to care for your diabetes, such as ophthamologist and podiatrist. Your blood pressure will be controlled. Continue with your blood pressure medications; eat a heart healthy diet with low salt diet; exercise regularly (consult with your physician or cardiologist first); maintain a heart healthy weight; if you smoke - quit (A resource to help you stop smoking is the [facility] – phone number [phone].); include healthy ways to manage stress. Continue to follow with your primary care physician or cardiologist. Excess fluids and waste products will be removed from your blood; your electrolytes will be balanced; your blood pressure will be controlled. Continue with your dialysis treatments. Follow with your nephrologist (kidney physician). Continue your medications as prescribed.

## 2018-01-05 NOTE — PROGRESS NOTE ADULT - SUBJECTIVE AND OBJECTIVE BOX
chief concern: chest pain, dizziness.     Overnight w/ out acute events  This AM is resting comfortably.     REVIEW OF SYSTEMS:  CONSTITUTIONAL: No fever, weight loss, or fatigue  EYES: No eye pain, visual disturbances, or discharge  ENMT: No difficulty hearing, tinnitus, vertigo; No sinus or throat pain  NECK: No pain or stiffness  RESPIRATORY: No cough, wheezing, chills or hemoptysis; No shortness of breath  CARDIOVASCULAR: No chest pain, palpitations, dizziness, or leg swelling  GASTROINTESTINAL: No abdominal or epigastric pain. No nausea, vomiting, or hematemesis; No diarrhea or constipation. No melena or hematochezia.  GENITOURINARY: No dysuria, frequency, hematuria, or incontinence  NEUROLOGICAL: No headaches, memory loss, loss of strength, numbness, or tremors  SKIN: No itching, burning, rashes, or lesions   LYMPH NODES: No enlarged glands  ENDOCRINE: No heat or cold intolerance; No hair loss  PSYCHIATRIC: No depression, anxiety, mood swings, or difficulty sleeping  HEME/LYMPH: No easy bruising, or bleeding gums  ALLERY AND IMMUNOLOGIC: No hives or eczema    Vital Signs Last 24 Hrs  T(C): 36.7 (05 Jan 2018 04:35), Max: 37 (04 Jan 2018 14:45)  T(F): 98.1 (05 Jan 2018 04:35), Max: 98.6 (04 Jan 2018 14:45)  HR: 78 (05 Jan 2018 04:35) (63 - 78)  BP: 136/66 (05 Jan 2018 04:35) (130/55 - 154/74)  BP(mean): --  RR: 18 (05 Jan 2018 04:35) (17 - 19)  SpO2: 98% (05 Jan 2018 04:35) (95% - 98%)    PHYSICAL EXAM:  GENERAL: NAD, obese woman.   HEAD:  Atraumatic, Normocephalic  EYES: EOMI, PERRLA, conjunctiva and sclera clear  ENMT: No tonsillar erythema, exudates, or enlargement; Moist mucous membranes, Good dentition, No lesions  NECK: Supple, No JVD, Normal thyroid  NERVOUS SYSTEM:  Alert & Oriented X3, Good concentration; Motor Strength 5/5 B/L upper and lower extremities  CHEST/LUNG: Clear to percussion bilaterally; No rales, rhonchi, wheezing, or rubs  HEART: Regular rate and rhythm; No murmurs  ABDOMEN: Soft, Nontender, distended; Bowel sounds present  EXTREMITIES:  2+ Peripheral Pulses, No clubbing, cyanosis, trace edema  LYMPH: No lymphadenopathy noted  SKIN: No rashes or lesions    Consultant(s) Notes Reviewed:  [x ] YES  [ ] NO    LABS:  1/5: stable anemia, BMP WNL. no leukocytosis.     K3, Cr 5.54 (ESRD on HD), corrected calcium = 7.4. Mag 1.5.                         12.0   5.4   )-----------( 190      ( 03 Jan 2018 20:42 )             37.5     01-04    140  |  107  |  23  ----------------------------<  202<H>  3.0<L>   |  19<L>  |  5.54<H>    Ca    6.9<L>      04 Jan 2018 07:57  Phos  3.7     01-04  Mg     1.5     01-04    TPro  6.8  /  Alb  3.4  /  TBili  0.2  /  DBili  x   /  AST  13  /  ALT  10  /  AlkPhos  101  01-03    PT/INR - ( 03 Jan 2018 20:42 )   PT: 10.6 sec;   INR: 0.98 ratio      CAPILLARY BLOOD GLUCOSE    POCT Blood Glucose.: 146 mg/dL (04 Jan 2018 11:34)  POCT Blood Glucose.: 207 mg/dL (04 Jan 2018 07:10)  POCT Blood Glucose.: 258 mg/dL (04 Jan 2018 04:47)  POCT Blood Glucose.: 287 mg/dL (04 Jan 2018 01:25)  POCT Blood Glucose.: 353 mg/dL (04 Jan 2018 00:27)  POCT Blood Glucose.: 336 mg/dL (03 Jan 2018 23:58)  POCT Blood Glucose.: 481 mg/dL (03 Jan 2018 19:43)    RADIOLOGY & ADDITIONAL TESTS:    Imaging Personally Reviewed: yes  Mitral Valve: Tethered mitral valve leaflets with normal  opening. Mild mitral regurgitation.  Aortic Valve/Aorta: Normal trileaflet aortic valve. Minimal  aortic regurgitation.  Normal aortic root size. (Ao: 3.2 cm at the sinuses of  Valsalva).  Left Atrium: Normal left atrium.  Left Ventricle: Mild global left ventricular systolic  dysfunction. Eccentric left ventricular hypertrophy  (dilated left ventricle with normal relative wall  thickness). Mild diastolic dysfunction (Stage I).  Right Heart: Normal right atrium. The right ventricle is  not well visualized; grossly normal right ventricular  systolic function. Normal tricuspid valve. Minimal  tricuspid regurgitation. Pulmonic valve not well  visualized, probably normal.  Pericardium/Pleura: Trivial pericardial effusion.  Hemodynamic: Estimated right atrial pressure is 8 mm Hg.  Estimated right ventricular systolic pressure equals 37 mm  Hg, assuming right atrial pressure equals 8 mm Hg,  consistent with borderline pulmonary hypertension.  ------------------------------------------------------------------------  Conclusions:  1. Mild global left ventricular systolic dysfunction.  2. Mild diastolic dysfunction (Stage I).  3. The right ventricle is not well visualized; grossly  normal right ventricular systolic function.  4. Trivial pericardial effusion.

## 2018-01-05 NOTE — PROGRESS NOTE ADULT - PROBLEM SELECTOR PLAN 1
- escalate Lantus 30U to 33U, cont ISS.  - Lantus 30  - ASA/statin -->atorva 40.   - started ACE as above.   - would benefit from sglt2 w/ heart failure as well, though being mindful of polypharm w/ nonadherent patient.   - d/c gabapentin, patient reports it does not help with anything.

## 2018-01-05 NOTE — PROGRESS NOTE ADULT - ASSESSMENT
Ms. Herr is a 53 yo woman w/ social instability, DM2 (a1c 10) , CVA (on DAPT no residual deficits), NICM, and ESRD on HD MWF p/w chest pain, dizziness, vomiting found hyperglycemic to >500 due to medication non-adherence; patient r/o for MI, echo w/ mild overall LV dysfunction w/ out focal deficit, recent stress test w/ out ischemia. While she was in house she had dialysis, started on phos binding agents w/ low Ca. Started on ACE as well after discussion w/ nephrologist, Dr. Francisco, given the mortality benefits in patients w/ ESRD on HD w/ risk reduction of mortality 52% independent of BP effects (Michellerati et. al 2002). Adherence was discussed at length with patient, she did not have insulin for brief period of time around the holidays because the office was closed: educated on the importance of regular adherence, she has her meds at home and reports no barriers moving forward. a1c is 10, needs insulin titration as an outpatient. D/C gabapentin w/ polypharmacy, simva 40 to atorva 40. Needs to further come down on medication burden as outpatient. Discussed case w/ cardiology and nephrology; will be given cardiology # to follow up with as well.

## 2018-01-05 NOTE — DISCHARGE NOTE ADULT - CARE PLAN
Principal Discharge DX:	Chest pain, unspecified type  Goal:	You will be free of chest pain or shortness of breath.  Instructions for follow-up, activity and diet:	Low salt, low fat diet.   Weight management.   Take medications as prescribed.    No smoking.  Follow up appointments with your doctor(s)  as instruced.  Secondary Diagnosis:	ESRD on dialysis  Secondary Diagnosis:	Type 2 diabetes mellitus without complication, with long-term current use of insulin  Goal:	Your hemoglobin A1C will be between 7-8  Instructions for follow-up, activity and diet:	Continue to follow with your primary care MD or your endocrinologist.  Follow a heart healthy diabetic diet. If you check your fingerstick glucose at home, call your MD if it is greater than 250mg/dL on 2 occasions or less than 100mg/dL on 2 occasions. Know signs of low blood sugar, such as: dizziness, shakiness, sweating, confusion, hunger, nervousness-drink 4 ounces apple juice if occurs and call your doctor. Know early signs of high blood sugar, such as: frequent urination, increased thirst, blurry vision, fatigue, headache - call your doctor if this occurs. Follow with other practitioners to care for your diabetes, such as ophthamologist and podiatrist.  Secondary Diagnosis:	Hypertension, unspecified type  Goal:	Your blood pressure will be controlled.  Instructions for follow-up, activity and diet:	Continue with your blood pressure medications; eat a heart healthy diet with low salt diet; exercise regularly (consult with your physician or cardiologist first); maintain a heart healthy weight; if you smoke - quit (A resource to help you stop smoking is the [facility] – phone number [phone].); include healthy ways to manage stress. Continue to follow with your primary care physician or cardiologist. Principal Discharge DX:	Chest pain, unspecified type  Goal:	You will be free of chest pain or shortness of breath.  Instructions for follow-up, activity and diet:	Low salt, low fat diet.   Weight management.   Take medications as prescribed.    No smoking.  Follow up appointments with your doctor(s)  as instruced.  Secondary Diagnosis:	ESRD on dialysis  Goal:	Excess fluids and waste products will be removed from your blood; your electrolytes will be balanced; your blood pressure will be controlled.  Instructions for follow-up, activity and diet:	Continue with your dialysis treatments. Follow with your nephrologist (kidney physician). Continue your medications as prescribed.  Secondary Diagnosis:	Type 2 diabetes mellitus without complication, with long-term current use of insulin  Goal:	Your hemoglobin A1C will be between 7-8  Instructions for follow-up, activity and diet:	Continue to follow with your primary care MD or your endocrinologist.  Follow a heart healthy diabetic diet. If you check your fingerstick glucose at home, call your MD if it is greater than 250mg/dL on 2 occasions or less than 100mg/dL on 2 occasions. Know signs of low blood sugar, such as: dizziness, shakiness, sweating, confusion, hunger, nervousness-drink 4 ounces apple juice if occurs and call your doctor. Know early signs of high blood sugar, such as: frequent urination, increased thirst, blurry vision, fatigue, headache - call your doctor if this occurs. Follow with other practitioners to care for your diabetes, such as ophthamologist and podiatrist.  Secondary Diagnosis:	Hypertension, unspecified type  Goal:	Your blood pressure will be controlled.  Instructions for follow-up, activity and diet:	Continue with your blood pressure medications; eat a heart healthy diet with low salt diet; exercise regularly (consult with your physician or cardiologist first); maintain a heart healthy weight; if you smoke - quit (A resource to help you stop smoking is the [facility] – phone number [phone].); include healthy ways to manage stress. Continue to follow with your primary care physician or cardiologist.

## 2018-01-05 NOTE — DISCHARGE NOTE ADULT - PROVIDER TOKENS
TOKEN:'[id number]' TOKEN:'[id number]',FREE:[LAST:[angela],FIRST:[Anderson],PHONE:[phone]],FAX:[(   )    -],ADDRESS:[[address] [place] [address]  [phone]]] TOKEN:'[id number]',FREE:[LAST:[angela],FIRST:[Anderson],PHONE:[phone]],FAX:[(   )    -],ADDRESS:[[id number] [place] [address]  [phone]]],TOKEN:'[id number]'

## 2018-01-05 NOTE — CONSULT NOTE ADULT - ASSESSMENT
53 y/o F w/ uncontrolled Type 2 DM w/ A1c 10% with macrovascular complications of CVA, microvascular complications of retinopathy, neuropathy, ESRD on HD admitted with chest pain and dizziness also with HTN, HLD (high risk patient with high level decision-making)
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
## 2018-01-05 NOTE — DISCHARGE NOTE ADULT - MEDICATION SUMMARY - MEDICATIONS TO STOP TAKING
I will STOP taking the medications listed below when I get home from the hospital:    gabapentin 300 mg oral capsule  -- 1 cap(s) by mouth once a day (at bedtime)    simvastatin 40 mg oral tablet  -- 1 tab(s) by mouth once a day (at bedtime)

## 2018-01-05 NOTE — PROGRESS NOTE ADULT - SUBJECTIVE AND OBJECTIVE BOX
No pain, no shortness of breath      VITAL:  T(C): , Max: 37 (01-04-18 @ 14:45)  T(F): , Max: 98.6 (01-04-18 @ 14:45)  HR: 78 (01-05-18 @ 04:35)  BP: 136/66 (01-05-18 @ 04:35)  BP(mean): --  RR: 18 (01-05-18 @ 04:35)  SpO2: 98% (01-05-18 @ 04:35)      PHYSICAL EXAM:  Constitutional: NAD, Alert, obese  HEENT: NCAT, DMM  Neck: Supple, No JVD  Respiratory: CTA-b/l  Cardiovascular: RRR s1s2, no m/r/g  Gastrointestinal: BS+, soft, NT/ND  Extremities: No peripheral edema b/l  Neurological: no focal deficits; strength grossly intact  Psychiatric: Normal mood, normal affect  Back: no CVAT b/l  Skin: No rashes, no nevi  Access: RIJ permacath; LUE AVF (+)bruit      LABS:                        11.1   5.0   )-----------( 214      ( 05 Jan 2018 05:47 )             33.9     Na(137)/K(3.6)/Cl(99)/HCO3(26)/BUN(16)/Cr(4.97)Glu(140)/Ca(8.6)/Mg(--)/PO4(3.7)    01-05 @ 05:47  Na(140)/K(3.0)/Cl(107)/HCO3(19)/BUN(23)/Cr(5.54)Glu(202)/Ca(6.9)/Mg(1.5)/PO4(--)    01-04 @ 07:57  Na(144)/K(2.8)/Cl(114)/HCO3(19)/BUN(20)/Cr(4.79)Glu(201)/Ca(6.2)/Mg(1.3)/PO4(3.7)    01-04 @ 06:04  Na(133)/K(4.3)/Cl(98)/HCO3(25)/BUN(23)/Cr(5.95)Glu(535)/Ca(8.7)/Mg(--)/PO4(--)    01-03 @ 20:42      52 year-old woman, well-known to me, with history of hypertension, type 2 diabetes mellitus, stroke, nonischemic cardiomyopathy, and ESRD-HD She presented overnight to the [facility] ER with intermittent nonradiating pressure-like chest pain for 1 day. She admits to palpitations as well; she states that the palpitations were relieved by eating. She stated that she ran out of Lantus for about a week prior to coming to the ER. She usually gets HD MWF at the [facility] HD unit; she underwent HD Sunday and Tuesday this week. She has an immature AV fistula and has been receiving hemodialysis via a tunneled HD catheter    ASSESSMENT:  (1)Renal - ESRD-HD usually MWF; has received HD Tuesday and Thursday this week. We can plan for HD tomorrow, with plan to revert back to MWF schedule next week  (2)Hypokalemia - improved, with high-K+ dialysate bath  (3)Hypocalcemia - intermittent - improved post HD  (4)CP/dizziness - Cardiology and EP input appreciated - it does not appear that the arrythmias were due to electrolyte derangements    RECOMMEND:  (1)Next HD tomorrow - high-Ca bath (3meq/L); K+ in bath to be based on results of labs from a.m.  (2)Add Phoslo 667mg po bid with meals (discussed yesterday with primary attending Dr. Valverde who brought up the idea...I agree with his plan).          Wale Francisco MD  [facility]  [phone] (+)mild chest pain this a.m. -now resolved. No sob, dizziness, or palpitations.      VITAL:  T(C): , Max: 37 (01-04-18 @ 14:45)  T(F): , Max: 98.6 (01-04-18 @ 14:45)  HR: 78 (01-05-18 @ 04:35)  BP: 136/66 (01-05-18 @ 04:35)  BP(mean): --  RR: 18 (01-05-18 @ 04:35)  SpO2: 98% (01-05-18 @ 04:35)      PHYSICAL EXAM:  Constitutional: NAD, Alert, obese  HEENT: NCAT, DMM  Neck: Supple, No JVD  Respiratory: CTA-b/l  Cardiovascular: RRR s1s2, no m/r/g  Gastrointestinal: BS+, soft, NT/ND  Extremities: No peripheral edema b/l  Neurological: no focal deficits; strength grossly intact  Psychiatric: Normal mood, normal affect  Back: no CVAT b/l  Skin: No rashes, no nevi  Access: RIJ permacath; LUE AVF (+)bruit      LABS:                        11.1   5.0   )-----------( 214      ( 05 Jan 2018 05:47 )             33.9     Na(137)/K(3.6)/Cl(99)/HCO3(26)/BUN(16)/Cr(4.97)Glu(140)/Ca(8.6)/Mg(--)/PO4(3.7)    01-05 @ 05:47  Na(140)/K(3.0)/Cl(107)/HCO3(19)/BUN(23)/Cr(5.54)Glu(202)/Ca(6.9)/Mg(1.5)/PO4(--)    01-04 @ 07:57  Na(144)/K(2.8)/Cl(114)/HCO3(19)/BUN(20)/Cr(4.79)Glu(201)/Ca(6.2)/Mg(1.3)/PO4(3.7)    01-04 @ 06:04  Na(133)/K(4.3)/Cl(98)/HCO3(25)/BUN(23)/Cr(5.95)Glu(535)/Ca(8.7)/Mg(--)/PO4(--)    01-03 @ 20:42          ASSESSMENT:52F w/ HTN, DM2, CVA, NICM, and ESRD-HD, 1/3/18 a/w CP  (1)Renal - ESRD-HD usually MWF; has received HD Tuesday and Thursday this week. Should dialyze today for 2 hours to get her back on schedule, given that it appears she will be leaving today.  (2)Hypokalemia - improved, with high-K+ dialysate bath  (3)Hypocalcemia - intermittent - improved post HD  (4)CP/dizziness - Cardiology and EP input appreciated - it does not appear that the arrythmias were due to electrolyte derangements    RECOMMEND:  (1)HD x 2 hours today to allow her to return to MWF schedule - high-Ca bath  (2)Add Phoslo 667mg po bid with meals (discussed yesterday with primary attending Dr. Valverde who brought up the idea...I agree with his plan).  (3)No objection to discharge post HD today        Wale Francisco MD  [facility]  [phone]

## 2018-01-05 NOTE — PROGRESS NOTE ADULT - ATTENDING COMMENTS
patient requesting cane prior to d/c, had one at home. Discussed w/ NP who will reach out to care coordination.
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
## 2018-02-27 ENCOUNTER — EMERGENCY (EMERGENCY)
Facility: HOSPITAL | Age: 53
LOS: 1 days | Discharge: HOME | End: 2018-02-27
Attending: EMERGENCY MEDICINE | Admitting: INTERNAL MEDICINE
Payer: MEDICAID

## 2018-02-27 VITALS
DIASTOLIC BLOOD PRESSURE: 78 MMHG | HEART RATE: 96 BPM | SYSTOLIC BLOOD PRESSURE: 150 MMHG | OXYGEN SATURATION: 96 % | RESPIRATION RATE: 16 BRPM | TEMPERATURE: 99 F

## 2018-02-27 DIAGNOSIS — R42 DIZZINESS AND GIDDINESS: ICD-10-CM

## 2018-02-27 LAB
ALBUMIN SERPL ELPH-MCNC: 3.5 G/DL — SIGNIFICANT CHANGE UP (ref 3.3–5)
ALP SERPL-CCNC: 117 U/L — SIGNIFICANT CHANGE UP (ref 40–120)
ALT FLD-CCNC: 14 U/L — SIGNIFICANT CHANGE UP (ref 4–33)
AST SERPL-CCNC: 22 U/L — SIGNIFICANT CHANGE UP (ref 4–32)
BASE EXCESS BLDV CALC-SCNC: 0.7 MMOL/L — SIGNIFICANT CHANGE UP
BASOPHILS # BLD AUTO: 0.04 K/UL — SIGNIFICANT CHANGE UP (ref 0–0.2)
BASOPHILS NFR BLD AUTO: 0.7 % — SIGNIFICANT CHANGE UP (ref 0–2)
BILIRUB SERPL-MCNC: < 0.2 MG/DL — LOW (ref 0.2–1.2)
BLOOD GAS VENOUS - CREATININE: 9.62 MG/DL — HIGH (ref 0.5–1.3)
BUN SERPL-MCNC: 38 MG/DL — HIGH (ref 7–23)
CALCIUM SERPL-MCNC: 9.1 MG/DL — SIGNIFICANT CHANGE UP (ref 8.4–10.5)
CHLORIDE BLDV-SCNC: 101 MMOL/L — SIGNIFICANT CHANGE UP (ref 96–108)
CHLORIDE SERPL-SCNC: 98 MMOL/L — SIGNIFICANT CHANGE UP (ref 98–107)
CO2 SERPL-SCNC: 24 MMOL/L — SIGNIFICANT CHANGE UP (ref 22–31)
CREAT SERPL-MCNC: 9.66 MG/DL — HIGH (ref 0.5–1.3)
EOSINOPHIL # BLD AUTO: 0.17 K/UL — SIGNIFICANT CHANGE UP (ref 0–0.5)
EOSINOPHIL NFR BLD AUTO: 3 % — SIGNIFICANT CHANGE UP (ref 0–6)
GAS PNL BLDV: 135 MMOL/L — LOW (ref 136–146)
GLUCOSE BLDV-MCNC: 322 — HIGH (ref 70–99)
GLUCOSE SERPL-MCNC: 311 MG/DL — HIGH (ref 70–99)
HCO3 BLDV-SCNC: 24 MMOL/L — SIGNIFICANT CHANGE UP (ref 20–27)
HCT VFR BLD CALC: 37.5 % — SIGNIFICANT CHANGE UP (ref 34.5–45)
HCT VFR BLDV CALC: SIGNIFICANT CHANGE UP % (ref 34.5–45)
HGB BLD-MCNC: 11.9 G/DL — SIGNIFICANT CHANGE UP (ref 11.5–15.5)
HGB BLDV-MCNC: SIGNIFICANT CHANGE UP G/DL (ref 11.5–15.5)
IMM GRANULOCYTES # BLD AUTO: 0.01 # — SIGNIFICANT CHANGE UP
IMM GRANULOCYTES NFR BLD AUTO: 0.2 % — SIGNIFICANT CHANGE UP (ref 0–1.5)
LACTATE BLDV-MCNC: 1.5 MMOL/L — SIGNIFICANT CHANGE UP (ref 0.5–2)
LYMPHOCYTES # BLD AUTO: 1.57 K/UL — SIGNIFICANT CHANGE UP (ref 1–3.3)
LYMPHOCYTES # BLD AUTO: 27.3 % — SIGNIFICANT CHANGE UP (ref 13–44)
MCHC RBC-ENTMCNC: 28.3 PG — SIGNIFICANT CHANGE UP (ref 27–34)
MCHC RBC-ENTMCNC: 31.7 % — LOW (ref 32–36)
MCV RBC AUTO: 89.1 FL — SIGNIFICANT CHANGE UP (ref 80–100)
MONOCYTES # BLD AUTO: 0.47 K/UL — SIGNIFICANT CHANGE UP (ref 0–0.9)
MONOCYTES NFR BLD AUTO: 8.2 % — SIGNIFICANT CHANGE UP (ref 2–14)
NEUTROPHILS # BLD AUTO: 3.49 K/UL — SIGNIFICANT CHANGE UP (ref 1.8–7.4)
NEUTROPHILS NFR BLD AUTO: 60.6 % — SIGNIFICANT CHANGE UP (ref 43–77)
NRBC # FLD: 0 — SIGNIFICANT CHANGE UP
PCO2 BLDV: 53 MMHG — HIGH (ref 41–51)
PH BLDV: 7.31 PH — LOW (ref 7.32–7.43)
PLATELET # BLD AUTO: 208 K/UL — SIGNIFICANT CHANGE UP (ref 150–400)
PMV BLD: 11.5 FL — SIGNIFICANT CHANGE UP (ref 7–13)
PO2 BLDV: 52 MMHG — HIGH (ref 35–40)
POTASSIUM BLDV-SCNC: 5.4 MMOL/L — HIGH (ref 3.4–4.5)
POTASSIUM SERPL-MCNC: 6.1 MMOL/L — HIGH (ref 3.5–5.3)
POTASSIUM SERPL-SCNC: 6.1 MMOL/L — HIGH (ref 3.5–5.3)
PROT SERPL-MCNC: 7.1 G/DL — SIGNIFICANT CHANGE UP (ref 6–8.3)
RBC # BLD: 4.21 M/UL — SIGNIFICANT CHANGE UP (ref 3.8–5.2)
RBC # FLD: 13.5 % — SIGNIFICANT CHANGE UP (ref 10.3–14.5)
SAO2 % BLDV: 84.4 % — SIGNIFICANT CHANGE UP (ref 60–85)
SODIUM SERPL-SCNC: 137 MMOL/L — SIGNIFICANT CHANGE UP (ref 135–145)
WBC # BLD: 5.75 K/UL — SIGNIFICANT CHANGE UP (ref 3.8–10.5)
WBC # FLD AUTO: 5.75 K/UL — SIGNIFICANT CHANGE UP (ref 3.8–10.5)

## 2018-02-27 PROCEDURE — 71046 X-RAY EXAM CHEST 2 VIEWS: CPT | Mod: 26

## 2018-02-27 PROCEDURE — 99285 EMERGENCY DEPT VISIT HI MDM: CPT

## 2018-02-27 RX ORDER — SODIUM POLYSTYRENE SULFONATE 4.1 MEQ/G
30 POWDER, FOR SUSPENSION ORAL ONCE
Qty: 0 | Refills: 0 | Status: COMPLETED | OUTPATIENT
Start: 2018-02-27 | End: 2018-02-27

## 2018-02-27 RX ADMIN — SODIUM POLYSTYRENE SULFONATE 30 GRAM(S): 4.1 POWDER, FOR SUSPENSION ORAL at 22:38

## 2018-02-27 NOTE — ED PROVIDER NOTE - PHYSICAL EXAMINATION
as below  +  pt ambulates with unsteadiness  says this is her dizziness  Pt with strong regular pulse   no tachycardia

## 2018-02-27 NOTE — ED PROVIDER NOTE - MEDICAL DECISION MAKING DETAILS
pt ESRD on HD   missed dialysis  also with h/o unstaediness  feels it is worse over the last week    will CT head   admit for dialysis

## 2018-02-27 NOTE — ED ADULT TRIAGE NOTE - CHIEF COMPLAINT QUOTE
was seen in area hospital and d/c d  had episode of dizziness felt like legs were going to give out pt called EMS

## 2018-02-27 NOTE — ED ADULT NURSE NOTE - OBJECTIVE STATEMENT
pt states she missed her dialysis, has been feeling dizzy and fell yesterday. states she hit her head and has a headache. denies loc . states she was dizzy and her rt leg gave out. lt AVG + bruit and thrill. large area of bruising noted over AVG pt states she was from a previous fall. states "I fall a lot" spoke with patient about using walker. sl placed labs sent.

## 2018-02-27 NOTE — ED PROVIDER NOTE - OBJECTIVE STATEMENT
pt presenting with dizziness   Yesterday  felt dizzy and fell  Seen at another hospital  reports being released after head CT  Had recurrent dizziness today  ?felt rt leg give out but did not fall   Pt reports  similar episodes in past  ? evaluation   Pt denies CP  cough   notes diarrhea over the last 2 days  not bloody  Pt is ESRD on HD  Last dialyzed Thursday

## 2018-02-28 ENCOUNTER — TRANSCRIPTION ENCOUNTER (OUTPATIENT)
Age: 53
End: 2018-02-28

## 2018-02-28 DIAGNOSIS — I77.0 ARTERIOVENOUS FISTULA, ACQUIRED: Chronic | ICD-10-CM

## 2018-02-28 DIAGNOSIS — N18.6 END STAGE RENAL DISEASE: ICD-10-CM

## 2018-02-28 DIAGNOSIS — Z29.9 ENCOUNTER FOR PROPHYLACTIC MEASURES, UNSPECIFIED: ICD-10-CM
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
## 2018-02-28 DIAGNOSIS — E11.9 TYPE 2 DIABETES MELLITUS WITHOUT COMPLICATIONS: ICD-10-CM

## 2018-02-28 DIAGNOSIS — I10 ESSENTIAL (PRIMARY) HYPERTENSION: ICD-10-CM

## 2018-02-28 DIAGNOSIS — E87.5 HYPERKALEMIA: ICD-10-CM

## 2018-02-28 LAB
ALBUMIN SERPL ELPH-MCNC: 3.4 G/DL — SIGNIFICANT CHANGE UP (ref 3.3–5)
ALP SERPL-CCNC: 116 U/L — SIGNIFICANT CHANGE UP (ref 40–120)
ALT FLD-CCNC: 12 U/L — SIGNIFICANT CHANGE UP (ref 4–33)
AST SERPL-CCNC: 13 U/L — SIGNIFICANT CHANGE UP (ref 4–32)
BILIRUB SERPL-MCNC: 0.2 MG/DL — SIGNIFICANT CHANGE UP (ref 0.2–1.2)
BUN SERPL-MCNC: 39 MG/DL — HIGH (ref 7–23)
CALCIUM SERPL-MCNC: 8.7 MG/DL — SIGNIFICANT CHANGE UP (ref 8.4–10.5)
CHLORIDE SERPL-SCNC: 102 MMOL/L — SIGNIFICANT CHANGE UP (ref 98–107)
CHOLEST SERPL-MCNC: 133 MG/DL — SIGNIFICANT CHANGE UP (ref 120–199)
CO2 SERPL-SCNC: 23 MMOL/L — SIGNIFICANT CHANGE UP (ref 22–31)
CREAT SERPL-MCNC: 9.66 MG/DL — HIGH (ref 0.5–1.3)
GLUCOSE SERPL-MCNC: 258 MG/DL — HIGH (ref 70–99)
HBA1C BLD-MCNC: 11.6 % — HIGH (ref 4–5.6)
HCT VFR BLD CALC: 41.5 % — SIGNIFICANT CHANGE UP (ref 34.5–45)
HDLC SERPL-MCNC: 42 MG/DL — LOW (ref 45–65)
HGB BLD-MCNC: 12.6 G/DL — SIGNIFICANT CHANGE UP (ref 11.5–15.5)
LIPID PNL WITH DIRECT LDL SERPL: 69 MG/DL — SIGNIFICANT CHANGE UP
MCHC RBC-ENTMCNC: 28.5 PG — SIGNIFICANT CHANGE UP (ref 27–34)
MCHC RBC-ENTMCNC: 30.4 % — LOW (ref 32–36)
MCV RBC AUTO: 93.9 FL — SIGNIFICANT CHANGE UP (ref 80–100)
NRBC # FLD: 0 — SIGNIFICANT CHANGE UP
PLATELET # BLD AUTO: 192 K/UL — SIGNIFICANT CHANGE UP (ref 150–400)
PMV BLD: 11.1 FL — SIGNIFICANT CHANGE UP (ref 7–13)
POTASSIUM SERPL-MCNC: 4.9 MMOL/L — SIGNIFICANT CHANGE UP (ref 3.5–5.3)
POTASSIUM SERPL-SCNC: 4.9 MMOL/L — SIGNIFICANT CHANGE UP (ref 3.5–5.3)
PROT SERPL-MCNC: 6.8 G/DL — SIGNIFICANT CHANGE UP (ref 6–8.3)
RBC # BLD: 4.42 M/UL — SIGNIFICANT CHANGE UP (ref 3.8–5.2)
RBC # FLD: 13.6 % — SIGNIFICANT CHANGE UP (ref 10.3–14.5)
SODIUM SERPL-SCNC: 140 MMOL/L — SIGNIFICANT CHANGE UP (ref 135–145)
TRIGL SERPL-MCNC: 200 MG/DL — HIGH (ref 10–149)
TSH SERPL-MCNC: 1.67 UIU/ML — SIGNIFICANT CHANGE UP (ref 0.27–4.2)
WBC # BLD: 5.63 K/UL — SIGNIFICANT CHANGE UP (ref 3.8–10.5)
WBC # FLD AUTO: 5.63 K/UL — SIGNIFICANT CHANGE UP (ref 3.8–10.5)

## 2018-02-28 PROCEDURE — 70450 CT HEAD/BRAIN W/O DYE: CPT | Mod: 26

## 2018-02-28 RX ORDER — FUROSEMIDE 40 MG
20 TABLET ORAL
Qty: 0 | Refills: 0 | Status: DISCONTINUED | OUTPATIENT
Start: 2018-02-28 | End: 2018-02-27

## 2018-02-28 RX ORDER — HEPARIN SODIUM 5000 [USP'U]/ML
5000 INJECTION INTRAVENOUS; SUBCUTANEOUS EVERY 8 HOURS
Qty: 0 | Refills: 0 | Status: DISCONTINUED | OUTPATIENT
Start: 2018-02-28 | End: 2018-02-27

## 2018-02-28 RX ORDER — CARVEDILOL PHOSPHATE 80 MG/1
12.5 CAPSULE, EXTENDED RELEASE ORAL EVERY 12 HOURS
Qty: 0 | Refills: 0 | Status: DISCONTINUED | OUTPATIENT
Start: 2018-02-28 | End: 2018-02-27

## 2018-02-28 RX ORDER — CLOPIDOGREL BISULFATE 75 MG/1
75 TABLET, FILM COATED ORAL DAILY
Qty: 0 | Refills: 0 | Status: DISCONTINUED | OUTPATIENT
Start: 2018-02-28 | End: 2018-02-27

## 2018-02-28 RX ORDER — CHOLECALCIFEROL (VITAMIN D3) 125 MCG
400 CAPSULE ORAL DAILY
Qty: 0 | Refills: 0 | Status: DISCONTINUED | OUTPATIENT
Start: 2018-02-28 | End: 2018-02-27

## 2018-02-28 RX ORDER — LISINOPRIL 2.5 MG/1
5 TABLET ORAL DAILY
Qty: 0 | Refills: 0 | Status: DISCONTINUED | OUTPATIENT
Start: 2018-02-28 | End: 2018-02-27

## 2018-02-28 RX ORDER — AMLODIPINE BESYLATE 2.5 MG/1
10 TABLET ORAL AT BEDTIME
Qty: 0 | Refills: 0 | Status: DISCONTINUED | OUTPATIENT
Start: 2018-02-28 | End: 2018-02-27

## 2018-02-28 RX ORDER — ASPIRIN/CALCIUM CARB/MAGNESIUM 324 MG
81 TABLET ORAL DAILY
Qty: 0 | Refills: 0 | Status: DISCONTINUED | OUTPATIENT
Start: 2018-02-28 | End: 2018-02-27

## 2018-02-28 RX ORDER — QUETIAPINE FUMARATE 200 MG/1
50 TABLET, FILM COATED ORAL DAILY
Qty: 0 | Refills: 0 | Status: DISCONTINUED | OUTPATIENT
Start: 2018-02-28 | End: 2018-02-27

## 2018-02-28 RX ORDER — OXYBUTYNIN CHLORIDE 5 MG
5 TABLET ORAL
Qty: 0 | Refills: 0 | Status: DISCONTINUED | OUTPATIENT
Start: 2018-02-28 | End: 2018-02-27

## 2018-02-28 RX ORDER — ATORVASTATIN CALCIUM 80 MG/1
40 TABLET, FILM COATED ORAL AT BEDTIME
Qty: 0 | Refills: 0 | Status: DISCONTINUED | OUTPATIENT
Start: 2018-02-28 | End: 2018-02-27

## 2018-02-28 RX ORDER — CALCIUM ACETATE 667 MG
667 TABLET ORAL
Qty: 0 | Refills: 0 | Status: DISCONTINUED | OUTPATIENT
Start: 2018-02-28 | End: 2018-02-27

## 2018-02-28 RX ORDER — SERTRALINE 25 MG/1
100 TABLET, FILM COATED ORAL DAILY
Qty: 0 | Refills: 0 | Status: DISCONTINUED | OUTPATIENT
Start: 2018-02-28 | End: 2018-02-27

## 2018-02-28 RX ORDER — PANTOPRAZOLE SODIUM 20 MG/1
40 TABLET, DELAYED RELEASE ORAL
Qty: 0 | Refills: 0 | Status: DISCONTINUED | OUTPATIENT
Start: 2018-02-28 | End: 2018-02-27

## 2018-02-28 RX ADMIN — HEPARIN SODIUM 5000 UNIT(S): 5000 INJECTION INTRAVENOUS; SUBCUTANEOUS at 15:00

## 2018-02-28 RX ADMIN — Medication 667 MILLIGRAM(S): at 10:22

## 2018-02-28 RX ADMIN — QUETIAPINE FUMARATE 50 MILLIGRAM(S): 200 TABLET, FILM COATED ORAL at 15:01

## 2018-02-28 RX ADMIN — Medication 5 MILLIGRAM(S): at 07:11

## 2018-02-28 RX ADMIN — Medication 667 MILLIGRAM(S): at 15:00

## 2018-02-28 RX ADMIN — PANTOPRAZOLE SODIUM 40 MILLIGRAM(S): 20 TABLET, DELAYED RELEASE ORAL at 07:11

## 2018-02-28 RX ADMIN — Medication 81 MILLIGRAM(S): at 15:00

## 2018-02-28 RX ADMIN — Medication 400 UNIT(S): at 15:00

## 2018-02-28 RX ADMIN — LISINOPRIL 5 MILLIGRAM(S): 2.5 TABLET ORAL at 07:11

## 2018-02-28 RX ADMIN — Medication 20 MILLIGRAM(S): at 15:00

## 2018-02-28 RX ADMIN — SERTRALINE 100 MILLIGRAM(S): 25 TABLET, FILM COATED ORAL at 15:01

## 2018-02-28 RX ADMIN — CLOPIDOGREL BISULFATE 75 MILLIGRAM(S): 75 TABLET, FILM COATED ORAL at 15:00

## 2018-02-28 RX ADMIN — CARVEDILOL PHOSPHATE 12.5 MILLIGRAM(S): 80 CAPSULE, EXTENDED RELEASE ORAL at 07:11

## 2018-02-28 RX ADMIN — HEPARIN SODIUM 5000 UNIT(S): 5000 INJECTION INTRAVENOUS; SUBCUTANEOUS at 07:11

## 2018-02-28 NOTE — H&P ADULT - ASSESSMENT
51 yo woman w/ social instability, DM2 (a1c 10) , CVA (on DAPT no residual deficits), NICM, and ESRD on HD MWF    +Fall  +Hyperkalemia- s/p Kayexelate

## 2018-02-28 NOTE — DISCHARGE NOTE ADULT - CARE PLAN
Principal Discharge DX:	Hyperkalemia  Goal:	Resolved with dialysis. Continue to attend your dialysis sessions as scheduled.  Assessment and plan of treatment:	Follow up with your nephrologist for ongoing care and management. Continue to take your medications as prescribed. Return to ED for any concerning symptoms.  Secondary Diagnosis:	NICM (nonischemic cardiomyopathy)  Goal:	Prevent fluid overload. Maintain euvolemia. Ensure compliance with medications.  Assessment and plan of treatment:	Follow up with cardiologist within one week of discharge. Call for appointment. Return to ED for any concerning symptoms. Continue medications as prescribed. Low salt diet with fluid restriction.  Secondary Diagnosis:	ESRD (end stage renal disease) on dialysis  Goal:	Continue to attend your dialysis sessions as scheduled.  Assessment and plan of treatment:	Follow up with your nephrologist for ongoing care and management. Continue to take your medications as prescribed. Return to ED for any concerning symptoms.  Secondary Diagnosis:	HTN (hypertension)  Goal:	Maintain adequate control of your blood pressure. Goal BP < 130/80. Continue low sodium diet.  Assessment and plan of treatment:	Follow up with PCP and/or cardiologist for ongoing medical management of your hypertension. Continue medications as prescribed. Low salt diet.  Secondary Diagnosis:	DM (diabetes mellitus)  Goal:	Maintain adequate glycemic control. Monitor your sugars. Goal HgA1C < 7.0%. Low carb diet.  Assessment and plan of treatment:	Follow up with PCP and/or endocrinologist for ongoing medical management of your diabetes. Continue your medications as prescribed. Low carb diet. If you had an angiogram with contrast, do not start oral medications until 72 hours post procedure.

## 2018-02-28 NOTE — PROVIDER CONTACT NOTE (OTHER) - ASSESSMENT
Patient stable to go home.  reinstatement of dialysis at [facility] completed.  [facility] called for transpotation home.  [place] taxi to transport pt at 4:15.  ref# [id number]

## 2018-02-28 NOTE — DISCHARGE NOTE ADULT - ADDITIONAL INSTRUCTIONS
Follow up with your nephrologist for ongoing care and management. Continue to take your medications as prescribed. Return to ED for any concerning symptoms.

## 2018-02-28 NOTE — DISCHARGE NOTE ADULT - NS AS ACTIVITY OBS
No Heavy lifting/straining/Walking-Indoors allowed/Do not drive or operate machinery/Walking-Outdoors allowed/Bathing allowed/Showering allowed/Do not make important decisions

## 2018-02-28 NOTE — DISCHARGE NOTE ADULT - PLAN OF CARE
Follow up with cardiologist within one week of discharge. Call for appointment. Return to ED for any concerning symptoms. Continue medications as prescribed. Low salt diet with fluid restriction. Continue to attend your dialysis sessions as scheduled. Follow up with your nephrologist for ongoing care and management. Continue to take your medications as prescribed. Return to ED for any concerning symptoms. Maintain adequate control of your blood pressure. Goal BP < 130/80. Continue low sodium diet. Follow up with PCP and/or cardiologist for ongoing medical management of your hypertension. Continue medications as prescribed. Low salt diet. Maintain adequate glycemic control. Monitor your sugars. Goal HgA1C < 7.0%. Low carb diet. Follow up with PCP and/or endocrinologist for ongoing medical management of your diabetes. Continue your medications as prescribed. Low carb diet. If you had an angiogram with contrast, do not start oral medications until 72 hours post procedure. Resolved with dialysis. Continue to attend your dialysis sessions as scheduled. Prevent fluid overload. Maintain euvolemia. Ensure compliance with medications.

## 2018-02-28 NOTE — DISCHARGE NOTE ADULT - HOSPITAL COURSE
51 y/o female with a PMHx of NICM with mild LV dysfunction (EF 45-50%), mild diastolic dysfunction, ESRD on HD M/WF, CVA with no residual deficits, HTN, DM and gout presents to ED S/P fall, found to be hyperkalemic. Pt received Kayexalate and HD and hyperkalemia resolved. EKG had no changes. CT head was negative. Case discussed with Dr. Salamanca on 2/28. Pt now medically stable for discharge home. Spoke with ER  who is to arrange to have HD reinstated.

## 2018-02-28 NOTE — H&P ADULT - PROBLEM SELECTOR PLAN 5
oral hyperglycemic medications on hold  Placed on insulin sliding scale  Fingersticks before meals and at bedtime  Hemoglobin A1C in AM

## 2018-02-28 NOTE — DISCHARGE NOTE ADULT - PATIENT PORTAL LINK FT
You can access the [facility] Patient Portal, offered by [facility], by registering with the following website: [email or web address]

## 2018-02-28 NOTE — DISCHARGE NOTE ADULT - SECONDARY DIAGNOSIS.
ESRD (end stage renal disease) on dialysis HTN (hypertension) DM (diabetes mellitus) NICM (nonischemic cardiomyopathy)

## 2018-02-28 NOTE — DISCHARGE NOTE ADULT - MEDICATION SUMMARY - MEDICATIONS TO TAKE
I will START or STAY ON the medications listed below when I get home from the hospital:    aspirin 81 mg oral delayed release tablet  -- 1 tab(s) by mouth once a day  -- Indication: For History of stroke    lisinopril 5 mg oral tablet  -- 1 tab(s) by mouth once a day MDD:1  -- Indication: For HTN (hypertension)    sertraline 100 mg oral tablet  -- 2 tab(s) by mouth once a day  -- Indication: For Depression    Prandin 1 mg oral tablet  -- 1 tab(s) by mouth 3 times a day (before meals) MDD:3  -- Do not drink alcoholic beverages when taking this medication.  It is very important that you take or use this exactly as directed.  Do not skip doses or discontinue unless directed by your doctor.  Obtain medical advice before taking any non-prescription drugs as some may affect the action of this medication.    -- Indication: For DM (diabetes mellitus)    Basaglar KwikPen 100 units/mL subcutaneous solution  -- 30 unit(s) subcutaneous once a day (at bedtime)  -- Indication: For DM (diabetes mellitus)    atorvastatin 40 mg oral tablet  -- 1 tab(s) by mouth once a day (at bedtime) MDD:1  -- Indication: For HLD (hyperlipidemia)    clopidogrel 75 mg oral tablet  -- 1 tab(s) by mouth once a day  -- Indication: For History of stroke    QUEtiapine 50 mg oral tablet  -- 1 tab(s) by mouth once a day  -- Indication: For Depression    Coreg 12.5 mg oral tablet  -- 1 tab(s) by mouth 2 times a day  -- Indication: For HTN (hypertension)    amLODIPine 10 mg oral tablet  -- 1 tab(s) by mouth once a day  -- Indication: For HTN (hypertension)    Lasix 20 mg oral tablet  -- 1 tab(s) by mouth once a day  -- Indication: For HTN (hypertension)    calcium acetate 667 mg oral tablet  -- 1  by mouth 2 times a day MDD:2   -- Indication: For ESRD on dialysis    pantoprazole 40 mg oral delayed release tablet  -- 1 tab(s) by mouth once a day (before a meal)  -- Indication: For GERD    tolterodine 4 mg oral capsule, extended release  -- 1 cap(s) by mouth once a day  -- Indication: For Urinary    Vitamin D3 400 intl units oral capsule  -- 1 cap(s) by mouth once a day  -- Indication: For Supplementation

## 2018-02-28 NOTE — CONSULT NOTE ADULT - ATTENDING COMMENTS
Agree with above.  -Admitted with falls  -Pt. denies history of syncope  -Recent cardiac workup performed showing NICM  -Given no anginal symptoms, no clinical heart failure, and no syncope, no repeat imaging needed at this time  -no further cardiac workup needed at this time    Amy Tay MD  [facility]  [address], [address]  [place] [address]  office: [phone]  pager: [phone]

## 2018-02-28 NOTE — H&P ADULT - HISTORY OF PRESENT ILLNESS
51 yo woman with DM2 (a1c 10), CVA (on DAPT no residual deficits), NICM, and ESRD on HD M/W/F presents s/p fall 2 days ago. Patient states [place] the train station when all of a sudden her legs felt weak and she fell down. States she hit her head and her left arm. Has ecchymosis on her left arm.  Patient reports similar episodes in the past. Denies any proceeding symptoms, denies LOC after was able to get up on her own. Did not go to HD on Monday because she was at OSH s/p fall. K found to be 6.1 given 1 dose of Kayexalate in the ER. Admits to two episodes of watery diarrhea today. Last HD on Thursday. Patient currently has permacath that was scheduled to be removed tomorrow with [facility]. Has left AVF placed in 10/2017. Currently asymptomatic resting comfortably in bed.

## 2018-02-28 NOTE — H&P ADULT - NSHPSOCIALHISTORY_GEN_ALL_CORE
Social History:  · Marital Status	Single    Substance Use History:  · Substance Use	denies use    Alcohol Use History:  · Have you ever consumed alcohol	yes...denies current use of alcohol     Tobacco Usage:  · Tobacco Usage: Denies smoking

## 2018-02-28 NOTE — DISCHARGE NOTE ADULT - CARE PROVIDER_API CALL
Wale Francisco), Internal Medicine; Nephrology  [address] [address]  Phone: [phone]  Fax: [phone]

## 2018-02-28 NOTE — H&P ADULT - NSHPPHYSICALEXAM_GEN_ALL_CORE
Appearance: Normal, NAD, NRD.  HEENT:   Normal oral mucosa, EOMI	  Cardiovascular: Normal S1 S2, No JVD, No murmurs, No edema  Respiratory: Lungs clear to auscultation, no rales/rhonchi/wheezing	  Psychiatry: A & O x 3, Mood & affect appropriate  Gastrointestinal:  Soft, Non-tender, ND + BS	  Skin: No rashes, left forearm ecchymoses, No cyanosis  Neurologic: Non-focal, sensation intact,  strength 5/5 B/L  Extremities: left AVF, right chest wall permacath; Normal range of motion, No clubbing, cyanosis or edema  Vascular: Peripheral pulses palpable

## 2018-02-28 NOTE — H&P ADULT - NSHPLABSRESULTS_GEN_ALL_CORE
TTE 1/2017 EF 45-50% Mild global left ventricular systolic dysfunction. Mild diastolic dysfunction (Stage I).The right ventricle is not well visualized; grosslynormal right ventricular systolic function.Trivial pericardial effusion.*** No previous Echo exam.  K 6.1 moderately hemolyzed

## 2018-02-28 NOTE — CONSULT NOTE ADULT - SUBJECTIVE AND OBJECTIVE BOX
HISTORY OF PRESENT ILLNESS:  53 yo woman with DM2 (a1c 10), CVA (on DAPT no residual deficits), NICM, and ESRD on HD M/W/F presents s/p fall 2 days ago. Patient states she was walking toward the train station when all of a sudden her legs felt weak and she fell down. States she hit her head and her left arm. Has ecchymosis on her left arm.  Patient reports similar episodes in the past. Denies any proceeding symptoms, denies LOC after was able to get up on her own. Did not go to HD on Monday because she was at OSH s/p fall. K found to be 6.1 given 1 dose of Kayexalate in the ER. Admits to two episodes of watery diarrhea today. Last HD on Thursday. Patient currently has permacath that was scheduled to be removed tomorrow with [facility]. Has left AVF placed in 10/2017. Currently asymptomatic resting comfortably in bed.   She reports she has frequent falls due to leg weakness.  SHe denies syncope.  She reports dizziness only when standing up too quickly from a seated position.  She has not seen her Cardiologist recently (Dr Gan).    PAST MEDICAL & SURGICAL HISTORY:  Depression  Gout  CVA (cerebral vascular accident)  DM (diabetes mellitus)  HTN (hypertension)  Glaucoma  AVF (arteriovenous fistula)      FAMILY HISTORY:  Family history of heart attack (Mother)      SOCIAL HISTORY:    (x ) Non-smoker ( ) Smoker ( ) Alcohol Abuse ( ) IVDA    Allergies    iodine (Unknown)  Seafood (Unknown)  shellfish (Nausea (Mild to Mod); Hives (Mild to Mod))    MEDICATIONS  (STANDING):  amLODIPine   Tablet 10 milliGRAM(s) Oral at bedtime  aspirin enteric coated 81 milliGRAM(s) Oral daily  atorvastatin 40 milliGRAM(s) Oral at bedtime  calcium acetate 667 milliGRAM(s) Oral three times a day with meals  carvedilol 12.5 milliGRAM(s) Oral every 12 hours  cholecalciferol 400 Unit(s) Oral daily  clopidogrel Tablet 75 milliGRAM(s) Oral daily  furosemide    Tablet 20 milliGRAM(s) Oral <User Schedule>  heparin  Injectable 5000 Unit(s) SubCutaneous every 8 hours  lisinopril 5 milliGRAM(s) Oral daily  oxybutynin 5 milliGRAM(s) Oral two times a day  pantoprazole    Tablet 40 milliGRAM(s) Oral before breakfast  QUEtiapine 50 milliGRAM(s) Oral daily  sertraline 100 milliGRAM(s) Oral daily    REVIEW OF SYSTEMS:     CONSTITUTIONAL: No fever, chills, weight loss, or fatigue  RESPIRATORY: No cough, wheezing, chills or hemoptysis; No Shortness of Breath  CARDIOVASCULAR: No chest pain, palpitations, dizziness, syncope, or leg swelling  GASTROINTESTINAL: No abdominal pain. No nausea, vomiting, diarrhea or constipation. No melena or hematochezia.  GENITOURINARY: No dysuria, frequency, hematuria, or incontinence  NEUROLOGICAL: No headaches, memory loss, loss of strength, numbness, or tremors  SKIN: No itching, burning, rashes, or lesions   	  [x ] All others negative	  [ ] Unable to obtain    PHYSICAL EXAM:  Vital Signs Last 24 Hrs  T(C): 36.6 (28 Feb 2018 13:40), Max: 37 (27 Feb 2018 19:00)  T(F): 97.9 (28 Feb 2018 13:40), Max: 98.6 (27 Feb 2018 19:00)  HR: 73 (28 Feb 2018 13:40) (73 - 96)  BP: 152/94 (28 Feb 2018 13:40) (150/78 - 169/97)  RR: 16 (28 Feb 2018 13:40) (16 - 18)  SpO2: 98% (28 Feb 2018 10:19) (96% - 98%)    Cardiovascular:  S1 S2 RRR, No JVD, No murmurs  Respiratory: Lungs CTA B/L, No wheeze, rales, or rhonchi	  Psychiatry: A & O x 3, Mood & affect appropriate  Gastrointestinal:  Soft, Non-tender, + BS	  Extremities: Normal range of motion, No clubbing, cyanosis or edema    DATA:      ECG:  	NSR no acute ST-T changes    PREVIOUS DIAGNOSTIC TESTING:    < from: Transthoracic Echocardiogram (01.05.18 @ 11:00) >  Conclusions:  1. Mild global left ventricular systolic dysfunction.  2. Mild diastolic dysfunction (Stage I).  3. The right ventricle is not well visualized; grossly  normal right ventricular systolic function.  4. Trivial pericardial effusion.  *** No previous Echo exam.  ------------------------------------------------------------------------  Confirmed on  1/5/2018 - 10:31:12 by Stefano Joe M.D.    < end of copied text >    < from: Nuclear Stress Test-Pharmacologic (10.19.17 @ 10:42) >  IMPRESSIONS:Abnormal Study  * Myocardial Perfusion SPECT results are mildly abnormal.  * Normal myocardial perfusion,with no evidence of  infarction or inducible ischemia.  * However, findings suggest cardiomyopathy. The LV was  mildly enlarged. Post-stress gated wall motion analysis  was performed (LVEF = 47 %;LVEDV = 142 ml.), revealing  mild diffuse hypokinesis. RV function appeared normal.  ------------------------------------------------------------------------  Confirmed on  10/19/2017 - 14:32:31 by Eric Cope M.D.    < end of copied text >      RADIOLOGY:    < from: CT Head No Cont (02.28.18 @ 01:53) >    IMPRESSION:   No acute territorial infarct, hemorrhage, mass effect or calvarial   fracture.     < end of copied text >    		  LABS:                      12.6   5.63  )-----------( 192      ( 28 Feb 2018 03:20 )             41.5     140  |  102  |  39<H>  ----------------------------<  258<H>  4.9   |  23  |  9.66<H>    Ca    8.7      28 Feb 2018 03:20    TPro  6.8  /  Alb  3.4  /  TBili  0.2  /  DBili  x   /  AST  13  /  ALT  12  /  AlkPhos  116      HgA1c: Hemoglobin A1C, Whole Blood: 11.6 % (02-28 @ 03:20)    Thyroid Stimulating Hormone, Serum: 1.67 uIU/mL (02-28 @ 03:20)    ASSESSMENT/PLAN: 	  53 yo woman with DM2 (a1c 10), CVA (on DAPT no residual deficits), NICM, and ESRD on HD M/W/F presents s/p fall 2 days ago.   She reports tripping and falling, denies LOC.  Last NST 10/2017 with EF 47% and no ischemia or infarct, and recent TTE with mild global LV dysfxn.    --Recent Cardiac work-up noted above  --pt denies syncope  --no further cardiac work up needed     Francheska Gallardo PA-C  [facility]  [address], [address]   [place] [address]  office [phone]  pager [phone]
HPI:  Ms. Herr is a 52 year-old woman, well-known to me with history of multiple medical issues including diabetes mellitus, hypertension, stroke, diastolic congestive heart failure, and end-stage renal disease on dialysis MWF at [facility] in [place]. She was sent to the [facility] ER from [facility] - they noted that she had recently fallen, and that she had missed HD on Monday (last dialyzed Friday 2/23). They opted not to remove her tunneled catheter as was planned due to her having missed HD and having fallen. K was 6.1meq/L in the ER; she received Kayexalate in the ER with associated diarrhea in response.      PAST MEDICAL & SURGICAL HISTORY:  Depression  Gout  CVA (cerebral vascular accident)  DM (diabetes mellitus)  HTN (hypertension)  Glaucoma  AVF (arteriovenous fistula)    Allergies  iodine (Unknown)  shellfish (Nausea (Mild to Mod); Hives (Mild to Mod))    SOCIAL HISTORY:  Denies ETOh,Smoking,     FAMILY HISTORY:  Family history of heart attack (Mother)    REVIEW OF SYSTEMS:  CONSTITUTIONAL: No weakness, fevers or chills  EYES/ENT: No visual changes;  No vertigo or throat pain   NECK: No pain or stiffness  RESPIRATORY: No cough, wheezing, hemoptysis; No shortness of breath  CARDIOVASCULAR: No chest pain or palpitations  GASTROINTESTINAL: No abdominal or epigastric pain. No nausea, vomiting, or hematemesis; No diarrhea or constipation. No melena or hematochezia.  GENITOURINARY: No dysuria, frequency or hematuria  NEUROLOGICAL: No numbness or weakness  SKIN: No itching, burning, rashes, or lesions   All other review of systems is negative unless indicated above.    VITAL:  T(C): , Max: 37 (02-27-18 @ 19:00)  T(F): , Max: 98.6 (02-27-18 @ 19:00)  HR: 81 (02-28-18 @ 06:31)  BP: 166/74 (02-28-18 @ 06:31)  BP(mean): --  RR: 18 (02-28-18 @ 06:31)  SpO2: 97% (02-28-18 @ 06:31)      PHYSICAL EXAM:  Constitutional: NAD, Alert  HEENT: NCAT, MMM  Neck: Supple, No JVD  Respiratory: CTA-b/l  Cardiovascular: RRR s1s2, no m/r/g  Gastrointestinal: BS+, soft, NT/ND  Extremities: No peripheral edema b/l  Neurological: no focal deficits; strength grossly intact  Psychiatric: Normal mood, normal affect  Back: no CVAT b/l  Skin: No rashes, no nevi    LABS:                        12.6   5.63  )-----------( 192      ( 28 Feb 2018 03:20 )             41.5     Na(140)/K(4.9)/Cl(102)/HCO3(23)/BUN(39)/Cr(9.66)Glu(258)/Ca(8.7)/Mg(--)/PO4(--)    02-28 @ 03:20  Na(137)/K(6.1)/Cl(98)/HCO3(24)/BUN(38)/Cr(9.66)Glu(311)/Ca(9.1)/Mg(--)/PO4(--)    02-27 @ 21:02      IMAGING:  < from: CT Head No Cont (02.28.18 @ 01:53) >  No acute territorial infarct, hemorrhage, mass effect or calvarial fracture.     < from: Xray Chest 2 Views PA/Lat (02.27.18 @ 20:33) >  Clear lungs.         ASSESSMENT:  (1)Renal - ESRD- due for HD today  (2)Vascular - Permcath removal yesterday cancelled due to her acute issues. Planned for catheter removal tomorrow at [facility], provided that she leaves [facility] today  (3)Hyperkalemia - improved, s/p kayexalate  (4)Fall - no acute intracranial injury based on CT    RECOMMEND:  (1)HD today - 3 hours, 2k bath  (2)No renal objection to discharge post HD today provided that no new issues ensue    Thank you for involving [facility] in this patient's care.    With warm regards,    Wale Francisco MD   [facility]  [phone]

## 2018-05-14 ENCOUNTER — EMERGENCY (EMERGENCY)
Facility: HOSPITAL | Age: 53
LOS: 1 days | Discharge: HOME | End: 2018-05-14
Attending: EMERGENCY MEDICINE | Admitting: EMERGENCY MEDICINE
Payer: MEDICAID

## 2018-05-14 VITALS
TEMPERATURE: 98 F | HEART RATE: 74 BPM | OXYGEN SATURATION: 98 % | RESPIRATION RATE: 18 BRPM | DIASTOLIC BLOOD PRESSURE: 76 MMHG | SYSTOLIC BLOOD PRESSURE: 169 MMHG

## 2018-05-14 VITALS
DIASTOLIC BLOOD PRESSURE: 80 MMHG | RESPIRATION RATE: 16 BRPM | OXYGEN SATURATION: 100 % | HEART RATE: 68 BPM | SYSTOLIC BLOOD PRESSURE: 169 MMHG

## 2018-05-14 DIAGNOSIS — I77.0 ARTERIOVENOUS FISTULA, ACQUIRED: Chronic | ICD-10-CM

## 2018-05-14 LAB
ALBUMIN SERPL ELPH-MCNC: 4.3 G/DL — SIGNIFICANT CHANGE UP (ref 3.3–5)
ALP SERPL-CCNC: 101 U/L — SIGNIFICANT CHANGE UP (ref 40–120)
ALT FLD-CCNC: 17 U/L — SIGNIFICANT CHANGE UP (ref 4–33)
AST SERPL-CCNC: 15 U/L — SIGNIFICANT CHANGE UP (ref 4–32)
BASOPHILS # BLD AUTO: 0.04 K/UL — SIGNIFICANT CHANGE UP (ref 0–0.2)
BASOPHILS NFR BLD AUTO: 1 % — SIGNIFICANT CHANGE UP (ref 0–2)
BILIRUB SERPL-MCNC: < 0.2 MG/DL — LOW (ref 0.2–1.2)
BUN SERPL-MCNC: 22 MG/DL — SIGNIFICANT CHANGE UP (ref 7–23)
CALCIUM SERPL-MCNC: 9.3 MG/DL — SIGNIFICANT CHANGE UP (ref 8.4–10.5)
CHLORIDE SERPL-SCNC: 98 MMOL/L — SIGNIFICANT CHANGE UP (ref 98–107)
CO2 SERPL-SCNC: 30 MMOL/L — SIGNIFICANT CHANGE UP (ref 22–31)
CREAT SERPL-MCNC: 4.71 MG/DL — HIGH (ref 0.5–1.3)
EOSINOPHIL # BLD AUTO: 0.24 K/UL — SIGNIFICANT CHANGE UP (ref 0–0.5)
EOSINOPHIL NFR BLD AUTO: 5.9 % — SIGNIFICANT CHANGE UP (ref 0–6)
GLUCOSE SERPL-MCNC: 115 MG/DL — HIGH (ref 70–99)
HCT VFR BLD CALC: 36.7 % — SIGNIFICANT CHANGE UP (ref 34.5–45)
HGB BLD-MCNC: 11.6 G/DL — SIGNIFICANT CHANGE UP (ref 11.5–15.5)
IMM GRANULOCYTES # BLD AUTO: 0 # — SIGNIFICANT CHANGE UP
IMM GRANULOCYTES NFR BLD AUTO: 0 % — SIGNIFICANT CHANGE UP (ref 0–1.5)
LYMPHOCYTES # BLD AUTO: 2.19 K/UL — SIGNIFICANT CHANGE UP (ref 1–3.3)
LYMPHOCYTES # BLD AUTO: 54.1 % — HIGH (ref 13–44)
MCHC RBC-ENTMCNC: 27.9 PG — SIGNIFICANT CHANGE UP (ref 27–34)
MCHC RBC-ENTMCNC: 31.6 % — LOW (ref 32–36)
MCV RBC AUTO: 88.2 FL — SIGNIFICANT CHANGE UP (ref 80–100)
MONOCYTES # BLD AUTO: 0.35 K/UL — SIGNIFICANT CHANGE UP (ref 0–0.9)
MONOCYTES NFR BLD AUTO: 8.6 % — SIGNIFICANT CHANGE UP (ref 2–14)
NEUTROPHILS # BLD AUTO: 1.23 K/UL — LOW (ref 1.8–7.4)
NEUTROPHILS NFR BLD AUTO: 30.4 % — LOW (ref 43–77)
NRBC # FLD: 0 — SIGNIFICANT CHANGE UP
PLATELET # BLD AUTO: 230 K/UL — SIGNIFICANT CHANGE UP (ref 150–400)
PMV BLD: 11.3 FL — SIGNIFICANT CHANGE UP (ref 7–13)
POTASSIUM SERPL-MCNC: 4.5 MMOL/L — SIGNIFICANT CHANGE UP (ref 3.5–5.3)
POTASSIUM SERPL-SCNC: 4.5 MMOL/L — SIGNIFICANT CHANGE UP (ref 3.5–5.3)
PROT SERPL-MCNC: 7.9 G/DL — SIGNIFICANT CHANGE UP (ref 6–8.3)
RBC # BLD: 4.16 M/UL — SIGNIFICANT CHANGE UP (ref 3.8–5.2)
RBC # FLD: 13.3 % — SIGNIFICANT CHANGE UP (ref 10.3–14.5)
SODIUM SERPL-SCNC: 140 MMOL/L — SIGNIFICANT CHANGE UP (ref 135–145)
WBC # BLD: 4.05 K/UL — SIGNIFICANT CHANGE UP (ref 3.8–10.5)
WBC # FLD AUTO: 4.05 K/UL — SIGNIFICANT CHANGE UP (ref 3.8–10.5)

## 2018-05-14 PROCEDURE — 99284 EMERGENCY DEPT VISIT MOD MDM: CPT

## 2018-05-14 NOTE — ED ADULT TRIAGE NOTE - CHIEF COMPLAINT QUOTE
Pt arrives from dialysis complaining of dizziness, was able to full complete full dialysis treatment. Pt denies chest pain, SOB, n/v/d.

## 2018-05-14 NOTE — ED ADULT NURSE NOTE - OBJECTIVE STATEMENT
pt received to room #16 s/p HD via L forearm a/v fistula, with c/o dizziness. pt states she feels like the room is spinning. was dizzy prior to dialysis and had partial resolution of symptoms after. denies cp, sob, n/v/d. on HM, NSR. IV placed, labs drawn and sent. pending MD robins, will cont to monitor.

## 2018-05-15 RX ORDER — COLCHICINE 0.6 MG
0.5 TABLET ORAL
Qty: 15 | Refills: 0 | OUTPATIENT
Start: 2018-05-15 | End: 2018-08-12

## 2018-05-15 NOTE — ED PROVIDER NOTE - PLAN OF CARE
Discharge Home 54 yo woman with DM2, CVA x2 (on DAPT no residual deficits), NICM, and ESRD on HD M/W/F presents from HD with dizziness and HA. Sxs now resolved. Hemodynamically stable, H/H stable, glc 115. Pt stable for discharge home.

## 2018-05-15 NOTE — ED PROVIDER NOTE - ATTENDING CONTRIBUTION TO CARE
pt with ESRD M W F HD with dizziness described as lightheadedness presenting from HD after a full session.  Had some prior during and after.  This is typical for her to have just not to this extent.  Has since resolved.  No SOB and no CP    Gen: Well appearing in NAD  Head: NC/AT  Neck: trachea midline  Resp:  No distress  Ext: no deformities  Neuro:  A&O appears non focal  Skin:  Warm and dry as visualized  Psych:  Normal affect and mood     pt with typical lightheadedness now resolved.  No CP or SOB.  EKG without signs of arrythmia and there are normal labs.  As such pt is ok for dc and return to her PMD

## 2018-05-15 NOTE — ED PROVIDER NOTE - MEDICAL DECISION MAKING DETAILS
54 yo woman with DM2, CVA x2 (on DAPT no residual deficits), NICM, and ESRD on HD M/W/F presents from HD with dizziness and HA. Check VS, CBC, Chem.

## 2018-05-15 NOTE — ED PROVIDER NOTE - OBJECTIVE STATEMENT
52 yo woman with DM2, CVA x2 (on DAPT no residual deficits), NICM, and ESRD on HD M/W/F presents from HD with dizziness and HA. She states world was spinning around her while in HD waiting room, HA began shortly after. She states she has experienced similar episodes multiple times in the past, usually during or after HD and usually resolve spontaneously after resting for a while. She was able to finish HD but sxs persisted and states she came to ED because she was afraid to be alone at home with nobody to observe her until sxs resolved. At the time this physician examined the pt, HA and dizziness had both resolved. Pt denies LOC, CP, palpitations, vision changes, SOB, F/C, N/V, dysuria.

## 2018-05-15 NOTE — ED PROVIDER NOTE - CARE PLAN
Principal Discharge DX:	Dizziness  Goal:	Discharge Home  Assessment and plan of treatment:	54 yo woman with DM2, CVA x2 (on DAPT no residual deficits), NICM, and ESRD on HD M/W/F presents from HD with dizziness and HA. Sxs now resolved. Hemodynamically stable, H/H stable, glc 115. Pt stable for discharge home.  Secondary Diagnosis:	Head ache

## 2018-05-15 NOTE — ED PROVIDER NOTE - TOBACCO USE
[id number] - [place], [place] - [address]  [address]  [place] [address]  Phone: [phone] Fax: [phone]    EMU admission    Payor: BLUE CROSS BLUE SHIELD / Plan: [employer] [place] HMO / Product Type: HMO /        05/10/17 1431   Discharge Assessment   Assessment Type Discharge Planning Assessment   Confirmed/corrected address and phone number on facesheet? Yes   Assessment information obtained from? Patient  (patient's brother at the bedside)   Expected Length of Stay (days) 4   Prior to hospitilization cognitive status: Alert/Oriented   Prior to hospitalization functional status: Independent   Current cognitive status: Alert/Oriented   Current Functional Status: Independent   Arrived From home or self-care   Lives With significant other   Able to Return to Prior Arrangements yes   Is patient able to care for self after discharge? Yes   Patient's perception of discharge disposition home or selfcare   Patient currently receives home health services? No   Patient currently receives any other outside agency services? No   Equipment Currently Used at Home none   Does the patient have transportation to healthcare appointments? Yes   Transportation Available family or friend will provide   On Dialysis? No   Discharge Plan A Home with family   Patient/Family In Agreement With Plan yes        Never smoker

## 2018-05-23 PROCEDURE — 85027 COMPLETE CBC AUTOMATED: CPT

## 2018-05-23 PROCEDURE — 93306 TTE W/DOPPLER COMPLETE: CPT

## 2018-05-23 PROCEDURE — 80053 COMPREHEN METABOLIC PANEL: CPT

## 2018-05-23 PROCEDURE — 83880 ASSAY OF NATRIURETIC PEPTIDE: CPT

## 2018-05-23 PROCEDURE — 85014 HEMATOCRIT: CPT

## 2018-05-23 PROCEDURE — 80048 BASIC METABOLIC PNL TOTAL CA: CPT

## 2018-05-23 PROCEDURE — 84295 ASSAY OF SERUM SODIUM: CPT

## 2018-05-23 PROCEDURE — 71046 X-RAY EXAM CHEST 2 VIEWS: CPT

## 2018-05-23 PROCEDURE — 82962 GLUCOSE BLOOD TEST: CPT

## 2018-05-23 PROCEDURE — 99285 EMERGENCY DEPT VISIT HI MDM: CPT | Mod: 25

## 2018-05-23 PROCEDURE — 83735 ASSAY OF MAGNESIUM: CPT

## 2018-05-23 PROCEDURE — 82272 OCCULT BLD FECES 1-3 TESTS: CPT

## 2018-05-23 PROCEDURE — 85610 PROTHROMBIN TIME: CPT

## 2018-05-23 PROCEDURE — 93005 ELECTROCARDIOGRAM TRACING: CPT

## 2018-05-23 PROCEDURE — 82947 ASSAY GLUCOSE BLOOD QUANT: CPT

## 2018-05-23 PROCEDURE — 99261: CPT

## 2018-05-23 PROCEDURE — 82803 BLOOD GASES ANY COMBINATION: CPT

## 2018-05-23 PROCEDURE — 80162 ASSAY OF DIGOXIN TOTAL: CPT

## 2018-05-23 PROCEDURE — 82553 CREATINE MB FRACTION: CPT

## 2018-05-23 PROCEDURE — 82435 ASSAY OF BLOOD CHLORIDE: CPT

## 2018-05-23 PROCEDURE — 84484 ASSAY OF TROPONIN QUANT: CPT

## 2018-05-23 PROCEDURE — 82330 ASSAY OF CALCIUM: CPT

## 2018-05-23 PROCEDURE — 83605 ASSAY OF LACTIC ACID: CPT

## 2018-05-23 PROCEDURE — 82550 ASSAY OF CK (CPK): CPT

## 2018-05-23 PROCEDURE — 84100 ASSAY OF PHOSPHORUS: CPT

## 2018-05-23 PROCEDURE — 83036 HEMOGLOBIN GLYCOSYLATED A1C: CPT

## 2018-05-23 PROCEDURE — 84132 ASSAY OF SERUM POTASSIUM: CPT

## 2018-06-22 ENCOUNTER — INPATIENT (INPATIENT)
Facility: HOSPITAL | Age: 53
LOS: 1 days | Discharge: HOME | End: 2018-06-24
Attending: INTERNAL MEDICINE | Admitting: INTERNAL MEDICINE
Payer: MEDICAID

## 2018-06-22 VITALS
RESPIRATION RATE: 18 BRPM | OXYGEN SATURATION: 99 % | DIASTOLIC BLOOD PRESSURE: 76 MMHG | TEMPERATURE: 99 F | HEART RATE: 82 BPM | SYSTOLIC BLOOD PRESSURE: 152 MMHG

## 2018-06-22 DIAGNOSIS — I77.0 ARTERIOVENOUS FISTULA, ACQUIRED: Chronic | ICD-10-CM

## 2018-06-22 DIAGNOSIS — R07.9 CHEST PAIN, UNSPECIFIED: ICD-10-CM

## 2018-06-22 LAB
ALBUMIN SERPL ELPH-MCNC: 4.4 G/DL — SIGNIFICANT CHANGE UP (ref 3.3–5)
ALP SERPL-CCNC: 110 U/L — SIGNIFICANT CHANGE UP (ref 40–120)
ALT FLD-CCNC: 9 U/L — SIGNIFICANT CHANGE UP (ref 4–33)
APTT BLD: 28.6 SEC — SIGNIFICANT CHANGE UP (ref 27.5–37.4)
AST SERPL-CCNC: 13 U/L — SIGNIFICANT CHANGE UP (ref 4–32)
BASE EXCESS BLDV CALC-SCNC: 7.7 MMOL/L — SIGNIFICANT CHANGE UP
BASOPHILS # BLD AUTO: 0.04 K/UL — SIGNIFICANT CHANGE UP (ref 0–0.2)
BASOPHILS NFR BLD AUTO: 0.9 % — SIGNIFICANT CHANGE UP (ref 0–2)
BILIRUB SERPL-MCNC: 0.2 MG/DL — SIGNIFICANT CHANGE UP (ref 0.2–1.2)
BLOOD GAS VENOUS - CREATININE: 4.47 MG/DL — HIGH (ref 0.5–1.3)
BUN SERPL-MCNC: 20 MG/DL — SIGNIFICANT CHANGE UP (ref 7–23)
CALCIUM SERPL-MCNC: 9.1 MG/DL — SIGNIFICANT CHANGE UP (ref 8.4–10.5)
CHLORIDE BLDV-SCNC: 100 MMOL/L — SIGNIFICANT CHANGE UP (ref 96–108)
CHLORIDE SERPL-SCNC: 97 MMOL/L — LOW (ref 98–107)
CK MB BLD-MCNC: 1.78 NG/ML — SIGNIFICANT CHANGE UP (ref 1–4.7)
CK MB BLD-MCNC: SIGNIFICANT CHANGE UP (ref 0–2.5)
CK SERPL-CCNC: 79 U/L — SIGNIFICANT CHANGE UP (ref 25–170)
CO2 SERPL-SCNC: 32 MMOL/L — HIGH (ref 22–31)
CREAT SERPL-MCNC: 4.2 MG/DL — HIGH (ref 0.5–1.3)
EOSINOPHIL # BLD AUTO: 0.09 K/UL — SIGNIFICANT CHANGE UP (ref 0–0.5)
EOSINOPHIL NFR BLD AUTO: 2 % — SIGNIFICANT CHANGE UP (ref 0–6)
GAS PNL BLDV: 140 MMOL/L — SIGNIFICANT CHANGE UP (ref 136–146)
GLUCOSE BLDV-MCNC: 200 — HIGH (ref 70–99)
GLUCOSE SERPL-MCNC: 203 MG/DL — HIGH (ref 70–99)
HCO3 BLDV-SCNC: 29 MMOL/L — HIGH (ref 20–27)
HCT VFR BLD CALC: 34.2 % — LOW (ref 34.5–45)
HCT VFR BLDV CALC: 42 % — SIGNIFICANT CHANGE UP (ref 34.5–45)
HGB BLD-MCNC: 10.8 G/DL — LOW (ref 11.5–15.5)
HGB BLDV-MCNC: 13.7 G/DL — SIGNIFICANT CHANGE UP (ref 11.5–15.5)
IMM GRANULOCYTES # BLD AUTO: 0.02 # — SIGNIFICANT CHANGE UP
IMM GRANULOCYTES NFR BLD AUTO: 0.4 % — SIGNIFICANT CHANGE UP (ref 0–1.5)
INR BLD: 0.88 — SIGNIFICANT CHANGE UP (ref 0.88–1.17)
LACTATE BLDV-MCNC: 1.1 MMOL/L — SIGNIFICANT CHANGE UP (ref 0.5–2)
LYMPHOCYTES # BLD AUTO: 1.69 K/UL — SIGNIFICANT CHANGE UP (ref 1–3.3)
LYMPHOCYTES # BLD AUTO: 37.7 % — SIGNIFICANT CHANGE UP (ref 13–44)
MAGNESIUM SERPL-MCNC: 2.1 MG/DL — SIGNIFICANT CHANGE UP (ref 1.6–2.6)
MCHC RBC-ENTMCNC: 28.4 PG — SIGNIFICANT CHANGE UP (ref 27–34)
MCHC RBC-ENTMCNC: 31.6 % — LOW (ref 32–36)
MCV RBC AUTO: 90 FL — SIGNIFICANT CHANGE UP (ref 80–100)
MONOCYTES # BLD AUTO: 0.41 K/UL — SIGNIFICANT CHANGE UP (ref 0–0.9)
MONOCYTES NFR BLD AUTO: 9.2 % — SIGNIFICANT CHANGE UP (ref 2–14)
NEUTROPHILS # BLD AUTO: 2.23 K/UL — SIGNIFICANT CHANGE UP (ref 1.8–7.4)
NEUTROPHILS NFR BLD AUTO: 49.8 % — SIGNIFICANT CHANGE UP (ref 43–77)
NRBC # FLD: 0 — SIGNIFICANT CHANGE UP
NT-PROBNP SERPL-SCNC: 798.3 PG/ML — SIGNIFICANT CHANGE UP
PCO2 BLDV: 58 MMHG — HIGH (ref 41–51)
PH BLDV: 7.37 PH — SIGNIFICANT CHANGE UP (ref 7.32–7.43)
PHOSPHATE SERPL-MCNC: 2.6 MG/DL — SIGNIFICANT CHANGE UP (ref 2.5–4.5)
PLATELET # BLD AUTO: 210 K/UL — SIGNIFICANT CHANGE UP (ref 150–400)
PMV BLD: 11.4 FL — SIGNIFICANT CHANGE UP (ref 7–13)
PO2 BLDV: 32 MMHG — LOW (ref 35–40)
POTASSIUM BLDV-SCNC: 3.6 MMOL/L — SIGNIFICANT CHANGE UP (ref 3.4–4.5)
POTASSIUM SERPL-MCNC: 3.7 MMOL/L — SIGNIFICANT CHANGE UP (ref 3.5–5.3)
POTASSIUM SERPL-SCNC: 3.7 MMOL/L — SIGNIFICANT CHANGE UP (ref 3.5–5.3)
PROT SERPL-MCNC: 7.8 G/DL — SIGNIFICANT CHANGE UP (ref 6–8.3)
PROTHROM AB SERPL-ACNC: 10.1 SEC — SIGNIFICANT CHANGE UP (ref 9.8–13.1)
RBC # BLD: 3.8 M/UL — SIGNIFICANT CHANGE UP (ref 3.8–5.2)
RBC # FLD: 14.2 % — SIGNIFICANT CHANGE UP (ref 10.3–14.5)
SAO2 % BLDV: 55.2 % — LOW (ref 60–85)
SODIUM SERPL-SCNC: 139 MMOL/L — SIGNIFICANT CHANGE UP (ref 135–145)
TROPONIN T, HIGH SENSITIVITY: 58 NG/L — CRITICAL HIGH (ref ?–14)
WBC # BLD: 4.48 K/UL — SIGNIFICANT CHANGE UP (ref 3.8–10.5)
WBC # FLD AUTO: 4.48 K/UL — SIGNIFICANT CHANGE UP (ref 3.8–10.5)

## 2018-06-22 PROCEDURE — 71046 X-RAY EXAM CHEST 2 VIEWS: CPT | Mod: 26

## 2018-06-22 NOTE — ED PROVIDER NOTE - MEDICAL DECISION MAKING DETAILS
Pt with multiple cardiac risk factors p/w CP during dialysis, still with some pain. Will check labs, cxr, ekg nonischemic, likely admit

## 2018-06-22 NOTE — ED PROVIDER NOTE - OBJECTIVE STATEMENT
53M with ESRD on HD (MWF), CHF, HTN, DM p/w CP. States pain started during dialysis today after about 2h15m of the session. Pain was substernal, nonradiating, accompanied by nausea, lightheadedness, SOB. Still feels CP and mild SOB. Has had this in the past but no workup. Denies recent cath/stress test.    Renal: Wale Francisco

## 2018-06-22 NOTE — ED ADULT NURSE NOTE - OBJECTIVE STATEMENT
Break Coverage RN: Patient is a 52 y/o female a&ox4 p/w a c/c of midsternal chest pain that began during dialysis today.  Patient has a hx of ESRD, does HD 3x weekly (M/W/F), did not complete full treatment today (only completed 2 hours).  Patient has left arm AV fistula, do not use extremity band in place.  Patient received with 20 gauge PIV in right, placed by EMS.  At present patient is endorsing midsternal CP, denies palliating or provoking factors, denies SOB, N/V/D, fevers/chills, abd pain, diaphoresis.  VSS, patient in nad, will continue to monitor.

## 2018-06-22 NOTE — ED ADULT TRIAGE NOTE - CHIEF COMPLAINT QUOTE
reports ch pains during diaysis today was 2 hrs 15 min into 3 hr 45 min treatment.  treatment not completed. given 2 baby asp,ntg x 1 by ems.  states pain 6/10 at present.. received dialysis wed,thurs,part today,  this wk

## 2018-06-22 NOTE — ED PROVIDER NOTE - ATTENDING CONTRIBUTION TO CARE
[facility]UR[facility] Attending Note - Dr. Sainz  53M with ESRD on HD (MWF), CHF, HTN, DM p/w CP. States pain started during dialysis today after about 2h15m of the session. Pain was substernal, nonradiating, accompanied by nausea, lightheadedness, SOB  PE: pt is alert and oriented, EYE: perrl, ENT normal, membranes are moist, neck supple. no lymphadenopathy or thyroid enlargement, No JVD.  Chest clear to P&A, Heart- reg rhythm without murmur, rubs or gallops, radial pulses equal bilaterally.  Abd is soft, non-tender, Bowel sounds are active. no mass or organomegaly. : No CVA tenderness. Neuro:  Pt alert and oriented x 3. Perrl    Distal neurosensory is intact. Motor function is 5/5 strength bilaterally.  No focal deficits. Extremities:  No edema.  Skin: warm and dry.  Psych: No mood or thought abnormality.  Plan:   Labs, including CBC, CMP, VBG, U/A, EKG, troponin    Impression: chest pain R/O ACS

## 2018-06-22 NOTE — ED PROVIDER NOTE - PROGRESS NOTE DETAILS
[place] PGY4: labs reviewed, trop intermediate range however CK not elevated. d/w Dr. Francisco who will arrange HD in hospital, admit tele DOD. Accepted Dr. Andrew, tele PA text page sent

## 2018-06-23 DIAGNOSIS — F32.9 MAJOR DEPRESSIVE DISORDER, SINGLE EPISODE, UNSPECIFIED: ICD-10-CM

## 2018-06-23 DIAGNOSIS — I10 ESSENTIAL (PRIMARY) HYPERTENSION: ICD-10-CM

## 2018-06-23 DIAGNOSIS — I63.9 CEREBRAL INFARCTION, UNSPECIFIED: ICD-10-CM

## 2018-06-23 DIAGNOSIS — R07.9 CHEST PAIN, UNSPECIFIED: ICD-10-CM

## 2018-06-23 DIAGNOSIS — E11.9 TYPE 2 DIABETES MELLITUS WITHOUT COMPLICATIONS: ICD-10-CM

## 2018-06-23 DIAGNOSIS — Z29.9 ENCOUNTER FOR PROPHYLACTIC MEASURES, UNSPECIFIED: ICD-10-CM

## 2018-06-23 LAB
BUN SERPL-MCNC: 31 MG/DL — HIGH (ref 7–23)
CALCIUM SERPL-MCNC: 9 MG/DL — SIGNIFICANT CHANGE UP (ref 8.4–10.5)
CHLORIDE SERPL-SCNC: 99 MMOL/L — SIGNIFICANT CHANGE UP (ref 98–107)
CHOLEST SERPL-MCNC: 110 MG/DL — LOW (ref 120–199)
CK MB BLD-MCNC: 1.59 NG/ML — SIGNIFICANT CHANGE UP (ref 1–4.7)
CK SERPL-CCNC: 73 U/L — SIGNIFICANT CHANGE UP (ref 25–170)
CO2 SERPL-SCNC: 30 MMOL/L — SIGNIFICANT CHANGE UP (ref 22–31)
CREAT SERPL-MCNC: 5.5 MG/DL — HIGH (ref 0.5–1.3)
GLUCOSE SERPL-MCNC: 157 MG/DL — HIGH (ref 70–99)
HBA1C BLD-MCNC: 8.3 % — HIGH (ref 4–5.6)
HCG SERPL-ACNC: < 5 MIU/ML — SIGNIFICANT CHANGE UP
HCT VFR BLD CALC: 32.3 % — LOW (ref 34.5–45)
HDLC SERPL-MCNC: 46 MG/DL — SIGNIFICANT CHANGE UP (ref 45–65)
HGB BLD-MCNC: 10.7 G/DL — LOW (ref 11.5–15.5)
LIPID PNL WITH DIRECT LDL SERPL: 50 MG/DL — SIGNIFICANT CHANGE UP
MCHC RBC-ENTMCNC: 28.5 PG — SIGNIFICANT CHANGE UP (ref 27–34)
MCHC RBC-ENTMCNC: 33.1 % — SIGNIFICANT CHANGE UP (ref 32–36)
MCV RBC AUTO: 86.1 FL — SIGNIFICANT CHANGE UP (ref 80–100)
NRBC # FLD: 0 — SIGNIFICANT CHANGE UP
PLATELET # BLD AUTO: 208 K/UL — SIGNIFICANT CHANGE UP (ref 150–400)
PMV BLD: 11.5 FL — SIGNIFICANT CHANGE UP (ref 7–13)
POTASSIUM SERPL-MCNC: 3.9 MMOL/L — SIGNIFICANT CHANGE UP (ref 3.5–5.3)
POTASSIUM SERPL-SCNC: 3.9 MMOL/L — SIGNIFICANT CHANGE UP (ref 3.5–5.3)
RBC # BLD: 3.75 M/UL — LOW (ref 3.8–5.2)
RBC # FLD: 14 % — SIGNIFICANT CHANGE UP (ref 10.3–14.5)
SODIUM SERPL-SCNC: 138 MMOL/L — SIGNIFICANT CHANGE UP (ref 135–145)
TRIGL SERPL-MCNC: 101 MG/DL — SIGNIFICANT CHANGE UP (ref 10–149)
TROPONIN T, HIGH SENSITIVITY: 56 NG/L — CRITICAL HIGH (ref ?–14)
TSH SERPL-MCNC: 1.65 UIU/ML — SIGNIFICANT CHANGE UP (ref 0.27–4.2)
WBC # BLD: 4.76 K/UL — SIGNIFICANT CHANGE UP (ref 3.8–10.5)
WBC # FLD AUTO: 4.76 K/UL — SIGNIFICANT CHANGE UP (ref 3.8–10.5)

## 2018-06-23 RX ORDER — COLCHICINE 0.6 MG
0.3 TABLET ORAL
Qty: 0 | Refills: 0 | Status: DISCONTINUED | OUTPATIENT
Start: 2018-06-23 | End: 2018-06-24

## 2018-06-23 RX ORDER — CARVEDILOL PHOSPHATE 80 MG/1
12.5 CAPSULE, EXTENDED RELEASE ORAL EVERY 12 HOURS
Qty: 0 | Refills: 0 | Status: DISCONTINUED | OUTPATIENT
Start: 2018-06-23 | End: 2018-06-24

## 2018-06-23 RX ORDER — GLUCAGON INJECTION, SOLUTION 0.5 MG/.1ML
1 INJECTION, SOLUTION SUBCUTANEOUS ONCE
Qty: 0 | Refills: 0 | Status: DISCONTINUED | OUTPATIENT
Start: 2018-06-23 | End: 2018-06-24

## 2018-06-23 RX ORDER — ASPIRIN/CALCIUM CARB/MAGNESIUM 324 MG
81 TABLET ORAL DAILY
Qty: 0 | Refills: 0 | Status: DISCONTINUED | OUTPATIENT
Start: 2018-06-23 | End: 2018-06-23

## 2018-06-23 RX ORDER — SODIUM CHLORIDE 9 MG/ML
3 INJECTION INTRAMUSCULAR; INTRAVENOUS; SUBCUTANEOUS EVERY 8 HOURS
Qty: 0 | Refills: 0 | Status: DISCONTINUED | OUTPATIENT
Start: 2018-06-23 | End: 2018-06-24

## 2018-06-23 RX ORDER — CHOLECALCIFEROL (VITAMIN D3) 125 MCG
400 CAPSULE ORAL DAILY
Qty: 0 | Refills: 0 | Status: DISCONTINUED | OUTPATIENT
Start: 2018-06-23 | End: 2018-06-24

## 2018-06-23 RX ORDER — ATORVASTATIN CALCIUM 80 MG/1
40 TABLET, FILM COATED ORAL AT BEDTIME
Qty: 0 | Refills: 0 | Status: DISCONTINUED | OUTPATIENT
Start: 2018-06-23 | End: 2018-06-24

## 2018-06-23 RX ORDER — INSULIN GLARGINE 100 [IU]/ML
30 INJECTION, SOLUTION SUBCUTANEOUS AT BEDTIME
Qty: 0 | Refills: 0 | Status: DISCONTINUED | OUTPATIENT
Start: 2018-06-23 | End: 2018-06-24

## 2018-06-23 RX ORDER — CLOPIDOGREL BISULFATE 75 MG/1
75 TABLET, FILM COATED ORAL DAILY
Qty: 0 | Refills: 0 | Status: DISCONTINUED | OUTPATIENT
Start: 2018-06-23 | End: 2018-06-24

## 2018-06-23 RX ORDER — FUROSEMIDE 40 MG
20 TABLET ORAL DAILY
Qty: 0 | Refills: 0 | Status: DISCONTINUED | OUTPATIENT
Start: 2018-06-23 | End: 2018-06-24

## 2018-06-23 RX ORDER — DEXTROSE 50 % IN WATER 50 %
25 SYRINGE (ML) INTRAVENOUS ONCE
Qty: 0 | Refills: 0 | Status: DISCONTINUED | OUTPATIENT
Start: 2018-06-23 | End: 2018-06-24

## 2018-06-23 RX ORDER — INSULIN LISPRO 100/ML
VIAL (ML) SUBCUTANEOUS AT BEDTIME
Qty: 0 | Refills: 0 | Status: DISCONTINUED | OUTPATIENT
Start: 2018-06-23 | End: 2018-06-24

## 2018-06-23 RX ORDER — HEPARIN SODIUM 5000 [USP'U]/ML
5000 INJECTION INTRAVENOUS; SUBCUTANEOUS EVERY 12 HOURS
Qty: 0 | Refills: 0 | Status: DISCONTINUED | OUTPATIENT
Start: 2018-06-23 | End: 2018-06-24

## 2018-06-23 RX ORDER — INSULIN LISPRO 100/ML
VIAL (ML) SUBCUTANEOUS
Qty: 0 | Refills: 0 | Status: DISCONTINUED | OUTPATIENT
Start: 2018-06-23 | End: 2018-06-24

## 2018-06-23 RX ORDER — AMLODIPINE BESYLATE 2.5 MG/1
10 TABLET ORAL DAILY
Qty: 0 | Refills: 0 | Status: DISCONTINUED | OUTPATIENT
Start: 2018-06-23 | End: 2018-06-24

## 2018-06-23 RX ORDER — SODIUM CHLORIDE 9 MG/ML
1000 INJECTION, SOLUTION INTRAVENOUS
Qty: 0 | Refills: 0 | Status: DISCONTINUED | OUTPATIENT
Start: 2018-06-23 | End: 2018-06-24

## 2018-06-23 RX ORDER — PANTOPRAZOLE SODIUM 20 MG/1
40 TABLET, DELAYED RELEASE ORAL
Qty: 0 | Refills: 0 | Status: DISCONTINUED | OUTPATIENT
Start: 2018-06-23 | End: 2018-06-24

## 2018-06-23 RX ORDER — DEXTROSE 50 % IN WATER 50 %
12.5 SYRINGE (ML) INTRAVENOUS ONCE
Qty: 0 | Refills: 0 | Status: DISCONTINUED | OUTPATIENT
Start: 2018-06-23 | End: 2018-06-24

## 2018-06-23 RX ORDER — SERTRALINE 25 MG/1
200 TABLET, FILM COATED ORAL DAILY
Qty: 0 | Refills: 0 | Status: DISCONTINUED | OUTPATIENT
Start: 2018-06-23 | End: 2018-06-24

## 2018-06-23 RX ORDER — QUETIAPINE FUMARATE 200 MG/1
50 TABLET, FILM COATED ORAL DAILY
Qty: 0 | Refills: 0 | Status: DISCONTINUED | OUTPATIENT
Start: 2018-06-23 | End: 2018-06-24

## 2018-06-23 RX ORDER — CALCIUM ACETATE 667 MG
667 TABLET ORAL
Qty: 0 | Refills: 0 | Status: DISCONTINUED | OUTPATIENT
Start: 2018-06-23 | End: 2018-06-24

## 2018-06-23 RX ORDER — ASPIRIN/CALCIUM CARB/MAGNESIUM 324 MG
81 TABLET ORAL DAILY
Qty: 0 | Refills: 0 | Status: DISCONTINUED | OUTPATIENT
Start: 2018-06-23 | End: 2018-06-24

## 2018-06-23 RX ORDER — DEXTROSE 50 % IN WATER 50 %
15 SYRINGE (ML) INTRAVENOUS ONCE
Qty: 0 | Refills: 0 | Status: DISCONTINUED | OUTPATIENT
Start: 2018-06-23 | End: 2018-06-24

## 2018-06-23 RX ORDER — LISINOPRIL 2.5 MG/1
5 TABLET ORAL DAILY
Qty: 0 | Refills: 0 | Status: DISCONTINUED | OUTPATIENT
Start: 2018-06-23 | End: 2018-06-24

## 2018-06-23 RX ORDER — OXYBUTYNIN CHLORIDE 5 MG
10 TABLET ORAL
Qty: 0 | Refills: 0 | Status: DISCONTINUED | OUTPATIENT
Start: 2018-06-23 | End: 2018-06-24

## 2018-06-23 RX ADMIN — CLOPIDOGREL BISULFATE 75 MILLIGRAM(S): 75 TABLET, FILM COATED ORAL at 11:42

## 2018-06-23 RX ADMIN — SODIUM CHLORIDE 3 MILLILITER(S): 9 INJECTION INTRAMUSCULAR; INTRAVENOUS; SUBCUTANEOUS at 13:43

## 2018-06-23 RX ADMIN — SERTRALINE 200 MILLIGRAM(S): 25 TABLET, FILM COATED ORAL at 11:43

## 2018-06-23 RX ADMIN — CARVEDILOL PHOSPHATE 12.5 MILLIGRAM(S): 80 CAPSULE, EXTENDED RELEASE ORAL at 06:01

## 2018-06-23 RX ADMIN — Medication 10 MILLIGRAM(S): at 06:02

## 2018-06-23 RX ADMIN — AMLODIPINE BESYLATE 10 MILLIGRAM(S): 2.5 TABLET ORAL at 06:01

## 2018-06-23 RX ADMIN — LISINOPRIL 5 MILLIGRAM(S): 2.5 TABLET ORAL at 06:02

## 2018-06-23 RX ADMIN — Medication 400 UNIT(S): at 11:42

## 2018-06-23 RX ADMIN — SODIUM CHLORIDE 3 MILLILITER(S): 9 INJECTION INTRAMUSCULAR; INTRAVENOUS; SUBCUTANEOUS at 21:52

## 2018-06-23 RX ADMIN — CARVEDILOL PHOSPHATE 12.5 MILLIGRAM(S): 80 CAPSULE, EXTENDED RELEASE ORAL at 17:13

## 2018-06-23 RX ADMIN — Medication 20 MILLIGRAM(S): at 06:02

## 2018-06-23 RX ADMIN — Medication 81 MILLIGRAM(S): at 02:32

## 2018-06-23 RX ADMIN — SODIUM CHLORIDE 3 MILLILITER(S): 9 INJECTION INTRAMUSCULAR; INTRAVENOUS; SUBCUTANEOUS at 06:00

## 2018-06-23 RX ADMIN — PANTOPRAZOLE SODIUM 40 MILLIGRAM(S): 20 TABLET, DELAYED RELEASE ORAL at 06:02

## 2018-06-23 RX ADMIN — QUETIAPINE FUMARATE 50 MILLIGRAM(S): 200 TABLET, FILM COATED ORAL at 11:41

## 2018-06-23 RX ADMIN — ATORVASTATIN CALCIUM 40 MILLIGRAM(S): 80 TABLET, FILM COATED ORAL at 21:52

## 2018-06-23 RX ADMIN — Medication 81 MILLIGRAM(S): at 11:42

## 2018-06-23 RX ADMIN — Medication 667 MILLIGRAM(S): at 17:13

## 2018-06-23 RX ADMIN — Medication 10 MILLIGRAM(S): at 17:13

## 2018-06-23 RX ADMIN — HEPARIN SODIUM 5000 UNIT(S): 5000 INJECTION INTRAVENOUS; SUBCUTANEOUS at 06:02

## 2018-06-23 RX ADMIN — Medication 4: at 14:02

## 2018-06-23 RX ADMIN — INSULIN GLARGINE 30 UNIT(S): 100 INJECTION, SOLUTION SUBCUTANEOUS at 22:52

## 2018-06-23 RX ADMIN — HEPARIN SODIUM 5000 UNIT(S): 5000 INJECTION INTRAVENOUS; SUBCUTANEOUS at 17:12

## 2018-06-23 RX ADMIN — Medication 2: at 09:34

## 2018-06-23 NOTE — H&P ADULT - MUSCULOSKELETAL
detailed exam no joint warmth/no joint swelling/normal strength/no joint erythema/no calf tenderness details…

## 2018-06-23 NOTE — H&P ADULT - HISTORY OF PRESENT ILLNESS
52F, obese with DM2, CVA, NICM F= 45 to 50% , ESRD on HD M/W/F, last dialysis session 6/22, HTN, Gout, experiencing nonexertional, substernal chest pain that began 2 hours into her dialysis session on 6/22.  Positive nausea,   lightheadedness, SOB.  Denies vomit, chills, palpitations, diaphoresis, fall, tinnitus, blurry vison.

## 2018-06-23 NOTE — H&P ADULT - NEGATIVE OPHTHALMOLOGIC SYMPTOMS
no discharge R/no lacrimation L/no lacrimation R/no blurred vision R/no blurred vision L/no photophobia/no discharge L

## 2018-06-23 NOTE — H&P ADULT - NSHPLABSRESULTS_GEN_ALL_CORE
NSR @ 81b/ min. TWI 1, AVL, V5   H &H =10.8/ 34.2  BUN/ Creatinine= 20/ 4.2  Glucose = 203  Trop = 58----> 56   BNP = 798   CXR preliminary = No Emergent findings

## 2018-06-23 NOTE — CONSULT NOTE ADULT - SUBJECTIVE AND OBJECTIVE BOX
CC: chest pain    HISTORY OF PRESENT ILLNESS: 54 y/o women with h/o DM2, CVA, NICM with EF of 45 to 50% , ESRD on HD MWF, last dialysis session 6/22, HTN, Gout who presented with chest pain during dialysis yesterday. Patient felt nauseous  Positive nausea,   lightheadedness, SOB.  Denies vomit, chills, palpitations, diaphoresis, fall, tinnitus, blurry vison.      ROS: 12 ROS negative except what is stated in the HPI    Patient History:    Past Medical History:  CVA (cerebral vascular accident)    Depression    DM (diabetes mellitus)    Glaucoma    Gout    HTN (hypertension).     Past Surgical History:  AVF (arteriovenous fistula).     Family History:  Mother  Still living? Unknown  Family history of heart attack, Age at diagnosis: Age Unknown.     Social History:  Social History (marital status, living situation, occupation, tobacco use, alcohol and drug use, and sexual history): .  	Lives with the mother.   	Denies Nicotine.  ETOH< 1 drink / month.        Allergies:  	shellfish: Food, Nausea (Mild to Mod), Hives (Mild to Mod)  	iodine: Drug, Unknown  	Seafood: Food, Unknown    Home Medications:   * Patient Currently Takes Medications as of 15-May-2018 02:15 documented in Structured Notes  · 	colchicine 0.6 mg oral capsule: 1/2 cap(s) orally 2 times a week  · 	Prandin 1 mg oral tablet: 1 tab(s) orally 3 times a day (before meals) MDD:3  · 	lisinopril 5 mg oral tablet: 1 tab(s) orally once a day MDD:1  · 	calcium acetate 667 mg oral tablet: 1  orally 2 times a day MDD:2   · 	atorvastatin 40 mg oral tablet: 1 tab(s) orally once a day (at bedtime) MDD:1  · 	Vitamin D3 400 intl units oral capsule: 1 cap(s) orally once a day  · 	pantoprazole 40 mg oral delayed release tablet: 1 tab(s) orally once a day (before a meal)  · 	amLODIPine 10 mg oral tablet: 1 tab(s) orally once a day  · 	QUEtiapine 50 mg oral tablet: 1 tab(s) orally once a day  · 	clopidogrel 75 mg oral tablet: 1 tab(s) orally once a day  · 	sertraline 100 mg oral tablet: 2 tab(s) orally once a day  · 	aspirin 81 mg oral delayed release tablet: 1 tab(s) orally once a day  · 	tolterodine 4 mg oral capsule, extended release: 1 cap(s) orally once a day  · 	Coreg 12.5 mg oral tablet: 1 tab(s) orally 2 times a day  · 	Lasix 20 mg oral tablet: 1 tab(s) orally once a day  · 	Basaglar KwikPen 100 units/mL subcutaneous solution: 30 unit(s) subcutaneous once a day (at bedtime)      PHYSICAL EXAM  GA: awake and alert, coherent and no distress  HEENT: MMM, clear OP, EOMI, clear conj, anicteric sclera, neck supple  LUNGS: CTABL, normal effort  HEART: S1S2 normal, +[place] apex  ABG: soft, NT/ND; BS+  EXT: well perfused; no edema or cyanosis  NEURO: grossly intact    LABORATORY  	H &H =10.8/ 34.2  	BUN/ Creatinine= 20/ 4.2  	Glucose = 203  	Trop = 58----> 56   	BNP = 798     ASSESSMENT; 54 y/o women with h/o DM2, CVA, NICM with EF of 45 to 50% , ESRD on HD MWF, last dialysis session 6/22, HTN, Gout who presented with chest pain during dialysis yesterday.     1. ESRD on HD MWF  2. HTN; BP acceptable  3. Secondary Hyperparathyroidism  4. Anemia likely due to ESRD  5. Chest pain; management per primary team    RECOMMENDATIONS:  - no emergent need for dialysis; patient was dialyzed Wednesday, Thursday and Friday; chemistry is acceptable and fluid status not an issue  - continue on a MWF schedule unless otherwise indicated  - continue phos binders  - check mag and phos in AM; anemia panel  - dose meds per GFR< 15 ml/min    Thank you for this consult    Abelardo Vazquez MD  [facility]

## 2018-06-23 NOTE — H&P ADULT - ATTENDING COMMENTS
Patient seen and examined.  Agree with above pa note.  patient is a 52 year old woman with history of DM2, CVA, NICMP, EF 45 to 50% , ESRD on HD M/W/F, HTN, Gout, admitted with chest pain during HD    no active sx  Patient denies any chest pain, dyspnea, palpitations, cough, syncope, edema, exertional symptoms, nausea, abdominal pain, fever, chills,  or rash.      A/P    52 year old woman with DM2, CVA, NICMP, EF 45 to 50% , ESRD on HD M/W/F, HTN, Gout, admitted with chest pain      1. chest pain  nick  await stress./echo  cont antiplatelets    2. ESRD   hd per renal     dvt ppx

## 2018-06-23 NOTE — H&P ADULT - RS GEN PE MLT RESP DETAILS PC
no subcutaneous emphysema/respirations non-labored/no chest wall tenderness/clear to auscultation bilaterally/no rhonchi/no wheezes/airway patent/no rales/breath sounds equal/good air movement

## 2018-06-23 NOTE — CHART NOTE - NSCHARTNOTEFT_GEN_A_CORE
Patient seen this morning  In summary patient is a 54 y/o women with h/o ESRD on HD MWF admitted with chest pain. Patient reports having dialysis for last 3 days; Wednesday, Thursday and Friday as she missed dialysis on Monday. She experienced some chest pain 2 hrs into dialysis so was brought in for evaluation. Chart reviewed; no need for HD today based on electrolytes, acid base and fluid status; will plan for HD on Monday if still inpatient unless otherwise indicated.   HD consent signed and in the chart  Rest of management per primary team

## 2018-06-23 NOTE — H&P ADULT - NEGATIVE MUSCULOSKELETAL SYMPTOMS
no joint swelling/no leg pain L/no arm pain L/no arm pain R/no leg pain R/no muscle weakness/no myalgia

## 2018-06-24 ENCOUNTER — TRANSCRIPTION ENCOUNTER (OUTPATIENT)
Age: 53
End: 2018-06-24

## 2018-06-24 VITALS
RESPIRATION RATE: 18 BRPM | DIASTOLIC BLOOD PRESSURE: 63 MMHG | SYSTOLIC BLOOD PRESSURE: 124 MMHG | OXYGEN SATURATION: 98 % | HEART RATE: 74 BPM

## 2018-06-24 LAB
BUN SERPL-MCNC: 48 MG/DL — HIGH (ref 7–23)
CALCIUM SERPL-MCNC: 8.8 MG/DL — SIGNIFICANT CHANGE UP (ref 8.4–10.5)
CHLORIDE SERPL-SCNC: 96 MMOL/L — LOW (ref 98–107)
CO2 SERPL-SCNC: 24 MMOL/L — SIGNIFICANT CHANGE UP (ref 22–31)
CREAT SERPL-MCNC: 7.46 MG/DL — HIGH (ref 0.5–1.3)
FERRITIN SERPL-MCNC: 286.4 NG/ML — HIGH (ref 15–150)
FOLATE SERPL-MCNC: 5.7 NG/ML — SIGNIFICANT CHANGE UP (ref 4.7–20)
GLUCOSE SERPL-MCNC: 169 MG/DL — HIGH (ref 70–99)
IRON SATN MFR SERPL: 233 UG/DL — SIGNIFICANT CHANGE UP (ref 140–530)
IRON SATN MFR SERPL: 44 UG/DL — SIGNIFICANT CHANGE UP (ref 30–160)
MAGNESIUM SERPL-MCNC: 2.1 MG/DL — SIGNIFICANT CHANGE UP (ref 1.6–2.6)
PHOSPHATE SERPL-MCNC: 4.8 MG/DL — HIGH (ref 2.5–4.5)
POTASSIUM SERPL-MCNC: 4.2 MMOL/L — SIGNIFICANT CHANGE UP (ref 3.5–5.3)
POTASSIUM SERPL-SCNC: 4.2 MMOL/L — SIGNIFICANT CHANGE UP (ref 3.5–5.3)
SODIUM SERPL-SCNC: 136 MMOL/L — SIGNIFICANT CHANGE UP (ref 135–145)
UIBC SERPL-MCNC: 188.6 UG/DL — SIGNIFICANT CHANGE UP (ref 110–370)
VIT B12 SERPL-MCNC: 328 PG/ML — SIGNIFICANT CHANGE UP (ref 200–900)

## 2018-06-24 PROCEDURE — 93016 CV STRESS TEST SUPVJ ONLY: CPT | Mod: GC

## 2018-06-24 PROCEDURE — 93018 CV STRESS TEST I&R ONLY: CPT | Mod: GC

## 2018-06-24 PROCEDURE — 78452 HT MUSCLE IMAGE SPECT MULT: CPT | Mod: 26

## 2018-06-24 RX ADMIN — Medication 81 MILLIGRAM(S): at 11:59

## 2018-06-24 RX ADMIN — CARVEDILOL PHOSPHATE 12.5 MILLIGRAM(S): 80 CAPSULE, EXTENDED RELEASE ORAL at 05:22

## 2018-06-24 RX ADMIN — QUETIAPINE FUMARATE 50 MILLIGRAM(S): 200 TABLET, FILM COATED ORAL at 11:59

## 2018-06-24 RX ADMIN — PANTOPRAZOLE SODIUM 40 MILLIGRAM(S): 20 TABLET, DELAYED RELEASE ORAL at 05:22

## 2018-06-24 RX ADMIN — SERTRALINE 200 MILLIGRAM(S): 25 TABLET, FILM COATED ORAL at 12:00

## 2018-06-24 RX ADMIN — Medication 10 MILLIGRAM(S): at 05:22

## 2018-06-24 RX ADMIN — SODIUM CHLORIDE 3 MILLILITER(S): 9 INJECTION INTRAMUSCULAR; INTRAVENOUS; SUBCUTANEOUS at 12:00

## 2018-06-24 RX ADMIN — LISINOPRIL 5 MILLIGRAM(S): 2.5 TABLET ORAL at 05:22

## 2018-06-24 RX ADMIN — Medication 2: at 08:21

## 2018-06-24 RX ADMIN — Medication 20 MILLIGRAM(S): at 05:25

## 2018-06-24 RX ADMIN — SODIUM CHLORIDE 3 MILLILITER(S): 9 INJECTION INTRAMUSCULAR; INTRAVENOUS; SUBCUTANEOUS at 05:25

## 2018-06-24 RX ADMIN — HEPARIN SODIUM 5000 UNIT(S): 5000 INJECTION INTRAVENOUS; SUBCUTANEOUS at 05:21

## 2018-06-24 RX ADMIN — Medication 10 MILLIGRAM(S): at 17:24

## 2018-06-24 RX ADMIN — CARVEDILOL PHOSPHATE 12.5 MILLIGRAM(S): 80 CAPSULE, EXTENDED RELEASE ORAL at 17:23

## 2018-06-24 RX ADMIN — Medication 2: at 17:24

## 2018-06-24 RX ADMIN — CLOPIDOGREL BISULFATE 75 MILLIGRAM(S): 75 TABLET, FILM COATED ORAL at 11:59

## 2018-06-24 RX ADMIN — HEPARIN SODIUM 5000 UNIT(S): 5000 INJECTION INTRAVENOUS; SUBCUTANEOUS at 17:25

## 2018-06-24 RX ADMIN — AMLODIPINE BESYLATE 10 MILLIGRAM(S): 2.5 TABLET ORAL at 05:22

## 2018-06-24 RX ADMIN — Medication 667 MILLIGRAM(S): at 17:23

## 2018-06-24 RX ADMIN — Medication 400 UNIT(S): at 11:59

## 2018-06-24 RX ADMIN — Medication 667 MILLIGRAM(S): at 08:21

## 2018-06-24 NOTE — DISCHARGE NOTE ADULT - ADDITIONAL INSTRUCTIONS
follow up with your Cardiologist in 1-2 weeks   continue with you normal hemodialysis sessions in the community   follow up with Dr. Francisco your Nephrologist

## 2018-06-24 NOTE — DISCHARGE NOTE ADULT - MEDICATION SUMMARY - MEDICATIONS TO TAKE
I will START or STAY ON the medications listed below when I get home from the hospital:    aspirin 81 mg oral delayed release tablet  -- 1 tab(s) by mouth once a day  -- Indication: For Cardio prophylaxis     Tylenol 325 mg oral capsule  -- 1 tab(s) by mouth every 6 hours, As Needed  -- Indication: For pain management     lisinopril 5 mg oral tablet  -- 1 tab(s) by mouth once a day MDD:1  -- Indication: For Essential hypertension    sertraline 100 mg oral tablet  -- 2 tab(s) by mouth once a day  -- Indication: For Depression    Prandin 1 mg oral tablet  -- 1 tab(s) by mouth 3 times a day (before meals) MDD:3  -- Do not drink alcoholic beverages when taking this medication.  It is very important that you take or use this exactly as directed.  Do not skip doses or discontinue unless directed by your doctor.  Obtain medical advice before taking any non-prescription drugs as some may affect the action of this medication.    -- Indication: For Diabetes mellitus, type 2    Basaglar KwikPen 100 units/mL subcutaneous solution  -- 30 unit(s) subcutaneous once a day (at bedtime)  -- Indication: For Diabetes mellitus, type 2    colchicine 0.6 mg oral capsule  -- 1/2 cap(s) by mouth 2 times a week  -- Do not take this drug if you are pregnant.  It is very important that you take or use this exactly as directed.  Do not skip doses or discontinue unless directed by your doctor.    -- Indication: For gout     atorvastatin 40 mg oral tablet  -- 1 tab(s) by mouth once a day (at bedtime) MDD:1  -- Indication: For hyperlipidemia     clopidogrel 75 mg oral tablet  -- 1 tab(s) by mouth once a day  -- Indication: For CVA (cerebral vascular accident)    QUEtiapine 50 mg oral tablet  -- 1 tab(s) by mouth once a day  -- Indication: For Depression    Coreg 12.5 mg oral tablet  -- 1 tab(s) by mouth 2 times a day  -- Indication: For Essential hypertension    amLODIPine 10 mg oral tablet  -- 1 tab(s) by mouth once a day  -- Indication: For Essential hypertension    Lasix 20 mg oral tablet  -- 1 tab(s) by mouth once a day  -- Indication: For ChF    calcium acetate 667 mg oral tablet  -- 1  by mouth 2 times a day MDD:2   -- Indication: For hyperphosphatemia     pantoprazole 40 mg oral delayed release tablet  -- 1 tab(s) by mouth once a day (before a meal)  -- Indication: For GERD     tolterodine 4 mg oral capsule, extended release  -- 1 cap(s) by mouth once a day  -- Indication: For bladder spam     Vitamin D3 400 intl units oral capsule  -- 1 cap(s) by mouth once a day  -- Indication: For supplement

## 2018-06-24 NOTE — DISCHARGE NOTE ADULT - PATIENT PORTAL LINK FT
You can access the [facility] Patient Portal, offered by [facility], by registering with the following website: [email or web address]

## 2018-06-24 NOTE — DISCHARGE NOTE ADULT - CARE PROVIDER_API CALL
Uvaldo Andrew), Cardiovascular Disease; Internal Medicine; Interventional Cardiology; Nuclear Cardiology  [address]  [place] [address]  Phone: [phone]  Fax: [phone]    Wale Francisco), Internal Medicine; Nephrology  [address]  [place] [address]  Phone: [phone]  Fax: [phone]

## 2018-06-24 NOTE — PROGRESS NOTE ADULT - SUBJECTIVE AND OBJECTIVE BOX
Patient seen and examined in bed; no new complaints.  No new events.  Denies CP    REVIEW OF SYSTEMS:  As per HPI, otherwise 8 full 10 ROS were unremarkable    MEDICATIONS  (STANDING):  amLODIPine   Tablet 10 milliGRAM(s) Oral daily  aspirin enteric coated 81 milliGRAM(s) Oral daily  atorvastatin 40 milliGRAM(s) Oral at bedtime  calcium acetate 667 milliGRAM(s) Oral two times a day with meals  carvedilol 12.5 milliGRAM(s) Oral every 12 hours  cholecalciferol 400 Unit(s) Oral daily  clopidogrel Tablet 75 milliGRAM(s) Oral daily  colchicine 0.3 milliGRAM(s) Oral <User Schedule>  dextrose 5%. 1000 milliLiter(s) (50 mL/Hr) IV Continuous <Continuous>  dextrose 50% Injectable 12.5 Gram(s) IV Push once  dextrose 50% Injectable 25 Gram(s) IV Push once  dextrose 50% Injectable 25 Gram(s) IV Push once  furosemide    Tablet 20 milliGRAM(s) Oral daily  heparin  Injectable 5000 Unit(s) SubCutaneous every 12 hours  insulin glargine Injectable (LANTUS) 30 Unit(s) SubCutaneous at bedtime  insulin lispro (HumaLOG) corrective regimen sliding scale   SubCutaneous three times a day before meals  insulin lispro (HumaLOG) corrective regimen sliding scale   SubCutaneous at bedtime  lisinopril 5 milliGRAM(s) Oral daily  oxybutynin 10 milliGRAM(s) Oral two times a day  pantoprazole    Tablet 40 milliGRAM(s) Oral before breakfast  QUEtiapine 50 milliGRAM(s) Oral daily  sertraline 200 milliGRAM(s) Oral daily  sodium chloride 0.9% lock flush 3 milliLiter(s) IV Push every 8 hours      VITAL:  T(C): , Max: 37 (06-24-18 @ 05:20)  T(F): , Max: 98.6 (06-24-18 @ 05:20)  HR: 72 (06-24-18 @ 12:01)  BP: 137/68 (06-24-18 @ 12:01)  BP(mean): --  RR: 18 (06-24-18 @ 12:01)  SpO2: 98% (06-24-18 @ 12:01)  Wt(kg): --    I and O's:        PHYSICAL EXAM:    Constitutional: NAD  HEENT: PERRLA, EOMI,  MMM  Neck: No LAD, No JVD  Respiratory: CTAB  Cardiovascular: S1 and S2  Gastrointestinal: BS+, soft, NT/ND  Extremities: No peripheral edema  Neurological: A/O x 3, no focal deficits  Psychiatric: Normal mood, normal affect  : No John  Skin: No rashes  Access: AVF + thrill    LABS:                        10.7   4.76  )-----------( 208      ( 23 Jun 2018 06:45 )             32.3     06-24    136  |  96<L>  |  48<H>  ----------------------------<  169<H>  4.2   |  24  |  7.46<H>    Ca    8.8      24 Jun 2018 06:07  Phos  4.8     06-24  Mg     2.1     06-24    TPro  7.8  /  Alb  4.4  /  TBili  0.2  /  DBili  x   /  AST  13  /  ALT  9   /  AlkPhos  110  06-22      ASSESSMENT; 52 y/o women with h/o DM2, CVA, NICM with EF of 45 to 50% , ESRD on HD MWF, last dialysis session 6/22, HTN, Gout who presented with chest pain during dialysis yesterday.     1. ESRD on HD MWF  2. HTN; BP remain acceptable  3. Secondary Hyperparathyroidism  4. Anemia likely due to ESRD; Hgb stable as of late  5. Chest pain; management per primary team  6. Hypophosphatemia dt ESRD; recently started on binders; mild    RECOMMENDATIONS:  - Will continue MWF HD schedule; plan for HD tomorrow as ordered  - Continue phos binders as ordered for now  - f/u anemia panel  - dose meds per GFR< 15 ml/min  - Rest of management per primary team

## 2018-06-24 NOTE — DISCHARGE NOTE ADULT - CARE PLAN
Principal Discharge DX:	Chest pain  Goal:	prevent reoccurrence  Assessment and plan of treatment:	stress test normal no evidence of ischemia or infarction  Secondary Diagnosis:	CHF (congestive heart failure)  Assessment and plan of treatment:	Low salt diet, fluid restriction to 1500 ml daily, monitor your fluid intake and weight daily, and follow up with your physician within 1 to 2 weeks.  Secondary Diagnosis:	HTN (hypertension)  Assessment and plan of treatment:	Low sodium and fat diet, continue anti-hypertensive medications, and follow up with primary care physician.  Secondary Diagnosis:	Gout  Assessment and plan of treatment:	continue colchicine as ordered, Tylenol as needed for pain  Secondary Diagnosis:	Diabetes mellitus, type 2  Assessment and plan of treatment:	Monitor finger sticks pre-meal and bedtime, low salt, fat and carbohydrate diet, minimize glucose intake.  Exercise daily for at least 30 minutes and weight loss.  Follow up with primary care physician and endocrinologist for routine Hemoglobin A1C checks and management.  Follow up with your ophthalmologist for routine yearly vision exams.

## 2018-06-24 NOTE — DISCHARGE NOTE ADULT - HOSPITAL COURSE
52F, obese with DM2, CVA, NICM F= 45 to 50% , ESRD on HD M/W/F, last dialysis session 6/22, HTN, Gout, experiencing nonexertional, substernal chest pain that began 2 hours into her dialysis session on 6/22.  Positive nausea,   lightheadedness, SOB.  Denies vomit, chills, palpitations, diaphoresis, fall, tinnitus, blurry vison.      Stress Test: IMPRESSIONS:Normal Study  * Normal study; no evidence for myocardial infarction or  ischemia.  * Post-stress gated wall motion analysis was performed  (LVEF = 47 %;LVEDV = 155 ml.), revealing mild hypokinesis.  * The LV was hypertrophied.    Patient w/ normal stress test d/w cardiologist patient stable for d/c home today. Resume HD in the community.

## 2018-06-24 NOTE — DISCHARGE NOTE ADULT - PLAN OF CARE
prevent reoccurrence stress test normal no evidence of ischemia or infarction Low salt diet, fluid restriction to 1500 ml daily, monitor your fluid intake and weight daily, and follow up with your physician within 1 to 2 weeks. Low sodium and fat diet, continue anti-hypertensive medications, and follow up with primary care physician. continue colchicine as ordered, Tylenol as needed for pain Monitor finger sticks pre-meal and bedtime, low salt, fat and carbohydrate diet, minimize glucose intake.  Exercise daily for at least 30 minutes and weight loss.  Follow up with primary care physician and endocrinologist for routine Hemoglobin A1C checks and management.  Follow up with your ophthalmologist for routine yearly vision exams.

## 2018-06-25 LAB — TRANSFERRIN SERPL-MCNC: 187 MG/DL — LOW (ref 200–360)

## 2018-07-16 PROBLEM — I25.10 ATHEROSCLEROTIC HEART DISEASE OF NATIVE CORONARY ARTERY WITHOUT ANGINA PECTORIS: Chronic | Status: INACTIVE | Noted: 2017-07-09 | Resolved: 2018-01-04

## 2018-08-09 NOTE — PATIENT PROFILE ADULT. - AS SC BRADEN MOISTURE
[FreeTextEntry8] : complians of "nipple " infection for a few days, patient is nursing, reports red, painful tender nipple with no fevers or chills\par used antifungal cream she had and then bactroban she had from previous baby which worked. patient now feelig much better with no\par redness or pain, nursing comfortably (4) rarely moist

## 2018-11-06 ENCOUNTER — EMERGENCY (EMERGENCY)
Facility: HOSPITAL | Age: 53
LOS: 1 days | Discharge: HOME | End: 2018-11-06
Attending: EMERGENCY MEDICINE | Admitting: EMERGENCY MEDICINE
Payer: MEDICAID

## 2018-11-06 VITALS
OXYGEN SATURATION: 98 % | SYSTOLIC BLOOD PRESSURE: 170 MMHG | TEMPERATURE: 98 F | HEART RATE: 68 BPM | DIASTOLIC BLOOD PRESSURE: 81 MMHG | RESPIRATION RATE: 20 BRPM

## 2018-11-06 DIAGNOSIS — I77.0 ARTERIOVENOUS FISTULA, ACQUIRED: Chronic | ICD-10-CM

## 2018-11-06 LAB
ALBUMIN SERPL ELPH-MCNC: 4 G/DL — SIGNIFICANT CHANGE UP (ref 3.3–5)
ALP SERPL-CCNC: 116 U/L — SIGNIFICANT CHANGE UP (ref 40–120)
ALT FLD-CCNC: 10 U/L — SIGNIFICANT CHANGE UP (ref 4–33)
AST SERPL-CCNC: 11 U/L — SIGNIFICANT CHANGE UP (ref 4–32)
BASE EXCESS BLDV CALC-SCNC: 3.8 MMOL/L — SIGNIFICANT CHANGE UP
BASOPHILS # BLD AUTO: 0.04 K/UL — SIGNIFICANT CHANGE UP (ref 0–0.2)
BASOPHILS NFR BLD AUTO: 0.9 % — SIGNIFICANT CHANGE UP (ref 0–2)
BILIRUB SERPL-MCNC: < 0.2 MG/DL — LOW (ref 0.2–1.2)
BLOOD GAS VENOUS - CREATININE: 7.95 MG/DL — HIGH (ref 0.5–1.3)
BUN SERPL-MCNC: 36 MG/DL — HIGH (ref 7–23)
CALCIUM SERPL-MCNC: 9.2 MG/DL — SIGNIFICANT CHANGE UP (ref 8.4–10.5)
CHLORIDE BLDV-SCNC: 102 MMOL/L — SIGNIFICANT CHANGE UP (ref 96–108)
CHLORIDE SERPL-SCNC: 99 MMOL/L — SIGNIFICANT CHANGE UP (ref 98–107)
CO2 SERPL-SCNC: 26 MMOL/L — SIGNIFICANT CHANGE UP (ref 22–31)
CREAT SERPL-MCNC: 8.23 MG/DL — HIGH (ref 0.5–1.3)
EOSINOPHIL # BLD AUTO: 0.16 K/UL — SIGNIFICANT CHANGE UP (ref 0–0.5)
EOSINOPHIL NFR BLD AUTO: 3.6 % — SIGNIFICANT CHANGE UP (ref 0–6)
GAS PNL BLDV: 136 MMOL/L — SIGNIFICANT CHANGE UP (ref 136–146)
GLUCOSE BLDV-MCNC: 162 — HIGH (ref 70–99)
GLUCOSE SERPL-MCNC: 168 MG/DL — HIGH (ref 70–99)
HCO3 BLDV-SCNC: 27 MMOL/L — SIGNIFICANT CHANGE UP (ref 20–27)
HCT VFR BLD CALC: 30.8 % — LOW (ref 34.5–45)
HCT VFR BLDV CALC: 30.6 % — LOW (ref 34.5–45)
HGB BLD-MCNC: 9.8 G/DL — LOW (ref 11.5–15.5)
HGB BLDV-MCNC: 9.9 G/DL — LOW (ref 11.5–15.5)
IMM GRANULOCYTES # BLD AUTO: 0.01 # — SIGNIFICANT CHANGE UP
IMM GRANULOCYTES NFR BLD AUTO: 0.2 % — SIGNIFICANT CHANGE UP (ref 0–1.5)
LACTATE BLDV-MCNC: 1.6 MMOL/L — SIGNIFICANT CHANGE UP (ref 0.5–2)
LYMPHOCYTES # BLD AUTO: 1.48 K/UL — SIGNIFICANT CHANGE UP (ref 1–3.3)
LYMPHOCYTES # BLD AUTO: 33.3 % — SIGNIFICANT CHANGE UP (ref 13–44)
MCHC RBC-ENTMCNC: 29.6 PG — SIGNIFICANT CHANGE UP (ref 27–34)
MCHC RBC-ENTMCNC: 31.8 % — LOW (ref 32–36)
MCV RBC AUTO: 93.1 FL — SIGNIFICANT CHANGE UP (ref 80–100)
MONOCYTES # BLD AUTO: 0.51 K/UL — SIGNIFICANT CHANGE UP (ref 0–0.9)
MONOCYTES NFR BLD AUTO: 11.5 % — SIGNIFICANT CHANGE UP (ref 2–14)
NEUTROPHILS # BLD AUTO: 2.25 K/UL — SIGNIFICANT CHANGE UP (ref 1.8–7.4)
NEUTROPHILS NFR BLD AUTO: 50.5 % — SIGNIFICANT CHANGE UP (ref 43–77)
NRBC # FLD: 0 — SIGNIFICANT CHANGE UP
PCO2 BLDV: 47 MMHG — SIGNIFICANT CHANGE UP (ref 41–51)
PH BLDV: 7.4 PH — SIGNIFICANT CHANGE UP (ref 7.32–7.43)
PLATELET # BLD AUTO: 251 K/UL — SIGNIFICANT CHANGE UP (ref 150–400)
PMV BLD: 10.3 FL — SIGNIFICANT CHANGE UP (ref 7–13)
PO2 BLDV: 46 MMHG — HIGH (ref 35–40)
POTASSIUM BLDV-SCNC: 3.9 MMOL/L — SIGNIFICANT CHANGE UP (ref 3.4–4.5)
POTASSIUM SERPL-MCNC: 4.2 MMOL/L — SIGNIFICANT CHANGE UP (ref 3.5–5.3)
POTASSIUM SERPL-SCNC: 4.2 MMOL/L — SIGNIFICANT CHANGE UP (ref 3.5–5.3)
PROT SERPL-MCNC: 7.4 G/DL — SIGNIFICANT CHANGE UP (ref 6–8.3)
RBC # BLD: 3.31 M/UL — LOW (ref 3.8–5.2)
RBC # FLD: 13.9 % — SIGNIFICANT CHANGE UP (ref 10.3–14.5)
SAO2 % BLDV: 78.1 % — SIGNIFICANT CHANGE UP (ref 60–85)
SODIUM SERPL-SCNC: 139 MMOL/L — SIGNIFICANT CHANGE UP (ref 135–145)
TROPONIN T, HIGH SENSITIVITY: 72 NG/L — CRITICAL HIGH (ref ?–14)
WBC # BLD: 4.45 K/UL — SIGNIFICANT CHANGE UP (ref 3.8–10.5)
WBC # FLD AUTO: 4.45 K/UL — SIGNIFICANT CHANGE UP (ref 3.8–10.5)

## 2018-11-06 PROCEDURE — 99285 EMERGENCY DEPT VISIT HI MDM: CPT

## 2018-11-06 NOTE — ED ADULT NURSE NOTE - INTERVENTIONS DEFINITIONS
Instruct patient to call for assistance/Physically safe environment: no spills, clutter or unnecessary equipment/[place] to call system/Stretcher in lowest position, wheels locked, appropriate side rails in place/Provide visual cue, wrist band, yellow gown, etc./Monitor gait and stability/Room bathroom lighting operational/Non-slip footwear when patient is off stretcher/Call bell, personal items and telephone within reach/Reinforce activity limits and safety measures with patient and family

## 2018-11-06 NOTE — ED ADULT NURSE NOTE - OBJECTIVE STATEMENT
Pt arrives to ED following 1 hour of Dialysis (T,R,S) c/o CP.  Pt reports 7/10 pain to center of chest descrbed as sharp and stabbing and reports this has happened before.  Pt placed on cardiac monitor at bedside.  JACQUELYN Helms placed 22g to rt arm and leon labs.  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  Pt ambulates with a cane.  MD at bedside assessing pt.  Pt has left arm pink band in place due to left arm AV Fistula.

## 2018-11-06 NOTE — ED ADULT TRIAGE NOTE - CHIEF COMPLAINT QUOTE
pt from Dialysis during 1 hr into pt c/o CP, denies SOB/dizziness at present time.    Left arm Av graft.

## 2018-11-07 VITALS
HEART RATE: 76 BPM | RESPIRATION RATE: 20 BRPM | DIASTOLIC BLOOD PRESSURE: 75 MMHG | TEMPERATURE: 98 F | SYSTOLIC BLOOD PRESSURE: 161 MMHG | OXYGEN SATURATION: 100 %

## 2018-11-07 PROBLEM — M10.9 GOUT, UNSPECIFIED: Chronic | Status: ACTIVE | Noted: 2017-07-09

## 2018-11-07 PROBLEM — F32.9 MAJOR DEPRESSIVE DISORDER, SINGLE EPISODE, UNSPECIFIED: Chronic | Status: ACTIVE | Noted: 2017-07-09

## 2018-11-07 LAB — TROPONIN T, HIGH SENSITIVITY: 73 NG/L — CRITICAL HIGH (ref ?–14)

## 2018-11-07 NOTE — ED PROVIDER NOTE - MEDICAL DECISION MAKING DETAILS
Patient is a 52 y/o F w/ a PMHx of T2DM, NICM, and ESRD on HD (M/W/F) who was sent to the ED from dialysis center for chest pain which is now currently improved. Episode is similar to prior episodes in the past. EKG reviewed and is grossly unchanged compared to prior EKGs. Patient currently nontoxic appearing. Will check basic labs and likely delta troponin and reassess.

## 2018-11-07 NOTE — ED PROVIDER NOTE - NS ED ROS FT
REVIEW OF SYSTEMS  General: No fever or chills  Skin/Breast: No itching or rash  Ophthalmologic: + R eye redness, No eye pain  ENMT: No nasal congestion or sore throat  Respiratory and Thorax: No shortness of breath or cough  Cardiovascular: + Chest pain, No palpitations  Gastrointestinal: No diarrhea or abdominal pain  Musculoskeletal: No joint pain or joint swelling  Neurological: No headache or confusion

## 2018-11-07 NOTE — ED PROVIDER NOTE - ATTENDING CONTRIBUTION TO CARE
chung: pmh includes renal failure/dialysis. tonite while one hour into dialysis developed 15 minutes sharp left sided CP. Dialysis stopped and pt sent to ED. She states this happens 2x/week for eyars; she had a nuclear stress test yearly for past several years; all normal. last stress test was several months ago.  exam: NAD. pt eating and w/o complaints.   EKG: no new changes.   troponin low 70s x2.   Impression: Chronic chest pain with normal stress thallium studies.   recc: dc to opt f/u.   d/w renal who agree that emergent dialysis not needed tonite. chung: pmh includes renal failure/dialysis. tonite while one hour into dialysis developed 15 minutes sharp left sided CP. Dialysis stopped and pt sent to ED. She states this happens 2x/week for eyars; she had a nuclear stress test yearly for past several years; all normal. last stress test was several months ago.  exam: NAD. pt eating and w/o complaints.   EKG: no new changes.   troponin low 70s x2.   Impression: Chronic chest pain with normal stress thallium studies.   recc: dc to opt f/u.   d/w renal who agree that emergent dialysis not needed tonite..

## 2018-11-07 NOTE — ED PROVIDER NOTE - DISCHARGE DATE
HYPERTENSIVE RETINOPATHY OU:  IMPORTANCE OF GOOD BLOOD PRESSURE CONTROL STRESSED. KEEP FU WITH PCP AND AS SCHEDULED. 07-Nov-2018

## 2018-11-07 NOTE — ED PROVIDER NOTE - OBJECTIVE STATEMENT
Patient is a 54 y/o F w/ a PMHx of T2DM, NICM, and ESRD on HD (M/W/F) who was sent to the ED from dialysis center for chest pain. Patient reports that she was in her usual state of health until her regularly scheduled dialysis today. Approximately 1 hour into HD, patient reports that she Patient is a 54 y/o F w/ a PMHx of T2DM, NICM, and ESRD on HD (M/W/F) who was sent to the ED from dialysis center for chest pain. Patient reports that she was in her usual state of health until her regularly scheduled dialysis today. Approximately 1 hour into HD, patient reports that she developed substernal, sharp, 7/10 chest pain which was nonradiating. Patient states that she has experienced this pain multiple times in the past and this episode was very similar. Patient was sent to the ED from her dialysis center prior to HD being completed. Patient currently denies any headache, shortness of breath, palpitations, orthopnea, PND, or abdominal pain. She reports that her chest pain is currently improved. No other acute complaints. Of note, patient has had multiple stress tests over the past year for similar symptoms. Her last stress test on 6/24/18 was grossly WNL.

## 2018-11-07 NOTE — ED PROVIDER NOTE - PHYSICAL EXAMINATION
PHYSICAL EXAM:  Constitutional: NAD, Laying comfortably in bed  HEENT: NC/AT, + R eye redness (Chronic per patient), MMM  Neck: Supple  Respiratory: Grossly CTAB; No wheeze or rhonchi appreciated  Cardiovascular: RRR; +S1/S2  Gastrointestinal: +BS, Soft, NT, No rigiditiy  Genitourinary: No John, No suprapubic TTP  Extremities: No peripheral edema  Neurological: A+Ox3, Answers questions and follows commands appropriately  Skin: Warm and dry  Psychiatric: Calm

## 2018-11-07 NOTE — ED PROVIDER NOTE - PROGRESS NOTE DETAILS
Patient reassessed. She reports that her chest pain is currently resolved. Initial hsTrop elevated in the setting of ESRD, but delta troponin was negative. Contacted patient's Nephrologist (Dr. Francisco) regarding patient's presentation to the hospital. Patient without emergent need for HD currently. Ok for patient to be discharged home. Patient agreeable to this plan as well. Patient advised to follow-up closely with Nephrology, her PMD, and her Cardiologist.

## 2018-11-12 ENCOUNTER — EMERGENCY (EMERGENCY)
Facility: HOSPITAL | Age: 53
LOS: 1 days | Discharge: HOME | End: 2018-11-12
Attending: EMERGENCY MEDICINE | Admitting: EMERGENCY MEDICINE
Payer: MEDICAID

## 2018-11-12 VITALS
HEART RATE: 84 BPM | RESPIRATION RATE: 16 BRPM | DIASTOLIC BLOOD PRESSURE: 74 MMHG | OXYGEN SATURATION: 100 % | TEMPERATURE: 98 F | SYSTOLIC BLOOD PRESSURE: 162 MMHG

## 2018-11-12 DIAGNOSIS — I77.0 ARTERIOVENOUS FISTULA, ACQUIRED: Chronic | ICD-10-CM

## 2018-11-12 PROCEDURE — 99284 EMERGENCY DEPT VISIT MOD MDM: CPT | Mod: 25

## 2018-11-12 NOTE — ED ADULT TRIAGE NOTE - CHIEF COMPLAINT QUOTE
pt c/o n/v twice today- pt d/c from [facility] today for n/v, told if symptoms occur again to go to ED- PMH DM, Dialysis (T, TH, Sat) , CHF, HTN- pt appears comfortable, , no active vomiting

## 2018-11-13 ENCOUNTER — EMERGENCY (EMERGENCY)
Facility: HOSPITAL | Age: 53
LOS: 1 days | Discharge: HOME | End: 2018-11-13
Attending: EMERGENCY MEDICINE | Admitting: EMERGENCY MEDICINE
Payer: MEDICAID

## 2018-11-13 VITALS
OXYGEN SATURATION: 100 % | RESPIRATION RATE: 18 BRPM | SYSTOLIC BLOOD PRESSURE: 155 MMHG | HEART RATE: 85 BPM | DIASTOLIC BLOOD PRESSURE: 77 MMHG

## 2018-11-13 VITALS
OXYGEN SATURATION: 97 % | DIASTOLIC BLOOD PRESSURE: 75 MMHG | TEMPERATURE: 99 F | HEART RATE: 83 BPM | SYSTOLIC BLOOD PRESSURE: 176 MMHG | RESPIRATION RATE: 14 BRPM

## 2018-11-13 DIAGNOSIS — I77.0 ARTERIOVENOUS FISTULA, ACQUIRED: Chronic | ICD-10-CM

## 2018-11-13 LAB
ALBUMIN SERPL ELPH-MCNC: 4.3 G/DL — SIGNIFICANT CHANGE UP (ref 3.3–5)
ALP SERPL-CCNC: 106 U/L — SIGNIFICANT CHANGE UP (ref 40–120)
ALT FLD-CCNC: 21 U/L — SIGNIFICANT CHANGE UP (ref 4–33)
APPEARANCE UR: CLEAR — SIGNIFICANT CHANGE UP
AST SERPL-CCNC: 24 U/L — SIGNIFICANT CHANGE UP (ref 4–32)
BACTERIA # UR AUTO: NEGATIVE — SIGNIFICANT CHANGE UP
BASE EXCESS BLDV CALC-SCNC: 3.5 MMOL/L — SIGNIFICANT CHANGE UP
BASOPHILS # BLD AUTO: 0.03 K/UL — SIGNIFICANT CHANGE UP (ref 0–0.2)
BASOPHILS NFR BLD AUTO: 0.6 % — SIGNIFICANT CHANGE UP (ref 0–2)
BILIRUB SERPL-MCNC: 0.4 MG/DL — SIGNIFICANT CHANGE UP (ref 0.2–1.2)
BILIRUB UR-MCNC: NEGATIVE — SIGNIFICANT CHANGE UP
BLOOD GAS VENOUS - CREATININE: 12.1 MG/DL — HIGH (ref 0.5–1.3)
BLOOD UR QL VISUAL: NEGATIVE — SIGNIFICANT CHANGE UP
BUN SERPL-MCNC: 54 MG/DL — HIGH (ref 7–23)
CALCIUM SERPL-MCNC: 9.6 MG/DL — SIGNIFICANT CHANGE UP (ref 8.4–10.5)
CHLORIDE BLDV-SCNC: 101 MMOL/L — SIGNIFICANT CHANGE UP (ref 96–108)
CHLORIDE SERPL-SCNC: 97 MMOL/L — LOW (ref 98–107)
CO2 SERPL-SCNC: 22 MMOL/L — SIGNIFICANT CHANGE UP (ref 22–31)
COLOR SPEC: SIGNIFICANT CHANGE UP
CREAT SERPL-MCNC: 10.76 MG/DL — HIGH (ref 0.5–1.3)
EOSINOPHIL # BLD AUTO: 0.09 K/UL — SIGNIFICANT CHANGE UP (ref 0–0.5)
EOSINOPHIL NFR BLD AUTO: 1.7 % — SIGNIFICANT CHANGE UP (ref 0–6)
GAS PNL BLDV: 134 MMOL/L — LOW (ref 136–146)
GLUCOSE BLDV-MCNC: 122 — HIGH (ref 70–99)
GLUCOSE SERPL-MCNC: 126 MG/DL — HIGH (ref 70–99)
GLUCOSE UR-MCNC: 50 — SIGNIFICANT CHANGE UP
HCO3 BLDV-SCNC: 27 MMOL/L — SIGNIFICANT CHANGE UP (ref 20–27)
HCT VFR BLD CALC: 32.6 % — LOW (ref 34.5–45)
HCT VFR BLDV CALC: 33 % — LOW (ref 34.5–45)
HGB BLD-MCNC: 10.4 G/DL — LOW (ref 11.5–15.5)
HGB BLDV-MCNC: 10.7 G/DL — LOW (ref 11.5–15.5)
HYALINE CASTS # UR AUTO: NEGATIVE — SIGNIFICANT CHANGE UP
IMM GRANULOCYTES # BLD AUTO: 0.01 # — SIGNIFICANT CHANGE UP
IMM GRANULOCYTES NFR BLD AUTO: 0.2 % — SIGNIFICANT CHANGE UP (ref 0–1.5)
KETONES UR-MCNC: SIGNIFICANT CHANGE UP
LACTATE BLDV-MCNC: 1 MMOL/L — SIGNIFICANT CHANGE UP (ref 0.5–2)
LEUKOCYTE ESTERASE UR-ACNC: NEGATIVE — SIGNIFICANT CHANGE UP
LYMPHOCYTES # BLD AUTO: 1.28 K/UL — SIGNIFICANT CHANGE UP (ref 1–3.3)
LYMPHOCYTES # BLD AUTO: 24.8 % — SIGNIFICANT CHANGE UP (ref 13–44)
MCHC RBC-ENTMCNC: 29.3 PG — SIGNIFICANT CHANGE UP (ref 27–34)
MCHC RBC-ENTMCNC: 31.9 % — LOW (ref 32–36)
MCV RBC AUTO: 91.8 FL — SIGNIFICANT CHANGE UP (ref 80–100)
MONOCYTES # BLD AUTO: 0.33 K/UL — SIGNIFICANT CHANGE UP (ref 0–0.9)
MONOCYTES NFR BLD AUTO: 6.4 % — SIGNIFICANT CHANGE UP (ref 2–14)
NEUTROPHILS # BLD AUTO: 3.42 K/UL — SIGNIFICANT CHANGE UP (ref 1.8–7.4)
NEUTROPHILS NFR BLD AUTO: 66.3 % — SIGNIFICANT CHANGE UP (ref 43–77)
NITRITE UR-MCNC: NEGATIVE — SIGNIFICANT CHANGE UP
NRBC # FLD: 0 — SIGNIFICANT CHANGE UP
PCO2 BLDV: 44 MMHG — SIGNIFICANT CHANGE UP (ref 41–51)
PH BLDV: 7.41 PH — SIGNIFICANT CHANGE UP (ref 7.32–7.43)
PH UR: 8 — SIGNIFICANT CHANGE UP (ref 5–8)
PLATELET # BLD AUTO: 229 K/UL — SIGNIFICANT CHANGE UP (ref 150–400)
PMV BLD: 10.8 FL — SIGNIFICANT CHANGE UP (ref 7–13)
PO2 BLDV: 72 MMHG — HIGH (ref 35–40)
POTASSIUM BLDV-SCNC: 4 MMOL/L — SIGNIFICANT CHANGE UP (ref 3.4–4.5)
POTASSIUM SERPL-MCNC: SIGNIFICANT CHANGE UP MMOL/L (ref 3.5–5.3)
POTASSIUM SERPL-SCNC: SIGNIFICANT CHANGE UP MMOL/L (ref 3.5–5.3)
PROT SERPL-MCNC: 8.2 G/DL — SIGNIFICANT CHANGE UP (ref 6–8.3)
PROT UR-MCNC: >600 — HIGH
RBC # BLD: 3.55 M/UL — LOW (ref 3.8–5.2)
RBC # FLD: 13.7 % — SIGNIFICANT CHANGE UP (ref 10.3–14.5)
RBC CASTS # UR COMP ASSIST: HIGH (ref 0–?)
SAO2 % BLDV: 93.8 % — HIGH (ref 60–85)
SODIUM SERPL-SCNC: 135 MMOL/L — SIGNIFICANT CHANGE UP (ref 135–145)
SP GR SPEC: 1.02 — SIGNIFICANT CHANGE UP (ref 1–1.04)
SQUAMOUS # UR AUTO: SIGNIFICANT CHANGE UP
UROBILINOGEN FLD QL: NORMAL — SIGNIFICANT CHANGE UP
WBC # BLD: 5.16 K/UL — SIGNIFICANT CHANGE UP (ref 3.8–10.5)
WBC # FLD AUTO: 5.16 K/UL — SIGNIFICANT CHANGE UP (ref 3.8–10.5)
WBC UR QL: SIGNIFICANT CHANGE UP (ref 0–?)

## 2018-11-13 PROCEDURE — 74176 CT ABD & PELVIS W/O CONTRAST: CPT | Mod: 26

## 2018-11-13 PROCEDURE — 99284 EMERGENCY DEPT VISIT MOD MDM: CPT

## 2018-11-13 RX ORDER — SODIUM CHLORIDE 9 MG/ML
3 INJECTION INTRAMUSCULAR; INTRAVENOUS; SUBCUTANEOUS ONCE
Qty: 0 | Refills: 0 | Status: COMPLETED | OUTPATIENT
Start: 2018-11-13 | End: 2018-11-13

## 2018-11-13 RX ORDER — FAMOTIDINE 10 MG/ML
20 INJECTION INTRAVENOUS ONCE
Qty: 0 | Refills: 0 | Status: COMPLETED | OUTPATIENT
Start: 2018-11-13 | End: 2018-11-13

## 2018-11-13 RX ORDER — ONDANSETRON 8 MG/1
4 TABLET, FILM COATED ORAL ONCE
Qty: 0 | Refills: 0 | Status: COMPLETED | OUTPATIENT
Start: 2018-11-13 | End: 2018-11-13

## 2018-11-13 RX ADMIN — SODIUM CHLORIDE 3 MILLILITER(S): 9 INJECTION INTRAMUSCULAR; INTRAVENOUS; SUBCUTANEOUS at 02:48

## 2018-11-13 RX ADMIN — FAMOTIDINE 20 MILLIGRAM(S): 10 INJECTION INTRAVENOUS at 02:48

## 2018-11-13 RX ADMIN — ONDANSETRON 4 MILLIGRAM(S): 8 TABLET, FILM COATED ORAL at 02:48

## 2018-11-13 NOTE — ED PROVIDER NOTE - OBJECTIVE STATEMENT
53y F hx DM, ESRD MWF now BIBEMS (EMS report not available at time of ED exam) p/w n/v abd pain.  Pt was dc from OSH & was on her way to family when she began to have NBNB emesis & c/o diffuse abd pain worse LUQ.  Nl BM & flatus.  Pt was admitted for the identical sx at OSH but doesn't know the workup.  Pt denied fvr/chills, HA, visual changes, dizziness, light headedness, presyncope, LOC, CP, PICKARD, PND, SOB, cough, hemoptysis, changes in bowel or bladder habits, LE edema, numbness, weakness, changes in gait or rashes.   Pt currently domiciled in shelter.

## 2018-11-13 NOTE — ED PROVIDER NOTE - ATTENDING CONTRIBUTION TO CARE
54 y/o F w/ a PMHx of T2DM, NICM, and ESRD on HD (last session Monday) p/w N/V NBNB on her way back to shelter on bus after discharge from [facility] after admission for same Sx. h/o cholecystectomy. Pt unclear about w/u and diagnosis at OSH. c/o epigastric pain. Afebrile. Awake and Alert. Lungs CTA. Heart RRR. Abdomen soft ND +epigastric TTP. CN II-XII grossly intact. Moves all extremities without lateralization. Check lipase, LFTs, check electrolytes. Zofran and pepcid for Sx. CT a/p r/o pathology, non-contrast given contrast allergy.

## 2018-11-13 NOTE — ED ADULT TRIAGE NOTE - CHIEF COMPLAINT QUOTE
Pt returns to ED for continual nausea, vomiting.  was discharged yesterday, went home and vomited x 3. Also endorses diarrhea and generalized abdominal pain. Pt  was recently admitted to [facility] for the vomiting, unknown diagnosis. Pt dialysis Tuesday, Thursday, Saturday, completed treatment today. Pt appears comfortable, no active vomiting.

## 2018-11-13 NOTE — ED PROVIDER NOTE - PHYSICAL EXAMINATION
No abd bruits or thrills.  No pulsatile abd masses.  No pulse deficits x4 ext.   moist mucous membranes  +thrill/bruit in AVF

## 2018-11-13 NOTE — ED PROVIDER NOTE - PROGRESS NOTE DETAILS
Zvi Dubin, MD (pgy-4) note: Pt seen & reassessed.  Pt symptomatically improved.   A&Ox3, NAD, VSS, pt tolerated PO & ambulated w/steady unassisted gait in the ED.  We discussed the results of ED w/u w/patient &son @bedside (incl. presumptive Emergency Department dx, associated anticipatory guidance, return precautions), & gave them a copy of results.  Patient verbalized understanding of ED course & agreed with our f/u recommendations, has decisional making capacity.  Pt st they will f/u w/PMD within the next 3 days; pt agrees to call today or tomorrow for an appointment. Pt agrees to return to the ED if there is any worsening or concerning symptoms.

## 2018-11-13 NOTE — ED PROVIDER NOTE - NSFOLLOWUPINSTRUCTIONS_ED_ALL_ED_FT
-- Please follow up in cardiology clinic. Explain why you were in the Emergency Department and that you need a follow up visit for continued outpatient care as soon as possible.  We will give you a list of these doctors but make sure your health insurance covers the doctor before making an appointment.   -- Please eat a bland diet (toast, jam, tea, bananas, plain rice, applesauce, etc.) until your symptoms have resolved. Avoid fatty/greasy/spicy foods, alcohol or anything that bothers your stomach.   -- Make sure to stay hydrated. This can be most easily done by drinking frequent small amounts of gatorade or watered down apple/orange juice. It's OK if you do not eat anything for a few days as long as you stay hydrated.  -- Please follow up with your primary doctor(s) within the next 3 days, but seek medical care sooner if your symptoms persist or worsen.  Please call as soon as possible for an appointment.  If you cannot follow up with your doctor please return to the Emergency Department for any urgent issues.  -- You were given a copy of the results from any tests performed during your stay in the Emergency Department.  Please go-over these with your doctor(s).  Some of the tests we sent may not have results yet so please call or have your doctor call the Emergency Department to follow up on all results.     -- If you have any worsening of symptoms, become dehydrated or any have other concerns please see your doctor or return to the Emergency Department.  -- Please continue taking your home medications as directed.  Don't use alcohol when taking any medication (especially antibiotics, tylenol or pain medication) unless you check with the doctor/pharmacist.

## 2018-11-13 NOTE — ED ADULT NURSE NOTE - CHIEF COMPLAINT QUOTE
pt c/o n/v twice today- pt d/c from [facility] today for n/v, told if symptoms occur again to go to ED- PMH DM, Dialysis (T, TH, Sat) , CHF, HTN- pt appears comfortable, , no active vomiting

## 2018-11-13 NOTE — ED PROVIDER NOTE - NSFOLLOWUPCLINICS_GEN_ALL_ED_FT
[facility]  Cardiology  [address] [place] [id number]  Phone: [phone]  Fax:   Follow Up Time: Routine

## 2018-11-13 NOTE — ED PROVIDER NOTE - CARE PLAN
Principal Discharge DX:	Nausea & vomiting  Secondary Diagnosis:	Pericardial effusion Principal Discharge DX:	Nausea & vomiting  Assessment and plan of treatment:	-- Please follow up in cardiology clinic. Explain why you were in the Emergency Department and that you need a follow up visit for continued outpatient care as soon as possible.  We will give you a list of these doctors but make sure your health insurance covers the doctor before making an appointment.   -- Please eat a bland diet (toast, jam, tea, bananas, plain rice, applesauce, etc.) until your symptoms have resolved. Avoid fatty/greasy/spicy foods, alcohol or anything that bothers your stomach.   -- Make sure to stay hydrated. This can be most easily done by drinking frequent small amounts of gatorade or watered down apple/orange juice. It's OK if you do not eat anything for a few days as long as you stay hydrated.  -- Please follow up with your primary doctor(s) within the next 3 days, but seek medical care sooner if your symptoms persist or worsen.  Please call as soon as possible for an appointment.  If you cannot follow up with your doctor please return to the Emergency Department for any urgent issues.  -- You were given a copy of the results from any tests performed during your stay in the Emergency Department.  Please go-over these with your doctor(s).  Some of the tests we sent may not have results yet so please call or have your doctor call the Emergency Department to follow up on all results.     -- If you have any worsening of symptoms, become dehydrated or any have other concerns please see your doctor or return to the Emergency Department.  -- Please continue taking your home medications as directed.  Don't use alcohol when taking any medication (especially antibiotics, tylenol or pain medication) unless you check with the doctor/pharmacist.  Secondary Diagnosis:	Pericardial effusion

## 2018-11-13 NOTE — ED PROVIDER NOTE - MEDICAL DECISION MAKING DETAILS
well appearing afebrile pt w/ continued abd pain n/v similar to recent hospitilization.  Afebrile, VSS, no evidence of hyperK on EKG.  will treat symptomatically, CT, labs, reassess

## 2018-11-14 VITALS
SYSTOLIC BLOOD PRESSURE: 178 MMHG | OXYGEN SATURATION: 100 % | TEMPERATURE: 98 F | HEART RATE: 86 BPM | RESPIRATION RATE: 16 BRPM | DIASTOLIC BLOOD PRESSURE: 92 MMHG

## 2018-11-14 LAB
ALBUMIN SERPL ELPH-MCNC: 3.8 G/DL — SIGNIFICANT CHANGE UP (ref 3.3–5)
ALP SERPL-CCNC: 104 U/L — SIGNIFICANT CHANGE UP (ref 40–120)
ALT FLD-CCNC: 7 U/L — SIGNIFICANT CHANGE UP (ref 4–33)
AST SERPL-CCNC: 15 U/L — SIGNIFICANT CHANGE UP (ref 4–32)
BASOPHILS # BLD AUTO: 0.04 K/UL — SIGNIFICANT CHANGE UP (ref 0–0.2)
BASOPHILS NFR BLD AUTO: 1.1 % — SIGNIFICANT CHANGE UP (ref 0–2)
BILIRUB SERPL-MCNC: 0.2 MG/DL — SIGNIFICANT CHANGE UP (ref 0.2–1.2)
BUN SERPL-MCNC: 21 MG/DL — SIGNIFICANT CHANGE UP (ref 7–23)
CALCIUM SERPL-MCNC: 9 MG/DL — SIGNIFICANT CHANGE UP (ref 8.4–10.5)
CHLORIDE SERPL-SCNC: 99 MMOL/L — SIGNIFICANT CHANGE UP (ref 98–107)
CO2 SERPL-SCNC: 27 MMOL/L — SIGNIFICANT CHANGE UP (ref 22–31)
CREAT SERPL-MCNC: 6.26 MG/DL — HIGH (ref 0.5–1.3)
EOSINOPHIL # BLD AUTO: 0.04 K/UL — SIGNIFICANT CHANGE UP (ref 0–0.5)
EOSINOPHIL NFR BLD AUTO: 1.1 % — SIGNIFICANT CHANGE UP (ref 0–6)
GLUCOSE SERPL-MCNC: 162 MG/DL — HIGH (ref 70–99)
HCT VFR BLD CALC: 31.3 % — LOW (ref 34.5–45)
HGB BLD-MCNC: 10 G/DL — LOW (ref 11.5–15.5)
IMM GRANULOCYTES # BLD AUTO: 0.01 # — SIGNIFICANT CHANGE UP
IMM GRANULOCYTES NFR BLD AUTO: 0.3 % — SIGNIFICANT CHANGE UP (ref 0–1.5)
LIDOCAIN IGE QN: 38.2 U/L — SIGNIFICANT CHANGE UP (ref 7–60)
LYMPHOCYTES # BLD AUTO: 1.1 K/UL — SIGNIFICANT CHANGE UP (ref 1–3.3)
LYMPHOCYTES # BLD AUTO: 29.2 % — SIGNIFICANT CHANGE UP (ref 13–44)
MCHC RBC-ENTMCNC: 29.2 PG — SIGNIFICANT CHANGE UP (ref 27–34)
MCHC RBC-ENTMCNC: 31.9 % — LOW (ref 32–36)
MCV RBC AUTO: 91.3 FL — SIGNIFICANT CHANGE UP (ref 80–100)
MONOCYTES # BLD AUTO: 0.33 K/UL — SIGNIFICANT CHANGE UP (ref 0–0.9)
MONOCYTES NFR BLD AUTO: 8.8 % — SIGNIFICANT CHANGE UP (ref 2–14)
NEUTROPHILS # BLD AUTO: 2.25 K/UL — SIGNIFICANT CHANGE UP (ref 1.8–7.4)
NEUTROPHILS NFR BLD AUTO: 59.5 % — SIGNIFICANT CHANGE UP (ref 43–77)
NRBC # FLD: 0 — SIGNIFICANT CHANGE UP
PLATELET # BLD AUTO: 197 K/UL — SIGNIFICANT CHANGE UP (ref 150–400)
PMV BLD: 10.7 FL — SIGNIFICANT CHANGE UP (ref 7–13)
POTASSIUM SERPL-MCNC: 4.2 MMOL/L — SIGNIFICANT CHANGE UP (ref 3.5–5.3)
POTASSIUM SERPL-SCNC: 4.2 MMOL/L — SIGNIFICANT CHANGE UP (ref 3.5–5.3)
PROT SERPL-MCNC: 7 G/DL — SIGNIFICANT CHANGE UP (ref 6–8.3)
RBC # BLD: 3.43 M/UL — LOW (ref 3.8–5.2)
RBC # FLD: 13.7 % — SIGNIFICANT CHANGE UP (ref 10.3–14.5)
SODIUM SERPL-SCNC: 138 MMOL/L — SIGNIFICANT CHANGE UP (ref 135–145)
WBC # BLD: 3.77 K/UL — LOW (ref 3.8–10.5)
WBC # FLD AUTO: 3.77 K/UL — LOW (ref 3.8–10.5)

## 2018-11-14 RX ORDER — ONDANSETRON 8 MG/1
4 TABLET, FILM COATED ORAL ONCE
Qty: 0 | Refills: 0 | Status: COMPLETED | OUTPATIENT
Start: 2018-11-14 | End: 2018-11-14

## 2018-11-14 RX ORDER — ONDANSETRON 8 MG/1
1 TABLET, FILM COATED ORAL
Qty: 15 | Refills: 0 | OUTPATIENT
Start: 2018-11-14

## 2018-11-14 RX ORDER — SODIUM CHLORIDE 9 MG/ML
3 INJECTION INTRAMUSCULAR; INTRAVENOUS; SUBCUTANEOUS ONCE
Qty: 0 | Refills: 0 | Status: COMPLETED | OUTPATIENT
Start: 2018-11-14 | End: 2018-11-14

## 2018-11-14 RX ADMIN — SODIUM CHLORIDE 3 MILLILITER(S): 9 INJECTION INTRAMUSCULAR; INTRAVENOUS; SUBCUTANEOUS at 01:41

## 2018-11-14 RX ADMIN — ONDANSETRON 4 MILLIGRAM(S): 8 TABLET, FILM COATED ORAL at 01:56

## 2018-11-14 RX ADMIN — ONDANSETRON 4 MILLIGRAM(S): 8 TABLET, FILM COATED ORAL at 03:39

## 2018-11-14 NOTE — ED ADULT NURSE NOTE - CHIEF COMPLAINT QUOTE
Pt returns to ED for continual nausea, vomiting.  was discharged yesterday, went home and vomited x 3. Also endorses diarrhea and generalized abdominal pain. Pt  was recently admitted to [facility] for the vomiting, unknown diagnosis. Pt dialysis Tuesday, Thursday, Saturday, completed treatment today. Pt appears comfortable, no active vomiting.

## 2018-11-14 NOTE — ED PROVIDER NOTE - NSFOLLOWUPINSTRUCTIONS_ED_ALL_ED_FT
1. TAKE ALL MEDICATIONS AS DIRECTED.    2. FOR PAIN OR FEVER YOU CAN TAKE IBUPROFEN (MOTRIN, ADVIL) OR ACETAMINOPHEN (TYLENOL) AS NEEDED, AS DIRECTED ON PACKAGING.  3. FOLLOW UP WITH YOUR PRIMARY DOCTOR WITHIN 5 DAYS AS DIRECTED.  4. IF YOU HAD LABS OR IMAGING DONE, YOU WERE GIVEN COPIES OF ALL LABS AND/OR IMAGING RESULTS FROM YOUR ER VISIT--PLEASE TAKE THEM WITH YOU TO YOUR FOLLOW UP APPOINTMENTS.  5. IF NEEDED, CALL PATIENT ACCESS SERVICES AT [phone] TO FIND A PRIMARY CARE PHYSICIAN.  OR CALL [phone] TO MAKE AN APPOINTMENT WITH THE CLINIC.  6. RETURN TO THE ER FOR ANY WORSENING SYMPTOMS OR CONCERNS.

## 2018-11-14 NOTE — ED PROVIDER NOTE - PROGRESS NOTE DETAILS
Pt feeling significantly better at this time - tolerating PO.  Labs are ok.  Will dc with zofran ODT tabs

## 2018-11-14 NOTE — ED PROVIDER NOTE - PHYSICAL EXAMINATION
Gen: Well appearing in NAD  Head: NC/AT  Neck: trachea midline  Resp:  No distress  CV: RRR  Abd: soft NT ND  Ext: no deformities  Neuro:  A&O appears non focal  Skin:  Warm and dry as visualized  Psych:  Normal affect and mood

## 2018-11-14 NOTE — ED ADULT NURSE NOTE - OBJECTIVE STATEMENT
Break coverage RN- Received pt in spot 25. [id number]. C/O vomiting since Saturday with intermittent abd pain and diarrhea. Denies dizziness, sob, chest pain, fevers or chills. Pt appears comfortable, laughing with son at bedside. 20G IV placed to right hand, labs sent. Will monitor.

## 2018-11-14 NOTE — ED PROVIDER NOTE - OBJECTIVE STATEMENT
54 yo with ESRD T Th Sa with recurrent visit for NV.  Was seen over the weekend at an OSH where she was admitted and had labs and a CT scan and was able to tolerate PO at dc.  Was here last night for same with NBNB emesis.  Had labs and a CT scan at that point that were unremarkable.  Went home feeling well but threw up NBNB three times since being home and on way to HD today.  No emesis since HD but came to ED to see why this is happening.  Was not dc with zofran after yesterdays visit.

## 2018-11-14 NOTE — ED PROVIDER NOTE - MEDICAL DECISION MAKING DETAILS
pt with repeat visit for NV.  Already had a CT scan without abnormality yesterday and had an inpatient workup at an OSH.  Did respond well to anti-emetics yesterday.  Will ensure lytes ok (although would assume so based on HD tonight) as well as give more zofran.  No indication to image again at this time.  If tolerates PO will dc home with PO zofran ODT

## 2018-11-14 NOTE — ED ADULT NURSE REASSESSMENT NOTE - NS ED NURSE REASSESS COMMENT FT1
cindy rn- i called via medicaid service for pt transport back to shelter. confirmation #[id number], [employer] radio [phone]. pt awaiting taxi in waiting room.

## 2018-11-14 NOTE — ED ADULT NURSE NOTE - NSIMPLEMENTINTERV_GEN_ALL_ED
Implemented All Universal Safety Interventions:  [place] to call system. Call bell, personal items and telephone within reach. Instruct patient to call for assistance. Room bathroom lighting operational. Non-slip footwear when patient is off stretcher. Physically safe environment: no spills, clutter or unnecessary equipment. Stretcher in lowest position, wheels locked, appropriate side rails in place.

## 2019-03-15 ENCOUNTER — INPATIENT (INPATIENT)
Facility: HOSPITAL | Age: 54
LOS: 9 days | Discharge: HOME | End: 2019-03-25
Attending: HOSPITALIST | Admitting: HOSPITALIST
Payer: MEDICAID

## 2019-03-15 VITALS
TEMPERATURE: 98 F | OXYGEN SATURATION: 95 % | HEART RATE: 115 BPM | RESPIRATION RATE: 16 BRPM | SYSTOLIC BLOOD PRESSURE: 123 MMHG | DIASTOLIC BLOOD PRESSURE: 80 MMHG

## 2019-03-15 DIAGNOSIS — I77.0 ARTERIOVENOUS FISTULA, ACQUIRED: Chronic | ICD-10-CM

## 2019-03-15 NOTE — ED ADULT TRIAGE NOTE - CHIEF COMPLAINT QUOTE
alert no distress c/o left flank pain and nausea  started 1 week ago   was seen at [facility]  this week with same c/o   hx ESRD with HD had dialysis today  HTN DM  high cholesterol

## 2019-03-16 DIAGNOSIS — M10.9 GOUT, UNSPECIFIED: ICD-10-CM

## 2019-03-16 DIAGNOSIS — R74.8 ABNORMAL LEVELS OF OTHER SERUM ENZYMES: ICD-10-CM

## 2019-03-16 DIAGNOSIS — I10 ESSENTIAL (PRIMARY) HYPERTENSION: ICD-10-CM

## 2019-03-16 DIAGNOSIS — E11.9 TYPE 2 DIABETES MELLITUS WITHOUT COMPLICATIONS: ICD-10-CM

## 2019-03-16 DIAGNOSIS — N18.6 END STAGE RENAL DISEASE: ICD-10-CM

## 2019-03-16 DIAGNOSIS — Z29.9 ENCOUNTER FOR PROPHYLACTIC MEASURES, UNSPECIFIED: ICD-10-CM

## 2019-03-16 DIAGNOSIS — R10.9 UNSPECIFIED ABDOMINAL PAIN: ICD-10-CM

## 2019-03-16 DIAGNOSIS — R07.9 CHEST PAIN, UNSPECIFIED: ICD-10-CM

## 2019-03-16 DIAGNOSIS — E78.5 HYPERLIPIDEMIA, UNSPECIFIED: ICD-10-CM

## 2019-03-16 DIAGNOSIS — F32.9 MAJOR DEPRESSIVE DISORDER, SINGLE EPISODE, UNSPECIFIED: ICD-10-CM

## 2019-03-16 LAB
ALBUMIN SERPL ELPH-MCNC: 3.8 G/DL — SIGNIFICANT CHANGE UP (ref 3.3–5)
ALP SERPL-CCNC: 145 U/L — HIGH (ref 40–120)
ALT FLD-CCNC: 25 U/L — SIGNIFICANT CHANGE UP (ref 4–33)
ANION GAP SERPL CALC-SCNC: 17 MMO/L — HIGH (ref 7–14)
APPEARANCE UR: CLEAR — SIGNIFICANT CHANGE UP
AST SERPL-CCNC: 20 U/L — SIGNIFICANT CHANGE UP (ref 4–32)
BASE EXCESS BLDV CALC-SCNC: 3.7 MMOL/L — SIGNIFICANT CHANGE UP
BASOPHILS # BLD AUTO: 0.06 K/UL — SIGNIFICANT CHANGE UP (ref 0–0.2)
BASOPHILS NFR BLD AUTO: 1.4 % — SIGNIFICANT CHANGE UP (ref 0–2)
BILIRUB SERPL-MCNC: 0.3 MG/DL — SIGNIFICANT CHANGE UP (ref 0.2–1.2)
BILIRUB UR-MCNC: NEGATIVE — SIGNIFICANT CHANGE UP
BLOOD GAS VENOUS - CREATININE: 11.2 MG/DL — HIGH (ref 0.5–1.3)
BLOOD UR QL VISUAL: SIGNIFICANT CHANGE UP
BUN SERPL-MCNC: 49 MG/DL — HIGH (ref 7–23)
CALCIUM SERPL-MCNC: 9.2 MG/DL — SIGNIFICANT CHANGE UP (ref 8.4–10.5)
CHLORIDE BLDV-SCNC: 102 MMOL/L — SIGNIFICANT CHANGE UP (ref 96–108)
CHLORIDE SERPL-SCNC: 95 MMOL/L — LOW (ref 98–107)
CK MB BLD-MCNC: 2.39 NG/ML — SIGNIFICANT CHANGE UP (ref 1–4.7)
CK SERPL-CCNC: 116 U/L — SIGNIFICANT CHANGE UP (ref 25–170)
CO2 SERPL-SCNC: 23 MMOL/L — SIGNIFICANT CHANGE UP (ref 22–31)
COLOR SPEC: YELLOW — SIGNIFICANT CHANGE UP
CREAT SERPL-MCNC: 9.62 MG/DL — HIGH (ref 0.5–1.3)
EOSINOPHIL # BLD AUTO: 0.12 K/UL — SIGNIFICANT CHANGE UP (ref 0–0.5)
EOSINOPHIL NFR BLD AUTO: 2.8 % — SIGNIFICANT CHANGE UP (ref 0–6)
GAS PNL BLDV: 127 MMOL/L — LOW (ref 136–146)
GLUCOSE BLDV-MCNC: 116 — HIGH (ref 70–99)
GLUCOSE SERPL-MCNC: 110 MG/DL — HIGH (ref 70–99)
GLUCOSE UR-MCNC: 100 — SIGNIFICANT CHANGE UP
HCO3 BLDV-SCNC: 27 MMOL/L — SIGNIFICANT CHANGE UP (ref 20–27)
HCT VFR BLD CALC: 36.3 % — SIGNIFICANT CHANGE UP (ref 34.5–45)
HCT VFR BLDV CALC: 36.3 % — SIGNIFICANT CHANGE UP (ref 34.5–45)
HGB BLD-MCNC: 11.3 G/DL — LOW (ref 11.5–15.5)
HGB BLDV-MCNC: 11.8 G/DL — SIGNIFICANT CHANGE UP (ref 11.5–15.5)
IMM GRANULOCYTES NFR BLD AUTO: 0 % — SIGNIFICANT CHANGE UP (ref 0–1.5)
KETONES UR-MCNC: NEGATIVE — SIGNIFICANT CHANGE UP
LACTATE BLDV-MCNC: 1.2 MMOL/L — SIGNIFICANT CHANGE UP (ref 0.5–2)
LEUKOCYTE ESTERASE UR-ACNC: NEGATIVE — SIGNIFICANT CHANGE UP
LIDOCAIN IGE QN: 44 U/L — SIGNIFICANT CHANGE UP (ref 7–60)
LIDOCAIN IGE QN: 64.2 U/L — HIGH (ref 7–60)
LYMPHOCYTES # BLD AUTO: 2.21 K/UL — SIGNIFICANT CHANGE UP (ref 1–3.3)
LYMPHOCYTES # BLD AUTO: 51.5 % — HIGH (ref 13–44)
MCHC RBC-ENTMCNC: 28.3 PG — SIGNIFICANT CHANGE UP (ref 27–34)
MCHC RBC-ENTMCNC: 31.1 % — LOW (ref 32–36)
MCV RBC AUTO: 90.8 FL — SIGNIFICANT CHANGE UP (ref 80–100)
MONOCYTES # BLD AUTO: 0.33 K/UL — SIGNIFICANT CHANGE UP (ref 0–0.9)
MONOCYTES NFR BLD AUTO: 7.7 % — SIGNIFICANT CHANGE UP (ref 2–14)
NEUTROPHILS # BLD AUTO: 1.57 K/UL — LOW (ref 1.8–7.4)
NEUTROPHILS NFR BLD AUTO: 36.6 % — LOW (ref 43–77)
NITRITE UR-MCNC: NEGATIVE — SIGNIFICANT CHANGE UP
NRBC # FLD: 0 K/UL — LOW (ref 25–125)
PCO2 BLDV: 50 MMHG — SIGNIFICANT CHANGE UP (ref 41–51)
PH BLDV: 7.37 PH — SIGNIFICANT CHANGE UP (ref 7.32–7.43)
PH UR: 8.5 — HIGH (ref 5–8)
PLATELET # BLD AUTO: 202 K/UL — SIGNIFICANT CHANGE UP (ref 150–400)
PMV BLD: 12 FL — SIGNIFICANT CHANGE UP (ref 7–13)
PO2 BLDV: 39 MMHG — SIGNIFICANT CHANGE UP (ref 35–40)
POTASSIUM BLDV-SCNC: SIGNIFICANT CHANGE UP MMOL/L (ref 3.4–4.5)
POTASSIUM SERPL-MCNC: 5 MMOL/L — SIGNIFICANT CHANGE UP (ref 3.5–5.3)
POTASSIUM SERPL-SCNC: 5 MMOL/L — SIGNIFICANT CHANGE UP (ref 3.5–5.3)
PROT SERPL-MCNC: 7.8 G/DL — SIGNIFICANT CHANGE UP (ref 6–8.3)
PROT UR-MCNC: 300 — HIGH
RBC # BLD: 4 M/UL — SIGNIFICANT CHANGE UP (ref 3.8–5.2)
RBC # FLD: 13 % — SIGNIFICANT CHANGE UP (ref 10.3–14.5)
RBC CASTS # UR COMP ASSIST: SIGNIFICANT CHANGE UP (ref 0–?)
SAO2 % BLDV: 66.9 % — SIGNIFICANT CHANGE UP (ref 60–85)
SODIUM SERPL-SCNC: 135 MMOL/L — SIGNIFICANT CHANGE UP (ref 135–145)
SP GR SPEC: 1.02 — SIGNIFICANT CHANGE UP (ref 1–1.04)
SQUAMOUS # UR AUTO: SIGNIFICANT CHANGE UP
TROPONIN T, HIGH SENSITIVITY: 68 NG/L — CRITICAL HIGH (ref ?–14)
TROPONIN T, HIGH SENSITIVITY: 73 NG/L — CRITICAL HIGH (ref ?–14)
UROBILINOGEN FLD QL: 0.2 — SIGNIFICANT CHANGE UP
WBC # BLD: 4.29 K/UL — SIGNIFICANT CHANGE UP (ref 3.8–10.5)
WBC # FLD AUTO: 4.29 K/UL — SIGNIFICANT CHANGE UP (ref 3.8–10.5)
WBC UR QL: SIGNIFICANT CHANGE UP (ref 0–?)

## 2019-03-16 PROCEDURE — 76705 ECHO EXAM OF ABDOMEN: CPT | Mod: 26

## 2019-03-16 PROCEDURE — 74176 CT ABD & PELVIS W/O CONTRAST: CPT | Mod: 26

## 2019-03-16 PROCEDURE — 71045 X-RAY EXAM CHEST 1 VIEW: CPT | Mod: 26

## 2019-03-16 RX ORDER — SIMVASTATIN 20 MG/1
40 TABLET, FILM COATED ORAL AT BEDTIME
Qty: 0 | Refills: 0 | Status: DISCONTINUED | OUTPATIENT
Start: 2019-03-16 | End: 2019-03-25

## 2019-03-16 RX ORDER — HYDRALAZINE HCL 50 MG
50 TABLET ORAL
Qty: 0 | Refills: 0 | Status: DISCONTINUED | OUTPATIENT
Start: 2019-03-16 | End: 2019-03-17

## 2019-03-16 RX ORDER — FAMOTIDINE 10 MG/ML
10 INJECTION INTRAVENOUS ONCE
Qty: 0 | Refills: 0 | Status: COMPLETED | OUTPATIENT
Start: 2019-03-16 | End: 2019-03-16

## 2019-03-16 RX ORDER — HEPARIN SODIUM 5000 [USP'U]/ML
5000 INJECTION INTRAVENOUS; SUBCUTANEOUS EVERY 8 HOURS
Qty: 0 | Refills: 0 | Status: DISCONTINUED | OUTPATIENT
Start: 2019-03-16 | End: 2019-03-25

## 2019-03-16 RX ORDER — DEXTROSE 50 % IN WATER 50 %
25 SYRINGE (ML) INTRAVENOUS ONCE
Qty: 0 | Refills: 0 | Status: DISCONTINUED | OUTPATIENT
Start: 2019-03-16 | End: 2019-03-25

## 2019-03-16 RX ORDER — ONDANSETRON 8 MG/1
4 TABLET, FILM COATED ORAL ONCE
Qty: 0 | Refills: 0 | Status: COMPLETED | OUTPATIENT
Start: 2019-03-16 | End: 2019-03-16

## 2019-03-16 RX ORDER — DEXTROSE 50 % IN WATER 50 %
15 SYRINGE (ML) INTRAVENOUS ONCE
Qty: 0 | Refills: 0 | Status: DISCONTINUED | OUTPATIENT
Start: 2019-03-16 | End: 2019-03-25

## 2019-03-16 RX ORDER — QUETIAPINE FUMARATE 200 MG/1
50 TABLET, FILM COATED ORAL AT BEDTIME
Qty: 0 | Refills: 0 | Status: DISCONTINUED | OUTPATIENT
Start: 2019-03-16 | End: 2019-03-25

## 2019-03-16 RX ORDER — SODIUM CHLORIDE 9 MG/ML
1000 INJECTION INTRAMUSCULAR; INTRAVENOUS; SUBCUTANEOUS ONCE
Qty: 0 | Refills: 0 | Status: COMPLETED | OUTPATIENT
Start: 2019-03-16 | End: 2019-03-16

## 2019-03-16 RX ORDER — CARVEDILOL PHOSPHATE 80 MG/1
12.5 CAPSULE, EXTENDED RELEASE ORAL EVERY 12 HOURS
Qty: 0 | Refills: 0 | Status: DISCONTINUED | OUTPATIENT
Start: 2019-03-16 | End: 2019-03-25

## 2019-03-16 RX ORDER — PANTOPRAZOLE SODIUM 20 MG/1
40 TABLET, DELAYED RELEASE ORAL
Qty: 0 | Refills: 0 | Status: DISCONTINUED | OUTPATIENT
Start: 2019-03-16 | End: 2019-03-25

## 2019-03-16 RX ORDER — SODIUM CHLORIDE 9 MG/ML
1000 INJECTION, SOLUTION INTRAVENOUS
Qty: 0 | Refills: 0 | Status: DISCONTINUED | OUTPATIENT
Start: 2019-03-16 | End: 2019-03-25

## 2019-03-16 RX ORDER — DEXTROSE 50 % IN WATER 50 %
12.5 SYRINGE (ML) INTRAVENOUS ONCE
Qty: 0 | Refills: 0 | Status: DISCONTINUED | OUTPATIENT
Start: 2019-03-16 | End: 2019-03-25

## 2019-03-16 RX ORDER — INSULIN LISPRO 100/ML
VIAL (ML) SUBCUTANEOUS
Qty: 0 | Refills: 0 | Status: DISCONTINUED | OUTPATIENT
Start: 2019-03-16 | End: 2019-03-25

## 2019-03-16 RX ORDER — FUROSEMIDE 40 MG
20 TABLET ORAL DAILY
Qty: 0 | Refills: 0 | Status: DISCONTINUED | OUTPATIENT
Start: 2019-03-16 | End: 2019-03-25

## 2019-03-16 RX ORDER — INSULIN GLARGINE 100 [IU]/ML
30 INJECTION, SOLUTION SUBCUTANEOUS
Qty: 0 | Refills: 0 | COMMUNITY

## 2019-03-16 RX ORDER — INSULIN LISPRO 100/ML
VIAL (ML) SUBCUTANEOUS AT BEDTIME
Qty: 0 | Refills: 0 | Status: DISCONTINUED | OUTPATIENT
Start: 2019-03-16 | End: 2019-03-25

## 2019-03-16 RX ORDER — INSULIN GLARGINE 100 [IU]/ML
30 INJECTION, SOLUTION SUBCUTANEOUS AT BEDTIME
Qty: 0 | Refills: 0 | Status: DISCONTINUED | OUTPATIENT
Start: 2019-03-16 | End: 2019-03-19

## 2019-03-16 RX ORDER — ISOSORBIDE DINITRATE 5 MG/1
10 TABLET ORAL THREE TIMES A DAY
Qty: 0 | Refills: 0 | Status: DISCONTINUED | OUTPATIENT
Start: 2019-03-16 | End: 2019-03-25

## 2019-03-16 RX ORDER — TOLTERODINE TARTRATE 1 MG/1
1 TABLET, FILM COATED ORAL
Qty: 0 | Refills: 0 | COMMUNITY

## 2019-03-16 RX ORDER — SODIUM CHLORIDE 9 MG/ML
3 INJECTION INTRAMUSCULAR; INTRAVENOUS; SUBCUTANEOUS EVERY 8 HOURS
Qty: 0 | Refills: 0 | Status: DISCONTINUED | OUTPATIENT
Start: 2019-03-16 | End: 2019-03-25

## 2019-03-16 RX ORDER — CLOPIDOGREL BISULFATE 75 MG/1
75 TABLET, FILM COATED ORAL DAILY
Qty: 0 | Refills: 0 | Status: DISCONTINUED | OUTPATIENT
Start: 2019-03-16 | End: 2019-03-25

## 2019-03-16 RX ORDER — SERTRALINE 25 MG/1
200 TABLET, FILM COATED ORAL DAILY
Qty: 0 | Refills: 0 | Status: DISCONTINUED | OUTPATIENT
Start: 2019-03-16 | End: 2019-03-25

## 2019-03-16 RX ORDER — ALLOPURINOL 300 MG
100 TABLET ORAL DAILY
Qty: 0 | Refills: 0 | Status: DISCONTINUED | OUTPATIENT
Start: 2019-03-16 | End: 2019-03-25

## 2019-03-16 RX ORDER — ACETAMINOPHEN 500 MG
1 TABLET ORAL
Qty: 0 | Refills: 0 | COMMUNITY

## 2019-03-16 RX ORDER — CEFTRIAXONE 500 MG/1
INJECTION, POWDER, FOR SOLUTION INTRAMUSCULAR; INTRAVENOUS
Qty: 0 | Refills: 0 | Status: DISCONTINUED | OUTPATIENT
Start: 2019-03-16 | End: 2019-03-22

## 2019-03-16 RX ORDER — OXYBUTYNIN CHLORIDE 5 MG
5 TABLET ORAL
Qty: 0 | Refills: 0 | Status: DISCONTINUED | OUTPATIENT
Start: 2019-03-16 | End: 2019-03-25

## 2019-03-16 RX ORDER — ASPIRIN/CALCIUM CARB/MAGNESIUM 324 MG
81 TABLET ORAL DAILY
Qty: 0 | Refills: 0 | Status: DISCONTINUED | OUTPATIENT
Start: 2019-03-16 | End: 2019-03-25

## 2019-03-16 RX ORDER — CARVEDILOL PHOSPHATE 80 MG/1
1 CAPSULE, EXTENDED RELEASE ORAL
Qty: 0 | Refills: 0 | COMMUNITY

## 2019-03-16 RX ORDER — CEFTRIAXONE 500 MG/1
1 INJECTION, POWDER, FOR SOLUTION INTRAMUSCULAR; INTRAVENOUS EVERY 24 HOURS
Qty: 0 | Refills: 0 | Status: DISCONTINUED | OUTPATIENT
Start: 2019-03-17 | End: 2019-03-22

## 2019-03-16 RX ORDER — KETOROLAC TROMETHAMINE 30 MG/ML
30 SYRINGE (ML) INJECTION ONCE
Qty: 0 | Refills: 0 | Status: DISCONTINUED | OUTPATIENT
Start: 2019-03-16 | End: 2019-03-16

## 2019-03-16 RX ORDER — CEFTRIAXONE 500 MG/1
1 INJECTION, POWDER, FOR SOLUTION INTRAMUSCULAR; INTRAVENOUS ONCE
Qty: 0 | Refills: 0 | Status: COMPLETED | OUTPATIENT
Start: 2019-03-16 | End: 2019-03-16

## 2019-03-16 RX ORDER — GLUCAGON INJECTION, SOLUTION 0.5 MG/.1ML
1 INJECTION, SOLUTION SUBCUTANEOUS ONCE
Qty: 0 | Refills: 0 | Status: DISCONTINUED | OUTPATIENT
Start: 2019-03-16 | End: 2019-03-25

## 2019-03-16 RX ADMIN — ISOSORBIDE DINITRATE 10 MILLIGRAM(S): 5 TABLET ORAL at 22:45

## 2019-03-16 RX ADMIN — ONDANSETRON 4 MILLIGRAM(S): 8 TABLET, FILM COATED ORAL at 11:39

## 2019-03-16 RX ADMIN — SIMVASTATIN 40 MILLIGRAM(S): 20 TABLET, FILM COATED ORAL at 22:45

## 2019-03-16 RX ADMIN — ONDANSETRON 4 MILLIGRAM(S): 8 TABLET, FILM COATED ORAL at 03:15

## 2019-03-16 RX ADMIN — Medication 30 MILLIGRAM(S): at 05:25

## 2019-03-16 RX ADMIN — QUETIAPINE FUMARATE 50 MILLIGRAM(S): 200 TABLET, FILM COATED ORAL at 22:45

## 2019-03-16 RX ADMIN — FAMOTIDINE 10 MILLIGRAM(S): 10 INJECTION INTRAVENOUS at 02:50

## 2019-03-16 RX ADMIN — SODIUM CHLORIDE 1000 MILLILITER(S): 9 INJECTION INTRAMUSCULAR; INTRAVENOUS; SUBCUTANEOUS at 03:06

## 2019-03-16 RX ADMIN — HEPARIN SODIUM 5000 UNIT(S): 5000 INJECTION INTRAVENOUS; SUBCUTANEOUS at 23:01

## 2019-03-16 RX ADMIN — CEFTRIAXONE 100 GRAM(S): 500 INJECTION, POWDER, FOR SOLUTION INTRAMUSCULAR; INTRAVENOUS at 18:24

## 2019-03-16 RX ADMIN — INSULIN GLARGINE 30 UNIT(S): 100 INJECTION, SOLUTION SUBCUTANEOUS at 22:45

## 2019-03-16 RX ADMIN — SODIUM CHLORIDE 3 MILLILITER(S): 9 INJECTION INTRAMUSCULAR; INTRAVENOUS; SUBCUTANEOUS at 22:58

## 2019-03-16 RX ADMIN — Medication 30 MILLIGRAM(S): at 09:24

## 2019-03-16 NOTE — ED ADULT NURSE NOTE - CHIEF COMPLAINT QUOTE
alert no distress c/o left flank pain and nausea  started 1 week ago   was seen at [facility]  this week with same c/o   hx ESRD with HD had dialysis today  HTN DM  high cholesterol

## 2019-03-16 NOTE — ED ADULT NURSE REASSESSMENT NOTE - NS ED NURSE REASSESS COMMENT FT1
pt is in bed  A and OX  3  in NAD resting comfortably in bed, noted pt to consume  1 glass of water, pt refused food states " I can't keep it down" denies N/V at this time.

## 2019-03-16 NOTE — ED ADULT NURSE NOTE - OBJECTIVE STATEMENT
rec'd pt in room 23... pt states missed 2  hd appts.. was admitted to [facility] and AK'ed on friday but developed vomiting, diarrhea and left abd pain .  states informed provider at [facility] and told was b/c she missed her hd but symptoms persisted. pt has av fistula left arm. + thrills. pt aa&ox4...has general weakness secondary to cva. uses a cane.  has chronic tremors. ..states doesn't know why but has had since her cva.  pt appears comfortable. reports had diarrhea x4 and vomiting x3 today.  22 gauge inserted right wrist. blood sent. ua sent. ucg pndg.

## 2019-03-16 NOTE — H&P ADULT - NSICDXPROBLEM_GEN_ALL_CORE_FT
PROBLEM DIAGNOSES  Problem: Abdominal pain  Assessment and Plan: - CT abdomen and US abdomen shows cystitis and bladder wall thinkening, will treat for cystitis   - Started on IV ceftriaxone  - f/u UCx  - lipase midly elevated, will trend, but unconcerning at this time  - LFTs unimpressive   - Will monitor closely, supportive tx w/ PPI/Maalox    Problem: Chest pain  Assessment and Plan: - Admit to telemetry   - Likely releated to GERD symptoms   - EKG  - Cardaic enzymes trending down, continuing to monitor   - proBNP  - ASA/Plavix   - TTE ordered      Problem: Elevated troponin  Assessment and Plan: - Liekly 2/2 to ESRD  - Trending down  - Continuing to trend enzymes    Problem: ESRD (end stage renal disease)  Assessment and Plan: - Pt seen and evaluated by Nephrology   - Plan for HD on Monday 3/18  - Dose meds for Cr clearance <10  - Monitor BMP closely   - f/u CXR  - If volume status worsens might need to dialyze sooner, continuing to monitor   - Renal Diet     Problem: Diabetes  Assessment and Plan: - Oral DM meds on hold  - ISS  - Lantus 30units qhs   - A1C  - Diabetic diet     Problem: HTN (hypertension)  Assessment and Plan: - Continue Home dose BP meds w/ parameters   - DASH/TLC diet     Problem: HLD (hyperlipidemia)  Assessment and Plan: - Continue Statin  - lipid profile in AM  - low fat low cholesterol diet     Problem: Gout  Assessment and Plan: - Continue allopurinol     Problem: Depression  Assessment and Plan: - Continue Sertiline and Seroquel     Problem: Need for prophylactic measure  Assessment and Plan: - DVT ppx  - sub q heparin

## 2019-03-16 NOTE — H&P ADULT - NSICDXPASTMEDICALHX_GEN_ALL_CORE_FT
PAST MEDICAL HISTORY:  CVA (cerebral vascular accident)     Depression     DM (diabetes mellitus)     ESRD (end stage renal disease) on HD (Tues, Thurs, Sat)    Glaucoma     Gout     HTN (hypertension)

## 2019-03-16 NOTE — H&P ADULT - NSHPLABSRESULTS_GEN_ALL_CORE
11.3   4.29  )-----------( 202      ( 16 Mar 2019 01:30 )             36.3   03-16    135  |  95<L>  |  49<H>  ----------------------------<  110<H>  5.0   |  23  |  9.62<H>    Ca    9.2      16 Mar 2019 01:30    TPro  7.8  /  Alb  3.8  /  TBili  0.3  /  DBili  x   /  AST  20  /  ALT  25  /  AlkPhos  145<H>  03-16    EKG: Pending     Trop: 73--> 68    CT Abdomen and Pelvis No Cont (03.16.19 @ 03:49)   IMPRESSION:   Circumferential bladder wall thickening may reflect cystitis in the   appropriate clinical context.  Small bilateral pleural effusions. Small pericardial effusion.      US Abdomen Limited (03.16.19 @ 08:08)   IMPRESSION:   Cholecystectomy. Right pleural effusion.

## 2019-03-16 NOTE — ED PROVIDER NOTE - PHYSICAL EXAMINATION
PHYSICAL EXAM:   General: well-appearing, appears stated age, in no acute distress  HEENT: NC/AT, airway patent  Cardiovascular: regular rate and rhythm, + S1/S2, no murmurs, rubs, gallops appreciated  Respiratory: clear to auscultation bilaterally, good aeration bilaterally, nonlabored respirations  Abdominal: soft obese abdomen, nondistended, no rebound, guarding or rigidity, +bowel sounds  Back: +b/l CVA tenderness  Extremities: no LE edema b/l.  Neuro: Alert and oriented x3. Nonfocal neurologic exam  Psychiatric: appropriate mood and affect.   Skin: appropriate color, warm, dry. No rashes appreciated  -Smita Rockwell PGY-1

## 2019-03-16 NOTE — H&P ADULT - HISTORY OF PRESENT ILLNESS
54 yo F lives in shelter at the moment, with PMHx of DM, HTN, HLD, ESRD (Tues, Thurs, Sat) last HD 3/14 and Gout came to ED for diffuse abdominal pain radiating to suprapubic area associated with chest discomfort, nausea, vomiting and diarrhea since last week when she was at [facility]. Also reports some dysuria. Pain is similar to when she was at [facility] but has not improved so she came to ED today. Per pt, she missed some dialysis sessions but had last dialysis on Thursday 3/14 when she was discharged from [facility]. States she did not have a work up for her abdominal pain at that time (no CT scan). Endorses multiple episodes of bilious vomiting and diarrhea, chest discomfort  (similar to prior GERD).  Pt admits to increased swelling in LE as well as PICKARD. Denies palpitations, fever, chills, HA, visual changes, dizziness, URI symptoms, constipation, hematuria.     On admission the pt is lying in bed, NAD, eating dinner. She endorse good appetite, but says after she eats she feels nauseous and gets a burning sensation in the epigastric region. She denies chest pain or SOB at this time. 54 yo F lives in shelter at the moment, with PMHx of DM, HTN, HLD, ESRD (Tues, Thurs, Sat) last HD 3/14 and Gout came to ED for diffuse abdominal pain radiating to suprapubic area associated with chest discomfort, nausea, vomiting and diarrhea since last week when she was at [facility]. Also reports some dysuria. Pain is similar to when she was at [facility] but has not improved so she came to ED today. Per pt, she missed some dialysis sessions but had last dialysis on Thursday 3/14 when she was discharged from [facility]. States she did not have a work up for her abdominal pain at that time (no CT scan). Endorses multiple episodes of bilious vomiting and diarrhea, chest discomfort  (similar to prior GERD).  Pt admits to increased swelling in LE as well as PICKARD. Denies palpitations, fever, chills, HA, visual changes, dizziness, URI symptoms, constipation, hematuria.     On admission the pt is lying in bed, NAD, eating dinner. She endorses good appetite, but says after she eats she feels nauseous and gets a burning sensation in the epigastric region. She denies chest pain or SOB at this time.

## 2019-03-16 NOTE — H&P ADULT - ATTENDING COMMENTS
Chart reviewed. Vitals and Labs noted.   Pt seen and examined at bedside. Plan formulated with the resident/PA/NP. Detail H&P as above.     52 yo F lives in shelter at the moment, with PMHx of DM, HTN, HLD, ESRD (Tues, Thurs, Sat) came to ED for diffuse abdominal pain, radiating to suprapubic area associated with chest discomfort since last week when she was at [facility]. Also reports some dysuria. Pain is similar to when she was at [facility] but has not improved so she came to ED today. Per pt, she missed some dialysis sessions but had last dialysed on Thursday when she was discharged from [facility]. States she did not have a work up for her abdominal pain at that time (no CT scan). Endorses multiple episodes of bilious vomiting and diarrhea, chest discomfort  (similar to prior GERD).     Admit for abdominal pain, CT abd shows cystitis. Per pt recently received abx approximately 2 weeks ago, ?abx for ? etiology. Poor historian. UA neg but given dysuria and bladder thickening as well as abdominal discomfort, will treat for cystitis. Start IV Ceftriaxone. f/u urine culture.    c/w PPI for GERD.    Home BP meds for HTN control.    HD per renal: consulted her outpt Dr. Silvio Francisco. Counseled her the importance of being compliant with HD sessions.     Trend enzymes and repeat TTE. Recent NM stress test and TTE is WNL. Cardiology consulted.     Social work eval given her home situation.    Physical therapy.  d/w pt, pt’s son and tele PA.  DVT ppx    - Dr. KOROMA Htet ([employer])  - [phone] Chart reviewed. Vitals and Labs noted.   Pt seen and examined at bedside. Plan formulated with the resident/PA/NP. Detail H&P as above.     52 yo F lives in shelter at the moment, with PMHx of DM, HTN, HLD, ESRD (Tues, Thurs, Sat) came to ED for diffuse abdominal pain, radiating to suprapubic area associated with chest discomfort since last week when she was at [facility]. Also reports some dysuria. Pain is similar to when she was at [facility] but has not improved so she came to ED today. Per pt, she missed some dialysis sessions but had last dialysed on Thursday when she was discharged from [facility]. States she did not have a work up for her abdominal pain at that time (no CT scan). Endorses multiple episodes of bilious vomiting and diarrhea, chest discomfort  (similar to prior GERD).     Admit for abdominal pain, CT abd shows cystitis. Per pt recently received abx approximately 2 weeks ago, ?abx for ? etiology. Poor historian. UA neg but given dysuria and bladder thickening as well as abdominal discomfort, will treat for cystitis. Start IV Ceftriaxone. f/u urine culture.  Lipase elevation is mild and nonspecific in this case. LFTs are unimpressive. No other intraabdominal pathology on CT abd. s/p cholecystectomy.   will monitor closely and provide supportive care with PPIs/ Maalox for symptom relieve for her knowns hx of GERD.    Resume home BP meds for HTN control.    HD per renal: consulted her outpt Dr. Silvio Francisco. Counseled her the importance of being compliant with HD sessions.     Trend enzymes and repeat TTE. Recent NM stress test and TTE is WNL. Cardiology consulted.     Social work eval given her home situation.    Physical therapy.  d/w pt, pt’s son and tele PA.  DVT ppx    - Dr. KOROMA Htet ([facility])  - [phone] Chart reviewed. Vitals and Labs noted.   Pt seen and examined at bedside. Plan formulated with the resident/PA/NP. Detail H&P as above.     54 yo F lives in shelter at the moment, with PMHx of DM, HTN, HLD, ESRD (Tues, Thurs, Sat) came to ED for diffuse abdominal pain, radiating to suprapubic area associated with chest discomfort since last week when she was at [facility]. Also reports some dysuria. Pain is similar to when she was at [facility] but has not improved so she came to ED today. Per pt, she missed some dialysis sessions but had last dialysed on Thursday when she was discharged from [facility]. States she did not have a work up for her abdominal pain at that time (no CT scan). Endorses multiple episodes of bilious vomiting and diarrhea, chest discomfort  (similar to prior GERD).     Admit for abdominal pain, CT abd shows cystitis. Per pt recently received abx approximately 2 weeks ago, ?abx for ? etiology. Poor historian. UA neg but given dysuria and bladder thickening as well as abdominal discomfort, will treat for cystitis. Start IV Ceftriaxone. f/u urine culture.  Lipase elevation is mild and nonspecific in this case. LFTs are unimpressive. No other intraabdominal pathology on CT abd. s/p cholecystectomy.   will monitor closely and provide supportive care with PPIs/ Maalox for symptom relieve for her knowns hx of GERD.    Resume home BP meds for HTN control.    HD per renal: consulted her outpt Dr. Silvio Francisco. Counseled her the importance of being compliant with HD sessions.     Trend enzymes and repeat TTE. Recent NM stress test WNL and TTE with stable mild global left ventricular systolic dysfunction. Cardiology consulted.     Social work eval given her home situation.    Physical therapy.  d/w pt, pt’s son and tele PA.  DVT ppx    - Dr. KOROMA Htet ([employer])  - [phone]

## 2019-03-16 NOTE — ED PROVIDER NOTE - CLINICAL SUMMARY MEDICAL DECISION MAKING FREE TEXT BOX
Smita Rockwell MD PGY-1 pt is a 54 yo F with PMH Dm, HTN, HLD, ESRD (on HD Tues, Thurs, Sat) who p/w LUQ abd pain radiating to suprapubic area associated with chest discomfort. Chest discomfort similar to GERD but will r/o acs, and abd pain similar to prior nephrolithiasis. nephrolithiasis vs pyelo vs colitis vs cystitis vs pancreatitis given vomiting.  Labs, ekg, trop/ck/ckmb, ct abd/pelvis to r/o kidney stone, symptomatic treatment for GERD, reassess

## 2019-03-16 NOTE — ED PROVIDER NOTE - NS ED ROS FT
REVIEW OF SYSTEMS:  General:  +fever last week  Cardiac: +chest discomfort  Respiratory: no cough, no shortness of breath  Gastrointestinal: + LUQ abdominal pain pe HPI, + nausea, + vomiting, + diarrhea,   Extremities: no extremity swelling  Neuro: no focal weakness, no numbness/tingling of the extremities, no decreased sensation  Endo: +DM  Skin: no rashes  -Smita Rockwell PGY-1

## 2019-03-16 NOTE — ED PROVIDER NOTE - ATTENDING CONTRIBUTION TO CARE
Seen and examined, mild distress, c/o N/V/abd pain, states has had kidney stones presenting with N/V/abd pain but today has bilat upper abd and epigastric c/o. Also endorses ur. freq. and some dysuria but no back pain at time of eval. No fever/chills, + hx of prev. UTI, no hx of prev. pyelo. Clear lungs, heart reg, +upper abd tenderness bilat, localizes epigastrium, no sher/guarding, mild diffuse back tenderness with bilat CVAT.

## 2019-03-16 NOTE — H&P ADULT - ASSESSMENT
52 yo F lives in shelter at the moment, with PMHx of DM, HTN, HLD, ESRD (Tues, Thurs, Sat) last HD 3/14 and Gout came to ED for diffuse abdominal pain radiating to suprapubic area associated with chest discomfort, nausea, vomiting, diarrhea and dysuria since last week when she was at [facility]. Admitted to telemetry for chest pain, abdominal pain, and cystitis.

## 2019-03-16 NOTE — ED PROVIDER NOTE - OBJECTIVE STATEMENT
Smita Rockwell MD PGY-1 pt is a 52 yo F with PMH Dm, HTN, HLD, ESRD (on HD Tues, Thurs, Sat) who p/w LUQ abd pain radiating to suprapubic area associated with chest discomfort since last week when she was at [facility], pain is similar to when she was at [facility] but has not improved so she came to ED today. Last dialysed on Thursday when she was discharged from [facility]. States she did not have a work up for her abdominal pain at that time (no CT scan). Endorses multiple episodes of bilious vomiting and diarrhea, chest discomfort  (similar to prior GERD). States pain feels similar to prior kidney stones.

## 2019-03-16 NOTE — ED PROVIDER NOTE - PMH
CVA (cerebral vascular accident)    Depression    DM (diabetes mellitus)    ESRD (end stage renal disease)    Glaucoma    Gout    HTN (hypertension) CVA (cerebral vascular accident)    Depression    DM (diabetes mellitus)    ESRD (end stage renal disease)  on HD (Tues, Thurs, Sat)  Glaucoma    Gout    HTN (hypertension)

## 2019-03-17 RX ORDER — HYDRALAZINE HCL 50 MG
100 TABLET ORAL
Qty: 0 | Refills: 0 | Status: DISCONTINUED | OUTPATIENT
Start: 2019-03-17 | End: 2019-03-25

## 2019-03-17 RX ORDER — ONDANSETRON 8 MG/1
4 TABLET, FILM COATED ORAL EVERY 8 HOURS
Qty: 0 | Refills: 0 | Status: DISCONTINUED | OUTPATIENT
Start: 2019-03-17 | End: 2019-03-25

## 2019-03-17 RX ORDER — INSULIN GLARGINE 100 [IU]/ML
15 INJECTION, SOLUTION SUBCUTANEOUS ONCE
Qty: 0 | Refills: 0 | Status: COMPLETED | OUTPATIENT
Start: 2019-03-17 | End: 2019-03-17

## 2019-03-17 RX ADMIN — CEFTRIAXONE 100 GRAM(S): 500 INJECTION, POWDER, FOR SOLUTION INTRAMUSCULAR; INTRAVENOUS at 20:03

## 2019-03-17 RX ADMIN — Medication 50 MILLIGRAM(S): at 07:03

## 2019-03-17 RX ADMIN — SODIUM CHLORIDE 3 MILLILITER(S): 9 INJECTION INTRAMUSCULAR; INTRAVENOUS; SUBCUTANEOUS at 13:29

## 2019-03-17 RX ADMIN — Medication 100 MILLIGRAM(S): at 12:40

## 2019-03-17 RX ADMIN — CARVEDILOL PHOSPHATE 12.5 MILLIGRAM(S): 80 CAPSULE, EXTENDED RELEASE ORAL at 07:03

## 2019-03-17 RX ADMIN — INSULIN GLARGINE 15 UNIT(S): 100 INJECTION, SOLUTION SUBCUTANEOUS at 23:48

## 2019-03-17 RX ADMIN — CLOPIDOGREL BISULFATE 75 MILLIGRAM(S): 75 TABLET, FILM COATED ORAL at 12:40

## 2019-03-17 RX ADMIN — CARVEDILOL PHOSPHATE 12.5 MILLIGRAM(S): 80 CAPSULE, EXTENDED RELEASE ORAL at 18:40

## 2019-03-17 RX ADMIN — PANTOPRAZOLE SODIUM 40 MILLIGRAM(S): 20 TABLET, DELAYED RELEASE ORAL at 07:03

## 2019-03-17 RX ADMIN — Medication 100 MILLIGRAM(S): at 18:40

## 2019-03-17 RX ADMIN — SIMVASTATIN 40 MILLIGRAM(S): 20 TABLET, FILM COATED ORAL at 21:28

## 2019-03-17 RX ADMIN — HEPARIN SODIUM 5000 UNIT(S): 5000 INJECTION INTRAVENOUS; SUBCUTANEOUS at 21:27

## 2019-03-17 RX ADMIN — SODIUM CHLORIDE 3 MILLILITER(S): 9 INJECTION INTRAMUSCULAR; INTRAVENOUS; SUBCUTANEOUS at 21:00

## 2019-03-17 RX ADMIN — Medication 5 MILLIGRAM(S): at 21:28

## 2019-03-17 RX ADMIN — ONDANSETRON 4 MILLIGRAM(S): 8 TABLET, FILM COATED ORAL at 23:50

## 2019-03-17 RX ADMIN — HEPARIN SODIUM 5000 UNIT(S): 5000 INJECTION INTRAVENOUS; SUBCUTANEOUS at 07:10

## 2019-03-17 RX ADMIN — ISOSORBIDE DINITRATE 10 MILLIGRAM(S): 5 TABLET ORAL at 21:28

## 2019-03-17 RX ADMIN — ISOSORBIDE DINITRATE 10 MILLIGRAM(S): 5 TABLET ORAL at 07:03

## 2019-03-17 RX ADMIN — HEPARIN SODIUM 5000 UNIT(S): 5000 INJECTION INTRAVENOUS; SUBCUTANEOUS at 14:01

## 2019-03-17 RX ADMIN — Medication 20 MILLIGRAM(S): at 07:03

## 2019-03-17 RX ADMIN — Medication 81 MILLIGRAM(S): at 12:40

## 2019-03-17 RX ADMIN — SERTRALINE 200 MILLIGRAM(S): 25 TABLET, FILM COATED ORAL at 12:40

## 2019-03-17 RX ADMIN — Medication 5 MILLIGRAM(S): at 07:03

## 2019-03-17 RX ADMIN — SODIUM CHLORIDE 3 MILLILITER(S): 9 INJECTION INTRAMUSCULAR; INTRAVENOUS; SUBCUTANEOUS at 08:13

## 2019-03-17 RX ADMIN — ISOSORBIDE DINITRATE 10 MILLIGRAM(S): 5 TABLET ORAL at 14:01

## 2019-03-17 NOTE — PHYSICAL THERAPY INITIAL EVALUATION ADULT - PERTINENT HX OF CURRENT PROBLEM, REHAB EVAL
52 yo F lives in shelter at the moment, with PMHx of DM, HTN, HLD, ESRD (Tues, Thurs, Sat) last HD 3/14 and Gout came to ED for diffuse abdominal pain radiating to suprapubic area associated with chest discomfort, nausea, vomiting, diarrhea and dysuria since last week when she was at [facility]. Admitted to telemetry for chest pain, abdominal pain, and cystitis.
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
## 2019-03-17 NOTE — PROGRESS NOTE ADULT - SUBJECTIVE AND OBJECTIVE BOX
Patient is a 53y old  Female who presents with a chief complaint of Abdominal pain and Chest Discomfort (17 Mar 2019 18:01)      SUBJECTIVE / OVERNIGHT EVENTS:  feels overall better.  abd pain improves  she had one episode of vomiting today  5-6 episodes yesterday.  Able to ate pudding this am and chicken during lunch time.  no cp, no sob.      Vital Signs Last 24 Hrs  T(C): 36.8 (17 Mar 2019 17:39), Max: 36.8 (17 Mar 2019 17:39)  T(F): 98.2 (17 Mar 2019 17:39), Max: 98.2 (17 Mar 2019 17:39)  HR: 86 (17 Mar 2019 17:39) (72 - 86)  BP: 165/88 (17 Mar 2019 17:39) (139/67 - 177/96)  BP(mean): --  RR: 18 (17 Mar 2019 17:39) (16 - 19)  SpO2: 100% (17 Mar 2019 17:39) (96% - 100%)  I&O's Summary      PHYSICAL EXAM:  GENERAL: NAD, Comfortable, obese  HEAD:  Atraumatic, Normocephalic  EYES: EOMI, PERRLA, conjunctiva and sclera clear  NECK: Supple, No JVD  CHEST/LUNG: Clear to auscultation bilaterally; No wheeze  HEART: Regular rate and rhythm; No murmurs, rubs, or gallops  ABDOMEN: Soft, mild midabdomen tenderness, Nondistended; Bowel sounds present  Neuro: AAO x 3, no focal deficit, 5/5 b/l extremities  EXTREMITIES:  2+ Peripheral Pulses, No clubbing, cyanosis, or edema  SKIN: No rashes or lesions    LABS:                        11.3   4.29  )-----------( 202      ( 16 Mar 2019 01:30 )             36.3     03-16    135  |  95<L>  |  49<H>  ----------------------------<  110<H>  5.0   |  23  |  9.62<H>    Ca    9.2      16 Mar 2019 01:30    TPro  7.8  /  Alb  3.8  /  TBili  0.3  /  DBili  x   /  AST  20  /  ALT  25  /  AlkPhos  145<H>  03-16      CAPILLARY BLOOD GLUCOSE      POCT Blood Glucose.: 105 mg/dL (17 Mar 2019 17:45)  POCT Blood Glucose.: 101 mg/dL (17 Mar 2019 13:27)  POCT Blood Glucose.: 114 mg/dL (17 Mar 2019 09:18)  POCT Blood Glucose.: 189 mg/dL (16 Mar 2019 22:13)    CARDIAC MARKERS ( 16 Mar 2019 05:22 )  x     / x     / 116 u/L / 2.39 ng/mL / x          Urinalysis Basic - ( 16 Mar 2019 02:08 )    Color: YELLOW / Appearance: CLEAR / S.025 / pH: 8.5  Gluc: 100 / Ketone: NEGATIVE  / Bili: NEGATIVE / Urobili: 0.2   Blood: TRACE / Protein: 300 / Nitrite: NEGATIVE   Leuk Esterase: NEGATIVE / RBC: 3-5 / WBC 3-5   Sq Epi: OCC. / Non Sq Epi: x / Bacteria: x        RADIOLOGY & ADDITIONAL TESTS:    Imaging Personally Reviewed:  [x] YES  [ ] NO    Consultant(s) Notes Reviewed:  [x] YES  [ ] NO      MEDICATIONS  (STANDING):  allopurinol 100 milliGRAM(s) Oral daily  aspirin enteric coated 81 milliGRAM(s) Oral daily  carvedilol 12.5 milliGRAM(s) Oral every 12 hours  cefTRIAXone   IVPB      cefTRIAXone   IVPB 1 Gram(s) IV Intermittent every 24 hours  clopidogrel Tablet 75 milliGRAM(s) Oral daily  dextrose 5%. 1000 milliLiter(s) (50 mL/Hr) IV Continuous <Continuous>  dextrose 50% Injectable 12.5 Gram(s) IV Push once  dextrose 50% Injectable 25 Gram(s) IV Push once  dextrose 50% Injectable 25 Gram(s) IV Push once  furosemide    Tablet 20 milliGRAM(s) Oral daily  heparin  Injectable 5000 Unit(s) SubCutaneous every 8 hours  hydrALAZINE 100 milliGRAM(s) Oral two times a day  insulin glargine Injectable (LANTUS) 30 Unit(s) SubCutaneous at bedtime  insulin lispro (HumaLOG) corrective regimen sliding scale   SubCutaneous three times a day before meals  insulin lispro (HumaLOG) corrective regimen sliding scale   SubCutaneous at bedtime  isosorbide   dinitrate Tablet (ISORDIL) 10 milliGRAM(s) Oral three times a day  oxybutynin 5 milliGRAM(s) Oral two times a day  pantoprazole    Tablet 40 milliGRAM(s) Oral before breakfast  QUEtiapine 50 milliGRAM(s) Oral at bedtime  sertraline 200 milliGRAM(s) Oral daily  simvastatin 40 milliGRAM(s) Oral at bedtime  sodium chloride 0.9% lock flush 3 milliLiter(s) IV Push every 8 hours    MEDICATIONS  (PRN):  aluminum hydroxide/magnesium hydroxide/simethicone Suspension 30 milliLiter(s) Oral every 6 hours PRN Dyspepsia  dextrose 40% Gel 15 Gram(s) Oral once PRN Blood Glucose LESS THAN 70 milliGRAM(s)/deciliter  glucagon  Injectable 1 milliGRAM(s) IntraMuscular once PRN Glucose LESS THAN 70 milligrams/deciliter  ondansetron Injectable 4 milliGRAM(s) IV Push every 8 hours PRN Nausea and/or Vomiting      Care Discussed with Consultants/Other Providers [x] YES  [ ] NO    HEALTH ISSUES - PROBLEM Dx:  HLD (hyperlipidemia)  Need for prophylactic measure  Depression  Diabetes  ESRD (end stage renal disease): on HD (Tues, Thurs, Sat)  HTN (hypertension)  Gout  Elevated troponin  Chest pain  Abdominal pain

## 2019-03-17 NOTE — PHYSICAL THERAPY INITIAL EVALUATION ADULT - DIAGNOSIS, PT EVAL
Pt admitted for Abdominal pain, dx with cytitis; pt presents with decreased strength and decreased balance.

## 2019-03-17 NOTE — PROGRESS NOTE ADULT - SUBJECTIVE AND OBJECTIVE BOX
Patient seen and examined in [facility] bed 4 just transferred from [facility] today.  Patient said she has been on dialysis for 2 years [facility]  Denies chest pain , SOB now.  She said she voids some    REVIEW OF SYSTEMS:  As per HPI, otherwise 8 full 10 ROS were unremarkable    MEDICATIONS  (STANDING):  allopurinol 100 milliGRAM(s) Oral daily  aspirin enteric coated 81 milliGRAM(s) Oral daily  carvedilol 12.5 milliGRAM(s) Oral every 12 hours  cefTRIAXone   IVPB      cefTRIAXone   IVPB 1 Gram(s) IV Intermittent every 24 hours  clopidogrel Tablet 75 milliGRAM(s) Oral daily  dextrose 5%. 1000 milliLiter(s) (50 mL/Hr) IV Continuous <Continuous>  dextrose 50% Injectable 12.5 Gram(s) IV Push once  dextrose 50% Injectable 25 Gram(s) IV Push once  dextrose 50% Injectable 25 Gram(s) IV Push once  furosemide    Tablet 20 milliGRAM(s) Oral daily  heparin  Injectable 5000 Unit(s) SubCutaneous every 8 hours  hydrALAZINE 100 milliGRAM(s) Oral two times a day  insulin glargine Injectable (LANTUS) 30 Unit(s) SubCutaneous at bedtime  insulin lispro (HumaLOG) corrective regimen sliding scale   SubCutaneous three times a day before meals  insulin lispro (HumaLOG) corrective regimen sliding scale   SubCutaneous at bedtime  isosorbide   dinitrate Tablet (ISORDIL) 10 milliGRAM(s) Oral three times a day  oxybutynin 5 milliGRAM(s) Oral two times a day  pantoprazole    Tablet 40 milliGRAM(s) Oral before breakfast  QUEtiapine 50 milliGRAM(s) Oral at bedtime  sertraline 200 milliGRAM(s) Oral daily  simvastatin 40 milliGRAM(s) Oral at bedtime  sodium chloride 0.9% lock flush 3 milliLiter(s) IV Push every 8 hours      VITAL:  T(C): , Max: 36.8 (19 @ 17:39)  T(F): , Max: 98.2 (19 @ 17:39)  HR: 86 (19 @ 17:39)  BP: 165/88 (19 @ 17:39)  BP(mean): --  RR: 18 (19 @ 17:39)  SpO2: 100% (19 @ 17:39)  Wt(kg): --    I and O's:        PHYSICAL EXAM:    General:  alert, cooperative and no distress  HEENT: no trauma  Lungs: clear to auscultation bilaterally  Heart: normal S1/S2  Abdomen: soft, non-tender not distended, + BS  Extremities: no clubbing, cyanosis or edema  Urologic: no malcolm  Neurologic: Grossly normal  Dialysis Access :AV fisturlar (+thrill)          LABS:                        11.3   4.29  )-----------( 202      ( 16 Mar 2019 01:30 )             36.3         135  |  95<L>  |  49<H>  ----------------------------<  110<H>  5.0   |  23  |  9.62<H>    Ca    9.2      16 Mar 2019 01:30    TPro  7.8  /  Alb  3.8  /  TBili  0.3  /  DBili  x   /  AST  20  /  ALT  25  /  AlkPhos  145<H>  16      Urine Studies:  Urinalysis Basic - ( 16 Mar 2019 02:08 )    Color: YELLOW / Appearance: CLEAR / S.025 / pH: 8.5  Gluc: 100 / Ketone: NEGATIVE  / Bili: NEGATIVE / Urobili: 0.2   Blood: TRACE / Protein: 300 / Nitrite: NEGATIVE   Leuk Esterase: NEGATIVE / RBC: 3-5 / WBC 3-5   Sq Epi: OCC. / Non Sq Epi: x / Bacteria: x      EXAM:  XR CHEST AP OR PA 1V  :   Mar 16 2019     INTERPRETATION:  EXAMINATION: XR CHEST AP OR PA 1V    CLINICAL INDICATION: End-stage renal disease    IMPRESSION:   Trace left pleural effusion.    < end of copied text >

## 2019-03-18 LAB
ANION GAP SERPL CALC-SCNC: 15 MMO/L — HIGH (ref 7–14)
BUN SERPL-MCNC: 71 MG/DL — HIGH (ref 7–23)
CALCIUM SERPL-MCNC: 9.1 MG/DL — SIGNIFICANT CHANGE UP (ref 8.4–10.5)
CHLORIDE SERPL-SCNC: 98 MMOL/L — SIGNIFICANT CHANGE UP (ref 98–107)
CO2 SERPL-SCNC: 22 MMOL/L — SIGNIFICANT CHANGE UP (ref 22–31)
CREAT SERPL-MCNC: 13.26 MG/DL — HIGH (ref 0.5–1.3)
GLUCOSE SERPL-MCNC: 90 MG/DL — SIGNIFICANT CHANGE UP (ref 70–99)
HBV SURFACE AG SER-ACNC: NEGATIVE — SIGNIFICANT CHANGE UP
HCT VFR BLD CALC: 31.8 % — LOW (ref 34.5–45)
HGB BLD-MCNC: 10.2 G/DL — LOW (ref 11.5–15.5)
MAGNESIUM SERPL-MCNC: 2.3 MG/DL — SIGNIFICANT CHANGE UP (ref 1.6–2.6)
MCHC RBC-ENTMCNC: 28.7 PG — SIGNIFICANT CHANGE UP (ref 27–34)
MCHC RBC-ENTMCNC: 32.1 % — SIGNIFICANT CHANGE UP (ref 32–36)
MCV RBC AUTO: 89.3 FL — SIGNIFICANT CHANGE UP (ref 80–100)
NRBC # FLD: 0 K/UL — LOW (ref 25–125)
PHOSPHATE SERPL-MCNC: 6.4 MG/DL — HIGH (ref 2.5–4.5)
PLATELET # BLD AUTO: 192 K/UL — SIGNIFICANT CHANGE UP (ref 150–400)
PMV BLD: 12 FL — SIGNIFICANT CHANGE UP (ref 7–13)
POTASSIUM SERPL-MCNC: 4.7 MMOL/L — SIGNIFICANT CHANGE UP (ref 3.5–5.3)
POTASSIUM SERPL-SCNC: 4.7 MMOL/L — SIGNIFICANT CHANGE UP (ref 3.5–5.3)
RBC # BLD: 3.56 M/UL — LOW (ref 3.8–5.2)
RBC # FLD: 12.8 % — SIGNIFICANT CHANGE UP (ref 10.3–14.5)
SODIUM SERPL-SCNC: 135 MMOL/L — SIGNIFICANT CHANGE UP (ref 135–145)
WBC # BLD: 3.08 K/UL — LOW (ref 3.8–10.5)
WBC # FLD AUTO: 3.08 K/UL — LOW (ref 3.8–10.5)

## 2019-03-18 RX ORDER — PROCHLORPERAZINE MALEATE 5 MG
10 TABLET ORAL ONCE
Qty: 0 | Refills: 0 | Status: COMPLETED | OUTPATIENT
Start: 2019-03-18 | End: 2019-03-18

## 2019-03-18 RX ADMIN — Medication 30 MILLILITER(S): at 20:01

## 2019-03-18 RX ADMIN — PANTOPRAZOLE SODIUM 40 MILLIGRAM(S): 20 TABLET, DELAYED RELEASE ORAL at 05:28

## 2019-03-18 RX ADMIN — INSULIN GLARGINE 30 UNIT(S): 100 INJECTION, SOLUTION SUBCUTANEOUS at 22:28

## 2019-03-18 RX ADMIN — Medication 30 MILLILITER(S): at 12:15

## 2019-03-18 RX ADMIN — HEPARIN SODIUM 5000 UNIT(S): 5000 INJECTION INTRAVENOUS; SUBCUTANEOUS at 14:42

## 2019-03-18 RX ADMIN — CLOPIDOGREL BISULFATE 75 MILLIGRAM(S): 75 TABLET, FILM COATED ORAL at 11:54

## 2019-03-18 RX ADMIN — Medication 81 MILLIGRAM(S): at 11:26

## 2019-03-18 RX ADMIN — Medication 10 MILLIGRAM(S): at 11:27

## 2019-03-18 RX ADMIN — Medication 20 MILLIGRAM(S): at 11:26

## 2019-03-18 RX ADMIN — CARVEDILOL PHOSPHATE 12.5 MILLIGRAM(S): 80 CAPSULE, EXTENDED RELEASE ORAL at 20:02

## 2019-03-18 RX ADMIN — SIMVASTATIN 40 MILLIGRAM(S): 20 TABLET, FILM COATED ORAL at 22:11

## 2019-03-18 RX ADMIN — ISOSORBIDE DINITRATE 10 MILLIGRAM(S): 5 TABLET ORAL at 05:28

## 2019-03-18 RX ADMIN — Medication 100 MILLIGRAM(S): at 20:01

## 2019-03-18 RX ADMIN — Medication 5 MILLIGRAM(S): at 20:01

## 2019-03-18 RX ADMIN — SERTRALINE 200 MILLIGRAM(S): 25 TABLET, FILM COATED ORAL at 11:26

## 2019-03-18 RX ADMIN — CEFTRIAXONE 100 GRAM(S): 500 INJECTION, POWDER, FOR SOLUTION INTRAMUSCULAR; INTRAVENOUS at 14:42

## 2019-03-18 RX ADMIN — SODIUM CHLORIDE 3 MILLILITER(S): 9 INJECTION INTRAMUSCULAR; INTRAVENOUS; SUBCUTANEOUS at 09:40

## 2019-03-18 RX ADMIN — HEPARIN SODIUM 5000 UNIT(S): 5000 INJECTION INTRAVENOUS; SUBCUTANEOUS at 22:11

## 2019-03-18 RX ADMIN — Medication 20 MILLIGRAM(S): at 20:01

## 2019-03-18 RX ADMIN — CARVEDILOL PHOSPHATE 12.5 MILLIGRAM(S): 80 CAPSULE, EXTENDED RELEASE ORAL at 05:28

## 2019-03-18 RX ADMIN — Medication 5 MILLIGRAM(S): at 05:28

## 2019-03-18 RX ADMIN — Medication 100 MILLIGRAM(S): at 05:28

## 2019-03-18 RX ADMIN — HEPARIN SODIUM 5000 UNIT(S): 5000 INJECTION INTRAVENOUS; SUBCUTANEOUS at 05:28

## 2019-03-18 RX ADMIN — ISOSORBIDE DINITRATE 10 MILLIGRAM(S): 5 TABLET ORAL at 22:11

## 2019-03-18 RX ADMIN — SODIUM CHLORIDE 3 MILLILITER(S): 9 INJECTION INTRAMUSCULAR; INTRAVENOUS; SUBCUTANEOUS at 21:07

## 2019-03-18 RX ADMIN — SODIUM CHLORIDE 3 MILLILITER(S): 9 INJECTION INTRAMUSCULAR; INTRAVENOUS; SUBCUTANEOUS at 05:14

## 2019-03-18 RX ADMIN — ONDANSETRON 4 MILLIGRAM(S): 8 TABLET, FILM COATED ORAL at 08:00

## 2019-03-18 RX ADMIN — Medication 100 MILLIGRAM(S): at 11:26

## 2019-03-18 NOTE — PROGRESS NOTE ADULT - SUBJECTIVE AND OBJECTIVE BOX
Patient is a 53y old  Female who presents with a chief complaint of Abdominal pain and Chest Discomfort (18 Mar 2019 09:32)      SUBJECTIVE / OVERNIGHT EVENTS:  nausea with PO intake.  +chronic loose stool  no cp, no sob.  walk with cane.       Vital Signs Last 24 Hrs  T(C): 36.6 (18 Mar 2019 05:13), Max: 36.8 (17 Mar 2019 17:39)  T(F): 97.9 (18 Mar 2019 05:13), Max: 98.2 (17 Mar 2019 17:39)  HR: 85 (18 Mar 2019 05:13) (72 - 86)  BP: 165/95 (18 Mar 2019 05:13) (139/67 - 165/95)  BP(mean): --  RR: 16 (18 Mar 2019 05:13) (16 - 18)  SpO2: 98% (18 Mar 2019 05:13) (96% - 100%)  I&O's Summary      PHYSICAL EXAM:  GENERAL: NAD, Comfortable, obese  HEAD:  Atraumatic, Normocephalic  EYES: EOMI, PERRLA, conjunctiva and sclera clear  NECK: Supple, No JVD  CHEST/LUNG: Clear to auscultation bilaterally; No wheeze  HEART: Regular rate and rhythm; No murmurs, rubs, or gallops  ABDOMEN: Soft, mild midabdomen tenderness, Nondistended; Bowel sounds present  Neuro: AAO x 3, no focal deficit, 5/5 b/l extremities  EXTREMITIES:  2+ Peripheral Pulses, No clubbing, cyanosis, or edema  SKIN: No rashes or lesions    LABS:            CAPILLARY BLOOD GLUCOSE      POCT Blood Glucose.: 114 mg/dL (18 Mar 2019 07:27)  POCT Blood Glucose.: 96 mg/dL (17 Mar 2019 23:45)  POCT Blood Glucose.: 109 mg/dL (17 Mar 2019 22:29)  POCT Blood Glucose.: 86 mg/dL (17 Mar 2019 21:12)  POCT Blood Glucose.: 105 mg/dL (17 Mar 2019 17:45)  POCT Blood Glucose.: 101 mg/dL (17 Mar 2019 13:27)            RADIOLOGY & ADDITIONAL TESTS:    Imaging Personally Reviewed:  [x] YES  [ ] NO    Consultant(s) Notes Reviewed:  [x] YES  [ ] NO      MEDICATIONS  (STANDING):  allopurinol 100 milliGRAM(s) Oral daily  aspirin enteric coated 81 milliGRAM(s) Oral daily  carvedilol 12.5 milliGRAM(s) Oral every 12 hours  cefTRIAXone   IVPB      cefTRIAXone   IVPB 1 Gram(s) IV Intermittent every 24 hours  clopidogrel Tablet 75 milliGRAM(s) Oral daily  dextrose 5%. 1000 milliLiter(s) (50 mL/Hr) IV Continuous <Continuous>  dextrose 50% Injectable 12.5 Gram(s) IV Push once  dextrose 50% Injectable 25 Gram(s) IV Push once  dextrose 50% Injectable 25 Gram(s) IV Push once  dicyclomine 20 milliGRAM(s) Oral two times a day before meals  furosemide    Tablet 20 milliGRAM(s) Oral daily  heparin  Injectable 5000 Unit(s) SubCutaneous every 8 hours  hydrALAZINE 100 milliGRAM(s) Oral two times a day  insulin glargine Injectable (LANTUS) 30 Unit(s) SubCutaneous at bedtime  insulin lispro (HumaLOG) corrective regimen sliding scale   SubCutaneous three times a day before meals  insulin lispro (HumaLOG) corrective regimen sliding scale   SubCutaneous at bedtime  isosorbide   dinitrate Tablet (ISORDIL) 10 milliGRAM(s) Oral three times a day  oxybutynin 5 milliGRAM(s) Oral two times a day  pantoprazole    Tablet 40 milliGRAM(s) Oral before breakfast  QUEtiapine 50 milliGRAM(s) Oral at bedtime  sertraline 200 milliGRAM(s) Oral daily  simvastatin 40 milliGRAM(s) Oral at bedtime  sodium chloride 0.9% lock flush 3 milliLiter(s) IV Push every 8 hours    MEDICATIONS  (PRN):  aluminum hydroxide/magnesium hydroxide/simethicone Suspension 30 milliLiter(s) Oral every 6 hours PRN Dyspepsia  dextrose 40% Gel 15 Gram(s) Oral once PRN Blood Glucose LESS THAN 70 milliGRAM(s)/deciliter  glucagon  Injectable 1 milliGRAM(s) IntraMuscular once PRN Glucose LESS THAN 70 milligrams/deciliter  ondansetron Injectable 4 milliGRAM(s) IV Push every 8 hours PRN Nausea and/or Vomiting      Care Discussed with Consultants/Other Providers [x] YES  [ ] NO    HEALTH ISSUES - PROBLEM Dx:  HLD (hyperlipidemia)  Need for prophylactic measure  Depression  Diabetes  ESRD (end stage renal disease): on HD (Tues, Thurs, Sat)  HTN (hypertension)  Gout  Elevated troponin  Chest pain  Abdominal pain

## 2019-03-18 NOTE — PROGRESS NOTE ADULT - SUBJECTIVE AND OBJECTIVE BOX
NEPHROLOGY - NSN    Patient seen and examined.  C/O nausea and poor appetite 2/2 abd discomfort   Last HD Friday    MEDICATIONS  (STANDING):  allopurinol 100 milliGRAM(s) Oral daily  aspirin enteric coated 81 milliGRAM(s) Oral daily  carvedilol 12.5 milliGRAM(s) Oral every 12 hours  cefTRIAXone   IVPB      cefTRIAXone   IVPB 1 Gram(s) IV Intermittent every 24 hours  clopidogrel Tablet 75 milliGRAM(s) Oral daily  dicyclomine 20 milliGRAM(s) Oral two times a day before meals  furosemide    Tablet 20 milliGRAM(s) Oral daily  heparin  Injectable 5000 Unit(s) SubCutaneous every 8 hours  hydrALAZINE 100 milliGRAM(s) Oral two times a day  insulin glargine Injectable (LANTUS) 30 Unit(s) SubCutaneous at bedtime  insulin lispro (HumaLOG) corrective regimen sliding scale   SubCutaneous three times a day before meals  insulin lispro (HumaLOG) corrective regimen sliding scale   SubCutaneous at bedtime  isosorbide   dinitrate Tablet (ISORDIL) 10 milliGRAM(s) Oral three times a day  oxybutynin 5 milliGRAM(s) Oral two times a day  pantoprazole    Tablet 40 milliGRAM(s) Oral before breakfast  QUEtiapine 50 milliGRAM(s) Oral at bedtime  sertraline 200 milliGRAM(s) Oral daily  simvastatin 40 milliGRAM(s) Oral at bedtime  sodium chloride 0.9% lock flush 3 milliLiter(s) IV Push every 8 hours    VITALS:  T(C): , Max: 36.8 (03-17-19 @ 17:39)  T(F): , Max: 98.2 (03-17-19 @ 17:39)  HR: 85 (03-18-19 @ 05:13)  BP: 165/95 (03-18-19 @ 05:13)  BP(mean): --  RR: 16 (03-18-19 @ 05:13)  SpO2: 98% (03-18-19 @ 05:13)  Wt(kg): --  I and O's:    REVIEW OF SYSTEMS:  Full ROS done and were negative unless otherwise indicated in HPI/assessment.     PHYSICAL EXAM:  Constitutional: NAD  Respiratory: CTA B/L  Cardiovascular: S1 and S2, RRR  Gastrointestinal: + BS, soft, NT, ND  Extremities: no peripheral edema  Neurological: AAO x 3  : no malcolm  Access: LUE AVF (+ thrill)    ASSESSMENT   53y Female with DM, HTN, gout, CVD and ESRD p/w abd pain, N/V/D  - ESRD: HD TTS, last dialyzed Friday (off schedule)  - HTN: BP mildly elevated    RECOMMENDATIONS:  - HD today as ordered  - c/w antihypertensive meds  - GI consult noted  - please dose any new medications for a CrCl of < 10 cc/min    Debi Johansen NP  [facility]  [phone]

## 2019-03-19 LAB
ALBUMIN SERPL ELPH-MCNC: 3.7 G/DL — SIGNIFICANT CHANGE UP (ref 3.3–5)
ALP SERPL-CCNC: 126 U/L — HIGH (ref 40–120)
ALT FLD-CCNC: 17 U/L — SIGNIFICANT CHANGE UP (ref 4–33)
ANION GAP SERPL CALC-SCNC: 13 MMO/L — SIGNIFICANT CHANGE UP (ref 7–14)
AST SERPL-CCNC: 16 U/L — SIGNIFICANT CHANGE UP (ref 4–32)
BASOPHILS # BLD AUTO: 0.03 K/UL — SIGNIFICANT CHANGE UP (ref 0–0.2)
BASOPHILS NFR BLD AUTO: 1 % — SIGNIFICANT CHANGE UP (ref 0–2)
BILIRUB SERPL-MCNC: 0.2 MG/DL — SIGNIFICANT CHANGE UP (ref 0.2–1.2)
BUN SERPL-MCNC: 30 MG/DL — HIGH (ref 7–23)
CALCIUM SERPL-MCNC: 9.2 MG/DL — SIGNIFICANT CHANGE UP (ref 8.4–10.5)
CHLORIDE SERPL-SCNC: 96 MMOL/L — LOW (ref 98–107)
CO2 SERPL-SCNC: 27 MMOL/L — SIGNIFICANT CHANGE UP (ref 22–31)
CREAT SERPL-MCNC: 7.84 MG/DL — HIGH (ref 0.5–1.3)
EOSINOPHIL # BLD AUTO: 0.14 K/UL — SIGNIFICANT CHANGE UP (ref 0–0.5)
EOSINOPHIL NFR BLD AUTO: 4.7 % — SIGNIFICANT CHANGE UP (ref 0–6)
GLUCOSE SERPL-MCNC: 49 MG/DL — LOW (ref 70–99)
HBV CORE AB SER-ACNC: REACTIVE — SIGNIFICANT CHANGE UP
HBV SURFACE AB SER-ACNC: >1000 MLU/ML — SIGNIFICANT CHANGE UP
HCT VFR BLD CALC: 34.2 % — LOW (ref 34.5–45)
HCV AB S/CO SERPL IA: 0.09 S/CO — SIGNIFICANT CHANGE UP (ref 0–0.79)
HCV AB SERPL-IMP: SIGNIFICANT CHANGE UP
HGB BLD-MCNC: 10.9 G/DL — LOW (ref 11.5–15.5)
IMM GRANULOCYTES NFR BLD AUTO: 0.7 % — SIGNIFICANT CHANGE UP (ref 0–1.5)
LYMPHOCYTES # BLD AUTO: 1.29 K/UL — SIGNIFICANT CHANGE UP (ref 1–3.3)
LYMPHOCYTES # BLD AUTO: 42.9 % — SIGNIFICANT CHANGE UP (ref 13–44)
MAGNESIUM SERPL-MCNC: 2.2 MG/DL — SIGNIFICANT CHANGE UP (ref 1.6–2.6)
MCHC RBC-ENTMCNC: 29.1 PG — SIGNIFICANT CHANGE UP (ref 27–34)
MCHC RBC-ENTMCNC: 31.9 % — LOW (ref 32–36)
MCV RBC AUTO: 91.2 FL — SIGNIFICANT CHANGE UP (ref 80–100)
MONOCYTES # BLD AUTO: 0.25 K/UL — SIGNIFICANT CHANGE UP (ref 0–0.9)
MONOCYTES NFR BLD AUTO: 8.3 % — SIGNIFICANT CHANGE UP (ref 2–14)
NEUTROPHILS # BLD AUTO: 1.28 K/UL — LOW (ref 1.8–7.4)
NEUTROPHILS NFR BLD AUTO: 42.4 % — LOW (ref 43–77)
NRBC # FLD: 0 K/UL — LOW (ref 25–125)
PHOSPHATE SERPL-MCNC: 4.5 MG/DL — SIGNIFICANT CHANGE UP (ref 2.5–4.5)
PLATELET # BLD AUTO: 196 K/UL — SIGNIFICANT CHANGE UP (ref 150–400)
PMV BLD: 12.3 FL — SIGNIFICANT CHANGE UP (ref 7–13)
POTASSIUM SERPL-MCNC: 3.9 MMOL/L — SIGNIFICANT CHANGE UP (ref 3.5–5.3)
POTASSIUM SERPL-SCNC: 3.9 MMOL/L — SIGNIFICANT CHANGE UP (ref 3.5–5.3)
PROT SERPL-MCNC: 7 G/DL — SIGNIFICANT CHANGE UP (ref 6–8.3)
RBC # BLD: 3.75 M/UL — LOW (ref 3.8–5.2)
RBC # FLD: 12.8 % — SIGNIFICANT CHANGE UP (ref 10.3–14.5)
SODIUM SERPL-SCNC: 136 MMOL/L — SIGNIFICANT CHANGE UP (ref 135–145)
WBC # BLD: 3.01 K/UL — LOW (ref 3.8–10.5)
WBC # FLD AUTO: 3.01 K/UL — LOW (ref 3.8–10.5)

## 2019-03-19 RX ORDER — INSULIN GLARGINE 100 [IU]/ML
20 INJECTION, SOLUTION SUBCUTANEOUS ONCE
Qty: 0 | Refills: 0 | Status: COMPLETED | OUTPATIENT
Start: 2019-03-19 | End: 2019-03-19

## 2019-03-19 RX ORDER — INSULIN GLARGINE 100 [IU]/ML
25 INJECTION, SOLUTION SUBCUTANEOUS AT BEDTIME
Qty: 0 | Refills: 0 | Status: DISCONTINUED | OUTPATIENT
Start: 2019-03-19 | End: 2019-03-20

## 2019-03-19 RX ADMIN — Medication 20 MILLIGRAM(S): at 17:08

## 2019-03-19 RX ADMIN — QUETIAPINE FUMARATE 50 MILLIGRAM(S): 200 TABLET, FILM COATED ORAL at 21:59

## 2019-03-19 RX ADMIN — ISOSORBIDE DINITRATE 10 MILLIGRAM(S): 5 TABLET ORAL at 21:59

## 2019-03-19 RX ADMIN — ISOSORBIDE DINITRATE 10 MILLIGRAM(S): 5 TABLET ORAL at 06:07

## 2019-03-19 RX ADMIN — SIMVASTATIN 40 MILLIGRAM(S): 20 TABLET, FILM COATED ORAL at 21:59

## 2019-03-19 RX ADMIN — CARVEDILOL PHOSPHATE 12.5 MILLIGRAM(S): 80 CAPSULE, EXTENDED RELEASE ORAL at 17:08

## 2019-03-19 RX ADMIN — HEPARIN SODIUM 5000 UNIT(S): 5000 INJECTION INTRAVENOUS; SUBCUTANEOUS at 06:07

## 2019-03-19 RX ADMIN — PANTOPRAZOLE SODIUM 40 MILLIGRAM(S): 20 TABLET, DELAYED RELEASE ORAL at 06:07

## 2019-03-19 RX ADMIN — HEPARIN SODIUM 5000 UNIT(S): 5000 INJECTION INTRAVENOUS; SUBCUTANEOUS at 21:58

## 2019-03-19 RX ADMIN — SODIUM CHLORIDE 3 MILLILITER(S): 9 INJECTION INTRAMUSCULAR; INTRAVENOUS; SUBCUTANEOUS at 20:22

## 2019-03-19 RX ADMIN — ISOSORBIDE DINITRATE 10 MILLIGRAM(S): 5 TABLET ORAL at 12:56

## 2019-03-19 RX ADMIN — CEFTRIAXONE 100 GRAM(S): 500 INJECTION, POWDER, FOR SOLUTION INTRAMUSCULAR; INTRAVENOUS at 15:02

## 2019-03-19 RX ADMIN — Medication 100 MILLIGRAM(S): at 11:36

## 2019-03-19 RX ADMIN — Medication 5 MILLIGRAM(S): at 06:07

## 2019-03-19 RX ADMIN — Medication 30 MILLILITER(S): at 16:30

## 2019-03-19 RX ADMIN — Medication 100 MILLIGRAM(S): at 17:08

## 2019-03-19 RX ADMIN — CLOPIDOGREL BISULFATE 75 MILLIGRAM(S): 75 TABLET, FILM COATED ORAL at 11:57

## 2019-03-19 RX ADMIN — Medication 20 MILLIGRAM(S): at 07:13

## 2019-03-19 RX ADMIN — Medication 20 MILLIGRAM(S): at 06:07

## 2019-03-19 RX ADMIN — HEPARIN SODIUM 5000 UNIT(S): 5000 INJECTION INTRAVENOUS; SUBCUTANEOUS at 12:56

## 2019-03-19 RX ADMIN — SODIUM CHLORIDE 3 MILLILITER(S): 9 INJECTION INTRAMUSCULAR; INTRAVENOUS; SUBCUTANEOUS at 05:11

## 2019-03-19 RX ADMIN — Medication 100 MILLIGRAM(S): at 06:07

## 2019-03-19 RX ADMIN — SODIUM CHLORIDE 3 MILLILITER(S): 9 INJECTION INTRAMUSCULAR; INTRAVENOUS; SUBCUTANEOUS at 09:22

## 2019-03-19 RX ADMIN — Medication 5 MILLIGRAM(S): at 17:08

## 2019-03-19 RX ADMIN — SERTRALINE 200 MILLIGRAM(S): 25 TABLET, FILM COATED ORAL at 11:36

## 2019-03-19 RX ADMIN — CARVEDILOL PHOSPHATE 12.5 MILLIGRAM(S): 80 CAPSULE, EXTENDED RELEASE ORAL at 06:07

## 2019-03-19 RX ADMIN — Medication 81 MILLIGRAM(S): at 11:36

## 2019-03-19 RX ADMIN — INSULIN GLARGINE 20 UNIT(S): 100 INJECTION, SOLUTION SUBCUTANEOUS at 23:43

## 2019-03-19 NOTE — PROGRESS NOTE ADULT - ATTENDING COMMENTS
Jose J Jones will be covering for me starting 3/20/19. He can be reached at  if needed.     - Dr. GA Hays ([facility])  - [phone]

## 2019-03-19 NOTE — PROGRESS NOTE ADULT - SUBJECTIVE AND OBJECTIVE BOX
NEPHROLOGY - NSN    Patient seen and examined.  S/P HD yesterday w/o incident  Feels much better; sitting up in bed; talking on cell phone; denies n/v/abd pain    MEDICATIONS  (STANDING):  allopurinol 100 milliGRAM(s) Oral daily  aspirin enteric coated 81 milliGRAM(s) Oral daily  carvedilol 12.5 milliGRAM(s) Oral every 12 hours  cefTRIAXone   IVPB      cefTRIAXone   IVPB 1 Gram(s) IV Intermittent every 24 hours  clopidogrel Tablet 75 milliGRAM(s) Oral daily  dicyclomine 20 milliGRAM(s) Oral two times a day before meals  furosemide    Tablet 20 milliGRAM(s) Oral daily  heparin  Injectable 5000 Unit(s) SubCutaneous every 8 hours  hydrALAZINE 100 milliGRAM(s) Oral two times a day  insulin glargine Injectable (LANTUS) 25 Unit(s) SubCutaneous at bedtime  insulin lispro (HumaLOG) corrective regimen sliding scale   SubCutaneous three times a day before meals  insulin lispro (HumaLOG) corrective regimen sliding scale   SubCutaneous at bedtime  isosorbide   dinitrate Tablet (ISORDIL) 10 milliGRAM(s) Oral three times a day  oxybutynin 5 milliGRAM(s) Oral two times a day  pantoprazole    Tablet 40 milliGRAM(s) Oral before breakfast  QUEtiapine 50 milliGRAM(s) Oral at bedtime  sertraline 200 milliGRAM(s) Oral daily  simvastatin 40 milliGRAM(s) Oral at bedtime  sodium chloride 0.9% lock flush 3 milliLiter(s) IV Push every 8 hours    VITALS:  T(C): , Max: 37 (03-18-19 @ 19:50)  T(F): , Max: 98.6 (03-18-19 @ 19:50)  HR: 80 (03-19-19 @ 06:05)  BP: 147/79 (03-19-19 @ 06:05)  BP(mean): --  RR: 18 (03-19-19 @ 06:05)  SpO2: 98% (03-19-19 @ 06:05)  Wt(kg): --  I and O's:    03-18 @ 07:01  -  03-19 @ 07:00  --------------------------------------------------------  IN: 700 mL / OUT: 3000 mL / NET: -2300 mL      REVIEW OF SYSTEMS:  Full ROS done and were negative unless otherwise indicated in HPI/assessment.     PHYSICAL EXAM:  Constitutional: NAD  Respiratory: CTA B/L  Cardiovascular: S1 and S2, RRR  Gastrointestinal: + BS, soft, NT, ND  Extremities: no peripheral edema  Neurological: AAO x 3  : no malcolm  Access: LUE AVF (+ thrill)    LABS:                        10.9   3.01  )-----------( 196      ( 19 Mar 2019 06:23 )             34.2     03-19    136  |  96<L>  |  30<H>  ----------------------------<  49<L>  3.9   |  27  |  7.84<H>    Ca    9.2      19 Mar 2019 06:23  Phos  4.5     03-19  Mg     2.2     03-19    TPro  7.0  /  Alb  3.7  /  TBili  0.2  /  DBili  x   /  AST  16  /  ALT  17  /  AlkPhos  126<H>  03-19    ASSESSMENT  53y Female with DM, HTN, gout, CVD and ESRD p/w abd pain, N/V/D  - ESRD: HD TTS, last HD yesterday - she is off her OP schedule  - HTN: BP improving  - endo: erratic glucose readings    RECOMMENDATIONS:  - HD tomorrow  - c/w antihypertensive meds  - consider endo input  - please dose any new medications for a CrCl of < 10 cc/min    Debi Johansen NP  [facility]  [phone]

## 2019-03-19 NOTE — PROGRESS NOTE ADULT - SUBJECTIVE AND OBJECTIVE BOX
Patient is a 53y old  Female who presents with a chief complaint of Abdominal pain and Chest Discomfort (19 Mar 2019 12:26)      SUBJECTIVE / OVERNIGHT EVENTS:  abd pain better.  am sugars low.  Lantus dec from 30 units to 25 units  no cp, no sob, no n/v/d. no abdominal pain.  no headache, no dizziness.         Vital Signs Last 24 Hrs  T(C): 36.8 (19 Mar 2019 13:05), Max: 37 (18 Mar 2019 19:50)  T(F): 98.3 (19 Mar 2019 13:05), Max: 98.6 (18 Mar 2019 19:50)  HR: 72 (19 Mar 2019 17:05) (72 - 90)  BP: 161/94 (19 Mar 2019 17:05) (142/82 - 177/93)  BP(mean): --  RR: 18 (19 Mar 2019 17:05) (18 - 18)  SpO2: 100% (19 Mar 2019 17:05) (97% - 100%)  I&O's Summary    18 Mar 2019 07:01  -  19 Mar 2019 07:00  --------------------------------------------------------  IN: 700 mL / OUT: 3000 mL / NET: -2300 mL        PHYSICAL EXAM:  GENERAL: NAD, Comfortable, obese  HEAD:  Atraumatic, Normocephalic  EYES: EOMI, PERRLA, conjunctiva and sclera clear  NECK: Supple, No JVD  CHEST/LUNG: Clear to auscultation bilaterally; No wheeze  HEART: Regular rate and rhythm; No murmurs, rubs, or gallops  ABDOMEN: Soft, Nontender, Nondistended; Bowel sounds present  Neuro: AAO x 3, no focal deficit, 5/5 b/l extremities  EXTREMITIES:  2+ Peripheral Pulses, No clubbing, cyanosis, or edema  SKIN: No rashes or lesions    LABS:                        10.9   3.01  )-----------( 196      ( 19 Mar 2019 06:23 )             34.2     03-19    136  |  96<L>  |  30<H>  ----------------------------<  49<L>  3.9   |  27  |  7.84<H>    Ca    9.2      19 Mar 2019 06:23  Phos  4.5     03-19  Mg     2.2     03-19    TPro  7.0  /  Alb  3.7  /  TBili  0.2  /  DBili  x   /  AST  16  /  ALT  17  /  AlkPhos  126<H>  03-19      CAPILLARY BLOOD GLUCOSE      POCT Blood Glucose.: 134 mg/dL (19 Mar 2019 17:46)  POCT Blood Glucose.: 66 mg/dL (19 Mar 2019 17:08)  POCT Blood Glucose.: 79 mg/dL (19 Mar 2019 11:39)  POCT Blood Glucose.: 113 mg/dL (19 Mar 2019 09:03)  POCT Blood Glucose.: 98 mg/dL (19 Mar 2019 08:36)  POCT Blood Glucose.: 82 mg/dL (19 Mar 2019 08:17)  POCT Blood Glucose.: 58 mg/dL (19 Mar 2019 08:01)  POCT Blood Glucose.: 54 mg/dL (19 Mar 2019 07:48)  POCT Blood Glucose.: 118 mg/dL (18 Mar 2019 22:07)  POCT Blood Glucose.: 135 mg/dL (18 Mar 2019 20:30)            RADIOLOGY & ADDITIONAL TESTS:    Imaging Personally Reviewed:  [x] YES  [ ] NO    Consultant(s) Notes Reviewed:  [x] YES  [ ] NO      MEDICATIONS  (STANDING):  allopurinol 100 milliGRAM(s) Oral daily  aspirin enteric coated 81 milliGRAM(s) Oral daily  carvedilol 12.5 milliGRAM(s) Oral every 12 hours  cefTRIAXone   IVPB      cefTRIAXone   IVPB 1 Gram(s) IV Intermittent every 24 hours  clopidogrel Tablet 75 milliGRAM(s) Oral daily  dextrose 5%. 1000 milliLiter(s) (50 mL/Hr) IV Continuous <Continuous>  dextrose 50% Injectable 12.5 Gram(s) IV Push once  dextrose 50% Injectable 25 Gram(s) IV Push once  dextrose 50% Injectable 25 Gram(s) IV Push once  dicyclomine 20 milliGRAM(s) Oral two times a day before meals  furosemide    Tablet 20 milliGRAM(s) Oral daily  heparin  Injectable 5000 Unit(s) SubCutaneous every 8 hours  hydrALAZINE 100 milliGRAM(s) Oral two times a day  insulin glargine Injectable (LANTUS) 25 Unit(s) SubCutaneous at bedtime  insulin lispro (HumaLOG) corrective regimen sliding scale   SubCutaneous three times a day before meals  insulin lispro (HumaLOG) corrective regimen sliding scale   SubCutaneous at bedtime  isosorbide   dinitrate Tablet (ISORDIL) 10 milliGRAM(s) Oral three times a day  oxybutynin 5 milliGRAM(s) Oral two times a day  pantoprazole    Tablet 40 milliGRAM(s) Oral before breakfast  QUEtiapine 50 milliGRAM(s) Oral at bedtime  sertraline 200 milliGRAM(s) Oral daily  simvastatin 40 milliGRAM(s) Oral at bedtime  sodium chloride 0.9% lock flush 3 milliLiter(s) IV Push every 8 hours    MEDICATIONS  (PRN):  aluminum hydroxide/magnesium hydroxide/simethicone Suspension 30 milliLiter(s) Oral every 6 hours PRN Dyspepsia  dextrose 40% Gel 15 Gram(s) Oral once PRN Blood Glucose LESS THAN 70 milliGRAM(s)/deciliter  glucagon  Injectable 1 milliGRAM(s) IntraMuscular once PRN Glucose LESS THAN 70 milligrams/deciliter  ondansetron Injectable 4 milliGRAM(s) IV Push every 8 hours PRN Nausea and/or Vomiting      Care Discussed with Consultants/Other Providers [x] YES  [ ] NO    HEALTH ISSUES - PROBLEM Dx:  HLD (hyperlipidemia)  Need for prophylactic measure  Depression  Diabetes  ESRD (end stage renal disease): on HD (Tues, Thurs, Sat)  HTN (hypertension)  Gout  Elevated troponin  Chest pain  Abdominal pain

## 2019-03-20 DIAGNOSIS — E11.9 TYPE 2 DIABETES MELLITUS WITHOUT COMPLICATIONS: ICD-10-CM

## 2019-03-20 LAB
ANION GAP SERPL CALC-SCNC: 12 MMO/L — SIGNIFICANT CHANGE UP (ref 7–14)
BUN SERPL-MCNC: 33 MG/DL — HIGH (ref 7–23)
CALCIUM SERPL-MCNC: 8.9 MG/DL — SIGNIFICANT CHANGE UP (ref 8.4–10.5)
CHLORIDE SERPL-SCNC: 92 MMOL/L — LOW (ref 98–107)
CO2 SERPL-SCNC: 28 MMOL/L — SIGNIFICANT CHANGE UP (ref 22–31)
CREAT SERPL-MCNC: 9.17 MG/DL — HIGH (ref 0.5–1.3)
GLUCOSE SERPL-MCNC: 56 MG/DL — LOW (ref 70–99)
MAGNESIUM SERPL-MCNC: 2.3 MG/DL — SIGNIFICANT CHANGE UP (ref 1.6–2.6)
PHOSPHATE SERPL-MCNC: 5.2 MG/DL — HIGH (ref 2.5–4.5)
POTASSIUM SERPL-MCNC: 4.1 MMOL/L — SIGNIFICANT CHANGE UP (ref 3.5–5.3)
POTASSIUM SERPL-SCNC: 4.1 MMOL/L — SIGNIFICANT CHANGE UP (ref 3.5–5.3)
SODIUM SERPL-SCNC: 132 MMOL/L — LOW (ref 135–145)

## 2019-03-20 RX ORDER — INSULIN GLARGINE 100 [IU]/ML
14 INJECTION, SOLUTION SUBCUTANEOUS AT BEDTIME
Qty: 0 | Refills: 0 | Status: DISCONTINUED | OUTPATIENT
Start: 2019-03-20 | End: 2019-03-21

## 2019-03-20 RX ADMIN — Medication 100 MILLIGRAM(S): at 21:03

## 2019-03-20 RX ADMIN — QUETIAPINE FUMARATE 50 MILLIGRAM(S): 200 TABLET, FILM COATED ORAL at 21:05

## 2019-03-20 RX ADMIN — SIMVASTATIN 40 MILLIGRAM(S): 20 TABLET, FILM COATED ORAL at 21:05

## 2019-03-20 RX ADMIN — HEPARIN SODIUM 5000 UNIT(S): 5000 INJECTION INTRAVENOUS; SUBCUTANEOUS at 07:09

## 2019-03-20 RX ADMIN — Medication 5 MILLIGRAM(S): at 21:04

## 2019-03-20 RX ADMIN — PANTOPRAZOLE SODIUM 40 MILLIGRAM(S): 20 TABLET, DELAYED RELEASE ORAL at 07:11

## 2019-03-20 RX ADMIN — ONDANSETRON 4 MILLIGRAM(S): 8 TABLET, FILM COATED ORAL at 12:14

## 2019-03-20 RX ADMIN — Medication 20 MILLIGRAM(S): at 07:10

## 2019-03-20 RX ADMIN — ISOSORBIDE DINITRATE 10 MILLIGRAM(S): 5 TABLET ORAL at 21:05

## 2019-03-20 RX ADMIN — CARVEDILOL PHOSPHATE 12.5 MILLIGRAM(S): 80 CAPSULE, EXTENDED RELEASE ORAL at 07:10

## 2019-03-20 RX ADMIN — Medication 81 MILLIGRAM(S): at 22:11

## 2019-03-20 RX ADMIN — Medication 5 MILLIGRAM(S): at 07:09

## 2019-03-20 RX ADMIN — INSULIN GLARGINE 14 UNIT(S): 100 INJECTION, SOLUTION SUBCUTANEOUS at 22:11

## 2019-03-20 RX ADMIN — HEPARIN SODIUM 5000 UNIT(S): 5000 INJECTION INTRAVENOUS; SUBCUTANEOUS at 21:05

## 2019-03-20 RX ADMIN — SODIUM CHLORIDE 3 MILLILITER(S): 9 INJECTION INTRAMUSCULAR; INTRAVENOUS; SUBCUTANEOUS at 07:11

## 2019-03-20 RX ADMIN — SODIUM CHLORIDE 3 MILLILITER(S): 9 INJECTION INTRAMUSCULAR; INTRAVENOUS; SUBCUTANEOUS at 21:08

## 2019-03-20 RX ADMIN — CLOPIDOGREL BISULFATE 75 MILLIGRAM(S): 75 TABLET, FILM COATED ORAL at 21:04

## 2019-03-20 RX ADMIN — Medication 100 MILLIGRAM(S): at 21:04

## 2019-03-20 RX ADMIN — CEFTRIAXONE 100 GRAM(S): 500 INJECTION, POWDER, FOR SOLUTION INTRAMUSCULAR; INTRAVENOUS at 22:10

## 2019-03-20 RX ADMIN — SODIUM CHLORIDE 3 MILLILITER(S): 9 INJECTION INTRAMUSCULAR; INTRAVENOUS; SUBCUTANEOUS at 14:33

## 2019-03-20 RX ADMIN — Medication 20 MILLIGRAM(S): at 22:10

## 2019-03-20 RX ADMIN — ISOSORBIDE DINITRATE 10 MILLIGRAM(S): 5 TABLET ORAL at 07:10

## 2019-03-20 RX ADMIN — CARVEDILOL PHOSPHATE 12.5 MILLIGRAM(S): 80 CAPSULE, EXTENDED RELEASE ORAL at 21:05

## 2019-03-20 RX ADMIN — Medication 100 MILLIGRAM(S): at 07:10

## 2019-03-20 RX ADMIN — SERTRALINE 200 MILLIGRAM(S): 25 TABLET, FILM COATED ORAL at 21:04

## 2019-03-20 NOTE — CONSULT NOTE ADULT - REASON FOR ADMISSION
Abdominal pain and Chest Discomfort

## 2019-03-20 NOTE — CONSULT NOTE ADULT - PROBLEM SELECTOR RECOMMENDATION 9
Will decrease Lantus to 14 units at bed time.  Continue Humalog correction scale coverage.  Patient counseled for compliance with consistent low carb diet.

## 2019-03-20 NOTE — CONSULT NOTE ADULT - SUBJECTIVE AND OBJECTIVE BOX
Patient is a 53y Female     Patient is a 53y old  Female who presents with a chief complaint of Abdominal pain and Chest Discomfort (17 Mar 2019 20:10)      HPI:  54 yo F lives in shelter at the moment, with PMHx of DM, HTN, HLD, ESRD (Tues, Thurs, Sat) last HD 3/14 and Gout came to ED for diffuse abdominal pain radiating to suprapubic area associated with chest discomfort, nausea, vomiting and diarrhea since last week when she was at [facility]. Also reports some dysuria. Pain is similar to when she was at [facility] but has not improved so she came to ED today. Per pt, she missed some dialysis sessions but had last dialysis on Thursday 3/14 when she was discharged from [facility]. States she did not have a work up for her abdominal pain at that time (no CT scan). Endorses multiple episodes of bilious vomiting and diarrhea, chest discomfort  (similar to prior GERD).  Pt admits to increased swelling in LE as well as PICKARD. Denies palpitations, fever, chills, HA, visual changes, dizziness, URI symptoms, constipation, hematuria.     On admission the pt is lying in bed, NAD, eating dinner. She endorses good appetite, but says after she eats she feels nauseous and gets a burning sensation in the epigastric region. She denies chest pain or SOB at this time. (16 Mar 2019 19:08)      PAST MEDICAL & SURGICAL HISTORY:  ESRD (end stage renal disease): on HD (Tues, Thurs, Sat)  Depression  Gout  CVA (cerebral vascular accident)  DM (diabetes mellitus)  HTN (hypertension)  Glaucoma  Diabetes  AVF (arteriovenous fistula)      MEDICATIONS  (STANDING):  allopurinol 100 milliGRAM(s) Oral daily  aspirin enteric coated 81 milliGRAM(s) Oral daily  carvedilol 12.5 milliGRAM(s) Oral every 12 hours  cefTRIAXone   IVPB      cefTRIAXone   IVPB 1 Gram(s) IV Intermittent every 24 hours  clopidogrel Tablet 75 milliGRAM(s) Oral daily  dextrose 5%. 1000 milliLiter(s) (50 mL/Hr) IV Continuous <Continuous>  dextrose 50% Injectable 12.5 Gram(s) IV Push once  dextrose 50% Injectable 25 Gram(s) IV Push once  dextrose 50% Injectable 25 Gram(s) IV Push once  furosemide    Tablet 20 milliGRAM(s) Oral daily  heparin  Injectable 5000 Unit(s) SubCutaneous every 8 hours  hydrALAZINE 100 milliGRAM(s) Oral two times a day  insulin glargine Injectable (LANTUS) 30 Unit(s) SubCutaneous at bedtime  insulin lispro (HumaLOG) corrective regimen sliding scale   SubCutaneous three times a day before meals  insulin lispro (HumaLOG) corrective regimen sliding scale   SubCutaneous at bedtime  isosorbide   dinitrate Tablet (ISORDIL) 10 milliGRAM(s) Oral three times a day  oxybutynin 5 milliGRAM(s) Oral two times a day  pantoprazole    Tablet 40 milliGRAM(s) Oral before breakfast  QUEtiapine 50 milliGRAM(s) Oral at bedtime  sertraline 200 milliGRAM(s) Oral daily  simvastatin 40 milliGRAM(s) Oral at bedtime  sodium chloride 0.9% lock flush 3 milliLiter(s) IV Push every 8 hours      Allergies    iodine (Unknown)  Seafood (Unknown)  shellfish (Nausea (Mild to Mod); Hives (Mild to Mod))    Intolerances        SOCIAL HISTORY:  Denies ETOh,Smoking,     FAMILY HISTORY:  Family history of heart attack      REVIEW OF SYSTEMS:    CONSTITUTIONAL: No weakness, fevers or chills  EYES/ENT: No visual changes;  No vertigo or throat pain   NECK: No pain or stiffness  RESPIRATORY: No cough, wheezing, hemoptysis; No shortness of breath  CARDIOVASCULAR: No chest pain or palpitations  GASTROINTESTINAL: No abdominal or epigastric pain. No nausea, vomiting, or hematemesis; No diarrhea or constipation. No melena or hematochezia.  GENITOURINARY: No dysuria, frequency or hematuria  NEUROLOGICAL: No numbness or weakness  SKIN: No itching, burning, rashes, or lesions   All other review of systems is negative unless indicated above.    VITAL:  T(C): , Max: 36.8 (03-17-19 @ 17:39)  T(F): , Max: 98.2 (03-17-19 @ 17:39)  HR: 85 (03-18-19 @ 05:13)  BP: 165/95 (03-18-19 @ 05:13)  BP(mean): --  RR: 16 (03-18-19 @ 05:13)  SpO2: 98% (03-18-19 @ 05:13)  Wt(kg): --    I and O's:        PHYSICAL EXAM:    Constitutional: NAD  HEENT: PERRLA,   Neck: No JVD  Respiratory: CTA B/L  Cardiovascular: S1 and S2  Gastrointestinal: BS+, soft, NT/ND  Extremities: No peripheral edema  Neurological: A/O x 3, no focal deficits  Psychiatric: Normal mood, normal affect  : No John  Skin: No rashes  Access: Not applicable  Back: No CVA tenderness    LABS:                RADIOLOGY & ADDITIONAL STUDIES:
CHIEF COMPLAINT:    HPI:  52 yo F lives in shelter at the moment, with PMHx of DM, HTN, HLD, ESRD (Tues, Thurs, Sat) last HD 3/14 and Gout came to ED for diffuse abdominal pain radiating to suprapubic area associated with chest discomfort, nausea, vomiting and diarrhea since last week when she was at [facility]. Also reports some dysuria. Pain is similar to when she was at [facility] but has not improved so she came to ED today. Per pt, she missed some dialysis sessions but had last dialysis on Thursday 3/14 when she was discharged from [facility]. States she did not have a work up for her abdominal pain at that time (no CT scan). Endorses multiple episodes of bilious vomiting and diarrhea, chest discomfort  (similar to prior GERD).  Pt admits to increased swelling in LE as well as PICKARD. Denies palpitations, fever, chills, HA, visual changes, dizziness, URI symptoms, constipation, hematuria.     On admission the pt is lying in bed, NAD, eating dinner. She endorses good appetite, but says after she eats she feels nauseous and gets a burning sensation in the epigastric region. She denies chest pain or SOB at this time. (16 Mar 2019 19:08)      PAST MEDICAL & SURGICAL HISTORY:  ESRD (end stage renal disease): on HD (Tues, Thurs, Sat)  Depression  Gout  CVA (cerebral vascular accident)  DM (diabetes mellitus)  HTN (hypertension)  Glaucoma  Diabetes  AVF (arteriovenous fistula)          PREVIOUS DIAGNOSTIC TESTING:    [ ] Echocardiogram:  [ ]  Catheterization:  [ ] Stress Test:  	    MEDICATIONS:  MEDICATIONS  (STANDING):  allopurinol 100 milliGRAM(s) Oral daily  aspirin enteric coated 81 milliGRAM(s) Oral daily  carvedilol 12.5 milliGRAM(s) Oral every 12 hours  cefTRIAXone   IVPB      cefTRIAXone   IVPB 1 Gram(s) IV Intermittent every 24 hours  clopidogrel Tablet 75 milliGRAM(s) Oral daily  dextrose 5%. 1000 milliLiter(s) (50 mL/Hr) IV Continuous <Continuous>  dextrose 50% Injectable 12.5 Gram(s) IV Push once  dextrose 50% Injectable 25 Gram(s) IV Push once  dextrose 50% Injectable 25 Gram(s) IV Push once  furosemide    Tablet 20 milliGRAM(s) Oral daily  heparin  Injectable 5000 Unit(s) SubCutaneous every 8 hours  hydrALAZINE 100 milliGRAM(s) Oral two times a day  insulin glargine Injectable (LANTUS) 30 Unit(s) SubCutaneous at bedtime  insulin lispro (HumaLOG) corrective regimen sliding scale   SubCutaneous three times a day before meals  insulin lispro (HumaLOG) corrective regimen sliding scale   SubCutaneous at bedtime  isosorbide   dinitrate Tablet (ISORDIL) 10 milliGRAM(s) Oral three times a day  oxybutynin 5 milliGRAM(s) Oral two times a day  pantoprazole    Tablet 40 milliGRAM(s) Oral before breakfast  QUEtiapine 50 milliGRAM(s) Oral at bedtime  sertraline 200 milliGRAM(s) Oral daily  simvastatin 40 milliGRAM(s) Oral at bedtime  sodium chloride 0.9% lock flush 3 milliLiter(s) IV Push every 8 hours      FAMILY HISTORY:  Family history of heart attack      SOCIAL HISTORY:    [ ] Non-smoker  [ ] Smoker  [ ] Alcohol    Allergies    iodine (Unknown)  Seafood (Unknown)  shellfish (Nausea (Mild to Mod); Hives (Mild to Mod))    Intolerances    	    REVIEW OF SYSTEMS:  CONSTITUTIONAL: No fever, weight loss, or fatigue  EYES: No eye pain, visual disturbances, or discharge  ENMT:  No difficulty hearing, tinnitus, vertigo; No sinus or throat pain  NECK: No pain or stiffness  RESPIRATORY: No cough, wheezing, chills or hemoptysis; No Shortness of Breath  CARDIOVASCULAR: No chest pain, palpitations, passing out, dizziness, or leg swelling  GASTROINTESTINAL: No abdominal or epigastric pain. No nausea, vomiting, or hematemesis; No diarrhea or constipation. No melena or hematochezia.  GENITOURINARY: No dysuria, frequency, hematuria, or incontinence  NEUROLOGICAL: No headaches, memory loss, loss of strength, numbness, or tremors  SKIN: No itching, burning, rashes, or lesions   	    [ ] All others negative	  [ ] Unable to obtain    PHYSICAL EXAM:  T(C): 36.5 (03-17-19 @ 06:46), Max: 36.7 (03-16-19 @ 13:01)  HR: 74 (03-17-19 @ 06:46) (70 - 82)  BP: 161/85 (03-17-19 @ 06:46) (153/82 - 177/96)  RR: 16 (03-17-19 @ 06:46) (16 - 19)  SpO2: 99% (03-17-19 @ 06:46) (98% - 100%)  Wt(kg): --  I&O's Summary      Appearance: Normal	  Psychiatry: A & O x 3, Mood & affect appropriate  HEENT:   Normal oral mucosa, PERRL, EOMI	  Lymphatic: No lymphadenopathy  Cardiovascular: Normal S1 S2,RRR, No JVD, No murmurs  Respiratory: Lungs clear to auscultation	  Gastrointestinal:  Soft, Non-tender, + BS	  Skin: No rashes, No ecchymoses, No cyanosis	  Neurologic: Non-focal  Extremities: Normal range of motion, No clubbing, cyanosis or edema  Vascular: Peripheral pulses palpable 2+ bilaterally    TELEMETRY: 	    ECG:  	  RADIOLOGY:  OTHER: 	  	  LABS:	 	    CARDIAC MARKERS:                                  11.3   4.29  )-----------( 202      ( 16 Mar 2019 01:30 )             36.3     03-16    135  |  95<L>  |  49<H>  ----------------------------<  110<H>  5.0   |  23  |  9.62<H>    Ca    9.2      16 Mar 2019 01:30    TPro  7.8  /  Alb  3.8  /  TBili  0.3  /  DBili  x   /  AST  20  /  ALT  25  /  AlkPhos  145<H>  03-16      proBNP:   Lipid Profile:   HgA1c:   TSH:     ASSESSMENT/PLAN:
HPI:  54 yo BF with DM, HTN, gout, CVD and ESRD p/w abd pain, N/V and D x few days.  She is benig evaluated for said complaints, and a renal consultation was requested to arrange dialysis. She is known to my partner, Dr. carola Francisco, and she currently receives HD on TTS at [facility] under Dr. Francisco's care.  She receives 3.75 hrs of HD via a LUE AVF, and her last treatment was yesterday due to her having missed HD for the first half of the week.  She still makes urine and currently reports no dyspnea.    PAST MEDICAL & SURGICAL HISTORY:  ESRD (end stage renal disease): on HD (Tues, Thurs, Sat)  Depression  Gout  CVA (cerebral vascular accident)  DM (diabetes mellitus)  HTN (hypertension)  Glaucoma  AVF (arteriovenous fistula)    MEDICATIONS  (STANDING):  cefTRIAXone   IVPB      cefTRIAXone   IVPB 1 Gram(s) IV Intermittent once  pantoprazole    Tablet 40 milliGRAM(s) Oral before breakfast    Allergies  iodine (Unknown)  Seafood (Unknown)  shellfish (Nausea (Mild to Mod); Hives (Mild to Mod))    SOCIAL HISTORY:  Denies EtOH, Smoking.    FAMILY HISTORY:  Family history of heart attack    REVIEW OF SYSTEMS:  Denies any fevers or chills. Denies chest pain, SOB, focal weakness.  Good oral intake and denies fatigue or weakness.  All other pertinent systems are reviewed and are negative.    VITALS:  T(F): 97 (03-16-19 @ 16:22), Max: 98.2 (03-15-19 @ 22:10)  HR: 82 (03-16-19 @ 16:22) (70 - 115)  BP: 175/94 (03-16-19 @ 16:22) (123/80 - 175/94)  SpO2: 100% (03-16-19 @ 16:22) (95% - 100%)  Weight (kg): 92.1 (03-16 @ 02:30)    PHYSICAL EXAM:  General:  alert, cooperative and no distress  HEENT: no trauma  Lungs: clear to auscultation bilaterally  Heart: normal S1/S2  Abdomen: soft, non-tender not distended, + BS  Extremities: no clubbing, cyanosis or edema  Urologic: no malcolm  Neurologic: Grossly normal  Skin: no rashes  Vascular Access: LUE AVF (+ thrill)    LABS:                        11.3   4.29  )-----------( 202      ( 16 Mar 2019 01:30 )             36.3     03-16    135  |  95<L>  |  49<H>  ----------------------------<  110<H>  5.0   |  23  |  9.62<H>    Ca    9.2      16 Mar 2019 01:30    TPro  7.8  /  Alb  3.8  /  TBili  0.3  /  DBili  x   /  AST  20  /  ALT  25  /  AlkPhos  145<H>  03-16      ASSESSMENT:  Patient is a 53y Female with DM, HTN, gout, CVD and ESRD p/w abd pain, N/V/D.  - ESRD: HD TTS, last dialyzed yesterday (off schedule)  - HTN: BP elevated, meds due    RECOMMENDATIONS:  - check CXR  - will plan for next dialysis on Monday unless CXR or AM labs suggest otherwise:      - 3.75 hrs, 2 K+, 2 kg UF  - resume antihypertensive meds  - please dose any new medications for a CrCl of < 10 cc/min    Thank you for the courtesy of this consultation.    Romulo Becker M.D.  [facility]  [phone]
HPI:  54 yo F lives in shelter at the moment, with PMHx of DM, HTN, HLD, ESRD (Tues, Thurs, Sat) last HD 3/14 and Gout came to ED for diffuse abdominal pain radiating to suprapubic area associated with chest discomfort, nausea, vomiting and diarrhea since last week when she was at [facility]. Also reports some dysuria. Pain is similar to when she was at [facility] but has not improved so she came to ED today. Per pt, she missed some dialysis sessions but had last dialysis on Thursday 3/14 when she was discharged from [facility]. States she did not have a work up for her abdominal pain at that time (no CT scan). Endorses multiple episodes of bilious vomiting and diarrhea, chest discomfort  (similar to prior GERD).  Pt admits to increased swelling in LE as well as PICKARD. Denies palpitations, fever, chills, HA, visual changes, dizziness, URI symptoms, constipation, hematuria.     On admission the pt is lying in bed, NAD, eating dinner. She endorses good appetite, but says after she eats she feels nauseous and gets a burning sensation in the epigastric region. She denies chest pain or SOB at this time. (16 Mar 2019 19:08)  Patient has history of diabetes, on oral meds and on insulin at home, no recent hypoglycemic episodes, no polyuria polydipsia. Patient follows up with PCP for diabetes management.    PAST MEDICAL & SURGICAL HISTORY:  ESRD (end stage renal disease): on HD (Tues, Thurs, Sat)  Depression  Gout  CVA (cerebral vascular accident)  DM (diabetes mellitus)  HTN (hypertension)  Glaucoma  Diabetes  AVF (arteriovenous fistula)      FAMILY HISTORY:  Family history of heart attack      Social History:    Outpatient Medications:    MEDICATIONS  (STANDING):  allopurinol 100 milliGRAM(s) Oral daily  aspirin enteric coated 81 milliGRAM(s) Oral daily  carvedilol 12.5 milliGRAM(s) Oral every 12 hours  cefTRIAXone   IVPB      cefTRIAXone   IVPB 1 Gram(s) IV Intermittent every 24 hours  clopidogrel Tablet 75 milliGRAM(s) Oral daily  dextrose 5%. 1000 milliLiter(s) (50 mL/Hr) IV Continuous <Continuous>  dextrose 50% Injectable 12.5 Gram(s) IV Push once  dextrose 50% Injectable 25 Gram(s) IV Push once  dextrose 50% Injectable 25 Gram(s) IV Push once  dicyclomine 20 milliGRAM(s) Oral two times a day before meals  furosemide    Tablet 20 milliGRAM(s) Oral daily  heparin  Injectable 5000 Unit(s) SubCutaneous every 8 hours  hydrALAZINE 100 milliGRAM(s) Oral two times a day  insulin glargine Injectable (LANTUS) 14 Unit(s) SubCutaneous at bedtime  insulin lispro (HumaLOG) corrective regimen sliding scale   SubCutaneous three times a day before meals  insulin lispro (HumaLOG) corrective regimen sliding scale   SubCutaneous at bedtime  isosorbide   dinitrate Tablet (ISORDIL) 10 milliGRAM(s) Oral three times a day  oxybutynin 5 milliGRAM(s) Oral two times a day  pantoprazole    Tablet 40 milliGRAM(s) Oral before breakfast  QUEtiapine 50 milliGRAM(s) Oral at bedtime  sertraline 200 milliGRAM(s) Oral daily  simvastatin 40 milliGRAM(s) Oral at bedtime  sodium chloride 0.9% lock flush 3 milliLiter(s) IV Push every 8 hours    MEDICATIONS  (PRN):  aluminum hydroxide/magnesium hydroxide/simethicone Suspension 30 milliLiter(s) Oral every 6 hours PRN Dyspepsia  dextrose 40% Gel 15 Gram(s) Oral once PRN Blood Glucose LESS THAN 70 milliGRAM(s)/deciliter  glucagon  Injectable 1 milliGRAM(s) IntraMuscular once PRN Glucose LESS THAN 70 milligrams/deciliter  ondansetron Injectable 4 milliGRAM(s) IV Push every 8 hours PRN Nausea and/or Vomiting      Allergies    iodine (Unknown)  Seafood (Unknown)  shellfish (Nausea (Mild to Mod); Hives (Mild to Mod))    Intolerances      Review of Systems:  Constitutional: No fever, no chills  Eyes: No blurry vision  Neuro: No tremors  HEENT: No pain, no neck swelling  Cardiovascular: No chest pain, no palpitations  Respiratory: Has SOB, no cough  GI: No nausea, vomiting, abdominal pain  : No dysuria  Skin: no rash  MSK: Has leg swelling.  Psych: no depression  Endocrine: no polyuria, polydipsia    ALL OTHER SYSTEMS REVIEWED AND NEGATIVE    UNABLE TO OBTAIN    PHYSICAL EXAM:  VITALS: T(C): 36.9 (03-20-19 @ 20:29)  T(F): 98.5 (03-20-19 @ 20:29), Max: 98.6 (03-20-19 @ 15:03)  HR: 70 (03-20-19 @ 20:29) (67 - 83)  BP: 153/86 (03-20-19 @ 20:29) (135/76 - 164/93)  RR:  (17 - 18)  SpO2:  (94% - 100%)  Wt(kg): --  GENERAL: NAD, well-groomed, well-developed  EYES: No proptosis, no lid lag  HEENT:  Atraumatic, Normocephalic  THYROID: Normal size, no palpable nodules  RESPIRATORY: Clear to auscultation bilaterally; No rales, rhonchi, wheezing  CARDIOVASCULAR: Si S2, No murmurs;  GI: Soft, non distended, normal bowel sounds  SKIN: Dry, intact, No rashes or lesions  MUSCULOSKELETAL: Has BL lower extremity edema.  NEURO:  no tremor, sensation decreased in feet BL,    POCT Blood Glucose.: 161 mg/dL (03-20-19 @ 21:16)  POCT Blood Glucose.: 75 mg/dL (03-20-19 @ 17:36)  POCT Blood Glucose.: 106 mg/dL (03-20-19 @ 15:00)  POCT Blood Glucose.: 104 mg/dL (03-20-19 @ 11:40)  POCT Blood Glucose.: 106 mg/dL (03-20-19 @ 09:12)  POCT Blood Glucose.: 69 mg/dL (03-20-19 @ 08:39)  POCT Blood Glucose.: 61 mg/dL (03-20-19 @ 06:26)  POCT Blood Glucose.: 100 mg/dL (03-19-19 @ 23:27)  POCT Blood Glucose.: 93 mg/dL (03-19-19 @ 21:46)  POCT Blood Glucose.: 134 mg/dL (03-19-19 @ 17:46)  POCT Blood Glucose.: 66 mg/dL (03-19-19 @ 17:08)  POCT Blood Glucose.: 79 mg/dL (03-19-19 @ 11:39)  POCT Blood Glucose.: 113 mg/dL (03-19-19 @ 09:03)  POCT Blood Glucose.: 98 mg/dL (03-19-19 @ 08:36)  POCT Blood Glucose.: 82 mg/dL (03-19-19 @ 08:17)  POCT Blood Glucose.: 58 mg/dL (03-19-19 @ 08:01)  POCT Blood Glucose.: 54 mg/dL (03-19-19 @ 07:48)  POCT Blood Glucose.: 118 mg/dL (03-18-19 @ 22:07)  POCT Blood Glucose.: 135 mg/dL (03-18-19 @ 20:30)  POCT Blood Glucose.: 76 mg/dL (03-18-19 @ 17:54)  POCT Blood Glucose.: 79 mg/dL (03-18-19 @ 17:26)  POCT Blood Glucose.: 83 mg/dL (03-18-19 @ 11:52)  POCT Blood Glucose.: 114 mg/dL (03-18-19 @ 07:27)  POCT Blood Glucose.: 96 mg/dL (03-17-19 @ 23:45)  POCT Blood Glucose.: 109 mg/dL (03-17-19 @ 22:29)                            10.9   3.01  )-----------( 196      ( 19 Mar 2019 06:23 )             34.2       03-20    132<L>  |  92<L>  |  33<H>  ----------------------------<  56<L>  4.1   |  28  |  9.17<H>    EGFR if : 5   EGFR if non : 4     Ca    8.9      03-20  Mg     2.3     03-20  Phos  5.2     03-20    TPro  7.0  /  Alb  3.7  /  TBili  0.2  /  DBili  x   /  AST  16  /  ALT  17  /  AlkPhos  126<H>  03-19      Thyroid Function Tests:              Radiology:

## 2019-03-20 NOTE — CONSULT NOTE ADULT - PROBLEM SELECTOR RECOMMENDATION 3
Controlled, asymptomatic, on medication. Suggest to continue medications, monitoring, FU primary team recommendations. .

## 2019-03-20 NOTE — CONSULT NOTE ADULT - ASSESSMENT
agree wtiih medicine, symptoms may be gastroparesis in the setting of long standing diabettes.  would start emperic rx with reglan bid and dicyclomine for spasm.  will follow with you.  would defer endosopic evaluation at this time.
54 yo F lives in shelter at the moment, with PMHx of DM, HTN, HLD, ESRD (Tues, Thurs, Sat) last HD 3/14 and Gout came to ED for diffuse abdominal pain radiating to suprapubic area associated with chest discomfort, nausea, vomiting     -symptoms likely related to cystits  -abx per med  -echo  -titrate BP meds to optmize control  -HD per renal
Assessment  DMT2: 53y Female with DM T2 with hyperglycemia, A1C  was on oral meds and on insulin at home, blood sugars trending down, had no hypoglycemic episode, eating meals,  non compliant with low carb diet.  HTN: Un Controlled,  on antihypertensive medications.  HLD: On statin.  ESRD: On hemodialysis, Monitor labs/BMP        Dimitri Ospina MD  Cell: [phone]  Office: [phone]

## 2019-03-20 NOTE — PROGRESS NOTE ADULT - SUBJECTIVE AND OBJECTIVE BOX
Tolerated breakfast without feeling nauseous or throwing up  No abd pain or diarrhea    Vital Signs Last 24 Hrs  T(C): 36.8 (20 Mar 2019 07:06), Max: 36.9 (19 Mar 2019 22:44)  T(F): 98.2 (20 Mar 2019 07:06), Max: 98.4 (19 Mar 2019 22:44)  HR: 68 (20 Mar 2019 07:06) (67 - 76)  BP: 164/93 (20 Mar 2019 07:06) (135/76 - 164/93)  BP(mean): --  RR: 18 (20 Mar 2019 07:06) (18 - 18)  SpO2: 95% (20 Mar 2019 07:06) (94% - 100%)    GENERAL: NAD, Comfortable, obese  HEAD:  Atraumatic, Normocephalic  EYES: EOMI, PERRLA, conjunctiva and sclera clear  NECK: Supple, No JVD  CHEST/LUNG: Clear to auscultation bilaterally; No wheeze  HEART: Regular rate and rhythm; No murmurs, rubs, or gallops  ABDOMEN: Soft, Nontender, Nondistended; Bowel sounds present  Neuro: AAO x 3, no focal deficit, 5/5 b/l extremities  EXTREMITIES:  2+ Peripheral Pulses, No clubbing, cyanosis, or edema  SKIN: No rashes or lesions    LABS:                        10.9   3.01  )-----------( 196      ( 19 Mar 2019 06:23 )             34.2     03-20    132<L>  |  92<L>  |  33<H>  ----------------------------<  56<L>  4.1   |  28  |  9.17<H>    Ca    8.9      20 Mar 2019 05:10  Phos  5.2     03-20  Mg     2.3     03-20    TPro  7.0  /  Alb  3.7  /  TBili  0.2  /  DBili  x   /  AST  16  /  ALT  17  /  AlkPhos  126<H>  03-19      CAPILLARY BLOOD GLUCOSE      POCT Blood Glucose.: 104 mg/dL (20 Mar 2019 11:40)  POCT Blood Glucose.: 106 mg/dL (20 Mar 2019 09:12)  POCT Blood Glucose.: 69 mg/dL (20 Mar 2019 08:39)  POCT Blood Glucose.: 61 mg/dL (20 Mar 2019 06:26)  POCT Blood Glucose.: 100 mg/dL (19 Mar 2019 23:27)  POCT Blood Glucose.: 93 mg/dL (19 Mar 2019 21:46)  POCT Blood Glucose.: 134 mg/dL (19 Mar 2019 17:46)  POCT Blood Glucose.: 66 mg/dL (19 Mar 2019 17:08)

## 2019-03-20 NOTE — PROGRESS NOTE ADULT - SUBJECTIVE AND OBJECTIVE BOX
MIMI HICKS:[id number],   53yFemale followed for:  iodine (Unknown)  Seafood (Unknown)  shellfish (Nausea (Mild to Mod); Hives (Mild to Mod))    PAST MEDICAL & SURGICAL HISTORY:  ESRD (end stage renal disease): on HD (Tues, Thurs, Sat)  Depression  Gout  CVA (cerebral vascular accident)  DM (diabetes mellitus)  HTN (hypertension)  Glaucoma  Diabetes  AVF (arteriovenous fistula)    FAMILY HISTORY:  Family history of heart attack    MEDICATIONS  (STANDING):  allopurinol 100 milliGRAM(s) Oral daily  aspirin enteric coated 81 milliGRAM(s) Oral daily  carvedilol 12.5 milliGRAM(s) Oral every 12 hours  cefTRIAXone   IVPB      cefTRIAXone   IVPB 1 Gram(s) IV Intermittent every 24 hours  clopidogrel Tablet 75 milliGRAM(s) Oral daily  dextrose 5%. 1000 milliLiter(s) (50 mL/Hr) IV Continuous <Continuous>  dextrose 50% Injectable 12.5 Gram(s) IV Push once  dextrose 50% Injectable 25 Gram(s) IV Push once  dextrose 50% Injectable 25 Gram(s) IV Push once  dicyclomine 20 milliGRAM(s) Oral two times a day before meals  furosemide    Tablet 20 milliGRAM(s) Oral daily  heparin  Injectable 5000 Unit(s) SubCutaneous every 8 hours  hydrALAZINE 100 milliGRAM(s) Oral two times a day  insulin glargine Injectable (LANTUS) 25 Unit(s) SubCutaneous at bedtime  insulin lispro (HumaLOG) corrective regimen sliding scale   SubCutaneous three times a day before meals  insulin lispro (HumaLOG) corrective regimen sliding scale   SubCutaneous at bedtime  isosorbide   dinitrate Tablet (ISORDIL) 10 milliGRAM(s) Oral three times a day  oxybutynin 5 milliGRAM(s) Oral two times a day  pantoprazole    Tablet 40 milliGRAM(s) Oral before breakfast  QUEtiapine 50 milliGRAM(s) Oral at bedtime  sertraline 200 milliGRAM(s) Oral daily  simvastatin 40 milliGRAM(s) Oral at bedtime  sodium chloride 0.9% lock flush 3 milliLiter(s) IV Push every 8 hours    MEDICATIONS  (PRN):  aluminum hydroxide/magnesium hydroxide/simethicone Suspension 30 milliLiter(s) Oral every 6 hours PRN Dyspepsia  dextrose 40% Gel 15 Gram(s) Oral once PRN Blood Glucose LESS THAN 70 milliGRAM(s)/deciliter  glucagon  Injectable 1 milliGRAM(s) IntraMuscular once PRN Glucose LESS THAN 70 milligrams/deciliter  ondansetron Injectable 4 milliGRAM(s) IV Push every 8 hours PRN Nausea and/or Vomiting      Vital Signs Last 24 Hrs  T(C): 36.8 (20 Mar 2019 07:06), Max: 36.9 (19 Mar 2019 22:44)  T(F): 98.2 (20 Mar 2019 07:06), Max: 98.4 (19 Mar 2019 22:44)  HR: 68 (20 Mar 2019 07:06) (67 - 76)  BP: 164/93 (20 Mar 2019 07:06) (135/76 - 164/93)  BP(mean): --  RR: 18 (20 Mar 2019 07:06) (18 - 18)  SpO2: 95% (20 Mar 2019 07:06) (94% - 100%)  nc/at  s1s2  cta  soft, nt, nd no guarding or rebound  no c/c/e    CBC Full  -  ( 19 Mar 2019 06:23 )  WBC Count : 3.01 K/uL  Hemoglobin : 10.9 g/dL  Hematocrit : 34.2 %  Platelet Count - Automated : 196 K/uL  Mean Cell Volume : 91.2 fL  Mean Cell Hemoglobin : 29.1 pg  Mean Cell Hemoglobin Concentration : 31.9 %  Auto Neutrophil # : 1.28 K/uL  Auto Lymphocyte # : 1.29 K/uL  Auto Monocyte # : 0.25 K/uL  Auto Eosinophil # : 0.14 K/uL  Auto Basophil # : 0.03 K/uL  Auto Neutrophil % : 42.4 %  Auto Lymphocyte % : 42.9 %  Auto Monocyte % : 8.3 %  Auto Eosinophil % : 4.7 %  Auto Basophil % : 1.0 %    03-20    132<L>  |  92<L>  |  33<H>  ----------------------------<  56<L>  4.1   |  28  |  9.17<H>    Ca    8.9      20 Mar 2019 05:10  Phos  5.2     03-20  Mg     2.3     03-20    TPro  7.0  /  Alb  3.7  /  TBili  0.2  /  DBili  x   /  AST  16  /  ALT  17  /  AlkPhos  126<H>  03-19

## 2019-03-20 NOTE — PROGRESS NOTE ADULT - SUBJECTIVE AND OBJECTIVE BOX
NEPHROLOGY - NSN    Patient seen and examined.  + N/V  Awaiting HD today  Low glucoses noted  Poor PO intake, trying to eat but starts to vomit  No abdominal pain    MEDICATIONS  (STANDING):  allopurinol 100 milliGRAM(s) Oral daily  aspirin enteric coated 81 milliGRAM(s) Oral daily  carvedilol 12.5 milliGRAM(s) Oral every 12 hours  cefTRIAXone   IVPB      cefTRIAXone   IVPB 1 Gram(s) IV Intermittent every 24 hours  clopidogrel Tablet 75 milliGRAM(s) Oral daily  dextrose 5%. 1000 milliLiter(s) (50 mL/Hr) IV Continuous <Continuous>  dextrose 50% Injectable 12.5 Gram(s) IV Push once  dextrose 50% Injectable 25 Gram(s) IV Push once  dextrose 50% Injectable 25 Gram(s) IV Push once  dicyclomine 20 milliGRAM(s) Oral two times a day before meals  furosemide    Tablet 20 milliGRAM(s) Oral daily  heparin  Injectable 5000 Unit(s) SubCutaneous every 8 hours  hydrALAZINE 100 milliGRAM(s) Oral two times a day  insulin glargine Injectable (LANTUS) 25 Unit(s) SubCutaneous at bedtime  insulin lispro (HumaLOG) corrective regimen sliding scale   SubCutaneous three times a day before meals  insulin lispro (HumaLOG) corrective regimen sliding scale   SubCutaneous at bedtime  isosorbide   dinitrate Tablet (ISORDIL) 10 milliGRAM(s) Oral three times a day  oxybutynin 5 milliGRAM(s) Oral two times a day  pantoprazole    Tablet 40 milliGRAM(s) Oral before breakfast  QUEtiapine 50 milliGRAM(s) Oral at bedtime  sertraline 200 milliGRAM(s) Oral daily  simvastatin 40 milliGRAM(s) Oral at bedtime  sodium chloride 0.9% lock flush 3 milliLiter(s) IV Push every 8 hours    VITALS:  T(C): , Max: 36.9 (03-19-19 @ 22:44)  T(F): , Max: 98.4 (03-19-19 @ 22:44)  HR: 68 (03-20-19 @ 07:06)  BP: 164/93 (03-20-19 @ 07:06)  BP(mean): --  RR: 18 (03-20-19 @ 07:06)  SpO2: 95% (03-20-19 @ 07:06)    REVIEW OF SYSTEMS:  Full ROS done and were negative unless otherwise indicated in HPI/assessment.     PHYSICAL EXAM:  Constitutional: NAD  Respiratory: CTA B/L  Cardiovascular: S1 and S2, RRR  Gastrointestinal: + BS, soft, NT, ND  Extremities: no peripheral edema  Neurological: AAO x 3  : no malcolm  Access: LUE AVF (+ thrill)      LABS:                        10.9   3.01  )-----------( 196      ( 19 Mar 2019 06:23 )             34.2     03-20    132<L>  |  92<L>  |  33<H>  ----------------------------<  56<L>  4.1   |  28  |  9.17<H>    Ca    8.9      20 Mar 2019 05:10  Phos  5.2     03-20  Mg     2.3     03-20    TPro  7.0  /  Alb  3.7  /  TBili  0.2  /  DBili  x   /  AST  16  /  ALT  17  /  AlkPhos  126<H>  03-19    ASSESSMENT  53y Female with DM, HTN, gout, CVD and ESRD p/w abd pain, N/V/D  - ESRD: HD TTS - she is off her outpatient HD schedule at present  - HTN: BP suboptimally controlled  - GI: no abd pain but n/v persists  - endo: erratic glucose readings; hypoglycemic     RECOMMENDATIONS:  - HD today   - c/w antihypertensive meds  - consider endo  - gi f/u  - please dose any new medications for a CrCl of < 10 cc/min    Debi Johansen NP  [facility]  [phone] NEPHROLOGY - NSN    Patient seen and examined.  + N/V  Awaiting HD today  Low glucoses noted  Poor PO intake, trying to eat but starts to vomit  No abdominal pain    MEDICATIONS  (STANDING):  allopurinol 100 milliGRAM(s) Oral daily  aspirin enteric coated 81 milliGRAM(s) Oral daily  carvedilol 12.5 milliGRAM(s) Oral every 12 hours  cefTRIAXone   IVPB      cefTRIAXone   IVPB 1 Gram(s) IV Intermittent every 24 hours  clopidogrel Tablet 75 milliGRAM(s) Oral daily  dextrose 5%. 1000 milliLiter(s) (50 mL/Hr) IV Continuous <Continuous>  dextrose 50% Injectable 12.5 Gram(s) IV Push once  dextrose 50% Injectable 25 Gram(s) IV Push once  dextrose 50% Injectable 25 Gram(s) IV Push once  dicyclomine 20 milliGRAM(s) Oral two times a day before meals  furosemide    Tablet 20 milliGRAM(s) Oral daily  heparin  Injectable 5000 Unit(s) SubCutaneous every 8 hours  hydrALAZINE 100 milliGRAM(s) Oral two times a day  insulin glargine Injectable (LANTUS) 25 Unit(s) SubCutaneous at bedtime  insulin lispro (HumaLOG) corrective regimen sliding scale   SubCutaneous three times a day before meals  insulin lispro (HumaLOG) corrective regimen sliding scale   SubCutaneous at bedtime  isosorbide   dinitrate Tablet (ISORDIL) 10 milliGRAM(s) Oral three times a day  oxybutynin 5 milliGRAM(s) Oral two times a day  pantoprazole    Tablet 40 milliGRAM(s) Oral before breakfast  QUEtiapine 50 milliGRAM(s) Oral at bedtime  sertraline 200 milliGRAM(s) Oral daily  simvastatin 40 milliGRAM(s) Oral at bedtime  sodium chloride 0.9% lock flush 3 milliLiter(s) IV Push every 8 hours    VITALS:  T(C): , Max: 36.9 (03-19-19 @ 22:44)  T(F): , Max: 98.4 (03-19-19 @ 22:44)  HR: 68 (03-20-19 @ 07:06)  BP: 164/93 (03-20-19 @ 07:06)  BP(mean): --  RR: 18 (03-20-19 @ 07:06)  SpO2: 95% (03-20-19 @ 07:06)    REVIEW OF SYSTEMS:  Full ROS done and were negative unless otherwise indicated in HPI/assessment.     PHYSICAL EXAM:  Constitutional: NAD  Respiratory: CTA B/L  Cardiovascular: S1 and S2, RRR  Gastrointestinal: + BS, soft, NT, ND  Extremities: no peripheral edema  Neurological: AAO x 3  : no malcolm  Access: LUE AVF (+ thrill)      LABS:                        10.9   3.01  )-----------( 196      ( 19 Mar 2019 06:23 )             34.2     03-20    132<L>  |  92<L>  |  33<H>  ----------------------------<  56<L>  4.1   |  28  |  9.17<H>    Ca    8.9      20 Mar 2019 05:10  Phos  5.2     03-20  Mg     2.3     03-20    TPro  7.0  /  Alb  3.7  /  TBili  0.2  /  DBili  x   /  AST  16  /  ALT  17  /  AlkPhos  126<H>  03-19    ASSESSMENT  53y Female with DM, HTN, gout, CVD and ESRD p/w abd pain, N/V/D  - ESRD: HD TTS - she is off her outpatient HD schedule at present  - HTN: BP suboptimally controlled  - GI: no abd pain but n/v persists  - endo: erratic glucose readings; hypoglycemic     RECOMMENDATIONS:  - HD today   - c/w antihypertensive meds  - consider endo  - gi f/u  - please dose any new medications for a CrCl of < 10 cc/min    Debi Johansen NP  [facility]  [phone]

## 2019-03-20 NOTE — PROGRESS NOTE ADULT - SUBJECTIVE AND OBJECTIVE BOX
CARDIOLOGY FOLLOW UP - Dr. Andrew    CC no cp/sob       PHYSICAL EXAM:  T(C): 36.8 (03-20-19 @ 07:06), Max: 36.9 (03-19-19 @ 22:44)  HR: 68 (03-20-19 @ 07:06) (67 - 76)  BP: 164/93 (03-20-19 @ 07:06) (135/76 - 164/93)  RR: 18 (03-20-19 @ 07:06) (18 - 18)  SpO2: 95% (03-20-19 @ 07:06) (94% - 100%)  Wt(kg): --  I&O's Summary      Appearance: Normal	  Cardiovascular: Normal S1 S2,RRR, No JVD, No murmurs  Respiratory: Lungs clear to auscultation	  Gastrointestinal:  Soft, Non-tender, + BS	  Extremities: Normal range of motion, No clubbing, cyanosis or edema        MEDICATIONS  (STANDING):  allopurinol 100 milliGRAM(s) Oral daily  aspirin enteric coated 81 milliGRAM(s) Oral daily  carvedilol 12.5 milliGRAM(s) Oral every 12 hours  cefTRIAXone   IVPB      cefTRIAXone   IVPB 1 Gram(s) IV Intermittent every 24 hours  clopidogrel Tablet 75 milliGRAM(s) Oral daily  dextrose 5%. 1000 milliLiter(s) (50 mL/Hr) IV Continuous <Continuous>  dextrose 50% Injectable 12.5 Gram(s) IV Push once  dextrose 50% Injectable 25 Gram(s) IV Push once  dextrose 50% Injectable 25 Gram(s) IV Push once  dicyclomine 20 milliGRAM(s) Oral two times a day before meals  furosemide    Tablet 20 milliGRAM(s) Oral daily  heparin  Injectable 5000 Unit(s) SubCutaneous every 8 hours  hydrALAZINE 100 milliGRAM(s) Oral two times a day  insulin glargine Injectable (LANTUS) 25 Unit(s) SubCutaneous at bedtime  insulin lispro (HumaLOG) corrective regimen sliding scale   SubCutaneous three times a day before meals  insulin lispro (HumaLOG) corrective regimen sliding scale   SubCutaneous at bedtime  isosorbide   dinitrate Tablet (ISORDIL) 10 milliGRAM(s) Oral three times a day  oxybutynin 5 milliGRAM(s) Oral two times a day  pantoprazole    Tablet 40 milliGRAM(s) Oral before breakfast  QUEtiapine 50 milliGRAM(s) Oral at bedtime  sertraline 200 milliGRAM(s) Oral daily  simvastatin 40 milliGRAM(s) Oral at bedtime  sodium chloride 0.9% lock flush 3 milliLiter(s) IV Push every 8 hours      TELEMETRY: 	    ECG:  	  RADIOLOGY:   DIAGNOSTIC TESTING:  [ ] Echocardiogram:  [ ]  Catheterization:  [ ] Stress Test:    OTHER: 	    LABS:	 	                                10.9   3.01  )-----------( 196      ( 19 Mar 2019 06:23 )             34.2     03-20    132<L>  |  92<L>  |  33<H>  ----------------------------<  56<L>  4.1   |  28  |  9.17<H>    Ca    8.9      20 Mar 2019 05:10  Phos  5.2     03-20  Mg     2.3     03-20    TPro  7.0  /  Alb  3.7  /  TBili  0.2  /  DBili  x   /  AST  16  /  ALT  17  /  AlkPhos  126<H>  03-19

## 2019-03-21 LAB
ANION GAP SERPL CALC-SCNC: 12 MMO/L — SIGNIFICANT CHANGE UP (ref 7–14)
ANION GAP SERPL CALC-SCNC: 13 MMO/L — SIGNIFICANT CHANGE UP (ref 7–14)
BUN SERPL-MCNC: 25 MG/DL — HIGH (ref 7–23)
BUN SERPL-MCNC: 27 MG/DL — HIGH (ref 7–23)
CALCIUM SERPL-MCNC: 8.5 MG/DL — SIGNIFICANT CHANGE UP (ref 8.4–10.5)
CALCIUM SERPL-MCNC: 9.1 MG/DL — SIGNIFICANT CHANGE UP (ref 8.4–10.5)
CHLORIDE SERPL-SCNC: 91 MMOL/L — LOW (ref 98–107)
CHLORIDE SERPL-SCNC: 93 MMOL/L — LOW (ref 98–107)
CO2 SERPL-SCNC: 26 MMOL/L — SIGNIFICANT CHANGE UP (ref 22–31)
CO2 SERPL-SCNC: 28 MMOL/L — SIGNIFICANT CHANGE UP (ref 22–31)
CREAT SERPL-MCNC: 8.45 MG/DL — HIGH (ref 0.5–1.3)
CREAT SERPL-MCNC: 9.14 MG/DL — HIGH (ref 0.5–1.3)
GLUCOSE SERPL-MCNC: 139 MG/DL — HIGH (ref 70–99)
GLUCOSE SERPL-MCNC: 47 MG/DL — LOW (ref 70–99)
HBA1C BLD-MCNC: 6.9 % — HIGH (ref 4–5.6)
HCT VFR BLD CALC: 33.5 % — LOW (ref 34.5–45)
HGB BLD-MCNC: 10.6 G/DL — LOW (ref 11.5–15.5)
MAGNESIUM SERPL-MCNC: 2.2 MG/DL — SIGNIFICANT CHANGE UP (ref 1.6–2.6)
MCHC RBC-ENTMCNC: 29.4 PG — SIGNIFICANT CHANGE UP (ref 27–34)
MCHC RBC-ENTMCNC: 31.6 % — LOW (ref 32–36)
MCV RBC AUTO: 92.8 FL — SIGNIFICANT CHANGE UP (ref 80–100)
NRBC # FLD: 0 K/UL — LOW (ref 25–125)
PHOSPHATE SERPL-MCNC: 5.4 MG/DL — HIGH (ref 2.5–4.5)
PLATELET # BLD AUTO: 200 K/UL — SIGNIFICANT CHANGE UP (ref 150–400)
PMV BLD: 12.5 FL — SIGNIFICANT CHANGE UP (ref 7–13)
POTASSIUM SERPL-MCNC: 4.2 MMOL/L — SIGNIFICANT CHANGE UP (ref 3.5–5.3)
POTASSIUM SERPL-MCNC: 4.9 MMOL/L — SIGNIFICANT CHANGE UP (ref 3.5–5.3)
POTASSIUM SERPL-SCNC: 4.2 MMOL/L — SIGNIFICANT CHANGE UP (ref 3.5–5.3)
POTASSIUM SERPL-SCNC: 4.9 MMOL/L — SIGNIFICANT CHANGE UP (ref 3.5–5.3)
RBC # BLD: 3.61 M/UL — LOW (ref 3.8–5.2)
RBC # FLD: 12.6 % — SIGNIFICANT CHANGE UP (ref 10.3–14.5)
SODIUM SERPL-SCNC: 130 MMOL/L — LOW (ref 135–145)
SODIUM SERPL-SCNC: 133 MMOL/L — LOW (ref 135–145)
WBC # BLD: 4.23 K/UL — SIGNIFICANT CHANGE UP (ref 3.8–10.5)
WBC # FLD AUTO: 4.23 K/UL — SIGNIFICANT CHANGE UP (ref 3.8–10.5)

## 2019-03-21 RX ORDER — ACETAMINOPHEN 500 MG
650 TABLET ORAL EVERY 6 HOURS
Qty: 0 | Refills: 0 | Status: DISCONTINUED | OUTPATIENT
Start: 2019-03-21 | End: 2019-03-25

## 2019-03-21 RX ORDER — DEXTROSE 50 % IN WATER 50 %
15 SYRINGE (ML) INTRAVENOUS ONCE
Qty: 0 | Refills: 0 | Status: COMPLETED | OUTPATIENT
Start: 2019-03-21 | End: 2019-03-21

## 2019-03-21 RX ADMIN — SIMVASTATIN 40 MILLIGRAM(S): 20 TABLET, FILM COATED ORAL at 22:08

## 2019-03-21 RX ADMIN — Medication 20 MILLIGRAM(S): at 06:50

## 2019-03-21 RX ADMIN — QUETIAPINE FUMARATE 50 MILLIGRAM(S): 200 TABLET, FILM COATED ORAL at 22:08

## 2019-03-21 RX ADMIN — Medication 20 MILLIGRAM(S): at 12:14

## 2019-03-21 RX ADMIN — Medication 81 MILLIGRAM(S): at 12:15

## 2019-03-21 RX ADMIN — SODIUM CHLORIDE 3 MILLILITER(S): 9 INJECTION INTRAMUSCULAR; INTRAVENOUS; SUBCUTANEOUS at 22:06

## 2019-03-21 RX ADMIN — CARVEDILOL PHOSPHATE 12.5 MILLIGRAM(S): 80 CAPSULE, EXTENDED RELEASE ORAL at 06:50

## 2019-03-21 RX ADMIN — Medication 15 GRAM(S): at 08:02

## 2019-03-21 RX ADMIN — Medication 100 MILLIGRAM(S): at 18:58

## 2019-03-21 RX ADMIN — CEFTRIAXONE 100 GRAM(S): 500 INJECTION, POWDER, FOR SOLUTION INTRAMUSCULAR; INTRAVENOUS at 22:09

## 2019-03-21 RX ADMIN — SERTRALINE 200 MILLIGRAM(S): 25 TABLET, FILM COATED ORAL at 12:15

## 2019-03-21 RX ADMIN — HEPARIN SODIUM 5000 UNIT(S): 5000 INJECTION INTRAVENOUS; SUBCUTANEOUS at 22:09

## 2019-03-21 RX ADMIN — Medication 650 MILLIGRAM(S): at 07:42

## 2019-03-21 RX ADMIN — PANTOPRAZOLE SODIUM 40 MILLIGRAM(S): 20 TABLET, DELAYED RELEASE ORAL at 06:50

## 2019-03-21 RX ADMIN — Medication 20 MILLIGRAM(S): at 22:09

## 2019-03-21 RX ADMIN — Medication 100 MILLIGRAM(S): at 12:15

## 2019-03-21 RX ADMIN — Medication 5 MILLIGRAM(S): at 06:50

## 2019-03-21 RX ADMIN — Medication 650 MILLIGRAM(S): at 08:18

## 2019-03-21 RX ADMIN — SODIUM CHLORIDE 3 MILLILITER(S): 9 INJECTION INTRAMUSCULAR; INTRAVENOUS; SUBCUTANEOUS at 06:49

## 2019-03-21 RX ADMIN — SODIUM CHLORIDE 3 MILLILITER(S): 9 INJECTION INTRAMUSCULAR; INTRAVENOUS; SUBCUTANEOUS at 15:00

## 2019-03-21 RX ADMIN — Medication 5 MILLIGRAM(S): at 22:09

## 2019-03-21 RX ADMIN — CARVEDILOL PHOSPHATE 12.5 MILLIGRAM(S): 80 CAPSULE, EXTENDED RELEASE ORAL at 22:08

## 2019-03-21 RX ADMIN — ISOSORBIDE DINITRATE 10 MILLIGRAM(S): 5 TABLET ORAL at 22:08

## 2019-03-21 RX ADMIN — Medication 100 MILLIGRAM(S): at 06:50

## 2019-03-21 RX ADMIN — CLOPIDOGREL BISULFATE 75 MILLIGRAM(S): 75 TABLET, FILM COATED ORAL at 12:15

## 2019-03-21 RX ADMIN — HEPARIN SODIUM 5000 UNIT(S): 5000 INJECTION INTRAVENOUS; SUBCUTANEOUS at 06:50

## 2019-03-21 RX ADMIN — ISOSORBIDE DINITRATE 10 MILLIGRAM(S): 5 TABLET ORAL at 06:50

## 2019-03-21 NOTE — PROGRESS NOTE ADULT - SUBJECTIVE AND OBJECTIVE BOX
Chief complaint  Patient is a 53y old  Female who presents with a chief complaint of Abdominal pain and Chest Discomfort (21 Mar 2019 09:59)   Review of systems  Patient in bed, looks comfortable, no fever, had hypoglycemia.    Labs and Fingersticks  CAPILLARY BLOOD GLUCOSE      POCT Blood Glucose.: 101 mg/dL (21 Mar 2019 11:56)  POCT Blood Glucose.: 146 mg/dL (21 Mar 2019 09:07)  POCT Blood Glucose.: 103 mg/dL (21 Mar 2019 08:51)  POCT Blood Glucose.: 58 mg/dL (21 Mar 2019 08:19)  POCT Blood Glucose.: 161 mg/dL (20 Mar 2019 21:16)  POCT Blood Glucose.: 75 mg/dL (20 Mar 2019 17:36)  POCT Blood Glucose.: 106 mg/dL (20 Mar 2019 15:00)      Anion Gap, Serum: 12 (03-21 @ 07:54)  Anion Gap, Serum: 12 (03-20 @ 05:10)    Hemoglobin A1C, Whole Blood: 6.9 <H> (03-21 @ 07:54)    Calcium, Total Serum: 8.5 (03-21 @ 07:54)  Calcium, Total Serum: 8.9 (03-20 @ 05:10)          03-21    133<L>  |  93<L>  |  25<H>  ----------------------------<  47<L>  4.2   |  28  |  8.45<H>    Ca    8.5      21 Mar 2019 07:54  Phos  5.4     03-21  Mg     2.2     03-21                          10.6   4.23  )-----------( 200      ( 21 Mar 2019 07:54 )             33.5     Medications  MEDICATIONS  (STANDING):  allopurinol 100 milliGRAM(s) Oral daily  aspirin enteric coated 81 milliGRAM(s) Oral daily  carvedilol 12.5 milliGRAM(s) Oral every 12 hours  cefTRIAXone   IVPB      cefTRIAXone   IVPB 1 Gram(s) IV Intermittent every 24 hours  clopidogrel Tablet 75 milliGRAM(s) Oral daily  dextrose 5%. 1000 milliLiter(s) (50 mL/Hr) IV Continuous <Continuous>  dextrose 50% Injectable 12.5 Gram(s) IV Push once  dextrose 50% Injectable 25 Gram(s) IV Push once  dextrose 50% Injectable 25 Gram(s) IV Push once  dicyclomine 20 milliGRAM(s) Oral two times a day before meals  furosemide    Tablet 20 milliGRAM(s) Oral daily  heparin  Injectable 5000 Unit(s) SubCutaneous every 8 hours  hydrALAZINE 100 milliGRAM(s) Oral two times a day  insulin lispro (HumaLOG) corrective regimen sliding scale   SubCutaneous three times a day before meals  insulin lispro (HumaLOG) corrective regimen sliding scale   SubCutaneous at bedtime  isosorbide   dinitrate Tablet (ISORDIL) 10 milliGRAM(s) Oral three times a day  oxybutynin 5 milliGRAM(s) Oral two times a day  pantoprazole    Tablet 40 milliGRAM(s) Oral before breakfast  QUEtiapine 50 milliGRAM(s) Oral at bedtime  sertraline 200 milliGRAM(s) Oral daily  simvastatin 40 milliGRAM(s) Oral at bedtime  sodium chloride 0.9% lock flush 3 milliLiter(s) IV Push every 8 hours      Physical Exam  General: Patient comfortable in bed  Vital Signs Last 12 Hrs  T(F): 98.4 (03-21-19 @ 06:47), Max: 98.4 (03-21-19 @ 06:47)  HR: 74 (03-21-19 @ 06:47) (74 - 74)  BP: 140/80 (03-21-19 @ 06:47) (140/80 - 140/80)  BP(mean): --  RR: 18 (03-21-19 @ 06:47) (18 - 18)  SpO2: 95% (03-21-19 @ 06:47) (95% - 95%)  Neck: No palpable thyroid nodules.  CVS: S1S2, No murmurs  Respiratory: No wheezing, no crepitations  GI: Abdomen soft, bowel sounds positive  Musculoskeletal:  edema lower extremities.   Skin: No skin rashes, no ecchymosis    Diagnostics

## 2019-03-21 NOTE — CHART NOTE - NSCHARTNOTEFT_GEN_A_CORE
Spoke to Dr Betancourt regarding pt having LLQ abdominal pain consistently with a history IBS. Pt has negative CT and benign exam. Dr Betancourt agrees to increase Bentyl to qid   Will follow

## 2019-03-21 NOTE — PROGRESS NOTE ADULT - SUBJECTIVE AND OBJECTIVE BOX
MIMI HICKS:[id number],   53yFemale followed for:  iodine (Unknown)  Seafood (Unknown)  shellfish (Nausea (Mild to Mod); Hives (Mild to Mod))    PAST MEDICAL & SURGICAL HISTORY:  ESRD (end stage renal disease): on HD (Tues, Thurs, Sat)  Depression  Gout  CVA (cerebral vascular accident)  DM (diabetes mellitus)  HTN (hypertension)  Glaucoma  Diabetes  AVF (arteriovenous fistula)    FAMILY HISTORY:  Family history of heart attack    MEDICATIONS  (STANDING):  allopurinol 100 milliGRAM(s) Oral daily  aspirin enteric coated 81 milliGRAM(s) Oral daily  carvedilol 12.5 milliGRAM(s) Oral every 12 hours  cefTRIAXone   IVPB      cefTRIAXone   IVPB 1 Gram(s) IV Intermittent every 24 hours  clopidogrel Tablet 75 milliGRAM(s) Oral daily  dextrose 5%. 1000 milliLiter(s) (50 mL/Hr) IV Continuous <Continuous>  dextrose 50% Injectable 12.5 Gram(s) IV Push once  dextrose 50% Injectable 25 Gram(s) IV Push once  dextrose 50% Injectable 25 Gram(s) IV Push once  dicyclomine 20 milliGRAM(s) Oral two times a day before meals  furosemide    Tablet 20 milliGRAM(s) Oral daily  heparin  Injectable 5000 Unit(s) SubCutaneous every 8 hours  hydrALAZINE 100 milliGRAM(s) Oral two times a day  insulin glargine Injectable (LANTUS) 14 Unit(s) SubCutaneous at bedtime  insulin lispro (HumaLOG) corrective regimen sliding scale   SubCutaneous three times a day before meals  insulin lispro (HumaLOG) corrective regimen sliding scale   SubCutaneous at bedtime  isosorbide   dinitrate Tablet (ISORDIL) 10 milliGRAM(s) Oral three times a day  oxybutynin 5 milliGRAM(s) Oral two times a day  pantoprazole    Tablet 40 milliGRAM(s) Oral before breakfast  QUEtiapine 50 milliGRAM(s) Oral at bedtime  sertraline 200 milliGRAM(s) Oral daily  simvastatin 40 milliGRAM(s) Oral at bedtime  sodium chloride 0.9% lock flush 3 milliLiter(s) IV Push every 8 hours    MEDICATIONS  (PRN):  acetaminophen   Tablet .. 650 milliGRAM(s) Oral every 6 hours PRN Mild Pain (1 - 3), Moderate Pain (4 - 6)  aluminum hydroxide/magnesium hydroxide/simethicone Suspension 30 milliLiter(s) Oral every 6 hours PRN Dyspepsia  dextrose 40% Gel 15 Gram(s) Oral once PRN Blood Glucose LESS THAN 70 milliGRAM(s)/deciliter  glucagon  Injectable 1 milliGRAM(s) IntraMuscular once PRN Glucose LESS THAN 70 milligrams/deciliter  ondansetron Injectable 4 milliGRAM(s) IV Push every 8 hours PRN Nausea and/or Vomiting      Vital Signs Last 24 Hrs  T(C): 36.9 (21 Mar 2019 06:47), Max: 37 (20 Mar 2019 15:03)  T(F): 98.4 (21 Mar 2019 06:47), Max: 98.6 (20 Mar 2019 15:03)  HR: 74 (21 Mar 2019 06:47) (70 - 83)  BP: 140/80 (21 Mar 2019 06:47) (140/80 - 153/86)  BP(mean): --  RR: 18 (21 Mar 2019 06:47) (17 - 18)  SpO2: 95% (21 Mar 2019 06:47) (94% - 98%)  nc/at  s1s2  cta  soft, nt, nd no guarding or rebound  no c/c/e    CBC Full  -  ( 21 Mar 2019 07:54 )  WBC Count : 4.23 K/uL  Hemoglobin : 10.6 g/dL  Hematocrit : 33.5 %  Platelet Count - Automated : 200 K/uL  Mean Cell Volume : 92.8 fL  Mean Cell Hemoglobin : 29.4 pg  Mean Cell Hemoglobin Concentration : 31.6 %  Auto Neutrophil # : x  Auto Lymphocyte # : x  Auto Monocyte # : x  Auto Eosinophil # : x  Auto Basophil # : x  Auto Neutrophil % : x  Auto Lymphocyte % : x  Auto Monocyte % : x  Auto Eosinophil % : x  Auto Basophil % : x    03-21    133<L>  |  93<L>  |  25<H>  ----------------------------<  47<L>  4.2   |  28  |  8.45<H>    Ca    8.5      21 Mar 2019 07:54  Phos  5.4     03-21  Mg     2.2     03-21

## 2019-03-21 NOTE — PROGRESS NOTE ADULT - SUBJECTIVE AND OBJECTIVE BOX
NEPHROLOGY - NSN    Patient seen and examined.  Last HD yesterday short session  HD today - if not d/c will do 4th shift here at [facility]  C/O abdominal pain, no n/v    MEDICATIONS  (STANDING):  allopurinol 100 milliGRAM(s) Oral daily  aspirin enteric coated 81 milliGRAM(s) Oral daily  carvedilol 12.5 milliGRAM(s) Oral every 12 hours  cefTRIAXone   IVPB      cefTRIAXone   IVPB 1 Gram(s) IV Intermittent every 24 hours  clopidogrel Tablet 75 milliGRAM(s) Oral daily  dextrose 5%. 1000 milliLiter(s) (50 mL/Hr) IV Continuous <Continuous>  dextrose 50% Injectable 12.5 Gram(s) IV Push once  dextrose 50% Injectable 25 Gram(s) IV Push once  dextrose 50% Injectable 25 Gram(s) IV Push once  dicyclomine 20 milliGRAM(s) Oral two times a day before meals  furosemide    Tablet 20 milliGRAM(s) Oral daily  heparin  Injectable 5000 Unit(s) SubCutaneous every 8 hours  hydrALAZINE 100 milliGRAM(s) Oral two times a day  insulin lispro (HumaLOG) corrective regimen sliding scale   SubCutaneous three times a day before meals  insulin lispro (HumaLOG) corrective regimen sliding scale   SubCutaneous at bedtime  isosorbide   dinitrate Tablet (ISORDIL) 10 milliGRAM(s) Oral three times a day  oxybutynin 5 milliGRAM(s) Oral two times a day  pantoprazole    Tablet 40 milliGRAM(s) Oral before breakfast  QUEtiapine 50 milliGRAM(s) Oral at bedtime  sertraline 200 milliGRAM(s) Oral daily  simvastatin 40 milliGRAM(s) Oral at bedtime  sodium chloride 0.9% lock flush 3 milliLiter(s) IV Push every 8 hours    VITALS:  T(C): , Max: 37.2 (03-21-19 @ 12:10)  T(F): , Max: 98.9 (03-21-19 @ 12:10)  HR: 75 (03-21-19 @ 12:10)  BP: 134/67 (03-21-19 @ 12:10)  BP(mean): --  RR: 18 (03-21-19 @ 12:10)  SpO2: 95% (03-21-19 @ 12:10)    03-20 @ 07:01  -  03-21 @ 07:00  --------------------------------------------------------  IN: 400 mL / OUT: 2400 mL / NET: -2000 mL    REVIEW OF SYSTEMS:  Full ROS done and were negative unless otherwise indicated in HPI/assessment.     PHYSICAL EXAM:  Constitutional: NAD  Respiratory: CTA B/L  Cardiovascular: S1 and S2, RRR  Gastrointestinal: + BS, soft, NT, ND, LLQ abdominal discomfort  Extremities: no peripheral edema  Neurological: AAO x 3  : no malcolm  Access: LUE AVF (+ thrill)    LABS:                        10.6   4.23  )-----------( 200      ( 21 Mar 2019 07:54 )             33.5     03-21    133<L>  |  93<L>  |  25<H>  ----------------------------<  47<L>  4.2   |  28  |  8.45<H>    Ca    8.5      21 Mar 2019 07:54  Phos  5.4     03-21  Mg     2.2     03-21    ASSESSMENT   53y Female with DM, HTN, gout, CVD and ESRD p/w abd pain, N/V/D  - ESRD: HD TTS - due for HD today   - HTN: BP acceptable   - GI: no n/v but again w/ abd discomfort  - endo: hypoglycemic     RECOMMENDATIONS:  - HD today   - c/w antihypertensive meds  - gi f/u  - dose any new medications for a CrCl of < 10 cc/min  - d/c planning    D/W HD RN and ISIAH Johansen NP  [facility]  [phone]

## 2019-03-21 NOTE — PROGRESS NOTE ADULT - SUBJECTIVE AND OBJECTIVE BOX
GENERAL: NAD, Comfortable, obese  HEAD:  Atraumatic, Normocephalic  EYES: EOMI, PERRLA, conjunctiva and sclera clear  NECK: Supple, No JVD  CHEST/LUNG: Clear to auscultation bilaterally; No wheeze  HEART: Regular rate and rhythm; No murmurs, rubs, or gallops  ABDOMEN: Soft, Nontender, Nondistended; Bowel sounds present  Neuro: AAO x 3, no focal deficit, 5/5 b/l extremities  EXTREMITIES:  2+ Peripheral Pulses, No clubbing, cyanosis, or edema  SKIN: No rashes or lesions She denies any acute N/V    afebrile 68  16   128/71   97% RA    GENERAL: NAD, Comfortable, obese  HEAD:  Atraumatic, Normocephalic  EYES: EOMI, PERRLA, conjunctiva and sclera clear  NECK: Supple, No JVD  CHEST/LUNG: Clear to auscultation bilaterally; No wheeze  HEART: Regular rate and rhythm; No murmurs, rubs, or gallops  ABDOMEN: Soft, Nontender, Nondistended; Bowel sounds present  Neuro: AAO x 3, no focal deficit, 5/5 b/l extremities  EXTREMITIES:  2+ Peripheral Pulses, No clubbing, cyanosis, or edema  SKIN: No rashes or lesions    labs reviewed 135

## 2019-03-22 ENCOUNTER — TRANSCRIPTION ENCOUNTER (OUTPATIENT)
Age: 54
End: 2019-03-22

## 2019-03-22 LAB
ANION GAP SERPL CALC-SCNC: 15 MMO/L — HIGH (ref 7–14)
BUN SERPL-MCNC: 17 MG/DL — SIGNIFICANT CHANGE UP (ref 7–23)
CALCIUM SERPL-MCNC: 8.8 MG/DL — SIGNIFICANT CHANGE UP (ref 8.4–10.5)
CHLORIDE SERPL-SCNC: 89 MMOL/L — LOW (ref 98–107)
CO2 SERPL-SCNC: 28 MMOL/L — SIGNIFICANT CHANGE UP (ref 22–31)
CREAT SERPL-MCNC: 6.49 MG/DL — HIGH (ref 0.5–1.3)
GLUCOSE SERPL-MCNC: 99 MG/DL — SIGNIFICANT CHANGE UP (ref 70–99)
HCT VFR BLD CALC: 33.1 % — LOW (ref 34.5–45)
HGB BLD-MCNC: 10.1 G/DL — LOW (ref 11.5–15.5)
MAGNESIUM SERPL-MCNC: 2.1 MG/DL — SIGNIFICANT CHANGE UP (ref 1.6–2.6)
MCHC RBC-ENTMCNC: 28.8 PG — SIGNIFICANT CHANGE UP (ref 27–34)
MCHC RBC-ENTMCNC: 30.5 % — LOW (ref 32–36)
MCV RBC AUTO: 94.3 FL — SIGNIFICANT CHANGE UP (ref 80–100)
NRBC # FLD: 0 K/UL — LOW (ref 25–125)
PLATELET # BLD AUTO: 191 K/UL — SIGNIFICANT CHANGE UP (ref 150–400)
PMV BLD: 12.6 FL — SIGNIFICANT CHANGE UP (ref 7–13)
POTASSIUM SERPL-MCNC: 4.1 MMOL/L — SIGNIFICANT CHANGE UP (ref 3.5–5.3)
POTASSIUM SERPL-SCNC: 4.1 MMOL/L — SIGNIFICANT CHANGE UP (ref 3.5–5.3)
RBC # BLD: 3.51 M/UL — LOW (ref 3.8–5.2)
RBC # FLD: 12.4 % — SIGNIFICANT CHANGE UP (ref 10.3–14.5)
SODIUM SERPL-SCNC: 132 MMOL/L — LOW (ref 135–145)
WBC # BLD: 3.57 K/UL — LOW (ref 3.8–10.5)
WBC # FLD AUTO: 3.57 K/UL — LOW (ref 3.8–10.5)

## 2019-03-22 PROCEDURE — 93306 TTE W/DOPPLER COMPLETE: CPT | Mod: 26

## 2019-03-22 RX ORDER — ALLOPURINOL 300 MG
1 TABLET ORAL
Qty: 0 | Refills: 0 | DISCHARGE
Start: 2019-03-22

## 2019-03-22 RX ORDER — CEFUROXIME AXETIL 250 MG
250 TABLET ORAL EVERY 12 HOURS
Qty: 0 | Refills: 0 | Status: COMPLETED | OUTPATIENT
Start: 2019-03-22 | End: 2019-03-23

## 2019-03-22 RX ORDER — INSULIN GLARGINE 100 [IU]/ML
34 INJECTION, SOLUTION SUBCUTANEOUS
Qty: 0 | Refills: 0 | COMMUNITY

## 2019-03-22 RX ORDER — HYDRALAZINE HCL 50 MG
1 TABLET ORAL
Qty: 0 | Refills: 0 | COMMUNITY

## 2019-03-22 RX ORDER — ISOSORBIDE DINITRATE 5 MG/1
1 TABLET ORAL
Qty: 0 | Refills: 0 | COMMUNITY
Start: 2019-03-22

## 2019-03-22 RX ORDER — HYDRALAZINE HCL 50 MG
1 TABLET ORAL
Qty: 60 | Refills: 0 | OUTPATIENT
Start: 2019-03-22 | End: 2019-04-20

## 2019-03-22 RX ORDER — ISOSORBIDE DINITRATE 5 MG/1
1 TABLET ORAL
Qty: 90 | Refills: 0
Start: 2019-03-22 | End: 2019-04-20

## 2019-03-22 RX ORDER — ALLOPURINOL 300 MG
1 TABLET ORAL
Qty: 0 | Refills: 0 | COMMUNITY

## 2019-03-22 RX ORDER — HYDRALAZINE HCL 50 MG
1 TABLET ORAL
Qty: 0 | Refills: 0 | COMMUNITY
Start: 2019-03-22

## 2019-03-22 RX ADMIN — SERTRALINE 200 MILLIGRAM(S): 25 TABLET, FILM COATED ORAL at 12:35

## 2019-03-22 RX ADMIN — Medication 5 MILLIGRAM(S): at 17:05

## 2019-03-22 RX ADMIN — SODIUM CHLORIDE 3 MILLILITER(S): 9 INJECTION INTRAMUSCULAR; INTRAVENOUS; SUBCUTANEOUS at 14:42

## 2019-03-22 RX ADMIN — Medication 20 MILLIGRAM(S): at 12:35

## 2019-03-22 RX ADMIN — Medication 20 MILLIGRAM(S): at 17:05

## 2019-03-22 RX ADMIN — ISOSORBIDE DINITRATE 10 MILLIGRAM(S): 5 TABLET ORAL at 22:17

## 2019-03-22 RX ADMIN — Medication 20 MILLIGRAM(S): at 06:59

## 2019-03-22 RX ADMIN — CLOPIDOGREL BISULFATE 75 MILLIGRAM(S): 75 TABLET, FILM COATED ORAL at 12:34

## 2019-03-22 RX ADMIN — ISOSORBIDE DINITRATE 10 MILLIGRAM(S): 5 TABLET ORAL at 05:52

## 2019-03-22 RX ADMIN — ONDANSETRON 4 MILLIGRAM(S): 8 TABLET, FILM COATED ORAL at 23:19

## 2019-03-22 RX ADMIN — Medication 0: at 22:18

## 2019-03-22 RX ADMIN — CARVEDILOL PHOSPHATE 12.5 MILLIGRAM(S): 80 CAPSULE, EXTENDED RELEASE ORAL at 22:17

## 2019-03-22 RX ADMIN — CARVEDILOL PHOSPHATE 12.5 MILLIGRAM(S): 80 CAPSULE, EXTENDED RELEASE ORAL at 09:25

## 2019-03-22 RX ADMIN — Medication 5 MILLIGRAM(S): at 05:52

## 2019-03-22 RX ADMIN — Medication 20 MILLIGRAM(S): at 22:17

## 2019-03-22 RX ADMIN — Medication 81 MILLIGRAM(S): at 12:34

## 2019-03-22 RX ADMIN — Medication 20 MILLIGRAM(S): at 05:52

## 2019-03-22 RX ADMIN — QUETIAPINE FUMARATE 50 MILLIGRAM(S): 200 TABLET, FILM COATED ORAL at 22:17

## 2019-03-22 RX ADMIN — Medication 100 MILLIGRAM(S): at 17:05

## 2019-03-22 RX ADMIN — PANTOPRAZOLE SODIUM 40 MILLIGRAM(S): 20 TABLET, DELAYED RELEASE ORAL at 05:52

## 2019-03-22 RX ADMIN — Medication 250 MILLIGRAM(S): at 18:59

## 2019-03-22 RX ADMIN — HEPARIN SODIUM 5000 UNIT(S): 5000 INJECTION INTRAVENOUS; SUBCUTANEOUS at 14:40

## 2019-03-22 RX ADMIN — SODIUM CHLORIDE 3 MILLILITER(S): 9 INJECTION INTRAMUSCULAR; INTRAVENOUS; SUBCUTANEOUS at 06:42

## 2019-03-22 RX ADMIN — SIMVASTATIN 40 MILLIGRAM(S): 20 TABLET, FILM COATED ORAL at 22:17

## 2019-03-22 RX ADMIN — Medication 100 MILLIGRAM(S): at 12:35

## 2019-03-22 RX ADMIN — SODIUM CHLORIDE 3 MILLILITER(S): 9 INJECTION INTRAMUSCULAR; INTRAVENOUS; SUBCUTANEOUS at 22:17

## 2019-03-22 RX ADMIN — HEPARIN SODIUM 5000 UNIT(S): 5000 INJECTION INTRAVENOUS; SUBCUTANEOUS at 06:59

## 2019-03-22 RX ADMIN — Medication 100 MILLIGRAM(S): at 05:52

## 2019-03-22 RX ADMIN — ISOSORBIDE DINITRATE 10 MILLIGRAM(S): 5 TABLET ORAL at 14:41

## 2019-03-22 RX ADMIN — HEPARIN SODIUM 5000 UNIT(S): 5000 INJECTION INTRAVENOUS; SUBCUTANEOUS at 22:17

## 2019-03-22 NOTE — PROGRESS NOTE ADULT - SUBJECTIVE AND OBJECTIVE BOX
So far, tolerating diet and HD    Vital Signs Last 24 Hrs  T(C): 37.2 (22 Mar 2019 13:29), Max: 37.2 (22 Mar 2019 13:29)  T(F): 99 (22 Mar 2019 13:29), Max: 99 (22 Mar 2019 13:29)  HR: 80 (22 Mar 2019 17:01) (69 - 80)  BP: 153/84 (22 Mar 2019 17:01) (136/78 - 153/84)  BP(mean): --  RR: 18 (22 Mar 2019 13:29) (18 - 18)  SpO2: 98% (22 Mar 2019 13:29) (98% - 99%)    GENERAL: NAD, Comfortable, obese  HEAD:  Atraumatic, Normocephalic  EYES: EOMI, PERRLA, conjunctiva and sclera clear  NECK: Supple, No JVD  CHEST/LUNG: Clear to auscultation bilaterally; No wheeze  HEART: Regular rate and rhythm; No murmurs, rubs, or gallops  ABDOMEN: Soft, Nontender, Nondistended; Bowel sounds present  Neuro: AAO x 3, no focal deficit, 5/5 b/l extremities  EXTREMITIES:  2+ Peripheral Pulses, No clubbing, cyanosis, or edema  SKIN: No rashes or lesions    LABS:                        10.1   3.57  )-----------( 191      ( 22 Mar 2019 06:30 )             33.1     03-22    132<L>  |  89<L>  |  17  ----------------------------<  99  4.1   |  28  |  6.49<H>    Ca    8.8      22 Mar 2019 06:30  Phos  5.4     03-21  Mg     2.1     03-22        CAPILLARY BLOOD GLUCOSE      POCT Blood Glucose.: 132 mg/dL (22 Mar 2019 16:55)  POCT Blood Glucose.: 103 mg/dL (22 Mar 2019 11:43)  POCT Blood Glucose.: 109 mg/dL (22 Mar 2019 07:40)  POCT Blood Glucose.: 133 mg/dL (21 Mar 2019 21:40)

## 2019-03-22 NOTE — DISCHARGE NOTE PROVIDER - CARE PROVIDER_API CALL
Wale Francisco (MD)  Internal Medicine; Nephrology  [address] [facility] [address] [facility] [address]  [place] [address]  Phone: [phone]  Fax: [phone]  Follow Up Time:     GISELLE Garcia  Phone: [phone]  Fax: (   )    -  Follow Up Time:     Uvaldo Andrew)  Cardiovascular Disease; Interventional Cardiology; Nuclear Cardiology  [address]  [place] [address]  Phone: [phone]  Fax: [phone]  Follow Up Time:     Anastacio Betancourt (DO)  Gastroenterology; Internal Medicine  [address]  [place] [address]  Phone: [phone]  Fax: [phone]  Follow Up Time: Wale Francisco (MD)  Internal Medicine; Nephrology  [address] [facility] [address] [facility]  [place] [address]  Phone: [phone]  Fax: [phone]  Follow Up Time:     Uvaldo Andrew (MD)  Cardiovascular Disease; Interventional Cardiology; Nuclear Cardiology  [address] [facility] [address]  [place] [address]  Phone: [phone]  Fax: [phone]  Follow Up Time:     Anastacio Betancourt (DO)  Gastroenterology; Internal Medicine  [address] [facility]  [place] [address]  Phone: [phone]  Fax: [phone]  Follow Up Time:     GISELLE Garcia  Phone: [phone]  Fax: (   )    -  Follow Up Time:     Dimitri Ospina)  EndocrinologyMetabDiabetes; Internal Medicine  [address] [place] [address]  Phone: [phone]  Fax: [phone]  Follow Up Time:

## 2019-03-22 NOTE — PROGRESS NOTE ADULT - SUBJECTIVE AND OBJECTIVE BOX
CARDIOLOGY FOLLOW UP - Dr. Andrew    CC no cp/sob     PHYSICAL EXAM:  T(C): 37.1 (03-22-19 @ 05:50), Max: 37.6 (03-21-19 @ 15:50)  HR: 78 (03-22-19 @ 09:24) (69 - 78)  BP: 143/73 (03-22-19 @ 09:24) (134/67 - 162/79)  RR: 18 (03-22-19 @ 05:50) (16 - 18)  SpO2: 99% (03-22-19 @ 05:50) (95% - 99%)  Wt(kg): --  I&O's Summary    21 Mar 2019 07:01  -  22 Mar 2019 07:00  --------------------------------------------------------  IN: 400 mL / OUT: 2400 mL / NET: -2000 mL        Appearance: Normal	  Cardiovascular: Normal S1 S2,RRR, No JVD, No murmurs  Respiratory: Lungs clear to auscultation	  Gastrointestinal:  Soft, Non-tender, + BS	  Extremities: Normal range of motion, No clubbing, cyanosis or edema        MEDICATIONS  (STANDING):  allopurinol 100 milliGRAM(s) Oral daily  aspirin enteric coated 81 milliGRAM(s) Oral daily  carvedilol 12.5 milliGRAM(s) Oral every 12 hours  cefTRIAXone   IVPB      cefTRIAXone   IVPB 1 Gram(s) IV Intermittent every 24 hours  clopidogrel Tablet 75 milliGRAM(s) Oral daily  dextrose 5%. 1000 milliLiter(s) (50 mL/Hr) IV Continuous <Continuous>  dextrose 50% Injectable 12.5 Gram(s) IV Push once  dextrose 50% Injectable 25 Gram(s) IV Push once  dextrose 50% Injectable 25 Gram(s) IV Push once  dicyclomine 20 milliGRAM(s) Oral four times a day before meals  furosemide    Tablet 20 milliGRAM(s) Oral daily  heparin  Injectable 5000 Unit(s) SubCutaneous every 8 hours  hydrALAZINE 100 milliGRAM(s) Oral two times a day  insulin lispro (HumaLOG) corrective regimen sliding scale   SubCutaneous three times a day before meals  insulin lispro (HumaLOG) corrective regimen sliding scale   SubCutaneous at bedtime  isosorbide   dinitrate Tablet (ISORDIL) 10 milliGRAM(s) Oral three times a day  oxybutynin 5 milliGRAM(s) Oral two times a day  pantoprazole    Tablet 40 milliGRAM(s) Oral before breakfast  QUEtiapine 50 milliGRAM(s) Oral at bedtime  sertraline 200 milliGRAM(s) Oral daily  simvastatin 40 milliGRAM(s) Oral at bedtime  sodium chloride 0.9% lock flush 3 milliLiter(s) IV Push every 8 hours      TELEMETRY: off tele 	    ECG:  	  RADIOLOGY:   DIAGNOSTIC TESTING:  [ ] Echocardiogram:  [ ]  Catheterization:  [ ] Stress Test:    OTHER: 	    LABS:	 	                                10.1   3.57  )-----------( 191      ( 22 Mar 2019 06:30 )             33.1     03-22    132<L>  |  89<L>  |  17  ----------------------------<  99  4.1   |  28  |  6.49<H>    Ca    8.8      22 Mar 2019 06:30  Phos  5.4     03-21  Mg     2.1     03-22

## 2019-03-22 NOTE — PROGRESS NOTE ADULT - SUBJECTIVE AND OBJECTIVE BOX
NEPHROLOGY - NSN    Patient seen and examined.  No abdominal pain, no N/V/D  Last HD yesterday: 2 kg UF, tolerated well    MEDICATIONS  (STANDING):  allopurinol 100 milliGRAM(s) Oral daily  aspirin enteric coated 81 milliGRAM(s) Oral daily  carvedilol 12.5 milliGRAM(s) Oral every 12 hours  cefTRIAXone   IVPB      cefTRIAXone   IVPB 1 Gram(s) IV Intermittent every 24 hours  clopidogrel Tablet 75 milliGRAM(s) Oral daily  dextrose 5%. 1000 milliLiter(s) (50 mL/Hr) IV Continuous <Continuous>  dextrose 50% Injectable 12.5 Gram(s) IV Push once  dextrose 50% Injectable 25 Gram(s) IV Push once  dextrose 50% Injectable 25 Gram(s) IV Push once  dicyclomine 20 milliGRAM(s) Oral four times a day before meals  furosemide    Tablet 20 milliGRAM(s) Oral daily  heparin  Injectable 5000 Unit(s) SubCutaneous every 8 hours  hydrALAZINE 100 milliGRAM(s) Oral two times a day  insulin lispro (HumaLOG) corrective regimen sliding scale   SubCutaneous three times a day before meals  insulin lispro (HumaLOG) corrective regimen sliding scale   SubCutaneous at bedtime  isosorbide   dinitrate Tablet (ISORDIL) 10 milliGRAM(s) Oral three times a day  oxybutynin 5 milliGRAM(s) Oral two times a day  pantoprazole    Tablet 40 milliGRAM(s) Oral before breakfast  QUEtiapine 50 milliGRAM(s) Oral at bedtime  sertraline 200 milliGRAM(s) Oral daily  simvastatin 40 milliGRAM(s) Oral at bedtime  sodium chloride 0.9% lock flush 3 milliLiter(s) IV Push every 8 hours    VITALS:  T(C): , Max: 37.6 (03-21-19 @ 15:50)  T(F): , Max: 99.7 (03-21-19 @ 15:50)  HR: 78 (03-22-19 @ 09:24)  BP: 143/73 (03-22-19 @ 09:24)  BP(mean): --  RR: 18 (03-22-19 @ 05:50)  SpO2: 99% (03-22-19 @ 05:50)  Wt(kg): --  I and O's:    03-21 @ 07:01  -  03-22 @ 07:00  --------------------------------------------------------  IN: 400 mL / OUT: 2400 mL / NET: -2000 mL    REVIEW OF SYSTEMS:  Full ROS done and were negative unless otherwise indicated in HPI/assessment.     PHYSICAL EXAM:  Constitutional: NAD  Respiratory: CTA B/L  Cardiovascular: S1 and S2, RRR  Gastrointestinal: + BS, soft, NT, ND  Extremities: no peripheral edema  Neurological: AAO x 3  : no malcolm  Access: LUE AVF (+ thrill)    LABS:                        10.1   3.57  )-----------( 191      ( 22 Mar 2019 06:30 )             33.1     03-22    132<L>  |  89<L>  |  17  ----------------------------<  99  4.1   |  28  |  6.49<H>    Ca    8.8      22 Mar 2019 06:30  Phos  5.4     03-21  Mg     2.1     03-22    ASSESSMENT  53y Female with DM, HTN, gout, CVD and ESRD p/w abd pain, N/V/D  - ESRD: HD TTS @ [facility]   - HTN: BP acceptable   - GI: IBS - sx improved  - endo: hypoglycemia - improving    RECOMMENDATIONS:  - HD tomorrow: 3 hr, 1.5 kg, K+ protocol  - c/w antihypertensive meds  - dose any new medications for a CrCl of < 10 cc/min  - no renal objection to discharge    Debi Johansen NP  [facility]  [phone]

## 2019-03-22 NOTE — DISCHARGE NOTE PROVIDER - HOSPITAL COURSE
HPI:    52 yo F lives in shelter at the moment, with PMHx of DM, HTN, HLD, ESRD (Tues, Thurs, Sat) last HD 3/14 and Gout came to ED for diffuse abdominal pain radiating to suprapubic area associated with chest discomfort, nausea, vomiting and diarrhea since last week when she was at [facility]. Also reports some dysuria. Pain is similar to when she was at [facility] but has not improved so she came to ED today. Per pt, she missed some dialysis sessions but had last dialysis on Thursday 3/14 when she was discharged from [facility]. States she did not have a work up for her abdominal pain at that time (no CT scan). Endorses multiple episodes of bilious vomiting and diarrhea, chest discomfort  (similar to prior GERD).  Pt admits to increased swelling in LE as well as PICKARD. Denies palpitations, fever, chills, HA, visual changes, dizziness, URI symptoms, constipation, hematuria.         On admission the pt is lying in bed, NAD, eating dinner. She endorses good appetite, but says after she eats she feels nauseous and gets a burning sensation in the epigastric region. She denies chest pain or SOB at this time. (16 Mar 2019 19:08)        On admission:    Trop: 73--> 68    lipase: 44--> 64.2    CT Abdomen and Pelvis No Cont: Circumferential bladder wall thickening may reflect cystitis in the appropriate clinical context. Small bilateral pleural effusions. Small pericardial effusion.    US Abdomen Limited: Cholecystectomy. Right pleural effusion.        Evaluated by cardiology: 52 yo F lives in shelter at the moment, with PMHx of DM, HTN, HLD, ESRD (Tues, Thurs, Sat) last HD 3/14 and Gout came to ED for diffuse abdominal pain radiating to suprapubic area associated with chest discomfort, nausea, vomiting.     1. Chest pain, atypical: symptoms likely related to cystitis vs GERD. cv stable, no cp/sob, no evidence of acute ischemia. HST elevated, type II MI, demand ischemia 2/2 to ESRD. pending echo, can be done as outpt. ASA/Plavix     2. HTN: improved. continue current meds     no cv objection to dc planning         Evaluated by medicine: 52 yo F lives in shelter at the moment, with PMHx of DM, HTN, HLD, ESRD (Tues, Thurs, Sat) last HD 3/14 and Gout came to ED for diffuse abdominal pain radiating to suprapubic area associated with chest discomfort, nausea, vomiting, diarrhea and dysuria since last week when she was at [facility]. Admitted to telemetry for chest pain, abdominal pain, and cystitis.     -Abdominal pain: cystitis vs. gastroparesis. CT abdomen and US abdomen shows cystitis and bladder wall thickening, will treat for cystitis empirically. c/w IV ceftriaxone last day 3/22/19. UCx not sent. dialysis pt, minute urine production. lipase mildly elevated, will trend, but unconcerning at this time, LFTs unimpressive. Will monitor closely, supportive tx w/ PPI/Maalox. GI input appreciated. cont dicyclomine BID. Reglan not started given questionable drug interaction with Seroquel.     -Chest pain: - on telemetry, no events. Likely related to GERD symptoms vs. gastroparesis. EKG, Cardaic enzymes trending down, continuing to monitor- ASA/Plavix - TTE ordered but can be done outpt    -Elevated troponin: - Likely 2/2 to ESRD. - Trending down    -ESRD (end stage renal disease): Pt seen and evaluated by Nephrology. s/p HD 3/21    -Diabetes: - Oral DM meds on hold - ISS - Lantus off for now due to early morning hypoglycemia - Endo: Dr. Ospina appreciated - Diabetic diet     -HTN (hypertension): - Continue current BP meds w/ parameters - DASH/TLC diet     -HLD (hyperlipidemia): - Continue Statin - low fat low cholesterol diet     -Gout: - Continue allopurinol     -Depression: - Continue Sertraline and Seroquel         Patient cleared for d/c.  Outpatient f/u with PCP, cardiology & renal.  Outpatient echo. HPI:    54 yo F lives in shelter at the moment, with PMHx of DM, HTN, HLD, ESRD (Tues, Thurs, Sat) last HD 3/14 and Gout came to ED for diffuse abdominal pain radiating to suprapubic area associated with chest discomfort, nausea, vomiting and diarrhea since last week when she was at [facility]. Also reports some dysuria. Pain is similar to when she was at [facility] but has not improved so she came to ED today. Per pt, she missed some dialysis sessions but had last dialysis on Thursday 3/14 when she was discharged from [facility]. States she did not have a work up for her abdominal pain at that time (no CT scan). Endorses multiple episodes of bilious vomiting and diarrhea, chest discomfort  (similar to prior GERD).  Pt admits to increased swelling in LE as well as PICKARD. Denies palpitations, fever, chills, HA, visual changes, dizziness, URI symptoms, constipation, hematuria.         On admission the pt is lying in bed, NAD, eating dinner. She endorses good appetite, but says after she eats she feels nauseous and gets a burning sensation in the epigastric region. She denies chest pain or SOB at this time. (16 Mar 2019 19:08)        On admission:    Trop: 73--> 68    lipase: 44--> 64.2    CT Abdomen and Pelvis No Cont: Circumferential bladder wall thickening may reflect cystitis in the appropriate clinical context. Small bilateral pleural effusions. Small pericardial effusion.    US Abdomen Limited: Cholecystectomy. Right pleural effusion.        Evaluated by cardiology: 54 yo F lives in shelter at the moment, with PMHx of DM, HTN, HLD, ESRD (Tues, Thurs, Sat) last HD 3/14 and Gout came to ED for diffuse abdominal pain radiating to suprapubic area associated with chest discomfort, nausea, vomiting.     1. Chest pain, atypical: symptoms likely related to cystitis vs GERD. cv stable, no cp/sob, no evidence of acute ischemia. HST elevated, type II MI, demand ischemia 2/2 to ESRD. pending echo, can be done as outpt. ASA/Plavix     2. HTN: improved. continue current meds     no cv objection to dc planning         Evaluated by medicine: 54 yo F lives in shelter at the moment, with PMHx of DM, HTN, HLD, ESRD (Tues, Thurs, Sat) last HD 3/14 and Gout came to ED for diffuse abdominal pain radiating to suprapubic area associated with chest discomfort, nausea, vomiting, diarrhea and dysuria since last week when she was at [facility]. Admitted to telemetry for chest pain, abdominal pain, and cystitis.     -Abdominal pain: cystitis vs. gastroparesis. CT abdomen and US abdomen shows cystitis and bladder wall thickening, will treat for cystitis empirically. c/w IV ceftriaxone last day 3/22/19. UCx not sent. dialysis pt, minute urine production. lipase mildly elevated, will trend, but unconcerning at this time, LFTs unimpressive. Will monitor closely, supportive tx w/ PPI/Maalox. GI input appreciated. cont dicyclomine BID. Reglan not started given questionable drug interaction with Seroquel.     -Chest pain: - on telemetry, no events. Likely related to GERD symptoms vs. gastroparesis. EKG, Cardaic enzymes trending down, continuing to monitor- ASA/Plavix - TTE ordered but can be done outpt    -Elevated troponin: - Likely 2/2 to ESRD. - Trending down    -ESRD (end stage renal disease): Pt seen and evaluated by Nephrology. s/p HD 3/21    -Diabetes: - Oral DM meds on hold - ISS - Lantus off for now due to early morning hypoglycemia - Endo: Dr. Ospina appreciated - Diabetic diet     -HTN (hypertension): - Continue current BP meds w/ parameters - DASH/TLC diet     -HLD (hyperlipidemia): - Continue Statin - low fat low cholesterol diet     -Gout: - Continue allopurinol     -Depression: - Continue Sertraline and Seroquel         Evaluated by endo Dr. Ospina for episodes of hypoglycemia.  While inpatient, was only on sliding scale pre-meals & pre-bedtime.  Upon discharge, Dr. Ospina recommended to send home on basaglar 6 units SQ qhs.  No need for pre-meal insulin or oral antihyperglycemics on discharge.  F/U with tyler Ospina in 2 weeks.         Patient cleared for d/c.  Outpatient f/u with PCP, cardiology, endo & renal.  Outpatient echo. HPI:    54 yo F lives in shelter at the moment, with PMHx of DM, HTN, HLD, ESRD (Tues, Thurs, Sat) last HD 3/14 and Gout came to ED for diffuse abdominal pain radiating to suprapubic area associated with chest discomfort, nausea, vomiting and diarrhea since last week when she was at [facility]. Also reports some dysuria. Pain is similar to when she was at [facility] but has not improved so she came to ED today. Per pt, she missed some dialysis sessions but had last dialysis on Thursday 3/14 when she was discharged from [facility]. States she did not have a work up for her abdominal pain at that time (no CT scan). Endorses multiple episodes of bilious vomiting and diarrhea, chest discomfort  (similar to prior GERD).  Pt admits to increased swelling in LE as well as PICKARD. Denies palpitations, fever, chills, HA, visual changes, dizziness, URI symptoms, constipation, hematuria.         On admission the pt is lying in bed, NAD, eating dinner. She endorses good appetite, but says after she eats she feels nauseous and gets a burning sensation in the epigastric region. She denies chest pain or SOB at this time. (16 Mar 2019 19:08)        On admission:    Trop: 73--> 68    lipase: 44--> 64.2    CT Abdomen and Pelvis No Cont: Circumferential bladder wall thickening may reflect cystitis in the appropriate clinical context. Small bilateral pleural effusions. Small pericardial effusion.    US Abdomen Limited: Cholecystectomy. Right pleural effusion.        Evaluated by cardiology: 54 yo F lives in shelter at the moment, with PMHx of DM, HTN, HLD, ESRD (Tues, Thurs, Sat) last HD 3/14 and Gout came to ED for diffuse abdominal pain radiating to suprapubic area associated with chest discomfort, nausea, vomiting.     1. Chest pain, atypical: symptoms likely related to cystitis vs GERD. cv stable, no cp/sob, no evidence of acute ischemia. HST elevated, type II MI, demand ischemia 2/2 to ESRD. pending echo, can be done as outpt. ASA/Plavix     2. HTN: improved. continue current meds     no cv objection to dc planning         Evaluated by medicine: 54 yo F lives in shelter at the moment, with PMHx of DM, HTN, HLD, ESRD (Tues, Thurs, Sat) last HD 3/14 and Gout came to ED for diffuse abdominal pain radiating to suprapubic area associated with chest discomfort, nausea, vomiting, diarrhea and dysuria since last week when she was at [facility]. Admitted to telemetry for chest pain, abdominal pain, and cystitis.     -Abdominal pain: cystitis vs. gastroparesis. CT abdomen and US abdomen shows cystitis and bladder wall thickening, will treat for cystitis empirically. c/w IV ceftriaxone last day 3/22/19. UCx not sent. dialysis pt, minute urine production. lipase mildly elevated, will trend, but unconcerning at this time, LFTs unimpressive. Will monitor closely, supportive tx w/ PPI/Maalox. GI input appreciated. cont dicyclomine BID. Reglan not started given questionable drug interaction with Seroquel.     -Chest pain: - on telemetry, no events. Likely related to GERD symptoms vs. gastroparesis. EKG, Cardaic enzymes trending down, continuing to monitor- ASA/Plavix - TTE ordered but can be done outpt    -Elevated troponin: - Likely 2/2 to ESRD. - Trending down    -ESRD (end stage renal disease): Pt seen and evaluated by Nephrology. s/p HD 3/21    -Diabetes: - Oral DM meds on hold - ISS - Lantus off for now due to early morning hypoglycemia - Endo: Dr. Ospina appreciated - Diabetic diet     -HTN (hypertension): - Continue current BP meds w/ parameters - DASH/TLC diet     -HLD (hyperlipidemia): - Continue Statin - low fat low cholesterol diet     -Gout: - Continue allopurinol     -Depression: - Continue Sertraline and Seroquel         Evaluated by endo Dr. Ospina for episodes of hypoglycemia.  While inpatient, was only on sliding scale pre-meals & pre-bedtime.  Upon discharge, Dr. Ospina recommended to send home on basaglar 6 units SQ qhs.  No need for pre-meal insulin or oral antihyperglycemics on discharge.  F/U with tyler Ospina in 2 weeks.         Patient cleared for d/c.  Outpatient f/u with PCP, cardiology, endo & renal.  Outpatient echo. HPI:    52 yo F lives in shelter at the moment, with PMHx of DM, HTN, HLD, ESRD (Tues, Thurs, Sat) last HD 3/14 and Gout came to ED for diffuse abdominal pain radiating to suprapubic area associated with chest discomfort, nausea, vomiting and diarrhea since last week when she was at [facility]. Also reports some dysuria. Pain is similar to when she was at [facility] but has not improved so she came to ED today. Per pt, she missed some dialysis sessions but had last dialysis on Thursday 3/14 when she was discharged from [facility]. States she did not have a work up for her abdominal pain at that time (no CT scan). Endorses multiple episodes of bilious vomiting and diarrhea, chest discomfort  (similar to prior GERD).  Pt admits to increased swelling in LE as well as PICKARD. Denies palpitations, fever, chills, HA, visual changes, dizziness, URI symptoms, constipation, hematuria.         On admission the pt is lying in bed, NAD, eating dinner. She endorses good appetite, but says after she eats she feels nauseous and gets a burning sensation in the epigastric region. She denies chest pain or SOB at this time. (16 Mar 2019 19:08)        On admission:    Trop: 73--> 68    lipase: 44--> 64.2    CT Abdomen and Pelvis No Cont: Circumferential bladder wall thickening may reflect cystitis in the appropriate clinical context. Small bilateral pleural effusions. Small pericardial effusion.    US Abdomen Limited: Cholecystectomy. Right pleural effusion.        Evaluated by cardiology: 52 yo F lives in shelter at the moment, with PMHx of DM, HTN, HLD, ESRD (Tues, Thurs, Sat) last HD 3/14 and Gout came to ED for diffuse abdominal pain radiating to suprapubic area associated with chest discomfort, nausea, vomiting.     1. Chest pain, atypical: symptoms likely related to cystitis vs GERD. cv stable, no cp/sob, no evidence of acute ischemia. HST elevated, type II MI, demand ischemia 2/2 to ESRD. pending echo, can be done as outpt. ASA/Plavix     2. HTN: improved. continue current meds     no cv objection to dc planning         Evaluated by medicine: 52 yo F lives in shelter at the moment, with PMHx of DM, HTN, HLD, ESRD (Tues, Thurs, Sat) last HD 3/14 and Gout came to ED for diffuse abdominal pain radiating to suprapubic area associated with chest discomfort, nausea, vomiting, diarrhea and dysuria since last week when she was at [facility]. Admitted to telemetry for chest pain, abdominal pain, and cystitis.     -Abdominal pain: cystitis vs. gastroparesis. CT abdomen and US abdomen shows cystitis and bladder wall thickening, will treat for cystitis empirically. c/w IV ceftriaxone last day 3/22/19 (switched to po ceftin on discharge to complete course).  UCx not sent. dialysis pt, minute urine production. lipase mildly elevated, will trend, but unconcerning at this time, LFTs unimpressive. Will monitor closely, supportive tx w/ PPI/Maalox. GI input appreciated. cont dicyclomine BID. Reglan not started given questionable drug interaction with Seroquel.     -Chest pain: - on telemetry, no events. Likely related to GERD symptoms vs. gastroparesis. EKG, Cardaic enzymes trending down, continuing to monitor- ASA/Plavix - TTE ordered but can be done outpt    -Elevated troponin: - Likely 2/2 to ESRD. - Trending down    -ESRD (end stage renal disease): Pt seen and evaluated by Nephrology. s/p HD 3/21    -Diabetes: - Oral DM meds on hold - ISS - Lantus off for now due to early morning hypoglycemia - Endo: Dr. Ospina appreciated - Diabetic diet     -HTN (hypertension): - Continue current BP meds w/ parameters - DASH/TLC diet     -HLD (hyperlipidemia): - Continue Statin - low fat low cholesterol diet     -Gout: - Continue allopurinol     -Depression: - Continue Sertraline and Seroquel         Evaluated by endo Dr. Ospina for episodes of hypoglycemia.  While inpatient, was only on sliding scale pre-meals & pre-bedtime.  Upon discharge, Dr. Ospina recommended to send home on basaglar 6 units SQ qhs.  No need for pre-meal insulin or oral antihyperglycemics on discharge.  F/U with tyler Ospina in 2 weeks.         Patient cleared for d/c.  Outpatient f/u with PCP, cardiology, endo & renal.  Outpatient echo. HPI:    54 yo F lives in shelter at the moment, with PMHx of DM, HTN, HLD, ESRD (Tues, Thurs, Sat) last HD 3/14 and Gout came to ED for diffuse abdominal pain radiating to suprapubic area associated with chest discomfort, nausea, vomiting and diarrhea since last week when she was at [facility]. Also reports some dysuria. Pain is similar to when she was at [facility] but has not improved so she came to ED today. Per pt, she missed some dialysis sessions but had last dialysis on Thursday 3/14 when she was discharged from [facility]. States she did not have a work up for her abdominal pain at that time (no CT scan). Endorses multiple episodes of bilious vomiting and diarrhea, chest discomfort  (similar to prior GERD).  Pt admits to increased swelling in LE as well as PICKARD. Denies palpitations, fever, chills, HA, visual changes, dizziness, URI symptoms, constipation, hematuria.         On admission the pt is lying in bed, NAD, eating dinner. She endorses good appetite, but says after she eats she feels nauseous and gets a burning sensation in the epigastric region. She denies chest pain or SOB at this time. (16 Mar 2019 19:08)        On admission:    Trop: 73--> 68    lipase: 44--> 64.2    CT Abdomen and Pelvis No Cont: Circumferential bladder wall thickening may reflect cystitis in the appropriate clinical context. Small bilateral pleural effusions. Small pericardial effusion.    US Abdomen Limited: Cholecystectomy. Right pleural effusion.        Evaluated by cardiology: 54 yo F lives in shelter at the moment, with PMHx of DM, HTN, HLD, ESRD (Tues, Thurs, Sat) last HD 3/14 and Gout came to ED for diffuse abdominal pain radiating to suprapubic area associated with chest discomfort, nausea, vomiting.     1. Chest pain, atypical: symptoms likely related to cystitis vs GERD. cv stable, no cp/sob, no evidence of acute ischemia. HST elevated, type II MI, demand ischemia 2/2 to ESRD. pending echo, can be done as outpt. ASA/Plavix     2. HTN: improved. continue current meds     no cv objection to dc planning         Evaluated by medicine: 54 yo F lives in shelter at the moment, with PMHx of DM, HTN, HLD, ESRD (Tues, Thurs, Sat) last HD 3/14 and Gout came to ED for diffuse abdominal pain radiating to suprapubic area associated with chest discomfort, nausea, vomiting, diarrhea and dysuria since last week when she was at [facility]. Admitted to telemetry for chest pain, abdominal pain, and cystitis.     -Abdominal pain 2/2 gastroparesis. CT abdomen and US abdomen shows cystitis and bladder wall thickening. finished IV ceftriaxone last day 3/22/19. Will monitor closely, supportive tx w/ PPI/Maalox. GI input appreciated. cont dicyclomine BID. Reglan not started given questionable drug interaction with Seroquel.     -Chest pain: - on telemetry, no events. Likely related to GERD symptoms vs. gastroparesis. EKG, Cardaic enzymes trending down, continuing to monitor- ASA/Plavix - TTE ordered but can be done outpt    -Elevated troponin: - Likely 2/2 to ESRD. - Trending down    -ESRD (end stage renal disease): Pt seen and evaluated by Nephrology. s/p HD 3/21    -Diabetes: - Oral DM meds on hold - ISS - Lantus off for now due to early morning hypoglycemia - Endo: Dr. Ospina appreciated - Diabetic diet     -HTN (hypertension): - Continue current BP meds w/ parameters - DASH/TLC diet     -HLD (hyperlipidemia): - Continue Statin - low fat low cholesterol diet     -Gout: - Continue allopurinol     -Depression: - Continue Sertraline and Seroquel         Evaluated by endo Dr. Ospina for episodes of hypoglycemia.  While inpatient, was only on sliding scale pre-meals & pre-bedtime.  Upon discharge, Dr. Ospina recommended to send home on basaglar 6 units SQ qhs.  No need for pre-meal insulin or oral antihyperglycemics on discharge.  F/U with tyler Ospina in 2 weeks.         Patient cleared for d/c to shelter.  Outpatient f/u with PCP, cardiology, endo & renal.  Outpatient echo. HPI:    52 yo F lives in shelter at the moment, with PMHx of DM, HTN, HLD, ESRD (Tues, Thurs, Sat) last HD 3/14 and Gout came to ED for diffuse abdominal pain radiating to suprapubic area associated with chest discomfort, nausea, vomiting and diarrhea since last week when she was at [facility]. Also reports some dysuria. Pain is similar to when she was at [facility] but has not improved so she came to ED today. Per pt, she missed some dialysis sessions but had last dialysis on Thursday 3/14 when she was discharged from [facility]. States she did not have a work up for her abdominal pain at that time (no CT scan). Endorses multiple episodes of bilious vomiting and diarrhea, chest discomfort  (similar to prior GERD).  Pt admits to increased swelling in LE as well as PICKARD. Denies palpitations, fever, chills, HA, visual changes, dizziness, URI symptoms, constipation, hematuria.         On admission the pt is lying in bed, NAD, eating dinner. She endorses good appetite, but says after she eats she feels nauseous and gets a burning sensation in the epigastric region. She denies chest pain or SOB at this time. (16 Mar 2019 19:08)        On admission:    Trop: 73--> 68    lipase: 44--> 64.2    CT Abdomen and Pelvis No Cont: Circumferential bladder wall thickening may reflect cystitis in the appropriate clinical context. Small bilateral pleural effusions. Small pericardial effusion.    US Abdomen Limited: Cholecystectomy. Right pleural effusion.        Evaluated by cardiology: 52 yo F lives in shelter at the moment, with PMHx of DM, HTN, HLD, ESRD (Tues, Thurs, Sat) last HD 3/14 and Gout came to ED for diffuse abdominal pain radiating to suprapubic area associated with chest discomfort, nausea, vomiting.     1. Chest pain, atypical: symptoms likely related to cystitis vs GERD. cv stable, no cp/sob, no evidence of acute ischemia. HST elevated, type II MI, demand ischemia 2/2 to ESRD. ASA/Plavix     2. HTN: improved. continue current meds     Echo done as above revealing 1. Mitral annular calcification, otherwise normal mitral    valve. Mild-moderate mitral regurgitation.    2. Mildly dilated left atrium.  LA volume index = 41 cc/m2.    3. Mild left ventricular enlargement.    4. Moderate global left ventricular systolic dysfunction.    5. The right ventricle is not well visualized; grossly    normal right ventricular systolic function.    6. Small pericardial effusion posterior to the left    ventricle.        no cv objection to dc planning         Evaluated by medicine: 52 yo F lives in shelter at the moment, with PMHx of DM, HTN, HLD, ESRD (Tues, Thurs, Sat) last HD 3/14 and Gout came to ED for diffuse abdominal pain radiating to suprapubic area associated with chest discomfort, nausea, vomiting, diarrhea and dysuria since last week when she was at [facility]. Admitted to telemetry for chest pain, abdominal pain, and cystitis.     -Abdominal pain 2/2 gastroparesis. CT abdomen and US abdomen shows cystitis and bladder wall thickening. finished IV ceftriaxone last day 3/22/19. Will monitor closely, supportive tx w/ PPI/Maalox. GI input appreciated. cont dicyclomine BID. Reglan not started given questionable drug interaction with Seroquel.     -Chest pain: - on telemetry, no events. Likely related to GERD symptoms vs. gastroparesis. EKG, Cardaic enzymes trending down, continuing to monitor- ASA/Plavix - TTE ordered but can be done outpt    -Elevated troponin: - Likely 2/2 to ESRD. - Trending down    -ESRD (end stage renal disease): Pt seen and evaluated by Nephrology. s/p HD 3/21    -Diabetes: - Oral DM meds on hold - ISS - Lantus off for now due to early morning hypoglycemia - Endo: Dr. Ospina appreciated - Diabetic diet     -HTN (hypertension): - Continue current BP meds w/ parameters - DASH/TLC diet     -HLD (hyperlipidemia): - Continue Statin - low fat low cholesterol diet     -Gout: - Continue allopurinol     -Depression: - Continue Sertraline and Seroquel         Evaluated by endo Dr. Ospina for episodes of hypoglycemia.  While inpatient, was only on sliding scale pre-meals & pre-bedtime.  Upon discharge, Dr. Ospina recommended to send home on basaglar 6 units SQ qhs.  No need for pre-meal insulin or oral antihyperglycemics on discharge.  F/U with endo Dr. Ospina in 2 weeks.         Patient cleared for d/c to shelter.  Outpatient f/u with PCP, cardiology, endo & renal.  Outpatient echo. HPI:    52 yo F lives in shelter at the moment, with PMHx of DM, HTN, HLD, ESRD (Tues, Thurs, Sat) last HD 3/14 and Gout came to ED for diffuse abdominal pain radiating to suprapubic area associated with chest discomfort, nausea, vomiting and diarrhea since last week when she was at [facility]. Also reports some dysuria. Pain is similar to when she was at [facility] but has not improved so she came to ED today. Per pt, she missed some dialysis sessions but had last dialysis on Thursday 3/14 when she was discharged from [facility]. States she did not have a work up for her abdominal pain at that time (no CT scan). Endorses multiple episodes of bilious vomiting and diarrhea, chest discomfort  (similar to prior GERD).  Pt admits to increased swelling in LE as well as PICKARD. Denies palpitations, fever, chills, HA, visual changes, dizziness, URI symptoms, constipation, hematuria.         On admission the pt is lying in bed, NAD, eating dinner. She endorses good appetite, but says after she eats she feels nauseous and gets a burning sensation in the epigastric region. She denies chest pain or SOB at this time. (16 Mar 2019 19:08)        On admission:    Trop: 73--> 68    lipase: 44--> 64.2    CT Abdomen and Pelvis No Cont: Circumferential bladder wall thickening may reflect cystitis in the appropriate clinical context. Small bilateral pleural effusions. Small pericardial effusion.    US Abdomen Limited: Cholecystectomy. Right pleural effusion.        Evaluated by cardiology: 52 yo F lives in shelter at the moment, with PMHx of DM, HTN, HLD, ESRD (Tues, Thurs, Sat) last HD 3/14 and Gout came to ED for diffuse abdominal pain radiating to suprapubic area associated with chest discomfort, nausea, vomiting.     1. Chest pain, atypical: symptoms likely related to cystitis vs GERD. cv stable, no cp/sob, no evidence of acute ischemia. HST elevated, type II MI, demand ischemia 2/2 to ESRD. ASA/Plavix     2. HTN: improved. continue current meds     Echo done revealing 1. Mitral annular calcification, otherwise normal mitral valve. Mild-moderate mitral regurgitation. 2. Mildly dilated left atrium.  LA volume index = 41 cc/m2. 3. Mild left ventricular enlargement.    4. Moderate global left ventricular systolic dysfunction.5. The right ventricle is not well visualized; grossly normal right ventricular systolic function.6. Small pericardial effusion posterior to the left ventricle.    Patient to f/u with cardiology as outpatient for serial TTE's to monitor pericardial effusion.          Evaluated by medicine: 52 yo F lives in shelter at the moment, with PMHx of DM, HTN, HLD, ESRD (Tues, Thurs, Sat) last HD 3/14 and Gout came to ED for diffuse abdominal pain radiating to suprapubic area associated with chest discomfort, nausea, vomiting, diarrhea and dysuria since last week when she was at [facility]. Admitted to telemetry for chest pain, abdominal pain, and cystitis.     -Abdominal pain 2/2 gastroparesis. CT abdomen and US abdomen shows cystitis and bladder wall thickening. finished IV ceftriaxone last day 3/22/19. Will monitor closely, supportive tx w/ PPI/Maalox. GI input appreciated. cont dicyclomine BID. Reglan not started given questionable drug interaction with Seroquel.     -Chest pain: - on telemetry, no events. Likely related to GERD symptoms vs. gastroparesis. EKG, Cardaic enzymes trending down, continuing to monitor- ASA/Plavix - TTE ordered but can be done outpt    -Elevated troponin: - Likely 2/2 to ESRD. - Trending down    -ESRD (end stage renal disease): Pt seen and evaluated by Nephrology. s/p HD 3/21    -Diabetes: - Oral DM meds on hold - ISS - Lantus off for now due to early morning hypoglycemia - Endo: Dr. Ospina appreciated - Diabetic diet     -HTN (hypertension): - Continue current BP meds w/ parameters - DASH/TLC diet     -HLD (hyperlipidemia): - Continue Statin - low fat low cholesterol diet     -Gout: - Continue allopurinol     -Depression: - Continue Sertraline and Seroquel         Evaluated by endo Dr. Ospina for episodes of hypoglycemia.  While inpatient, was only on sliding scale pre-meals & pre-bedtime.  Upon discharge, Dr. Ospina recommended to send home on basaglar 6 units SQ qhs.  No need for pre-meal insulin or oral antihyperglycemics on discharge.  F/U with endo Dr. Ospina in 2 weeks.         Patient cleared for d/c to shelter.  Outpatient f/u with PCP, cardiology, endo & renal.  Outpatient echo. HPI:    52 yo F lives in shelter at the moment, with PMHx of DM, HTN, HLD, ESRD (Tues, Thurs, Sat) last HD 3/14 and Gout came to ED for diffuse abdominal pain radiating to suprapubic area associated with chest discomfort, nausea, vomiting and diarrhea since last week when she was at [facility]. Also reports some dysuria. Pain is similar to when she was at [facility] but has not improved so she came to ED today. Per pt, she missed some dialysis sessions but had last dialysis on Thursday 3/14 when she was discharged from [facility]. States she did not have a work up for her abdominal pain at that time (no CT scan). Endorses multiple episodes of bilious vomiting and diarrhea, chest discomfort  (similar to prior GERD).  Pt admits to increased swelling in LE as well as PICKARD. Denies palpitations, fever, chills, HA, visual changes, dizziness, URI symptoms, constipation, hematuria.         On admission the pt is lying in bed, NAD, eating dinner. She endorses good appetite, but says after she eats she feels nauseous and gets a burning sensation in the epigastric region. She denies chest pain or SOB at this time. (16 Mar 2019 19:08)        On admission:    Trop: 73--> 68    lipase: 44--> 64.2    CT Abdomen and Pelvis No Cont: Circumferential bladder wall thickening may reflect cystitis in the appropriate clinical context. Small bilateral pleural effusions. Small pericardial effusion.    US Abdomen Limited: Cholecystectomy. Right pleural effusion.        Evaluated by cardiology: 52 yo F lives in shelter at the moment, with PMHx of DM, HTN, HLD, ESRD (Tues, Thurs, Sat) last HD 3/14 and Gout came to ED for diffuse abdominal pain radiating to suprapubic area associated with chest discomfort, nausea, vomiting.     1. Chest pain, atypical: symptoms likely related to cystitis vs GERD. cv stable, no cp/sob, no evidence of acute ischemia. HST elevated, type II MI, demand ischemia 2/2 to ESRD. ASA/Plavix     2. HTN: improved. continue current meds     Echo done revealing 1. Mitral annular calcification, otherwise normal mitral valve. Mild-moderate mitral regurgitation. 2. Mildly dilated left atrium.  LA volume index = 41 cc/m2. 3. Mild left ventricular enlargement.    4. Moderate global left ventricular systolic dysfunction.5. The right ventricle is not well visualized; grossly normal right ventricular systolic function.6. Small pericardial effusion posterior to the left ventricle.    Discussed echo findings with cardiology.  Patient cleared for d/c from cardiac standpoint.  Patient to f/u with cardiology as outpatient for serial TTE's to monitor pericardial effusion.          Evaluated by medicine: 52 yo F lives in shelter at the moment, with PMHx of DM, HTN, HLD, ESRD (Tues, Thurs, Sat) last HD 3/14 and Gout came to ED for diffuse abdominal pain radiating to suprapubic area associated with chest discomfort, nausea, vomiting, diarrhea and dysuria since last week when she was at [facility]. Admitted to telemetry for chest pain, abdominal pain, and cystitis.     -Abdominal pain 2/2 gastroparesis. CT abdomen and US abdomen shows cystitis and bladder wall thickening. finished IV ceftriaxone last day 3/22/19. Will monitor closely, supportive tx w/ PPI/Maalox. GI input appreciated. cont dicyclomine BID. Reglan not started given questionable drug interaction with Seroquel.     -Chest pain: - on telemetry, no events. Likely related to GERD symptoms vs. gastroparesis. EKG, Cardaic enzymes trending down, continuing to monitor- ASA/Plavix - TTE ordered but can be done outpt    -Elevated troponin: - Likely 2/2 to ESRD. - Trending down    -ESRD (end stage renal disease): Pt seen and evaluated by Nephrology. s/p HD 3/21    -Diabetes: - Oral DM meds on hold - ISS - Lantus off for now due to early morning hypoglycemia - Endo: Dr. Ospina appreciated - Diabetic diet     -HTN (hypertension): - Continue current BP meds w/ parameters - DASH/TLC diet     -HLD (hyperlipidemia): - Continue Statin - low fat low cholesterol diet     -Gout: - Continue allopurinol     -Depression: - Continue Sertraline and Seroquel         Evaluated by endo Dr. Ospina for episodes of hypoglycemia.  While inpatient, was only on sliding scale pre-meals & pre-bedtime.  Upon discharge, Dr. Ospina recommended to send home on basaglar 6 units SQ qhs.  No need for pre-meal insulin or oral antihyperglycemics on discharge.  F/U with endo Dr. Ospina in 2 weeks.         Patient cleared for d/c to shelter.  Outpatient f/u with PCP, cardiology, endo & renal.  Outpatient echo. HPI:    52 yo F lives in shelter at the moment, with PMHx of DM, HTN, HLD, ESRD (Tues, Thurs, Sat) last HD 3/14 and Gout came to ED for diffuse abdominal pain radiating to suprapubic area associated with chest discomfort, nausea, vomiting and diarrhea since last week when she was at [facility]. Also reports some dysuria. Pain is similar to when she was at [facility] but has not improved so she came to ED today. Per pt, she missed some dialysis sessions but had last dialysis on Thursday 3/14 when she was discharged from [facility]. States she did not have a work up for her abdominal pain at that time (no CT scan). Endorses multiple episodes of bilious vomiting and diarrhea, chest discomfort  (similar to prior GERD).  Pt admits to increased swelling in LE as well as PICKARD. Denies palpitations, fever, chills, HA, visual changes, dizziness, URI symptoms, constipation, hematuria.         On admission the pt is lying in bed, NAD, eating dinner. She endorses good appetite, but says after she eats she feels nauseous and gets a burning sensation in the epigastric region. She denies chest pain or SOB at this time. (16 Mar 2019 19:08)        On admission:    Trop: 73--> 68    lipase: 44--> 64.2    CT Abdomen and Pelvis No Cont: Circumferential bladder wall thickening may reflect cystitis in the appropriate clinical context. Small bilateral pleural effusions. Small pericardial effusion.    US Abdomen Limited: Cholecystectomy. Right pleural effusion.        Evaluated by cardiology: 52 yo F lives in shelter at the moment, with PMHx of DM, HTN, HLD, ESRD (Tues, Thurs, Sat) last HD 3/14 and Gout came to ED for diffuse abdominal pain radiating to suprapubic area associated with chest discomfort, nausea, vomiting.     1. Chest pain, atypical: symptoms likely related to cystitis vs GERD. cv stable, no cp/sob, no evidence of acute ischemia. HST elevated, type II MI, demand ischemia 2/2 to ESRD. ASA/Plavix     2. HTN: improved. continue current meds     Echo done revealing 1. Mitral annular calcification, otherwise normal mitral valve. Mild-moderate mitral regurgitation. 2. Mildly dilated left atrium.  LA volume index = 41 cc/m2. 3. Mild left ventricular enlargement.    4. Moderate global left ventricular systolic dysfunction.5. The right ventricle is not well visualized; grossly normal right ventricular systolic function.6. Small pericardial effusion posterior to the left ventricle.    Discussed echo findings with cardiology.  Patient cleared for d/c from cardiac standpoint.  Patient to f/u with cardiology as outpatient for serial TTE's to monitor pericardial effusion.          Evaluated by medicine: 52 yo F lives in shelter at the moment, with PMHx of DM, HTN, HLD, ESRD (Tues, Thurs, Sat) last HD 3/14 and Gout came to ED for diffuse abdominal pain radiating to suprapubic area associated with chest discomfort, nausea, vomiting, diarrhea and dysuria since last week when she was at [facility]. Admitted to telemetry for chest pain, abdominal pain, and cystitis.     -Abdominal pain 2/2 gastroparesis. CT abdomen and US abdomen shows cystitis and bladder wall thickening. finished IV ceftriaxone last day 3/22/19. Will monitor closely, supportive tx w/ PPI/Maalox. GI input appreciated. cont dicyclomine BID. Reglan not started given questionable drug interaction with Seroquel.     -Chest pain: - on telemetry, no events. Likely related to GERD symptoms vs. gastroparesis. EKG, Cardaic enzymes trending down, continuing to monitor- ASA/Plavix - TTE ordered but can be done outpt    -Elevated troponin: - Likely 2/2 to ESRD. - Trending down    -ESRD (end stage renal disease): Pt seen and evaluated by Nephrology. s/p HD 3/21    -Diabetes: - Oral DM meds on hold - ISS - Lantus off for now due to early morning hypoglycemia - Endo: Dr. Ospina appreciated - Diabetic diet     -HTN (hypertension): - Continue current BP meds w/ parameters - DASH/TLC diet     -HLD (hyperlipidemia): - Continue Statin - low fat low cholesterol diet     -Gout: - Continue allopurinol     -Depression: - Continue Sertraline and Seroquel         Evaluated by endo Dr. Ospina for episodes of hypoglycemia.  While inpatient, was only on sliding scale pre-meals & pre-bedtime.  Upon discharge, Dr. Ospina recommended to send home on tradjenta 5mg po qd.  Stop all insulins on discharge.   F/U with endo Dr. Ospina in 2 weeks.         Patient cleared for d/c to shelter.  Outpatient f/u with PCP, cardiology, endo & renal.  Outpatient echo. HPI:    54 yo F lives in shelter at the moment, with PMHx of DM, HTN, HLD, ESRD (Tues, Thurs, Sat) last HD 3/14 and Gout came to ED for diffuse abdominal pain radiating to suprapubic area associated with chest discomfort, nausea, vomiting and diarrhea since last week when she was at [facility]. Also reports some dysuria. Pain is similar to when she was at [facility] but has not improved so she came to ED today. Per pt, she missed some dialysis sessions but had last dialysis on Thursday 3/14 when she was discharged from [facility]. States she did not have a work up for her abdominal pain at that time (no CT scan). Endorses multiple episodes of bilious vomiting and diarrhea, chest discomfort  (similar to prior GERD).  Pt admits to increased swelling in LE as well as PICKARD. Denies palpitations, fever, chills, HA, visual changes, dizziness, URI symptoms, constipation, hematuria.         On admission the pt is lying in bed, NAD, eating dinner. She endorses good appetite, but says after she eats she feels nauseous and gets a burning sensation in the epigastric region. She denies chest pain or SOB at this time. (16 Mar 2019 19:08)        On admission:    Trop: 73--> 68    lipase: 44--> 64.2    CT Abdomen and Pelvis No Cont: Circumferential bladder wall thickening may reflect cystitis in the appropriate clinical context. Small bilateral pleural effusions. Small pericardial effusion.    US Abdomen Limited: Cholecystectomy. Right pleural effusion.        Evaluated by cardiology: 54 yo F lives in shelter at the moment, with PMHx of DM, HTN, HLD, ESRD (Tues, Thurs, Sat) last HD 3/14 and Gout came to ED for diffuse abdominal pain radiating to suprapubic area associated with chest discomfort, nausea, vomiting.     1. Chest pain, atypical: symptoms likely related to cystitis vs GERD. cv stable, no cp/sob, no evidence of acute ischemia. HST elevated, type II MI, demand ischemia 2/2 to ESRD. ASA/Plavix     2. HTN: improved. continue current meds     Echo done revealing 1. Mitral annular calcification, otherwise normal mitral valve. Mild-moderate mitral regurgitation. 2. Mildly dilated left atrium.  LA volume index = 41 cc/m2. 3. Mild left ventricular enlargement.    4. Moderate global left ventricular systolic dysfunction.5. The right ventricle is not well visualized; grossly normal right ventricular systolic function.6. Small pericardial effusion posterior to the left ventricle.    Discussed echo findings with cardiology.  Patient cleared for d/c from cardiac standpoint.  Patient to f/u with cardiology as outpatient for serial TTE's to monitor pericardial effusion.          Evaluated by medicine: 54 yo F lives in shelter at the moment, with PMHx of DM, HTN, HLD, ESRD (Tues, Thurs, Sat) last HD 3/14 and Gout came to ED for diffuse abdominal pain radiating to suprapubic area associated with chest discomfort, nausea, vomiting, diarrhea and dysuria since last week when she was at [facility]. Admitted to telemetry for chest pain, abdominal pain, and cystitis.     -Abdominal pain 2/2 gastroparesis. CT abdomen and US abdomen shows cystitis and bladder wall thickening. finished IV ceftriaxone last day 3/22/19. Will monitor closely, supportive tx w/ PPI/Maalox. GI input appreciated. cont dicyclomine BID. Reglan not started given questionable drug interaction with Seroquel.     -Chest pain: - on telemetry, no events. Likely related to GERD symptoms vs. gastroparesis. EKG, Cardaic enzymes trending down, continuing to monitor- ASA/Plavix - TTE ordered but can be done outpt    -Elevated troponin: - Likely 2/2 to ESRD. - Trending down    -ESRD (end stage renal disease): Pt seen and evaluated by Nephrology. s/p HD 3/21    -Diabetes: - Oral DM meds on hold - ISS - Lantus off for now due to early morning hypoglycemia - Endo: Dr. Ospina appreciated - Diabetic diet     -HTN (hypertension): - Continue current BP meds w/ parameters - DASH/TLC diet     -HLD (hyperlipidemia): - Continue Statin - low fat low cholesterol diet     -Gout: - Continue allopurinol     -Depression: - Continue Sertraline and Seroquel         Evaluated by endo Dr. Ospina for episodes of hypoglycemia.  While inpatient, was only on sliding scale pre-meals & pre-bedtime.  Upon discharge, Dr. Ospina recommended to send home on januvia 50mg po qd.  Stop all insulins on discharge.   F/U with endo Dr. Ospina in 2 weeks.         Patient cleared for d/c to shelter.  Outpatient f/u with PCP, cardiology, endo & renal.  Outpatient echo.

## 2019-03-22 NOTE — PROGRESS NOTE ADULT - SUBJECTIVE AND OBJECTIVE BOX
MIMI HICKS:[id number],   53yFemale followed for:  iodine (Unknown)  Seafood (Unknown)  shellfish (Nausea (Mild to Mod); Hives (Mild to Mod))    PAST MEDICAL & SURGICAL HISTORY:  ESRD (end stage renal disease): on HD (Tues, Thurs, Sat)  Depression  Gout  CVA (cerebral vascular accident)  DM (diabetes mellitus)  HTN (hypertension)  Glaucoma  Diabetes  AVF (arteriovenous fistula)    FAMILY HISTORY:  Family history of heart attack    MEDICATIONS  (STANDING):  allopurinol 100 milliGRAM(s) Oral daily  aspirin enteric coated 81 milliGRAM(s) Oral daily  carvedilol 12.5 milliGRAM(s) Oral every 12 hours  cefTRIAXone   IVPB      cefTRIAXone   IVPB 1 Gram(s) IV Intermittent every 24 hours  clopidogrel Tablet 75 milliGRAM(s) Oral daily  dextrose 5%. 1000 milliLiter(s) (50 mL/Hr) IV Continuous <Continuous>  dextrose 50% Injectable 12.5 Gram(s) IV Push once  dextrose 50% Injectable 25 Gram(s) IV Push once  dextrose 50% Injectable 25 Gram(s) IV Push once  dicyclomine 20 milliGRAM(s) Oral four times a day before meals  furosemide    Tablet 20 milliGRAM(s) Oral daily  heparin  Injectable 5000 Unit(s) SubCutaneous every 8 hours  hydrALAZINE 100 milliGRAM(s) Oral two times a day  insulin lispro (HumaLOG) corrective regimen sliding scale   SubCutaneous three times a day before meals  insulin lispro (HumaLOG) corrective regimen sliding scale   SubCutaneous at bedtime  isosorbide   dinitrate Tablet (ISORDIL) 10 milliGRAM(s) Oral three times a day  oxybutynin 5 milliGRAM(s) Oral two times a day  pantoprazole    Tablet 40 milliGRAM(s) Oral before breakfast  QUEtiapine 50 milliGRAM(s) Oral at bedtime  sertraline 200 milliGRAM(s) Oral daily  simvastatin 40 milliGRAM(s) Oral at bedtime  sodium chloride 0.9% lock flush 3 milliLiter(s) IV Push every 8 hours    MEDICATIONS  (PRN):  acetaminophen   Tablet .. 650 milliGRAM(s) Oral every 6 hours PRN Mild Pain (1 - 3), Moderate Pain (4 - 6)  aluminum hydroxide/magnesium hydroxide/simethicone Suspension 30 milliLiter(s) Oral every 6 hours PRN Dyspepsia  dextrose 40% Gel 15 Gram(s) Oral once PRN Blood Glucose LESS THAN 70 milliGRAM(s)/deciliter  glucagon  Injectable 1 milliGRAM(s) IntraMuscular once PRN Glucose LESS THAN 70 milligrams/deciliter  ondansetron Injectable 4 milliGRAM(s) IV Push every 8 hours PRN Nausea and/or Vomiting      Vital Signs Last 24 Hrs  T(C): 37.1 (22 Mar 2019 05:50), Max: 37.6 (21 Mar 2019 15:50)  T(F): 98.8 (22 Mar 2019 05:50), Max: 99.7 (21 Mar 2019 15:50)  HR: 71 (22 Mar 2019 05:50) (69 - 77)  BP: 138/79 (22 Mar 2019 05:50) (134/67 - 162/79)  BP(mean): --  RR: 18 (22 Mar 2019 05:50) (16 - 18)  SpO2: 99% (22 Mar 2019 05:50) (95% - 99%)  nc/at  s1s2  cta  soft, nt, nd no guarding or rebound  no c/c/e    CBC Full  -  ( 22 Mar 2019 06:30 )  WBC Count : 3.57 K/uL  Hemoglobin : 10.1 g/dL  Hematocrit : 33.1 %  Platelet Count - Automated : 191 K/uL  Mean Cell Volume : 94.3 fL  Mean Cell Hemoglobin : 28.8 pg  Mean Cell Hemoglobin Concentration : 30.5 %  Auto Neutrophil # : x  Auto Lymphocyte # : x  Auto Monocyte # : x  Auto Eosinophil # : x  Auto Basophil # : x  Auto Neutrophil % : x  Auto Lymphocyte % : x  Auto Monocyte % : x  Auto Eosinophil % : x  Auto Basophil % : x    03-22    132<L>  |  89<L>  |  17  ----------------------------<  99  4.1   |  28  |  6.49<H>    Ca    8.8      22 Mar 2019 06:30  Phos  5.4     03-21  Mg     2.1     03-22

## 2019-03-22 NOTE — DISCHARGE NOTE PROVIDER - CARE PROVIDERS DIRECT ADDRESSES
,DirectAddress_Unknown,DirectAddress_Unknown,DirectAddress_Unknown,[email or web address] ,DirectAddress_Unknown,DirectAddress_Unknown,[email or web address],DirectAddress_Unknown,DirectAddress_Unknown

## 2019-03-22 NOTE — DISCHARGE NOTE PROVIDER - INSTRUCTIONS
Continue diet modification. Avoid complex carbohydrates such as bread, pasta, cereal, white rice, white potatoes, etc. Avoid concentrated sugar as found in desserts, candy, soda, juice, etc. Consume a diet based on lean protein (chicken, fish) and vegetables. Low salt, low fat diet.  Renal restrictions: no concentrated phosphorus or potassium.

## 2019-03-22 NOTE — DISCHARGE NOTE PROVIDER - PROVIDER TOKENS
PROVIDER:[TOKEN:[[id number]]],FREE:[LAST:[Garcia],PHONE:[phone]],FAX:[(   )    -],ADDRESS:[PCP]],PROVIDER:[TOKEN:[[id number]]],PROVIDER:[TOKEN:[[id number]]] PROVIDER:[TOKEN:[[id number]]],PROVIDER:[TOKEN:[[id number]]],PROVIDER:[TOKEN:[[id number]]],FREE:[LAST:[Garcia],PHONE:[phone]],FAX:[(   )    -],ADDRESS:[PCP]],PROVIDER:[TOKEN:[[id number]]]

## 2019-03-22 NOTE — PROGRESS NOTE ADULT - SUBJECTIVE AND OBJECTIVE BOX
Chief complaint  Patient is a 53y old  Female who presents with a chief complaint of Abdominal pain and Chest Discomfort (22 Mar 2019 13:07)   Review of systems  Patient in bed, looks comfortable, no fever, no hypoglycemia.    Labs and Fingersticks  CAPILLARY BLOOD GLUCOSE      POCT Blood Glucose.: 103 mg/dL (22 Mar 2019 11:43)  POCT Blood Glucose.: 109 mg/dL (22 Mar 2019 07:40)  POCT Blood Glucose.: 133 mg/dL (21 Mar 2019 21:40)  POCT Blood Glucose.: 96 mg/dL (21 Mar 2019 16:56)      Anion Gap, Serum: 15 <H> (03-22 @ 06:30)  Anion Gap, Serum: 13 (03-21 @ 15:50)  Anion Gap, Serum: 12 (03-21 @ 07:54)    Hemoglobin A1C, Whole Blood: 6.9 <H> (03-21 @ 07:54)    Calcium, Total Serum: 8.8 (03-22 @ 06:30)  Calcium, Total Serum: 9.1 (03-21 @ 15:50)  Calcium, Total Serum: 8.5 (03-21 @ 07:54)          03-22    132<L>  |  89<L>  |  17  ----------------------------<  99  4.1   |  28  |  6.49<H>    Ca    8.8      22 Mar 2019 06:30  Phos  5.4     03-21  Mg     2.1     03-22                          10.1   3.57  )-----------( 191      ( 22 Mar 2019 06:30 )             33.1     Medications  MEDICATIONS  (STANDING):  allopurinol 100 milliGRAM(s) Oral daily  aspirin enteric coated 81 milliGRAM(s) Oral daily  carvedilol 12.5 milliGRAM(s) Oral every 12 hours  cefTRIAXone   IVPB      cefTRIAXone   IVPB 1 Gram(s) IV Intermittent every 24 hours  clopidogrel Tablet 75 milliGRAM(s) Oral daily  dextrose 5%. 1000 milliLiter(s) (50 mL/Hr) IV Continuous <Continuous>  dextrose 50% Injectable 12.5 Gram(s) IV Push once  dextrose 50% Injectable 25 Gram(s) IV Push once  dextrose 50% Injectable 25 Gram(s) IV Push once  dicyclomine 20 milliGRAM(s) Oral four times a day before meals  furosemide    Tablet 20 milliGRAM(s) Oral daily  heparin  Injectable 5000 Unit(s) SubCutaneous every 8 hours  hydrALAZINE 100 milliGRAM(s) Oral two times a day  insulin lispro (HumaLOG) corrective regimen sliding scale   SubCutaneous three times a day before meals  insulin lispro (HumaLOG) corrective regimen sliding scale   SubCutaneous at bedtime  isosorbide   dinitrate Tablet (ISORDIL) 10 milliGRAM(s) Oral three times a day  oxybutynin 5 milliGRAM(s) Oral two times a day  pantoprazole    Tablet 40 milliGRAM(s) Oral before breakfast  QUEtiapine 50 milliGRAM(s) Oral at bedtime  sertraline 200 milliGRAM(s) Oral daily  simvastatin 40 milliGRAM(s) Oral at bedtime  sodium chloride 0.9% lock flush 3 milliLiter(s) IV Push every 8 hours      Physical Exam  General: Patient comfortable in bed  Vital Signs Last 12 Hrs  T(F): 99 (03-22-19 @ 13:29), Max: 99 (03-22-19 @ 13:29)  HR: 69 (03-22-19 @ 13:29) (69 - 78)  BP: 141/74 (03-22-19 @ 13:29) (138/79 - 143/73)  BP(mean): --  RR: 18 (03-22-19 @ 13:29) (18 - 18)  SpO2: 98% (03-22-19 @ 13:29) (98% - 99%)  Neck: No palpable thyroid nodules.  CVS: S1S2, No murmurs  Respiratory: No wheezing, no crepitations  GI: Abdomen soft, bowel sounds positive  Musculoskeletal:  edema lower extremities.   Skin: No skin rashes, no ecchymosis    Diagnostics

## 2019-03-22 NOTE — DISCHARGE NOTE PROVIDER - NSDCCPCAREPLAN_GEN_ALL_CORE_FT
PRINCIPAL DISCHARGE DIAGNOSIS  Diagnosis: Atypical chest pain  Assessment and Plan of Treatment: Follow up with cardiology within 1-2 weeks; call for appointment.  You will need an outpatient transthoracic echocardiogram.  Follow up with GI Dr. Betancourt within 1-2 weeks; call for appointment.  Continue medications as prescribed.      SECONDARY DISCHARGE DIAGNOSES  Diagnosis: HLD (hyperlipidemia)  Assessment and Plan of Treatment: Low fat diet, exercise daily and continue current medications. Follow up with primary care physician and cardiologist for management.    Diagnosis: Depression  Assessment and Plan of Treatment: Continue medications as prescribed.  Follow up with your PCP.    Diagnosis: ESRD (end stage renal disease)  Assessment and Plan of Treatment: Please follow up with your nephrologist for management, and continue your scheduled hemodialysis.    Diagnosis: DM (diabetes mellitus)  Assessment and Plan of Treatment: Monitor finger sticks pre-meal and bedtime, low salt, fat and carbohydrate diet, minimize glucose intake.  Exercise daily for at least 30 minutes and weight loss.  Follow up with primary care physician and endocrinologist for routine Hemoglobin A1C checks and management.  Follow up with your ophthalmologist for routine yearly vision exams. PRINCIPAL DISCHARGE DIAGNOSIS  Diagnosis: Atypical chest pain  Assessment and Plan of Treatment: Follow up with cardiology within 1-2 weeks; call for appointment.  You will need an outpatient transthoracic echocardiogram.  Follow up with GI Dr. Betancourt within 1-2 weeks; call for appointment.  Continue medications as prescribed.      SECONDARY DISCHARGE DIAGNOSES  Diagnosis: HLD (hyperlipidemia)  Assessment and Plan of Treatment: Low fat diet, exercise daily and continue current medications. Follow up with primary care physician and cardiologist for management.    Diagnosis: Depression  Assessment and Plan of Treatment: Continue medications as prescribed.  Follow up with your PCP.    Diagnosis: ESRD (end stage renal disease)  Assessment and Plan of Treatment: Please follow up with your nephrologist for management, and continue your scheduled hemodialysis.    Diagnosis: DM (diabetes mellitus)  Assessment and Plan of Treatment: Take basaglar 6 UNITS subcutaneously at bedtime.   Monitor finger sticks pre-meal and bedtime, low salt, fat and carbohydrate diet, minimize glucose intake.  Exercise daily for at least 30 minutes and weight loss.  Follow up with primary care physician and endocrinologist for routine Hemoglobin A1C checks and management.  Follow up with your ophthalmologist for routine yearly vision exams.  Follow up with endocrinologist Dr. Ospina in 2 weeks & bring a log of your fingersticks with you to this follow up appointment; call for appointment. PRINCIPAL DISCHARGE DIAGNOSIS  Diagnosis: Atypical chest pain  Assessment and Plan of Treatment: Follow up with cardiology within 1-2 weeks; call for appointment.  You will need an outpatient transthoracic echocardiogram.  Follow up with GI Dr. Betancourt within 1-2 weeks; call for appointment.  Continue medications as prescribed.      SECONDARY DISCHARGE DIAGNOSES  Diagnosis: Cystitis  Assessment and Plan of Treatment: Take antibiotic (ceftin) as prescribed for one more dose in order to complete the full course of antibiotics.  Follow up with your PCP.    Diagnosis: HLD (hyperlipidemia)  Assessment and Plan of Treatment: Low fat diet, exercise daily and continue current medications. Follow up with primary care physician and cardiologist for management.    Diagnosis: Depression  Assessment and Plan of Treatment: Continue medications as prescribed.  Follow up with your PCP.    Diagnosis: ESRD (end stage renal disease)  Assessment and Plan of Treatment: Please follow up with your nephrologist for management, and continue your scheduled hemodialysis.    Diagnosis: DM (diabetes mellitus)  Assessment and Plan of Treatment: Take basaglar 6 UNITS subcutaneously at bedtime.   Monitor finger sticks pre-meal and bedtime, low salt, fat and carbohydrate diet, minimize glucose intake.  Exercise daily for at least 30 minutes and weight loss.  Follow up with primary care physician and endocrinologist for routine Hemoglobin A1C checks and management.  Follow up with your ophthalmologist for routine yearly vision exams.  Follow up with endocrinologist Dr. Ospina in 2 weeks & bring a log of your fingersticks with you to this follow up appointment; call for appointment. PRINCIPAL DISCHARGE DIAGNOSIS  Diagnosis: Atypical chest pain  Assessment and Plan of Treatment: Your pain was likely secondary to your gastroparesis.   Follow up with cardiology within 1-2 weeks; call for appointment.   Follow up with GI Dr. Betancourt within 1-2 weeks; call for appointment.  Continue medications as prescribed.      SECONDARY DISCHARGE DIAGNOSES  Diagnosis: Pericardial effusion  Assessment and Plan of Treatment: On your transthoracic echocardiogram, you were found to have a small pericardial effusion.  Continue medications as prescribed.  Follow up with cardiology within 1-2 weeks; call for appointment.  You will need routine transthoracic echocardiograms as outpatient by cardiology to monitor your pericardial effusion.    Diagnosis: Gastroparesis  Assessment and Plan of Treatment: Continue medications as prescribed.  Follow up with GI Dr. Betancourt within 1-2 weeks; call for appointment.    Diagnosis: Cystitis  Assessment and Plan of Treatment: During your hospitalization, you completed a course of antibiotics.   Follow up with your PCP.    Diagnosis: HLD (hyperlipidemia)  Assessment and Plan of Treatment: Low fat diet, exercise daily and continue current medications. Follow up with primary care physician and cardiologist for management.    Diagnosis: Depression  Assessment and Plan of Treatment: Continue medications as prescribed.  Follow up with your PCP.    Diagnosis: ESRD (end stage renal disease)  Assessment and Plan of Treatment: Please follow up with your nephrologist for management, and continue your scheduled hemodialysis.    Diagnosis: DM (diabetes mellitus)  Assessment and Plan of Treatment: Take basaglar 6 UNITS subcutaneously at bedtime.   Monitor finger sticks pre-meal and bedtime, low salt, fat and carbohydrate diet, minimize glucose intake.  Exercise daily for at least 30 minutes and weight loss.  Follow up with your ophthalmologist for routine yearly vision exams.  You should have your HgA1C blood test checked every 3 months.  You should also have yearly routine foot examinations by podiatry.  Follow up with endocrinologist Dr. Ospina in 2 weeks & bring a log of your fingersticks with you to this follow up appointment; call for appointment. PRINCIPAL DISCHARGE DIAGNOSIS  Diagnosis: Atypical chest pain  Assessment and Plan of Treatment: Your pain was likely secondary to your gastroparesis.   Follow up with cardiology within 1-2 weeks; call for appointment.   Follow up with GI Dr. Betancourt within 1-2 weeks; call for appointment.  Continue medications as prescribed.      SECONDARY DISCHARGE DIAGNOSES  Diagnosis: Pericardial effusion  Assessment and Plan of Treatment: On your transthoracic echocardiogram, you were found to have a small pericardial effusion.  Continue medications as prescribed.  Follow up with cardiology within 1-2 weeks; call for appointment.  You will need routine transthoracic echocardiograms as outpatient by cardiology to monitor your pericardial effusion.    Diagnosis: Gastroparesis  Assessment and Plan of Treatment: Continue medications as prescribed.  Follow up with GI Dr. Betancourt within 1-2 weeks; call for appointment.    Diagnosis: Cystitis  Assessment and Plan of Treatment: During your hospitalization, you completed a course of antibiotics.   Follow up with your PCP.    Diagnosis: HLD (hyperlipidemia)  Assessment and Plan of Treatment: Low fat diet, exercise daily and continue current medications. Follow up with primary care physician and cardiologist for management.    Diagnosis: Depression  Assessment and Plan of Treatment: Continue medications as prescribed.  Follow up with your PCP.    Diagnosis: ESRD (end stage renal disease)  Assessment and Plan of Treatment: Please follow up with your nephrologist for management, and continue your scheduled hemodialysis.    Diagnosis: DM (diabetes mellitus)  Assessment and Plan of Treatment: STOP taking insulin.  You are now on tradjenta 5 mg by mouth daily only.  Check your fingersticks prior to each meal & prior to bedtime.  Please keep a log of your fingersticks & bring this with you to your follow up endocrine appointment.  Follow up with endocrinologist Dr. Ospina in 2 weeks; call for appointment.  Continue diet modification. Avoid complex carbohydrates such as bread, pasta, cereal, white rice, white potatoes, etc. Avoid concentrated sugar as found in desserts, candy, soda, juice, etc. Consume a diet based on lean protein (chicken, fish) and vegetables.   You should have yearly routine eye & foot exams.  You should have your HgA1C blood test checked every 3 months. PRINCIPAL DISCHARGE DIAGNOSIS  Diagnosis: Atypical chest pain  Assessment and Plan of Treatment: Your pain was likely secondary to your gastroparesis.   Follow up with cardiology within 1-2 weeks; call for appointment.   Follow up with GI Dr. Betancourt within 1-2 weeks; call for appointment.  Continue medications as prescribed.      SECONDARY DISCHARGE DIAGNOSES  Diagnosis: Pericardial effusion  Assessment and Plan of Treatment: On your transthoracic echocardiogram, you were found to have a small pericardial effusion.  Continue medications as prescribed.  Follow up with cardiology within 1-2 weeks; call for appointment.  You will need routine transthoracic echocardiograms as outpatient by cardiology to monitor your pericardial effusion.    Diagnosis: Gastroparesis  Assessment and Plan of Treatment: Continue medications as prescribed.  Follow up with GI Dr. Betancourt within 1-2 weeks; call for appointment.    Diagnosis: Cystitis  Assessment and Plan of Treatment: During your hospitalization, you completed a course of antibiotics.   Follow up with your PCP.    Diagnosis: HLD (hyperlipidemia)  Assessment and Plan of Treatment: Low fat diet, exercise daily and continue current medications. Follow up with primary care physician and cardiologist for management.    Diagnosis: Depression  Assessment and Plan of Treatment: Continue medications as prescribed.  Follow up with your PCP.    Diagnosis: ESRD (end stage renal disease)  Assessment and Plan of Treatment: Please follow up with your nephrologist for management, and continue your scheduled hemodialysis.    Diagnosis: DM (diabetes mellitus)  Assessment and Plan of Treatment: STOP taking insulin.  You are now on januvia 50mg by mouth daily.   Check your fingersticks prior to each meal & prior to bedtime.  Please keep a log of your fingersticks & bring this with you to your follow up endocrine appointment.  Follow up with endocrinologist Dr. Ospina in 2 weeks; call for appointment.  Continue diet modification. Avoid complex carbohydrates such as bread, pasta, cereal, white rice, white potatoes, etc. Avoid concentrated sugar as found in desserts, candy, soda, juice, etc. Consume a diet based on lean protein (chicken, fish) and vegetables.   You should have yearly routine eye & foot exams.  You should have your HgA1C blood test checked every 3 months.

## 2019-03-22 NOTE — DISCHARGE NOTE PROVIDER - NSDCFUADDINST_GEN_ALL_CORE_FT
Follow up with your nephrologist & continue your scheduled dialysis.  Follow up with your PCP within 1-2 weeks; call for appointment.  Follow up with cardiology within 1-2 weeks; call for appointment.  You will need an outpatient transthoracic echocardiogram.   Follow up with GI within 1-2 weeks; call for appointment. Follow up with your nephrologist & continue your scheduled dialysis.  Follow up with your PCP within 1-2 weeks; call for appointment.  Follow up with cardiology within 1-2 weeks; call for appointment.  You will need an outpatient transthoracic echocardiogram.   Follow up with GI within 1-2 weeks; call for appointment.  Follow up with endocrinologist Dr. Ospina in 2 weeks & bring a log of your fingersticks with you to this follow up appointment; call for appointment. Follow up with your nephrologist & continue your scheduled dialysis.  Follow up with your PCP within 1-2 weeks; call for appointment.  Follow up with cardiology within 1-2 weeks; call for appointment.    Follow up with GI within 1-2 weeks; call for appointment.  Follow up with endocrinologist Dr. Ospina in 2 weeks & bring a log of your fingersticks with you to this follow up appointment; call for appointment.

## 2019-03-23 LAB
ANION GAP SERPL CALC-SCNC: 11 MMO/L — SIGNIFICANT CHANGE UP (ref 7–14)
ANION GAP SERPL CALC-SCNC: 11 MMO/L — SIGNIFICANT CHANGE UP (ref 7–14)
BASOPHILS # BLD AUTO: 0.04 K/UL — SIGNIFICANT CHANGE UP (ref 0–0.2)
BASOPHILS NFR BLD AUTO: 1 % — SIGNIFICANT CHANGE UP (ref 0–2)
BUN SERPL-MCNC: 26 MG/DL — HIGH (ref 7–23)
BUN SERPL-MCNC: 26 MG/DL — HIGH (ref 7–23)
CALCIUM SERPL-MCNC: 9 MG/DL — SIGNIFICANT CHANGE UP (ref 8.4–10.5)
CALCIUM SERPL-MCNC: 9 MG/DL — SIGNIFICANT CHANGE UP (ref 8.4–10.5)
CHLORIDE SERPL-SCNC: 90 MMOL/L — LOW (ref 98–107)
CHLORIDE SERPL-SCNC: 90 MMOL/L — LOW (ref 98–107)
CO2 SERPL-SCNC: 28 MMOL/L — SIGNIFICANT CHANGE UP (ref 22–31)
CO2 SERPL-SCNC: 28 MMOL/L — SIGNIFICANT CHANGE UP (ref 22–31)
CREAT SERPL-MCNC: 8.36 MG/DL — HIGH (ref 0.5–1.3)
CREAT SERPL-MCNC: 8.36 MG/DL — HIGH (ref 0.5–1.3)
EOSINOPHIL # BLD AUTO: 0.24 K/UL — SIGNIFICANT CHANGE UP (ref 0–0.5)
EOSINOPHIL NFR BLD AUTO: 5.9 % — SIGNIFICANT CHANGE UP (ref 0–6)
GLUCOSE SERPL-MCNC: 112 MG/DL — HIGH (ref 70–99)
GLUCOSE SERPL-MCNC: 112 MG/DL — HIGH (ref 70–99)
HCT VFR BLD CALC: 33 % — LOW (ref 34.5–45)
HGB BLD-MCNC: 10.4 G/DL — LOW (ref 11.5–15.5)
IMM GRANULOCYTES NFR BLD AUTO: 0.2 % — SIGNIFICANT CHANGE UP (ref 0–1.5)
LYMPHOCYTES # BLD AUTO: 1.57 K/UL — SIGNIFICANT CHANGE UP (ref 1–3.3)
LYMPHOCYTES # BLD AUTO: 38.6 % — SIGNIFICANT CHANGE UP (ref 13–44)
MAGNESIUM SERPL-MCNC: 2.3 MG/DL — SIGNIFICANT CHANGE UP (ref 1.6–2.6)
MCHC RBC-ENTMCNC: 29 PG — SIGNIFICANT CHANGE UP (ref 27–34)
MCHC RBC-ENTMCNC: 31.5 % — LOW (ref 32–36)
MCV RBC AUTO: 91.9 FL — SIGNIFICANT CHANGE UP (ref 80–100)
MONOCYTES # BLD AUTO: 0.41 K/UL — SIGNIFICANT CHANGE UP (ref 0–0.9)
MONOCYTES NFR BLD AUTO: 10.1 % — SIGNIFICANT CHANGE UP (ref 2–14)
NEUTROPHILS # BLD AUTO: 1.8 K/UL — SIGNIFICANT CHANGE UP (ref 1.8–7.4)
NEUTROPHILS NFR BLD AUTO: 44.2 % — SIGNIFICANT CHANGE UP (ref 43–77)
NRBC # FLD: 0 K/UL — LOW (ref 25–125)
PHOSPHATE SERPL-MCNC: 5.3 MG/DL — HIGH (ref 2.5–4.5)
PLATELET # BLD AUTO: 195 K/UL — SIGNIFICANT CHANGE UP (ref 150–400)
PMV BLD: 12.1 FL — SIGNIFICANT CHANGE UP (ref 7–13)
POTASSIUM SERPL-MCNC: 4.9 MMOL/L — SIGNIFICANT CHANGE UP (ref 3.5–5.3)
POTASSIUM SERPL-MCNC: 4.9 MMOL/L — SIGNIFICANT CHANGE UP (ref 3.5–5.3)
POTASSIUM SERPL-SCNC: 4.9 MMOL/L — SIGNIFICANT CHANGE UP (ref 3.5–5.3)
POTASSIUM SERPL-SCNC: 4.9 MMOL/L — SIGNIFICANT CHANGE UP (ref 3.5–5.3)
RBC # BLD: 3.59 M/UL — LOW (ref 3.8–5.2)
RBC # FLD: 12.5 % — SIGNIFICANT CHANGE UP (ref 10.3–14.5)
SODIUM SERPL-SCNC: 129 MMOL/L — LOW (ref 135–145)
SODIUM SERPL-SCNC: 129 MMOL/L — LOW (ref 135–145)
WBC # BLD: 4.07 K/UL — SIGNIFICANT CHANGE UP (ref 3.8–10.5)
WBC # FLD AUTO: 4.07 K/UL — SIGNIFICANT CHANGE UP (ref 3.8–10.5)

## 2019-03-23 RX ORDER — BENZOCAINE AND MENTHOL 5; 1 G/100ML; G/100ML
1 LIQUID ORAL ONCE
Qty: 0 | Refills: 0 | Status: COMPLETED | OUTPATIENT
Start: 2019-03-23 | End: 2019-03-23

## 2019-03-23 RX ORDER — POLYETHYLENE GLYCOL 3350 17 G/17G
17 POWDER, FOR SOLUTION ORAL ONCE
Qty: 0 | Refills: 0 | Status: COMPLETED | OUTPATIENT
Start: 2019-03-23 | End: 2019-03-23

## 2019-03-23 RX ORDER — DOCUSATE SODIUM 100 MG
100 CAPSULE ORAL ONCE
Qty: 0 | Refills: 0 | Status: COMPLETED | OUTPATIENT
Start: 2019-03-23 | End: 2019-03-23

## 2019-03-23 RX ORDER — BENZOCAINE AND MENTHOL 5; 1 G/100ML; G/100ML
1 LIQUID ORAL ONCE
Qty: 0 | Refills: 0 | Status: DISCONTINUED | OUTPATIENT
Start: 2019-03-23 | End: 2019-03-23

## 2019-03-23 RX ADMIN — CARVEDILOL PHOSPHATE 12.5 MILLIGRAM(S): 80 CAPSULE, EXTENDED RELEASE ORAL at 07:15

## 2019-03-23 RX ADMIN — SODIUM CHLORIDE 3 MILLILITER(S): 9 INJECTION INTRAMUSCULAR; INTRAVENOUS; SUBCUTANEOUS at 15:00

## 2019-03-23 RX ADMIN — CARVEDILOL PHOSPHATE 12.5 MILLIGRAM(S): 80 CAPSULE, EXTENDED RELEASE ORAL at 17:45

## 2019-03-23 RX ADMIN — Medication 100 MILLIGRAM(S): at 17:45

## 2019-03-23 RX ADMIN — Medication 5 MILLIGRAM(S): at 07:14

## 2019-03-23 RX ADMIN — Medication 30 MILLILITER(S): at 22:50

## 2019-03-23 RX ADMIN — Medication 20 MILLIGRAM(S): at 16:33

## 2019-03-23 RX ADMIN — Medication 100 MILLIGRAM(S): at 11:48

## 2019-03-23 RX ADMIN — Medication 20 MILLIGRAM(S): at 07:15

## 2019-03-23 RX ADMIN — Medication 81 MILLIGRAM(S): at 11:49

## 2019-03-23 RX ADMIN — PANTOPRAZOLE SODIUM 40 MILLIGRAM(S): 20 TABLET, DELAYED RELEASE ORAL at 07:14

## 2019-03-23 RX ADMIN — Medication 20 MILLIGRAM(S): at 11:48

## 2019-03-23 RX ADMIN — ISOSORBIDE DINITRATE 10 MILLIGRAM(S): 5 TABLET ORAL at 16:32

## 2019-03-23 RX ADMIN — CLOPIDOGREL BISULFATE 75 MILLIGRAM(S): 75 TABLET, FILM COATED ORAL at 11:48

## 2019-03-23 RX ADMIN — HEPARIN SODIUM 5000 UNIT(S): 5000 INJECTION INTRAVENOUS; SUBCUTANEOUS at 22:49

## 2019-03-23 RX ADMIN — SIMVASTATIN 40 MILLIGRAM(S): 20 TABLET, FILM COATED ORAL at 22:49

## 2019-03-23 RX ADMIN — HEPARIN SODIUM 5000 UNIT(S): 5000 INJECTION INTRAVENOUS; SUBCUTANEOUS at 08:13

## 2019-03-23 RX ADMIN — Medication 5 MILLIGRAM(S): at 17:45

## 2019-03-23 RX ADMIN — QUETIAPINE FUMARATE 50 MILLIGRAM(S): 200 TABLET, FILM COATED ORAL at 22:49

## 2019-03-23 RX ADMIN — SODIUM CHLORIDE 3 MILLILITER(S): 9 INJECTION INTRAMUSCULAR; INTRAVENOUS; SUBCUTANEOUS at 22:23

## 2019-03-23 RX ADMIN — Medication 250 MILLIGRAM(S): at 07:15

## 2019-03-23 RX ADMIN — POLYETHYLENE GLYCOL 3350 17 GRAM(S): 17 POWDER, FOR SOLUTION ORAL at 16:32

## 2019-03-23 RX ADMIN — Medication 100 MILLIGRAM(S): at 22:49

## 2019-03-23 RX ADMIN — BENZOCAINE AND MENTHOL 1 LOZENGE: 5; 1 LIQUID ORAL at 22:49

## 2019-03-23 RX ADMIN — SODIUM CHLORIDE 3 MILLILITER(S): 9 INJECTION INTRAMUSCULAR; INTRAVENOUS; SUBCUTANEOUS at 07:14

## 2019-03-23 RX ADMIN — ISOSORBIDE DINITRATE 10 MILLIGRAM(S): 5 TABLET ORAL at 07:15

## 2019-03-23 RX ADMIN — SERTRALINE 200 MILLIGRAM(S): 25 TABLET, FILM COATED ORAL at 11:48

## 2019-03-23 RX ADMIN — Medication 20 MILLIGRAM(S): at 22:49

## 2019-03-23 RX ADMIN — ISOSORBIDE DINITRATE 10 MILLIGRAM(S): 5 TABLET ORAL at 22:49

## 2019-03-23 RX ADMIN — Medication 100 MILLIGRAM(S): at 07:15

## 2019-03-23 NOTE — PROGRESS NOTE ADULT - SUBJECTIVE AND OBJECTIVE BOX
Chief complaint  Patient is a 53y old  Female who presents with a chief complaint of Abdominal pain and Chest Discomfort (23 Mar 2019 12:02)   Review of systems  Patient in bed, looks comfortable, no fever, no hypoglycemia.    Labs and Fingersticks  CAPILLARY BLOOD GLUCOSE      POCT Blood Glucose.: 125 mg/dL (23 Mar 2019 11:46)  POCT Blood Glucose.: 111 mg/dL (23 Mar 2019 07:57)  POCT Blood Glucose.: 128 mg/dL (22 Mar 2019 22:16)  POCT Blood Glucose.: 132 mg/dL (22 Mar 2019 16:55)      Anion Gap, Serum: 11 (03-23 @ 06:50)  Anion Gap, Serum: 11 (03-23 @ 06:50)  Anion Gap, Serum: 15 <H> (03-22 @ 06:30)      Calcium, Total Serum: 9.0 (03-23 @ 06:50)  Calcium, Total Serum: 9.0 (03-23 @ 06:50)  Calcium, Total Serum: 8.8 (03-22 @ 06:30)          03-23    129<L>  |  90<L>  |  26<H>  ----------------------------<  112<H>  4.9   |  28  |  8.36<H>    Ca    9.0      23 Mar 2019 06:50  Phos  5.3     03-23  Mg     2.3     03-23                          10.4   4.07  )-----------( 195      ( 23 Mar 2019 06:50 )             33.0     Medications  MEDICATIONS  (STANDING):  allopurinol 100 milliGRAM(s) Oral daily  aspirin enteric coated 81 milliGRAM(s) Oral daily  carvedilol 12.5 milliGRAM(s) Oral every 12 hours  clopidogrel Tablet 75 milliGRAM(s) Oral daily  dextrose 5%. 1000 milliLiter(s) (50 mL/Hr) IV Continuous <Continuous>  dextrose 50% Injectable 12.5 Gram(s) IV Push once  dextrose 50% Injectable 25 Gram(s) IV Push once  dextrose 50% Injectable 25 Gram(s) IV Push once  dicyclomine 20 milliGRAM(s) Oral four times a day before meals  furosemide    Tablet 20 milliGRAM(s) Oral daily  heparin  Injectable 5000 Unit(s) SubCutaneous every 8 hours  hydrALAZINE 100 milliGRAM(s) Oral two times a day  insulin lispro (HumaLOG) corrective regimen sliding scale   SubCutaneous three times a day before meals  insulin lispro (HumaLOG) corrective regimen sliding scale   SubCutaneous at bedtime  isosorbide   dinitrate Tablet (ISORDIL) 10 milliGRAM(s) Oral three times a day  oxybutynin 5 milliGRAM(s) Oral two times a day  pantoprazole    Tablet 40 milliGRAM(s) Oral before breakfast  polyethylene glycol 3350 17 Gram(s) Oral once  QUEtiapine 50 milliGRAM(s) Oral at bedtime  sertraline 200 milliGRAM(s) Oral daily  simvastatin 40 milliGRAM(s) Oral at bedtime  sodium chloride 0.9% lock flush 3 milliLiter(s) IV Push every 8 hours      Physical Exam  General: Patient comfortable in bed  Vital Signs Last 12 Hrs  T(F): 99 (03-23-19 @ 12:45), Max: 99 (03-23-19 @ 12:45)  HR: 70 (03-23-19 @ 12:45) (62 - 70)  BP: 160/113 (03-23-19 @ 12:45) (124/66 - 160/113)  BP(mean): --  RR: 18 (03-23-19 @ 12:45) (17 - 18)  SpO2: 97% (03-23-19 @ 12:05) (95% - 97%)  Neck: No palpable thyroid nodules.  CVS: S1S2, No murmurs  Respiratory: No wheezing, no crepitations  GI: Abdomen soft, bowel sounds positive  Musculoskeletal:  edema lower extremities.   Skin: No skin rashes, no ecchymosis    Diagnostics

## 2019-03-23 NOTE — PROGRESS NOTE ADULT - SUBJECTIVE AND OBJECTIVE BOX
NEPHROLOGY-NSN        Patient seen and examined in bed. Awake, alert, responsive. Denies sob, chest pain   HD today well tolerated    REVIEW OF SYSTEMS:  As per HPI, otherwise 8 full 10 ROS were unremarkable    MEDICATIONS  (STANDING):  allopurinol 100 milliGRAM(s) Oral daily  aspirin enteric coated 81 milliGRAM(s) Oral daily  carvedilol 12.5 milliGRAM(s) Oral every 12 hours  clopidogrel Tablet 75 milliGRAM(s) Oral daily  dextrose 5%. 1000 milliLiter(s) (50 mL/Hr) IV Continuous <Continuous>  dextrose 50% Injectable 12.5 Gram(s) IV Push once  dextrose 50% Injectable 25 Gram(s) IV Push once  dextrose 50% Injectable 25 Gram(s) IV Push once  dicyclomine 20 milliGRAM(s) Oral four times a day before meals  furosemide    Tablet 20 milliGRAM(s) Oral daily  heparin  Injectable 5000 Unit(s) SubCutaneous every 8 hours  hydrALAZINE 100 milliGRAM(s) Oral two times a day  insulin lispro (HumaLOG) corrective regimen sliding scale   SubCutaneous three times a day before meals  insulin lispro (HumaLOG) corrective regimen sliding scale   SubCutaneous at bedtime  isosorbide   dinitrate Tablet (ISORDIL) 10 milliGRAM(s) Oral three times a day  oxybutynin 5 milliGRAM(s) Oral two times a day  pantoprazole    Tablet 40 milliGRAM(s) Oral before breakfast  QUEtiapine 50 milliGRAM(s) Oral at bedtime  sertraline 200 milliGRAM(s) Oral daily  simvastatin 40 milliGRAM(s) Oral at bedtime  sodium chloride 0.9% lock flush 3 milliLiter(s) IV Push every 8 hours      VITAL:  T(C): , Max: 37.2 (03-23-19 @ 12:45)  T(F): , Max: 99 (03-23-19 @ 12:45)  HR: 74 (03-23-19 @ 17:43)  BP: 134/72 (03-23-19 @ 17:43)  BP(mean): --  RR: 17 (03-23-19 @ 17:43)  SpO2: 96% (03-23-19 @ 17:43)  Wt(kg): --    I and O's:    03-23 @ 07:01  -  03-23 @ 18:28  --------------------------------------------------------  IN: 600 mL / OUT: 2400 mL / NET: -1800 mL          PHYSICAL EXAM:    Constitutional: NAD    Neck:  No JVD  Respiratory: CTAB/L  Cardiovascular: S1 and S2  Gastrointestinal: BS+, soft, NT/ND  Extremities: No peripheral edema  Neurological: A/O x 3, no focal deficits  Psychiatric: Normal mood, normal affect  : No John  Skin: No rashes  Access: DAVID MCDONALD, + bruit    LABS:                        10.4   4.07  )-----------( 195      ( 23 Mar 2019 06:50 )             33.0     23 Mar 2019 06:50    129<L>  |  90<L>  |  26<H>  ----------------------------<  112<H>  4.9     |  28     |  8.36<H>  22 Mar 2019 06:30    132<L>  |  89<L>  |  17     ----------------------------<  99     4.1     |  28     |  6.49<H>    Ca    9.0        23 Mar 2019 06:50  Ca    8.8        22 Mar 2019 06:30  Phos  5.3<H>     23 Mar 2019 06:50  Mg     2.3       23 Mar 2019 06:50  Mg     2.1       22 Mar 2019 06:30              Urine Studies:    RADIOLOGY & ADDITIONAL STUDIES:      ASSESSMENT  53y Female with DM, HTN, gout, CVD and ESRD p/w abd pain, N/V/D  - ESRD: HD TTS @ [facility]   - HTN: BP acceptable   - GI: IBS - sx improved  - endocrine: hypoglycemia - improving    RECOMMENDATIONS:  - Next HD anticipated on Tuesday  - c/w antihypertensive meds  - dose any new medications for a CrCl of < 10 cc/min/HD  - no renal objection to discharge    Iraida Ruiz, NP-c  [facility],   [phone]

## 2019-03-23 NOTE — PROGRESS NOTE ADULT - SUBJECTIVE AND OBJECTIVE BOX
Denies any acute nausea or vomiting  Abd pain is controlled    Vital Signs Last 24 Hrs  T(C): 36.7 (23 Mar 2019 07:12), Max: 37.2 (22 Mar 2019 13:29)  T(F): 98.1 (23 Mar 2019 07:12), Max: 99 (22 Mar 2019 13:29)  HR: 62 (23 Mar 2019 07:12) (62 - 80)  BP: 143/74 (23 Mar 2019 07:12) (141/74 - 153/84)  BP(mean): --  RR: 17 (23 Mar 2019 07:12) (17 - 18)  SpO2: 95% (23 Mar 2019 07:12) (95% - 99%)    GENERAL: NAD, Comfortable, obese  HEAD:  Atraumatic, Normocephalic  EYES: EOMI, PERRLA, conjunctiva and sclera clear  NECK: Supple, No JVD  CHEST/LUNG: Clear to auscultation bilaterally; No wheeze  HEART: Regular rate and rhythm; No murmurs, rubs, or gallops  ABDOMEN: Soft, Nontender, Nondistended; Bowel sounds present  Neuro: AAO x 3, no focal deficit, 5/5 b/l extremities  EXTREMITIES:  2+ Peripheral Pulses, No clubbing, cyanosis, or edema  SKIN: No rashes or lesions    LABS:                        10.4   4.07  )-----------( 195      ( 23 Mar 2019 06:50 )             33.0     03-23    129<L>  |  90<L>  |  26<H>  ----------------------------<  112<H>  4.9   |  28  |  8.36<H>    Ca    9.0      23 Mar 2019 06:50  Phos  5.3     03-23  Mg     2.3     03-23        CAPILLARY BLOOD GLUCOSE      POCT Blood Glucose.: 125 mg/dL (23 Mar 2019 11:46)  POCT Blood Glucose.: 111 mg/dL (23 Mar 2019 07:57)  POCT Blood Glucose.: 128 mg/dL (22 Mar 2019 22:16)  POCT Blood Glucose.: 132 mg/dL (22 Mar 2019 16:55)

## 2019-03-23 NOTE — PROGRESS NOTE ADULT - SUBJECTIVE AND OBJECTIVE BOX
MIMI HICKS:[id number],   53yFemale followed for:  iodine (Unknown)  Seafood (Unknown)  shellfish (Nausea (Mild to Mod); Hives (Mild to Mod))    PAST MEDICAL & SURGICAL HISTORY:  ESRD (end stage renal disease): on HD (Tues, Thurs, Sat)  Depression  Gout  CVA (cerebral vascular accident)  DM (diabetes mellitus)  HTN (hypertension)  Glaucoma  Diabetes  AVF (arteriovenous fistula)    FAMILY HISTORY:  Family history of heart attack    MEDICATIONS  (STANDING):  allopurinol 100 milliGRAM(s) Oral daily  aspirin enteric coated 81 milliGRAM(s) Oral daily  carvedilol 12.5 milliGRAM(s) Oral every 12 hours  clopidogrel Tablet 75 milliGRAM(s) Oral daily  dextrose 5%. 1000 milliLiter(s) (50 mL/Hr) IV Continuous <Continuous>  dextrose 50% Injectable 12.5 Gram(s) IV Push once  dextrose 50% Injectable 25 Gram(s) IV Push once  dextrose 50% Injectable 25 Gram(s) IV Push once  dicyclomine 20 milliGRAM(s) Oral four times a day before meals  furosemide    Tablet 20 milliGRAM(s) Oral daily  heparin  Injectable 5000 Unit(s) SubCutaneous every 8 hours  hydrALAZINE 100 milliGRAM(s) Oral two times a day  insulin lispro (HumaLOG) corrective regimen sliding scale   SubCutaneous three times a day before meals  insulin lispro (HumaLOG) corrective regimen sliding scale   SubCutaneous at bedtime  isosorbide   dinitrate Tablet (ISORDIL) 10 milliGRAM(s) Oral three times a day  oxybutynin 5 milliGRAM(s) Oral two times a day  pantoprazole    Tablet 40 milliGRAM(s) Oral before breakfast  QUEtiapine 50 milliGRAM(s) Oral at bedtime  sertraline 200 milliGRAM(s) Oral daily  simvastatin 40 milliGRAM(s) Oral at bedtime  sodium chloride 0.9% lock flush 3 milliLiter(s) IV Push every 8 hours    MEDICATIONS  (PRN):  acetaminophen   Tablet .. 650 milliGRAM(s) Oral every 6 hours PRN Mild Pain (1 - 3), Moderate Pain (4 - 6)  aluminum hydroxide/magnesium hydroxide/simethicone Suspension 30 milliLiter(s) Oral every 6 hours PRN Dyspepsia  dextrose 40% Gel 15 Gram(s) Oral once PRN Blood Glucose LESS THAN 70 milliGRAM(s)/deciliter  glucagon  Injectable 1 milliGRAM(s) IntraMuscular once PRN Glucose LESS THAN 70 milligrams/deciliter  ondansetron Injectable 4 milliGRAM(s) IV Push every 8 hours PRN Nausea and/or Vomiting      Vital Signs Last 24 Hrs  T(C): 36.7 (23 Mar 2019 07:12), Max: 37.2 (22 Mar 2019 13:29)  T(F): 98.1 (23 Mar 2019 07:12), Max: 99 (22 Mar 2019 13:29)  HR: 62 (23 Mar 2019 07:12) (62 - 80)  BP: 143/74 (23 Mar 2019 07:12) (141/74 - 153/84)  BP(mean): --  RR: 17 (23 Mar 2019 07:12) (17 - 18)  SpO2: 95% (23 Mar 2019 07:12) (95% - 99%)  nc/at  s1s2  cta  soft, nt, nd no guarding or rebound  no c/c/e    CBC Full  -  ( 23 Mar 2019 06:50 )  WBC Count : 4.07 K/uL  Hemoglobin : 10.4 g/dL  Hematocrit : 33.0 %  Platelet Count - Automated : 195 K/uL  Mean Cell Volume : 91.9 fL  Mean Cell Hemoglobin : 29.0 pg  Mean Cell Hemoglobin Concentration : 31.5 %  Auto Neutrophil # : 1.80 K/uL  Auto Lymphocyte # : 1.57 K/uL  Auto Monocyte # : 0.41 K/uL  Auto Eosinophil # : 0.24 K/uL  Auto Basophil # : 0.04 K/uL  Auto Neutrophil % : 44.2 %  Auto Lymphocyte % : 38.6 %  Auto Monocyte % : 10.1 %  Auto Eosinophil % : 5.9 %  Auto Basophil % : 1.0 %    03-22    132<L>  |  89<L>  |  17  ----------------------------<  99  4.1   |  28  |  6.49<H>    Ca    8.8      22 Mar 2019 06:30  Mg     2.1     03-22

## 2019-03-24 LAB
ANION GAP SERPL CALC-SCNC: 10 MMO/L — SIGNIFICANT CHANGE UP (ref 7–14)
BASOPHILS # BLD AUTO: 0.03 K/UL — SIGNIFICANT CHANGE UP (ref 0–0.2)
BASOPHILS NFR BLD AUTO: 0.6 % — SIGNIFICANT CHANGE UP (ref 0–2)
BUN SERPL-MCNC: 22 MG/DL — SIGNIFICANT CHANGE UP (ref 7–23)
CALCIUM SERPL-MCNC: 8.7 MG/DL — SIGNIFICANT CHANGE UP (ref 8.4–10.5)
CHLORIDE SERPL-SCNC: 92 MMOL/L — LOW (ref 98–107)
CO2 SERPL-SCNC: 28 MMOL/L — SIGNIFICANT CHANGE UP (ref 22–31)
CREAT SERPL-MCNC: 6.87 MG/DL — HIGH (ref 0.5–1.3)
EOSINOPHIL # BLD AUTO: 0.26 K/UL — SIGNIFICANT CHANGE UP (ref 0–0.5)
EOSINOPHIL NFR BLD AUTO: 5.4 % — SIGNIFICANT CHANGE UP (ref 0–6)
GLUCOSE SERPL-MCNC: 120 MG/DL — HIGH (ref 70–99)
HCT VFR BLD CALC: 32.1 % — LOW (ref 34.5–45)
HGB BLD-MCNC: 10.1 G/DL — LOW (ref 11.5–15.5)
IMM GRANULOCYTES NFR BLD AUTO: 0.2 % — SIGNIFICANT CHANGE UP (ref 0–1.5)
LYMPHOCYTES # BLD AUTO: 1.48 K/UL — SIGNIFICANT CHANGE UP (ref 1–3.3)
LYMPHOCYTES # BLD AUTO: 30.9 % — SIGNIFICANT CHANGE UP (ref 13–44)
MAGNESIUM SERPL-MCNC: 2.2 MG/DL — SIGNIFICANT CHANGE UP (ref 1.6–2.6)
MCHC RBC-ENTMCNC: 29.2 PG — SIGNIFICANT CHANGE UP (ref 27–34)
MCHC RBC-ENTMCNC: 31.5 % — LOW (ref 32–36)
MCV RBC AUTO: 92.8 FL — SIGNIFICANT CHANGE UP (ref 80–100)
MONOCYTES # BLD AUTO: 0.43 K/UL — SIGNIFICANT CHANGE UP (ref 0–0.9)
MONOCYTES NFR BLD AUTO: 9 % — SIGNIFICANT CHANGE UP (ref 2–14)
NEUTROPHILS # BLD AUTO: 2.58 K/UL — SIGNIFICANT CHANGE UP (ref 1.8–7.4)
NEUTROPHILS NFR BLD AUTO: 53.9 % — SIGNIFICANT CHANGE UP (ref 43–77)
NRBC # FLD: 0 K/UL — LOW (ref 25–125)
PLATELET # BLD AUTO: 191 K/UL — SIGNIFICANT CHANGE UP (ref 150–400)
PMV BLD: 12.6 FL — SIGNIFICANT CHANGE UP (ref 7–13)
POTASSIUM SERPL-MCNC: 4.5 MMOL/L — SIGNIFICANT CHANGE UP (ref 3.5–5.3)
POTASSIUM SERPL-SCNC: 4.5 MMOL/L — SIGNIFICANT CHANGE UP (ref 3.5–5.3)
RBC # BLD: 3.46 M/UL — LOW (ref 3.8–5.2)
RBC # FLD: 12.7 % — SIGNIFICANT CHANGE UP (ref 10.3–14.5)
SODIUM SERPL-SCNC: 130 MMOL/L — LOW (ref 135–145)
WBC # BLD: 4.79 K/UL — SIGNIFICANT CHANGE UP (ref 3.8–10.5)
WBC # FLD AUTO: 4.79 K/UL — SIGNIFICANT CHANGE UP (ref 3.8–10.5)

## 2019-03-24 RX ORDER — BENZOCAINE AND MENTHOL 5; 1 G/100ML; G/100ML
1 LIQUID ORAL
Qty: 0 | Refills: 0 | Status: DISCONTINUED | OUTPATIENT
Start: 2019-03-24 | End: 2019-03-25

## 2019-03-24 RX ADMIN — CLOPIDOGREL BISULFATE 75 MILLIGRAM(S): 75 TABLET, FILM COATED ORAL at 12:15

## 2019-03-24 RX ADMIN — ONDANSETRON 4 MILLIGRAM(S): 8 TABLET, FILM COATED ORAL at 12:50

## 2019-03-24 RX ADMIN — Medication 20 MILLIGRAM(S): at 12:16

## 2019-03-24 RX ADMIN — PANTOPRAZOLE SODIUM 40 MILLIGRAM(S): 20 TABLET, DELAYED RELEASE ORAL at 07:00

## 2019-03-24 RX ADMIN — SODIUM CHLORIDE 3 MILLILITER(S): 9 INJECTION INTRAMUSCULAR; INTRAVENOUS; SUBCUTANEOUS at 21:20

## 2019-03-24 RX ADMIN — SODIUM CHLORIDE 3 MILLILITER(S): 9 INJECTION INTRAMUSCULAR; INTRAVENOUS; SUBCUTANEOUS at 07:03

## 2019-03-24 RX ADMIN — SIMVASTATIN 40 MILLIGRAM(S): 20 TABLET, FILM COATED ORAL at 21:27

## 2019-03-24 RX ADMIN — Medication 20 MILLIGRAM(S): at 07:00

## 2019-03-24 RX ADMIN — ISOSORBIDE DINITRATE 10 MILLIGRAM(S): 5 TABLET ORAL at 13:05

## 2019-03-24 RX ADMIN — CARVEDILOL PHOSPHATE 12.5 MILLIGRAM(S): 80 CAPSULE, EXTENDED RELEASE ORAL at 17:22

## 2019-03-24 RX ADMIN — Medication 20 MILLIGRAM(S): at 21:27

## 2019-03-24 RX ADMIN — HEPARIN SODIUM 5000 UNIT(S): 5000 INJECTION INTRAVENOUS; SUBCUTANEOUS at 07:03

## 2019-03-24 RX ADMIN — Medication 100 MILLIGRAM(S): at 12:16

## 2019-03-24 RX ADMIN — ISOSORBIDE DINITRATE 10 MILLIGRAM(S): 5 TABLET ORAL at 21:28

## 2019-03-24 RX ADMIN — QUETIAPINE FUMARATE 50 MILLIGRAM(S): 200 TABLET, FILM COATED ORAL at 21:27

## 2019-03-24 RX ADMIN — HEPARIN SODIUM 5000 UNIT(S): 5000 INJECTION INTRAVENOUS; SUBCUTANEOUS at 21:27

## 2019-03-24 RX ADMIN — SODIUM CHLORIDE 3 MILLILITER(S): 9 INJECTION INTRAMUSCULAR; INTRAVENOUS; SUBCUTANEOUS at 13:01

## 2019-03-24 RX ADMIN — SERTRALINE 200 MILLIGRAM(S): 25 TABLET, FILM COATED ORAL at 12:15

## 2019-03-24 RX ADMIN — Medication 100 MILLIGRAM(S): at 17:22

## 2019-03-24 RX ADMIN — CARVEDILOL PHOSPHATE 12.5 MILLIGRAM(S): 80 CAPSULE, EXTENDED RELEASE ORAL at 07:00

## 2019-03-24 RX ADMIN — Medication 81 MILLIGRAM(S): at 12:16

## 2019-03-24 RX ADMIN — Medication 30 MILLILITER(S): at 13:12

## 2019-03-24 RX ADMIN — Medication 5 MILLIGRAM(S): at 17:23

## 2019-03-24 RX ADMIN — Medication 20 MILLIGRAM(S): at 17:22

## 2019-03-24 RX ADMIN — Medication 5 MILLIGRAM(S): at 06:59

## 2019-03-24 RX ADMIN — BENZOCAINE AND MENTHOL 1 LOZENGE: 5; 1 LIQUID ORAL at 18:13

## 2019-03-24 RX ADMIN — HEPARIN SODIUM 5000 UNIT(S): 5000 INJECTION INTRAVENOUS; SUBCUTANEOUS at 13:05

## 2019-03-24 RX ADMIN — ISOSORBIDE DINITRATE 10 MILLIGRAM(S): 5 TABLET ORAL at 06:59

## 2019-03-24 RX ADMIN — Medication 100 MILLIGRAM(S): at 06:59

## 2019-03-24 NOTE — PROGRESS NOTE ADULT - SUBJECTIVE AND OBJECTIVE BOX
No N/V  No fevers or chills    Vital Signs Last 24 Hrs  T(C): 37.1 (24 Mar 2019 06:57), Max: 37.2 (23 Mar 2019 12:45)  T(F): 98.7 (24 Mar 2019 06:57), Max: 99 (23 Mar 2019 12:45)  HR: 73 (24 Mar 2019 06:57) (69 - 84)  BP: 149/78 (24 Mar 2019 06:57) (124/66 - 160/113)  BP(mean): --  RR: 17 (24 Mar 2019 06:57) (17 - 18)  SpO2: 96% (24 Mar 2019 06:57) (95% - 97%)    GENERAL: NAD, Comfortable, obese  HEAD:  Atraumatic, Normocephalic  EYES: EOMI, PERRLA, conjunctiva and sclera clear  NECK: Supple, No JVD  CHEST/LUNG: Clear to auscultation bilaterally; No wheeze  HEART: Regular rate and rhythm; No murmurs, rubs, or gallops  ABDOMEN: Soft, Nontender, Nondistended; Bowel sounds present  Neuro: AAO x 3, no focal deficit, 5/5 b/l extremities  EXTREMITIES:  2+ Peripheral Pulses, No clubbing, cyanosis, or edema  SKIN: No rashes or lesions    LABS:                        10.1   4.79  )-----------( 191      ( 24 Mar 2019 06:20 )             32.1     03-24    130<L>  |  92<L>  |  22  ----------------------------<  120<H>  4.5   |  28  |  6.87<H>    Ca    8.7      24 Mar 2019 06:20  Phos  5.3     03-23  Mg     2.2     03-24        CAPILLARY BLOOD GLUCOSE      POCT Blood Glucose.: 131 mg/dL (24 Mar 2019 07:47)  POCT Blood Glucose.: 127 mg/dL (23 Mar 2019 22:19)  POCT Blood Glucose.: 141 mg/dL (23 Mar 2019 17:10)  POCT Blood Glucose.: 125 mg/dL (23 Mar 2019 11:46)

## 2019-03-24 NOTE — PROGRESS NOTE ADULT - SUBJECTIVE AND OBJECTIVE BOX
Chief complaint  Patient is a 53y old  Female who presents with a chief complaint of Abdominal pain and Chest Discomfort (24 Mar 2019 09:27)   Review of systems  Patient in bed, looks comfortable, no fever, no hypoglycemia.    Labs and Fingersticks  CAPILLARY BLOOD GLUCOSE      POCT Blood Glucose.: 146 mg/dL (24 Mar 2019 17:19)  POCT Blood Glucose.: 147 mg/dL (24 Mar 2019 12:02)  POCT Blood Glucose.: 131 mg/dL (24 Mar 2019 07:47)  POCT Blood Glucose.: 127 mg/dL (23 Mar 2019 22:19)      Anion Gap, Serum: 10 (03-24 @ 06:20)  Anion Gap, Serum: 11 (03-23 @ 06:50)  Anion Gap, Serum: 11 (03-23 @ 06:50)      Calcium, Total Serum: 8.7 (03-24 @ 06:20)  Calcium, Total Serum: 9.0 (03-23 @ 06:50)  Calcium, Total Serum: 9.0 (03-23 @ 06:50)          03-24    130<L>  |  92<L>  |  22  ----------------------------<  120<H>  4.5   |  28  |  6.87<H>    Ca    8.7      24 Mar 2019 06:20  Phos  5.3     03-23  Mg     2.2     03-24                          10.1   4.79  )-----------( 191      ( 24 Mar 2019 06:20 )             32.1     Medications  MEDICATIONS  (STANDING):  allopurinol 100 milliGRAM(s) Oral daily  aspirin enteric coated 81 milliGRAM(s) Oral daily  carvedilol 12.5 milliGRAM(s) Oral every 12 hours  clopidogrel Tablet 75 milliGRAM(s) Oral daily  dextrose 5%. 1000 milliLiter(s) (50 mL/Hr) IV Continuous <Continuous>  dextrose 50% Injectable 12.5 Gram(s) IV Push once  dextrose 50% Injectable 25 Gram(s) IV Push once  dextrose 50% Injectable 25 Gram(s) IV Push once  dicyclomine 20 milliGRAM(s) Oral four times a day before meals  furosemide    Tablet 20 milliGRAM(s) Oral daily  heparin  Injectable 5000 Unit(s) SubCutaneous every 8 hours  hydrALAZINE 100 milliGRAM(s) Oral two times a day  insulin lispro (HumaLOG) corrective regimen sliding scale   SubCutaneous three times a day before meals  insulin lispro (HumaLOG) corrective regimen sliding scale   SubCutaneous at bedtime  isosorbide   dinitrate Tablet (ISORDIL) 10 milliGRAM(s) Oral three times a day  oxybutynin 5 milliGRAM(s) Oral two times a day  pantoprazole    Tablet 40 milliGRAM(s) Oral before breakfast  QUEtiapine 50 milliGRAM(s) Oral at bedtime  sertraline 200 milliGRAM(s) Oral daily  simvastatin 40 milliGRAM(s) Oral at bedtime  sodium chloride 0.9% lock flush 3 milliLiter(s) IV Push every 8 hours      Physical Exam  General: Patient comfortable in bed  Vital Signs Last 12 Hrs  T(F): 98.1 (03-24-19 @ 17:20), Max: 98.7 (03-24-19 @ 06:57)  HR: 68 (03-24-19 @ 17:20) (63 - 73)  BP: 122/62 (03-24-19 @ 17:20) (122/62 - 149/78)  BP(mean): --  RR: 18 (03-24-19 @ 17:20) (17 - 18)  SpO2: 99% (03-24-19 @ 17:20) (96% - 99%)  Neck: No palpable thyroid nodules.  CVS: S1S2, No murmurs  Respiratory: No wheezing, no crepitations  GI: Abdomen soft, bowel sounds positive  Musculoskeletal:  edema lower extremities.   Skin: No skin rashes, no ecchymosis    Diagnostics

## 2019-03-24 NOTE — PROGRESS NOTE ADULT - SUBJECTIVE AND OBJECTIVE BOX
MIMI HICKS:[id number],   53yFemale followed for:  iodine (Unknown)  Seafood (Unknown)  shellfish (Nausea (Mild to Mod); Hives (Mild to Mod))    PAST MEDICAL & SURGICAL HISTORY:  ESRD (end stage renal disease): on HD (Tues, Thurs, Sat)  Depression  Gout  CVA (cerebral vascular accident)  DM (diabetes mellitus)  HTN (hypertension)  Glaucoma  Diabetes  AVF (arteriovenous fistula)    FAMILY HISTORY:  Family history of heart attack    MEDICATIONS  (STANDING):  allopurinol 100 milliGRAM(s) Oral daily  aspirin enteric coated 81 milliGRAM(s) Oral daily  carvedilol 12.5 milliGRAM(s) Oral every 12 hours  clopidogrel Tablet 75 milliGRAM(s) Oral daily  dextrose 5%. 1000 milliLiter(s) (50 mL/Hr) IV Continuous <Continuous>  dextrose 50% Injectable 12.5 Gram(s) IV Push once  dextrose 50% Injectable 25 Gram(s) IV Push once  dextrose 50% Injectable 25 Gram(s) IV Push once  dicyclomine 20 milliGRAM(s) Oral four times a day before meals  furosemide    Tablet 20 milliGRAM(s) Oral daily  heparin  Injectable 5000 Unit(s) SubCutaneous every 8 hours  hydrALAZINE 100 milliGRAM(s) Oral two times a day  insulin lispro (HumaLOG) corrective regimen sliding scale   SubCutaneous three times a day before meals  insulin lispro (HumaLOG) corrective regimen sliding scale   SubCutaneous at bedtime  isosorbide   dinitrate Tablet (ISORDIL) 10 milliGRAM(s) Oral three times a day  oxybutynin 5 milliGRAM(s) Oral two times a day  pantoprazole    Tablet 40 milliGRAM(s) Oral before breakfast  QUEtiapine 50 milliGRAM(s) Oral at bedtime  sertraline 200 milliGRAM(s) Oral daily  simvastatin 40 milliGRAM(s) Oral at bedtime  sodium chloride 0.9% lock flush 3 milliLiter(s) IV Push every 8 hours    MEDICATIONS  (PRN):  acetaminophen   Tablet .. 650 milliGRAM(s) Oral every 6 hours PRN Mild Pain (1 - 3), Moderate Pain (4 - 6)  aluminum hydroxide/magnesium hydroxide/simethicone Suspension 30 milliLiter(s) Oral every 6 hours PRN Dyspepsia  dextrose 40% Gel 15 Gram(s) Oral once PRN Blood Glucose LESS THAN 70 milliGRAM(s)/deciliter  glucagon  Injectable 1 milliGRAM(s) IntraMuscular once PRN Glucose LESS THAN 70 milligrams/deciliter  ondansetron Injectable 4 milliGRAM(s) IV Push every 8 hours PRN Nausea and/or Vomiting      Vital Signs Last 24 Hrs  T(C): 37.1 (24 Mar 2019 06:57), Max: 37.2 (23 Mar 2019 12:45)  T(F): 98.7 (24 Mar 2019 06:57), Max: 99 (23 Mar 2019 12:45)  HR: 73 (24 Mar 2019 06:57) (69 - 84)  BP: 149/78 (24 Mar 2019 06:57) (124/66 - 160/113)  BP(mean): --  RR: 17 (24 Mar 2019 06:57) (17 - 18)  SpO2: 96% (24 Mar 2019 06:57) (95% - 97%)  nc/at  s1s2  cta  soft, nt, nd no guarding or rebound  no c/c/e    CBC Full  -  ( 24 Mar 2019 06:20 )  WBC Count : 4.79 K/uL  Hemoglobin : 10.1 g/dL  Hematocrit : 32.1 %  Platelet Count - Automated : 191 K/uL  Mean Cell Volume : 92.8 fL  Mean Cell Hemoglobin : 29.2 pg  Mean Cell Hemoglobin Concentration : 31.5 %  Auto Neutrophil # : 2.58 K/uL  Auto Lymphocyte # : 1.48 K/uL  Auto Monocyte # : 0.43 K/uL  Auto Eosinophil # : 0.26 K/uL  Auto Basophil # : 0.03 K/uL  Auto Neutrophil % : 53.9 %  Auto Lymphocyte % : 30.9 %  Auto Monocyte % : 9.0 %  Auto Eosinophil % : 5.4 %  Auto Basophil % : 0.6 %    03-24    130<L>  |  92<L>  |  22  ----------------------------<  120<H>  4.5   |  28  |  6.87<H>    Ca    8.7      24 Mar 2019 06:20  Phos  5.3     03-23  Mg     2.2     03-24

## 2019-03-25 ENCOUNTER — TRANSCRIPTION ENCOUNTER (OUTPATIENT)
Age: 54
End: 2019-03-25

## 2019-03-25 VITALS
TEMPERATURE: 99 F | HEART RATE: 73 BPM | SYSTOLIC BLOOD PRESSURE: 152 MMHG | OXYGEN SATURATION: 100 % | RESPIRATION RATE: 18 BRPM | DIASTOLIC BLOOD PRESSURE: 77 MMHG

## 2019-03-25 LAB
ANION GAP SERPL CALC-SCNC: 9 MMO/L — SIGNIFICANT CHANGE UP (ref 7–14)
BASOPHILS # BLD AUTO: 0.05 K/UL — SIGNIFICANT CHANGE UP (ref 0–0.2)
BASOPHILS NFR BLD AUTO: 1 % — SIGNIFICANT CHANGE UP (ref 0–2)
BUN SERPL-MCNC: 30 MG/DL — HIGH (ref 7–23)
CALCIUM SERPL-MCNC: 8.9 MG/DL — SIGNIFICANT CHANGE UP (ref 8.4–10.5)
CHLORIDE SERPL-SCNC: 91 MMOL/L — LOW (ref 98–107)
CO2 SERPL-SCNC: 29 MMOL/L — SIGNIFICANT CHANGE UP (ref 22–31)
CREAT SERPL-MCNC: 8.61 MG/DL — HIGH (ref 0.5–1.3)
EOSINOPHIL # BLD AUTO: 0.33 K/UL — SIGNIFICANT CHANGE UP (ref 0–0.5)
EOSINOPHIL NFR BLD AUTO: 6.7 % — HIGH (ref 0–6)
GLUCOSE SERPL-MCNC: 103 MG/DL — HIGH (ref 70–99)
HCT VFR BLD CALC: 32.2 % — LOW (ref 34.5–45)
HGB BLD-MCNC: 9.9 G/DL — LOW (ref 11.5–15.5)
IMM GRANULOCYTES NFR BLD AUTO: 0.2 % — SIGNIFICANT CHANGE UP (ref 0–1.5)
LYMPHOCYTES # BLD AUTO: 1.68 K/UL — SIGNIFICANT CHANGE UP (ref 1–3.3)
LYMPHOCYTES # BLD AUTO: 34.1 % — SIGNIFICANT CHANGE UP (ref 13–44)
MAGNESIUM SERPL-MCNC: 2.4 MG/DL — SIGNIFICANT CHANGE UP (ref 1.6–2.6)
MCHC RBC-ENTMCNC: 28.9 PG — SIGNIFICANT CHANGE UP (ref 27–34)
MCHC RBC-ENTMCNC: 30.7 % — LOW (ref 32–36)
MCV RBC AUTO: 93.9 FL — SIGNIFICANT CHANGE UP (ref 80–100)
MONOCYTES # BLD AUTO: 0.48 K/UL — SIGNIFICANT CHANGE UP (ref 0–0.9)
MONOCYTES NFR BLD AUTO: 9.7 % — SIGNIFICANT CHANGE UP (ref 2–14)
NEUTROPHILS # BLD AUTO: 2.38 K/UL — SIGNIFICANT CHANGE UP (ref 1.8–7.4)
NEUTROPHILS NFR BLD AUTO: 48.3 % — SIGNIFICANT CHANGE UP (ref 43–77)
NRBC # FLD: 0 K/UL — LOW (ref 25–125)
PLATELET # BLD AUTO: 187 K/UL — SIGNIFICANT CHANGE UP (ref 150–400)
PMV BLD: 12.2 FL — SIGNIFICANT CHANGE UP (ref 7–13)
POTASSIUM SERPL-MCNC: 4.9 MMOL/L — SIGNIFICANT CHANGE UP (ref 3.5–5.3)
POTASSIUM SERPL-SCNC: 4.9 MMOL/L — SIGNIFICANT CHANGE UP (ref 3.5–5.3)
RBC # BLD: 3.43 M/UL — LOW (ref 3.8–5.2)
RBC # FLD: 12.6 % — SIGNIFICANT CHANGE UP (ref 10.3–14.5)
SODIUM SERPL-SCNC: 129 MMOL/L — LOW (ref 135–145)
WBC # BLD: 4.93 K/UL — SIGNIFICANT CHANGE UP (ref 3.8–10.5)
WBC # FLD AUTO: 4.93 K/UL — SIGNIFICANT CHANGE UP (ref 3.8–10.5)

## 2019-03-25 RX ORDER — INSULIN GLARGINE 100 [IU]/ML
6 INJECTION, SOLUTION SUBCUTANEOUS
Qty: 0 | Refills: 0 | COMMUNITY

## 2019-03-25 RX ORDER — LINAGLIPTIN 5 MG/1
1 TABLET, FILM COATED ORAL
Qty: 30 | Refills: 0 | OUTPATIENT
Start: 2019-03-25 | End: 2019-04-23

## 2019-03-25 RX ORDER — SITAGLIPTIN 50 MG/1
1 TABLET, FILM COATED ORAL
Qty: 30 | Refills: 0
Start: 2019-03-25 | End: 2019-04-23

## 2019-03-25 RX ORDER — SITAGLIPTIN 50 MG/1
1 TABLET, FILM COATED ORAL
Qty: 30 | Refills: 0 | OUTPATIENT
Start: 2019-03-25 | End: 2019-04-23

## 2019-03-25 RX ADMIN — Medication 20 MILLIGRAM(S): at 12:58

## 2019-03-25 RX ADMIN — Medication 100 MILLIGRAM(S): at 05:22

## 2019-03-25 RX ADMIN — BENZOCAINE AND MENTHOL 1 LOZENGE: 5; 1 LIQUID ORAL at 09:56

## 2019-03-25 RX ADMIN — Medication 5 MILLIGRAM(S): at 05:22

## 2019-03-25 RX ADMIN — ONDANSETRON 4 MILLIGRAM(S): 8 TABLET, FILM COATED ORAL at 12:58

## 2019-03-25 RX ADMIN — SODIUM CHLORIDE 3 MILLILITER(S): 9 INJECTION INTRAMUSCULAR; INTRAVENOUS; SUBCUTANEOUS at 05:22

## 2019-03-25 RX ADMIN — SERTRALINE 200 MILLIGRAM(S): 25 TABLET, FILM COATED ORAL at 12:58

## 2019-03-25 RX ADMIN — Medication 20 MILLIGRAM(S): at 08:16

## 2019-03-25 RX ADMIN — HEPARIN SODIUM 5000 UNIT(S): 5000 INJECTION INTRAVENOUS; SUBCUTANEOUS at 05:23

## 2019-03-25 RX ADMIN — SODIUM CHLORIDE 3 MILLILITER(S): 9 INJECTION INTRAMUSCULAR; INTRAVENOUS; SUBCUTANEOUS at 13:24

## 2019-03-25 RX ADMIN — CARVEDILOL PHOSPHATE 12.5 MILLIGRAM(S): 80 CAPSULE, EXTENDED RELEASE ORAL at 05:23

## 2019-03-25 RX ADMIN — ISOSORBIDE DINITRATE 10 MILLIGRAM(S): 5 TABLET ORAL at 05:23

## 2019-03-25 RX ADMIN — ISOSORBIDE DINITRATE 10 MILLIGRAM(S): 5 TABLET ORAL at 12:58

## 2019-03-25 RX ADMIN — PANTOPRAZOLE SODIUM 40 MILLIGRAM(S): 20 TABLET, DELAYED RELEASE ORAL at 05:23

## 2019-03-25 RX ADMIN — Medication 20 MILLIGRAM(S): at 05:23

## 2019-03-25 RX ADMIN — Medication 100 MILLIGRAM(S): at 12:58

## 2019-03-25 RX ADMIN — Medication 81 MILLIGRAM(S): at 12:58

## 2019-03-25 RX ADMIN — CLOPIDOGREL BISULFATE 75 MILLIGRAM(S): 75 TABLET, FILM COATED ORAL at 12:58

## 2019-03-25 NOTE — PROGRESS NOTE ADULT - SUBJECTIVE AND OBJECTIVE BOX
MIMI HICKS:[id number],   53yFemale followed for:  iodine (Unknown)  Seafood (Unknown)  shellfish (Nausea (Mild to Mod); Hives (Mild to Mod))    PAST MEDICAL & SURGICAL HISTORY:  ESRD (end stage renal disease): on HD (Tues, Thurs, Sat)  Depression  Gout  CVA (cerebral vascular accident)  DM (diabetes mellitus)  HTN (hypertension)  Glaucoma  Diabetes  AVF (arteriovenous fistula)    FAMILY HISTORY:  Family history of heart attack    MEDICATIONS  (STANDING):  allopurinol 100 milliGRAM(s) Oral daily  aspirin enteric coated 81 milliGRAM(s) Oral daily  carvedilol 12.5 milliGRAM(s) Oral every 12 hours  clopidogrel Tablet 75 milliGRAM(s) Oral daily  dextrose 5%. 1000 milliLiter(s) (50 mL/Hr) IV Continuous <Continuous>  dextrose 50% Injectable 12.5 Gram(s) IV Push once  dextrose 50% Injectable 25 Gram(s) IV Push once  dextrose 50% Injectable 25 Gram(s) IV Push once  dicyclomine 20 milliGRAM(s) Oral four times a day before meals  furosemide    Tablet 20 milliGRAM(s) Oral daily  heparin  Injectable 5000 Unit(s) SubCutaneous every 8 hours  hydrALAZINE 100 milliGRAM(s) Oral two times a day  insulin lispro (HumaLOG) corrective regimen sliding scale   SubCutaneous three times a day before meals  insulin lispro (HumaLOG) corrective regimen sliding scale   SubCutaneous at bedtime  isosorbide   dinitrate Tablet (ISORDIL) 10 milliGRAM(s) Oral three times a day  oxybutynin 5 milliGRAM(s) Oral two times a day  pantoprazole    Tablet 40 milliGRAM(s) Oral before breakfast  QUEtiapine 50 milliGRAM(s) Oral at bedtime  sertraline 200 milliGRAM(s) Oral daily  simvastatin 40 milliGRAM(s) Oral at bedtime  sodium chloride 0.9% lock flush 3 milliLiter(s) IV Push every 8 hours    MEDICATIONS  (PRN):  acetaminophen   Tablet .. 650 milliGRAM(s) Oral every 6 hours PRN Mild Pain (1 - 3), Moderate Pain (4 - 6)  aluminum hydroxide/magnesium hydroxide/simethicone Suspension 30 milliLiter(s) Oral every 6 hours PRN Dyspepsia  benzocaine 15 mG/menthol 3.6 mG (Sugar-Free) Lozenge 1 Lozenge Oral every 3 hours PRN Sore Throat  dextrose 40% Gel 15 Gram(s) Oral once PRN Blood Glucose LESS THAN 70 milliGRAM(s)/deciliter  glucagon  Injectable 1 milliGRAM(s) IntraMuscular once PRN Glucose LESS THAN 70 milligrams/deciliter  ondansetron Injectable 4 milliGRAM(s) IV Push every 8 hours PRN Nausea and/or Vomiting      Vital Signs Last 24 Hrs  T(C): 36.8 (25 Mar 2019 05:21), Max: 36.8 (24 Mar 2019 12:48)  T(F): 98.3 (25 Mar 2019 05:21), Max: 98.3 (24 Mar 2019 12:48)  HR: 76 (25 Mar 2019 05:21) (63 - 78)  BP: 150/83 (25 Mar 2019 05:21) (122/62 - 150/83)  BP(mean): --  RR: 18 (25 Mar 2019 05:21) (17 - 18)  SpO2: 99% (25 Mar 2019 05:21) (99% - 99%)  nc/at  s1s2  cta  soft, nt, nd no guarding or rebound  no c/c/e    CBC Full  -  ( 25 Mar 2019 07:00 )  WBC Count : 4.93 K/uL  Hemoglobin : 9.9 g/dL  Hematocrit : 32.2 %  Platelet Count - Automated : 187 K/uL  Mean Cell Volume : 93.9 fL  Mean Cell Hemoglobin : 28.9 pg  Mean Cell Hemoglobin Concentration : 30.7 %  Auto Neutrophil # : 2.38 K/uL  Auto Lymphocyte # : 1.68 K/uL  Auto Monocyte # : 0.48 K/uL  Auto Eosinophil # : 0.33 K/uL  Auto Basophil # : 0.05 K/uL  Auto Neutrophil % : 48.3 %  Auto Lymphocyte % : 34.1 %  Auto Monocyte % : 9.7 %  Auto Eosinophil % : 6.7 %  Auto Basophil % : 1.0 %    03-25    129<L>  |  91<L>  |  30<H>  ----------------------------<  103<H>  4.9   |  29  |  8.61<H>    Ca    8.9      25 Mar 2019 07:00  Mg     2.4     03-25

## 2019-03-25 NOTE — PROGRESS NOTE ADULT - SUBJECTIVE AND OBJECTIVE BOX
Chief complaint  Patient is a 53y old  Female who presents with a chief complaint of Abdominal pain and Chest Discomfort (25 Mar 2019 11:05)   Review of systems  Patient in bed, looks comfortable, no fever, no hypoglycemia.    Labs and Fingersticks  CAPILLARY BLOOD GLUCOSE      POCT Blood Glucose.: 150 mg/dL (25 Mar 2019 12:48)  POCT Blood Glucose.: 101 mg/dL (25 Mar 2019 07:53)  POCT Blood Glucose.: 141 mg/dL (24 Mar 2019 22:54)  POCT Blood Glucose.: 163 mg/dL (24 Mar 2019 22:20)  POCT Blood Glucose.: 146 mg/dL (24 Mar 2019 17:19)      Anion Gap, Serum: 9 (03-25 @ 07:00)  Anion Gap, Serum: 10 (03-24 @ 06:20)      Calcium, Total Serum: 8.9 (03-25 @ 07:00)  Calcium, Total Serum: 8.7 (03-24 @ 06:20)          03-25    129<L>  |  91<L>  |  30<H>  ----------------------------<  103<H>  4.9   |  29  |  8.61<H>    Ca    8.9      25 Mar 2019 07:00  Mg     2.4     03-25                          9.9    4.93  )-----------( 187      ( 25 Mar 2019 07:00 )             32.2     Medications  MEDICATIONS  (STANDING):  allopurinol 100 milliGRAM(s) Oral daily  aspirin enteric coated 81 milliGRAM(s) Oral daily  carvedilol 12.5 milliGRAM(s) Oral every 12 hours  clopidogrel Tablet 75 milliGRAM(s) Oral daily  dextrose 5%. 1000 milliLiter(s) (50 mL/Hr) IV Continuous <Continuous>  dextrose 50% Injectable 12.5 Gram(s) IV Push once  dextrose 50% Injectable 25 Gram(s) IV Push once  dextrose 50% Injectable 25 Gram(s) IV Push once  dicyclomine 20 milliGRAM(s) Oral four times a day before meals  furosemide    Tablet 20 milliGRAM(s) Oral daily  heparin  Injectable 5000 Unit(s) SubCutaneous every 8 hours  hydrALAZINE 100 milliGRAM(s) Oral two times a day  insulin lispro (HumaLOG) corrective regimen sliding scale   SubCutaneous three times a day before meals  insulin lispro (HumaLOG) corrective regimen sliding scale   SubCutaneous at bedtime  isosorbide   dinitrate Tablet (ISORDIL) 10 milliGRAM(s) Oral three times a day  oxybutynin 5 milliGRAM(s) Oral two times a day  pantoprazole    Tablet 40 milliGRAM(s) Oral before breakfast  QUEtiapine 50 milliGRAM(s) Oral at bedtime  sertraline 200 milliGRAM(s) Oral daily  simvastatin 40 milliGRAM(s) Oral at bedtime  sodium chloride 0.9% lock flush 3 milliLiter(s) IV Push every 8 hours      Physical Exam  General: Patient comfortable in bed  Vital Signs Last 12 Hrs  T(F): 99.1 (03-25-19 @ 14:00), Max: 99.1 (03-25-19 @ 14:00)  HR: 73 (03-25-19 @ 14:00) (73 - 76)  BP: 152/77 (03-25-19 @ 14:00) (150/83 - 152/77)  BP(mean): --  RR: 18 (03-25-19 @ 14:00) (18 - 18)  SpO2: 100% (03-25-19 @ 14:00) (99% - 100%)  Neck: No palpable thyroid nodules.  CVS: S1S2, No murmurs  Respiratory: No wheezing, no crepitations  GI: Abdomen soft, bowel sounds positive  Musculoskeletal:  edema lower extremities.   Skin: No skin rashes, no ecchymosis    Diagnostics

## 2019-03-25 NOTE — PROGRESS NOTE ADULT - PROBLEM SELECTOR PLAN 1
Will continue current insulin regimen for now. Will continue monitoring FS, log, will Follow up.  Since patient is DC to a shelter and her insulin dose requirement is low, will DC her on Januvia 50 mg PO daily only, FU 4 weeks. Discussed with patient and primary team.  Patient counseled for compliance with consistent low carb diet.

## 2019-03-25 NOTE — PROGRESS NOTE ADULT - PROVIDER SPECIALTY LIST ADULT
Cardiology
Cardiology
Endocrinology
Gastroenterology
Internal Medicine
Nephrology
Cardiology
Gastroenterology

## 2019-03-25 NOTE — PROGRESS NOTE ADULT - ATTENDING COMMENTS
Agree with above NP note.  cv stable  cont current tx Agree with above NP note.  cv stable  cont current tx  echo with same lv dysfxn  cont lv dysfxn meds  can consider low dose ace/arb if no renal CI

## 2019-03-25 NOTE — PROGRESS NOTE ADULT - ASSESSMENT
52 yo F lives in shelter at the moment, with PMHx of DM, HTN, HLD, ESRD (Tues, Thurs, Sat) last HD 3/14 and Gout came to ED for diffuse abdominal pain radiating to suprapubic area associated with chest discomfort, nausea, vomiting.     1. Chest pain, atypical   symptoms likely related to cystitis vs GERD   cv stable, no cp/sob, no evidence of acute ischemia   HST elevated, type II MI, demand ischemia 2/2 to ESRD   GI F/U   abx per med  pending echo, can be done as outpt.   ASA/Plavix     2. HTN  improved   continue current meds   HD per renal    3. ESRD on HD  renal f/u     dvt ppx   no cv objection to dc planning
llq pain, negative ct scan. continue dicyclomien, no further rx/dx planned
52 yo F lives in shelter at the moment, with PMHx of DM, HTN, HLD, ESRD (Tues, Thurs, Sat) last HD 3/14 and Gout came to ED for diffuse abdominal pain radiating to suprapubic area associated with chest discomfort, nausea, vomiting, diarrhea and dysuria since last week when she was at [facility]. Admitted to telemetry for chest pain, abdominal pain, and cystitis.      Problem Selector:  PROBLEM DIAGNOSES  Problem: Abdominal pain  Assessment and Plan: - CT abdomen and US abdomen shows cystitis and bladder wall thickening, will treat for cystitis empirically.  - c/w IV ceftriaxone 5 days course  - f/u UCx  - lipase midly elevated, will trend, but unconcerning at this time  - LFTs unimpressive   - Will monitor closely, supportive tx w/ PPI/Maalox  - GI consulted for possible diabetic gastropareisis. intermittent nausea/vomiting with food intake and abd discomfort.   - started dicyclomine BID. Reglan prn but questionable drug interaction with Seroquel.     Problem: Chest pain  Assessment and Plan: - Admit to telemetry   - Likely releated to GERD symptoms vs. gastroparesis  - EKG, Cardaic enzymes trending down, continuing to monitor  - proBNP  - ASA/Plavix   - TTE ordered  - card eval appreciated    Problem: Elevated troponin  Assessment and Plan: - Liekly 2/2 to ESRD  - Trending down  - Continuing to trend enzymes    Problem: ESRD (end stage renal disease)  Assessment and Plan: - Pt seen and evaluated by Nephrology   - Plan for HD on Monday 3/18  - Dose meds for Cr clearance <10  - Monitor BMP closely   - CXR trace left pleural effusion.   - Renal Diet     Problem: Diabetes  Assessment and Plan: - Oral DM meds on hold  - ISS  - Lantus 30units qhs   - A1C  - Diabetic diet     Problem: HTN (hypertension)  Assessment and Plan: - Continue Home dose BP meds w/ parameters   - hydralazine dose switch back to home dose 100 mg BID for elevated BP  - DASH/TLC diet     Problem: HLD (hyperlipidemia)  Assessment and Plan: - Continue Statin  - lipid profile in AM  - low fat low cholesterol diet     Problem: Gout  Assessment and Plan: - Continue allopurinol     Problem: Depression  Assessment and Plan: - Continue Sertiline and Seroquel     Problem: Need for prophylactic measure  Assessment and Plan: - DVT ppx  - sub q heparin.
52 yo F lives in shelter at the moment, with PMHx of DM, HTN, HLD, ESRD (Tues, Thurs, Sat) last HD 3/14 and Gout came to ED for diffuse abdominal pain radiating to suprapubic area associated with chest discomfort, nausea, vomiting, diarrhea and dysuria since last week when she was at [facility]. Admitted to telemetry for chest pain, abdominal pain, and cystitis.       Problem: Abdominal pain: 2' gastroparesis  Assessment and Plan: - CT abdomen and US abdomen shows cystitis and bladder wall thickening, will treat for cystitis empirically.  - c/w IV ceftriaxone last day 3/22/19  - UCx not sent. dialysis pt, minute urine production  - lipase mildly elevated, will trend, but unconcerning at this time, LFTs unimpressive   - Will monitor closely, supportive tx w/ PPI/Maalox  - GI input appreciated  - cont dicyclomine BID. Reglan not started given questionable drug interaction with Seroquel.     Problem: Chest pain  Assessment and Plan: - on telemetry, no events  - Likely releted to GERD symptoms vs. gastroparesis  - EKG, Cardaic enzymes trending down, continuing to monitor  - proBNP  - ASA/Plavix   - TTE ordered but can be done outpt  - card eval appreciated    Problem: Elevated troponin  Assessment and Plan: - Liekly 2/2 to ESRD  - Trending down  - Continuing to trend enzymes    Problem: ESRD (end stage renal disease)  Assessment and Plan: - Pt seen and evaluated by Nephrology   - Plan for HD per renal  - Dose meds for Cr clearance <10  - Monitor BMP closely   - CXR trace left pleural effusion.   - Renal Diet     Problem: Diabetes  Assessment and Plan: - Oral DM meds on hold  - ISS  - Lantus off for now due to early morning hypoglycemia  - Endo: Dr. Ospina appreciated  - Diabetic diet     Problem: HTN (hypertension)  Assessment and Plan: - Continue current BP meds w/ parameters   - DASH/TLC diet     Problem: HLD (hyperlipidemia)  Assessment and Plan: - Continue Statin  - low fat low cholesterol diet     Problem: Gout  Assessment and Plan: - Continue allopurinol     Problem: Depression  Assessment and Plan: - Continue Sertraline and Seroquel     Problem: Need for prophylactic measure  Assessment and Plan: - DVT ppx  - sub q heparin.    D/c planning home. SW consulted for pt's living situation.
52 yo F lives in shelter at the moment, with PMHx of DM, HTN, HLD, ESRD (Tues, Thurs, Sat) last HD 3/14 and Gout came to ED for diffuse abdominal pain radiating to suprapubic area associated with chest discomfort, nausea, vomiting, diarrhea and dysuria since last week when she was at [facility]. Admitted to telemetry for chest pain, abdominal pain, and cystitis.      Problem Selector:  PROBLEM DIAGNOSES  Problem: Abdominal pain  Assessment and Plan: - CT abdomen and US abdomen shows cystitis and bladder wall thickening, will treat for cystitis empirically.  - Started on IV ceftriaxone  - f/u UCx  - lipase midly elevated, will trend, but unconcerning at this time  - LFTs unimpressive   - Will monitor closely, supportive tx w/ PPI/Maalox  - GI consulted for possible diabetic gastropareisis. intermittent nausea/vomiting with food intake and abd discomfort.     Problem: Chest pain  Assessment and Plan: - Admit to telemetry   - Likely releated to GERD symptoms vs. gastroparesis  - EKG, Cardaic enzymes trending down, continuing to monitor  - proBNP  - ASA/Plavix   - TTE ordered  - card eval appreciated    Problem: Elevated troponin  Assessment and Plan: - Liekly 2/2 to ESRD  - Trending down  - Continuing to trend enzymes    Problem: ESRD (end stage renal disease)  Assessment and Plan: - Pt seen and evaluated by Nephrology   - Plan for HD on Monday 3/18  - Dose meds for Cr clearance <10  - Monitor BMP closely   - CXR trace left pleural effusion.   - Renal Diet     Problem: Diabetes  Assessment and Plan: - Oral DM meds on hold  - ISS  - Lantus 30units qhs   - A1C  - Diabetic diet     Problem: HTN (hypertension)  Assessment and Plan: - Continue Home dose BP meds w/ parameters   - hydralazine dose switch back to home dose 100 mg BID for elevated BP  - DASH/TLC diet     Problem: HLD (hyperlipidemia)  Assessment and Plan: - Continue Statin  - lipid profile in AM  - low fat low cholesterol diet     Problem: Gout  Assessment and Plan: - Continue allopurinol     Problem: Depression  Assessment and Plan: - Continue Sertiline and Seroquel     Problem: Need for prophylactic measure  Assessment and Plan: - DVT ppx  - sub q heparin.
54 yo F lives in shelter at the moment, with PMHx of DM, HTN, HLD, ESRD (Tues, Thurs, Sat) last HD 3/14 and Gout came to ED for diffuse abdominal pain radiating to suprapubic area associated with chest discomfort, nausea, vomiting, diarrhea and dysuria since last week when she was at [facility]. Admitted to telemetry for chest pain, abdominal pain, and cystitis.      Problem Selector:  PROBLEM DIAGNOSES  Problem: Abdominal pain: cystitis vs. gastroparesis  Assessment and Plan: - CT abdomen and US abdomen shows cystitis and bladder wall thickening, will treat for cystitis empirically.  - c/w IV ceftriaxone 5-7 days course  - UCx not sent. dialysis pt, minute urine production  - lipase midly elevated, will trend, but unconcerning at this time, LFTs unimpressive   - Will monitor closely, supportive tx w/ PPI/Maalox  - GI consulted for possible diabetic gastroparesis intermittent nausea/vomiting with food intake and abd discomfort.   - started dicyclomine BID with improvment. Reglan not started given questionable drug interaction with Seroquel.     Problem: Chest pain  Assessment and Plan: - on telemetry, no events  - Likely releted to GERD symptoms vs. gastroparesis  - EKG, Cardaic enzymes trending down, continuing to monitor  - proBNP  - ASA/Plavix   - TTE ordered but can be done outpt  - card eval appreciated    Problem: Elevated troponin  Assessment and Plan: - Liekly 2/2 to ESRD  - Trending down  - Continuing to trend enzymes    Problem: ESRD (end stage renal disease)  Assessment and Plan: - Pt seen and evaluated by Nephrology   - Plan for HD per renal  - Dose meds for Cr clearance <10  - Monitor BMP closely   - CXR trace left pleural effusion.   - Renal Diet     Problem: Diabetes  Assessment and Plan: - Oral DM meds on hold  - ISS  - Lantus 30units qhs decreased to 25 units due to early morning hypoglycemia  - Endo: Dr. Ospina consulted.   - A1C  - Diabetic diet     Problem: HTN (hypertension)  Assessment and Plan: - Continue Home dose BP meds w/ parameters   - hydralazine dose switch back to home dose 100 mg BID for elevated BP  - DASH/TLC diet     Problem: HLD (hyperlipidemia)  Assessment and Plan: - Continue Statin  - lipid profile in AM  - low fat low cholesterol diet     Problem: Gout  Assessment and Plan: - Continue allopurinol     Problem: Depression  Assessment and Plan: - Continue Sertraline and Seroquel     Problem: Need for prophylactic measure  Assessment and Plan: - DVT ppx  - sub q heparin.    D/c planning home. SW consulted for pt's living situation.
54 yo F lives in shelter at the moment, with PMHx of DM, HTN, HLD, ESRD (Tues, Thurs, Sat) last HD 3/14 and Gout came to ED for diffuse abdominal pain radiating to suprapubic area associated with chest discomfort, nausea, vomiting, diarrhea and dysuria since last week when she was at [facility]. Admitted to telemetry for chest pain, abdominal pain, and cystitis.       Problem: Abdominal pain: 2' gastroparesis  Assessment and Plan: - CT abdomen and US abdomen shows cystitis and bladder wall thickening  - finished IV ceftriaxone last day 3/22/19  - Will monitor closely, supportive tx w/ PPI/Maalox  - GI input appreciated  - cont dicyclomine BID. Reglan not started given questionable drug interaction with Seroquel.     Problem: Chest pain  Assessment and Plan: - on telemetry, no events  - Likely releted to GERD symptoms vs. gastroparesis  - ASA/Plavix   - TTE can be done outpt  - card julienne appreciated    Problem: Elevated troponin  Assessment and Plan: - Liekly 2/2 to ESRD  - no further workup as inpt    Problem: ESRD (end stage renal disease)  Assessment and Plan: - Pt seen and evaluated by Nephrology   - Plan for HD per renal  - Dose meds for Cr clearance <10  - Monitor BMP closely   - CXR trace left pleural effusion.   - Renal Diet     Problem: Diabetes  Assessment and Plan: - Oral DM meds on hold  - ISS  - Lantus off for now due to early morning hypoglycemia  - Endo: Dr. Ospina appreciated  - Diabetic diet     Problem: HTN (hypertension)  Assessment and Plan: - Continue current BP meds w/ parameters   - DASH/TLC diet     Problem: HLD (hyperlipidemia)  Assessment and Plan: - Continue Statin  - low fat low cholesterol diet     Problem: Gout  Assessment and Plan: - Continue allopurinol     Problem: Depression  Assessment and Plan: - Continue Sertraline and Seroquel     Problem: Need for prophylactic measure  Assessment and Plan: - DVT ppx  - sub q heparin.    Cleared for D/C
54 yo F lives in shelter at the moment, with PMHx of DM, HTN, HLD, ESRD (Tues, Thurs, Sat) last HD 3/14 and Gout came to ED for diffuse abdominal pain radiating to suprapubic area associated with chest discomfort, nausea, vomiting, diarrhea and dysuria since last week when she was at [facility]. Admitted to telemetry for chest pain, abdominal pain, and cystitis.       Problem: Abdominal pain: 2' gastroparesis  Assessment and Plan: - CT abdomen and US abdomen shows cystitis and bladder wall thickening  - finished IV ceftriaxone last day 3/22/19  - Will monitor closely, supportive tx w/ PPI/Maalox  - GI input appreciated  - cont dicyclomine BID. Reglan not started given questionable drug interaction with Seroquel.     Problem: Chest pain  Assessment and Plan: - on telemetry, no events  - Likely releted to GERD symptoms vs. gastroparesis  - ASA/Plavix   - TTE ordered but can be done outpt  - card eval appreciated    Problem: Elevated troponin  Assessment and Plan: - Liekly 2/2 to ESRD  - no further workup as inpt    Problem: ESRD (end stage renal disease)  Assessment and Plan: - Pt seen and evaluated by Nephrology   - Plan for HD per renal  - Dose meds for Cr clearance <10  - Monitor BMP closely   - CXR trace left pleural effusion.   - Renal Diet     Problem: Diabetes  Assessment and Plan: - Oral DM meds on hold  - ISS  - Lantus off for now due to early morning hypoglycemia  - Endo: Dr. Ospina appreciated  - Diabetic diet     Problem: HTN (hypertension)  Assessment and Plan: - Continue current BP meds w/ parameters   - DASH/TLC diet     Problem: HLD (hyperlipidemia)  Assessment and Plan: - Continue Statin  - low fat low cholesterol diet     Problem: Gout  Assessment and Plan: - Continue allopurinol     Problem: Depression  Assessment and Plan: - Continue Sertraline and Seroquel     Problem: Need for prophylactic measure  Assessment and Plan: - DVT ppx  - sub q heparin.    D/c planning home. SW consulted for pt's living situation.
54 yo F lives in shelter at the moment, with PMHx of DM, HTN, HLD, ESRD (Tues, Thurs, Sat) last HD 3/14 and Gout came to ED for diffuse abdominal pain radiating to suprapubic area associated with chest discomfort, nausea, vomiting.     1. Chest pain, atypical   symptoms likely related to cystitis vs GERD   cv stable, no cp/sob, no evidence of acute ischemia   HST elevated, type II MI, demand ischemia 2/2 to ESRD   GI F/U   abx per med  echo reviewed, pt. with known systolic dysfunction (noted on echo from 1/2018), EF 41% (overall unchanged), sml pericardial effusions noted   denies cp/sob/colon, no evidence of decompensated CHF on exam  continue bb, imdur, hydral. lasix   continue ASA/Plavix/statin     2. HTN  improved   continue current meds   HD per renal    3. ESRD on HD  renal f/u     dvt ppx   no cv objection to dc planning
54 yo F lives in shelter at the moment, with PMHx of DM, HTN, HLD, ESRD (Tues, Thurs, Sat) last HD 3/14 and Gout came to ED for diffuse abdominal pain radiating to suprapubic area associated with chest discomfort, nausea, vomiting.     1. Chest pain, atypical   symptoms likely related to cystitis vs GERD   cv stable, no cp/sob, no evidence of acute ischemia   HST elevated, type II MI, demand ischemia 2/2 to ESRD   GI F/U   abx per med  pending echo, can be done as outpt.   ASA/Plavix     2. HTN  bp labile   titrate BP meds to optmize control  HD per renal    3. ESRD on HD  renal f/u     dvt ppx
Assessment  DMT2: 53y Female with DM T2 with hyperglycemia, A1C  was on oral meds and on insulin at home, blood sugars improving now,,  no new hypoglycemic episode, eating meals, compliant with low carb diet.  HTN: Un Controlled,  on antihypertensive medications.  HLD: On statin.  ESRD: On hemodialysis, Monitor labs/BMP        Dimitri Ospina MD  Cell: [phone]  Office: [phone]
Assessment  DMT2: 53y Female with DM T2 with hyperglycemia, A1C  was on oral meds and on insulin at home, blood sugars trending down, had new hypoglycemic episode, eating meals, compliant with low carb diet.  HTN: Un Controlled,  on antihypertensive medications.  HLD: On statin.  ESRD: On hemodialysis, Monitor labs/BMP        Dimitri Ospina MD  Cell: [phone]  Office: [phone]
Assessment  DMT2: 53y Female with DM T2 with hyperglycemia, on insulin now, blood sugars improving,  no new hypoglycemic episode, eating meals, compliant with low carb diet.  HTN: Un Controlled,  on antihypertensive medications.  HLD: On statin.  ESRD: Dialysis today, Monitor labs/BMP        Dimitri Ospina MD  Cell: [phone]  Office: [phone]
Assessment  DMT2: 53y Female with DM T2 with hyperglycemia, on insulin now, blood sugars improving,  no new hypoglycemic episode, eating meals, compliant with low carb diet.  HTN: Un Controlled,  on antihypertensive medications.  HLD: On statin.  ESRD: Dialysis today, Monitor labs/BMP        Dimitri Ospina MD  Cell: [phone]  Office: [phone]
Assessment  DMT2: 53y Female with DM T2 with hyperglycemia, on insulin now, blood sugars improving,  no new hypoglycemic episode, eating meals, compliant with low carb diet. Planing DC to shelter today.  HTN: Un Controlled,  on antihypertensive medications.  HLD: On statin.  ESRD: Dialysis today, Monitor labs/BMP        Dimitri Ospina MD  Cell: [phone]  Office: [phone]
Patient is a 53y Female with DM, HTN, gout, CVD and ESRD p/w abd pain, N/V/D.  - ESRD: HD TTS, last dialyzed Friday (off schedule)  - HTN: BP elevated- Meds due    RECOMMENDATIONS:  - HD tomorrow as ordered:  3.75 hrs, 2 K+, 2 kg UF  - c/w antihypertensive meds  - please dose any new medications for a CrCl of < 10 cc/min
Renal attd    -HD in am and then possibly on THur as outpt  -Abd pain resolved
continue current rx for abdominal pain.  pt with clinical improvement
continue current rx, bentyl for pain.
no acute abdominal pain, bentyl, ct negative
no compalint of abdominal pain, continue dicyclomine for spasm
no complaints of abdominal pain, continue dicyclomine.
· Assessment		  54 yo F lives in shelter at the moment, with PMHx of DM, HTN, HLD, ESRD (Tues, Thurs, Sat) last HD 3/14 and Gout came to ED for diffuse abdominal pain radiating to suprapubic area associated with chest discomfort, nausea, vomiting, diarrhea and dysuria since last week when she was at [facility]. Admitted to telemetry for chest pain, abdominal pain, and cystitis.      Problem Selector:  PROBLEM DIAGNOSES  Problem: Abdominal pain: cystitis vs. gastroparesis  Assessment and Plan: - CT abdomen and US abdomen shows cystitis and bladder wall thickening, will treat for cystitis empirically.  - c/w IV ceftriaxone 5-7 days course  - UCx not sent. dialysis pt, minute urine production  - lipase mildly elevated, will trend, but unconcerning at this time, LFTs unimpressive   - Will monitor closely, supportive tx w/ PPI/Maalox  - GI input appreciated  - started dicyclomine BID with improvment. Reglan not started given questionable drug interaction with Seroquel.     Problem: Chest pain  Assessment and Plan: - on telemetry, no events  - Likely releted to GERD symptoms vs. gastroparesis  - EKG, Cardaic enzymes trending down, continuing to monitor  - proBNP  - ASA/Plavix   - TTE ordered but can be done outpt  - card eval appreciated    Problem: Elevated troponin  Assessment and Plan: - Liekly 2/2 to ESRD  - Trending down  - Continuing to trend enzymes    Problem: ESRD (end stage renal disease)  Assessment and Plan: - Pt seen and evaluated by Nephrology   - Plan for HD per renal  - Dose meds for Cr clearance <10  - Monitor BMP closely   - CXR trace left pleural effusion.   - Renal Diet     Problem: Diabetes  Assessment and Plan: - Oral DM meds on hold  - ISS  - Lantus 30units qhs decreased to 25 units due to early morning hypoglycemia  - Endo: Dr. Ospina consulted.   - A1C  - Diabetic diet     Problem: HTN (hypertension)  Assessment and Plan: - Continue Home dose BP meds w/ parameters   - hydralazine dose switch back to home dose 100 mg BID for elevated BP  - DASH/TLC diet     Problem: HLD (hyperlipidemia)  Assessment and Plan: - Continue Statin  - lipid profile in AM  - low fat low cholesterol diet     Problem: Gout  Assessment and Plan: - Continue allopurinol     Problem: Depression  Assessment and Plan: - Continue Sertraline and Seroquel     Problem: Need for prophylactic measure  Assessment and Plan: - DVT ppx  - sub q heparin.    D/c planning home. SW consulted for pt's living situation.
· Assessment		  54 yo F lives in shelter at the moment, with PMHx of DM, HTN, HLD, ESRD (Tues, Thurs, Sat) last HD 3/14 and Gout came to ED for diffuse abdominal pain radiating to suprapubic area associated with chest discomfort, nausea, vomiting, diarrhea and dysuria since last week when she was at [facility]. Admitted to telemetry for chest pain, abdominal pain, and cystitis.      Problem Selector:  PROBLEM DIAGNOSES  Problem: Abdominal pain: cystitis vs. gastroparesis  Assessment and Plan: - CT abdomen and US abdomen shows cystitis and bladder wall thickening, will treat for cystitis empirically.  - c/w IV ceftriaxone last day 3/22/19  - UCx not sent. dialysis pt, minute urine production  - lipase mildly elevated, will trend, but unconcerning at this time, LFTs unimpressive   - Will monitor closely, supportive tx w/ PPI/Maalox  - GI input appreciated  - cont dicyclomine BID. Reglan not started given questionable drug interaction with Seroquel.     Problem: Chest pain  Assessment and Plan: - on telemetry, no events  - Likely releted to GERD symptoms vs. gastroparesis  - EKG, Cardaic enzymes trending down, continuing to monitor  - proBNP  - ASA/Plavix   - TTE ordered but can be done outpt  - card wooal appreciated    Problem: Elevated troponin  Assessment and Plan: - Liekly 2/2 to ESRD  - Trending down  - Continuing to trend enzymes    Problem: ESRD (end stage renal disease)  Assessment and Plan: - Pt seen and evaluated by Nephrology   - Plan for HD per renal  - Dose meds for Cr clearance <10  - Monitor BMP closely   - CXR trace left pleural effusion.   - Renal Diet     Problem: Diabetes  Assessment and Plan: - Oral DM meds on hold  - ISS  - Lantus off for now due to early morning hypoglycemia  - Endo: Dr. Ospina appreciated  - Diabetic diet     Problem: HTN (hypertension)  Assessment and Plan: - Continue current BP meds w/ parameters   - DASH/TLC diet     Problem: HLD (hyperlipidemia)  Assessment and Plan: - Continue Statin  - low fat low cholesterol diet     Problem: Gout  Assessment and Plan: - Continue allopurinol     Problem: Depression  Assessment and Plan: - Continue Sertraline and Seroquel     Problem: Need for prophylactic measure  Assessment and Plan: - DVT ppx  - sub q heparin.    D/c planning home. SW consulted for pt's living situation.

## 2019-03-25 NOTE — DISCHARGE NOTE NURSING/CASE MANAGEMENT/SOCIAL WORK - NSDCCRNAME_GEN_ALL_CORE_FT
Out Patient HD at [facility] [address] [facility] .Patient accepted to return back to [address].Transportation set with [employer]  at 1:30 pm to commute to [address] [address].

## 2019-03-25 NOTE — PROGRESS NOTE ADULT - SUBJECTIVE AND OBJECTIVE BOX
NEPHROLOGY - NSN    Patient seen and examined.  Last HD Saturday 1.8 kg UF, tolerated well  NAD, no complaints  No N/V/abd pain    MEDICATIONS  (STANDING):  allopurinol 100 milliGRAM(s) Oral daily  aspirin enteric coated 81 milliGRAM(s) Oral daily  carvedilol 12.5 milliGRAM(s) Oral every 12 hours  clopidogrel Tablet 75 milliGRAM(s) Oral daily  dextrose 5%. 1000 milliLiter(s) (50 mL/Hr) IV Continuous <Continuous>  dextrose 50% Injectable 12.5 Gram(s) IV Push once  dextrose 50% Injectable 25 Gram(s) IV Push once  dextrose 50% Injectable 25 Gram(s) IV Push once  dicyclomine 20 milliGRAM(s) Oral four times a day before meals  furosemide    Tablet 20 milliGRAM(s) Oral daily  heparin  Injectable 5000 Unit(s) SubCutaneous every 8 hours  hydrALAZINE 100 milliGRAM(s) Oral two times a day  insulin lispro (HumaLOG) corrective regimen sliding scale   SubCutaneous three times a day before meals  insulin lispro (HumaLOG) corrective regimen sliding scale   SubCutaneous at bedtime  isosorbide   dinitrate Tablet (ISORDIL) 10 milliGRAM(s) Oral three times a day  oxybutynin 5 milliGRAM(s) Oral two times a day  pantoprazole    Tablet 40 milliGRAM(s) Oral before breakfast  QUEtiapine 50 milliGRAM(s) Oral at bedtime  sertraline 200 milliGRAM(s) Oral daily  simvastatin 40 milliGRAM(s) Oral at bedtime  sodium chloride 0.9% lock flush 3 milliLiter(s) IV Push every 8 hours    VITALS:  T(C): , Max: 36.8 (03-24-19 @ 12:48)  T(F): , Max: 98.3 (03-24-19 @ 12:48)  HR: 76 (03-25-19 @ 05:21)  BP: 150/83 (03-25-19 @ 05:21)  RR: 18 (03-25-19 @ 05:21)  SpO2: 99% (03-25-19 @ 05:21)    REVIEW OF SYSTEMS:  Full ROS done and were negative unless otherwise indicated in HPI/assessment.     PHYSICAL EXAM:  Constitutional: NAD  Respiratory: CTA B/L  Cardiovascular: S1 and S2, RRR  Gastrointestinal: + BS, soft, NT, ND  Extremities: no peripheral edema  Neurological: AAO x 3  : no malcolm  Access: LUE AVF (+ thrill)    LABS:                        9.9    4.93  )-----------( 187      ( 25 Mar 2019 07:00 )             32.2     03-25    129<L>  |  91<L>  |  30<H>  ----------------------------<  103<H>  4.9   |  29  |  8.61<H>    Ca    8.9      25 Mar 2019 07:00  Mg     2.4     03-25    ASSESSMENT  53y Female with DM, HTN, gout, CVD and ESRD p/w abd pain, N/V/D  - ESRD: HD TTS @ [facility]   - HTN: BP acceptable   - GI: IBS - no further GI complaints  - endo: hypoglycemia - improving - endo on board     RECOMMENDATIONS:  - HD tomorrow: 3 hr, 1.5 kg, K+ protocol  - c/w antihypertensive meds  - dose any new medications for a CrCl of < 10 cc/min  - no renal objection to discharge    Debi Johansen NP  [facility]  [phone] NEPHROLOGY - NSN    Patient seen and examined.  Last HD Saturday 1.8 kg UF, tolerated well  NAD, no complaints  No N/V/abd pain    MEDICATIONS  (STANDING):  allopurinol 100 milliGRAM(s) Oral daily  aspirin enteric coated 81 milliGRAM(s) Oral daily  carvedilol 12.5 milliGRAM(s) Oral every 12 hours  clopidogrel Tablet 75 milliGRAM(s) Oral daily  dextrose 5%. 1000 milliLiter(s) (50 mL/Hr) IV Continuous <Continuous>  dextrose 50% Injectable 12.5 Gram(s) IV Push once  dextrose 50% Injectable 25 Gram(s) IV Push once  dextrose 50% Injectable 25 Gram(s) IV Push once  dicyclomine 20 milliGRAM(s) Oral four times a day before meals  furosemide    Tablet 20 milliGRAM(s) Oral daily  heparin  Injectable 5000 Unit(s) SubCutaneous every 8 hours  hydrALAZINE 100 milliGRAM(s) Oral two times a day  insulin lispro (HumaLOG) corrective regimen sliding scale   SubCutaneous three times a day before meals  insulin lispro (HumaLOG) corrective regimen sliding scale   SubCutaneous at bedtime  isosorbide   dinitrate Tablet (ISORDIL) 10 milliGRAM(s) Oral three times a day  oxybutynin 5 milliGRAM(s) Oral two times a day  pantoprazole    Tablet 40 milliGRAM(s) Oral before breakfast  QUEtiapine 50 milliGRAM(s) Oral at bedtime  sertraline 200 milliGRAM(s) Oral daily  simvastatin 40 milliGRAM(s) Oral at bedtime  sodium chloride 0.9% lock flush 3 milliLiter(s) IV Push every 8 hours    VITALS:  T(C): , Max: 36.8 (03-24-19 @ 12:48)  T(F): , Max: 98.3 (03-24-19 @ 12:48)  HR: 76 (03-25-19 @ 05:21)  BP: 150/83 (03-25-19 @ 05:21)  RR: 18 (03-25-19 @ 05:21)  SpO2: 99% (03-25-19 @ 05:21)    REVIEW OF SYSTEMS:  Full ROS done and were negative unless otherwise indicated in HPI/assessment.     PHYSICAL EXAM:  Constitutional: NAD  Respiratory: CTA B/L  Cardiovascular: S1 and S2, RRR  Gastrointestinal: + BS, soft, NT, ND  Extremities: no peripheral edema  Neurological: AAO x 3  : no malcolm  Access: LUE AVF (+ thrill)    LABS:                        9.9    4.93  )-----------( 187      ( 25 Mar 2019 07:00 )             32.2     03-25    129<L>  |  91<L>  |  30<H>  ----------------------------<  103<H>  4.9   |  29  |  8.61<H>    Ca    8.9      25 Mar 2019 07:00  Mg     2.4     03-25    ASSESSMENT  53y Female with DM, HTN, gout, CVD and ESRD p/w abd pain, N/V/D  - ESRD: HD TTS @ [facility]   - HTN: BP acceptable   - GI: IBS - no further GI complaints  - endo: hypoglycemia - improving - endo on board     RECOMMENDATIONS:  - HD tomorrow: 3 hr, 1.5 kg, K+ protocol  - c/w antihypertensive meds  - dose any new medications for a CrCl of < 10 cc/min  - no renal objection to discharge    Debi Johansen NP  [facility]  [phone]      *******************RENAL ATTENDING**********************  Seen/examined with NP. I agree with NP assessment as above.    VS as above; NAD; CTA-B/L; RRR; no edema b/l      ASSESSMENT: 53F w/ DM, HTN, gout, CVD and ESRD-HD TTS, 3/16/19 a/w abd pain, N/V/D  - ESRD: HD TTS @ [facility]   - HTN: BP acceptable   - GI: IBS - no further GI complaints  - endo: hypoglycemia - improving - endo on board     RECOMMENDATIONS:  - HD tomorrow: 3 hr, 1.5 kg, K+ protocol  - c/w antihypertensive meds  - dose any new medications for a CrCl of < 10 cc/min  - no renal objection to discharge          Wale Francisco MD  [facility]  [phone] NEPHROLOGY - NSN    Patient seen and examined.  Last HD Saturday 1.8 kg UF, tolerated well  NAD, no complaints  No N/V/abd pain    MEDICATIONS  (STANDING):  allopurinol 100 milliGRAM(s) Oral daily  aspirin enteric coated 81 milliGRAM(s) Oral daily  carvedilol 12.5 milliGRAM(s) Oral every 12 hours  clopidogrel Tablet 75 milliGRAM(s) Oral daily  dextrose 5%. 1000 milliLiter(s) (50 mL/Hr) IV Continuous <Continuous>  dextrose 50% Injectable 12.5 Gram(s) IV Push once  dextrose 50% Injectable 25 Gram(s) IV Push once  dextrose 50% Injectable 25 Gram(s) IV Push once  dicyclomine 20 milliGRAM(s) Oral four times a day before meals  furosemide    Tablet 20 milliGRAM(s) Oral daily  heparin  Injectable 5000 Unit(s) SubCutaneous every 8 hours  hydrALAZINE 100 milliGRAM(s) Oral two times a day  insulin lispro (HumaLOG) corrective regimen sliding scale   SubCutaneous three times a day before meals  insulin lispro (HumaLOG) corrective regimen sliding scale   SubCutaneous at bedtime  isosorbide   dinitrate Tablet (ISORDIL) 10 milliGRAM(s) Oral three times a day  oxybutynin 5 milliGRAM(s) Oral two times a day  pantoprazole    Tablet 40 milliGRAM(s) Oral before breakfast  QUEtiapine 50 milliGRAM(s) Oral at bedtime  sertraline 200 milliGRAM(s) Oral daily  simvastatin 40 milliGRAM(s) Oral at bedtime  sodium chloride 0.9% lock flush 3 milliLiter(s) IV Push every 8 hours    VITALS:  T(C): , Max: 36.8 (03-24-19 @ 12:48)  T(F): , Max: 98.3 (03-24-19 @ 12:48)  HR: 76 (03-25-19 @ 05:21)  BP: 150/83 (03-25-19 @ 05:21)  RR: 18 (03-25-19 @ 05:21)  SpO2: 99% (03-25-19 @ 05:21)    REVIEW OF SYSTEMS:  Full ROS done and were negative unless otherwise indicated in HPI/assessment.     PHYSICAL EXAM:  Constitutional: NAD  Respiratory: CTA B/L  Cardiovascular: S1 and S2, RRR  Gastrointestinal: + BS, soft, NT, ND  Extremities: no peripheral edema  Neurological: AAO x 3  : no malcolm  Access: LUE AVF (+ thrill)    LABS:                        9.9    4.93  )-----------( 187      ( 25 Mar 2019 07:00 )             32.2     03-25    129<L>  |  91<L>  |  30<H>  ----------------------------<  103<H>  4.9   |  29  |  8.61<H>    Ca    8.9      25 Mar 2019 07:00  Mg     2.4     03-25    ASSESSMENT  53y Female with DM, HTN, gout, CVD and ESRD p/w abd pain, N/V/D  - ESRD: HD TTS @ [facility]   - HTN: BP acceptable   - GI: IBS - no further GI complaints  - endo: hypoglycemia - improving - endo on board     RECOMMENDATIONS:  - HD tomorrow: 3 hr, 1.5 kg, K+ protocol  - c/w antihypertensive meds  - dose any new medications for a CrCl of < 10 cc/min  - no renal objection to discharge    Debi Johansen NP  [facility]  [phone]      *******************RENAL ATTENDING**********************  Seen/examined with NP. I agree with NP assessment as above.    VS as above; NAD; CTA-B/L; RRR; no edema b/l; LUE AVF (+)thrill      ASSESSMENT: 53F w/ DM, HTN, gout, CVD and ESRD-HD TTS, 3/16/19 a/w abd pain, N/V/D  - ESRD: HD TTS @ [facility]   - HTN: BP acceptable   - GI: IBS - (+)intermittent vomiting  - endo: hypoglycemia - improving - endo on board     RECOMMENDATIONS:  - HD tomorrow: 3 hr, 1.5 kg, K+ protocol  - treatment of N/V per primary team/GI          Wale Francisco MD  [facility]  [phone]

## 2019-03-25 NOTE — PROGRESS NOTE ADULT - SUBJECTIVE AND OBJECTIVE BOX
CARDIOLOGY FOLLOW UP - Dr. Andrew    CC no cp/sob   no orthopnea, LE edema, PICKARD     PHYSICAL EXAM:  T(C): 36.8 (03-25-19 @ 05:21), Max: 36.8 (03-24-19 @ 12:48)  HR: 76 (03-25-19 @ 05:21) (63 - 78)  BP: 150/83 (03-25-19 @ 05:21) (122/62 - 150/83)  RR: 18 (03-25-19 @ 05:21) (17 - 18)  SpO2: 99% (03-25-19 @ 05:21) (99% - 99%)  Wt(kg): --  I&O's Summary      Appearance: Normal	  Cardiovascular: Normal S1 S2,RRR, No JVD, No murmurs  Respiratory: Lungs clear to auscultation	  Gastrointestinal:  Soft, Non-tender, + BS	  Extremities: Normal range of motion, No clubbing, cyanosis or edema      MEDICATIONS  (STANDING):  allopurinol 100 milliGRAM(s) Oral daily  aspirin enteric coated 81 milliGRAM(s) Oral daily  carvedilol 12.5 milliGRAM(s) Oral every 12 hours  clopidogrel Tablet 75 milliGRAM(s) Oral daily  dextrose 5%. 1000 milliLiter(s) (50 mL/Hr) IV Continuous <Continuous>  dextrose 50% Injectable 12.5 Gram(s) IV Push once  dextrose 50% Injectable 25 Gram(s) IV Push once  dextrose 50% Injectable 25 Gram(s) IV Push once  dicyclomine 20 milliGRAM(s) Oral four times a day before meals  furosemide    Tablet 20 milliGRAM(s) Oral daily  heparin  Injectable 5000 Unit(s) SubCutaneous every 8 hours  hydrALAZINE 100 milliGRAM(s) Oral two times a day  insulin lispro (HumaLOG) corrective regimen sliding scale   SubCutaneous three times a day before meals  insulin lispro (HumaLOG) corrective regimen sliding scale   SubCutaneous at bedtime  isosorbide   dinitrate Tablet (ISORDIL) 10 milliGRAM(s) Oral three times a day  oxybutynin 5 milliGRAM(s) Oral two times a day  pantoprazole    Tablet 40 milliGRAM(s) Oral before breakfast  QUEtiapine 50 milliGRAM(s) Oral at bedtime  sertraline 200 milliGRAM(s) Oral daily  simvastatin 40 milliGRAM(s) Oral at bedtime  sodium chloride 0.9% lock flush 3 milliLiter(s) IV Push every 8 hours      TELEMETRY: 	    ECG:  	  RADIOLOGY:   DIAGNOSTIC TESTING:  [ ] Echocardiogram: < from: Transthoracic Echocardiogram (03.22.19 @ 13:34) >  CONCLUSIONS:  1. Mitral annular calcification, otherwise normal mitral  valve. Mild-moderate mitral regurgitation.  2. Mildly dilated left atrium.  LA volume index = 41 cc/m2.  3. Mild left ventricular enlargement.  4. Moderate global left ventricular systolic dysfunction.  5. The right ventricle is not well visualized; grossly  normal right ventricular systolic function.  6. Small pericardial effusion posterior to the left  ventricle.    < end of copied text >  < from: Transthoracic Echocardiogram (03.22.19 @ 13:34) >  Ejection Fraction (Modified Jordan Rule): 41 %    < end of copied text >    [ ]  Catheterization:  [ ] Stress Test:    OTHER: 	    LABS:	 	                                9.9    4.93  )-----------( 187      ( 25 Mar 2019 07:00 )             32.2     03-25    129<L>  |  91<L>  |  30<H>  ----------------------------<  103<H>  4.9   |  29  |  8.61<H>    Ca    8.9      25 Mar 2019 07:00  Mg     2.4     03-25

## 2019-03-25 NOTE — PROGRESS NOTE ADULT - REASON FOR ADMISSION
Abdominal pain and Chest Discomfort

## 2019-03-25 NOTE — DISCHARGE NOTE NURSING/CASE MANAGEMENT/SOCIAL WORK - NSDCDPATPORTLINK_GEN_ALL_CORE
You can access the [facility], offered by [facility], by registering with the following website: [email or web address]

## 2019-03-27 NOTE — PATIENT PROFILE ADULT. - MEDICATION HERBAL REMEDIES, PROFILE
Confirmed with Daniel Latifa is taking 19 U LANTUS SOLOSTAR 100 UNIT/ML pen-injector  And HUMALOG KWIKPEN) 100 UNIT/ML pen-injector - 3x daily based on sliding scale. Chart reviewed. Patient was last seen 1/7/2019 and has an appointment 4/16/2019.  Script was eprescribed to pharmacy per Dr. Chang.   no

## 2019-03-30 ENCOUNTER — INPATIENT (INPATIENT)
Facility: HOSPITAL | Age: 54
LOS: 2 days | Discharge: HOME | End: 2019-04-02
Attending: HOSPITALIST | Admitting: HOSPITALIST
Payer: MEDICAID

## 2019-03-30 VITALS
OXYGEN SATURATION: 95 % | TEMPERATURE: 100 F | HEART RATE: 95 BPM | DIASTOLIC BLOOD PRESSURE: 92 MMHG | RESPIRATION RATE: 16 BRPM | SYSTOLIC BLOOD PRESSURE: 178 MMHG

## 2019-03-30 DIAGNOSIS — N18.6 END STAGE RENAL DISEASE: ICD-10-CM

## 2019-03-30 DIAGNOSIS — I50.9 HEART FAILURE, UNSPECIFIED: ICD-10-CM

## 2019-03-30 DIAGNOSIS — I10 ESSENTIAL (PRIMARY) HYPERTENSION: ICD-10-CM

## 2019-03-30 DIAGNOSIS — R10.9 UNSPECIFIED ABDOMINAL PAIN: ICD-10-CM

## 2019-03-30 DIAGNOSIS — Z29.9 ENCOUNTER FOR PROPHYLACTIC MEASURES, UNSPECIFIED: ICD-10-CM

## 2019-03-30 DIAGNOSIS — E87.5 HYPERKALEMIA: ICD-10-CM

## 2019-03-30 DIAGNOSIS — F32.9 MAJOR DEPRESSIVE DISORDER, SINGLE EPISODE, UNSPECIFIED: ICD-10-CM

## 2019-03-30 DIAGNOSIS — E11.9 TYPE 2 DIABETES MELLITUS WITHOUT COMPLICATIONS: ICD-10-CM

## 2019-03-30 DIAGNOSIS — I77.0 ARTERIOVENOUS FISTULA, ACQUIRED: Chronic | ICD-10-CM

## 2019-03-30 LAB
ALBUMIN SERPL ELPH-MCNC: 3.7 G/DL — SIGNIFICANT CHANGE UP (ref 3.3–5)
ALP SERPL-CCNC: 132 U/L — HIGH (ref 40–120)
ALT FLD-CCNC: 14 U/L — SIGNIFICANT CHANGE UP (ref 4–33)
ANION GAP SERPL CALC-SCNC: 15 MMO/L — HIGH (ref 7–14)
ANION GAP SERPL CALC-SCNC: 17 MMO/L — HIGH (ref 7–14)
AST SERPL-CCNC: 13 U/L — SIGNIFICANT CHANGE UP (ref 4–32)
BASE EXCESS BLDV CALC-SCNC: 1.6 MMOL/L — SIGNIFICANT CHANGE UP
BASE EXCESS BLDV CALC-SCNC: 2.2 MMOL/L — SIGNIFICANT CHANGE UP
BASOPHILS # BLD AUTO: 0.07 K/UL — SIGNIFICANT CHANGE UP (ref 0–0.2)
BASOPHILS NFR BLD AUTO: 1.1 % — SIGNIFICANT CHANGE UP (ref 0–2)
BILIRUB SERPL-MCNC: 0.2 MG/DL — SIGNIFICANT CHANGE UP (ref 0.2–1.2)
BLOOD GAS VENOUS - CREATININE: 15.8 MG/DL — HIGH (ref 0.5–1.3)
BLOOD GAS VENOUS - CREATININE: 15.9 MG/DL — HIGH (ref 0.5–1.3)
BUN SERPL-MCNC: 66 MG/DL — HIGH (ref 7–23)
BUN SERPL-MCNC: 73 MG/DL — HIGH (ref 7–23)
CALCIUM SERPL-MCNC: 8.9 MG/DL — SIGNIFICANT CHANGE UP (ref 8.4–10.5)
CALCIUM SERPL-MCNC: 9 MG/DL — SIGNIFICANT CHANGE UP (ref 8.4–10.5)
CHLORIDE BLDV-SCNC: 98 MMOL/L — SIGNIFICANT CHANGE UP (ref 96–108)
CHLORIDE BLDV-SCNC: SIGNIFICANT CHANGE UP MMOL/L (ref 96–108)
CHLORIDE SERPL-SCNC: 96 MMOL/L — LOW (ref 98–107)
CHLORIDE SERPL-SCNC: 98 MMOL/L — SIGNIFICANT CHANGE UP (ref 98–107)
CO2 SERPL-SCNC: 22 MMOL/L — SIGNIFICANT CHANGE UP (ref 22–31)
CO2 SERPL-SCNC: 24 MMOL/L — SIGNIFICANT CHANGE UP (ref 22–31)
CREAT SERPL-MCNC: 14 MG/DL — HIGH (ref 0.5–1.3)
CREAT SERPL-MCNC: 14.82 MG/DL — HIGH (ref 0.5–1.3)
EOSINOPHIL # BLD AUTO: 0.26 K/UL — SIGNIFICANT CHANGE UP (ref 0–0.5)
EOSINOPHIL NFR BLD AUTO: 4.2 % — SIGNIFICANT CHANGE UP (ref 0–6)
GAS PNL BLDV: 135 MMOL/L — LOW (ref 136–146)
GAS PNL BLDV: SIGNIFICANT CHANGE UP MMOL/L (ref 136–146)
GLUCOSE BLDV-MCNC: 165 — HIGH (ref 70–99)
GLUCOSE BLDV-MCNC: 178 — HIGH (ref 70–99)
GLUCOSE SERPL-MCNC: 147 MG/DL — HIGH (ref 70–99)
GLUCOSE SERPL-MCNC: 173 MG/DL — HIGH (ref 70–99)
HCG SERPL-ACNC: < 5 MIU/ML — SIGNIFICANT CHANGE UP
HCO3 BLDV-SCNC: 25 MMOL/L — SIGNIFICANT CHANGE UP (ref 20–27)
HCO3 BLDV-SCNC: 26 MMOL/L — SIGNIFICANT CHANGE UP (ref 20–27)
HCT VFR BLD CALC: 33.4 % — LOW (ref 34.5–45)
HCT VFR BLDV CALC: 29.2 % — LOW (ref 34.5–45)
HCT VFR BLDV CALC: 30.5 % — LOW (ref 34.5–45)
HGB BLD-MCNC: 10.2 G/DL — LOW (ref 11.5–15.5)
HGB BLDV-MCNC: 9.4 G/DL — LOW (ref 11.5–15.5)
HGB BLDV-MCNC: 9.9 G/DL — LOW (ref 11.5–15.5)
IMM GRANULOCYTES NFR BLD AUTO: 0.2 % — SIGNIFICANT CHANGE UP (ref 0–1.5)
LACTATE BLDV-MCNC: 1.2 MMOL/L — SIGNIFICANT CHANGE UP (ref 0.5–2)
LACTATE BLDV-MCNC: 1.3 MMOL/L — SIGNIFICANT CHANGE UP (ref 0.5–2)
LIDOCAIN IGE QN: 151.4 U/L — HIGH (ref 7–60)
LYMPHOCYTES # BLD AUTO: 1.74 K/UL — SIGNIFICANT CHANGE UP (ref 1–3.3)
LYMPHOCYTES # BLD AUTO: 27.9 % — SIGNIFICANT CHANGE UP (ref 13–44)
MAGNESIUM SERPL-MCNC: 2.7 MG/DL — HIGH (ref 1.6–2.6)
MAGNESIUM SERPL-MCNC: 2.8 MG/DL — HIGH (ref 1.6–2.6)
MCHC RBC-ENTMCNC: 29 PG — SIGNIFICANT CHANGE UP (ref 27–34)
MCHC RBC-ENTMCNC: 30.5 % — LOW (ref 32–36)
MCV RBC AUTO: 94.9 FL — SIGNIFICANT CHANGE UP (ref 80–100)
MONOCYTES # BLD AUTO: 0.65 K/UL — SIGNIFICANT CHANGE UP (ref 0–0.9)
MONOCYTES NFR BLD AUTO: 10.4 % — SIGNIFICANT CHANGE UP (ref 2–14)
NEUTROPHILS # BLD AUTO: 3.5 K/UL — SIGNIFICANT CHANGE UP (ref 1.8–7.4)
NEUTROPHILS NFR BLD AUTO: 56.2 % — SIGNIFICANT CHANGE UP (ref 43–77)
NRBC # FLD: 0 K/UL — SIGNIFICANT CHANGE UP (ref 0–0)
PCO2 BLDV: 51 MMHG — SIGNIFICANT CHANGE UP (ref 41–51)
PCO2 BLDV: 54 MMHG — HIGH (ref 41–51)
PH BLDV: 7.32 PH — SIGNIFICANT CHANGE UP (ref 7.32–7.43)
PH BLDV: 7.35 PH — SIGNIFICANT CHANGE UP (ref 7.32–7.43)
PHOSPHATE SERPL-MCNC: 5 MG/DL — HIGH (ref 2.5–4.5)
PHOSPHATE SERPL-MCNC: 5.4 MG/DL — HIGH (ref 2.5–4.5)
PLATELET # BLD AUTO: 193 K/UL — SIGNIFICANT CHANGE UP (ref 150–400)
PMV BLD: 12.1 FL — SIGNIFICANT CHANGE UP (ref 7–13)
PO2 BLDV: 40 MMHG — SIGNIFICANT CHANGE UP (ref 35–40)
PO2 BLDV: 43 MMHG — HIGH (ref 35–40)
POTASSIUM BLDV-SCNC: 5.6 MMOL/L — HIGH (ref 3.4–4.5)
POTASSIUM BLDV-SCNC: SIGNIFICANT CHANGE UP MMOL/L (ref 3.4–4.5)
POTASSIUM SERPL-MCNC: 5.5 MMOL/L — HIGH (ref 3.5–5.3)
POTASSIUM SERPL-MCNC: 6 MMOL/L — HIGH (ref 3.5–5.3)
POTASSIUM SERPL-SCNC: 5.5 MMOL/L — HIGH (ref 3.5–5.3)
POTASSIUM SERPL-SCNC: 6 MMOL/L — HIGH (ref 3.5–5.3)
PROT SERPL-MCNC: 7.1 G/DL — SIGNIFICANT CHANGE UP (ref 6–8.3)
RBC # BLD: 3.52 M/UL — LOW (ref 3.8–5.2)
RBC # FLD: 13.3 % — SIGNIFICANT CHANGE UP (ref 10.3–14.5)
SAO2 % BLDV: 63.9 % — SIGNIFICANT CHANGE UP (ref 60–85)
SAO2 % BLDV: 73.5 % — SIGNIFICANT CHANGE UP (ref 60–85)
SODIUM SERPL-SCNC: 135 MMOL/L — SIGNIFICANT CHANGE UP (ref 135–145)
SODIUM SERPL-SCNC: 137 MMOL/L — SIGNIFICANT CHANGE UP (ref 135–145)
WBC # BLD: 6.23 K/UL — SIGNIFICANT CHANGE UP (ref 3.8–10.5)
WBC # FLD AUTO: 6.23 K/UL — SIGNIFICANT CHANGE UP (ref 3.8–10.5)

## 2019-03-30 PROCEDURE — 74177 CT ABD & PELVIS W/CONTRAST: CPT | Mod: 26

## 2019-03-30 PROCEDURE — 71046 X-RAY EXAM CHEST 2 VIEWS: CPT | Mod: 26

## 2019-03-30 RX ORDER — HYDRALAZINE HCL 50 MG
100 TABLET ORAL
Qty: 0 | Refills: 0 | Status: DISCONTINUED | OUTPATIENT
Start: 2019-03-30 | End: 2019-04-02

## 2019-03-30 RX ORDER — INSULIN LISPRO 100/ML
VIAL (ML) SUBCUTANEOUS
Qty: 0 | Refills: 0 | Status: DISCONTINUED | OUTPATIENT
Start: 2019-03-30 | End: 2019-04-02

## 2019-03-30 RX ORDER — HEPARIN SODIUM 5000 [USP'U]/ML
5000 INJECTION INTRAVENOUS; SUBCUTANEOUS EVERY 12 HOURS
Qty: 0 | Refills: 0 | Status: DISCONTINUED | OUTPATIENT
Start: 2019-03-30 | End: 2019-04-02

## 2019-03-30 RX ORDER — INSULIN LISPRO 100/ML
VIAL (ML) SUBCUTANEOUS AT BEDTIME
Qty: 0 | Refills: 0 | Status: DISCONTINUED | OUTPATIENT
Start: 2019-03-30 | End: 2019-04-02

## 2019-03-30 RX ORDER — FAMOTIDINE 10 MG/ML
20 INJECTION INTRAVENOUS ONCE
Qty: 0 | Refills: 0 | Status: COMPLETED | OUTPATIENT
Start: 2019-03-30 | End: 2019-03-30

## 2019-03-30 RX ORDER — ISOSORBIDE DINITRATE 5 MG/1
10 TABLET ORAL THREE TIMES A DAY
Qty: 0 | Refills: 0 | Status: DISCONTINUED | OUTPATIENT
Start: 2019-03-30 | End: 2019-03-30

## 2019-03-30 RX ORDER — OXYBUTYNIN CHLORIDE 5 MG
5 TABLET ORAL
Qty: 0 | Refills: 0 | Status: DISCONTINUED | OUTPATIENT
Start: 2019-03-30 | End: 2019-04-02

## 2019-03-30 RX ORDER — ALBUTEROL 90 UG/1
9 AEROSOL, METERED ORAL ONCE
Qty: 0 | Refills: 0 | Status: COMPLETED | OUTPATIENT
Start: 2019-03-30 | End: 2019-03-30

## 2019-03-30 RX ORDER — CARVEDILOL PHOSPHATE 80 MG/1
12.5 CAPSULE, EXTENDED RELEASE ORAL EVERY 12 HOURS
Qty: 0 | Refills: 0 | Status: DISCONTINUED | OUTPATIENT
Start: 2019-03-30 | End: 2019-03-30

## 2019-03-30 RX ORDER — PANTOPRAZOLE SODIUM 20 MG/1
40 TABLET, DELAYED RELEASE ORAL
Qty: 0 | Refills: 0 | Status: DISCONTINUED | OUTPATIENT
Start: 2019-03-30 | End: 2019-04-02

## 2019-03-30 RX ORDER — ONDANSETRON 8 MG/1
4 TABLET, FILM COATED ORAL ONCE
Qty: 0 | Refills: 0 | Status: COMPLETED | OUTPATIENT
Start: 2019-03-30 | End: 2019-03-30

## 2019-03-30 RX ORDER — DEXTROSE 50 % IN WATER 50 %
25 SYRINGE (ML) INTRAVENOUS ONCE
Qty: 0 | Refills: 0 | Status: DISCONTINUED | OUTPATIENT
Start: 2019-03-30 | End: 2019-04-02

## 2019-03-30 RX ORDER — ACETAMINOPHEN 500 MG
975 TABLET ORAL ONCE
Qty: 0 | Refills: 0 | Status: COMPLETED | OUTPATIENT
Start: 2019-03-30 | End: 2019-03-30

## 2019-03-30 RX ORDER — HYDRALAZINE HCL 50 MG
100 TABLET ORAL
Qty: 0 | Refills: 0 | Status: DISCONTINUED | OUTPATIENT
Start: 2019-03-30 | End: 2019-03-30

## 2019-03-30 RX ORDER — SODIUM CHLORIDE 9 MG/ML
1000 INJECTION, SOLUTION INTRAVENOUS
Qty: 0 | Refills: 0 | Status: DISCONTINUED | OUTPATIENT
Start: 2019-03-30 | End: 2019-04-02

## 2019-03-30 RX ORDER — CLOPIDOGREL BISULFATE 75 MG/1
75 TABLET, FILM COATED ORAL DAILY
Qty: 0 | Refills: 0 | Status: DISCONTINUED | OUTPATIENT
Start: 2019-03-30 | End: 2019-04-02

## 2019-03-30 RX ORDER — SIMVASTATIN 20 MG/1
40 TABLET, FILM COATED ORAL AT BEDTIME
Qty: 0 | Refills: 0 | Status: DISCONTINUED | OUTPATIENT
Start: 2019-03-30 | End: 2019-04-02

## 2019-03-30 RX ORDER — ASPIRIN/CALCIUM CARB/MAGNESIUM 324 MG
81 TABLET ORAL DAILY
Qty: 0 | Refills: 0 | Status: DISCONTINUED | OUTPATIENT
Start: 2019-03-30 | End: 2019-04-02

## 2019-03-30 RX ORDER — ISOSORBIDE DINITRATE 5 MG/1
10 TABLET ORAL THREE TIMES A DAY
Qty: 0 | Refills: 0 | Status: DISCONTINUED | OUTPATIENT
Start: 2019-03-30 | End: 2019-04-02

## 2019-03-30 RX ORDER — GLUCAGON INJECTION, SOLUTION 0.5 MG/.1ML
1 INJECTION, SOLUTION SUBCUTANEOUS ONCE
Qty: 0 | Refills: 0 | Status: DISCONTINUED | OUTPATIENT
Start: 2019-03-30 | End: 2019-04-02

## 2019-03-30 RX ORDER — SODIUM POLYSTYRENE SULFONATE 4.1 MEQ/G
15 POWDER, FOR SUSPENSION ORAL ONCE
Qty: 0 | Refills: 0 | Status: DISCONTINUED | OUTPATIENT
Start: 2019-03-30 | End: 2019-03-30

## 2019-03-30 RX ORDER — ALLOPURINOL 300 MG
100 TABLET ORAL DAILY
Qty: 0 | Refills: 0 | Status: DISCONTINUED | OUTPATIENT
Start: 2019-03-30 | End: 2019-04-02

## 2019-03-30 RX ORDER — SERTRALINE 25 MG/1
200 TABLET, FILM COATED ORAL DAILY
Qty: 0 | Refills: 0 | Status: DISCONTINUED | OUTPATIENT
Start: 2019-03-30 | End: 2019-04-02

## 2019-03-30 RX ORDER — QUETIAPINE FUMARATE 200 MG/1
50 TABLET, FILM COATED ORAL AT BEDTIME
Qty: 0 | Refills: 0 | Status: DISCONTINUED | OUTPATIENT
Start: 2019-03-30 | End: 2019-04-02

## 2019-03-30 RX ORDER — FUROSEMIDE 40 MG
20 TABLET ORAL DAILY
Qty: 0 | Refills: 0 | Status: DISCONTINUED | OUTPATIENT
Start: 2019-03-30 | End: 2019-04-02

## 2019-03-30 RX ORDER — DEXTROSE 50 % IN WATER 50 %
15 SYRINGE (ML) INTRAVENOUS ONCE
Qty: 0 | Refills: 0 | Status: DISCONTINUED | OUTPATIENT
Start: 2019-03-30 | End: 2019-04-02

## 2019-03-30 RX ORDER — OXYBUTYNIN CHLORIDE 5 MG
5 TABLET ORAL
Qty: 0 | Refills: 0 | Status: DISCONTINUED | OUTPATIENT
Start: 2019-03-30 | End: 2019-03-30

## 2019-03-30 RX ORDER — INSULIN HUMAN 100 [IU]/ML
5 INJECTION, SOLUTION SUBCUTANEOUS ONCE
Qty: 0 | Refills: 0 | Status: COMPLETED | OUTPATIENT
Start: 2019-03-30 | End: 2019-03-30

## 2019-03-30 RX ORDER — DEXTROSE 50 % IN WATER 50 %
12.5 SYRINGE (ML) INTRAVENOUS ONCE
Qty: 0 | Refills: 0 | Status: DISCONTINUED | OUTPATIENT
Start: 2019-03-30 | End: 2019-04-02

## 2019-03-30 RX ORDER — DEXTROSE 50 % IN WATER 50 %
50 SYRINGE (ML) INTRAVENOUS ONCE
Qty: 0 | Refills: 0 | Status: COMPLETED | OUTPATIENT
Start: 2019-03-30 | End: 2019-03-30

## 2019-03-30 RX ORDER — CHOLECALCIFEROL (VITAMIN D3) 125 MCG
400 CAPSULE ORAL DAILY
Qty: 0 | Refills: 0 | Status: DISCONTINUED | OUTPATIENT
Start: 2019-03-30 | End: 2019-04-02

## 2019-03-30 RX ORDER — CARVEDILOL PHOSPHATE 80 MG/1
12.5 CAPSULE, EXTENDED RELEASE ORAL EVERY 12 HOURS
Qty: 0 | Refills: 0 | Status: DISCONTINUED | OUTPATIENT
Start: 2019-03-30 | End: 2019-04-02

## 2019-03-30 RX ADMIN — FAMOTIDINE 20 MILLIGRAM(S): 10 INJECTION INTRAVENOUS at 06:04

## 2019-03-30 RX ADMIN — ONDANSETRON 4 MILLIGRAM(S): 8 TABLET, FILM COATED ORAL at 06:05

## 2019-03-30 RX ADMIN — CARVEDILOL PHOSPHATE 12.5 MILLIGRAM(S): 80 CAPSULE, EXTENDED RELEASE ORAL at 22:34

## 2019-03-30 RX ADMIN — Medication 50 MILLILITER(S): at 08:00

## 2019-03-30 RX ADMIN — Medication 975 MILLIGRAM(S): at 08:01

## 2019-03-30 RX ADMIN — Medication 30 MILLILITER(S): at 06:05

## 2019-03-30 RX ADMIN — Medication 975 MILLIGRAM(S): at 06:04

## 2019-03-30 RX ADMIN — Medication 20 MILLIGRAM(S): at 22:35

## 2019-03-30 RX ADMIN — HEPARIN SODIUM 5000 UNIT(S): 5000 INJECTION INTRAVENOUS; SUBCUTANEOUS at 22:35

## 2019-03-30 RX ADMIN — ALBUTEROL 9 MILLIGRAM(S): 90 AEROSOL, METERED ORAL at 08:01

## 2019-03-30 RX ADMIN — INSULIN HUMAN 5 UNIT(S): 100 INJECTION, SOLUTION SUBCUTANEOUS at 08:00

## 2019-03-30 RX ADMIN — SIMVASTATIN 40 MILLIGRAM(S): 20 TABLET, FILM COATED ORAL at 22:36

## 2019-03-30 RX ADMIN — ISOSORBIDE DINITRATE 10 MILLIGRAM(S): 5 TABLET ORAL at 22:36

## 2019-03-30 RX ADMIN — Medication 100 MILLIGRAM(S): at 22:35

## 2019-03-30 RX ADMIN — QUETIAPINE FUMARATE 50 MILLIGRAM(S): 200 TABLET, FILM COATED ORAL at 22:36

## 2019-03-30 NOTE — ED PROVIDER NOTE - ATTENDING CONTRIBUTION TO CARE
Zavala: 53 yof with ESRD without HD for 5 days since discharge from hospital. Pt has intermittent abd pain with nausea, no vomiting, same as previous. No fever, no trauma, no diarrhea, no fever. On exam, pt is sleeping comfortably, no distress, no tn, decreased BS at bases, +edema. Labs show elevated lipase with no abd tn, but with pain will CT and then admit for HD.

## 2019-03-30 NOTE — ED PROVIDER NOTE - CLINICAL SUMMARY MEDICAL DECISION MAKING FREE TEXT BOX
52 yo F c PMH of ESRD (on HD T/Th/Sat, last HD 1 w/a), DM, CVA p/w 6 day hx of sharp periumbilical intermittent abdominal pain "most severe is 9/10) radiating throughout her abdomen that is similar in nature to the abdominal pain she had 2 w/a when she was admitted, associated n/v. denies CP, SOB. On exam, VS wnl, no remarkable exam findings aside from pt agitation. CT 2 w/a showed cystitis. labs pending, pepcid/maalox/zofran/tylenol for tx. will admit for HD.

## 2019-03-30 NOTE — ED ADULT NURSE REASSESSMENT NOTE - NS ED NURSE REASSESS COMMENT FT1
pt stable and comfortable no respiratory distress noted respirations equal and non labored, report given to RN Kasey

## 2019-03-30 NOTE — H&P ADULT - HISTORY OF PRESENT ILLNESS
52 yo F lives in shelter with PMH of DM2, HTN, HLD, ESRD (TTS, last HD 3/23/19) and gout p/w LUQ abdominal pain. Pt was discharged from [facility] 5 days ago after admission for abd pain and chest discomfort, found to have gastroparesis. Pt endorses that this started 2 weeks ago and that her symptoms are better then when they initially started but have gotten worse over the past few days. She endorses a LUQ, sharp, 10/10 pain worse with eating that never completely goes away. She denies any associated symptoms but also endorses "chest pain" that restarted last night that is located in the mid upper abdomen when she points to it. She also endorses nausea but no vomiting. She was discharged 5 days ago and endorses that her last HD session was exactly one week ago while she was in the hospital. She states she was unable to go to HD because she had to move to a new shelter far from her old HD center. She also endorses SOB, LE edema and a cough that started last night.    ED:  VS - Tm 99.7, HR 95, /92, RR 16, SpO2 95%  K+ 6.0 (not hemolyzed), given regular insulin 5, D50 push, albuterol with improvement in K+ to 5.5  Also given tylenol, pepcid, zofran, maalox  Nephrology consulted. Ordered for urgent HD

## 2019-03-30 NOTE — ED ADULT NURSE NOTE - CHIEF COMPLAINT QUOTE
Pt comes in for c/o left side abd pain that has been going on x2 weeks with NVD, last diarrhea episode yesterday. Pt reports that she was seen here last week and still having same symptoms. Pt appears in no acute distress, vs as noted.  addendum On reassessment pt st" I am on diaysis and missed 2 treatments this week, my legs are more swollen and I have chest pains."

## 2019-03-30 NOTE — H&P ADULT - PROBLEM SELECTOR PLAN 5
A1c 6.9 on last admission (3/2019) with episodes of hypoglycemia on insulin. Switched to Januvia on discharge last week.  - Hold Januvia while inpatient  - FS and low-dose ISS premeal and at bedtime

## 2019-03-30 NOTE — H&P ADULT - NSHPLABSRESULTS_GEN_ALL_CORE
CBC Full  -  ( 30 Mar 2019 05:05 )  WBC Count : 6.23 K/uL  RBC Count : 3.52 M/uL  Hemoglobin : 10.2 g/dL  Hematocrit : 33.4 %  Platelet Count - Automated : 193 K/uL  Mean Cell Volume : 94.9 fL  Mean Cell Hemoglobin : 29.0 pg  Mean Cell Hemoglobin Concentration : 30.5 %  Auto Neutrophil # : 3.50 K/uL  Auto Lymphocyte # : 1.74 K/uL  Auto Monocyte # : 0.65 K/uL  Auto Eosinophil # : 0.26 K/uL  Auto Basophil # : 0.07 K/uL  Auto Neutrophil % : 56.2 %  Auto Lymphocyte % : 27.9 %  Auto Monocyte % : 10.4 %  Auto Eosinophil % : 4.2 %  Auto Basophil % : 1.1 %    Comprehensive Metabolic, Mg + Phosphorus (03.30.19 @ 05:05)    Phosphorus Level, Serum: 5.0 mg/dL    Sodium, Serum: 135 mmol/L    Potassium, Serum: 6.0: SPECIMEN NOT HEMOLYZED mmol/L    Chloride, Serum: 96: Delta: 91 on 03/25/  Delta: 91 on 03/25/ mmol/L    Carbon Dioxide, Serum: 24 mmol/L    Anion Gap, Serum: 15 mmo/L    Blood Urea Nitrogen, Serum: 66 mg/dL    Creatinine, Serum: 14.00 mg/dL    Glucose, Serum: 173 mg/dL    Calcium, Total Serum: 8.9 mg/dL    Protein Total, Serum: 7.1 g/dL    Albumin, Serum: 3.7 g/dL    Bilirubin Total, Serum: 0.2 mg/dL    Alkaline Phosphatase, Serum: 132 u/L    Aspartate Aminotransferase (AST/SGOT): 13 u/L    Alanine Aminotransferase (ALT/SGPT): 14 u/L    Magnesium, Serum: 2.7 mg/dL    Basic Metabolic Panel w/Mg &amp; Inorg Phos (03.30.19 @ 13:20)    Calcium, Total Serum: 9.0 mg/dL    Phosphorus Level, Serum: 5.4 mg/dL    Sodium, Serum: 137 mmol/L    Potassium, Serum: 5.5: SPECIMEN NOT HEMOLYZED mmol/L    Chloride, Serum: 98 mmol/L    Carbon Dioxide, Serum: 22 mmol/L    Anion Gap, Serum: 17 mmo/L    Blood Urea Nitrogen, Serum: 73 mg/dL    Creatinine, Serum: 14.82 mg/dL    Glucose, Serum: 147 mg/dL    Magnesium, Serum: 2.8 mg/dL    Lipase, Serum: 151.4 U/L (03.30.19 @ 05:05)    EKG - Normal sinus rhythm, no peaked T waves. QTc 477.      < from: CT Abdomen and Pelvis w/ IV Cont (03.30.19 @ 09:22) >    IMPRESSION:     No acute intra-abdominal pathology. No CT evidence of acute pancreatitis.    Nonspecific mild bilateral perinephric fat stranding.    Increased small bilateral pleural effusions.    < end of copied text >

## 2019-03-30 NOTE — H&P ADULT - NSICDXPASTMEDICALHX_GEN_ALL_CORE_FT
PAST MEDICAL HISTORY:  Chronic CHF     CVA (cerebral vascular accident)     Depression     Diabetes     DM (diabetes mellitus)     ESRD (end stage renal disease) on HD (Tues, Thurs, Sat)    Glaucoma     Gout     HTN (hypertension)

## 2019-03-30 NOTE — H&P ADULT - ASSESSMENT
52 yo F lives in shelter with PMH of DM2, HTN, HLD, ESRD (TTS, last HD 3/23/19) and gout p/w LUQ abdominal pain, found to be hyperkalemic, hyperphosphatemic with mild fluid overload 2/2 missed HD over past week.

## 2019-03-30 NOTE — H&P ADULT - ATTENDING COMMENTS
Chart reviewed. Vitals and Labs noted.   Pt seen and examined at bedside. Plan formulated with the resident/PA/NP. Detail H&P as above.     Admitted for abdominal pain, missed HD sessions due to being at different shelter place, noted to have hyperkalemia and mild fluid overload, admitted for urgent HD. renal eval appreciated.   Recently discharged from [facility], s/p treatment of cystitis and abdominal pain which was thought to be due to IBS vs. gastroparesis.  CT abd unremarkable. doubt UTI, no urinary symptoms. c/w home Bentyl. Reglan on hold due to drug interaction with Seroquel.  pain control, Zofran PRN, supportive care. Tylenol PRN pain.  clear liquid diet, advance as tolerated. Will consult Gi if no improvement.   Social work/case management consult.  Depression stable. c/w home meds. Pt denied SI/HI ideations, denied visual and auditory hallucinations. Pt's son at bedside.  DVT ppx  - Dr. KOROMA Htet ([employer])  - [phone]

## 2019-03-30 NOTE — H&P ADULT - NSHPREVIEWOFSYSTEMS_GEN_ALL_CORE
General: Denies dizziness, fatigue  Eyes: Denies blurry vision  ENMT: Denies rhinorrhea  Respiratory: +cough, SOB  Cardiovascular: +edema; denies joint pain  Endocrine: Denies increased thirst, increased frequency  Allergic/Immunologic: Denies rashes or hives  Neuro: Denies weakness, numbness

## 2019-03-30 NOTE — ED PROVIDER NOTE - PMH
CVA (cerebral vascular accident)    Depression    Diabetes    DM (diabetes mellitus)    ESRD (end stage renal disease)  on HD (Tues, Thurs, Sat)  Glaucoma    Gout    HTN (hypertension)

## 2019-03-30 NOTE — ED PROCEDURE NOTE - PROCEDURE ADDITIONAL DETAILS
20 gauge IV placed under ultrasound guidance using clean conditions after location of vein with b-mode ultrasound. Vascular access performed using dynamic guidance with direct visualization of needle entering vein and location and patency ascertained by direct visualization of catheter in vein and functional catheter.  US performed after to ascertain fro complications. Successful peripheral venous access performed in right basilic vein. No complications.  See image(s) and report in chart.  Narda [id number]

## 2019-03-30 NOTE — H&P ADULT - PROBLEM SELECTOR PLAN 2
Pt on HD TTS with missed HD for 7 days (since last admission), presenting with hyperkalemia and fluid overload.  - Urgent HD today  - Nephrology following, recs appreciated

## 2019-03-30 NOTE — ED ADULT NURSE REASSESSMENT NOTE - NS ED NURSE REASSESS COMMENT FT1
received report from RN Chanel Galaviz, pt sleeping at this time but easily arousable sating 100% room air, respirations equal and non labored, abdomen soft and non tender, nsr, fistula to left arm + bruit thrill family at bedside will monitor,

## 2019-03-30 NOTE — H&P ADULT - PROBLEM SELECTOR PLAN 3
Pt with continued abd pain over past 2 weeks, worse with eating, diagnosed with gastroparesis on last admission (last week). Lipase elevated but less than 3x UL and CT negative for pancreatic changes making pancreatitis unlikely. Still likely gastroparesis in setting of long standing DM2.  - C/w PPI, bentyl, Maalox  - NPO. Advance diet as tolerated

## 2019-03-30 NOTE — H&P ADULT - PROBLEM SELECTOR PLAN 8
VTE ppx: HSQ  Dispo: SW consult. From shelter, recently moved and unable to get HD at old facility given new location of her shelter.

## 2019-03-30 NOTE — CHART NOTE - NSCHARTNOTEFT_GEN_A_CORE
Pt was seen and examined.  Briefly this is a female with hx HTN DM pw abd pain   Hyperkalemia  CHF    Suggest  -Urgent HD now        Full dictated note to follow in am   Sayed Mariana  [facility]  [phone]

## 2019-03-30 NOTE — ED ADULT NURSE NOTE - PAIN: PRESENCE, MLM
-- Marginally improved  -- White blood cell count is 0.82 k/uL; absolute neutrophil count 585 cells/uL  -- hemoglobin 7.4 g/dL; platelet count is 22 k/uL   -- Following with daily CBC and transfusing for hemoglobin < 7 g/dL, platelets < 10 k/uL  -- Anticipate the need for more aggressive platelet transfusion prior to surgery   complains of pain/discomfort

## 2019-03-30 NOTE — ED PROVIDER NOTE - CARE PLAN
Principal Discharge DX:	Abdominal pain  Secondary Diagnosis:	Elevated lipase  Secondary Diagnosis:	Hyperkalemia

## 2019-03-30 NOTE — H&P ADULT - PROBLEM SELECTOR PLAN 1
Pt hyperkalemic to 6.0 on admission in setting of missing HD for 1 week. No EKG changes and improvement after hyperK+ cocktail given.  - Nephrology following, recs appreciated. Plan for urgent HD today.  - Monitor K+ daily

## 2019-03-30 NOTE — H&P ADULT - NSHPPHYSICALEXAM_GEN_ALL_CORE
Vital Signs Last 24 Hrs  T(C): 36.3 (30 Mar 2019 12:55), Max: 37.6 (30 Mar 2019 00:44)  T(F): 97.4 (30 Mar 2019 12:55), Max: 99.7 (30 Mar 2019 00:44)  HR: 85 (30 Mar 2019 12:55) (85 - 99)  BP: 165/85 (30 Mar 2019 12:55) (144/84 - 186/106)  RR: 16 (30 Mar 2019 12:55) (16 - 18)  SpO2: 97% (30 Mar 2019 12:55) (95% - 100%)    Constitutional: Obese, middle-aged woman seen lying in bed in NAD  Eyes: PERRL, sclera clear  ENMT: MMM, clear oropharynx  Neck: Supple, no cervical LAD  Respiratory: Mild crackles at the b/l bases  Cardiovascular: RRR, no m/g/r  Gastrointestinal: +BS, soft, mildly ttp over LUQ  Neurological: AAOx3  Psychiatric: Appropriate affect and mood  Extremities: Trace b/l LE pitting edema to the knees

## 2019-03-30 NOTE — ED PROVIDER NOTE - OBJECTIVE STATEMENT
54 yo F c PMH of ESRD (on HD T/Th/Sat, last HD 1 w/a), DM, CVA p/w 6 day hx of sharp periumbilical intermittent abdominal pain "most severe is 9/10) radiating throughout her abdomen that is similar in nature to the abdominal pain she had 2 w/a when she was admitted and CT showed possible cystitis and b/l pleural effusions and cardiac effusion. also 1 day hx of 3 episodes of NBNB n/v 52 yo F c PMH of ESRD (on HD T/Th/Sat, last HD 1 w/a), DM, CVA p/w 6 day hx of sharp periumbilical intermittent abdominal pain "most severe is 9/10) radiating throughout her abdomen that is similar in nature to the abdominal pain she had 2 w/a when she was admitted and CT showed possible cystitis and b/l pleural effusions and cardiac effusion. also 1 day hx of 3 episodes of NBNB n/v.     ROS positive: abdominal pain, n/v, leg edema   ROS negative: f/c, cough, hemoptysis, hematemesis, dysuria, hematuria, CP, SOB

## 2019-03-30 NOTE — ED ADULT NURSE NOTE - OBJECTIVE STATEMENT
Float RN: Received pt in room 20, pt A&Ox3, sleeping, easily arousable to verbal stimuli, respirations even and unlabored b/l. Abdomen soft, nondistended, nontender. Pt appears in NAD. Pt with left AV fistula, dialysis tues, thurs, sat. Report endorsed to primary RN.

## 2019-03-30 NOTE — ED ADULT TRIAGE NOTE - CHIEF COMPLAINT QUOTE
Pt comes in for c/o left side abd pain that has been going on x2 weeks with NVD, last diarrhea episode yesterday. Pt reports that she was seen here last week and still having same symptoms. Pt appears in no acute distress, vs as noted. Pt comes in for c/o left side abd pain that has been going on x2 weeks with NVD, last diarrhea episode yesterday. Pt reports that she was seen here last week and still having same symptoms. Pt appears in no acute distress, vs as noted.  addendum On reassessment pt st" I am on diaysis and missed 2 treatments this week, my legs are more swollen and I have chest pains."

## 2019-03-31 LAB
ANION GAP SERPL CALC-SCNC: 11 MMO/L — SIGNIFICANT CHANGE UP (ref 7–14)
BASOPHILS # BLD AUTO: 0.05 K/UL — SIGNIFICANT CHANGE UP (ref 0–0.2)
BASOPHILS NFR BLD AUTO: 1.4 % — SIGNIFICANT CHANGE UP (ref 0–2)
BUN SERPL-MCNC: 33 MG/DL — HIGH (ref 7–23)
CALCIUM SERPL-MCNC: 8.9 MG/DL — SIGNIFICANT CHANGE UP (ref 8.4–10.5)
CHLORIDE SERPL-SCNC: 97 MMOL/L — LOW (ref 98–107)
CO2 SERPL-SCNC: 27 MMOL/L — SIGNIFICANT CHANGE UP (ref 22–31)
CREAT SERPL-MCNC: 8.92 MG/DL — HIGH (ref 0.5–1.3)
EOSINOPHIL # BLD AUTO: 0.21 K/UL — SIGNIFICANT CHANGE UP (ref 0–0.5)
EOSINOPHIL NFR BLD AUTO: 5.7 % — SIGNIFICANT CHANGE UP (ref 0–6)
GLUCOSE SERPL-MCNC: 96 MG/DL — SIGNIFICANT CHANGE UP (ref 70–99)
HCT VFR BLD CALC: 30.7 % — LOW (ref 34.5–45)
HGB BLD-MCNC: 9.5 G/DL — LOW (ref 11.5–15.5)
IMM GRANULOCYTES NFR BLD AUTO: 0.3 % — SIGNIFICANT CHANGE UP (ref 0–1.5)
LYMPHOCYTES # BLD AUTO: 1.43 K/UL — SIGNIFICANT CHANGE UP (ref 1–3.3)
LYMPHOCYTES # BLD AUTO: 38.8 % — SIGNIFICANT CHANGE UP (ref 13–44)
MCHC RBC-ENTMCNC: 29.1 PG — SIGNIFICANT CHANGE UP (ref 27–34)
MCHC RBC-ENTMCNC: 30.9 % — LOW (ref 32–36)
MCV RBC AUTO: 93.9 FL — SIGNIFICANT CHANGE UP (ref 80–100)
MONOCYTES # BLD AUTO: 0.38 K/UL — SIGNIFICANT CHANGE UP (ref 0–0.9)
MONOCYTES NFR BLD AUTO: 10.3 % — SIGNIFICANT CHANGE UP (ref 2–14)
NEUTROPHILS # BLD AUTO: 1.61 K/UL — LOW (ref 1.8–7.4)
NEUTROPHILS NFR BLD AUTO: 43.5 % — SIGNIFICANT CHANGE UP (ref 43–77)
NRBC # FLD: 0 K/UL — SIGNIFICANT CHANGE UP (ref 0–0)
PHOSPHATE SERPL-MCNC: 4.2 MG/DL — SIGNIFICANT CHANGE UP (ref 2.5–4.5)
PLATELET # BLD AUTO: 171 K/UL — SIGNIFICANT CHANGE UP (ref 150–400)
PMV BLD: 12.1 FL — SIGNIFICANT CHANGE UP (ref 7–13)
POTASSIUM SERPL-MCNC: 5.1 MMOL/L — SIGNIFICANT CHANGE UP (ref 3.5–5.3)
POTASSIUM SERPL-SCNC: 5.1 MMOL/L — SIGNIFICANT CHANGE UP (ref 3.5–5.3)
RBC # BLD: 3.27 M/UL — LOW (ref 3.8–5.2)
RBC # FLD: 13.2 % — SIGNIFICANT CHANGE UP (ref 10.3–14.5)
SODIUM SERPL-SCNC: 135 MMOL/L — SIGNIFICANT CHANGE UP (ref 135–145)
WBC # BLD: 3.69 K/UL — LOW (ref 3.8–10.5)
WBC # FLD AUTO: 3.69 K/UL — LOW (ref 3.8–10.5)

## 2019-03-31 RX ADMIN — SIMVASTATIN 40 MILLIGRAM(S): 20 TABLET, FILM COATED ORAL at 21:37

## 2019-03-31 RX ADMIN — QUETIAPINE FUMARATE 50 MILLIGRAM(S): 200 TABLET, FILM COATED ORAL at 21:37

## 2019-03-31 RX ADMIN — SERTRALINE 200 MILLIGRAM(S): 25 TABLET, FILM COATED ORAL at 11:50

## 2019-03-31 RX ADMIN — CLOPIDOGREL BISULFATE 75 MILLIGRAM(S): 75 TABLET, FILM COATED ORAL at 11:49

## 2019-03-31 RX ADMIN — Medication 5 MILLIGRAM(S): at 06:35

## 2019-03-31 RX ADMIN — CARVEDILOL PHOSPHATE 12.5 MILLIGRAM(S): 80 CAPSULE, EXTENDED RELEASE ORAL at 17:59

## 2019-03-31 RX ADMIN — Medication 20 MILLIGRAM(S): at 17:59

## 2019-03-31 RX ADMIN — Medication 20 MILLIGRAM(S): at 21:37

## 2019-03-31 RX ADMIN — Medication 400 UNIT(S): at 11:49

## 2019-03-31 RX ADMIN — CARVEDILOL PHOSPHATE 12.5 MILLIGRAM(S): 80 CAPSULE, EXTENDED RELEASE ORAL at 06:34

## 2019-03-31 RX ADMIN — ISOSORBIDE DINITRATE 10 MILLIGRAM(S): 5 TABLET ORAL at 17:58

## 2019-03-31 RX ADMIN — PANTOPRAZOLE SODIUM 40 MILLIGRAM(S): 20 TABLET, DELAYED RELEASE ORAL at 06:35

## 2019-03-31 RX ADMIN — HEPARIN SODIUM 5000 UNIT(S): 5000 INJECTION INTRAVENOUS; SUBCUTANEOUS at 17:59

## 2019-03-31 RX ADMIN — ISOSORBIDE DINITRATE 10 MILLIGRAM(S): 5 TABLET ORAL at 06:35

## 2019-03-31 RX ADMIN — Medication 30 MILLILITER(S): at 21:36

## 2019-03-31 RX ADMIN — Medication 20 MILLIGRAM(S): at 06:34

## 2019-03-31 RX ADMIN — Medication 100 MILLIGRAM(S): at 11:49

## 2019-03-31 RX ADMIN — ISOSORBIDE DINITRATE 10 MILLIGRAM(S): 5 TABLET ORAL at 21:37

## 2019-03-31 RX ADMIN — HEPARIN SODIUM 5000 UNIT(S): 5000 INJECTION INTRAVENOUS; SUBCUTANEOUS at 06:34

## 2019-03-31 RX ADMIN — Medication 1: at 18:03

## 2019-03-31 RX ADMIN — Medication 30 MILLILITER(S): at 10:19

## 2019-03-31 RX ADMIN — Medication 100 MILLIGRAM(S): at 17:58

## 2019-03-31 RX ADMIN — Medication 20 MILLIGRAM(S): at 11:49

## 2019-03-31 RX ADMIN — Medication 100 MILLIGRAM(S): at 06:34

## 2019-03-31 RX ADMIN — Medication 5 MILLIGRAM(S): at 17:59

## 2019-03-31 RX ADMIN — Medication 81 MILLIGRAM(S): at 11:49

## 2019-03-31 NOTE — PROGRESS NOTE ADULT - ASSESSMENT
54 yo F lives in shelter with PMH of DM2, HTN, HLD, ESRD (TTS, last HD 3/23/19) and gout p/w LUQ abdominal pain, found to be hyperkalemic, hyperphosphatemic with mild fluid overload 2/2 missed HD over past week.

## 2019-03-31 NOTE — PROGRESS NOTE ADULT - SUBJECTIVE AND OBJECTIVE BOX
Patient is a 53y old  Female who presents with a chief complaint of Urgent HD (30 Mar 2019 14:39)      SUBJECTIVE / OVERNIGHT EVENTS:  feeling better.  K+ improving.  abd pain improving.  tolerating liquid diet.  diet advanced.  no cp, no sob, no n/v/d. no abdominal pain.  no headache, no dizziness.       Vital Signs Last 24 Hrs  T(C): 36.7 (31 Mar 2019 10:25), Max: 37 (30 Mar 2019 20:03)  T(F): 98 (31 Mar 2019 10:25), Max: 98.6 (30 Mar 2019 20:03)  HR: 74 (31 Mar 2019 10:25) (73 - 83)  BP: 133/67 (31 Mar 2019 10:25) (133/67 - 171/93)  BP(mean): --  RR: 18 (31 Mar 2019 10:25) (16 - 18)  SpO2: 99% (31 Mar 2019 10:25) (92% - 99%)  I&O's Summary    30 Mar 2019 07:01  -  31 Mar 2019 07:00  --------------------------------------------------------  IN: 400 mL / OUT: 3400 mL / NET: -3000 mL        PHYSICAL EXAM:  GENERAL: NAD, Comfortable  HEAD:  Atraumatic, Normocephalic  EYES: EOMI, PERRLA, conjunctiva and sclera clear  NECK: Supple, No JVD  CHEST/LUNG: Clear to auscultation bilaterally; No wheeze  HEART: Regular rate and rhythm; No murmurs, rubs, or gallops  ABDOMEN: Soft, Nontender, Nondistended; Bowel sounds present  Neuro: AAO x 3, no focal deficit, 5/5 b/l extremities  EXTREMITIES:  2+ Peripheral Pulses, No clubbing, cyanosis, or edema  SKIN: No rashes or lesions    LABS:                        9.5    3.69  )-----------( 171      ( 31 Mar 2019 05:24 )             30.7     03-31    135  |  97<L>  |  33<H>  ----------------------------<  96  5.1   |  27  |  8.92<H>    Ca    8.9      31 Mar 2019 05:24  Phos  4.2     03-31  Mg     2.8     03-30    TPro  7.1  /  Alb  3.7  /  TBili  0.2  /  DBili  x   /  AST  13  /  ALT  14  /  AlkPhos  132<H>  03-30      CAPILLARY BLOOD GLUCOSE      POCT Blood Glucose.: 137 mg/dL (31 Mar 2019 12:24)  POCT Blood Glucose.: 100 mg/dL (31 Mar 2019 08:31)  POCT Blood Glucose.: 112 mg/dL (30 Mar 2019 21:39)  POCT Blood Glucose.: 141 mg/dL (30 Mar 2019 16:14)            RADIOLOGY & ADDITIONAL TESTS:    Imaging Personally Reviewed:  [x] YES  [ ] NO    Consultant(s) Notes Reviewed:  [x] YES  [ ] NO      MEDICATIONS  (STANDING):  allopurinol 100 milliGRAM(s) Oral daily  aspirin enteric coated 81 milliGRAM(s) Oral daily  carvedilol 12.5 milliGRAM(s) Oral every 12 hours  cholecalciferol 400 Unit(s) Oral daily  clopidogrel Tablet 75 milliGRAM(s) Oral daily  dextrose 5%. 1000 milliLiter(s) (50 mL/Hr) IV Continuous <Continuous>  dextrose 50% Injectable 12.5 Gram(s) IV Push once  dextrose 50% Injectable 25 Gram(s) IV Push once  dextrose 50% Injectable 25 Gram(s) IV Push once  dicyclomine 20 milliGRAM(s) Oral four times a day before meals  furosemide    Tablet 20 milliGRAM(s) Oral daily  heparin  Injectable 5000 Unit(s) SubCutaneous every 12 hours  hydrALAZINE 100 milliGRAM(s) Oral two times a day  insulin lispro (HumaLOG) corrective regimen sliding scale   SubCutaneous three times a day before meals  insulin lispro (HumaLOG) corrective regimen sliding scale   SubCutaneous at bedtime  isosorbide   dinitrate Tablet (ISORDIL) 10 milliGRAM(s) Oral three times a day  oxybutynin Syrup 5 milliGRAM(s) Oral two times a day  pantoprazole    Tablet 40 milliGRAM(s) Oral before breakfast  QUEtiapine 50 milliGRAM(s) Oral at bedtime  sertraline 200 milliGRAM(s) Oral daily  simvastatin 40 milliGRAM(s) Oral at bedtime    MEDICATIONS  (PRN):  aluminum hydroxide/magnesium hydroxide/simethicone Suspension 30 milliLiter(s) Oral every 6 hours PRN Dyspepsia  dextrose 40% Gel 15 Gram(s) Oral once PRN Blood Glucose LESS THAN 70 milliGRAM(s)/deciliter  glucagon  Injectable 1 milliGRAM(s) IntraMuscular once PRN Glucose LESS THAN 70 milligrams/deciliter      Care Discussed with Consultants/Other Providers [x] YES  [ ] NO    HEALTH ISSUES - PROBLEM Dx:  Prophylactic measure: Prophylactic measure  Depression: Depression  Chronic CHF: Chronic CHF  DM (diabetes mellitus): DM (diabetes mellitus)  HTN (hypertension): HTN (hypertension)  Abdominal pain: Abdominal pain  ESRD (end stage renal disease): ESRD (end stage renal disease)  Hyperkalemia: Hyperkalemia

## 2019-03-31 NOTE — PROGRESS NOTE ADULT - PROBLEM SELECTOR PLAN 2
Pt on HD TTS with missed HD for 7 days (since last admission), presenting with hyperkalemia and fluid overload.  see above.

## 2019-03-31 NOTE — PROGRESS NOTE ADULT - PROBLEM SELECTOR PLAN 1
Pt hyperkalemic to 6.0 on admission in setting of missing HD for 1 week. No EKG changes and improvement after hyperK+ cocktail given.  - Nephrology following, recs appreciated. s/p urgent HD on 3/30th.   - Monitor K+ daily  - social work f/u to confirm HD spot.

## 2019-03-31 NOTE — PROGRESS NOTE ADULT - PROBLEM SELECTOR PLAN 3
Pt with continued abd pain over past 2 weeks, worse with eating, diagnosed with gastroparesis on last admission (last week). Lipase elevated but less than 3x UL and CT negative for pancreatic changes making pancreatitis unlikely. Still likely gastroparesis in setting of long standing DM2.  - C/w PPI, bentyl, Maalox  - tolerating full liquid. advance diet to regular today  - c/w Bentyl

## 2019-04-01 LAB
ALBUMIN SERPL ELPH-MCNC: 3.5 G/DL — SIGNIFICANT CHANGE UP (ref 3.3–5)
ALP SERPL-CCNC: 108 U/L — SIGNIFICANT CHANGE UP (ref 40–120)
ALT FLD-CCNC: 11 U/L — SIGNIFICANT CHANGE UP (ref 4–33)
ANION GAP SERPL CALC-SCNC: 11 MMO/L — SIGNIFICANT CHANGE UP (ref 7–14)
AST SERPL-CCNC: 8 U/L — SIGNIFICANT CHANGE UP (ref 4–32)
BASOPHILS # BLD AUTO: 0.05 K/UL — SIGNIFICANT CHANGE UP (ref 0–0.2)
BASOPHILS NFR BLD AUTO: 1.1 % — SIGNIFICANT CHANGE UP (ref 0–2)
BILIRUB SERPL-MCNC: 0.2 MG/DL — SIGNIFICANT CHANGE UP (ref 0.2–1.2)
BUN SERPL-MCNC: 42 MG/DL — HIGH (ref 7–23)
CALCIUM SERPL-MCNC: 9.2 MG/DL — SIGNIFICANT CHANGE UP (ref 8.4–10.5)
CHLORIDE SERPL-SCNC: 97 MMOL/L — LOW (ref 98–107)
CO2 SERPL-SCNC: 25 MMOL/L — SIGNIFICANT CHANGE UP (ref 22–31)
CREAT SERPL-MCNC: 10.78 MG/DL — HIGH (ref 0.5–1.3)
EOSINOPHIL # BLD AUTO: 0.22 K/UL — SIGNIFICANT CHANGE UP (ref 0–0.5)
EOSINOPHIL NFR BLD AUTO: 5 % — SIGNIFICANT CHANGE UP (ref 0–6)
GLUCOSE SERPL-MCNC: 104 MG/DL — HIGH (ref 70–99)
HCT VFR BLD CALC: 30 % — LOW (ref 34.5–45)
HGB BLD-MCNC: 9.2 G/DL — LOW (ref 11.5–15.5)
IMM GRANULOCYTES NFR BLD AUTO: 0.2 % — SIGNIFICANT CHANGE UP (ref 0–1.5)
LYMPHOCYTES # BLD AUTO: 1.34 K/UL — SIGNIFICANT CHANGE UP (ref 1–3.3)
LYMPHOCYTES # BLD AUTO: 30.2 % — SIGNIFICANT CHANGE UP (ref 13–44)
MCHC RBC-ENTMCNC: 28.2 PG — SIGNIFICANT CHANGE UP (ref 27–34)
MCHC RBC-ENTMCNC: 30.7 % — LOW (ref 32–36)
MCV RBC AUTO: 92 FL — SIGNIFICANT CHANGE UP (ref 80–100)
MONOCYTES # BLD AUTO: 0.48 K/UL — SIGNIFICANT CHANGE UP (ref 0–0.9)
MONOCYTES NFR BLD AUTO: 10.8 % — SIGNIFICANT CHANGE UP (ref 2–14)
NEUTROPHILS # BLD AUTO: 2.34 K/UL — SIGNIFICANT CHANGE UP (ref 1.8–7.4)
NEUTROPHILS NFR BLD AUTO: 52.7 % — SIGNIFICANT CHANGE UP (ref 43–77)
NRBC # FLD: 0.02 K/UL — SIGNIFICANT CHANGE UP (ref 0–0)
PLATELET # BLD AUTO: 180 K/UL — SIGNIFICANT CHANGE UP (ref 150–400)
PMV BLD: 12.2 FL — SIGNIFICANT CHANGE UP (ref 7–13)
POTASSIUM SERPL-MCNC: 5.9 MMOL/L — HIGH (ref 3.5–5.3)
POTASSIUM SERPL-SCNC: 5.9 MMOL/L — HIGH (ref 3.5–5.3)
PROT SERPL-MCNC: 6.5 G/DL — SIGNIFICANT CHANGE UP (ref 6–8.3)
RBC # BLD: 3.26 M/UL — LOW (ref 3.8–5.2)
RBC # FLD: 13.2 % — SIGNIFICANT CHANGE UP (ref 10.3–14.5)
SODIUM SERPL-SCNC: 133 MMOL/L — LOW (ref 135–145)
WBC # BLD: 4.44 K/UL — SIGNIFICANT CHANGE UP (ref 3.8–10.5)
WBC # FLD AUTO: 4.44 K/UL — SIGNIFICANT CHANGE UP (ref 3.8–10.5)

## 2019-04-01 RX ORDER — ONDANSETRON 8 MG/1
4 TABLET, FILM COATED ORAL ONCE
Qty: 0 | Refills: 0 | Status: COMPLETED | OUTPATIENT
Start: 2019-04-01 | End: 2019-04-01

## 2019-04-01 RX ADMIN — PANTOPRAZOLE SODIUM 40 MILLIGRAM(S): 20 TABLET, DELAYED RELEASE ORAL at 06:01

## 2019-04-01 RX ADMIN — Medication 100 MILLIGRAM(S): at 17:05

## 2019-04-01 RX ADMIN — Medication 20 MILLIGRAM(S): at 06:02

## 2019-04-01 RX ADMIN — QUETIAPINE FUMARATE 50 MILLIGRAM(S): 200 TABLET, FILM COATED ORAL at 21:50

## 2019-04-01 RX ADMIN — CARVEDILOL PHOSPHATE 12.5 MILLIGRAM(S): 80 CAPSULE, EXTENDED RELEASE ORAL at 17:06

## 2019-04-01 RX ADMIN — HEPARIN SODIUM 5000 UNIT(S): 5000 INJECTION INTRAVENOUS; SUBCUTANEOUS at 17:05

## 2019-04-01 RX ADMIN — Medication 5 MILLIGRAM(S): at 17:08

## 2019-04-01 RX ADMIN — SERTRALINE 200 MILLIGRAM(S): 25 TABLET, FILM COATED ORAL at 11:59

## 2019-04-01 RX ADMIN — Medication 100 MILLIGRAM(S): at 11:59

## 2019-04-01 RX ADMIN — SIMVASTATIN 40 MILLIGRAM(S): 20 TABLET, FILM COATED ORAL at 21:50

## 2019-04-01 RX ADMIN — ISOSORBIDE DINITRATE 10 MILLIGRAM(S): 5 TABLET ORAL at 06:01

## 2019-04-01 RX ADMIN — Medication 20 MILLIGRAM(S): at 06:01

## 2019-04-01 RX ADMIN — ISOSORBIDE DINITRATE 10 MILLIGRAM(S): 5 TABLET ORAL at 13:24

## 2019-04-01 RX ADMIN — CLOPIDOGREL BISULFATE 75 MILLIGRAM(S): 75 TABLET, FILM COATED ORAL at 11:58

## 2019-04-01 RX ADMIN — Medication 20 MILLIGRAM(S): at 17:05

## 2019-04-01 RX ADMIN — Medication 81 MILLIGRAM(S): at 11:58

## 2019-04-01 RX ADMIN — Medication 20 MILLIGRAM(S): at 21:50

## 2019-04-01 RX ADMIN — ONDANSETRON 4 MILLIGRAM(S): 8 TABLET, FILM COATED ORAL at 13:24

## 2019-04-01 RX ADMIN — Medication 20 MILLIGRAM(S): at 11:58

## 2019-04-01 RX ADMIN — HEPARIN SODIUM 5000 UNIT(S): 5000 INJECTION INTRAVENOUS; SUBCUTANEOUS at 06:01

## 2019-04-01 RX ADMIN — Medication 5 MILLIGRAM(S): at 06:02

## 2019-04-01 RX ADMIN — Medication 400 UNIT(S): at 11:59

## 2019-04-01 RX ADMIN — Medication 100 MILLIGRAM(S): at 06:01

## 2019-04-01 RX ADMIN — CARVEDILOL PHOSPHATE 12.5 MILLIGRAM(S): 80 CAPSULE, EXTENDED RELEASE ORAL at 06:01

## 2019-04-01 RX ADMIN — ISOSORBIDE DINITRATE 10 MILLIGRAM(S): 5 TABLET ORAL at 21:50

## 2019-04-01 NOTE — PROGRESS NOTE ADULT - SUBJECTIVE AND OBJECTIVE BOX
No pain, no shortness of breath      VITAL:  T(C): , Max: 37.3 (03-31-19 @ 21:30)  T(F): , Max: 99.1 (03-31-19 @ 21:30)  HR: 94 (04-01-19 @ 05:57)  BP: 158/93 (04-01-19 @ 05:57)  RR: 18 (04-01-19 @ 05:57)  SpO2: 98% (04-01-19 @ 05:57)      PHYSICAL EXAM:    Constitutional: NAD; Alert  HEENT:  NCAT; DMM  Neck: No JVD; supple  Respiratory: CTA-b/l  Cardiac: RRR s1s2  Gastrointestinal: BS+, soft, NT/ND  Urologic: No malcolm  Extremities: No peripheral edema  Back: No CVAT b/l    LABS:                        9.2    4.44  )-----------( 180      ( 01 Apr 2019 06:30 )             30.0     Na(133)/K(5.9)/Cl(97)/HCO3(25)/BUN(42)/Cr(10.78)Glu(104)/Ca(9.2)/Mg(--)/PO4(--)    04-01 @ 06:30  Na(135)/K(5.1)/Cl(97)/HCO3(27)/BUN(33)/Cr(8.92)Glu(96)/Ca(8.9)/Mg(--)/PO4(4.2)    03-31 @ 05:24  Na(137)/K(5.5)/Cl(98)/HCO3(22)/BUN(73)/Cr(14.82)Glu(147)/Ca(9.0)/Mg(2.8)/PO4(5.4)    03-30 @ 13:20  Na(135)/K(6.0)/Cl(96)/HCO3(24)/BUN(66)/Cr(14.00)Glu(173)/Ca(8.9)/Mg(2.7)/PO4(5.0)    03-30 @ 05:05      ASSESSMENT: 53F w/ DM, HTN, gout, CVD and ESRD, 3/30/19 a/w hyperkalemia    (1)Renal - ESRD - HD TTS   (2)Hyperkalemia - in setting of dietary indiscretion/missing HD  (3)SW - missed HD x 1 week after last discharge      RECOMMENDATIONS:  (1)HD today + tomorrow - 2hours each; low-K bath  (2)Counseled regarding dietary K+ limitation  (3)D/C planning per primary team - after HD tomorrow?        Wale Francisco MD  [facility]  [phone]

## 2019-04-01 NOTE — PROGRESS NOTE ADULT - PROBLEM SELECTOR PLAN 1
Pt hyperkalemic to 6.0 on admission in setting of missing HD for 1 week.   No EKG changes and improvement after hyperK+ cocktail given.  Nephrology following, recs appreciated. s/p urgent HD on 3/30th.   Monitor K+ daily, may need dialysis today.   social work f/u to confirm HD spot.

## 2019-04-01 NOTE — PROGRESS NOTE ADULT - PROBLEM SELECTOR PLAN 3
Pt with continued abd pain over past 2 weeks, worse with eating, diagnosed with gastroparesis on last admission (last week). Lipase elevated but less than 3x UL and CT negative for pancreatic changes making pancreatitis unlikely. Still likely gastroparesis in setting of long standing DM2.  - C/w PPI, bentyl, Maalox  - tolerating regular diet  - c/w Bentyl

## 2019-04-01 NOTE — PROGRESS NOTE ADULT - SUBJECTIVE AND OBJECTIVE BOX
Patient is a 53y old  Female who presents with a chief complaint of Urgent HD (31 Mar 2019 15:11)      SUBJECTIVE / OVERNIGHT EVENTS:  ate breakfast this am.  no abd pain.  sitting up comfortable.  no n/v/d.         Vital Signs Last 24 Hrs  T(C): 37.3 (31 Mar 2019 21:30), Max: 37.3 (31 Mar 2019 21:30)  T(F): 99.1 (31 Mar 2019 21:30), Max: 99.1 (31 Mar 2019 21:30)  HR: 94 (01 Apr 2019 05:57) (77 - 94)  BP: 158/93 (01 Apr 2019 05:57) (147/74 - 158/93)  BP(mean): --  RR: 18 (01 Apr 2019 05:57) (18 - 18)  SpO2: 98% (01 Apr 2019 05:57) (95% - 98%)  I&O's Summary      PHYSICAL EXAM:  GENERAL: NAD, Comfortable  HEAD:  Atraumatic, Normocephalic  EYES: EOMI, PERRLA, conjunctiva and sclera clear  NECK: Supple, No JVD  CHEST/LUNG: Clear to auscultation bilaterally; No wheeze  HEART: Regular rate and rhythm; No murmurs, rubs, or gallops  ABDOMEN: Soft, Nontender, Nondistended; Bowel sounds present  Neuro: AAO x 3, no focal deficit, 5/5 b/l extremities  EXTREMITIES:  2+ Peripheral Pulses, No clubbing, cyanosis, or edema  SKIN: No rashes or lesions    LABS:                        9.2    4.44  )-----------( 180      ( 01 Apr 2019 06:30 )             30.0     04-01    133<L>  |  97<L>  |  42<H>  ----------------------------<  104<H>  5.9<H>   |  25  |  10.78<H>    Ca    9.2      01 Apr 2019 06:30  Phos  4.2     03-31  Mg     2.8     03-30    TPro  6.5  /  Alb  3.5  /  TBili  0.2  /  DBili  x   /  AST  8   /  ALT  11  /  AlkPhos  108  04-01      CAPILLARY BLOOD GLUCOSE      POCT Blood Glucose.: 111 mg/dL (01 Apr 2019 08:24)  POCT Blood Glucose.: 136 mg/dL (31 Mar 2019 21:28)  POCT Blood Glucose.: 169 mg/dL (31 Mar 2019 17:11)  POCT Blood Glucose.: 137 mg/dL (31 Mar 2019 12:24)            RADIOLOGY & ADDITIONAL TESTS:    Imaging Personally Reviewed:  [x] YES  [ ] NO    Consultant(s) Notes Reviewed:  [x] YES  [ ] NO      MEDICATIONS  (STANDING):  allopurinol 100 milliGRAM(s) Oral daily  aspirin enteric coated 81 milliGRAM(s) Oral daily  carvedilol 12.5 milliGRAM(s) Oral every 12 hours  cholecalciferol 400 Unit(s) Oral daily  clopidogrel Tablet 75 milliGRAM(s) Oral daily  dextrose 5%. 1000 milliLiter(s) (50 mL/Hr) IV Continuous <Continuous>  dextrose 50% Injectable 12.5 Gram(s) IV Push once  dextrose 50% Injectable 25 Gram(s) IV Push once  dextrose 50% Injectable 25 Gram(s) IV Push once  dicyclomine 20 milliGRAM(s) Oral four times a day before meals  furosemide    Tablet 20 milliGRAM(s) Oral daily  heparin  Injectable 5000 Unit(s) SubCutaneous every 12 hours  hydrALAZINE 100 milliGRAM(s) Oral two times a day  insulin lispro (HumaLOG) corrective regimen sliding scale   SubCutaneous three times a day before meals  insulin lispro (HumaLOG) corrective regimen sliding scale   SubCutaneous at bedtime  isosorbide   dinitrate Tablet (ISORDIL) 10 milliGRAM(s) Oral three times a day  oxybutynin Syrup 5 milliGRAM(s) Oral two times a day  pantoprazole    Tablet 40 milliGRAM(s) Oral before breakfast  QUEtiapine 50 milliGRAM(s) Oral at bedtime  sertraline 200 milliGRAM(s) Oral daily  simvastatin 40 milliGRAM(s) Oral at bedtime    MEDICATIONS  (PRN):  aluminum hydroxide/magnesium hydroxide/simethicone Suspension 30 milliLiter(s) Oral every 6 hours PRN Dyspepsia  dextrose 40% Gel 15 Gram(s) Oral once PRN Blood Glucose LESS THAN 70 milliGRAM(s)/deciliter  glucagon  Injectable 1 milliGRAM(s) IntraMuscular once PRN Glucose LESS THAN 70 milligrams/deciliter      Care Discussed with Consultants/Other Providers [x] YES  [ ] NO    HEALTH ISSUES - PROBLEM Dx:  Prophylactic measure: Prophylactic measure  Depression: Depression  Chronic CHF: Chronic CHF  DM (diabetes mellitus): DM (diabetes mellitus)  HTN (hypertension): HTN (hypertension)  Abdominal pain: Abdominal pain  ESRD (end stage renal disease): ESRD (end stage renal disease)  Hyperkalemia: Hyperkalemia

## 2019-04-01 NOTE — PROGRESS NOTE ADULT - PROBLEM SELECTOR PROBLEM 6
From: Charo Crouch  To: Freda Cruz MD  Sent: 1/28/2019 4:21 PM CST  Subject: Prescription Question    I received a message that prescriptions had been sent to [facility]. We now use [facility] at [address]  They were supposed to transfer prescription Chronic CHF

## 2019-04-02 ENCOUNTER — TRANSCRIPTION ENCOUNTER (OUTPATIENT)
Age: 54
End: 2019-04-02

## 2019-04-02 VITALS
DIASTOLIC BLOOD PRESSURE: 74 MMHG | SYSTOLIC BLOOD PRESSURE: 159 MMHG | OXYGEN SATURATION: 98 % | HEART RATE: 75 BPM | RESPIRATION RATE: 18 BRPM

## 2019-04-02 LAB
ANION GAP SERPL CALC-SCNC: 12 MMO/L — SIGNIFICANT CHANGE UP (ref 7–14)
BUN SERPL-MCNC: 31 MG/DL — HIGH (ref 7–23)
CALCIUM SERPL-MCNC: 8.9 MG/DL — SIGNIFICANT CHANGE UP (ref 8.4–10.5)
CHLORIDE SERPL-SCNC: 92 MMOL/L — LOW (ref 98–107)
CO2 SERPL-SCNC: 27 MMOL/L — SIGNIFICANT CHANGE UP (ref 22–31)
CREAT SERPL-MCNC: 9.09 MG/DL — HIGH (ref 0.5–1.3)
GLUCOSE SERPL-MCNC: 124 MG/DL — HIGH (ref 70–99)
MAGNESIUM SERPL-MCNC: 2.4 MG/DL — SIGNIFICANT CHANGE UP (ref 1.6–2.6)
PHOSPHATE SERPL-MCNC: 3.8 MG/DL — SIGNIFICANT CHANGE UP (ref 2.5–4.5)
POTASSIUM SERPL-MCNC: 4.9 MMOL/L — SIGNIFICANT CHANGE UP (ref 3.5–5.3)
POTASSIUM SERPL-SCNC: 4.9 MMOL/L — SIGNIFICANT CHANGE UP (ref 3.5–5.3)
SODIUM SERPL-SCNC: 131 MMOL/L — LOW (ref 135–145)

## 2019-04-02 RX ADMIN — Medication 20 MILLIGRAM(S): at 12:18

## 2019-04-02 RX ADMIN — ISOSORBIDE DINITRATE 10 MILLIGRAM(S): 5 TABLET ORAL at 09:18

## 2019-04-02 RX ADMIN — Medication 1: at 12:51

## 2019-04-02 RX ADMIN — Medication 100 MILLIGRAM(S): at 13:08

## 2019-04-02 RX ADMIN — ISOSORBIDE DINITRATE 10 MILLIGRAM(S): 5 TABLET ORAL at 13:09

## 2019-04-02 RX ADMIN — Medication 400 UNIT(S): at 13:08

## 2019-04-02 RX ADMIN — CLOPIDOGREL BISULFATE 75 MILLIGRAM(S): 75 TABLET, FILM COATED ORAL at 12:18

## 2019-04-02 RX ADMIN — HEPARIN SODIUM 5000 UNIT(S): 5000 INJECTION INTRAVENOUS; SUBCUTANEOUS at 05:53

## 2019-04-02 RX ADMIN — Medication 5 MILLIGRAM(S): at 05:57

## 2019-04-02 RX ADMIN — Medication 100 MILLIGRAM(S): at 09:18

## 2019-04-02 RX ADMIN — SERTRALINE 200 MILLIGRAM(S): 25 TABLET, FILM COATED ORAL at 13:08

## 2019-04-02 RX ADMIN — Medication 81 MILLIGRAM(S): at 12:18

## 2019-04-02 RX ADMIN — CARVEDILOL PHOSPHATE 12.5 MILLIGRAM(S): 80 CAPSULE, EXTENDED RELEASE ORAL at 09:18

## 2019-04-02 RX ADMIN — PANTOPRAZOLE SODIUM 40 MILLIGRAM(S): 20 TABLET, DELAYED RELEASE ORAL at 05:54

## 2019-04-02 RX ADMIN — Medication 20 MILLIGRAM(S): at 05:56

## 2019-04-02 RX ADMIN — Medication 20 MILLIGRAM(S): at 09:18

## 2019-04-02 NOTE — DISCHARGE NOTE PROVIDER - HOSPITAL COURSE
54 yo F c PMH of ESRD (on HD T/Th/Sat, last HD 1 w/a), DM, CVA p/w 6 day hx of sharp periumbilical intermittent abdominal pain "most severe is 9/10) radiating throughout her abdomen that is similar in nature to the abdominal pain she had 2 w/a when she was admitted and CT showed possible cystitis and b/l pleural effusions and cardiac effusion. also 1 day hx of 3 episodes of NBNB n/v.         Hospital Course        Hyperkalemia- Nephrology Dr Francisco consulted. Pt hyperkalemic to 6.0 on admission 2/2 missed HD for 1 week . No EKG changes and improvement after hyperK+ cocktail given, s/p urgent HD on 3/30th, social work f/u to confirm HD spot        Abd pain- Pt with continued abd pain over past 2 weeks, worse with eating, diagnosed with gastroparesis on last admission (last week). CT negative for pancreatic changes making pancreatitis unlikely. C/w PPI, Bentyl, Maalox        ESRD- HD TTS with missed HD for 7 days (since last admission), presenting with hyperkalemia and fluid overload. Urgent HD today. Nephrology following, recs appreciated.         DM- A1c 6.9% on last admission (3/2019) with episodes of hypoglycemia on insulin. Switched to Januvia on discharge last week. Held Januvia while inpatient        HTN - C/w home meds hydralazine, coreg, isosorbide dinitrate        Chronic CHF- CHF (EF 41% 3/2019). Continue home meds Coreg, ASA        Depression- C/w Sertraline    Pt stable for discharge home with outpt f/u and HD

## 2019-04-02 NOTE — DISCHARGE NOTE PROVIDER - CARE PROVIDER_API CALL
Wale Francisco)  Internal Medicine; Nephrology  [address] [address]  Phone: [phone]  Fax: [phone]  Follow Up Time:

## 2019-04-02 NOTE — DISCHARGE NOTE PROVIDER - NSDCCPCAREPLAN_GEN_ALL_CORE_FT
PRINCIPAL DISCHARGE DIAGNOSIS  Diagnosis: Abdominal pain  Assessment and Plan of Treatment: - likely 2/2 Gastroparesis that you were on last admission   - CT A/P negative for pancreatic changes making pancreatitis unlikely  - Continue taking Protonix, Bentyl, Maalox  - follow up with PCP as outpatient for furtyher management      SECONDARY DISCHARGE DIAGNOSES  Diagnosis: Hyperkalemia  Assessment and Plan of Treatment: - Nephrology Dr Francisco consulted in the hospital  - Pt hyperkalemic to 6.0 on admission 2/2 missed HD for 1 week   - No EKG changes and improvement after hyperK+ cocktail given  - s/p urgent HD on 3/30th, 4/1 & 4/2  - Follow up with PCP and Nephro as outpatient for further management    Diagnosis: ESRD (end stage renal disease)  Assessment and Plan of Treatment: - HD TTS schedule. Please continue with dialyses treatments as scheduled to avoid hyperkalemia and fluid overload  - Follow up with Nephrology Dr Francisco as outpatient    Diagnosis: DM (diabetes mellitus)  Assessment and Plan of Treatment: - HgA1c 6.9% on last admission (3/2019) with episodes of hypoglycemia on insulin  - Switched to Januvia on discharge last week, continue taking as prescribed  - Follow up with PCP as outpatient    Diagnosis: Chronic CHF  Assessment and Plan of Treatment: - Previous ECHO EF 41% 3/2019  - Continue taking home medications Coreg, ASA as prescribed    Diagnosis: Elevated lipase  Assessment and Plan of Treatment: PRINCIPAL DISCHARGE DIAGNOSIS  Diagnosis: Abdominal pain  Assessment and Plan of Treatment: - likely 2/2 Gastroparesis that you were on last admission   - CT A/P negative for pancreatic changes making pancreatitis unlikely  - Continue taking Protonix, Bentyl, Maalox  - follow up with PCP as outpatient for furtyher management      SECONDARY DISCHARGE DIAGNOSES  Diagnosis: Hyperkalemia  Assessment and Plan of Treatment: - Nephrology Dr Francisco consulted in the hospital  - Pt hyperkalemic to 6.0 on admission 2/2 missed HD for 1 week   - No EKG changes and improvement after hyperK+ cocktail given  - s/p urgent HD on 3/30th, 4/1 & 4/2  - Follow up with PCP and Nephro as outpatient for further management    Diagnosis: ESRD (end stage renal disease)  Assessment and Plan of Treatment: - HD TTS schedule. Please continue with dialyses treatments as scheduled to avoid hyperkalemia and fluid overload  - Follow up with Nephrology Dr Francisco as outpatient    Diagnosis: DM (diabetes mellitus)  Assessment and Plan of Treatment: - HgA1c 6.9% on last admission (3/2019) with episodes of hypoglycemia on insulin  - Switched to Januvia on discharge last week, continue taking as prescribed  - Follow up with PCP as outpatient    Diagnosis: Chronic CHF  Assessment and Plan of Treatment: - Previous ECHO EF 41% 3/2019  - Continue taking home medications Coreg, ASA as prescribed    Diagnosis: Elevated lipase  Assessment and Plan of Treatment: - Follow up with PCP as outpatient for further management

## 2019-04-02 NOTE — DISCHARGE NOTE NURSING/CASE MANAGEMENT/SOCIAL WORK - NSDCDPATPORTLINK_GEN_ALL_CORE
You can access the [facility] Patient Portal, offered by [facility], by registering with the following website: [email or web address]

## 2019-04-02 NOTE — PROGRESS NOTE ADULT - SUBJECTIVE AND OBJECTIVE BOX
No pain, no shortness of breath      VITAL:  T(C): , Max: 36.9 (04-02-19 @ 07:09)  T(F): , Max: 98.5 (04-02-19 @ 07:09)  HR: 75 (04-02-19 @ 09:15)  BP: 159/74 (04-02-19 @ 09:15)  BP(mean): --  RR: 18 (04-02-19 @ 09:15)  SpO2: 98% (04-02-19 @ 09:15)      PHYSICAL EXAM:  Constitutional: NAD; Alert  HEENT:  NCAT; DMM  Neck: No JVD; supple  Respiratory: CTA-b/l  Cardiac: RRR s1s2  Gastrointestinal: BS+, soft, NT/ND  Urologic: No malcolm  Extremities: No peripheral edema  Back: No CVAT b/l      LABS:                        9.2    4.44  )-----------( 180      ( 01 Apr 2019 06:30 )             30.0     Na(131)/K(4.9)/Cl(92)/HCO3(27)/BUN(31)/Cr(9.09)Glu(124)/Ca(8.9)/Mg(2.4)/PO4(3.8)    04-02 @ 06:35  Na(133)/K(5.9)/Cl(97)/HCO3(25)/BUN(42)/Cr(10.78)Glu(104)/Ca(9.2)/Mg(--)/PO4(--)    04-01 @ 06:30  Na(135)/K(5.1)/Cl(97)/HCO3(27)/BUN(33)/Cr(8.92)Glu(96)/Ca(8.9)/Mg(--)/PO4(4.2)    03-31 @ 05:24  Na(137)/K(5.5)/Cl(98)/HCO3(22)/BUN(73)/Cr(14.82)Glu(147)/Ca(9.0)/Mg(2.8)/PO4(5.4)    03-30 @ 13:20      ASSESSMENT: 53F w/ DM, HTN, gout, CVD and ESRD, 3/30/19 a/w hyperkalemia    (1)Renal - ESRD - HD TTS   (2)Hyperkalemia - in setting of dietary indiscretion/missing HD; improved, s/p HD yesterday  (3)SW - missed HD x 1 week after last discharge. She states that her transportation to and from [facility] is now set up, such that she should be able to make it there from the shelter at which she is residing.      RECOMMENDATIONS:  (1)HD today x 2 hours as ordered  (2)Counseled regarding dietary K+ limitation  (3)No objection to discharge after HD today            Wale Francisco MD  [facility]  [phone] unable to be seen; discharged before I saw her.          Wale Francisco MD  [facility]  [phone]

## 2019-04-13 ENCOUNTER — EMERGENCY (EMERGENCY)
Facility: HOSPITAL | Age: 54
LOS: 1 days | Discharge: HOME | End: 2019-04-13
Attending: EMERGENCY MEDICINE | Admitting: EMERGENCY MEDICINE
Payer: MEDICAID

## 2019-04-13 VITALS
SYSTOLIC BLOOD PRESSURE: 150 MMHG | OXYGEN SATURATION: 99 % | TEMPERATURE: 98 F | HEART RATE: 84 BPM | DIASTOLIC BLOOD PRESSURE: 85 MMHG | RESPIRATION RATE: 16 BRPM

## 2019-04-13 DIAGNOSIS — I77.0 ARTERIOVENOUS FISTULA, ACQUIRED: Chronic | ICD-10-CM

## 2019-04-13 PROCEDURE — 99284 EMERGENCY DEPT VISIT MOD MDM: CPT | Mod: 25

## 2019-04-13 NOTE — ED ADULT TRIAGE NOTE - CHIEF COMPLAINT QUOTE
alert no distress c/o non radiating mid chest pain     c/o dizziness and SOB   hx asthma CHF   aspirin 162mg given by EMS alert c/o left flank pain started today   with N/V/D   hx ESRD with HD  had dialysis today    hx DM HTN CHF

## 2019-04-14 ENCOUNTER — EMERGENCY (EMERGENCY)
Facility: HOSPITAL | Age: 54
LOS: 1 days | Discharge: HOME | End: 2019-04-14
Attending: EMERGENCY MEDICINE | Admitting: EMERGENCY MEDICINE
Payer: MEDICAID

## 2019-04-14 VITALS
HEART RATE: 75 BPM | OXYGEN SATURATION: 100 % | DIASTOLIC BLOOD PRESSURE: 88 MMHG | RESPIRATION RATE: 17 BRPM | SYSTOLIC BLOOD PRESSURE: 165 MMHG | TEMPERATURE: 98 F

## 2019-04-14 VITALS
SYSTOLIC BLOOD PRESSURE: 152 MMHG | OXYGEN SATURATION: 100 % | HEART RATE: 74 BPM | TEMPERATURE: 98 F | DIASTOLIC BLOOD PRESSURE: 75 MMHG | RESPIRATION RATE: 18 BRPM

## 2019-04-14 DIAGNOSIS — I77.0 ARTERIOVENOUS FISTULA, ACQUIRED: Chronic | ICD-10-CM

## 2019-04-14 LAB
ALBUMIN SERPL ELPH-MCNC: 4.2 G/DL — SIGNIFICANT CHANGE UP (ref 3.3–5)
ALP SERPL-CCNC: 139 U/L — HIGH (ref 40–120)
ALT FLD-CCNC: 19 U/L — SIGNIFICANT CHANGE UP (ref 4–33)
ANION GAP SERPL CALC-SCNC: 12 MMO/L — SIGNIFICANT CHANGE UP (ref 7–14)
AST SERPL-CCNC: 20 U/L — SIGNIFICANT CHANGE UP (ref 4–32)
BASOPHILS # BLD AUTO: 0.04 K/UL — SIGNIFICANT CHANGE UP (ref 0–0.2)
BASOPHILS NFR BLD AUTO: 0.7 % — SIGNIFICANT CHANGE UP (ref 0–2)
BILIRUB SERPL-MCNC: 0.3 MG/DL — SIGNIFICANT CHANGE UP (ref 0.2–1.2)
BUN SERPL-MCNC: 16 MG/DL — SIGNIFICANT CHANGE UP (ref 7–23)
CALCIUM SERPL-MCNC: 9.6 MG/DL — SIGNIFICANT CHANGE UP (ref 8.4–10.5)
CHLORIDE SERPL-SCNC: 97 MMOL/L — LOW (ref 98–107)
CO2 SERPL-SCNC: 29 MMOL/L — SIGNIFICANT CHANGE UP (ref 22–31)
CREAT SERPL-MCNC: 5.55 MG/DL — HIGH (ref 0.5–1.3)
EOSINOPHIL # BLD AUTO: 0.16 K/UL — SIGNIFICANT CHANGE UP (ref 0–0.5)
EOSINOPHIL NFR BLD AUTO: 2.8 % — SIGNIFICANT CHANGE UP (ref 0–6)
GLUCOSE SERPL-MCNC: 95 MG/DL — SIGNIFICANT CHANGE UP (ref 70–99)
HCT VFR BLD CALC: 34.4 % — LOW (ref 34.5–45)
HGB BLD-MCNC: 10.5 G/DL — LOW (ref 11.5–15.5)
IMM GRANULOCYTES NFR BLD AUTO: 0.4 % — SIGNIFICANT CHANGE UP (ref 0–1.5)
LYMPHOCYTES # BLD AUTO: 1.58 K/UL — SIGNIFICANT CHANGE UP (ref 1–3.3)
LYMPHOCYTES # BLD AUTO: 28 % — SIGNIFICANT CHANGE UP (ref 13–44)
MCHC RBC-ENTMCNC: 29.2 PG — SIGNIFICANT CHANGE UP (ref 27–34)
MCHC RBC-ENTMCNC: 30.5 % — LOW (ref 32–36)
MCV RBC AUTO: 95.6 FL — SIGNIFICANT CHANGE UP (ref 80–100)
MONOCYTES # BLD AUTO: 0.67 K/UL — SIGNIFICANT CHANGE UP (ref 0–0.9)
MONOCYTES NFR BLD AUTO: 11.9 % — SIGNIFICANT CHANGE UP (ref 2–14)
NEUTROPHILS # BLD AUTO: 3.18 K/UL — SIGNIFICANT CHANGE UP (ref 1.8–7.4)
NEUTROPHILS NFR BLD AUTO: 56.2 % — SIGNIFICANT CHANGE UP (ref 43–77)
NRBC # FLD: 0 K/UL — SIGNIFICANT CHANGE UP (ref 0–0)
PLATELET # BLD AUTO: 256 K/UL — SIGNIFICANT CHANGE UP (ref 150–400)
PMV BLD: 10.8 FL — SIGNIFICANT CHANGE UP (ref 7–13)
POTASSIUM SERPL-MCNC: 3.6 MMOL/L — SIGNIFICANT CHANGE UP (ref 3.5–5.3)
POTASSIUM SERPL-SCNC: 3.6 MMOL/L — SIGNIFICANT CHANGE UP (ref 3.5–5.3)
PROT SERPL-MCNC: 7.6 G/DL — SIGNIFICANT CHANGE UP (ref 6–8.3)
RBC # BLD: 3.6 M/UL — LOW (ref 3.8–5.2)
RBC # FLD: 13.1 % — SIGNIFICANT CHANGE UP (ref 10.3–14.5)
SODIUM SERPL-SCNC: 138 MMOL/L — SIGNIFICANT CHANGE UP (ref 135–145)
WBC # BLD: 5.65 K/UL — SIGNIFICANT CHANGE UP (ref 3.8–10.5)
WBC # FLD AUTO: 5.65 K/UL — SIGNIFICANT CHANGE UP (ref 3.8–10.5)

## 2019-04-14 PROCEDURE — 74176 CT ABD & PELVIS W/O CONTRAST: CPT | Mod: 26

## 2019-04-14 PROCEDURE — 99283 EMERGENCY DEPT VISIT LOW MDM: CPT

## 2019-04-14 RX ORDER — FAMOTIDINE 10 MG/ML
20 INJECTION INTRAVENOUS ONCE
Qty: 0 | Refills: 0 | Status: COMPLETED | OUTPATIENT
Start: 2019-04-14 | End: 2019-04-14

## 2019-04-14 RX ORDER — PANTOPRAZOLE SODIUM 20 MG/1
40 TABLET, DELAYED RELEASE ORAL ONCE
Qty: 0 | Refills: 0 | Status: DISCONTINUED | OUTPATIENT
Start: 2019-04-14 | End: 2019-04-14

## 2019-04-14 RX ORDER — DIPHENHYDRAMINE HCL 50 MG
50 CAPSULE ORAL ONCE
Qty: 0 | Refills: 0 | Status: DISCONTINUED | OUTPATIENT
Start: 2019-04-14 | End: 2019-04-14

## 2019-04-14 RX ORDER — ACETAMINOPHEN 500 MG
650 TABLET ORAL ONCE
Qty: 0 | Refills: 0 | Status: COMPLETED | OUTPATIENT
Start: 2019-04-14 | End: 2019-04-14

## 2019-04-14 RX ORDER — DIPHENHYDRAMINE HCL 50 MG
50 CAPSULE ORAL ONCE
Qty: 0 | Refills: 0 | Status: COMPLETED | OUTPATIENT
Start: 2019-04-14 | End: 2019-04-14

## 2019-04-14 RX ADMIN — Medication 650 MILLIGRAM(S): at 05:37

## 2019-04-14 RX ADMIN — Medication 50 MILLIGRAM(S): at 16:47

## 2019-04-14 RX ADMIN — Medication 30 MILLILITER(S): at 05:37

## 2019-04-14 RX ADMIN — FAMOTIDINE 20 MILLIGRAM(S): 10 INJECTION INTRAVENOUS at 05:37

## 2019-04-14 RX ADMIN — Medication 50 MILLIGRAM(S): at 17:03

## 2019-04-14 RX ADMIN — FAMOTIDINE 20 MILLIGRAM(S): 10 INJECTION INTRAVENOUS at 16:46

## 2019-04-14 RX ADMIN — Medication 650 MILLIGRAM(S): at 06:25

## 2019-04-14 NOTE — ED ADULT NURSE NOTE - OBJECTIVE STATEMENT
Kerrie RN: Patient received in room #16 c/o Left flank pain radiating to LLQ x1day. Patient A&OX3, ambulatory with cane at baseline. Patient reports N/V/D, denies blood in emesis or stool. Hx. ESRD on dialysis T,TH,Sat, AV fistula observed to left arm, +thrills. VS as noted. 20G IV Placed in right arm, labs drawn and sent. Report given to primary RN Sue. Will monitor.

## 2019-04-14 NOTE — ED PROVIDER NOTE - PROGRESS NOTE DETAILS
Pt reports pain improved with meds. CT shows no acute findings to explain pt condition/complaints. Labs otherwise shows improvement in Cr compared to previous and pt s/p HD last night. Will discharge home, f/u with PMD.

## 2019-04-14 NOTE — ED ADULT NURSE NOTE - CHIEF COMPLAINT QUOTE
alert c/o left flank pain started today   with N/V/D   hx ESRD with HD  had dialysis today    hx DM HTN CHF

## 2019-04-14 NOTE — ED PROVIDER NOTE - ATTENDING CONTRIBUTION TO CARE
HPI: 53 yo F with Past Medical History of NIDD, HTN, HLD, ESRD on HD T/Th/Sat (no longer makes urine) last HD 6 hours prior to arrival full course, presents with  LLQ pain x 1day assoc with two episodes nb/nb emesis yday. Also had diarrhea without blood yday. Reports had same pain and was seen and eval'd here 2 wks ago. She's had fever, chills as well but no chest pain, SOB, headache, vision/hearing changes.   EXAM: Somnolent but arousable, speaking full sentences, lungs ctab, abd diffusely tenderness to palpation without rebound or guarding, LLQ tenderness to palpation no Bilateral CVAT. 1+ pitting edema BLE.   MDM: concern for colitis vs gastroenteritis vs diverticulitis. CT from 2 wks ago when pt presented with same pain showed no acute pathology. Will obtain labs and provide meds for now, consider imaging if pain persists.

## 2019-04-14 NOTE — ED PROVIDER NOTE - ATTENDING CONTRIBUTION TO CARE
Pt accidentally ate fish during previous visit. Noted itchiness to skin. Here well appearing, no swelling in the mouth, tolerating po, well appearing, lungs cta, rrr, will treat for allergy and dc to follow up with pmd.

## 2019-04-14 NOTE — ED PROVIDER NOTE - OBJECTIVE STATEMENT
54F w/ pmh dm, htn, hld, esrd on dialysis TTSa p/w allergic reaction - patient came to ED earlier this morning 1am for abd pain - workup unremarkable, was discharged however ~1pm she was given a tray of food with fish and ate it (thought was chicken) - felt swelling in throat afterwards. No rash, sensation of throat closing, throat pain, abd pain, n/v/d/c. Patient denies any other complaints. Hasn't had this reaction in many years.
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
## 2019-04-14 NOTE — ED PROVIDER NOTE - PHYSICAL EXAMINATION
*GEN:   comfortable, in no acute distress, AOx3  *EYES:   pupils equally round and reactive to light, extra-occular movements intact  *HEENT:   airway patent, moist mucosal membranes, full ROM neck  *CV:   regular rate and rhythm  *RESP:   clear to auscultation bilaterally, non-labored  *ABD:   soft, non-tender  *:   no cva/flank tenderness  *MSK:   no MSK tenderness or limited ROM  *SKIN:   dry, intact; AVF L arm visualized with good thrill  *NEURO:   AOx3, no focal weakness or loss of sensation, gait normal

## 2019-04-14 NOTE — ED ADULT TRIAGE NOTE - CHIEF COMPLAINT QUOTE
Pt states she was seen in ED this morning for abdominal pain and discharged x 30 minutes ago. States she ate a lunch tray with fish on it prior to leaving. Pt is allergic to fish but thought the food was chicken. Now co of swelling to throat and difficulty breathing. States tongue feels swollen. Airway visible. Speaking in full sentences.

## 2019-04-14 NOTE — ED PROVIDER NOTE - CLINICAL SUMMARY MEDICAL DECISION MAKING FREE TEXT BOX
54f hx dm, htn, hld, esrd on dialysis T/R/S p/w LLQ pain. +TTP LLQ. Plan: labs, CTAP, pain control, reassess.

## 2019-04-14 NOTE — ED PROVIDER NOTE - PMH
Chronic CHF    CVA (cerebral vascular accident)    Depression    Diabetes    DM (diabetes mellitus)    ESRD (end stage renal disease)  on HD (Tues, Thurs, Sat)  Glaucoma    Gout    HTN (hypertension)

## 2019-04-14 NOTE — ED ADULT TRIAGE NOTE - MODE OF ARRIVAL
Problem: Falls - Risk of  Goal: *Absence of Falls  Document Abena Fall Risk and appropriate interventions in the flowsheet.    Outcome: Progressing Towards Goal  Fall Risk Interventions:  Mobility Interventions: Bed/chair exit alarm     Mentation Interventions: Bed/chair exit alarm     Medication Interventions: Bed/chair exit alarm, Evaluate medications/consider consulting pharmacy     Elimination Interventions: Call light in reach     History of Falls Interventions: Evaluate medications/consider consulting pharmacy Walk in

## 2019-04-14 NOTE — ED PROVIDER NOTE - OBJECTIVE STATEMENT
54f hx dm, htn, hld, esrd on dialysis T/R/S, p/w LLQ pain x 1day assoc w/ n/v (pt says she vomited twice yesterday, nbnb). Pt does not make urine. Last BM yesterday, nonbloody/normal consistency. Well 54f hx dm, htn, hld, esrd on dialysis T/R/S, p/w LLQ pain x 1day assoc w/ n/v (pt says she vomited twice yesterday, nbnb). Pt does not make urine. Last BM yesterday, nonbloody/normal consistency.
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
## 2019-04-18 ENCOUNTER — EMERGENCY (EMERGENCY)
Facility: HOSPITAL | Age: 54
LOS: 1 days | Discharge: HOME | End: 2019-04-18
Attending: EMERGENCY MEDICINE | Admitting: EMERGENCY MEDICINE
Payer: MEDICAID

## 2019-04-18 VITALS
RESPIRATION RATE: 18 BRPM | OXYGEN SATURATION: 100 % | DIASTOLIC BLOOD PRESSURE: 81 MMHG | HEART RATE: 87 BPM | SYSTOLIC BLOOD PRESSURE: 146 MMHG | TEMPERATURE: 98 F

## 2019-04-18 DIAGNOSIS — I77.0 ARTERIOVENOUS FISTULA, ACQUIRED: Chronic | ICD-10-CM

## 2019-04-18 PROCEDURE — 99284 EMERGENCY DEPT VISIT MOD MDM: CPT | Mod: 25

## 2019-04-18 NOTE — ED ADULT TRIAGE NOTE - CHIEF COMPLAINT QUOTE
Pt st" I been having abd pains for a month now today I vomited, I go to diaylsis 3x per week, tues/thur/sat....I had 3 hour treatment today." localizing pain to mid abd pt st" travels from left to rt."

## 2019-04-19 VITALS
DIASTOLIC BLOOD PRESSURE: 88 MMHG | HEART RATE: 82 BPM | TEMPERATURE: 99 F | SYSTOLIC BLOOD PRESSURE: 165 MMHG | OXYGEN SATURATION: 98 % | RESPIRATION RATE: 16 BRPM

## 2019-04-19 LAB
ALBUMIN SERPL ELPH-MCNC: 3.5 G/DL — SIGNIFICANT CHANGE UP (ref 3.3–5)
ALP SERPL-CCNC: 108 U/L — SIGNIFICANT CHANGE UP (ref 40–120)
ALT FLD-CCNC: 10 U/L — SIGNIFICANT CHANGE UP (ref 4–33)
ANION GAP SERPL CALC-SCNC: 13 MMO/L — SIGNIFICANT CHANGE UP (ref 7–14)
AST SERPL-CCNC: 14 U/L — SIGNIFICANT CHANGE UP (ref 4–32)
BASOPHILS # BLD AUTO: 0.03 K/UL — SIGNIFICANT CHANGE UP (ref 0–0.2)
BASOPHILS NFR BLD AUTO: 0.6 % — SIGNIFICANT CHANGE UP (ref 0–2)
BILIRUB SERPL-MCNC: < 0.2 MG/DL — LOW (ref 0.2–1.2)
BUN SERPL-MCNC: 12 MG/DL — SIGNIFICANT CHANGE UP (ref 7–23)
CALCIUM SERPL-MCNC: 8.5 MG/DL — SIGNIFICANT CHANGE UP (ref 8.4–10.5)
CHLORIDE SERPL-SCNC: 97 MMOL/L — LOW (ref 98–107)
CO2 SERPL-SCNC: 27 MMOL/L — SIGNIFICANT CHANGE UP (ref 22–31)
CREAT SERPL-MCNC: 4.32 MG/DL — HIGH (ref 0.5–1.3)
EOSINOPHIL # BLD AUTO: 0.16 K/UL — SIGNIFICANT CHANGE UP (ref 0–0.5)
EOSINOPHIL NFR BLD AUTO: 3.4 % — SIGNIFICANT CHANGE UP (ref 0–6)
GLUCOSE SERPL-MCNC: 218 MG/DL — HIGH (ref 70–99)
HCT VFR BLD CALC: 29.2 % — LOW (ref 34.5–45)
HGB BLD-MCNC: 8.8 G/DL — LOW (ref 11.5–15.5)
IMM GRANULOCYTES NFR BLD AUTO: 0.2 % — SIGNIFICANT CHANGE UP (ref 0–1.5)
LIDOCAIN IGE QN: 30.4 U/L — SIGNIFICANT CHANGE UP (ref 7–60)
LYMPHOCYTES # BLD AUTO: 0.92 K/UL — LOW (ref 1–3.3)
LYMPHOCYTES # BLD AUTO: 19.6 % — SIGNIFICANT CHANGE UP (ref 13–44)
MCHC RBC-ENTMCNC: 28.1 PG — SIGNIFICANT CHANGE UP (ref 27–34)
MCHC RBC-ENTMCNC: 30.1 % — LOW (ref 32–36)
MCV RBC AUTO: 93.3 FL — SIGNIFICANT CHANGE UP (ref 80–100)
MONOCYTES # BLD AUTO: 0.41 K/UL — SIGNIFICANT CHANGE UP (ref 0–0.9)
MONOCYTES NFR BLD AUTO: 8.7 % — SIGNIFICANT CHANGE UP (ref 2–14)
NEUTROPHILS # BLD AUTO: 3.17 K/UL — SIGNIFICANT CHANGE UP (ref 1.8–7.4)
NEUTROPHILS NFR BLD AUTO: 67.5 % — SIGNIFICANT CHANGE UP (ref 43–77)
NRBC # FLD: 0 K/UL — SIGNIFICANT CHANGE UP (ref 0–0)
PLATELET # BLD AUTO: 214 K/UL — SIGNIFICANT CHANGE UP (ref 150–400)
PMV BLD: 10.8 FL — SIGNIFICANT CHANGE UP (ref 7–13)
POTASSIUM SERPL-MCNC: 3.5 MMOL/L — SIGNIFICANT CHANGE UP (ref 3.5–5.3)
POTASSIUM SERPL-SCNC: 3.5 MMOL/L — SIGNIFICANT CHANGE UP (ref 3.5–5.3)
PROT SERPL-MCNC: 6.7 G/DL — SIGNIFICANT CHANGE UP (ref 6–8.3)
RBC # BLD: 3.13 M/UL — LOW (ref 3.8–5.2)
RBC # FLD: 13.8 % — SIGNIFICANT CHANGE UP (ref 10.3–14.5)
SODIUM SERPL-SCNC: 137 MMOL/L — SIGNIFICANT CHANGE UP (ref 135–145)
WBC # BLD: 4.7 K/UL — SIGNIFICANT CHANGE UP (ref 3.8–10.5)
WBC # FLD AUTO: 4.7 K/UL — SIGNIFICANT CHANGE UP (ref 3.8–10.5)

## 2019-04-19 RX ORDER — SUCRALFATE 1 G
1 TABLET ORAL ONCE
Qty: 0 | Refills: 0 | Status: COMPLETED | OUTPATIENT
Start: 2019-04-19 | End: 2019-04-19

## 2019-04-19 RX ORDER — ONDANSETRON 8 MG/1
4 TABLET, FILM COATED ORAL ONCE
Qty: 0 | Refills: 0 | Status: COMPLETED | OUTPATIENT
Start: 2019-04-19 | End: 2019-04-19

## 2019-04-19 RX ADMIN — Medication 1 GRAM(S): at 02:19

## 2019-04-19 RX ADMIN — ONDANSETRON 4 MILLIGRAM(S): 8 TABLET, FILM COATED ORAL at 02:19

## 2019-04-19 NOTE — ED ADULT NURSE REASSESSMENT NOTE - NS ED NURSE REASSESS COMMENT FT1
RN Break coverage: Pt is sleeping at this time. Respirations even & unlabored. Will continue to monitor. Awaiting dispo

## 2019-04-19 NOTE — ED PROVIDER NOTE - NSFOLLOWUPINSTRUCTIONS_ED_ALL_ED_FT
Follow up with gastroenterology in 2-3 days.    Return to the emergency department for severe or worsening pain, inability to eat or drink, or any other new concerning symptoms.

## 2019-04-19 NOTE — ED PROVIDER NOTE - ATTENDING CONTRIBUTION TO CARE
I performed a face to face bedside interview with patient regarding history of present illness, review of symptoms and past medical history. I completed an independent physical exam.  I have discussed patient's plan of care.   I agree with note as stated above, having amended the EMR as needed to reflect my findings. I have discussed the assessment and plan of care.  This includes during the time I functioned as the attending physician for this patient.  Attending Contribution to Care: agree with plan of resident. persistence of LUQ abdominal pain, 2 months w/o recent change in symptoms. Recent imaging negative. Will recheck labs, if negative adjust outpt analgesic regimen and dc w/ GI f/u. pt with chronic anemia. pt pain not likely due to mesenteric ischemia due to time of onset of symptoms. vss. will f/u with gi outpt.

## 2019-04-19 NOTE — ED ADULT NURSE NOTE - NSIMPLEMENTINTERV_GEN_ALL_ED
Implemented All Universal Safety Interventions:  Aline to call system. Call bell, personal items and telephone within reach. Instruct patient to call for assistance. Room bathroom lighting operational. Non-slip footwear when patient is off stretcher. Physically safe environment: no spills, clutter or unnecessary equipment. Stretcher in lowest position, wheels locked, appropriate side rails in place.

## 2019-04-19 NOTE — ED ADULT NURSE NOTE - OBJECTIVE STATEMENT
Patient is awake, A&O x 3, NAD, presents to ED for abdominal pain for one month with associated vomiting today.  Denies vomiting, diarrhea or changes in urinary sx.  Fistula left arm.  Safety measures in place and will continue to monitor patient.

## 2019-04-19 NOTE — ED PROVIDER NOTE - NSFOLLOWUPCLINICS_GEN_ALL_ED_FT
[facility]  Gastroenterology  [address] [place] [id number]  Phone: [phone]  Fax:   Follow Up Time:

## 2019-04-19 NOTE — ED PROVIDER NOTE - OBJECTIVE STATEMENT
I been having abd pains localized to luq for a month now today I vomited, I go to diaylsis 3x per week, states had full HD session. complaining of acute on chronic abd pain. pain ot associated with hd. denies n/v/d, fever, states pain is improved currently. denies any pain s/p eating meals, no claudication.

## 2019-04-19 NOTE — ED PROVIDER NOTE - CLINICAL SUMMARY MEDICAL DECISION MAKING FREE TEXT BOX
Jonathan Weil, PGY2 - persistence of LUQ abdominal pain, 2 months w/o recent change in symptoms. Recent imaging negative. Will recheck labs, if negative adjust outpt analgesic regimen and dc w/ GI f/u.

## 2019-05-02 ENCOUNTER — INPATIENT (INPATIENT)
Facility: HOSPITAL | Age: 54
LOS: 3 days | Discharge: HOME | End: 2019-05-06
Attending: INTERNAL MEDICINE | Admitting: INTERNAL MEDICINE
Payer: MEDICAID

## 2019-05-02 VITALS
OXYGEN SATURATION: 100 % | TEMPERATURE: 98 F | HEART RATE: 97 BPM | DIASTOLIC BLOOD PRESSURE: 63 MMHG | SYSTOLIC BLOOD PRESSURE: 137 MMHG | RESPIRATION RATE: 16 BRPM

## 2019-05-02 DIAGNOSIS — I63.9 CEREBRAL INFARCTION, UNSPECIFIED: ICD-10-CM

## 2019-05-02 DIAGNOSIS — R07.9 CHEST PAIN, UNSPECIFIED: ICD-10-CM

## 2019-05-02 DIAGNOSIS — N18.6 END STAGE RENAL DISEASE: ICD-10-CM

## 2019-05-02 DIAGNOSIS — Z90.49 ACQUIRED ABSENCE OF OTHER SPECIFIED PARTS OF DIGESTIVE TRACT: Chronic | ICD-10-CM

## 2019-05-02 DIAGNOSIS — E11.9 TYPE 2 DIABETES MELLITUS WITHOUT COMPLICATIONS: ICD-10-CM

## 2019-05-02 DIAGNOSIS — Z90.710 ACQUIRED ABSENCE OF BOTH CERVIX AND UTERUS: Chronic | ICD-10-CM

## 2019-05-02 DIAGNOSIS — Z98.83 FILTERING (VITREOUS) BLEB AFTER GLAUCOMA SURGERY STATUS: Chronic | ICD-10-CM

## 2019-05-02 DIAGNOSIS — I10 ESSENTIAL (PRIMARY) HYPERTENSION: ICD-10-CM

## 2019-05-02 DIAGNOSIS — I50.22 CHRONIC SYSTOLIC (CONGESTIVE) HEART FAILURE: ICD-10-CM

## 2019-05-02 DIAGNOSIS — I50.32 CHRONIC DIASTOLIC (CONGESTIVE) HEART FAILURE: ICD-10-CM

## 2019-05-02 DIAGNOSIS — I77.0 ARTERIOVENOUS FISTULA, ACQUIRED: Chronic | ICD-10-CM

## 2019-05-02 LAB
ALBUMIN SERPL ELPH-MCNC: 4 G/DL — SIGNIFICANT CHANGE UP (ref 3.3–5)
ALP SERPL-CCNC: 125 U/L — HIGH (ref 40–120)
ALT FLD-CCNC: 22 U/L — SIGNIFICANT CHANGE UP (ref 4–33)
ANION GAP SERPL CALC-SCNC: 14 MMO/L — SIGNIFICANT CHANGE UP (ref 7–14)
APTT BLD: 30.7 SEC — SIGNIFICANT CHANGE UP (ref 27.5–36.3)
AST SERPL-CCNC: 18 U/L — SIGNIFICANT CHANGE UP (ref 4–32)
BASOPHILS # BLD AUTO: 0.05 K/UL — SIGNIFICANT CHANGE UP (ref 0–0.2)
BASOPHILS NFR BLD AUTO: 0.9 % — SIGNIFICANT CHANGE UP (ref 0–2)
BILIRUB SERPL-MCNC: 0.2 MG/DL — SIGNIFICANT CHANGE UP (ref 0.2–1.2)
BUN SERPL-MCNC: 29 MG/DL — HIGH (ref 7–23)
CALCIUM SERPL-MCNC: 9 MG/DL — SIGNIFICANT CHANGE UP (ref 8.4–10.5)
CHLORIDE SERPL-SCNC: 101 MMOL/L — SIGNIFICANT CHANGE UP (ref 98–107)
CO2 SERPL-SCNC: 27 MMOL/L — SIGNIFICANT CHANGE UP (ref 22–31)
CREAT SERPL-MCNC: 7.37 MG/DL — HIGH (ref 0.5–1.3)
EOSINOPHIL # BLD AUTO: 0.18 K/UL — SIGNIFICANT CHANGE UP (ref 0–0.5)
EOSINOPHIL NFR BLD AUTO: 3.2 % — SIGNIFICANT CHANGE UP (ref 0–6)
GLUCOSE BLDC GLUCOMTR-MCNC: 111 MG/DL — HIGH (ref 70–99)
GLUCOSE BLDC GLUCOMTR-MCNC: 117 MG/DL — HIGH (ref 70–99)
GLUCOSE BLDC GLUCOMTR-MCNC: 170 MG/DL — HIGH (ref 70–99)
GLUCOSE BLDC GLUCOMTR-MCNC: 198 MG/DL — HIGH (ref 70–99)
GLUCOSE SERPL-MCNC: 159 MG/DL — HIGH (ref 70–99)
HCT VFR BLD CALC: 30.6 % — LOW (ref 34.5–45)
HGB BLD-MCNC: 9.4 G/DL — LOW (ref 11.5–15.5)
IMM GRANULOCYTES NFR BLD AUTO: 0.2 % — SIGNIFICANT CHANGE UP (ref 0–1.5)
INR BLD: 1.08 — SIGNIFICANT CHANGE UP (ref 0.88–1.17)
LYMPHOCYTES # BLD AUTO: 1.31 K/UL — SIGNIFICANT CHANGE UP (ref 1–3.3)
LYMPHOCYTES # BLD AUTO: 23.2 % — SIGNIFICANT CHANGE UP (ref 13–44)
MCHC RBC-ENTMCNC: 29.3 PG — SIGNIFICANT CHANGE UP (ref 27–34)
MCHC RBC-ENTMCNC: 30.7 % — LOW (ref 32–36)
MCV RBC AUTO: 95.3 FL — SIGNIFICANT CHANGE UP (ref 80–100)
MONOCYTES # BLD AUTO: 0.44 K/UL — SIGNIFICANT CHANGE UP (ref 0–0.9)
MONOCYTES NFR BLD AUTO: 7.8 % — SIGNIFICANT CHANGE UP (ref 2–14)
NEUTROPHILS # BLD AUTO: 3.66 K/UL — SIGNIFICANT CHANGE UP (ref 1.8–7.4)
NEUTROPHILS NFR BLD AUTO: 64.7 % — SIGNIFICANT CHANGE UP (ref 43–77)
NRBC # FLD: 0 K/UL — SIGNIFICANT CHANGE UP (ref 0–0)
PLATELET # BLD AUTO: 224 K/UL — SIGNIFICANT CHANGE UP (ref 150–400)
PMV BLD: 10.9 FL — SIGNIFICANT CHANGE UP (ref 7–13)
POTASSIUM SERPL-MCNC: 5 MMOL/L — SIGNIFICANT CHANGE UP (ref 3.5–5.3)
POTASSIUM SERPL-SCNC: 5 MMOL/L — SIGNIFICANT CHANGE UP (ref 3.5–5.3)
PROT SERPL-MCNC: 7.3 G/DL — SIGNIFICANT CHANGE UP (ref 6–8.3)
PROTHROM AB SERPL-ACNC: 12 SEC — SIGNIFICANT CHANGE UP (ref 9.8–13.1)
RBC # BLD: 3.21 M/UL — LOW (ref 3.8–5.2)
RBC # FLD: 14.1 % — SIGNIFICANT CHANGE UP (ref 10.3–14.5)
SODIUM SERPL-SCNC: 142 MMOL/L — SIGNIFICANT CHANGE UP (ref 135–145)
TROPONIN T, HIGH SENSITIVITY: 80 NG/L — CRITICAL HIGH (ref ?–14)
TROPONIN T, HIGH SENSITIVITY: 81 NG/L — CRITICAL HIGH (ref ?–14)
WBC # BLD: 5.65 K/UL — SIGNIFICANT CHANGE UP (ref 3.8–10.5)
WBC # FLD AUTO: 5.65 K/UL — SIGNIFICANT CHANGE UP (ref 3.8–10.5)

## 2019-05-02 PROCEDURE — 71045 X-RAY EXAM CHEST 1 VIEW: CPT | Mod: 26

## 2019-05-02 RX ORDER — CALCIUM ACETATE 667 MG
667 TABLET ORAL
Qty: 0 | Refills: 0 | Status: DISCONTINUED | OUTPATIENT
Start: 2019-05-02 | End: 2019-05-06

## 2019-05-02 RX ORDER — AMLODIPINE BESYLATE 2.5 MG/1
5 TABLET ORAL DAILY
Qty: 0 | Refills: 0 | Status: DISCONTINUED | OUTPATIENT
Start: 2019-05-02 | End: 2019-05-06

## 2019-05-02 RX ORDER — OXYBUTYNIN CHLORIDE 5 MG
10 TABLET ORAL
Qty: 0 | Refills: 0 | Status: DISCONTINUED | OUTPATIENT
Start: 2019-05-02 | End: 2019-05-06

## 2019-05-02 RX ORDER — DEXTROSE 50 % IN WATER 50 %
25 SYRINGE (ML) INTRAVENOUS ONCE
Qty: 0 | Refills: 0 | Status: DISCONTINUED | OUTPATIENT
Start: 2019-05-02 | End: 2019-05-06

## 2019-05-02 RX ORDER — HEPARIN SODIUM 5000 [USP'U]/ML
5000 INJECTION INTRAVENOUS; SUBCUTANEOUS EVERY 8 HOURS
Qty: 0 | Refills: 0 | Status: DISCONTINUED | OUTPATIENT
Start: 2019-05-02 | End: 2019-05-06

## 2019-05-02 RX ORDER — ACETAMINOPHEN 500 MG
650 TABLET ORAL ONCE
Qty: 0 | Refills: 0 | Status: COMPLETED | OUTPATIENT
Start: 2019-05-02 | End: 2019-05-02

## 2019-05-02 RX ORDER — CARVEDILOL PHOSPHATE 80 MG/1
12.5 CAPSULE, EXTENDED RELEASE ORAL EVERY 12 HOURS
Qty: 0 | Refills: 0 | Status: DISCONTINUED | OUTPATIENT
Start: 2019-05-02 | End: 2019-05-06

## 2019-05-02 RX ORDER — SERTRALINE 25 MG/1
2 TABLET, FILM COATED ORAL
Qty: 0 | Refills: 0 | COMMUNITY

## 2019-05-02 RX ORDER — SIMVASTATIN 20 MG/1
20 TABLET, FILM COATED ORAL AT BEDTIME
Qty: 0 | Refills: 0 | Status: DISCONTINUED | OUTPATIENT
Start: 2019-05-02 | End: 2019-05-06

## 2019-05-02 RX ORDER — GLUCAGON INJECTION, SOLUTION 0.5 MG/.1ML
1 INJECTION, SOLUTION SUBCUTANEOUS ONCE
Qty: 0 | Refills: 0 | Status: DISCONTINUED | OUTPATIENT
Start: 2019-05-02 | End: 2019-05-06

## 2019-05-02 RX ORDER — OXYBUTYNIN CHLORIDE 5 MG
1 TABLET ORAL
Qty: 0 | Refills: 0 | COMMUNITY

## 2019-05-02 RX ORDER — DEXTROSE 50 % IN WATER 50 %
12.5 SYRINGE (ML) INTRAVENOUS ONCE
Qty: 0 | Refills: 0 | Status: DISCONTINUED | OUTPATIENT
Start: 2019-05-02 | End: 2019-05-06

## 2019-05-02 RX ORDER — SODIUM CHLORIDE 9 MG/ML
1000 INJECTION, SOLUTION INTRAVENOUS
Qty: 0 | Refills: 0 | Status: DISCONTINUED | OUTPATIENT
Start: 2019-05-02 | End: 2019-05-06

## 2019-05-02 RX ORDER — ISOSORBIDE DINITRATE 5 MG/1
10 TABLET ORAL THREE TIMES A DAY
Qty: 0 | Refills: 0 | Status: DISCONTINUED | OUTPATIENT
Start: 2019-05-02 | End: 2019-05-06

## 2019-05-02 RX ORDER — INSULIN LISPRO 100/ML
VIAL (ML) SUBCUTANEOUS
Qty: 0 | Refills: 0 | Status: DISCONTINUED | OUTPATIENT
Start: 2019-05-02 | End: 2019-05-06

## 2019-05-02 RX ORDER — PANTOPRAZOLE SODIUM 20 MG/1
40 TABLET, DELAYED RELEASE ORAL
Qty: 0 | Refills: 0 | Status: DISCONTINUED | OUTPATIENT
Start: 2019-05-02 | End: 2019-05-06

## 2019-05-02 RX ORDER — HYDRALAZINE HCL 50 MG
100 TABLET ORAL THREE TIMES A DAY
Qty: 0 | Refills: 0 | Status: DISCONTINUED | OUTPATIENT
Start: 2019-05-02 | End: 2019-05-06

## 2019-05-02 RX ORDER — FUROSEMIDE 40 MG
20 TABLET ORAL DAILY
Qty: 0 | Refills: 0 | Status: DISCONTINUED | OUTPATIENT
Start: 2019-05-02 | End: 2019-05-06

## 2019-05-02 RX ORDER — CLOPIDOGREL BISULFATE 75 MG/1
75 TABLET, FILM COATED ORAL DAILY
Qty: 0 | Refills: 0 | Status: DISCONTINUED | OUTPATIENT
Start: 2019-05-02 | End: 2019-05-06

## 2019-05-02 RX ORDER — OXYBUTYNIN CHLORIDE 5 MG
5 TABLET ORAL
Qty: 0 | Refills: 0 | Status: DISCONTINUED | OUTPATIENT
Start: 2019-05-02 | End: 2019-05-04

## 2019-05-02 RX ORDER — ASPIRIN/CALCIUM CARB/MAGNESIUM 324 MG
81 TABLET ORAL DAILY
Qty: 0 | Refills: 0 | Status: DISCONTINUED | OUTPATIENT
Start: 2019-05-02 | End: 2019-05-06

## 2019-05-02 RX ORDER — ALLOPURINOL 300 MG
100 TABLET ORAL DAILY
Qty: 0 | Refills: 0 | Status: DISCONTINUED | OUTPATIENT
Start: 2019-05-02 | End: 2019-05-06

## 2019-05-02 RX ORDER — ASPIRIN/CALCIUM CARB/MAGNESIUM 324 MG
162 TABLET ORAL ONCE
Qty: 0 | Refills: 0 | Status: COMPLETED | OUTPATIENT
Start: 2019-05-02 | End: 2019-05-02

## 2019-05-02 RX ORDER — SIMVASTATIN 20 MG/1
1 TABLET, FILM COATED ORAL
Qty: 0 | Refills: 0 | COMMUNITY

## 2019-05-02 RX ORDER — DEXTROSE 50 % IN WATER 50 %
15 SYRINGE (ML) INTRAVENOUS ONCE
Qty: 0 | Refills: 0 | Status: DISCONTINUED | OUTPATIENT
Start: 2019-05-02 | End: 2019-05-06

## 2019-05-02 RX ADMIN — CLOPIDOGREL BISULFATE 75 MILLIGRAM(S): 75 TABLET, FILM COATED ORAL at 13:47

## 2019-05-02 RX ADMIN — Medication 162 MILLIGRAM(S): at 05:57

## 2019-05-02 RX ADMIN — Medication 1: at 13:48

## 2019-05-02 RX ADMIN — Medication 5 MILLIGRAM(S): at 17:47

## 2019-05-02 RX ADMIN — ISOSORBIDE DINITRATE 10 MILLIGRAM(S): 5 TABLET ORAL at 13:48

## 2019-05-02 RX ADMIN — Medication 10 MILLIGRAM(S): at 17:47

## 2019-05-02 RX ADMIN — Medication 650 MILLIGRAM(S): at 05:57

## 2019-05-02 RX ADMIN — Medication 650 MILLIGRAM(S): at 06:57

## 2019-05-02 RX ADMIN — HEPARIN SODIUM 5000 UNIT(S): 5000 INJECTION INTRAVENOUS; SUBCUTANEOUS at 13:48

## 2019-05-02 RX ADMIN — Medication 667 MILLIGRAM(S): at 17:47

## 2019-05-02 RX ADMIN — Medication 100 MILLIGRAM(S): at 13:48

## 2019-05-02 RX ADMIN — Medication 81 MILLIGRAM(S): at 13:48

## 2019-05-02 RX ADMIN — Medication 1: at 17:00

## 2019-05-02 RX ADMIN — CARVEDILOL PHOSPHATE 12.5 MILLIGRAM(S): 80 CAPSULE, EXTENDED RELEASE ORAL at 17:47
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
## 2019-05-02 NOTE — H&P ADULT - NEGATIVE NEUROLOGICAL SYMPTOMS
no loss of consciousness/no paresthesias/no focal seizures/no generalized seizures/no hemiparesis/no headache

## 2019-05-02 NOTE — H&P ADULT - PROBLEM SELECTOR PLAN 1
Admit to Telemetry, serial CE's, EKG's, FLP, continue ASA, plavix, BB, statin. F/U MD note, consider Cardiology eval Admit to Telemetry, serial CE's, EKG's, FLP, continue ASA, plavix, BB, statin. F/U MD note, Cardiology c/s Dr Andrew called

## 2019-05-02 NOTE — H&P ADULT - NSICDXPASTMEDICALHX_GEN_ALL_CORE_FT
PAST MEDICAL HISTORY:  Chronic CHF     CVA (cerebral vascular accident) 2013 w/ Rt sided weakness    Depression     DM (diabetes mellitus)     ESRD (end stage renal disease) on HD (Tues, Thurs, Sat)    Glaucoma     Gout     HTN (hypertension)     KERI (obstructive sleep apnea)
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
## 2019-05-02 NOTE — H&P ADULT - GIT GEN HX ROS MEA POS PC
abdominal pain/vomiting/diarrhea/constipation/See HPI, h/o occasional diarrhea with stool incontinence for a while now, h/o occasional BRBPR when constipated, last BM yesterday

## 2019-05-02 NOTE — ED ADULT NURSE REASSESSMENT NOTE - NS ED NURSE REASSESS COMMENT FT1
Received report from JACQUELYN Leger. Pt sleeping in bed, appears to be in no acute distress at this time. Breathing even and unlabored. VSS and as noted. MD at bedside, will continue to monitor.
Pt at baseline mental status, AxOx4, awake and sitting up comfortably in bed. Pt denies CP, SOB, or any discomfort at this time. Breathing is even and unlabored. Pt presenting with positive ROM in upper and lower extremities. MD at bedside, will continue to monitor.
Report given to RN in ESSU2. Pt waiting to be brought over to area.
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
## 2019-05-02 NOTE — ED PROVIDER NOTE - PROGRESS NOTE DETAILS
[place]: Initial trop elevated. pt currently sleeping. given comorbidities, will admit for further care. resident d/w [facility] hospitalist.

## 2019-05-02 NOTE — H&P ADULT - NEGATIVE ENMT SYMPTOMS
no hearing difficulty/no ear pain/no throat pain/no dysphagia/no sinus symptoms/no tinnitus/no nasal discharge/no nasal congestion

## 2019-05-02 NOTE — H&P ADULT - HISTORY OF PRESENT ILLNESS
53 yo F p/w int CP x 3 days. Pt states she took her meds tuesday night and shortly afterwards started feeling her heart was racing, along with dizziness, SOB & non-radiating, stabbing, left sided CP, 10/10 severity. Pt went food shopping with her friend and son yesterday morning and started feeling the symptoms again. Pt admits to an exertional component to her CP when walking short distances, no pleuritic or reproducible component. Pt has had similar CP in the past every so often especially when missing HD. Pt missed her HD on tuesday because she wasn't feeling good. Pt felt abd pain on tuesday which is similar to her GERD reportedly, vomited up food particles, no blood once on tuesday afternoon.   Pt admits to experiencing anterior chest discomfort when laying down for a while now, so she sleeps with 2-3 pillows, also has a h/o orthopnea. Pt also has PICKARD after walking a few steps for a while now. Admits to chronic b/l LE edema which worsens when she misses dialysis.

## 2019-05-02 NOTE — H&P ADULT - ATTENDING COMMENTS
CC: Patient is a 54y old  Female who presents with a chief complaint of CP (02 May 2019 14:03)    HPI:  55 yo F p/w int CP x 3 days. Pt states she took her meds tuesday night and shortly afterwards started feeling her heart was racing, along with dizziness, SOB & non-radiating, stabbing, left sided CP, 10/10 severity. Pt went food shopping with her friend and son yesterday morning and started feeling the symptoms again. Pt admits to an exertional component to her CP when walking short distances, no pleuritic or reproducible component. Pt has had similar CP in the past every so often especially when missing HD. Pt missed her HD on tuesday because she wasn't feeling good. Pt felt abd pain on tuesday which is similar to her GERD reportedly, vomited up food particles, no blood once on tuesday afternoon.   Pt admits to experiencing anterior chest discomfort when laying down for a while now, so she sleeps with 2-3 pillows, also has a h/o orthopnea. Pt also has PICKARD after walking a few steps for a while now. Admits to chronic b/l LE edema which worsens when she misses dialysis. (02 May 2019 13:21)      PAST MEDICAL & SURGICAL HISTORY:  KERI (obstructive sleep apnea)  Chronic CHF  ESRD (end stage renal disease): on HD (Tues, Thurs, Sat)  Depression  Gout  CVA (cerebral vascular accident): 2013 w/ Rt sided weakness  DM (diabetes mellitus)  HTN (hypertension)  Glaucoma  History of hysterectomy  Status post glaucoma surgery  History of cholecystectomy  AVF (arteriovenous fistula)    Vital Signs Last 24 Hrs  T(C): 36.8 (02 May 2019 15:37), Max: 36.9 (02 May 2019 03:33)  T(F): 98.2 (02 May 2019 15:37), Max: 98.5 (02 May 2019 03:33)  HR: 82 (02 May 2019 15:37) (82 - 97)  BP: 127/70 (02 May 2019 15:37) (127/70 - 147/78)  BP(mean): --  RR: 18 (02 May 2019 15:37) (16 - 20)  SpO2: 98% (02 May 2019 15:37) (96% - 100%)  I&O's Summary      PHYSICAL EXAM:  GENERAL: NAD, well-developed  HEAD:  Atraumatic, Normocephalic  EYES: EOMI, PERRLA, conjunctiva and sclera clear  NECK: Supple, No JVD  CHEST/LUNG: Decreased breath sounds both bases; no wheezes  HEART: Regular rate and rhythm; No murmurs, rubs, or gallops  ABDOMEN: Soft, Nontender, Nondistended; Bowel sounds present  EXTREMITIES:  2+ Peripheral Pulses, 1+ b/l LE pitting edema  SKIN: No rashes or lesions  NEURO: A+O x 3; nonfocal CN/motor/sensory/reflexes  PSYCH: Nl affect; no agitation or delirium; no suicidal or homicidal ideation    LABS:                        9.4    5.65  )-----------( 224      ( 02 May 2019 03:53 )             30.6     05-02    142  |  101  |  29<H>  ----------------------------<  159<H>  5.0   |  27  |  7.37<H>    Ca    9.0      02 May 2019 03:53    TPro  7.3  /  Alb  4.0  /  TBili  0.2  /  DBili  x   /  AST  18  /  ALT  22  /  AlkPhos  125<H>  05-02    PT/INR - ( 02 May 2019 03:53 )   PT: 12.0 SEC;   INR: 1.08          PTT - ( 02 May 2019 03:53 )  PTT:30.7 SEC  CAPILLARY BLOOD GLUCOSE      POCT Blood Glucose.: 170 mg/dL (02 May 2019 16:22)  POCT Blood Glucose.: 198 mg/dL (02 May 2019 13:43)  POCT Blood Glucose.: 111 mg/dL (02 May 2019 09:22)      RADIOLOGY & ADDITIONAL TESTS:    EKG: NSR, no ST changes, inverted T-waves inferiorly and anterolaterally    Imaging Personally Reviewed:  [x] YES  [ ] NO    Consultant(s) Notes Reviewed:  [x] YES  [ ] NO    Care Discussed with Consultants/Other Providers [x] YES  [ ] NO    81-yo female with above past medical history presenting with atypical chest pain.  Agree with A+P above.  Admit to tele to rule out ACS.  Pt has had TTE in March of this year and nuclear stress in June 2018 that was negative for reversible ischemia or prior infarct.  Continue current cardiac and renal medications.  Next HD will be Saturday.  If her symptoms persist or recur after she has had dialysis this Saturday, will discuss with cardiology about more invasive ischemic workup if necessary.
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
## 2019-05-02 NOTE — H&P ADULT - ASSESSMENT
53 yo F p/w int CP x 3 days with palp's, SOB    EKG- NSR @ 99 PRWP,  TWI v5-6, II, III, avF, Flat T waves I, L

## 2019-05-02 NOTE — H&P ADULT - NSICDXPASTSURGICALHX_GEN_ALL_CORE_FT
PAST SURGICAL HISTORY:  AVF (arteriovenous fistula)     History of cholecystectomy     History of hysterectomy     Status post glaucoma surgery

## 2019-05-02 NOTE — CONSULT NOTE ADULT - ASSESSMENT
Echo 1/5/20189: EF 45-50%, global LV sys dysfx, mild diastolic dysfx (stgI)  Stress test in 6/2018 normal with no evidence of mi or ischemia   Echo from 3/ 22/2019, mild to mod MR, moderate global lv sys dysfx small pericardial effusion to left ventricle EF 41%     a/p  52 yo Female with PMHx of Systolic CHF, DM, HTN, HLD, ESRD (Tues, Thurs, Sat) , Gout presenting with cp and sob    1. Chest pain, atypical   symptoms likely in the setting of fluid overload, secondary to missed HD session   cv stable, no cp/sob, no evidence of acute ischemia   HST elevated, type II MI, demand ischemia 2/2 to ESRD , EKG unchanged   Recent echo performed, pt. with known systolic dysfunction (noted on echo from 1/2018), EF 41% (overall unchanged), sml pericardial effusions noted   chest xray with small left pleural effusion and intersitial edema unchanged from prior exam  can check limited Echo to eval pericardial effusion   c/w BB, ASA, imdur, plavix     2. Acute on chronic Systolic chf   fluid overloaded likely secondary to missed HD session  chest xray noted   fluid removal with HD   check echo   continue bb, imdur, hydral. lasix   acei or arb per renal     3. HTN  stable   continue current meds     4. ESRD on HD  renal f/u     dvt ppx

## 2019-05-02 NOTE — H&P ADULT - NSHPREVIEWOFSYSTEMS_GEN_ALL_CORE
REVIEW OF SYSTEMS:    CONSTITUTIONAL: (+) dizziness (-) weakness (-) fevers (-) chills (-) weight loss (-) weight gain (-) sweats (-) poor appetite (-) falls  HEENT: (-) visual changes (-) HA (-) vertigo (-) rhinorrhea (-) epistaxis (-) swelling (-) hearing changes (-) sore throat (-) hoarseness  NECK: (-) stiffness (-) pain  RESPIRATORY: (-) cough (+) SOB (-) PICKARD (-) hemoptysis   CARDIOVASCULAR: (+) chest pain (+) palpitations (-) PND (-) orthopnea  GASTROINTESTINAL: (-) abd pain (-) nausea (-) vomiting (-) diarrhea (-) constipation (-) melena (-) BRBPR (-) dysphagia (-) odynophagia (-) incontinence (-) bloatedness  GENITOURINARY: (-) dysuria  (-) frequency (-) hematuria (-) incontinence (-) abnormal discharge (-) incomplete emptying (-) flank pain  NEUROLOGICAL: (-) confusion (-) slurred speech (-) focal weakness (-) tingling/numbness (-) difficulty walking (-) tremors  MUSCULOSKELETAL: (-) back pain (-) joint pain (-) joint swelling (-) reduced ROM (-) extremity pain (-) extremity swelling  PSYCH: (-) anxious (-) depressed (-) aural hallucinations (-) visual hallucinations  SKIN: (-) itching (-) burning (-) rashes (-) swelling (-) bruising (-) pain  All other review of systems is negative unless indicated above.

## 2019-05-03 DIAGNOSIS — Z02.9 ENCOUNTER FOR ADMINISTRATIVE EXAMINATIONS, UNSPECIFIED: ICD-10-CM

## 2019-05-03 LAB
ALBUMIN SERPL ELPH-MCNC: 3.6 G/DL — SIGNIFICANT CHANGE UP (ref 3.3–5)
ALP SERPL-CCNC: 111 U/L — SIGNIFICANT CHANGE UP (ref 40–120)
ALT FLD-CCNC: 18 U/L — SIGNIFICANT CHANGE UP (ref 4–33)
ANION GAP SERPL CALC-SCNC: 12 MMO/L — SIGNIFICANT CHANGE UP (ref 7–14)
AST SERPL-CCNC: 14 U/L — SIGNIFICANT CHANGE UP (ref 4–32)
B PERT DNA SPEC QL NAA+PROBE: NOT DETECTED — SIGNIFICANT CHANGE UP
BASOPHILS # BLD AUTO: 0.04 K/UL — SIGNIFICANT CHANGE UP (ref 0–0.2)
BASOPHILS NFR BLD AUTO: 1.2 % — SIGNIFICANT CHANGE UP (ref 0–2)
BILIRUB SERPL-MCNC: < 0.2 MG/DL — LOW (ref 0.2–1.2)
BUN SERPL-MCNC: 20 MG/DL — SIGNIFICANT CHANGE UP (ref 7–23)
C PNEUM DNA SPEC QL NAA+PROBE: NOT DETECTED — SIGNIFICANT CHANGE UP
CALCIUM SERPL-MCNC: 9 MG/DL — SIGNIFICANT CHANGE UP (ref 8.4–10.5)
CHLORIDE SERPL-SCNC: 97 MMOL/L — LOW (ref 98–107)
CHOLEST SERPL-MCNC: 93 MG/DL — LOW (ref 120–199)
CO2 SERPL-SCNC: 29 MMOL/L — SIGNIFICANT CHANGE UP (ref 22–31)
CREAT SERPL-MCNC: 6.09 MG/DL — HIGH (ref 0.5–1.3)
EOSINOPHIL # BLD AUTO: 0.23 K/UL — SIGNIFICANT CHANGE UP (ref 0–0.5)
EOSINOPHIL NFR BLD AUTO: 6.8 % — HIGH (ref 0–6)
FLUAV H1 2009 PAND RNA SPEC QL NAA+PROBE: NOT DETECTED — SIGNIFICANT CHANGE UP
FLUAV H1 RNA SPEC QL NAA+PROBE: NOT DETECTED — SIGNIFICANT CHANGE UP
FLUAV H3 RNA SPEC QL NAA+PROBE: NOT DETECTED — SIGNIFICANT CHANGE UP
FLUAV SUBTYP SPEC NAA+PROBE: NOT DETECTED — SIGNIFICANT CHANGE UP
FLUBV RNA SPEC QL NAA+PROBE: NOT DETECTED — SIGNIFICANT CHANGE UP
GLUCOSE BLDC GLUCOMTR-MCNC: 123 MG/DL — HIGH (ref 70–99)
GLUCOSE BLDC GLUCOMTR-MCNC: 125 MG/DL — HIGH (ref 70–99)
GLUCOSE BLDC GLUCOMTR-MCNC: 153 MG/DL — HIGH (ref 70–99)
GLUCOSE SERPL-MCNC: 131 MG/DL — HIGH (ref 70–99)
HADV DNA SPEC QL NAA+PROBE: NOT DETECTED — SIGNIFICANT CHANGE UP
HBA1C BLD-MCNC: 6.1 % — HIGH (ref 4–5.6)
HCOV PNL SPEC NAA+PROBE: SIGNIFICANT CHANGE UP
HCT VFR BLD CALC: 29.2 % — LOW (ref 34.5–45)
HDLC SERPL-MCNC: 51 MG/DL — SIGNIFICANT CHANGE UP (ref 45–65)
HGB BLD-MCNC: 8.8 G/DL — LOW (ref 11.5–15.5)
HMPV RNA SPEC QL NAA+PROBE: NOT DETECTED — SIGNIFICANT CHANGE UP
HPIV1 RNA SPEC QL NAA+PROBE: NOT DETECTED — SIGNIFICANT CHANGE UP
HPIV2 RNA SPEC QL NAA+PROBE: NOT DETECTED — SIGNIFICANT CHANGE UP
HPIV3 RNA SPEC QL NAA+PROBE: NOT DETECTED — SIGNIFICANT CHANGE UP
HPIV4 RNA SPEC QL NAA+PROBE: NOT DETECTED — SIGNIFICANT CHANGE UP
IMM GRANULOCYTES NFR BLD AUTO: 0 % — SIGNIFICANT CHANGE UP (ref 0–1.5)
LIPID PNL WITH DIRECT LDL SERPL: 35 MG/DL — SIGNIFICANT CHANGE UP
LYMPHOCYTES # BLD AUTO: 1.37 K/UL — SIGNIFICANT CHANGE UP (ref 1–3.3)
LYMPHOCYTES # BLD AUTO: 40.8 % — SIGNIFICANT CHANGE UP (ref 13–44)
MAGNESIUM SERPL-MCNC: 2.1 MG/DL — SIGNIFICANT CHANGE UP (ref 1.6–2.6)
MCHC RBC-ENTMCNC: 29.3 PG — SIGNIFICANT CHANGE UP (ref 27–34)
MCHC RBC-ENTMCNC: 30.1 % — LOW (ref 32–36)
MCV RBC AUTO: 97.3 FL — SIGNIFICANT CHANGE UP (ref 80–100)
MONOCYTES # BLD AUTO: 0.37 K/UL — SIGNIFICANT CHANGE UP (ref 0–0.9)
MONOCYTES NFR BLD AUTO: 11 % — SIGNIFICANT CHANGE UP (ref 2–14)
NEUTROPHILS # BLD AUTO: 1.35 K/UL — LOW (ref 1.8–7.4)
NEUTROPHILS NFR BLD AUTO: 40.2 % — LOW (ref 43–77)
NRBC # FLD: 0 K/UL — SIGNIFICANT CHANGE UP (ref 0–0)
PHOSPHATE SERPL-MCNC: 4 MG/DL — SIGNIFICANT CHANGE UP (ref 2.5–4.5)
PLATELET # BLD AUTO: 202 K/UL — SIGNIFICANT CHANGE UP (ref 150–400)
PMV BLD: 11.2 FL — SIGNIFICANT CHANGE UP (ref 7–13)
POTASSIUM SERPL-MCNC: 4.2 MMOL/L — SIGNIFICANT CHANGE UP (ref 3.5–5.3)
POTASSIUM SERPL-SCNC: 4.2 MMOL/L — SIGNIFICANT CHANGE UP (ref 3.5–5.3)
PROT SERPL-MCNC: 6.9 G/DL — SIGNIFICANT CHANGE UP (ref 6–8.3)
RBC # BLD: 3 M/UL — LOW (ref 3.8–5.2)
RBC # FLD: 14.3 % — SIGNIFICANT CHANGE UP (ref 10.3–14.5)
RSV RNA SPEC QL NAA+PROBE: NOT DETECTED — SIGNIFICANT CHANGE UP
RV+EV RNA SPEC QL NAA+PROBE: NOT DETECTED — SIGNIFICANT CHANGE UP
SODIUM SERPL-SCNC: 138 MMOL/L — SIGNIFICANT CHANGE UP (ref 135–145)
TRIGL SERPL-MCNC: 72 MG/DL — SIGNIFICANT CHANGE UP (ref 10–149)
WBC # BLD: 3.36 K/UL — LOW (ref 3.8–10.5)
WBC # FLD AUTO: 3.36 K/UL — LOW (ref 3.8–10.5)

## 2019-05-03 RX ORDER — ERYTHROPOIETIN 10000 [IU]/ML
10000 INJECTION, SOLUTION INTRAVENOUS; SUBCUTANEOUS
Qty: 0 | Refills: 0 | Status: DISCONTINUED | OUTPATIENT
Start: 2019-05-03 | End: 2019-05-06

## 2019-05-03 RX ADMIN — Medication 100 MILLIGRAM(S): at 06:31

## 2019-05-03 RX ADMIN — CLOPIDOGREL BISULFATE 75 MILLIGRAM(S): 75 TABLET, FILM COATED ORAL at 11:37

## 2019-05-03 RX ADMIN — Medication 20 MILLIGRAM(S): at 06:32

## 2019-05-03 RX ADMIN — Medication 5 MILLIGRAM(S): at 17:47

## 2019-05-03 RX ADMIN — Medication 1: at 17:00

## 2019-05-03 RX ADMIN — HEPARIN SODIUM 5000 UNIT(S): 5000 INJECTION INTRAVENOUS; SUBCUTANEOUS at 15:13

## 2019-05-03 RX ADMIN — ISOSORBIDE DINITRATE 10 MILLIGRAM(S): 5 TABLET ORAL at 06:31

## 2019-05-03 RX ADMIN — Medication 100 MILLIGRAM(S): at 11:37

## 2019-05-03 RX ADMIN — Medication 81 MILLIGRAM(S): at 11:38

## 2019-05-03 RX ADMIN — Medication 5 MILLIGRAM(S): at 06:32

## 2019-05-03 RX ADMIN — AMLODIPINE BESYLATE 5 MILLIGRAM(S): 2.5 TABLET ORAL at 06:32

## 2019-05-03 RX ADMIN — Medication 667 MILLIGRAM(S): at 11:37

## 2019-05-03 RX ADMIN — ISOSORBIDE DINITRATE 10 MILLIGRAM(S): 5 TABLET ORAL at 15:12

## 2019-05-03 RX ADMIN — Medication 10 MILLIGRAM(S): at 17:47

## 2019-05-03 RX ADMIN — Medication 667 MILLIGRAM(S): at 17:47

## 2019-05-03 RX ADMIN — HEPARIN SODIUM 5000 UNIT(S): 5000 INJECTION INTRAVENOUS; SUBCUTANEOUS at 21:15

## 2019-05-03 RX ADMIN — CARVEDILOL PHOSPHATE 12.5 MILLIGRAM(S): 80 CAPSULE, EXTENDED RELEASE ORAL at 17:47

## 2019-05-03 RX ADMIN — HEPARIN SODIUM 5000 UNIT(S): 5000 INJECTION INTRAVENOUS; SUBCUTANEOUS at 06:31

## 2019-05-03 RX ADMIN — Medication 10 MILLIGRAM(S): at 06:42

## 2019-05-03 RX ADMIN — Medication 100 MILLIGRAM(S): at 21:17

## 2019-05-03 RX ADMIN — SIMVASTATIN 20 MILLIGRAM(S): 20 TABLET, FILM COATED ORAL at 21:17

## 2019-05-03 RX ADMIN — CARVEDILOL PHOSPHATE 12.5 MILLIGRAM(S): 80 CAPSULE, EXTENDED RELEASE ORAL at 06:32

## 2019-05-03 RX ADMIN — ISOSORBIDE DINITRATE 10 MILLIGRAM(S): 5 TABLET ORAL at 21:17

## 2019-05-03 RX ADMIN — PANTOPRAZOLE SODIUM 40 MILLIGRAM(S): 20 TABLET, DELAYED RELEASE ORAL at 06:31

## 2019-05-03 RX ADMIN — Medication 100 MILLIGRAM(S): at 15:12

## 2019-05-03 NOTE — PROGRESS NOTE ADULT - SUBJECTIVE AND OBJECTIVE BOX
CARDIOLOGY FOLLOW UP - Dr. Andrew    CC no cp or sob       PHYSICAL EXAM:  T(C): 36.7 (05-03-19 @ 06:43), Max: 36.9 (05-02-19 @ 20:35)  HR: 80 (05-03-19 @ 06:43) (80 - 90)  BP: 140/82 (05-03-19 @ 06:43) (127/70 - 143/84)  RR: 18 (05-03-19 @ 06:43) (16 - 19)  SpO2: 97% (05-03-19 @ 06:43) (96% - 99%)  Wt(kg): --  I&O's Summary    02 May 2019 07:01  -  03 May 2019 07:00  --------------------------------------------------------  IN: 700 mL / OUT: 2900 mL / NET: -2200 mL        Appearance: Normal	  Cardiovascular: Normal S1 S2,RRR, No JVD, No murmurs  Respiratory: Lungs clear to auscultation	  Gastrointestinal:  Soft, Non-tender, + BS	  Extremities: Normal range of motion, No clubbing, cyanosis or edema        MEDICATIONS  (STANDING):  allopurinol 100 milliGRAM(s) Oral daily  amLODIPine   Tablet 5 milliGRAM(s) Oral daily  aspirin enteric coated 81 milliGRAM(s) Oral daily  calcium acetate 667 milliGRAM(s) Oral three times a day with meals  carvedilol 12.5 milliGRAM(s) Oral every 12 hours  clopidogrel Tablet 75 milliGRAM(s) Oral daily  dextrose 5%. 1000 milliLiter(s) (50 mL/Hr) IV Continuous <Continuous>  dextrose 50% Injectable 12.5 Gram(s) IV Push once  dextrose 50% Injectable 25 Gram(s) IV Push once  dextrose 50% Injectable 25 Gram(s) IV Push once  epoetin karla Injectable 32334 Unit(s) IV Push <User Schedule>  furosemide    Tablet 20 milliGRAM(s) Oral daily  heparin  Injectable 5000 Unit(s) SubCutaneous every 8 hours  hydrALAZINE 100 milliGRAM(s) Oral three times a day  insulin lispro (HumaLOG) corrective regimen sliding scale   SubCutaneous three times a day before meals  isosorbide   dinitrate Tablet (ISORDIL) 10 milliGRAM(s) Oral three times a day  oxybutynin 5 milliGRAM(s) Oral two times a day  oxybutynin 10 milliGRAM(s) Oral two times a day  pantoprazole    Tablet 40 milliGRAM(s) Oral before breakfast  simvastatin 20 milliGRAM(s) Oral at bedtime      TELEMETRY: 	    ECG:  	  RADIOLOGY:   DIAGNOSTIC TESTING:  [ ] Echocardiogram:  [ ]  Catheterization:  [ ] Stress Test:    OTHER: 	    LABS:	 	                                8.8    3.36  )-----------( 202      ( 03 May 2019 06:15 )             29.2     05-03    138  |  97<L>  |  20  ----------------------------<  131<H>  4.2   |  29  |  6.09<H>    Ca    9.0      03 May 2019 06:15  Phos  4.0     05-03  Mg     2.1     05-03    TPro  6.9  /  Alb  3.6  /  TBili  < 0.2<L>  /  DBili  x   /  AST  14  /  ALT  18  /  AlkPhos  111  05-03    PT/INR - ( 02 May 2019 03:53 )   PT: 12.0 SEC;   INR: 1.08          PTT - ( 02 May 2019 03:53 )  PTT:30.7 SEC

## 2019-05-03 NOTE — PROGRESS NOTE ADULT - ASSESSMENT
Echo 1/5/20189: EF 45-50%, global LV sys dysfx, mild diastolic dysfx (stgI)  Stress test in 6/2018 normal with no evidence of mi or ischemia   Echo from 3/ 22/2019, mild to mod MR, moderate global lv sys dysfx small pericardial effusion to left ventricle EF 41%     a/p  54 yo Female with PMHx of Systolic CHF, DM, HTN, HLD, ESRD (Tues, Thurs, Sat) , Gout presenting with cp and sob    1. Chest pain, atypical   symptoms likely in the setting of fluid overload, secondary to missed HD session   cv stable, no cp/sob, no evidence of acute ischemia   HST elevated, type II MI, demand ischemia 2/2 to ESRD , EKG unchanged   Recent echo performed, pt. with known systolic dysfunction (noted on echo from 1/2018), EF 41% (overall unchanged), sml pericardial effusions noted   chest xray with small left pleural effusion and intersitial edema unchanged from prior exam  can check limited Echo to eval pericardial effusion   c/w BB, ASA, imdur, plavix     2. Acute on chronic Systolic chf   fluid overloaded likely secondary to missed HD session  chest xray noted   fluid removal with HD   check echo   continue bb, imdur, hydral. lasix   acei or arb per renal     3. HTN  stable   continue current meds     4. ESRD on HD  renal f/u     dvt ppx Echo 1/5/20189: EF 45-50%, global LV sys dysfx, mild diastolic dysfx (stgI)  Stress test in 6/2018 normal with no evidence of mi or ischemia   Echo from 3/ 22/2019, mild to mod MR, moderate global lv sys dysfx small pericardial effusion to left ventricle EF 41%     a/p  53 yo Female with PMHx of Systolic CHF, DM, HTN, HLD, ESRD (Tues, Thurs, Sat) , Gout presenting with cp and sob    1. Chest pain, atypical   symptoms likely in the setting of fluid overload, secondary to missed HD session   cv stable, no cp/sob, no evidence of acute ischemia   HST elevated, type II MI, demand ischemia 2/2 to ESRD , EKG unchanged   Recent echo performed, pt. with known systolic dysfunction (noted on echo from 1/2018), EF 41% (overall unchanged), sml pericardial effusions noted   chest xray with small left pleural effusion and intersitial edema unchanged from prior exam  can check limited Echo to eval pericardial effusion   c/w BB, ASA, imdur, plavix     2. Acute on chronic Systolic chf   fluid overloaded likely secondary to missed HD session  chest xray noted   fluid removal with HD   check echo   continue bb, imdur, hydral. lasix   acei or arb per renal     3. HTN  stable   continue current meds     4. ESRD on HD  renal f/u     dvt ppx

## 2019-05-03 NOTE — PROGRESS NOTE ADULT - PROBLEM SELECTOR PLAN 1
Telemetry, no evidence for acute ACS  NO need for inpt ischemic evaluation  continue ASA, plavix, BB, statin.   TTE pending  Cardiology c/s Dr Andrew appreciated

## 2019-05-03 NOTE — PROGRESS NOTE ADULT - SUBJECTIVE AND OBJECTIVE BOX
NEPHROLOGY    Patient seen and examined.    MEDICATIONS  (STANDING):  allopurinol 100 milliGRAM(s) Oral daily  amLODIPine   Tablet 5 milliGRAM(s) Oral daily  aspirin enteric coated 81 milliGRAM(s) Oral daily  calcium acetate 667 milliGRAM(s) Oral three times a day with meals  carvedilol 12.5 milliGRAM(s) Oral every 12 hours  clopidogrel Tablet 75 milliGRAM(s) Oral daily  furosemide    Tablet 20 milliGRAM(s) Oral daily  heparin  Injectable 5000 Unit(s) SubCutaneous every 8 hours  hydrALAZINE 100 milliGRAM(s) Oral three times a day  isosorbide   dinitrate Tablet (ISORDIL) 10 milliGRAM(s) Oral three times a day  oxybutynin 5 milliGRAM(s) Oral two times a day  oxybutynin 10 milliGRAM(s) Oral two times a day  pantoprazole    Tablet 40 milliGRAM(s) Oral before breakfast  simvastatin 20 milliGRAM(s) Oral at bedtime    VITALS:  T(C): , Max: 36.9 (05-02-19 @ 10:36)  T(F): , Max: 98.4 (05-02-19 @ 10:36)  HR: 80 (05-03-19 @ 06:43)  BP: 140/82 (05-03-19 @ 06:43)  RR: 18 (05-03-19 @ 06:43)  SpO2: 97% (05-03-19 @ 06:43)    05-02 @ 07:01  -  05-03 @ 07:00  --------------------------------------------------------  IN: 700 mL / OUT: 2900 mL / NET: -2200 mL    Height (cm): 162.56 (05-02 @ 13:21)  Weight (kg): 90.7 (05-02 @ 13:21)  BMI (kg/m2): 34.3 (05-02 @ 13:21)  BSA (m2): 1.96 (05-02 @ 13:21)    REVIEW OF SYSTEMS:  Full ROS done and were negative unless otherwise indicated in HPI/assessment.     PHYSICAL EXAM:  Constitutional: NAD  Respiratory: CTA B/L  Cardiovascular: S1 and S2, RRR  Gastrointestinal: + BS, soft, NT, ND  Extremities: no peripheral edema  Neurological: AAO x 3  : no malcolm  Access: HD tunneled catheter, temp HD catheter, AVG/AVF    LABS:                        8.8    3.36  )-----------( 202      ( 03 May 2019 06:15 )             29.2     05-03    138  |  97<L>  |  20  ----------------------------<  131<H>  4.2   |  29  |  6.09<H>    Ca    9.0      03 May 2019 06:15  Phos  4.0     05-03  Mg     2.1     05-03    TPro  6.9  /  Alb  3.6  /  TBili  < 0.2<L>  /  DBili  x   /  AST  14  /  ALT  18  /  AlkPhos  111  05-03    ASSESSMENT      Debi Johansen, BOB  [facility]  [phone] NEPHROLOGY    Patient seen and examined.  S/P HD yesterday 2.2 kg UF  NAD, sleeping    MEDICATIONS  (STANDING):  allopurinol 100 milliGRAM(s) Oral daily  amLODIPine   Tablet 5 milliGRAM(s) Oral daily  aspirin enteric coated 81 milliGRAM(s) Oral daily  calcium acetate 667 milliGRAM(s) Oral three times a day with meals  carvedilol 12.5 milliGRAM(s) Oral every 12 hours  clopidogrel Tablet 75 milliGRAM(s) Oral daily  furosemide    Tablet 20 milliGRAM(s) Oral daily  heparin  Injectable 5000 Unit(s) SubCutaneous every 8 hours  hydrALAZINE 100 milliGRAM(s) Oral three times a day  isosorbide   dinitrate Tablet (ISORDIL) 10 milliGRAM(s) Oral three times a day  oxybutynin 5 milliGRAM(s) Oral two times a day  oxybutynin 10 milliGRAM(s) Oral two times a day  pantoprazole    Tablet 40 milliGRAM(s) Oral before breakfast  simvastatin 20 milliGRAM(s) Oral at bedtime    VITALS:  T(C): , Max: 36.9 (05-02-19 @ 10:36)  T(F): , Max: 98.4 (05-02-19 @ 10:36)  HR: 80 (05-03-19 @ 06:43)  BP: 140/82 (05-03-19 @ 06:43)  RR: 18 (05-03-19 @ 06:43)  SpO2: 97% (05-03-19 @ 06:43)    05-02 @ 07:01  -  05-03 @ 07:00  --------------------------------------------------------  IN: 700 mL / OUT: 2900 mL / NET: -2200 mL    Height (cm): 162.56 (05-02 @ 13:21)  Weight (kg): 90.7 (05-02 @ 13:21)  BMI (kg/m2): 34.3 (05-02 @ 13:21)  BSA (m2): 1.96 (05-02 @ 13:21)    REVIEW OF SYSTEMS:  Full ROS done and were negative unless otherwise indicated in HPI/assessment.     PHYSICAL EXAM:  Constitutional: NAD  Respiratory: diminished B/L  Cardiovascular: S1 and S2, RRR  Gastrointestinal: + BS, soft, NT, ND  Extremities: +/trace edema  Neurological: AAO x 3  : no malcolm  Access: LUE AVF (+ thrill)    LABS:                        8.8    3.36  )-----------( 202      ( 03 May 2019 06:15 )             29.2     05-03    138  |  97<L>  |  20  ----------------------------<  131<H>  4.2   |  29  |  6.09<H>    Ca    9.0      03 May 2019 06:15  Phos  4.0     05-03  Mg     2.1     05-03    TPro  6.9  /  Alb  3.6  /  TBili  < 0.2<L>  /  DBili  x   /  AST  14  /  ALT  18  /  AlkPhos  111  05-03    ASSESSMENT  54F w/ DM, HTN, CVA, CHF, KERI, ESRD (TTS) p/w CP/SOB (5/2).  - renal: ESRD on HD TTS  - HTN: BP suboptimally controlled - improving  - HF: volume status improving with dialysis   - hyperphosphatemia: on binders - stable  - anemia: likely of chronic disease    RECOMMEND  - dialysis tomorrow: 3 hours, 3 kg UF  - give epogen 10K tiw with HD   - continue furosemide 20 mg po daily   - continue calcium acetate 667 mg po tid with meals  - renal diet, 1.2 L/day fluid restriction  - dose medication for a GFR <10    Debi Johansen NP  [facility] [employer]  [phone] NEPHROLOGY    Patient seen and examined.  S/P HD yesterday 2.2 kg UF  NAD, sleeping    MEDICATIONS  (STANDING):  allopurinol 100 milliGRAM(s) Oral daily  amLODIPine   Tablet 5 milliGRAM(s) Oral daily  aspirin enteric coated 81 milliGRAM(s) Oral daily  calcium acetate 667 milliGRAM(s) Oral three times a day with meals  carvedilol 12.5 milliGRAM(s) Oral every 12 hours  clopidogrel Tablet 75 milliGRAM(s) Oral daily  furosemide    Tablet 20 milliGRAM(s) Oral daily  heparin  Injectable 5000 Unit(s) SubCutaneous every 8 hours  hydrALAZINE 100 milliGRAM(s) Oral three times a day  isosorbide   dinitrate Tablet (ISORDIL) 10 milliGRAM(s) Oral three times a day  oxybutynin 5 milliGRAM(s) Oral two times a day  oxybutynin 10 milliGRAM(s) Oral two times a day  pantoprazole    Tablet 40 milliGRAM(s) Oral before breakfast  simvastatin 20 milliGRAM(s) Oral at bedtime    VITALS:  T(C): , Max: 36.9 (05-02-19 @ 10:36)  T(F): , Max: 98.4 (05-02-19 @ 10:36)  HR: 80 (05-03-19 @ 06:43)  BP: 140/82 (05-03-19 @ 06:43)  RR: 18 (05-03-19 @ 06:43)  SpO2: 97% (05-03-19 @ 06:43)    05-02 @ 07:01  -  05-03 @ 07:00  --------------------------------------------------------  IN: 700 mL / OUT: 2900 mL / NET: -2200 mL    Height (cm): 162.56 (05-02 @ 13:21)  Weight (kg): 90.7 (05-02 @ 13:21)  BMI (kg/m2): 34.3 (05-02 @ 13:21)  BSA (m2): 1.96 (05-02 @ 13:21)    REVIEW OF SYSTEMS:  Full ROS done and were negative unless otherwise indicated in HPI/assessment.     PHYSICAL EXAM:  Constitutional: NAD  Respiratory: diminished B/L  Cardiovascular: S1 and S2, RRR  Gastrointestinal: + BS, soft, NT, ND  Extremities: +/trace edema  Neurological: AAO x 3  : no malcolm  Access: LUE AVF (+ thrill)    LABS:                        8.8    3.36  )-----------( 202      ( 03 May 2019 06:15 )             29.2     05-03    138  |  97<L>  |  20  ----------------------------<  131<H>  4.2   |  29  |  6.09<H>    Ca    9.0      03 May 2019 06:15  Phos  4.0     05-03  Mg     2.1     05-03    TPro  6.9  /  Alb  3.6  /  TBili  < 0.2<L>  /  DBili  x   /  AST  14  /  ALT  18  /  AlkPhos  111  05-03    ASSESSMENT  54F w/ DM, HTN, CVA, CHF, KERI, ESRD (TTS) p/w CP/SOB (5/2).  - renal: ESRD on HD TTS  - HTN: BP suboptimally controlled - improving  - HF: volume status improving with dialysis   - hyperphosphatemia: on binders - stable  - anemia: likely of chronic disease    RECOMMEND  - dialysis tomorrow: 3 hours, 3 kg UF  - give epogen 10K tiw with HD   - continue furosemide 20 mg po daily   - continue calcium acetate 667 mg po tid with meals  - renal diet, 1.2 L/day fluid restriction  - dose medication for a GFR <10    eDbi Johansen NP  [facility]  [phone]      *******************RENAL ATTENDING**********************  Seen/examined with NP. I agree with NP assessment as above.    VS as above; NAD; CTA-B/L; RRR; no edema b/l; LUE AVF (+)thrill      ASSESSMENT: 54F w/ DM, HTN, CVA, CHF, KERI, ESRD (TTS) p/w CP/SOB (5/2).  (1)Renal - ESRD - HD TTS - due for next HD tomorrow    RECOMMEND  (1)Next HD as ordered            Wale Francisco MD  [employer]  [phone]

## 2019-05-03 NOTE — PROGRESS NOTE ADULT - ASSESSMENT
54 yo F lives in shelter with PMH of DM2, HTN, HLD, ESRD (TTS, last HD 3/23/19) and gout p/w atypical chest pain.    EKG- NSR @ 99 PRWP,  TWI v5-6, II, III, avF, Flat T waves I, L

## 2019-05-04 ENCOUNTER — TRANSCRIPTION ENCOUNTER (OUTPATIENT)
Age: 54
End: 2019-05-04

## 2019-05-04 LAB
ANION GAP SERPL CALC-SCNC: 15 MMO/L — HIGH (ref 7–14)
BASOPHILS # BLD AUTO: 0.03 K/UL — SIGNIFICANT CHANGE UP (ref 0–0.2)
BASOPHILS NFR BLD AUTO: 0.8 % — SIGNIFICANT CHANGE UP (ref 0–2)
BUN SERPL-MCNC: 31 MG/DL — HIGH (ref 7–23)
CALCIUM SERPL-MCNC: 9.1 MG/DL — SIGNIFICANT CHANGE UP (ref 8.4–10.5)
CHLORIDE SERPL-SCNC: 93 MMOL/L — LOW (ref 98–107)
CO2 SERPL-SCNC: 26 MMOL/L — SIGNIFICANT CHANGE UP (ref 22–31)
CREAT SERPL-MCNC: 7.67 MG/DL — HIGH (ref 0.5–1.3)
EOSINOPHIL # BLD AUTO: 0.24 K/UL — SIGNIFICANT CHANGE UP (ref 0–0.5)
EOSINOPHIL NFR BLD AUTO: 6 % — SIGNIFICANT CHANGE UP (ref 0–6)
GLUCOSE BLDC GLUCOMTR-MCNC: 110 MG/DL — HIGH (ref 70–99)
GLUCOSE BLDC GLUCOMTR-MCNC: 114 MG/DL — HIGH (ref 70–99)
GLUCOSE BLDC GLUCOMTR-MCNC: 137 MG/DL — HIGH (ref 70–99)
GLUCOSE BLDC GLUCOMTR-MCNC: 150 MG/DL — HIGH (ref 70–99)
GLUCOSE SERPL-MCNC: 114 MG/DL — HIGH (ref 70–99)
HCT VFR BLD CALC: 27.4 % — LOW (ref 34.5–45)
HGB BLD-MCNC: 8.5 G/DL — LOW (ref 11.5–15.5)
IMM GRANULOCYTES NFR BLD AUTO: 0 % — SIGNIFICANT CHANGE UP (ref 0–1.5)
LYMPHOCYTES # BLD AUTO: 1.58 K/UL — SIGNIFICANT CHANGE UP (ref 1–3.3)
LYMPHOCYTES # BLD AUTO: 39.7 % — SIGNIFICANT CHANGE UP (ref 13–44)
MAGNESIUM SERPL-MCNC: 2.1 MG/DL — SIGNIFICANT CHANGE UP (ref 1.6–2.6)
MCHC RBC-ENTMCNC: 28.9 PG — SIGNIFICANT CHANGE UP (ref 27–34)
MCHC RBC-ENTMCNC: 31 % — LOW (ref 32–36)
MCV RBC AUTO: 93.2 FL — SIGNIFICANT CHANGE UP (ref 80–100)
MONOCYTES # BLD AUTO: 0.35 K/UL — SIGNIFICANT CHANGE UP (ref 0–0.9)
MONOCYTES NFR BLD AUTO: 8.8 % — SIGNIFICANT CHANGE UP (ref 2–14)
NEUTROPHILS # BLD AUTO: 1.78 K/UL — LOW (ref 1.8–7.4)
NEUTROPHILS NFR BLD AUTO: 44.7 % — SIGNIFICANT CHANGE UP (ref 43–77)
NRBC # FLD: 0.03 K/UL — SIGNIFICANT CHANGE UP (ref 0–0)
PHOSPHATE SERPL-MCNC: 4.5 MG/DL — SIGNIFICANT CHANGE UP (ref 2.5–4.5)
PLATELET # BLD AUTO: 202 K/UL — SIGNIFICANT CHANGE UP (ref 150–400)
PMV BLD: 11.7 FL — SIGNIFICANT CHANGE UP (ref 7–13)
POTASSIUM SERPL-MCNC: 4.7 MMOL/L — SIGNIFICANT CHANGE UP (ref 3.5–5.3)
POTASSIUM SERPL-SCNC: 4.7 MMOL/L — SIGNIFICANT CHANGE UP (ref 3.5–5.3)
RBC # BLD: 2.94 M/UL — LOW (ref 3.8–5.2)
RBC # FLD: 14 % — SIGNIFICANT CHANGE UP (ref 10.3–14.5)
SODIUM SERPL-SCNC: 134 MMOL/L — LOW (ref 135–145)
WBC # BLD: 3.98 K/UL — SIGNIFICANT CHANGE UP (ref 3.8–10.5)
WBC # FLD AUTO: 3.98 K/UL — SIGNIFICANT CHANGE UP (ref 3.8–10.5)

## 2019-05-04 RX ADMIN — CLOPIDOGREL BISULFATE 75 MILLIGRAM(S): 75 TABLET, FILM COATED ORAL at 14:50

## 2019-05-04 RX ADMIN — HEPARIN SODIUM 5000 UNIT(S): 5000 INJECTION INTRAVENOUS; SUBCUTANEOUS at 06:07

## 2019-05-04 RX ADMIN — Medication 20 MILLIGRAM(S): at 06:03

## 2019-05-04 RX ADMIN — Medication 100 MILLIGRAM(S): at 14:50

## 2019-05-04 RX ADMIN — ERYTHROPOIETIN 10000 UNIT(S): 10000 INJECTION, SOLUTION INTRAVENOUS; SUBCUTANEOUS at 23:08

## 2019-05-04 RX ADMIN — PANTOPRAZOLE SODIUM 40 MILLIGRAM(S): 20 TABLET, DELAYED RELEASE ORAL at 06:12

## 2019-05-04 RX ADMIN — AMLODIPINE BESYLATE 5 MILLIGRAM(S): 2.5 TABLET ORAL at 06:03

## 2019-05-04 RX ADMIN — Medication 5 MILLIGRAM(S): at 06:03

## 2019-05-04 RX ADMIN — Medication 100 MILLIGRAM(S): at 06:03

## 2019-05-04 RX ADMIN — ISOSORBIDE DINITRATE 10 MILLIGRAM(S): 5 TABLET ORAL at 06:03

## 2019-05-04 RX ADMIN — Medication 81 MILLIGRAM(S): at 14:50

## 2019-05-04 RX ADMIN — Medication 10 MILLIGRAM(S): at 16:58

## 2019-05-04 RX ADMIN — Medication 667 MILLIGRAM(S): at 14:50

## 2019-05-04 RX ADMIN — CARVEDILOL PHOSPHATE 12.5 MILLIGRAM(S): 80 CAPSULE, EXTENDED RELEASE ORAL at 06:03

## 2019-05-04 RX ADMIN — Medication 667 MILLIGRAM(S): at 16:58

## 2019-05-04 RX ADMIN — Medication 10 MILLIGRAM(S): at 06:05

## 2019-05-04 NOTE — PROGRESS NOTE ADULT - ATTENDING COMMENTS
Agree with above NP note.  cv stable  volume removal   cont outpt meds  can check limited echo   no ischemic eval planned/indicated
Agree with above NP note.  cv stable  cont current tx

## 2019-05-04 NOTE — PROGRESS NOTE ADULT - ASSESSMENT
Echo 1/5/20189: EF 45-50%, global LV sys dysfx, mild diastolic dysfx (stgI)  Stress test in 6/2018 normal with no evidence of mi or ischemia   Echo from 3/ 22/2019, mild to mod MR, moderate global lv sys dysfx small pericardial effusion to left ventricle EF 41%     a/p  53 yo Female with PMHx of Systolic CHF, DM, HTN, HLD, ESRD (Tues, Thurs, Sat) , Gout presenting with cp and sob    1. Chest pain, atypical   symptoms likely in the setting of fluid overload, secondary to missed HD session   cv stable, no cp/sob, no evidence of acute ischemia   HST elevated, type II MI, demand ischemia 2/2 to ESRD , EKG unchanged   Recent echo performed, pt. with known systolic dysfunction (noted on echo from 1/2018), EF 41% (overall unchanged), sml pericardial effusions noted   chest xray with small left pleural effusion and intersitial edema unchanged from prior exam  can check limited Echo to eval pericardial effusion   c/w BB, ASA, imdur, plavix     2. Acute on chronic Systolic chf   fluid overloaded likely secondary to missed HD session  now clinically improving, fluid removal with HD / lasix po  continue bb, imdur, hydral. lasix   acei or arb per renal     3. HTN  stable   continue current meds     4. ESRD on HD  renal f/u     dvt ppx Echo 1/5/20189: EF 45-50%, global LV sys dysfx, mild diastolic dysfx (stgI)  Stress test in 6/2018 normal with no evidence of mi or ischemia   Echo from 3/ 22/2019, mild to mod MR, moderate global lv sys dysfx small pericardial effusion to left ventricle EF 41%     a/p  55 yo Female with PMHx of Systolic CHF, DM, HTN, HLD, ESRD (Tues, Thurs, Sat) , Gout presenting with cp and sob    1. Chest pain, atypical   symptoms likely in the setting of fluid overload, secondary to missed HD session   cv stable, no cp/sob, no evidence of acute ischemia   HST elevated, type II MI, demand ischemia 2/2 to ESRD , EKG unchanged   Recent echo performed, pt. with known systolic dysfunction (noted on echo from 1/2018), EF 41% (overall unchanged), sml pericardial effusion noted   chest xray with small left pleural effusion and intersitial edema unchanged from prior exam  can check limited Echo to eval pericardial effusion   c/w BB, ASA, imdur, plavix     2. Acute on chronic Systolic chf   fluid overloaded likely secondary to missed HD session  now clinically improving, fluid removal with HD / lasix po  continue bb, imdur, hydral. lasix   acei or arb per renal     3. HTN  stable   continue current meds     4. ESRD on HD  renal f/u     dvt ppx

## 2019-05-04 NOTE — PROGRESS NOTE ADULT - SUBJECTIVE AND OBJECTIVE BOX
She denies any cardiac/pulmonary symptoms this morning    Vital Signs Last 24 Hrs  T(C): 36.6 (04 May 2019 05:59), Max: 36.7 (03 May 2019 21:08)  T(F): 97.9 (04 May 2019 05:59), Max: 98.1 (03 May 2019 21:08)  HR: 83 (04 May 2019 05:59) (79 - 86)  BP: 122/66 (04 May 2019 05:59) (122/66 - 134/84)  BP(mean): --  RR: 18 (04 May 2019 05:59) (17 - 18)  SpO2: 99% (04 May 2019 05:59) (99% - 100%)    GENERAL: NAD, well-developed  HEAD:  Atraumatic, Normocephalic  EYES: EOMI, PERRLA, conjunctiva and sclera clear  NECK: Supple, No JVD  CHEST/LUNG: Decreased breath sounds both bases; no wheezes  HEART: Regular rate and rhythm; No murmurs, rubs, or gallops  ABDOMEN: Soft, Nontender, Nondistended; Bowel sounds present  EXTREMITIES:  2+ Peripheral Pulses, 1+ b/l LE pitting edemal lt UE AVF with palpable thrill  SKIN: No rashes or lesions  NEURO: A+O x 3; nonfocal CN/motor/sensory/reflexes  PSYCH: Nl affect; no agitation or delirium; no suicidal or homicidal ideation    LABS:                        8.5    3.98  )-----------( 202      ( 04 May 2019 06:30 )             27.4     05-04    134<L>  |  93<L>  |  31<H>  ----------------------------<  114<H>  4.7   |  26  |  7.67<H>    Ca    9.1      04 May 2019 06:30  Phos  4.5     05-04  Mg     2.1     05-04    TPro  6.9  /  Alb  3.6  /  TBili  < 0.2<L>  /  DBili  x   /  AST  14  /  ALT  18  /  AlkPhos  111  05-03      CAPILLARY BLOOD GLUCOSE      POCT Blood Glucose.: 150 mg/dL (04 May 2019 11:36)  POCT Blood Glucose.: 114 mg/dL (04 May 2019 07:31)  POCT Blood Glucose.: 153 mg/dL (03 May 2019 16:37)

## 2019-05-04 NOTE — DISCHARGE NOTE PROVIDER - PROVIDER TOKENS
FREE:[LAST:[Anderson],FIRST:[],PHONE:[(   )    -],FAX:[(   )    -],ADDRESS:[PCP]] FREE:[LAST:[Anderson],FIRST:[],PHONE:[(   )    -],FAX:[(   )    -],ADDRESS:[PCP]],PROVIDER:[TOKEN:[[id number]]]

## 2019-05-04 NOTE — PROGRESS NOTE ADULT - SUBJECTIVE AND OBJECTIVE BOX
CARDIOLOGY FOLLOW UP - Dr. Andrew    CC no cp or sob       PHYSICAL EXAM:  T(C): 36.6 (05-04-19 @ 05:59), Max: 36.7 (05-03-19 @ 21:08)  HR: 83 (05-04-19 @ 05:59) (79 - 86)  BP: 122/66 (05-04-19 @ 05:59) (122/66 - 134/84)  RR: 18 (05-04-19 @ 05:59) (17 - 18)  SpO2: 99% (05-04-19 @ 05:59) (99% - 100%)  Wt(kg): --  I&O's Summary    03 May 2019 07:01  -  04 May 2019 07:00  --------------------------------------------------------  IN: 1020 mL / OUT: 0 mL / NET: 1020 mL        Appearance: Normal	  Cardiovascular: Normal S1 S2,RRR, No JVD, No murmurs  Respiratory: Lungs clear to auscultation	  Gastrointestinal:  Soft, Non-tender, + BS	  Extremities: Normal range of motion, No clubbing, cyanosis or edema        MEDICATIONS  (STANDING):  allopurinol 100 milliGRAM(s) Oral daily  amLODIPine   Tablet 5 milliGRAM(s) Oral daily  aspirin enteric coated 81 milliGRAM(s) Oral daily  calcium acetate 667 milliGRAM(s) Oral three times a day with meals  carvedilol 12.5 milliGRAM(s) Oral every 12 hours  clopidogrel Tablet 75 milliGRAM(s) Oral daily  dextrose 5%. 1000 milliLiter(s) (50 mL/Hr) IV Continuous <Continuous>  dextrose 50% Injectable 12.5 Gram(s) IV Push once  dextrose 50% Injectable 25 Gram(s) IV Push once  dextrose 50% Injectable 25 Gram(s) IV Push once  epoetin karla Injectable 41106 Unit(s) IV Push <User Schedule>  furosemide    Tablet 20 milliGRAM(s) Oral daily  heparin  Injectable 5000 Unit(s) SubCutaneous every 8 hours  hydrALAZINE 100 milliGRAM(s) Oral three times a day  insulin lispro (HumaLOG) corrective regimen sliding scale   SubCutaneous three times a day before meals  isosorbide   dinitrate Tablet (ISORDIL) 10 milliGRAM(s) Oral three times a day  oxybutynin 5 milliGRAM(s) Oral two times a day  oxybutynin 10 milliGRAM(s) Oral two times a day  pantoprazole    Tablet 40 milliGRAM(s) Oral before breakfast  simvastatin 20 milliGRAM(s) Oral at bedtime      TELEMETRY: NSR	    ECG:  	  RADIOLOGY:   DIAGNOSTIC TESTING:  [ ] Echocardiogram:  [ ]  Catheterization:  [ ] Stress Test:    OTHER: 	    LABS:	 	                                8.5    3.98  )-----------( 202      ( 04 May 2019 06:30 )             27.4     05-04    134<L>  |  93<L>  |  31<H>  ----------------------------<  114<H>  4.7   |  26  |  7.67<H>    Ca    9.1      04 May 2019 06:30  Phos  4.5     05-04  Mg     2.1     05-04    TPro  6.9  /  Alb  3.6  /  TBili  < 0.2<L>  /  DBili  x   /  AST  14  /  ALT  18  /  AlkPhos  111  05-03

## 2019-05-04 NOTE — DISCHARGE NOTE PROVIDER - CARE PROVIDER_API CALL
Dr. Anderson  PCP  Phone: (   )    -  Fax: (   )    -  Follow Up Time: Dr. Anderson  PCP  Phone: (   )    -  Fax: (   )    -  Follow Up Time:     Jose Andrew)  Cardiology; Internal Medicine  [address], [address]  [place] [address]  Phone: [phone]  Fax: [phone]  Follow Up Time:

## 2019-05-04 NOTE — DISCHARGE NOTE PROVIDER - HOSPITAL COURSE
55 y/o F PMH of DM, HTN, CVA, CHF, KERI, ESRD (TTS) p/w int Lt sided CP         COURSE_______ 53 y/o F PMH of DM, HTN, CVA, CHF, KERI, ESRD (TTS) p/w left-sided chest pain         Chest pain: Initially admitted to telemetry and cardiology consulted; Cardiac work-up during admission was negative and patient's symptoms resolved    	CXR: Small L pleural effusion and interstitial edema are unchanged from prior    	Trops 81 -> 80 in setting of ESRD    	Previous ECHO 3/19: Evidence of mitral regurgitation; Moderate global LV systolic dysfunction    	NST 6/18: Normal Study; LV was hypertrophied    	TTE no significant change from last; Moderate global L ventricular systolic dysfunction        End-stage renal disease: Creatinine and electrolytes monitored, renal consulted, medications continued, hemodialysis TTS schedule as previously established         Heart failure: Cardiology consulted and diuretics continued; ECHO with no significant change        H/o CVA: C/w ASA & Plavix        Diabetes: Holding oral medications while inpatient and placed on insulin sliding scale; A1C 6.1%        Hypertension: C/w home medications and monitor BP         Gout: C/w Allopurinol         Dispo - Home

## 2019-05-04 NOTE — DISCHARGE NOTE PROVIDER - NSDCCPCAREPLAN_GEN_ALL_CORE_FT
PRINCIPAL DISCHARGE DIAGNOSIS  Diagnosis: Chest pain  Assessment and Plan of Treatment: Cardiology consulted and recommends _____ PRINCIPAL DISCHARGE DIAGNOSIS  Diagnosis: Chest pain  Assessment and Plan of Treatment: Your echocardiogram was similar to your last showing moderate ventricular dysfunction - No intervention warranted. Please continue medications as previously established and follow-up with cardiology as outpatient within 1-2 weeks.

## 2019-05-05 LAB
ANION GAP SERPL CALC-SCNC: 11 MMO/L — SIGNIFICANT CHANGE UP (ref 7–14)
BUN SERPL-MCNC: 19 MG/DL — SIGNIFICANT CHANGE UP (ref 7–23)
CALCIUM SERPL-MCNC: 9.3 MG/DL — SIGNIFICANT CHANGE UP (ref 8.4–10.5)
CHLORIDE SERPL-SCNC: 96 MMOL/L — LOW (ref 98–107)
CO2 SERPL-SCNC: 28 MMOL/L — SIGNIFICANT CHANGE UP (ref 22–31)
CREAT SERPL-MCNC: 6.18 MG/DL — HIGH (ref 0.5–1.3)
GLUCOSE BLDC GLUCOMTR-MCNC: 101 MG/DL — HIGH (ref 70–99)
GLUCOSE BLDC GLUCOMTR-MCNC: 102 MG/DL — HIGH (ref 70–99)
GLUCOSE BLDC GLUCOMTR-MCNC: 103 MG/DL — HIGH (ref 70–99)
GLUCOSE BLDC GLUCOMTR-MCNC: 97 MG/DL — SIGNIFICANT CHANGE UP (ref 70–99)
GLUCOSE SERPL-MCNC: 102 MG/DL — HIGH (ref 70–99)
HCT VFR BLD CALC: 28.6 % — LOW (ref 34.5–45)
HGB BLD-MCNC: 8.7 G/DL — LOW (ref 11.5–15.5)
MAGNESIUM SERPL-MCNC: 2 MG/DL — SIGNIFICANT CHANGE UP (ref 1.6–2.6)
MCHC RBC-ENTMCNC: 28.7 PG — SIGNIFICANT CHANGE UP (ref 27–34)
MCHC RBC-ENTMCNC: 30.4 % — LOW (ref 32–36)
MCV RBC AUTO: 94.4 FL — SIGNIFICANT CHANGE UP (ref 80–100)
NRBC # FLD: 0 K/UL — SIGNIFICANT CHANGE UP (ref 0–0)
PHOSPHATE SERPL-MCNC: 3.6 MG/DL — SIGNIFICANT CHANGE UP (ref 2.5–4.5)
PLATELET # BLD AUTO: 222 K/UL — SIGNIFICANT CHANGE UP (ref 150–400)
PMV BLD: 11 FL — SIGNIFICANT CHANGE UP (ref 7–13)
POTASSIUM SERPL-MCNC: 4.1 MMOL/L — SIGNIFICANT CHANGE UP (ref 3.5–5.3)
POTASSIUM SERPL-SCNC: 4.1 MMOL/L — SIGNIFICANT CHANGE UP (ref 3.5–5.3)
RBC # BLD: 3.03 M/UL — LOW (ref 3.8–5.2)
RBC # FLD: 13.9 % — SIGNIFICANT CHANGE UP (ref 10.3–14.5)
SODIUM SERPL-SCNC: 135 MMOL/L — SIGNIFICANT CHANGE UP (ref 135–145)
WBC # BLD: 3.18 K/UL — LOW (ref 3.8–10.5)
WBC # FLD AUTO: 3.18 K/UL — LOW (ref 3.8–10.5)

## 2019-05-05 PROCEDURE — 93306 TTE W/DOPPLER COMPLETE: CPT | Mod: 26

## 2019-05-05 RX ADMIN — ISOSORBIDE DINITRATE 10 MILLIGRAM(S): 5 TABLET ORAL at 13:50

## 2019-05-05 RX ADMIN — Medication 10 MILLIGRAM(S): at 05:30

## 2019-05-05 RX ADMIN — ISOSORBIDE DINITRATE 10 MILLIGRAM(S): 5 TABLET ORAL at 00:45

## 2019-05-05 RX ADMIN — Medication 667 MILLIGRAM(S): at 11:57

## 2019-05-05 RX ADMIN — Medication 667 MILLIGRAM(S): at 07:56

## 2019-05-05 RX ADMIN — Medication 81 MILLIGRAM(S): at 11:57

## 2019-05-05 RX ADMIN — CARVEDILOL PHOSPHATE 12.5 MILLIGRAM(S): 80 CAPSULE, EXTENDED RELEASE ORAL at 18:09

## 2019-05-05 RX ADMIN — AMLODIPINE BESYLATE 5 MILLIGRAM(S): 2.5 TABLET ORAL at 05:30

## 2019-05-05 RX ADMIN — Medication 100 MILLIGRAM(S): at 00:45

## 2019-05-05 RX ADMIN — Medication 100 MILLIGRAM(S): at 22:24

## 2019-05-05 RX ADMIN — HEPARIN SODIUM 5000 UNIT(S): 5000 INJECTION INTRAVENOUS; SUBCUTANEOUS at 22:25

## 2019-05-05 RX ADMIN — HEPARIN SODIUM 5000 UNIT(S): 5000 INJECTION INTRAVENOUS; SUBCUTANEOUS at 05:30

## 2019-05-05 RX ADMIN — SIMVASTATIN 20 MILLIGRAM(S): 20 TABLET, FILM COATED ORAL at 00:45

## 2019-05-05 RX ADMIN — Medication 100 MILLIGRAM(S): at 13:50

## 2019-05-05 RX ADMIN — ISOSORBIDE DINITRATE 10 MILLIGRAM(S): 5 TABLET ORAL at 05:30

## 2019-05-05 RX ADMIN — CLOPIDOGREL BISULFATE 75 MILLIGRAM(S): 75 TABLET, FILM COATED ORAL at 11:57

## 2019-05-05 RX ADMIN — PANTOPRAZOLE SODIUM 40 MILLIGRAM(S): 20 TABLET, DELAYED RELEASE ORAL at 06:31

## 2019-05-05 RX ADMIN — Medication 100 MILLIGRAM(S): at 11:57

## 2019-05-05 RX ADMIN — CARVEDILOL PHOSPHATE 12.5 MILLIGRAM(S): 80 CAPSULE, EXTENDED RELEASE ORAL at 05:30

## 2019-05-05 RX ADMIN — HEPARIN SODIUM 5000 UNIT(S): 5000 INJECTION INTRAVENOUS; SUBCUTANEOUS at 00:45

## 2019-05-05 RX ADMIN — ISOSORBIDE DINITRATE 10 MILLIGRAM(S): 5 TABLET ORAL at 22:24

## 2019-05-05 RX ADMIN — SIMVASTATIN 20 MILLIGRAM(S): 20 TABLET, FILM COATED ORAL at 22:24

## 2019-05-05 RX ADMIN — Medication 10 MILLIGRAM(S): at 18:09

## 2019-05-05 RX ADMIN — Medication 667 MILLIGRAM(S): at 18:08

## 2019-05-05 RX ADMIN — Medication 20 MILLIGRAM(S): at 05:30

## 2019-05-05 RX ADMIN — Medication 100 MILLIGRAM(S): at 05:30

## 2019-05-05 NOTE — PROGRESS NOTE ADULT - SUBJECTIVE AND OBJECTIVE BOX
NO acute CP or SOB    Vital Signs Last 24 Hrs  T(C): 36.7 (05 May 2019 05:25), Max: 37.2 (05 May 2019 00:00)  T(F): 98.1 (05 May 2019 05:25), Max: 99 (05 May 2019 00:00)  HR: 84 (05 May 2019 05:25) (75 - 90)  BP: 146/84 (05 May 2019 05:25) (122/82 - 147/74)  BP(mean): --  RR: 17 (05 May 2019 05:25) (16 - 18)  SpO2: 100% (05 May 2019 05:25) (95% - 100%)    GENERAL: NAD, well-developed  HEAD:  Atraumatic, Normocephalic  EYES: EOMI, PERRLA, conjunctiva and sclera clear  NECK: Supple, No JVD  CHEST/LUNG: Decreased breath sounds both bases; no wheezes  HEART: Regular rate and rhythm; No murmurs, rubs, or gallops  ABDOMEN: Soft, Nontender, Nondistended; Bowel sounds present  EXTREMITIES:  2+ Peripheral Pulses, 1+ b/l LE pitting edemal lt UE AVF with palpable thrill  SKIN: No rashes or lesions  NEURO: A+O x 3; nonfocal CN/motor/sensory/reflexes  PSYCH: Nl affect; no agitation or delirium; no suicidal or homicidal ideation    LABS:                        8.7    3.18  )-----------( 222      ( 05 May 2019 06:00 )             28.6     05-05    135  |  96<L>  |  19  ----------------------------<  102<H>  4.1   |  28  |  6.18<H>    Ca    9.3      05 May 2019 06:00  Phos  3.6     05-05  Mg     2.0     05-05        CAPILLARY BLOOD GLUCOSE      POCT Blood Glucose.: 102 mg/dL (05 May 2019 11:49)  POCT Blood Glucose.: 101 mg/dL (05 May 2019 07:47)  POCT Blood Glucose.: 110 mg/dL (04 May 2019 23:00)  POCT Blood Glucose.: 137 mg/dL (04 May 2019 16:44)

## 2019-05-05 NOTE — PROGRESS NOTE ADULT - ASSESSMENT
Echo 1/5/20189: EF 45-50%, global LV sys dysfx, mild diastolic dysfx (stgI)  Stress test in 6/2018 normal with no evidence of mi or ischemia   Echo from 3/ 22/2019, mild to mod MR, moderate global lv sys dysfx small pericardial effusion to left ventricle EF 41%     a/p  53 yo Female with PMHx of Systolic CHF, DM, HTN, HLD, ESRD (Tues, Thurs, Sat) , Gout presenting with cp and sob    1. Chest pain, atypical   symptoms likely in the setting of fluid overload, secondary to missed HD session   cv stable, no cp/sob, no evidence of acute ischemia   HST elevated, type II MI, demand ischemia 2/2 to ESRD , EKG unchanged   Recent echo performed, pt. with known systolic dysfunction (noted on echo from 1/2018), EF 41% (overall unchanged), sml pericardial effusion noted   chest xray with small left pleural effusion and intersitial edema unchanged from prior exam  can check limited Echo to eval pericardial effusion   c/w BB, ASA, imdur, plavix     2. Acute on chronic Systolic chf   fluid overloaded likely secondary to missed HD session  now clinically improving, fluid removal with HD / lasix po  continue bb, imdur, hydral. lasix   acei or arb per renal     3. HTN  stable   continue current meds     4. ESRD on HD  renal f/u     dvt ppx

## 2019-05-05 NOTE — PROGRESS NOTE ADULT - SUBJECTIVE AND OBJECTIVE BOX
CC: no events    TELEMETRY:     PHYSICAL EXAM:    T(C): 36.7 (05-05-19 @ 05:25), Max: 37.2 (05-05-19 @ 00:00)  HR: 84 (05-05-19 @ 05:25) (75 - 90)  BP: 146/84 (05-05-19 @ 05:25) (122/82 - 147/74)  RR: 17 (05-05-19 @ 05:25) (16 - 18)  SpO2: 100% (05-05-19 @ 05:25) (95% - 100%)  Wt(kg): --  I&O's Summary    04 May 2019 07:01  -  05 May 2019 07:00  --------------------------------------------------------  IN: 400 mL / OUT: 3000 mL / NET: -2600 mL        Appearance: Normal	  Cardiovascular: Normal S1 S2,RRR, No JVD, No murmurs  Respiratory: Lungs clear to auscultation	  Gastrointestinal:  Soft, Non-tender, + BS	  Extremities: Normal range of motion, No clubbing, cyanosis or edema  Vascular: Peripheral pulses palpable 2+ bilaterally     LABS:	 	                          8.7    3.18  )-----------( 222      ( 05 May 2019 06:00 )             28.6     05-05    135  |  96<L>  |  19  ----------------------------<  102<H>  4.1   |  28  |  6.18<H>    Ca    9.3      05 May 2019 06:00  Phos  3.6     05-05  Mg     2.0     05-05            CARDIAC MARKERS:

## 2019-05-06 ENCOUNTER — TRANSCRIPTION ENCOUNTER (OUTPATIENT)
Age: 54
End: 2019-05-06

## 2019-05-06 VITALS
DIASTOLIC BLOOD PRESSURE: 81 MMHG | OXYGEN SATURATION: 99 % | HEART RATE: 77 BPM | RESPIRATION RATE: 18 BRPM | SYSTOLIC BLOOD PRESSURE: 138 MMHG | TEMPERATURE: 99 F

## 2019-05-06 LAB
ANION GAP SERPL CALC-SCNC: 14 MMO/L — SIGNIFICANT CHANGE UP (ref 7–14)
BUN SERPL-MCNC: 28 MG/DL — HIGH (ref 7–23)
CALCIUM SERPL-MCNC: 9.3 MG/DL — SIGNIFICANT CHANGE UP (ref 8.4–10.5)
CHLORIDE SERPL-SCNC: 100 MMOL/L — SIGNIFICANT CHANGE UP (ref 98–107)
CO2 SERPL-SCNC: 25 MMOL/L — SIGNIFICANT CHANGE UP (ref 22–31)
CREAT SERPL-MCNC: 8.39 MG/DL — HIGH (ref 0.5–1.3)
GLUCOSE BLDC GLUCOMTR-MCNC: 109 MG/DL — HIGH (ref 70–99)
GLUCOSE BLDC GLUCOMTR-MCNC: 92 MG/DL — SIGNIFICANT CHANGE UP (ref 70–99)
GLUCOSE SERPL-MCNC: 88 MG/DL — SIGNIFICANT CHANGE UP (ref 70–99)
HCT VFR BLD CALC: 30.1 % — LOW (ref 34.5–45)
HGB BLD-MCNC: 9.2 G/DL — LOW (ref 11.5–15.5)
MAGNESIUM SERPL-MCNC: 2.2 MG/DL — SIGNIFICANT CHANGE UP (ref 1.6–2.6)
MCHC RBC-ENTMCNC: 29.1 PG — SIGNIFICANT CHANGE UP (ref 27–34)
MCHC RBC-ENTMCNC: 30.6 % — LOW (ref 32–36)
MCV RBC AUTO: 95.3 FL — SIGNIFICANT CHANGE UP (ref 80–100)
NRBC # FLD: 0 K/UL — SIGNIFICANT CHANGE UP (ref 0–0)
PHOSPHATE SERPL-MCNC: 4.2 MG/DL — SIGNIFICANT CHANGE UP (ref 2.5–4.5)
PLATELET # BLD AUTO: 227 K/UL — SIGNIFICANT CHANGE UP (ref 150–400)
PMV BLD: 11.3 FL — SIGNIFICANT CHANGE UP (ref 7–13)
POTASSIUM SERPL-MCNC: 4.6 MMOL/L — SIGNIFICANT CHANGE UP (ref 3.5–5.3)
POTASSIUM SERPL-SCNC: 4.6 MMOL/L — SIGNIFICANT CHANGE UP (ref 3.5–5.3)
RBC # BLD: 3.16 M/UL — LOW (ref 3.8–5.2)
RBC # FLD: 14.1 % — SIGNIFICANT CHANGE UP (ref 10.3–14.5)
SODIUM SERPL-SCNC: 139 MMOL/L — SIGNIFICANT CHANGE UP (ref 135–145)
WBC # BLD: 3.94 K/UL — SIGNIFICANT CHANGE UP (ref 3.8–10.5)
WBC # FLD AUTO: 3.94 K/UL — SIGNIFICANT CHANGE UP (ref 3.8–10.5)

## 2019-05-06 RX ORDER — ERYTHROPOIETIN 10000 [IU]/ML
10000 INJECTION, SOLUTION INTRAVENOUS; SUBCUTANEOUS
Qty: 0 | Refills: 0 | DISCHARGE
Start: 2019-05-06

## 2019-05-06 RX ADMIN — ISOSORBIDE DINITRATE 10 MILLIGRAM(S): 5 TABLET ORAL at 05:29

## 2019-05-06 RX ADMIN — PANTOPRAZOLE SODIUM 40 MILLIGRAM(S): 20 TABLET, DELAYED RELEASE ORAL at 06:09

## 2019-05-06 RX ADMIN — CLOPIDOGREL BISULFATE 75 MILLIGRAM(S): 75 TABLET, FILM COATED ORAL at 12:40

## 2019-05-06 RX ADMIN — AMLODIPINE BESYLATE 5 MILLIGRAM(S): 2.5 TABLET ORAL at 05:30

## 2019-05-06 RX ADMIN — Medication 100 MILLIGRAM(S): at 12:41

## 2019-05-06 RX ADMIN — HEPARIN SODIUM 5000 UNIT(S): 5000 INJECTION INTRAVENOUS; SUBCUTANEOUS at 05:29

## 2019-05-06 RX ADMIN — CARVEDILOL PHOSPHATE 12.5 MILLIGRAM(S): 80 CAPSULE, EXTENDED RELEASE ORAL at 05:29

## 2019-05-06 RX ADMIN — Medication 20 MILLIGRAM(S): at 05:29

## 2019-05-06 RX ADMIN — Medication 667 MILLIGRAM(S): at 09:16

## 2019-05-06 RX ADMIN — Medication 100 MILLIGRAM(S): at 12:40

## 2019-05-06 RX ADMIN — Medication 81 MILLIGRAM(S): at 12:40

## 2019-05-06 RX ADMIN — Medication 667 MILLIGRAM(S): at 12:41

## 2019-05-06 RX ADMIN — ISOSORBIDE DINITRATE 10 MILLIGRAM(S): 5 TABLET ORAL at 12:41

## 2019-05-06 RX ADMIN — Medication 100 MILLIGRAM(S): at 05:29

## 2019-05-06 RX ADMIN — Medication 10 MILLIGRAM(S): at 05:29

## 2019-05-06 NOTE — DISCHARGE NOTE NURSING/CASE MANAGEMENT/SOCIAL WORK - NSDCPEPTSTRK_GEN_ALL_CORE
Call 911 for stroke/Need for follow up after discharge/Stroke education booklet/Prescribed medications/Risk factors for stroke/Stroke support groups for patients, families, and friends/Stroke warning signs and symptoms/Signs and symptoms of stroke

## 2019-05-06 NOTE — PROGRESS NOTE ADULT - SUBJECTIVE AND OBJECTIVE BOX
Patient seen and examined in bed. No pain, no SOB.      MEDICATIONS  (STANDING):  allopurinol 100 milliGRAM(s) Oral daily  amLODIPine   Tablet 5 milliGRAM(s) Oral daily  aspirin enteric coated 81 milliGRAM(s) Oral daily  calcium acetate 667 milliGRAM(s) Oral three times a day with meals  carvedilol 12.5 milliGRAM(s) Oral every 12 hours  clopidogrel Tablet 75 milliGRAM(s) Oral daily  dextrose 5%. 1000 milliLiter(s) (50 mL/Hr) IV Continuous <Continuous>  dextrose 50% Injectable 12.5 Gram(s) IV Push once  dextrose 50% Injectable 25 Gram(s) IV Push once  dextrose 50% Injectable 25 Gram(s) IV Push once  epoetin karla Injectable 37706 Unit(s) IV Push <User Schedule>  furosemide    Tablet 20 milliGRAM(s) Oral daily  heparin  Injectable 5000 Unit(s) SubCutaneous every 8 hours  hydrALAZINE 100 milliGRAM(s) Oral three times a day  insulin lispro (HumaLOG) corrective regimen sliding scale   SubCutaneous three times a day before meals  isosorbide   dinitrate Tablet (ISORDIL) 10 milliGRAM(s) Oral three times a day  oxybutynin 10 milliGRAM(s) Oral two times a day  pantoprazole    Tablet 40 milliGRAM(s) Oral before breakfast  simvastatin 20 milliGRAM(s) Oral at bedtime      VITAL:  T(C): , Max: 37.1 (05-05-19 @ 21:40)  T(F): , Max: 98.7 (05-05-19 @ 21:40)  HR: 80 (05-06-19 @ 05:26)  BP: 147/79 (05-06-19 @ 05:26)  RR: 18 (05-06-19 @ 05:26)  SpO2: 100% (05-06-19 @ 05:26)        PHYSICAL EXAM:    Constitutional: NAD  Neck:  No JVD  Respiratory: diminished BS  Cardiovascular: S1 and S2  Gastrointestinal: BS+, soft, NT/ND  Extremities: trace peripheral edema  : No John  Skin: No rashes  Access: Left AVF, +thrill    LABS:                        9.2    3.94  )-----------( 227      ( 06 May 2019 05:36 )             30.1     05-06    139  |  100  |  28<H>  ----------------------------<  88  4.6   |  25  |  8.39<H>    Ca    9.3      06 May 2019 05:36  Phos  4.2     05-06  Mg     2.2     05-06      ASSESSMENT:  54F w/ DM, HTN, CVA, CHF, KERI, ESRD (TTS) p/w CP/SOB (5/2).  - Renal: ESRD on HD TTS  - HTN: BP suboptimally controlled - improving  - HF: volume status improving with dialysis   - anemia: likely of chronic disease    RECOMMEND:  - HD tomorrow as ordered - inpatient vs. outpatient  - give epogen 10K tiw with HD   - continue furosemide 20 mg po daily   - continue calcium acetate 667 mg po tid with meals  - dose medication for a GFR <10        SHREYAS GarciaC  [facility]  [phone] Patient seen and examined in bed. No pain, no SOB.      MEDICATIONS  (STANDING):  allopurinol 100 milliGRAM(s) Oral daily  amLODIPine   Tablet 5 milliGRAM(s) Oral daily  aspirin enteric coated 81 milliGRAM(s) Oral daily  calcium acetate 667 milliGRAM(s) Oral three times a day with meals  carvedilol 12.5 milliGRAM(s) Oral every 12 hours  clopidogrel Tablet 75 milliGRAM(s) Oral daily  dextrose 5%. 1000 milliLiter(s) (50 mL/Hr) IV Continuous <Continuous>  dextrose 50% Injectable 12.5 Gram(s) IV Push once  dextrose 50% Injectable 25 Gram(s) IV Push once  dextrose 50% Injectable 25 Gram(s) IV Push once  epoetin karla Injectable 18484 Unit(s) IV Push <User Schedule>  furosemide    Tablet 20 milliGRAM(s) Oral daily  heparin  Injectable 5000 Unit(s) SubCutaneous every 8 hours  hydrALAZINE 100 milliGRAM(s) Oral three times a day  insulin lispro (HumaLOG) corrective regimen sliding scale   SubCutaneous three times a day before meals  isosorbide   dinitrate Tablet (ISORDIL) 10 milliGRAM(s) Oral three times a day  oxybutynin 10 milliGRAM(s) Oral two times a day  pantoprazole    Tablet 40 milliGRAM(s) Oral before breakfast  simvastatin 20 milliGRAM(s) Oral at bedtime      VITAL:  T(C): , Max: 37.1 (05-05-19 @ 21:40)  T(F): , Max: 98.7 (05-05-19 @ 21:40)  HR: 80 (05-06-19 @ 05:26)  BP: 147/79 (05-06-19 @ 05:26)  RR: 18 (05-06-19 @ 05:26)  SpO2: 100% (05-06-19 @ 05:26)        PHYSICAL EXAM:    Constitutional: NAD  Neck:  No JVD  Respiratory: diminished BS  Cardiovascular: S1 and S2  Gastrointestinal: BS+, soft, NT/ND  Extremities: trace peripheral edema  : No John  Skin: No rashes  Access: Left AVF, +thrill    LABS:                        9.2    3.94  )-----------( 227      ( 06 May 2019 05:36 )             30.1     05-06    139  |  100  |  28<H>  ----------------------------<  88  4.6   |  25  |  8.39<H>    Ca    9.3      06 May 2019 05:36  Phos  4.2     05-06  Mg     2.2     05-06      ASSESSMENT:  54F w/ DM, HTN, CVA, CHF, KERI, ESRD (TTS) p/w CP/SOB (5/2).  - Renal: ESRD on HD TTS  - HTN: BP suboptimally controlled - improving  - HF: volume status improving with dialysis   - anemia: likely of chronic disease    RECOMMEND:  - HD tomorrow as ordered - inpatient vs. outpatient  - give epogen 10K tiw with HD   - continue furosemide 20 mg po daily   - continue calcium acetate 667 mg po tid with meals  - dose medication for a GFR <10        Elina Dallas NP-SHAHANA  [facility]  [phone]      *******************RENAL ATTENDING**********************  Seen/examined with NP. I agree with NP assessment as above.    VS as above; NAD; CTA-B/L; RRR; no edema b/l; LUE AVF (+)thrill    ASSESSMENT: 54F w/ DM, HTN, CVA, HFrEF, KERI, ESRD (TTS) p/w CP/SOB (5/2).  (1)Renal: ESRD-HD TTS - due for next HD tomorrow  (2)Chest pain - resolved    RECOMMEND:  (1)No objection to discharge  (2)Next HD tomorrow - inpatient versus outpatient            Wale Francisco MD  [facility]  [phone]

## 2019-05-06 NOTE — PROGRESS NOTE ADULT - SUBJECTIVE AND OBJECTIVE BOX
Feels well  No new symptoms    Vital Signs Last 24 Hrs  T(C): 36.9 (06 May 2019 05:26), Max: 37.1 (05 May 2019 21:40)  T(F): 98.4 (06 May 2019 05:26), Max: 98.7 (05 May 2019 21:40)  HR: 80 (06 May 2019 05:26) (80 - 92)  BP: 147/79 (06 May 2019 05:26) (123/73 - 147/79)  BP(mean): --  RR: 18 (06 May 2019 05:26) (18 - 18)  SpO2: 100% (06 May 2019 05:26) (96% - 100%)    GENERAL: NAD, well-developed  HEAD:  Atraumatic, Normocephalic  EYES: EOMI, PERRLA, conjunctiva and sclera clear  NECK: Supple, No JVD  CHEST/LUNG: Decreased breath sounds both bases; no wheezes  HEART: Regular rate and rhythm; No murmurs, rubs, or gallops  ABDOMEN: Soft, Nontender, Nondistended; Bowel sounds present  EXTREMITIES:  2+ Peripheral Pulses, 1+ b/l LE pitting edemal lt UE AVF with palpable thrill  SKIN: No rashes or lesions  NEURO: A+O x 3; nonfocal CN/motor/sensory/reflexes  PSYCH: Nl affect; no agitation or delirium; no suicidal or homicidal ideation    LABS:                        9.2    3.94  )-----------( 227      ( 06 May 2019 05:36 )             30.1     05-06    139  |  100  |  28<H>  ----------------------------<  88  4.6   |  25  |  8.39<H>    Ca    9.3      06 May 2019 05:36  Phos  4.2     05-06  Mg     2.2     05-06        CAPILLARY BLOOD GLUCOSE      POCT Blood Glucose.: 92 mg/dL (06 May 2019 07:50)  POCT Blood Glucose.: 103 mg/dL (05 May 2019 21:34)  POCT Blood Glucose.: 97 mg/dL (05 May 2019 16:34)  POCT Blood Glucose.: 102 mg/dL (05 May 2019 11:49)

## 2019-05-06 NOTE — PROGRESS NOTE ADULT - ASSESSMENT
52 yo F lives in shelter with PMH of DM2, HTN, HLD, ESRD (TTS, last HD 3/23/19) and gout p/w atypical chest pain.    EKG- NSR @ 99 PRWP,  TWI v5-6, II, III, avF, Flat T waves I, L

## 2019-05-06 NOTE — DISCHARGE NOTE NURSING/CASE MANAGEMENT/SOCIAL WORK - NSDCDPATPORTLINK_GEN_ALL_CORE
You can access the [facility] Patient Portal, offered by [facility], by registering with the following website: [email or web address]

## 2019-05-06 NOTE — PROGRESS NOTE ADULT - PROVIDER SPECIALTY LIST ADULT
Cardiology
Cardiology
Internal Medicine
Nephrology
Nephrology
Cardiology

## 2019-05-06 NOTE — PROGRESS NOTE ADULT - PROBLEM SELECTOR PLAN 1
Telemetry, no evidence for acute ACS  NO need for inpt ischemic evaluation  continue ASA, plavix, BB, statin.   TTE showing moderate global LV dysfunction, but unchanged since last study  Cardiology c/s Dr Andrew appreciated

## 2019-05-06 NOTE — CHART NOTE - NSCHARTNOTEFT_GEN_A_CORE
ECHO performed with no significant changes - Patient is medically ready for DC as per Dr. Griggs - SW aware for re-instatement of outpatient HD

## 2019-05-17 ENCOUNTER — INPATIENT (INPATIENT)
Facility: HOSPITAL | Age: 54
LOS: 3 days | Discharge: NURSING, REHAB OR LONG-TERM CARE | End: 2019-05-21
Attending: INTERNAL MEDICINE | Admitting: INTERNAL MEDICINE
Payer: MEDICAID

## 2019-05-17 VITALS
OXYGEN SATURATION: 100 % | SYSTOLIC BLOOD PRESSURE: 164 MMHG | HEART RATE: 82 BPM | DIASTOLIC BLOOD PRESSURE: 89 MMHG | TEMPERATURE: 98 F | RESPIRATION RATE: 15 BRPM

## 2019-05-17 DIAGNOSIS — Z98.83 FILTERING (VITREOUS) BLEB AFTER GLAUCOMA SURGERY STATUS: Chronic | ICD-10-CM

## 2019-05-17 DIAGNOSIS — I50.23 ACUTE ON CHRONIC SYSTOLIC (CONGESTIVE) HEART FAILURE: ICD-10-CM

## 2019-05-17 DIAGNOSIS — Z90.711 ACQUIRED ABSENCE OF UTERUS WITH REMAINING CERVICAL STUMP: Chronic | ICD-10-CM

## 2019-05-17 DIAGNOSIS — Z86.73 PERSONAL HISTORY OF TRANSIENT ISCHEMIC ATTACK (TIA), AND CEREBRAL INFARCTION WITHOUT RESIDUAL DEFICITS: ICD-10-CM

## 2019-05-17 DIAGNOSIS — D63.8 ANEMIA IN OTHER CHRONIC DISEASES CLASSIFIED ELSEWHERE: ICD-10-CM

## 2019-05-17 DIAGNOSIS — Z90.710 ACQUIRED ABSENCE OF BOTH CERVIX AND UTERUS: Chronic | ICD-10-CM

## 2019-05-17 DIAGNOSIS — Z90.49 ACQUIRED ABSENCE OF OTHER SPECIFIED PARTS OF DIGESTIVE TRACT: Chronic | ICD-10-CM

## 2019-05-17 DIAGNOSIS — I10 ESSENTIAL (PRIMARY) HYPERTENSION: ICD-10-CM

## 2019-05-17 DIAGNOSIS — N18.6 END STAGE RENAL DISEASE: ICD-10-CM

## 2019-05-17 DIAGNOSIS — I77.0 ARTERIOVENOUS FISTULA, ACQUIRED: Chronic | ICD-10-CM

## 2019-05-17 DIAGNOSIS — Z29.9 ENCOUNTER FOR PROPHYLACTIC MEASURES, UNSPECIFIED: ICD-10-CM

## 2019-05-17 DIAGNOSIS — I20.9 ANGINA PECTORIS, UNSPECIFIED: ICD-10-CM

## 2019-05-17 DIAGNOSIS — K21.9 GASTRO-ESOPHAGEAL REFLUX DISEASE WITHOUT ESOPHAGITIS: ICD-10-CM

## 2019-05-17 DIAGNOSIS — E11.9 TYPE 2 DIABETES MELLITUS WITHOUT COMPLICATIONS: ICD-10-CM

## 2019-05-17 DIAGNOSIS — E78.5 HYPERLIPIDEMIA, UNSPECIFIED: ICD-10-CM

## 2019-05-17 DIAGNOSIS — I50.9 HEART FAILURE, UNSPECIFIED: ICD-10-CM

## 2019-05-17 PROBLEM — G47.33 OBSTRUCTIVE SLEEP APNEA (ADULT) (PEDIATRIC): Chronic | Status: ACTIVE | Noted: 2019-05-02

## 2019-05-17 LAB
ALBUMIN SERPL ELPH-MCNC: 3.9 G/DL — SIGNIFICANT CHANGE UP (ref 3.3–5)
ALP SERPL-CCNC: 124 U/L — HIGH (ref 40–120)
ALT FLD-CCNC: 20 U/L — SIGNIFICANT CHANGE UP (ref 4–33)
ANION GAP SERPL CALC-SCNC: 15 MMO/L — HIGH (ref 7–14)
APTT BLD: 30.4 SEC — SIGNIFICANT CHANGE UP (ref 27.5–36.3)
AST SERPL-CCNC: 16 U/L — SIGNIFICANT CHANGE UP (ref 4–32)
BASOPHILS # BLD AUTO: 0.06 K/UL — SIGNIFICANT CHANGE UP (ref 0–0.2)
BASOPHILS NFR BLD AUTO: 1.1 % — SIGNIFICANT CHANGE UP (ref 0–2)
BILIRUB SERPL-MCNC: < 0.2 MG/DL — LOW (ref 0.2–1.2)
BUN SERPL-MCNC: 48 MG/DL — HIGH (ref 7–23)
CALCIUM SERPL-MCNC: 9.3 MG/DL — SIGNIFICANT CHANGE UP (ref 8.4–10.5)
CHLORIDE SERPL-SCNC: 105 MMOL/L — SIGNIFICANT CHANGE UP (ref 98–107)
CO2 SERPL-SCNC: 24 MMOL/L — SIGNIFICANT CHANGE UP (ref 22–31)
CREAT SERPL-MCNC: 9.61 MG/DL — HIGH (ref 0.5–1.3)
EOSINOPHIL # BLD AUTO: 0.28 K/UL — SIGNIFICANT CHANGE UP (ref 0–0.5)
EOSINOPHIL NFR BLD AUTO: 5.3 % — SIGNIFICANT CHANGE UP (ref 0–6)
GLUCOSE BLDC GLUCOMTR-MCNC: 105 MG/DL — HIGH (ref 70–99)
GLUCOSE BLDC GLUCOMTR-MCNC: 112 MG/DL — HIGH (ref 70–99)
GLUCOSE BLDC GLUCOMTR-MCNC: 145 MG/DL — HIGH (ref 70–99)
GLUCOSE BLDC GLUCOMTR-MCNC: 99 MG/DL — SIGNIFICANT CHANGE UP (ref 70–99)
GLUCOSE SERPL-MCNC: 101 MG/DL — HIGH (ref 70–99)
HCT VFR BLD CALC: 34.8 % — SIGNIFICANT CHANGE UP (ref 34.5–45)
HGB BLD-MCNC: 10.7 G/DL — LOW (ref 11.5–15.5)
IMM GRANULOCYTES NFR BLD AUTO: 0.4 % — SIGNIFICANT CHANGE UP (ref 0–1.5)
INR BLD: 0.94 — SIGNIFICANT CHANGE UP (ref 0.88–1.17)
LYMPHOCYTES # BLD AUTO: 1.67 K/UL — SIGNIFICANT CHANGE UP (ref 1–3.3)
LYMPHOCYTES # BLD AUTO: 31.6 % — SIGNIFICANT CHANGE UP (ref 13–44)
MCHC RBC-ENTMCNC: 29.6 PG — SIGNIFICANT CHANGE UP (ref 27–34)
MCHC RBC-ENTMCNC: 30.7 % — LOW (ref 32–36)
MCV RBC AUTO: 96.1 FL — SIGNIFICANT CHANGE UP (ref 80–100)
MONOCYTES # BLD AUTO: 0.48 K/UL — SIGNIFICANT CHANGE UP (ref 0–0.9)
MONOCYTES NFR BLD AUTO: 9.1 % — SIGNIFICANT CHANGE UP (ref 2–14)
NEUTROPHILS # BLD AUTO: 2.78 K/UL — SIGNIFICANT CHANGE UP (ref 1.8–7.4)
NEUTROPHILS NFR BLD AUTO: 52.5 % — SIGNIFICANT CHANGE UP (ref 43–77)
NRBC # FLD: 0 K/UL — SIGNIFICANT CHANGE UP (ref 0–0)
NT-PROBNP SERPL-SCNC: SIGNIFICANT CHANGE UP PG/ML
PLATELET # BLD AUTO: 221 K/UL — SIGNIFICANT CHANGE UP (ref 150–400)
PMV BLD: 10.3 FL — SIGNIFICANT CHANGE UP (ref 7–13)
POTASSIUM SERPL-MCNC: 4.5 MMOL/L — SIGNIFICANT CHANGE UP (ref 3.5–5.3)
POTASSIUM SERPL-SCNC: 4.5 MMOL/L — SIGNIFICANT CHANGE UP (ref 3.5–5.3)
PROT SERPL-MCNC: 7 G/DL — SIGNIFICANT CHANGE UP (ref 6–8.3)
PROTHROM AB SERPL-ACNC: 10.7 SEC — SIGNIFICANT CHANGE UP (ref 9.8–13.1)
RBC # BLD: 3.62 M/UL — LOW (ref 3.8–5.2)
RBC # FLD: 14.9 % — HIGH (ref 10.3–14.5)
SODIUM SERPL-SCNC: 144 MMOL/L — SIGNIFICANT CHANGE UP (ref 135–145)
TROPONIN T, HIGH SENSITIVITY: 61 NG/L — CRITICAL HIGH (ref ?–14)
TROPONIN T, HIGH SENSITIVITY: 73 NG/L — CRITICAL HIGH (ref ?–14)
WBC # BLD: 5.29 K/UL — SIGNIFICANT CHANGE UP (ref 3.8–10.5)
WBC # FLD AUTO: 5.29 K/UL — SIGNIFICANT CHANGE UP (ref 3.8–10.5)

## 2019-05-17 PROCEDURE — 71046 X-RAY EXAM CHEST 2 VIEWS: CPT | Mod: 26

## 2019-05-17 PROCEDURE — 71045 X-RAY EXAM CHEST 1 VIEW: CPT | Mod: 26

## 2019-05-17 RX ORDER — SIMVASTATIN 20 MG/1
20 TABLET, FILM COATED ORAL AT BEDTIME
Refills: 0 | Status: DISCONTINUED | OUTPATIENT
Start: 2019-05-17 | End: 2019-05-21

## 2019-05-17 RX ORDER — HYDRALAZINE HCL 50 MG
50 TABLET ORAL EVERY 8 HOURS
Refills: 0 | Status: DISCONTINUED | OUTPATIENT
Start: 2019-05-17 | End: 2019-05-17

## 2019-05-17 RX ORDER — FUROSEMIDE 40 MG
20 TABLET ORAL DAILY
Refills: 0 | Status: DISCONTINUED | OUTPATIENT
Start: 2019-05-17 | End: 2019-05-21

## 2019-05-17 RX ORDER — SODIUM CHLORIDE 9 MG/ML
1000 INJECTION, SOLUTION INTRAVENOUS
Refills: 0 | Status: DISCONTINUED | OUTPATIENT
Start: 2019-05-17 | End: 2019-05-21

## 2019-05-17 RX ORDER — AMLODIPINE BESYLATE 2.5 MG/1
5 TABLET ORAL DAILY
Refills: 0 | Status: DISCONTINUED | OUTPATIENT
Start: 2019-05-17 | End: 2019-05-21

## 2019-05-17 RX ORDER — ISOSORBIDE DINITRATE 5 MG/1
10 TABLET ORAL THREE TIMES A DAY
Refills: 0 | Status: DISCONTINUED | OUTPATIENT
Start: 2019-05-17 | End: 2019-05-21

## 2019-05-17 RX ORDER — ALLOPURINOL 300 MG
100 TABLET ORAL DAILY
Refills: 0 | Status: DISCONTINUED | OUTPATIENT
Start: 2019-05-17 | End: 2019-05-21

## 2019-05-17 RX ORDER — DEXTROSE 50 % IN WATER 50 %
25 SYRINGE (ML) INTRAVENOUS ONCE
Refills: 0 | Status: DISCONTINUED | OUTPATIENT
Start: 2019-05-17 | End: 2019-05-21

## 2019-05-17 RX ORDER — PANTOPRAZOLE SODIUM 20 MG/1
40 TABLET, DELAYED RELEASE ORAL
Refills: 0 | Status: DISCONTINUED | OUTPATIENT
Start: 2019-05-17 | End: 2019-05-21

## 2019-05-17 RX ORDER — GLUCAGON INJECTION, SOLUTION 0.5 MG/.1ML
1 INJECTION, SOLUTION SUBCUTANEOUS ONCE
Refills: 0 | Status: DISCONTINUED | OUTPATIENT
Start: 2019-05-17 | End: 2019-05-21

## 2019-05-17 RX ORDER — CARVEDILOL PHOSPHATE 80 MG/1
12.5 CAPSULE, EXTENDED RELEASE ORAL EVERY 12 HOURS
Refills: 0 | Status: DISCONTINUED | OUTPATIENT
Start: 2019-05-17 | End: 2019-05-21

## 2019-05-17 RX ORDER — HYDRALAZINE HCL 50 MG
100 TABLET ORAL EVERY 8 HOURS
Refills: 0 | Status: DISCONTINUED | OUTPATIENT
Start: 2019-05-17 | End: 2019-05-21

## 2019-05-17 RX ORDER — HEPARIN SODIUM 5000 [USP'U]/ML
5000 INJECTION INTRAVENOUS; SUBCUTANEOUS EVERY 8 HOURS
Refills: 0 | Status: DISCONTINUED | OUTPATIENT
Start: 2019-05-17 | End: 2019-05-21

## 2019-05-17 RX ORDER — DEXTROSE 50 % IN WATER 50 %
12.5 SYRINGE (ML) INTRAVENOUS ONCE
Refills: 0 | Status: DISCONTINUED | OUTPATIENT
Start: 2019-05-17 | End: 2019-05-21

## 2019-05-17 RX ORDER — DEXTROSE 50 % IN WATER 50 %
15 SYRINGE (ML) INTRAVENOUS ONCE
Refills: 0 | Status: DISCONTINUED | OUTPATIENT
Start: 2019-05-17 | End: 2019-05-21

## 2019-05-17 RX ORDER — INSULIN LISPRO 100/ML
VIAL (ML) SUBCUTANEOUS AT BEDTIME
Refills: 0 | Status: DISCONTINUED | OUTPATIENT
Start: 2019-05-17 | End: 2019-05-21

## 2019-05-17 RX ORDER — ASPIRIN/CALCIUM CARB/MAGNESIUM 324 MG
81 TABLET ORAL DAILY
Refills: 0 | Status: DISCONTINUED | OUTPATIENT
Start: 2019-05-17 | End: 2019-05-21

## 2019-05-17 RX ORDER — INSULIN LISPRO 100/ML
VIAL (ML) SUBCUTANEOUS
Refills: 0 | Status: DISCONTINUED | OUTPATIENT
Start: 2019-05-17 | End: 2019-05-21

## 2019-05-17 RX ORDER — CLOPIDOGREL BISULFATE 75 MG/1
75 TABLET, FILM COATED ORAL DAILY
Refills: 0 | Status: DISCONTINUED | OUTPATIENT
Start: 2019-05-17 | End: 2019-05-21

## 2019-05-17 RX ORDER — OXYBUTYNIN CHLORIDE 5 MG
10 TABLET ORAL
Refills: 0 | Status: DISCONTINUED | OUTPATIENT
Start: 2019-05-17 | End: 2019-05-21

## 2019-05-17 RX ORDER — CALCIUM ACETATE 667 MG
667 TABLET ORAL
Refills: 0 | Status: DISCONTINUED | OUTPATIENT
Start: 2019-05-17 | End: 2019-05-21

## 2019-05-17 RX ADMIN — CARVEDILOL PHOSPHATE 12.5 MILLIGRAM(S): 80 CAPSULE, EXTENDED RELEASE ORAL at 18:24

## 2019-05-17 RX ADMIN — ISOSORBIDE DINITRATE 10 MILLIGRAM(S): 5 TABLET ORAL at 21:38

## 2019-05-17 RX ADMIN — CLOPIDOGREL BISULFATE 75 MILLIGRAM(S): 75 TABLET, FILM COATED ORAL at 11:30

## 2019-05-17 RX ADMIN — Medication 20 MILLIGRAM(S): at 11:30

## 2019-05-17 RX ADMIN — HEPARIN SODIUM 5000 UNIT(S): 5000 INJECTION INTRAVENOUS; SUBCUTANEOUS at 21:38

## 2019-05-17 RX ADMIN — Medication 100 MILLIGRAM(S): at 11:30

## 2019-05-17 RX ADMIN — PANTOPRAZOLE SODIUM 40 MILLIGRAM(S): 20 TABLET, DELAYED RELEASE ORAL at 11:30

## 2019-05-17 RX ADMIN — Medication 81 MILLIGRAM(S): at 11:30

## 2019-05-17 RX ADMIN — SIMVASTATIN 20 MILLIGRAM(S): 20 TABLET, FILM COATED ORAL at 21:38

## 2019-05-17 RX ADMIN — Medication 10 MILLIGRAM(S): at 18:24

## 2019-05-17 RX ADMIN — Medication 100 MILLIGRAM(S): at 21:38

## 2019-05-17 RX ADMIN — AMLODIPINE BESYLATE 5 MILLIGRAM(S): 2.5 TABLET ORAL at 11:30

## 2019-05-17 RX ADMIN — Medication 667 MILLIGRAM(S): at 18:24

## 2019-05-17 RX ADMIN — Medication 667 MILLIGRAM(S): at 11:31

## 2019-05-17 NOTE — H&P ADULT - NEGATIVE GASTROINTESTINAL SYMPTOMS
no diarrhea/no change in bowel habits/no flatulence/no vomiting/no abdominal pain/no hematochezia/no melena/no constipation/no nausea

## 2019-05-17 NOTE — CONSULT NOTE ADULT - ASSESSMENT
Echo 1/5/20189: EF 45-50%, global LV sys dysfx, mild diastolic dysfx (stgI)  Stress test in 6/2018 normal with no evidence of mi or ischemia   Echo from 3/ 22/2019, mild to mod MR, moderate global lv sys dysfx small pericardial effusion to left ventricle EF 41%     a/p  55 yo Female with PMHx of Systolic CHF, DM, HTN, HLD, ESRD (Tues, Thurs, Sat) , Gout presenting with cp and sob due to CHF from missed HD    1. Chest pain, atypical from CHF  symptoms likely in the setting of fluid overload, secondary to missed HD session   cv stable, no cp/sob, no evidence of acute ischemia   HST elevated, type II MI, demand ischemia 2/2 to ESRD , EKG unchanged   Recent echo performed, pt. with known systolic dysfunction (noted on echo from 1/2018), EF 41% (overall unchanged), sml pericardial effusion noted   chest xray with small left pleural effusion and intersitial edema unchanged from prior exam  can check limited Echo to eval pericardial effusion   c/w BB, ASA, imdur, plavix     2. Acute on chronic Systolic chf   fluid overloaded likely secondary to missed HD session  cont fluid removal with HD / lasix po  continue bb, imdur, hydral. lasix   acei or arb per renal     3. HTN  stable   continue current meds     4. ESRD on HD  renal f/u     dvt ppx

## 2019-05-17 NOTE — ED ADULT NURSE NOTE - PSH
AVF (arteriovenous fistula)    History of cholecystectomy    History of hysterectomy    Status post glaucoma surgery

## 2019-05-17 NOTE — H&P ADULT - NSHPSOCIALHISTORY_GEN_ALL_CORE
Pt is  and lives in an assisted living facility. She ambulates with a cane. She denies smoking and drinking.

## 2019-05-17 NOTE — H&P ADULT - PROBLEM SELECTOR PLAN 2
Chest pain possibly in the setting of fluid overload  EKG shows NSR with no ischemic changes  Delta troponin mildly positive (73-->61) likely in the setting of ESRD and volume overload, no need to treat for ACS  Continue ASA, Plavix, Simvastatin, Coreg  Recent echo as seen above  Cards consult with Dr. Andrew called  DASH diet

## 2019-05-17 NOTE — H&P ADULT - RS GEN PE MLT RESP DETAILS PC
no rhonchi/airway patent/no wheezes/no chest wall tenderness/good air movement/rales/respirations non-labored/no intercostal retractions

## 2019-05-17 NOTE — ED ADULT NURSE NOTE - OBJECTIVE STATEMENT
Kerrie RN: Pt received to rm 18 with ESRD normally received dialysis on Sundays, Tuesdays and Thursdays however pt missed dialysis today. pt a&ox4 and ambulatory with walker, skin intact with edema present in b/l lower extremities. pt respirations even and unlabored. pt reports feeling CP earlier today while on exertion, associated with SOB. Pt currently denies CP and SOB. Pt missed dialysis today because "I had to take my son to the hospital". Will draw labs and endorse report to primary RN Shweta.

## 2019-05-17 NOTE — PHYSICAL THERAPY INITIAL EVALUATION ADULT - PATIENT PROFILE REVIEW, REHAB EVAL
yes/PT orders received: no formal activity order. Consult with JACQUELYN PATTEN, pt may participate in PT evaluation an ambulate with PT, as pt has been ambulating to/from bathroom.

## 2019-05-17 NOTE — H&P ADULT - NSHPLABSRESULTS_GEN_ALL_CORE
May 2019, Echo: EF 40-45%. Moderate global left ventricular systolic dysfunction. Small pericardial effusion (measures up to 1.0 cm posterior to LV in parasternal long axis views).  ----------  EKG: NSR at 88 bpm, QTc 471  CE x2: Trop 73-->61  H/H: 10.7/34.8  BUN/Cr: 48/9.61  Alk phos: 124  ProBNP: 78319

## 2019-05-17 NOTE — H&P ADULT - HISTORY OF PRESENT ILLNESS
55 y/o female with a PMHx of NICM with moderate LV dysfunction, ESRD on HD T/T/S via left AVF, CVA with residual RLE weakness, HTN, HLD, DM, GERD, anemia of chronic disease and gout presents to ED with exertional chest pain and dyspnea on exertion since yesterday. Pt reports that she experienced intermittent, 10/10, non-pleuritic, non-radiating, exertional, left sided chest pain which was associated with dyspnea on exertion. Pt was "running errands" at the time that her symptoms started and admits to an exertional component. Pt did not take any pain medications because her symptoms improved with rest. Pt also admits to three to four pillow orthopnea and bilateral lower extremity edema. Pt still urinates and admits to taking Lasix daily. However, pt was supposed to go to dialysis yesterday but didn't go because she "had too many things to do." Pt denies fever, chills, recent travel, headache, dizziness, visual deficits, cough, palpitations, abdominal pain, N/V/D/C, hematochezia, melena, dysuria, hematuria, LOC, syncope. Upon arrival to ED, EKG: NSR at 88 bpm, QTc 471. CE x2: Trop 73-->61. H/H: 10.7/34.8. BUN/Cr: 48/9.61. Alk phos: 124. ProBNP: 55935. CXR: Mild interstitial edema.

## 2019-05-17 NOTE — H&P ADULT - NEGATIVE NEUROLOGICAL SYMPTOMS
no loss of sensation/no headache/no confusion/no focal seizures/no difficulty walking/no syncope/no vertigo/no loss of consciousness/no hemiparesis/no transient paralysis/no paresthesias/no weakness/no generalized seizures/no tremors

## 2019-05-17 NOTE — H&P ADULT - PROBLEM SELECTOR PLAN 1
Patient appears hypervolemic in the setting of a missed hemodialysis session (pulmonary edema on CXR, bilateral pedal edema, BNP 69746)  EKG shows NSR with no ischemic changes  Delta troponin mildly positive (73-->61) likely in the setting of ESRD and volume overload, no need to treat for ACS  Renal consult called for HD today (Dr. Francisco)  Strict I&Os, monitor daily weights, 1500 cc fluid restriction, sodium restriction  Renal diet  Continue Lasix  Recent echo seen above  PT eval for disposition  F/U MD note Patient appears hypervolemic in the setting of a missed hemodialysis session (pulmonary edema on CXR, bilateral pedal edema, BNP 60320)  EKG shows NSR with no ischemic changes  Delta troponin mildly positive (73-->61) likely in the setting of ESRD and volume overload, no need to treat for ACS  Pt saturating well on room air with no signs of respiratory distress  Renal consult called for non-urgent HD today (Dr. Francisco)  Strict I&Os, monitor daily weights, 1500 cc fluid restriction, sodium restriction  Renal diet  Continue Lasix  Recent echo seen above  PT eval for disposition  F/U MD note

## 2019-05-17 NOTE — H&P ADULT - NEGATIVE OPHTHALMOLOGIC SYMPTOMS
no loss of vision L/no blurred vision L/no pain R/no loss of vision R/no pain L/no photophobia/no blurred vision R/no diplopia

## 2019-05-17 NOTE — PHYSICAL THERAPY INITIAL EVALUATION ADULT - GAIT DEVIATIONS NOTED, PT EVAL
increased time in double stance/decreased velocity of limb motion/decreased stride length/decreased madina/decreased step length/decreased weight-shifting ability

## 2019-05-17 NOTE — PHYSICAL THERAPY INITIAL EVALUATION ADULT - PERTINENT HX OF CURRENT PROBLEM, REHAB EVAL
Patient is a 54 year old female admitted to [facility] on 5/17 with exertional chest pain and dyspnea on exertion. PMH: NICM with moderate LV dysfunction, ESRD on HD T/T/S via left AVF, CVA with residual RLE weakness, HTN, HLD, DM, GERD, anemia of chronic disease and gout.

## 2019-05-17 NOTE — ED ADULT NURSE NOTE - NSIMPLEMENTINTERV_GEN_ALL_ED
Implemented All Fall Risk Interventions:  [place] to call system. Call bell, personal items and telephone within reach. Instruct patient to call for assistance. Room bathroom lighting operational. Non-slip footwear when patient is off stretcher. Physically safe environment: no spills, clutter or unnecessary equipment. Stretcher in lowest position, wheels locked, appropriate side rails in place. Provide visual cue, wrist band, yellow gown, etc. Monitor gait and stability. Monitor for mental status changes and reorient to person, place, and time. Review medications for side effects contributing to fall risk. Reinforce activity limits and safety measures with patient and family.

## 2019-05-17 NOTE — H&P ADULT - PROBLEM SELECTOR PLAN 3
Monitor renal function, lytes and volume status  Electrolytes are stable but patient appears hypervolemic in the setting of a missed hemodialysis session  Renal consult called with Dr. Francisco for non-urgent HD today  Continue calcium acetate  Avoid NSAIDs and nephrotoxic agents  Renal diet

## 2019-05-17 NOTE — ED ADULT NURSE NOTE - PMH
Chronic CHF    CVA (cerebral vascular accident)  2013 w/ Rt sided weakness  Depression    DM (diabetes mellitus)    ESRD (end stage renal disease)  on HD (Tues, Thurs, Sat)  Glaucoma    Gout    HTN (hypertension)    KERI (obstructive sleep apnea)

## 2019-05-17 NOTE — ED PROVIDER NOTE - OBJECTIVE STATEMENT
54F, pmh of HtN, CAD, DM (tue, thur, sat), CVA presenting with chest pain. Patient reports exertional chest pain yesterday but no current chest pain. Reports feeling short of breath. Has increased leg swelling. Last dialysis Tuesday and son is hospitalized. Denies any fever, nausea, abdominal pain. 54F, pmh of HtN, CAD, DM (tue, thur, sat), CVA presenting with chest pain. Patient reports exertional chest pain yesterday but no current chest pain. Reports feeling short of breath. Has increased leg swelling. Last dialysis Tuesday and son is hospitalized. Denies any fever, nausea, abdominal pain.    05:00 Jack att: 54F h/o htn, dm, cad, chf, cva c/o cp. "I have chf." Patient reports 1 day cp, midsternal, intermittent, worse with exertion, cannot lay flat and legs more swollen. Exertional dyspnea. Denies f, cough. 54F, pmh of HtN, CAD, DM (tue, thur, sat), CVA presenting with chest pain. Patient reports exertional chest pain yesterday but no current chest pain. Reports feeling short of breath. Has increased leg swelling. Last dialysis Tuesday and son is hospitalized. Denies any fever, nausea, abdominal pain.    05:00 [facility] att: 54F h/o htn, dm, cad, chf, cva c/o cp. "I have chf." Patient reports 1 day cp, midsternal, intermittent, worse with exertion, cannot lay flat and legs more swollen. Exertional dyspnea. Denies f, cough. Denies recent lasix medicatio.

## 2019-05-17 NOTE — ED PROVIDER NOTE - CLINICAL SUMMARY MEDICAL DECISION MAKING FREE TEXT BOX
54F presenting with chest pain. no current chest pain but exertional chest pain yestereday. lungs clear but has bilateral leg swelling. concern for acs vs fluid overload. plan for labs, ekg, cxr. will reassess.

## 2019-05-17 NOTE — ED ADULT TRIAGE NOTE - CHIEF COMPLAINT QUOTE
Patient c/o chest pain and B/L LE swelling. Patient reports dialysis T TH Sat, last dialysis Tuesday. Patient reports she missed dialysis yesterday because "my son was feeling sick and I was having chest pains". Hx. ESRD, KERI, CHF, DM, CVA (left sided residuals). Patient appears comfortable.

## 2019-05-17 NOTE — H&P ADULT - ATTENDING COMMENTS
Patient is a 54y old  Female who presents with a chief complaint of Chest pain, shortness of breath (17 May 2019 11:04)    Pt seen and examined 5/17/19.    HPI:  53 y/o female with a PMHx of NICM with moderate LV dysfunction, ESRD on HD T/T/S via left AVF, CVA with residual RLE weakness, HTN, HLD, DM, GERD, anemia of chronic disease and gout presents to ED with exertional chest pain and dyspnea on exertion since yesterday. Pt reports that she experienced intermittent, 10/10, non-pleuritic, non-radiating, exertional, left sided chest pain which was associated with dyspnea on exertion. Pt was "running errands" at the time that her symptoms started and admits to an exertional component. Pt did not take any pain medications because her symptoms improved with rest. Pt also admits to three to four pillow orthopnea and bilateral lower extremity edema. Pt still urinates and admits to taking Lasix daily. However, pt was supposed to go to dialysis yesterday but didn't go because she "had too many things to do." Pt denies fever, chills, recent travel, headache, dizziness, visual deficits, cough, palpitations, abdominal pain, N/V/D/C, hematochezia, melena, dysuria, hematuria, LOC, syncope. Upon arrival to ED, EKG: NSR at 88 bpm, QTc 471. CE x2: Trop 73-->61. H/H: 10.7/34.8. BUN/Cr: 48/9.61. Alk phos: 124. ProBNP: 38522. CXR: Mild interstitial edema. (17 May 2019 09:40)    Pt admitted for acute-on-chronic systolic CHF and pulmonary edema 2' missed dialysis session.  Renal consult saw pt.      Vital Signs Last 24 Hrs  T(C): 36.9 (17 May 2019 09:57), Max: 36.9 (17 May 2019 01:38)  T(F): 98.5 (17 May 2019 09:57), Max: 98.5 (17 May 2019 09:57)  HR: 83 (17 May 2019 09:57) (82 - 83)  BP: 151/80 (17 May 2019 09:57) (151/80 - 164/89)  BP(mean): --  RR: 18 (17 May 2019 09:57) (15 - 18)  SpO2: 100% (17 May 2019 09:57) (100% - 100%)  I&O's Summary      PHYSICAL EXAM:  GENERAL: NAD, well-developed  HEAD:  Atraumatic, Normocephalic  EYES: EOMI, PERRLA, conjunctiva and sclera clear  NECK: Supple, No JVD, No carotid bruits  CHEST/LUNG: bibasilar rales ; no wheezes  HEART: Regular rate and rhythm; No murmurs, rubs, or gallops  ABDOMEN: Soft, Nontender, Nondistended; Bowel sounds present  EXTREMITIES:  2+ Peripheral Pulses, 1+ b/l LE edema  SKIN: No rashes or lesions  NEURO: A+O x 3; nonfocal CN/motor/sensory/reflexes  PSYCH: Nl affect; no agitation or delirium; no suicidal or homicidal ideation    LABS:                        10.7   5.29  )-----------( 221      ( 17 May 2019 05:00 )             34.8     05-17    144  |  105  |  48<H>  ----------------------------<  101<H>  4.5   |  24  |  9.61<H>    Ca    9.3      17 May 2019 05:00    TPro  7.0  /  Alb  3.9  /  TBili  < 0.2<L>  /  DBili  x   /  AST  16  /  ALT  20  /  AlkPhos  124<H>  05-17    PT/INR - ( 17 May 2019 05:00 )   PT: 10.7 SEC;   INR: 0.94          PTT - ( 17 May 2019 05:00 )  PTT:30.4 SEC  CAPILLARY BLOOD GLUCOSE      POCT Blood Glucose.: 112 mg/dL (17 May 2019 12:56)      Care Discussed with Consultants/Other Providers [x] YES  [ ] NO        A/P: 53 y/o female with a PMHx of NICM with moderate LV dysfunction, ESRD on HD T/T/S via left AVF, CVA with residual RLE weakness, HTN, HLD, DM, GERD, anemia of chronic disease and gout presents to ED with exertional chest pain and dyspnea on exertion since yesterday 2' acute-on-chronic systolic failure 2' missed dialysis.  Admit to tele.   BNP and trops, as expected, elevated in her clinical context.  Continue dietary and fluid restrictions attendant to her history of congestive heart failure.  No need for repeat TTE.  Cont ASA, plavix, statin.  Nephrology appreciated, to scheduled extra HD session for pt today.  Cont epogen.  Cont PPI.  SQ heparin 5000 units tid.

## 2019-05-17 NOTE — ED PROVIDER NOTE - PHYSICAL EXAMINATION
General: well appearing female, no acute distress   HeENt: normocephalic, atraumatic   Respiratory: normal work of breathing, lungs clear to auscultation bilaterally   Cardiac: regular rate and rhythm   Abdomen: soft, non-tender  MSk: bilateral 2+ pitting edema   Skin: no rashes  Neuro: A&Ox3

## 2019-05-17 NOTE — PHYSICAL THERAPY INITIAL EVALUATION ADULT - ADDITIONAL COMMENTS
Patient resides at assisted living facility. Patient reports she was previously independent in all ADLs and ambulated with a single axis cane prior to admission.     Patient was left semi-supine in bed as found, all lines/tubes intact and call kothari within reach, JACQUELYN gayle

## 2019-05-17 NOTE — CONSULT NOTE ADULT - SUBJECTIVE AND OBJECTIVE BOX
CHIEF COMPLAINT:    HPI:  53 y/o female with a PMHx of NICM with moderate LV dysfunction, ESRD on HD T/T/S via left AVF, CVA with residual RLE weakness, HTN, HLD, DM, GERD, anemia of chronic disease and gout presents to ED with exertional chest pain and dyspnea on exertion since yesterday. Pt reports that she experienced intermittent, 10/10, non-pleuritic, non-radiating, exertional, left sided chest pain which was associated with dyspnea on exertion. Pt was "running errands" at the time that her symptoms started and admits to an exertional component. Pt did not take any pain medications because her symptoms improved with rest. Pt also admits to three to four pillow orthopnea and bilateral lower extremity edema. Pt still urinates and admits to taking Lasix daily. However, pt was supposed to go to dialysis yesterday but didn't go because she "had too many things to do." Pt denies fever, chills, recent travel, headache, dizziness, visual deficits, cough, palpitations, abdominal pain, N/V/D/C, hematochezia, melena, dysuria, hematuria, LOC, syncope. Upon arrival to ED, EKG: NSR at 88 bpm, QTc 471. CE x2: Trop 73-->61. H/H: 10.7/34.8. BUN/Cr: 48/9.61. Alk phos: 124. ProBNP: 60441. CXR: Mild interstitial edema. (17 May 2019 09:40)      PAST MEDICAL & SURGICAL HISTORY:  GERD (gastroesophageal reflux disease)  Anemia of chronic disease  HLD (hyperlipidemia)  NICM (nonischemic cardiomyopathy)  KERI (obstructive sleep apnea)  ESRD (end stage renal disease): HD T/T/S  Depression  Gout  CVA (cerebral vascular accident): 2013- Residual RLE weakness  DM (diabetes mellitus)  HTN (hypertension)  Glaucoma  S/P cholecystectomy  S/P partial hysterectomy  Status post glaucoma surgery  AVF (arteriovenous fistula)          PREVIOUS DIAGNOSTIC TESTING:    [ ] Echocardiogram:  [ ]  Catheterization:  [ ] Stress Test:  	    MEDICATIONS:  MEDICATIONS  (STANDING):  allopurinol 100 milliGRAM(s) Oral daily  amLODIPine   Tablet 5 milliGRAM(s) Oral daily  aspirin enteric coated 81 milliGRAM(s) Oral daily  calcium acetate 667 milliGRAM(s) Oral three times a day with meals  carvedilol 12.5 milliGRAM(s) Oral every 12 hours  clopidogrel Tablet 75 milliGRAM(s) Oral daily  dextrose 5%. 1000 milliLiter(s) (50 mL/Hr) IV Continuous <Continuous>  dextrose 50% Injectable 12.5 Gram(s) IV Push once  dextrose 50% Injectable 25 Gram(s) IV Push once  dextrose 50% Injectable 25 Gram(s) IV Push once  furosemide    Tablet 20 milliGRAM(s) Oral daily  heparin  Injectable 5000 Unit(s) SubCutaneous every 8 hours  hydrALAZINE 100 milliGRAM(s) Oral every 8 hours  insulin lispro (HumaLOG) corrective regimen sliding scale   SubCutaneous three times a day before meals  insulin lispro (HumaLOG) corrective regimen sliding scale   SubCutaneous at bedtime  isosorbide   dinitrate Tablet (ISORDIL) 10 milliGRAM(s) Oral three times a day  oxybutynin 10 milliGRAM(s) Oral two times a day  pantoprazole    Tablet 40 milliGRAM(s) Oral before breakfast  simvastatin 20 milliGRAM(s) Oral at bedtime      FAMILY HISTORY:  Family history of heart attack      SOCIAL HISTORY:    [ ] Non-smoker  [ ] Smoker  [ ] Alcohol    Allergies    iodine (Unknown)  Seafood (Unknown)  shellfish (Nausea (Mild to Mod); Hives (Mild to Mod))    Intolerances    	    REVIEW OF SYSTEMS:  CONSTITUTIONAL: No fever, weight loss, or fatigue  EYES: No eye pain, visual disturbances, or discharge  ENMT:  No difficulty hearing, tinnitus, vertigo; No sinus or throat pain  NECK: No pain or stiffness  RESPIRATORY: No cough, wheezing, chills or hemoptysis; No Shortness of Breath  CARDIOVASCULAR: No chest pain, palpitations, passing out, dizziness, or leg swelling  GASTROINTESTINAL: No abdominal or epigastric pain. No nausea, vomiting, or hematemesis; No diarrhea or constipation. No melena or hematochezia.  GENITOURINARY: No dysuria, frequency, hematuria, or incontinence  NEUROLOGICAL: No headaches, memory loss, loss of strength, numbness, or tremors  SKIN: No itching, burning, rashes, or lesions   	    [ ] All others negative	  [ ] Unable to obtain    PHYSICAL EXAM:  T(C): 36.7 (05-17-19 @ 13:50), Max: 36.9 (05-17-19 @ 01:38)  HR: 89 (05-17-19 @ 13:50) (82 - 89)  BP: 146/88 (05-17-19 @ 13:50) (146/88 - 164/89)  RR: 18 (05-17-19 @ 09:57) (15 - 18)  SpO2: 100% (05-17-19 @ 09:57) (100% - 100%)  Wt(kg): --  I&O's Summary      Appearance: Normal	  Psychiatry: A & O x 3, Mood & affect appropriate  HEENT:   Normal oral mucosa, PERRL, EOMI	  Lymphatic: No lymphadenopathy  Cardiovascular: Normal S1 S2,RRR, No JVD, No murmurs  Respiratory: Lungs clear to auscultation	  Gastrointestinal:  Soft, Non-tender, + BS	  Skin: No rashes, No ecchymoses, No cyanosis	  Neurologic: Non-focal  Extremities: Normal range of motion, No clubbing, cyanosis or edema  Vascular: Peripheral pulses palpable 2+ bilaterally    TELEMETRY: 	    ECG:  	  RADIOLOGY:  OTHER: 	  	  LABS:	 	    CARDIAC MARKERS:                                  10.7   5.29  )-----------( 221      ( 17 May 2019 05:00 )             34.8     05-17    144  |  105  |  48<H>  ----------------------------<  101<H>  4.5   |  24  |  9.61<H>    Ca    9.3      17 May 2019 05:00    TPro  7.0  /  Alb  3.9  /  TBili  < 0.2<L>  /  DBili  x   /  AST  16  /  ALT  20  /  AlkPhos  124<H>  05-17    PT/INR - ( 17 May 2019 05:00 )   PT: 10.7 SEC;   INR: 0.94          PTT - ( 17 May 2019 05:00 )  PTT:30.4 SEC  proBNP: Serum Pro-Brain Natriuretic Peptide: 58213 pg/mL (05-17 @ 06:11)    Lipid Profile:   HgA1c:   TSH:     ASSESSMENT/PLAN: 	    MEDICATIONS  (STANDING):  allopurinol 100 milliGRAM(s) Oral daily  amLODIPine   Tablet 5 milliGRAM(s) Oral daily  aspirin enteric coated 81 milliGRAM(s) Oral daily  calcium acetate 667 milliGRAM(s) Oral three times a day with meals  carvedilol 12.5 milliGRAM(s) Oral every 12 hours  clopidogrel Tablet 75 milliGRAM(s) Oral daily  dextrose 5%. 1000 milliLiter(s) (50 mL/Hr) IV Continuous <Continuous>  dextrose 50% Injectable 12.5 Gram(s) IV Push once  dextrose 50% Injectable 25 Gram(s) IV Push once  dextrose 50% Injectable 25 Gram(s) IV Push once  furosemide    Tablet 20 milliGRAM(s) Oral daily  heparin  Injectable 5000 Unit(s) SubCutaneous every 8 hours  hydrALAZINE 100 milliGRAM(s) Oral every 8 hours  insulin lispro (HumaLOG) corrective regimen sliding scale   SubCutaneous three times a day before meals  insulin lispro (HumaLOG) corrective regimen sliding scale   SubCutaneous at bedtime  isosorbide   dinitrate Tablet (ISORDIL) 10 milliGRAM(s) Oral three times a day  oxybutynin 10 milliGRAM(s) Oral two times a day  pantoprazole    Tablet 40 milliGRAM(s) Oral before breakfast  simvastatin 20 milliGRAM(s) Oral at bedtime

## 2019-05-17 NOTE — H&P ADULT - NSICDXPASTSURGICALHX_GEN_ALL_CORE_FT
PAST SURGICAL HISTORY:  AVF (arteriovenous fistula)     S/P cholecystectomy     S/P partial hysterectomy     Status post glaucoma surgery

## 2019-05-17 NOTE — H&P ADULT - NSICDXPASTMEDICALHX_GEN_ALL_CORE_FT
PAST MEDICAL HISTORY:  Anemia of chronic disease     CVA (cerebral vascular accident) 2013- Residual RLE weakness    Depression     DM (diabetes mellitus)     ESRD (end stage renal disease) HD T/T/S    GERD (gastroesophageal reflux disease)     Glaucoma     Gout     HLD (hyperlipidemia)     HTN (hypertension)     NICM (nonischemic cardiomyopathy)     KERI (obstructive sleep apnea)

## 2019-05-17 NOTE — CONSULT NOTE ADULT - SUBJECTIVE AND OBJECTIVE BOX
HPI: Ms. Herr is a 54 year-old woman with history of multiple medical issues including hypertension, type 2 diabetes mellitus, nonischemic cardiomyopathy, cerebrovascular disease, and end stage renal disease. She undergoes hemodialysis Tuesdays, Thursdays, and Saturdays under my care at the [facility] in [place]. She presented yesterday to the [facility] ER with chest pain and shortness of breath. She missed her scheduled hemodialysis session yesterday; she was last dialyzed on Tuesday 5/14/19.      PAST MEDICAL & SURGICAL HISTORY:  GERD (gastroesophageal reflux disease)  Anemia of chronic disease  HLD (hyperlipidemia)  NICM (nonischemic cardiomyopathy)  KERI (obstructive sleep apnea)  ESRD (end stage renal disease): HD T/T/S  Depression  Gout  CVA (cerebral vascular accident): 2013- Residual RLE weakness  DM (diabetes mellitus)  HTN (hypertension)  Glaucoma  S/P cholecystectomy  S/P partial hysterectomy  Status post glaucoma surgery  AVF (arteriovenous fistula)    Allergies  iodine (Unknown)  Seafood (Unknown)    SOCIAL HISTORY:  Denies ETOh,Smoking,     FAMILY HISTORY:  Family history of heart attack    REVIEW OF SYSTEMS:  CONSTITUTIONAL: No weakness, fevers or chills  EYES/ENT: No visual changes;  No vertigo or throat pain   NECK: No pain or stiffness  RESPIRATORY: (+)SOB  CARDIOVASCULAR: (+)chest pain  GASTROINTESTINAL: No abdominal or epigastric pain. No nausea, vomiting, or hematemesis; No diarrhea or constipation. No melena or hematochezia.  GENITOURINARY: No dysuria, frequency or hematuria  NEUROLOGICAL: No numbness or weakness  SKIN: No itching, burning, rashes, or lesions   All other review of systems is negative unless indicated above.    VITAL:  T(C): , Max: 36.9 (05-17-19 @ 01:38)  T(F): , Max: 98.5 (05-17-19 @ 09:57)  HR: 83 (05-17-19 @ 09:57)  BP: 151/80 (05-17-19 @ 09:57)  RR: 18 (05-17-19 @ 09:57)  SpO2: 100% (05-17-19 @ 09:57)      PHYSICAL EXAM:  Constitutional: NAD, Alert  HEENT: NCAT, MMM  Neck: Supple, No JVD  Respiratory: CTA-b/l  Cardiovascular: RRR s1s2, no m/r/g  Gastrointestinal: BS+, soft, NT/ND  Extremities: No peripheral edema b/l  Neurological: no focal deficits; strength grossly intact  Back: no CVAT b/l  Skin: No rashes, no nevi  Access: LUE AVF (+)thrill    LABS:                        10.7   5.29  )-----------( 221      ( 17 May 2019 05:00 )             34.8     Na(144)/K(4.5)/Cl(105)/HCO3(24)/BUN(48)/Cr(9.61)Glu(101)/Ca(9.3)/Mg(--)/PO4(--)    05-17 @ 05:00    IMAGING:  < from: Xray Chest 1 View- PORTABLE-Urgent (05.17.19 @ 04:51) >  Mild interstitial edema.    ASSESSMENT:  (1)Renal - ESRD - HD TTS - missed HD yesterday; therefore, needs HD today. We could plan to dialyze her for 2.5 hours today and 2.5 hours tomorrow, in effort to get her back on her TTS schedule.  (2)CV - admitted with chest pain and shortness of breath. Indicated for aggressive UF today and tomorrow, as tolerated    RECOMMEND:  (1)HD today and tomorrow, 2.5h/session; 3kg UF per session as able  (2)Dose new meds for GFR<10/HD  (3)No needlesticks left arm (besides for HD)    Thank you for involving [facility] in this patient's care.    With warm regards,    Wale Francisco MD   [facility]  [phone] HPI: Ms. Herr is a 54 year-old woman with history of multiple medical issues including hypertension, type 2 diabetes mellitus, nonischemic cardiomyopathy, cerebrovascular disease, and end stage renal disease. She undergoes hemodialysis Tuesdays, Thursdays, and Saturdays under my care at the [facility] in [place]. She presented yesterday to the [facility] ER with chest pain and shortness of breath. She missed her scheduled hemodialysis session yesterday; she was last dialyzed on Tuesday 5/14/19.      PAST MEDICAL & SURGICAL HISTORY:  GERD (gastroesophageal reflux disease)  Anemia of chronic disease  HLD (hyperlipidemia)  NICM (nonischemic cardiomyopathy)  KERI (obstructive sleep apnea)  ESRD (end stage renal disease): HD T/T/S  Depression  Gout  CVA (cerebral vascular accident): 2013- Residual RLE weakness  DM (diabetes mellitus)  HTN (hypertension)  Glaucoma  S/P cholecystectomy  S/P partial hysterectomy  Status post glaucoma surgery  AVF (arteriovenous fistula)    Allergies  iodine (Unknown)  Seafood (Unknown)    SOCIAL HISTORY:  Denies ETOh,Smoking,     FAMILY HISTORY:  Family history of heart attack    REVIEW OF SYSTEMS:  CONSTITUTIONAL: No weakness, fevers or chills  EYES/ENT: No visual changes;  No vertigo or throat pain   NECK: No pain or stiffness  RESPIRATORY: (+)SOB  CARDIOVASCULAR: (+)chest pain  GASTROINTESTINAL: No abdominal or epigastric pain. No nausea, vomiting, or hematemesis; No diarrhea or constipation. No melena or hematochezia.  GENITOURINARY: No dysuria, frequency or hematuria  NEUROLOGICAL: No numbness or weakness  SKIN: No itching, burning, rashes, or lesions   All other review of systems is negative unless indicated above.    VITAL:  T(C): , Max: 36.9 (05-17-19 @ 01:38)  T(F): , Max: 98.5 (05-17-19 @ 09:57)  HR: 83 (05-17-19 @ 09:57)  BP: 151/80 (05-17-19 @ 09:57)  RR: 18 (05-17-19 @ 09:57)  SpO2: 100% (05-17-19 @ 09:57)      PHYSICAL EXAM:  Constitutional: NAD, Alert  HEENT: NCAT, MMM  Neck: Supple, No JVD  Respiratory: crackles L>R  Cardiovascular: RRR s1s2, no m/r/g  Gastrointestinal: BS+, soft, NT/ND  Extremities: No peripheral edema b/l  Neurological: no focal deficits; strength grossly intact  Back: no CVAT b/l  Skin: No rashes, no nevi  Access: LUE AVF (+)thrill    LABS:                        10.7   5.29  )-----------( 221      ( 17 May 2019 05:00 )             34.8     Na(144)/K(4.5)/Cl(105)/HCO3(24)/BUN(48)/Cr(9.61)Glu(101)/Ca(9.3)/Mg(--)/PO4(--)    05-17 @ 05:00    IMAGING:  < from: Xray Chest 1 View- PORTABLE-Urgent (05.17.19 @ 04:51) >  Mild interstitial edema.    ASSESSMENT:  (1)Renal - ESRD - HD TTS - missed HD yesterday; therefore, needs HD today. We could plan to dialyze her for 2.5 hours today and 2.5 hours tomorrow, in effort to get her back on her TTS schedule.  (2)CV - admitted with chest pain and shortness of breath. Indicated for aggressive UF today and tomorrow, as tolerated    RECOMMEND:  (1)HD today and tomorrow, 2.5h/session; 3kg UF per session as able  (2)Dose new meds for GFR<10/HD  (3)No needlesticks left arm (besides for HD)    Thank you for involving [facility] in this patient's care.    With warm regards,    Wale Francisco MD   [facility]  [phone]

## 2019-05-18 LAB
ALBUMIN SERPL ELPH-MCNC: 3.3 G/DL — SIGNIFICANT CHANGE UP (ref 3.3–5)
ALP SERPL-CCNC: 108 U/L — SIGNIFICANT CHANGE UP (ref 40–120)
ALT FLD-CCNC: 14 U/L — SIGNIFICANT CHANGE UP (ref 4–33)
ANION GAP SERPL CALC-SCNC: 12 MMO/L — SIGNIFICANT CHANGE UP (ref 7–14)
AST SERPL-CCNC: 12 U/L — SIGNIFICANT CHANGE UP (ref 4–32)
BILIRUB SERPL-MCNC: 0.2 MG/DL — SIGNIFICANT CHANGE UP (ref 0.2–1.2)
BUN SERPL-MCNC: 35 MG/DL — HIGH (ref 7–23)
CALCIUM SERPL-MCNC: 8.9 MG/DL — SIGNIFICANT CHANGE UP (ref 8.4–10.5)
CHLORIDE SERPL-SCNC: 100 MMOL/L — SIGNIFICANT CHANGE UP (ref 98–107)
CO2 SERPL-SCNC: 27 MMOL/L — SIGNIFICANT CHANGE UP (ref 22–31)
CREAT SERPL-MCNC: 8.24 MG/DL — HIGH (ref 0.5–1.3)
GLUCOSE BLDC GLUCOMTR-MCNC: 109 MG/DL — HIGH (ref 70–99)
GLUCOSE BLDC GLUCOMTR-MCNC: 111 MG/DL — HIGH (ref 70–99)
GLUCOSE BLDC GLUCOMTR-MCNC: 129 MG/DL — HIGH (ref 70–99)
GLUCOSE BLDC GLUCOMTR-MCNC: 147 MG/DL — HIGH (ref 70–99)
GLUCOSE SERPL-MCNC: 132 MG/DL — HIGH (ref 70–99)
HCT VFR BLD CALC: 30 % — LOW (ref 34.5–45)
HGB BLD-MCNC: 9.4 G/DL — LOW (ref 11.5–15.5)
MAGNESIUM SERPL-MCNC: 2.2 MG/DL — SIGNIFICANT CHANGE UP (ref 1.6–2.6)
MCHC RBC-ENTMCNC: 29.6 PG — SIGNIFICANT CHANGE UP (ref 27–34)
MCHC RBC-ENTMCNC: 31.3 % — LOW (ref 32–36)
MCV RBC AUTO: 94.3 FL — SIGNIFICANT CHANGE UP (ref 80–100)
NRBC # FLD: 0 K/UL — SIGNIFICANT CHANGE UP (ref 0–0)
PHOSPHATE SERPL-MCNC: 4.5 MG/DL — SIGNIFICANT CHANGE UP (ref 2.5–4.5)
PLATELET # BLD AUTO: 203 K/UL — SIGNIFICANT CHANGE UP (ref 150–400)
PMV BLD: 10.3 FL — SIGNIFICANT CHANGE UP (ref 7–13)
POTASSIUM SERPL-MCNC: 4.1 MMOL/L — SIGNIFICANT CHANGE UP (ref 3.5–5.3)
POTASSIUM SERPL-SCNC: 4.1 MMOL/L — SIGNIFICANT CHANGE UP (ref 3.5–5.3)
PROT SERPL-MCNC: 6.3 G/DL — SIGNIFICANT CHANGE UP (ref 6–8.3)
RBC # BLD: 3.18 M/UL — LOW (ref 3.8–5.2)
RBC # FLD: 14.7 % — HIGH (ref 10.3–14.5)
SODIUM SERPL-SCNC: 139 MMOL/L — SIGNIFICANT CHANGE UP (ref 135–145)
TSH SERPL-MCNC: 0.7 UIU/ML — SIGNIFICANT CHANGE UP (ref 0.27–4.2)
WBC # BLD: 3.54 K/UL — LOW (ref 3.8–10.5)
WBC # FLD AUTO: 3.54 K/UL — LOW (ref 3.8–10.5)

## 2019-05-18 RX ADMIN — CARVEDILOL PHOSPHATE 12.5 MILLIGRAM(S): 80 CAPSULE, EXTENDED RELEASE ORAL at 17:51

## 2019-05-18 RX ADMIN — Medication 667 MILLIGRAM(S): at 12:05

## 2019-05-18 RX ADMIN — Medication 100 MILLIGRAM(S): at 14:37

## 2019-05-18 RX ADMIN — Medication 81 MILLIGRAM(S): at 14:37

## 2019-05-18 RX ADMIN — Medication 100 MILLIGRAM(S): at 05:57

## 2019-05-18 RX ADMIN — Medication 667 MILLIGRAM(S): at 17:51

## 2019-05-18 RX ADMIN — Medication 20 MILLIGRAM(S): at 14:40

## 2019-05-18 RX ADMIN — SIMVASTATIN 20 MILLIGRAM(S): 20 TABLET, FILM COATED ORAL at 21:10

## 2019-05-18 RX ADMIN — ISOSORBIDE DINITRATE 10 MILLIGRAM(S): 5 TABLET ORAL at 21:10

## 2019-05-18 RX ADMIN — PANTOPRAZOLE SODIUM 40 MILLIGRAM(S): 20 TABLET, DELAYED RELEASE ORAL at 05:58

## 2019-05-18 RX ADMIN — Medication 100 MILLIGRAM(S): at 21:10

## 2019-05-18 RX ADMIN — ISOSORBIDE DINITRATE 10 MILLIGRAM(S): 5 TABLET ORAL at 14:37

## 2019-05-18 RX ADMIN — AMLODIPINE BESYLATE 5 MILLIGRAM(S): 2.5 TABLET ORAL at 14:37

## 2019-05-18 RX ADMIN — HEPARIN SODIUM 5000 UNIT(S): 5000 INJECTION INTRAVENOUS; SUBCUTANEOUS at 05:57

## 2019-05-18 RX ADMIN — ISOSORBIDE DINITRATE 10 MILLIGRAM(S): 5 TABLET ORAL at 05:57

## 2019-05-18 RX ADMIN — CLOPIDOGREL BISULFATE 75 MILLIGRAM(S): 75 TABLET, FILM COATED ORAL at 14:37

## 2019-05-18 RX ADMIN — CARVEDILOL PHOSPHATE 12.5 MILLIGRAM(S): 80 CAPSULE, EXTENDED RELEASE ORAL at 05:57

## 2019-05-18 RX ADMIN — Medication 10 MILLIGRAM(S): at 17:51

## 2019-05-18 RX ADMIN — HEPARIN SODIUM 5000 UNIT(S): 5000 INJECTION INTRAVENOUS; SUBCUTANEOUS at 21:10

## 2019-05-18 RX ADMIN — Medication 10 MILLIGRAM(S): at 05:57

## 2019-05-18 NOTE — PROGRESS NOTE ADULT - ASSESSMENT
53 y/o female with a PMHx of NICM with moderate LV dysfunction, ESRD on HD T/T/S via left AVF, CVA with residual RLE weakness, HTN, HLD, DM, GERD, anemia of chronic disease and gout presents to ED with exertional chest pain and dyspnea on exertion, found to be pulmonary edema in the setting of missed HD session.

## 2019-05-18 NOTE — PROGRESS NOTE ADULT - PROBLEM SELECTOR PLAN 1
Patient appears hypervolemic in the setting of a missed hemodialysis session (pulmonary edema on CXR, bilateral pedal edema, BNP 43995)  EKG shows NSR with no ischemic changes  Delta troponin mildly positive (73-->61) likely in the setting of ESRD and volume overload, no need to treat for ACS  Pt saturating well on room air with no signs of respiratory distress  Renal consult appreciated  Strict I&Os, monitor daily weights, 1500 cc fluid restriction, sodium restriction  Renal diet  Continue Lasix  No need to repeat TTE  PT eval for disposition

## 2019-05-18 NOTE — PROGRESS NOTE ADULT - PROBLEM SELECTOR PLAN 7
Cont insulin sliding scale for coverage while inpatient  Hold oral hypoglycemics  Recent HgA1C 6.1, no need to repeat  Monitor finger sticks  Diabetic diet

## 2019-05-18 NOTE — PROGRESS NOTE ADULT - SUBJECTIVE AND OBJECTIVE BOX
Patient is a 54y old  Female who presents with a chief complaint of Chest pain, shortness of breath (17 May 2019 14:35)      SUBJECTIVE / OVERNIGHT EVENTS:    Undergoing HD  Breathing more comfortable  Edema improving in both distal legs    Vital Signs Last 24 Hrs  T(C): 36.8 (18 May 2019 10:40), Max: 37.3 (17 May 2019 20:52)  T(F): 98.3 (18 May 2019 10:40), Max: 99.2 (17 May 2019 20:52)  HR: 81 (18 May 2019 10:40) (77 - 89)  BP: 132/75 (18 May 2019 10:40) (132/75 - 147/84)  BP(mean): --  RR: 18 (18 May 2019 10:40) (18 - 18)  SpO2: 95% (18 May 2019 05:55) (94% - 99%)  I&O's Summary    17 May 2019 07:01  -  18 May 2019 07:00  --------------------------------------------------------  IN: 0 mL / OUT: 200 mL / NET: -200 mL        GENERAL: NAD, well-developed  HEAD:  Atraumatic, Normocephalic  EYES: EOMI, PERRLA, conjunctiva and sclera clear  NECK: Supple, No JVD, No LAD, No carotid bruits  CHEST/LUNG: decreased BS at both bases b/l  HEART: Regular rate and rhythm; No murmurs, rubs, or gallops  ABDOMEN: Soft, Nontender, Nondistended; Bowel sounds present  EXTREMITIES:  2+ Peripheral Pulses, 2-3+ pitting b/l distal LE edema  SKIN: No rashes or lesions  NEURO: A+O x 3; nonfocal CN/motor/sensory/reflexes  PSYCH: Nl affect; no agitation or delirium; no suicidal or homicidal ideation    LABS:                        9.4    3.54  )-----------( 203      ( 18 May 2019 07:12 )             30.0     05-18    139  |  100  |  35<H>  ----------------------------<  132<H>  4.1   |  27  |  8.24<H>    Ca    8.9      18 May 2019 07:12  Phos  4.5     05-18  Mg     2.2     05-18    TPro  6.3  /  Alb  3.3  /  TBili  0.2  /  DBili  x   /  AST  12  /  ALT  14  /  AlkPhos  108  05-18    PT/INR - ( 17 May 2019 05:00 )   PT: 10.7 SEC;   INR: 0.94          PTT - ( 17 May 2019 05:00 )  PTT:30.4 SEC  CAPILLARY BLOOD GLUCOSE      POCT Blood Glucose.: 147 mg/dL (18 May 2019 10:57)  POCT Blood Glucose.: 129 mg/dL (18 May 2019 08:29)  POCT Blood Glucose.: 145 mg/dL (17 May 2019 22:26)  POCT Blood Glucose.: 105 mg/dL (17 May 2019 17:38)  POCT Blood Glucose.: 99 mg/dL (17 May 2019 14:37)  POCT Blood Glucose.: 112 mg/dL (17 May 2019 12:56)            RADIOLOGY & ADDITIONAL TESTS:    Imaging Personally Reviewed:  [x] YES  [ ] NO    Consultant(s) Notes Reviewed:  [x] YES  [ ] NO      MEDICATIONS  (STANDING):  allopurinol 100 milliGRAM(s) Oral daily  amLODIPine   Tablet 5 milliGRAM(s) Oral daily  aspirin enteric coated 81 milliGRAM(s) Oral daily  calcium acetate 667 milliGRAM(s) Oral three times a day with meals  carvedilol 12.5 milliGRAM(s) Oral every 12 hours  clopidogrel Tablet 75 milliGRAM(s) Oral daily  dextrose 5%. 1000 milliLiter(s) (50 mL/Hr) IV Continuous <Continuous>  dextrose 50% Injectable 12.5 Gram(s) IV Push once  dextrose 50% Injectable 25 Gram(s) IV Push once  dextrose 50% Injectable 25 Gram(s) IV Push once  furosemide    Tablet 20 milliGRAM(s) Oral daily  heparin  Injectable 5000 Unit(s) SubCutaneous every 8 hours  hydrALAZINE 100 milliGRAM(s) Oral every 8 hours  insulin lispro (HumaLOG) corrective regimen sliding scale   SubCutaneous three times a day before meals  insulin lispro (HumaLOG) corrective regimen sliding scale   SubCutaneous at bedtime  isosorbide   dinitrate Tablet (ISORDIL) 10 milliGRAM(s) Oral three times a day  oxybutynin 10 milliGRAM(s) Oral two times a day  pantoprazole    Tablet 40 milliGRAM(s) Oral before breakfast  simvastatin 20 milliGRAM(s) Oral at bedtime    MEDICATIONS  (PRN):  dextrose 40% Gel 15 Gram(s) Oral once PRN Blood Glucose LESS THAN 70 milliGRAM(s)/deciliter  glucagon  Injectable 1 milliGRAM(s) IntraMuscular once PRN Glucose LESS THAN 70 milligrams/deciliter      Care Discussed with Consultants/Other Providers [x] YES  [ ] NO    HEALTH ISSUES - PROBLEM Dx:  Need for prophylactic measure: Need for prophylactic measure  Anemia of chronic disease: Anemia of chronic disease  GERD (gastroesophageal reflux disease): GERD (gastroesophageal reflux disease)  DM (diabetes mellitus): DM (diabetes mellitus)  HLD (hyperlipidemia): HLD (hyperlipidemia)  HTN (hypertension): HTN (hypertension)  History of CVA (cerebrovascular accident): History of CVA (cerebrovascular accident)  ESRD (end stage renal disease): ESRD (end stage renal disease)  Angina pectoris: Angina pectoris  Acute on chronic systolic (congestive) heart failure: Acute on chronic systolic (congestive) heart failure

## 2019-05-18 NOTE — PROGRESS NOTE ADULT - PROBLEM SELECTOR PLAN 2
Chest pain possibly in the setting of fluid overload  EKG shows NSR with no ischemic changes  Delta troponin mildly positive (73-->61) likely in the setting of ESRD and volume overload, no need to treat for ACS  Continue ASA, Plavix, Simvastatin, Coreg  no need to repeat TTE  DASH diet

## 2019-05-19 LAB
ALBUMIN SERPL ELPH-MCNC: 3.6 G/DL — SIGNIFICANT CHANGE UP (ref 3.3–5)
ALP SERPL-CCNC: 106 U/L — SIGNIFICANT CHANGE UP (ref 40–120)
ALT FLD-CCNC: 13 U/L — SIGNIFICANT CHANGE UP (ref 4–33)
ANION GAP SERPL CALC-SCNC: 14 MMO/L — SIGNIFICANT CHANGE UP (ref 7–14)
AST SERPL-CCNC: 10 U/L — SIGNIFICANT CHANGE UP (ref 4–32)
BILIRUB SERPL-MCNC: 0.3 MG/DL — SIGNIFICANT CHANGE UP (ref 0.2–1.2)
BUN SERPL-MCNC: 31 MG/DL — HIGH (ref 7–23)
CALCIUM SERPL-MCNC: 9.5 MG/DL — SIGNIFICANT CHANGE UP (ref 8.4–10.5)
CHLORIDE SERPL-SCNC: 98 MMOL/L — SIGNIFICANT CHANGE UP (ref 98–107)
CK MB BLD-MCNC: 1.05 NG/ML — SIGNIFICANT CHANGE UP (ref 1–4.7)
CK MB BLD-MCNC: 1.09 NG/ML — SIGNIFICANT CHANGE UP (ref 1–4.7)
CK MB BLD-MCNC: SIGNIFICANT CHANGE UP (ref 0–2.5)
CK SERPL-CCNC: 35 U/L — SIGNIFICANT CHANGE UP (ref 25–170)
CK SERPL-CCNC: 56 U/L — SIGNIFICANT CHANGE UP (ref 25–170)
CO2 SERPL-SCNC: 27 MMOL/L — SIGNIFICANT CHANGE UP (ref 22–31)
CREAT SERPL-MCNC: 7.67 MG/DL — HIGH (ref 0.5–1.3)
GLUCOSE BLDC GLUCOMTR-MCNC: 103 MG/DL — HIGH (ref 70–99)
GLUCOSE BLDC GLUCOMTR-MCNC: 104 MG/DL — HIGH (ref 70–99)
GLUCOSE BLDC GLUCOMTR-MCNC: 117 MG/DL — HIGH (ref 70–99)
GLUCOSE BLDC GLUCOMTR-MCNC: 95 MG/DL — SIGNIFICANT CHANGE UP (ref 70–99)
GLUCOSE SERPL-MCNC: 92 MG/DL — SIGNIFICANT CHANGE UP (ref 70–99)
HCT VFR BLD CALC: 33.4 % — LOW (ref 34.5–45)
HGB BLD-MCNC: 10.2 G/DL — LOW (ref 11.5–15.5)
MAGNESIUM SERPL-MCNC: 2.2 MG/DL — SIGNIFICANT CHANGE UP (ref 1.6–2.6)
MCHC RBC-ENTMCNC: 29.3 PG — SIGNIFICANT CHANGE UP (ref 27–34)
MCHC RBC-ENTMCNC: 30.5 % — LOW (ref 32–36)
MCV RBC AUTO: 96 FL — SIGNIFICANT CHANGE UP (ref 80–100)
NRBC # FLD: 0 K/UL — SIGNIFICANT CHANGE UP (ref 0–0)
NT-PROBNP SERPL-SCNC: SIGNIFICANT CHANGE UP PG/ML
PHOSPHATE SERPL-MCNC: 4.5 MG/DL — SIGNIFICANT CHANGE UP (ref 2.5–4.5)
PLATELET # BLD AUTO: 232 K/UL — SIGNIFICANT CHANGE UP (ref 150–400)
PMV BLD: 11.1 FL — SIGNIFICANT CHANGE UP (ref 7–13)
POTASSIUM SERPL-MCNC: 4.1 MMOL/L — SIGNIFICANT CHANGE UP (ref 3.5–5.3)
POTASSIUM SERPL-SCNC: 4.1 MMOL/L — SIGNIFICANT CHANGE UP (ref 3.5–5.3)
PROT SERPL-MCNC: 6.7 G/DL — SIGNIFICANT CHANGE UP (ref 6–8.3)
RBC # BLD: 3.48 M/UL — LOW (ref 3.8–5.2)
RBC # FLD: 14.6 % — HIGH (ref 10.3–14.5)
SODIUM SERPL-SCNC: 139 MMOL/L — SIGNIFICANT CHANGE UP (ref 135–145)
TROPONIN T, HIGH SENSITIVITY: 88 NG/L — CRITICAL HIGH (ref ?–14)
TROPONIN T, HIGH SENSITIVITY: 93 NG/L — CRITICAL HIGH (ref ?–14)
WBC # BLD: 3.34 K/UL — LOW (ref 3.8–10.5)
WBC # FLD AUTO: 3.34 K/UL — LOW (ref 3.8–10.5)

## 2019-05-19 PROCEDURE — 93010 ELECTROCARDIOGRAM REPORT: CPT

## 2019-05-19 RX ORDER — DOCUSATE SODIUM 100 MG
100 CAPSULE ORAL DAILY
Refills: 0 | Status: DISCONTINUED | OUTPATIENT
Start: 2019-05-19 | End: 2019-05-21

## 2019-05-19 RX ORDER — KETOCONAZOLE 20 MG/G
1 AEROSOL, FOAM TOPICAL DAILY
Refills: 0 | Status: DISCONTINUED | OUTPATIENT
Start: 2019-05-19 | End: 2019-05-19

## 2019-05-19 RX ADMIN — Medication 100 MILLIGRAM(S): at 05:14

## 2019-05-19 RX ADMIN — PANTOPRAZOLE SODIUM 40 MILLIGRAM(S): 20 TABLET, DELAYED RELEASE ORAL at 05:15

## 2019-05-19 RX ADMIN — ISOSORBIDE DINITRATE 10 MILLIGRAM(S): 5 TABLET ORAL at 22:04

## 2019-05-19 RX ADMIN — Medication 667 MILLIGRAM(S): at 17:20

## 2019-05-19 RX ADMIN — Medication 100 MILLIGRAM(S): at 14:14

## 2019-05-19 RX ADMIN — Medication 100 MILLIGRAM(S): at 22:04

## 2019-05-19 RX ADMIN — Medication 100 MILLIGRAM(S): at 17:20

## 2019-05-19 RX ADMIN — Medication 10 MILLIGRAM(S): at 05:14

## 2019-05-19 RX ADMIN — Medication 81 MILLIGRAM(S): at 14:14

## 2019-05-19 RX ADMIN — HEPARIN SODIUM 5000 UNIT(S): 5000 INJECTION INTRAVENOUS; SUBCUTANEOUS at 05:15

## 2019-05-19 RX ADMIN — AMLODIPINE BESYLATE 5 MILLIGRAM(S): 2.5 TABLET ORAL at 14:14

## 2019-05-19 RX ADMIN — Medication 10 MILLIGRAM(S): at 17:20

## 2019-05-19 RX ADMIN — Medication 667 MILLIGRAM(S): at 14:14

## 2019-05-19 RX ADMIN — Medication 667 MILLIGRAM(S): at 08:57

## 2019-05-19 RX ADMIN — Medication 30 MILLILITER(S): at 08:57

## 2019-05-19 RX ADMIN — CARVEDILOL PHOSPHATE 12.5 MILLIGRAM(S): 80 CAPSULE, EXTENDED RELEASE ORAL at 05:15

## 2019-05-19 RX ADMIN — ISOSORBIDE DINITRATE 10 MILLIGRAM(S): 5 TABLET ORAL at 14:14

## 2019-05-19 RX ADMIN — CLOPIDOGREL BISULFATE 75 MILLIGRAM(S): 75 TABLET, FILM COATED ORAL at 14:14

## 2019-05-19 RX ADMIN — SIMVASTATIN 20 MILLIGRAM(S): 20 TABLET, FILM COATED ORAL at 22:04

## 2019-05-19 RX ADMIN — Medication 20 MILLIGRAM(S): at 14:14

## 2019-05-19 RX ADMIN — ISOSORBIDE DINITRATE 10 MILLIGRAM(S): 5 TABLET ORAL at 05:14

## 2019-05-19 RX ADMIN — HEPARIN SODIUM 5000 UNIT(S): 5000 INJECTION INTRAVENOUS; SUBCUTANEOUS at 22:04

## 2019-05-19 RX ADMIN — HEPARIN SODIUM 5000 UNIT(S): 5000 INJECTION INTRAVENOUS; SUBCUTANEOUS at 14:14

## 2019-05-19 RX ADMIN — CARVEDILOL PHOSPHATE 12.5 MILLIGRAM(S): 80 CAPSULE, EXTENDED RELEASE ORAL at 17:20

## 2019-05-19 NOTE — PROGRESS NOTE ADULT - PROBLEM SELECTOR PLAN 1
Patient appears hypervolemic in the setting of a missed hemodialysis session (pulmonary edema on CXR, bilateral pedal edema, BNP 38173)  EKG shows NSR with no ischemic changes  Delta troponin mildly positive (73-->61) likely in the setting of ESRD and volume overload, no need to treat for ACS  Pt saturating well on room air with no signs of respiratory distress  Renal consult appreciated  Strict I&Os, monitor daily weights, 1500 cc fluid restriction, sodium restriction  Renal diet  Continue Lasix  No need to repeat TTE  PT eval for disposition

## 2019-05-19 NOTE — DIETITIAN INITIAL EVALUATION ADULT. - NS AS NUTRI INTERV MEALS SNACK
Continue current diet order, which remains appropriate at this time. Fluid restriction as per MD discretion/Carbohydrate - modified diet

## 2019-05-19 NOTE — DIETITIAN INITIAL EVALUATION ADULT. - PROBLEM SELECTOR PLAN 1
Patient appears hypervolemic in the setting of a missed hemodialysis session (pulmonary edema on CXR, bilateral pedal edema, BNP 79093)  EKG shows NSR with no ischemic changes  Delta troponin mildly positive (73-->61) likely in the setting of ESRD and volume overload, no need to treat for ACS  Pt saturating well on room air with no signs of respiratory distress  Renal consult called for non-urgent HD today (Dr. Francisco)  Strict I&Os, monitor daily weights, 1500 cc fluid restriction, sodium restriction  Renal diet  Continue Lasix  Recent echo seen above  PT eval for disposition  F/U MD note

## 2019-05-19 NOTE — DIETITIAN INITIAL EVALUATION ADULT. - DIET TYPE
1500ml/renal replacement pts:no protein restr,no conc K & phos, low sodium/consistent carbohydrate (no snacks)/DASH/TLC (sodium and cholesterol restricted diet)

## 2019-05-19 NOTE — DIETITIAN INITIAL EVALUATION ADULT. - PERTINENT LABORATORY DATA
05-19 Na139 mmol/L Glu 92 mg/dL K+ 4.1 mmol/L Cr  7.67 mg/dL<H> BUN 31 mg/dL<H> 05-19 Phos 4.5 mg/dL 05-19 Alb 3.6 g/dL 05-03 [id number] 6.1 %<H> 05-03 Chol 93 mg/dL<L> LDL 35 mg/dL HDL 51 mg/dL Trig 72 mg/dL

## 2019-05-19 NOTE — DIETITIAN INITIAL EVALUATION ADULT. - NUTRITION INTERVENTION
Meals and Snack/Nutrition Education Meals and Snack/Nutrition Education/Collaboration and Referral of Nutrition Care

## 2019-05-19 NOTE — DIETITIAN INITIAL EVALUATION ADULT. - PERTINENT MEDS FT
MEDICATIONS  (STANDING):  allopurinol 100 milliGRAM(s) Oral daily  amLODIPine   Tablet 5 milliGRAM(s) Oral daily  aspirin enteric coated 81 milliGRAM(s) Oral daily  calcium acetate 667 milliGRAM(s) Oral three times a day with meals  carvedilol 12.5 milliGRAM(s) Oral every 12 hours  clopidogrel Tablet 75 milliGRAM(s) Oral daily  dextrose 5%. 1000 milliLiter(s) (50 mL/Hr) IV Continuous <Continuous>  dextrose 50% Injectable 12.5 Gram(s) IV Push once  dextrose 50% Injectable 25 Gram(s) IV Push once  dextrose 50% Injectable 25 Gram(s) IV Push once  furosemide    Tablet 20 milliGRAM(s) Oral daily  heparin  Injectable 5000 Unit(s) SubCutaneous every 8 hours  hydrALAZINE 100 milliGRAM(s) Oral every 8 hours  insulin lispro (HumaLOG) corrective regimen sliding scale   SubCutaneous three times a day before meals  insulin lispro (HumaLOG) corrective regimen sliding scale   SubCutaneous at bedtime  isosorbide   dinitrate Tablet (ISORDIL) 10 milliGRAM(s) Oral three times a day  oxybutynin 10 milliGRAM(s) Oral two times a day  pantoprazole    Tablet 40 milliGRAM(s) Oral before breakfast  simvastatin 20 milliGRAM(s) Oral at bedtime    MEDICATIONS  (PRN):  aluminum hydroxide/magnesium hydroxide/simethicone Suspension 30 milliLiter(s) Oral every 6 hours PRN Dyspepsia  dextrose 40% Gel 15 Gram(s) Oral once PRN Blood Glucose LESS THAN 70 milliGRAM(s)/deciliter  glucagon  Injectable 1 milliGRAM(s) IntraMuscular once PRN Glucose LESS THAN 70 milligrams/deciliter

## 2019-05-19 NOTE — PROGRESS NOTE ADULT - ASSESSMENT
Echo 1/5/20189: EF 45-50%, global LV sys dysfx, mild diastolic dysfx (stgI)  Stress test in 6/2018 normal with no evidence of mi or ischemia   Echo from 3/ 22/2019, mild to mod MR, moderate global lv sys dysfx small pericardial effusion to left ventricle EF 41%   Echo 5/5/2019: EF 40-45%, small pericardial effusion, normal RV fx, mod global lv sys dysfx     a/p  53 yo Female with PMHx of Systolic CHF, DM, HTN, HLD, ESRD (Tues, Thurs, Sat) , Gout presenting with cp and sob due to CHF from missed HD    1. Chest pain, atypical from CHF  symptoms likely in the setting of fluid overload, secondary to missed HD session   cv stable, no cp/sob, no evidence of acute ischemia   HST elevated, type II MI, demand ischemia 2/2 to ESRD , EKG unchanged   Recent echo performed, pt. with known systolic dysfunction (noted on echo from 1/2018), EF 41% (overall unchanged), sml pericardial effusion noted   chest xray with small left pleural effusion and intersitial edema unchanged from prior exam  can check limited Echo to eval pericardial effusion   c/w BB, ASA, imdur, plavix     2. Acute on chronic Systolic chf   fluid overloaded likely secondary to missed HD session  cont fluid removal with HD / lasix po  continue bb, imdur, hydral. lasix   acei or arb per renal     3. HTN  stable   continue current meds     4. ESRD on HD  renal f/u     dvt ppx

## 2019-05-19 NOTE — DIETITIAN INITIAL EVALUATION ADULT. - OTHER INFO
Nutrition consult received for RD. Patient admitted for acute on chronic heart failure 2/2 missed HD sessions. Patient reports good appetite and PO intake >75% at this time. Patient denies any nausea/vomiting/diarrhea/constipation or difficulty chewing and swallowing. Allergic to Shellfish and Seafood. Patient denies any weight changes, report weight to be around 88-90kg. Per flowsheet, weight from previous admission on 4/3/19 87kg POST HD, 5/2 88.2 POST HD and now 88.8kg (5/19) with +1 dependent +2 right and left foot edema noted. Patient with fair understanding of diet restriction when questioned about diet, though states she was seen by RDN at dialysis. Reviewed therapeutic diet modification with minimal interest during interview. Discussed importance of good adherence to diet recommendations and HD.

## 2019-05-19 NOTE — DIETITIAN INITIAL EVALUATION ADULT. - PROBLEM SELECTOR PLAN 7
Start insulin sliding scale for coverage while inpatient  Hold oral hypoglycemics  Recent HgA1C 6.1, no need to repeat  Monitor finger sticks  Diabetic diet

## 2019-05-19 NOTE — CHART NOTE - NSCHARTNOTEFT_GEN_A_CORE
Pt c/o chest pain and states she feels like she has a gas bubble. EKG ordered that was unchanged from previous EKG. Cardiac enzymes ordered. Troponin elevated likely due to ESRD on dialysis. CKMB wnl. Pts last BM was Thursday. Pt given Maalox and Colace to help with BM. Will continue to monitor.

## 2019-05-19 NOTE — PROGRESS NOTE ADULT - SUBJECTIVE AND OBJECTIVE BOX
Sleeping but arousable  tolerated HD yesterday but she reports feeling mild substernal chest discomfort during the session, not currently    Vital Signs Last 24 Hrs  T(C): 37.1 (19 May 2019 07:23), Max: 37.3 (19 May 2019 05:11)  T(F): 98.7 (19 May 2019 07:23), Max: 99.1 (19 May 2019 05:11)  HR: 80 (19 May 2019 07:23) (79 - 84)  BP: 118/63 (19 May 2019 07:23) (118/63 - 143/80)  BP(mean): --  RR: 18 (19 May 2019 07:23) (18 - 18)  SpO2: 99% (19 May 2019 07:23) (94% - 99%)    GENERAL: NAD, well-developed  HEAD:  Atraumatic, Normocephalic  EYES: EOMI, PERRLA, conjunctiva and sclera clear  NECK: Supple, No JVD, No LAD, No carotid bruits  CHEST/LUNG: decreased BS at both bases b/l  HEART: Regular rate and rhythm; No murmurs, rubs, or gallops  ABDOMEN: Soft, Nontender, Nondistended; Bowel sounds present  EXTREMITIES:  2+ Peripheral Pulses, 2-3+ pitting b/l distal LE edema  SKIN: No rashes or lesions  NEURO: A+O x 3; nonfocal CN/motor/sensory/reflexes  PSYCH: Nl affect; no agitation or delirium; no suicidal or homicidal ideation    LABS:                        10.2   3.34  )-----------( 232      ( 19 May 2019 05:49 )             33.4     05-19    139  |  98  |  31<H>  ----------------------------<  92  4.1   |  27  |  7.67<H>    Ca    9.5      19 May 2019 05:49  Phos  4.5     05-19  Mg     2.2     05-19    TPro  6.7  /  Alb  3.6  /  TBili  0.3  /  DBili  x   /  AST  10  /  ALT  13  /  AlkPhos  106  05-19      CAPILLARY BLOOD GLUCOSE      POCT Blood Glucose.: 104 mg/dL (19 May 2019 08:45)  POCT Blood Glucose.: 109 mg/dL (18 May 2019 21:50)  POCT Blood Glucose.: 111 mg/dL (18 May 2019 17:57)  POCT Blood Glucose.: 147 mg/dL (18 May 2019 10:57)    CARDIAC MARKERS ( 19 May 2019 05:49 )  x     / x     / 56 u/L / 1.05 ng/mL / x

## 2019-05-19 NOTE — PROGRESS NOTE ADULT - ASSESSMENT
55 y/o female with a PMHx of NICM with moderate LV dysfunction, ESRD on HD T/T/S via left AVF, CVA with residual RLE weakness, HTN, HLD, DM, GERD, anemia of chronic disease and gout presents to ED with exertional chest pain and dyspnea on exertion, found to be pulmonary edema in the setting of missed HD session.

## 2019-05-19 NOTE — PROGRESS NOTE ADULT - SUBJECTIVE AND OBJECTIVE BOX
CARDIOLOGY FOLLOW UP - Dr. nAdrew    CC no cp or sob       PHYSICAL EXAM:  T(C): 37.1 (05-19-19 @ 07:23), Max: 37.3 (05-19-19 @ 05:11)  HR: 80 (05-19-19 @ 07:23) (79 - 84)  BP: 118/63 (05-19-19 @ 07:23) (118/63 - 143/80)  RR: 18 (05-19-19 @ 07:23) (18 - 18)  SpO2: 99% (05-19-19 @ 07:23) (94% - 99%)  Wt(kg): --  I&O's Summary    18 May 2019 07:01  -  19 May 2019 07:00  --------------------------------------------------------  IN: 960 mL / OUT: 3700 mL / NET: -2740 mL        Appearance: Normal	  Cardiovascular: Normal S1 S2,RRR, No JVD, No murmurs  Respiratory: Lungs clear to auscultation	  Gastrointestinal:  Soft, Non-tender, + BS	  Extremities: Normal range of motion, No clubbing, cyanosis or edema        MEDICATIONS  (STANDING):  allopurinol 100 milliGRAM(s) Oral daily  amLODIPine   Tablet 5 milliGRAM(s) Oral daily  aspirin enteric coated 81 milliGRAM(s) Oral daily  calcium acetate 667 milliGRAM(s) Oral three times a day with meals  carvedilol 12.5 milliGRAM(s) Oral every 12 hours  clopidogrel Tablet 75 milliGRAM(s) Oral daily  dextrose 5%. 1000 milliLiter(s) (50 mL/Hr) IV Continuous <Continuous>  dextrose 50% Injectable 12.5 Gram(s) IV Push once  dextrose 50% Injectable 25 Gram(s) IV Push once  dextrose 50% Injectable 25 Gram(s) IV Push once  furosemide    Tablet 20 milliGRAM(s) Oral daily  heparin  Injectable 5000 Unit(s) SubCutaneous every 8 hours  hydrALAZINE 100 milliGRAM(s) Oral every 8 hours  insulin lispro (HumaLOG) corrective regimen sliding scale   SubCutaneous three times a day before meals  insulin lispro (HumaLOG) corrective regimen sliding scale   SubCutaneous at bedtime  isosorbide   dinitrate Tablet (ISORDIL) 10 milliGRAM(s) Oral three times a day  oxybutynin 10 milliGRAM(s) Oral two times a day  pantoprazole    Tablet 40 milliGRAM(s) Oral before breakfast  simvastatin 20 milliGRAM(s) Oral at bedtime      TELEMETRY: NSR	    ECG:  	  RADIOLOGY:   DIAGNOSTIC TESTING:  [ ] Echocardiogram:  [ ]  Catheterization:  [ ] Stress Test:    OTHER: 	    LABS:	 	        CKMB: 1.05 ng/mL (05-19 @ 05:49)                          10.2   3.34  )-----------( 232      ( 19 May 2019 05:49 )             33.4     05-19    139  |  98  |  31<H>  ----------------------------<  92  4.1   |  27  |  7.67<H>    Ca    9.5      19 May 2019 05:49  Phos  4.5     05-19  Mg     2.2     05-19    TPro  6.7  /  Alb  3.6  /  TBili  0.3  /  DBili  x   /  AST  10  /  ALT  13  /  AlkPhos  106  05-19

## 2019-05-19 NOTE — DIETITIAN INITIAL EVALUATION ADULT. - NS AS NUTRI INTERV COLLABORAT
Collaboration with other nutrition professionals/Suggest outpatient follow up with appropriate RD for the purposes of long-term nutrition evaluation and diet education./Collaboration with other providers

## 2019-05-20 ENCOUNTER — TRANSCRIPTION ENCOUNTER (OUTPATIENT)
Age: 54
End: 2019-05-20

## 2019-05-20 LAB
ALBUMIN SERPL ELPH-MCNC: 3.6 G/DL — SIGNIFICANT CHANGE UP (ref 3.3–5)
ALP SERPL-CCNC: 110 U/L — SIGNIFICANT CHANGE UP (ref 40–120)
ALT FLD-CCNC: 11 U/L — SIGNIFICANT CHANGE UP (ref 4–33)
ANION GAP SERPL CALC-SCNC: 14 MMO/L — SIGNIFICANT CHANGE UP (ref 7–14)
AST SERPL-CCNC: 11 U/L — SIGNIFICANT CHANGE UP (ref 4–32)
BILIRUB SERPL-MCNC: 0.2 MG/DL — SIGNIFICANT CHANGE UP (ref 0.2–1.2)
BUN SERPL-MCNC: 43 MG/DL — HIGH (ref 7–23)
CALCIUM SERPL-MCNC: 9.2 MG/DL — SIGNIFICANT CHANGE UP (ref 8.4–10.5)
CHLORIDE SERPL-SCNC: 100 MMOL/L — SIGNIFICANT CHANGE UP (ref 98–107)
CO2 SERPL-SCNC: 24 MMOL/L — SIGNIFICANT CHANGE UP (ref 22–31)
CREAT SERPL-MCNC: 9.79 MG/DL — HIGH (ref 0.5–1.3)
GLUCOSE BLDC GLUCOMTR-MCNC: 112 MG/DL — HIGH (ref 70–99)
GLUCOSE BLDC GLUCOMTR-MCNC: 113 MG/DL — HIGH (ref 70–99)
GLUCOSE BLDC GLUCOMTR-MCNC: 123 MG/DL — HIGH (ref 70–99)
GLUCOSE BLDC GLUCOMTR-MCNC: 207 MG/DL — HIGH (ref 70–99)
GLUCOSE SERPL-MCNC: 112 MG/DL — HIGH (ref 70–99)
HCT VFR BLD CALC: 33.2 % — LOW (ref 34.5–45)
HGB BLD-MCNC: 10.5 G/DL — LOW (ref 11.5–15.5)
MAGNESIUM SERPL-MCNC: 2.3 MG/DL — SIGNIFICANT CHANGE UP (ref 1.6–2.6)
MCHC RBC-ENTMCNC: 29.7 PG — SIGNIFICANT CHANGE UP (ref 27–34)
MCHC RBC-ENTMCNC: 31.6 % — LOW (ref 32–36)
MCV RBC AUTO: 93.8 FL — SIGNIFICANT CHANGE UP (ref 80–100)
NRBC # FLD: 0 K/UL — SIGNIFICANT CHANGE UP (ref 0–0)
PHOSPHATE SERPL-MCNC: 5 MG/DL — HIGH (ref 2.5–4.5)
PLATELET # BLD AUTO: 226 K/UL — SIGNIFICANT CHANGE UP (ref 150–400)
PMV BLD: 10.1 FL — SIGNIFICANT CHANGE UP (ref 7–13)
POTASSIUM SERPL-MCNC: 4.6 MMOL/L — SIGNIFICANT CHANGE UP (ref 3.5–5.3)
POTASSIUM SERPL-SCNC: 4.6 MMOL/L — SIGNIFICANT CHANGE UP (ref 3.5–5.3)
PROT SERPL-MCNC: 6.7 G/DL — SIGNIFICANT CHANGE UP (ref 6–8.3)
RBC # BLD: 3.54 M/UL — LOW (ref 3.8–5.2)
RBC # FLD: 14.3 % — SIGNIFICANT CHANGE UP (ref 10.3–14.5)
SODIUM SERPL-SCNC: 138 MMOL/L — SIGNIFICANT CHANGE UP (ref 135–145)
WBC # BLD: 2.96 K/UL — LOW (ref 3.8–10.5)
WBC # FLD AUTO: 2.96 K/UL — LOW (ref 3.8–10.5)

## 2019-05-20 RX ORDER — SIMVASTATIN 20 MG/1
1 TABLET, FILM COATED ORAL
Qty: 30 | Refills: 0
Start: 2019-05-20 | End: 2019-06-18

## 2019-05-20 RX ORDER — SIMVASTATIN 20 MG/1
1 TABLET, FILM COATED ORAL
Qty: 0 | Refills: 0 | DISCHARGE

## 2019-05-20 RX ORDER — ALLOPURINOL 300 MG
1 TABLET ORAL
Qty: 30 | Refills: 0
Start: 2019-05-20 | End: 2019-06-18

## 2019-05-20 RX ORDER — FUROSEMIDE 40 MG
1 TABLET ORAL
Qty: 30 | Refills: 0
Start: 2019-05-20 | End: 2019-06-18

## 2019-05-20 RX ORDER — FUROSEMIDE 40 MG
1 TABLET ORAL
Qty: 0 | Refills: 0 | DISCHARGE

## 2019-05-20 RX ORDER — CALCIUM ACETATE 667 MG
1 TABLET ORAL
Qty: 0 | Refills: 0 | DISCHARGE

## 2019-05-20 RX ORDER — DOCUSATE SODIUM 100 MG
1 CAPSULE ORAL
Qty: 30 | Refills: 0
Start: 2019-05-20 | End: 2019-06-18

## 2019-05-20 RX ORDER — ACETAMINOPHEN 500 MG
650 TABLET ORAL ONCE
Refills: 0 | Status: DISCONTINUED | OUTPATIENT
Start: 2019-05-20 | End: 2019-05-21

## 2019-05-20 RX ADMIN — Medication 81 MILLIGRAM(S): at 13:02

## 2019-05-20 RX ADMIN — ISOSORBIDE DINITRATE 10 MILLIGRAM(S): 5 TABLET ORAL at 13:02

## 2019-05-20 RX ADMIN — ISOSORBIDE DINITRATE 10 MILLIGRAM(S): 5 TABLET ORAL at 21:00

## 2019-05-20 RX ADMIN — AMLODIPINE BESYLATE 5 MILLIGRAM(S): 2.5 TABLET ORAL at 13:02

## 2019-05-20 RX ADMIN — CLOPIDOGREL BISULFATE 75 MILLIGRAM(S): 75 TABLET, FILM COATED ORAL at 13:02

## 2019-05-20 RX ADMIN — HEPARIN SODIUM 5000 UNIT(S): 5000 INJECTION INTRAVENOUS; SUBCUTANEOUS at 21:00

## 2019-05-20 RX ADMIN — Medication 10 MILLIGRAM(S): at 18:00

## 2019-05-20 RX ADMIN — Medication 100 MILLIGRAM(S): at 13:02

## 2019-05-20 RX ADMIN — Medication 20 MILLIGRAM(S): at 13:02

## 2019-05-20 RX ADMIN — HEPARIN SODIUM 5000 UNIT(S): 5000 INJECTION INTRAVENOUS; SUBCUTANEOUS at 06:42

## 2019-05-20 RX ADMIN — Medication 10 MILLIGRAM(S): at 06:42

## 2019-05-20 RX ADMIN — Medication 100 MILLIGRAM(S): at 06:42

## 2019-05-20 RX ADMIN — PANTOPRAZOLE SODIUM 40 MILLIGRAM(S): 20 TABLET, DELAYED RELEASE ORAL at 06:42

## 2019-05-20 RX ADMIN — HEPARIN SODIUM 5000 UNIT(S): 5000 INJECTION INTRAVENOUS; SUBCUTANEOUS at 13:03

## 2019-05-20 RX ADMIN — ISOSORBIDE DINITRATE 10 MILLIGRAM(S): 5 TABLET ORAL at 06:42

## 2019-05-20 RX ADMIN — SIMVASTATIN 20 MILLIGRAM(S): 20 TABLET, FILM COATED ORAL at 21:00

## 2019-05-20 RX ADMIN — Medication 667 MILLIGRAM(S): at 09:14

## 2019-05-20 RX ADMIN — CARVEDILOL PHOSPHATE 12.5 MILLIGRAM(S): 80 CAPSULE, EXTENDED RELEASE ORAL at 06:42

## 2019-05-20 RX ADMIN — Medication 667 MILLIGRAM(S): at 13:02

## 2019-05-20 RX ADMIN — CARVEDILOL PHOSPHATE 12.5 MILLIGRAM(S): 80 CAPSULE, EXTENDED RELEASE ORAL at 18:01

## 2019-05-20 RX ADMIN — Medication 100 MILLIGRAM(S): at 21:00

## 2019-05-20 RX ADMIN — Medication 667 MILLIGRAM(S): at 18:00

## 2019-05-20 NOTE — DISCHARGE NOTE PROVIDER - NSDCCPCAREPLAN_GEN_ALL_CORE_FT
PRINCIPAL DISCHARGE DIAGNOSIS  Diagnosis: CHF (congestive heart failure)  Assessment and Plan of Treatment: PRINCIPAL DISCHARGE DIAGNOSIS  Diagnosis: CHF (congestive heart failure)  Assessment and Plan of Treatment: Low salt diet, fluid restriction to 1500 ml daily, monitor your fluid intake and weight daily, exercise as tolerated 30 minutes daily, and follow up with your physician within 1 to 2 weeks.      SECONDARY DISCHARGE DIAGNOSES  Diagnosis: ESRD (end stage renal disease)  Assessment and Plan of Treatment: Please follow up with your nephrologist for management, and continue you schedule hemodialysis.    Diagnosis: DM (diabetes mellitus)  Assessment and Plan of Treatment: low salt, fat and carbohydrate diet, minimize glucose intake.  Exercise daily for at least 30 minutes and weight loss.  Follow up with primary care physician and endocrinologist for routine Hemoglobin A1C checks and management.  Follow up with your ophthalmologist for routine yearly vision exams.    Diagnosis: HTN (hypertension)  Assessment and Plan of Treatment: Low sodium and fat diet, continue anti-hypertensive medications, and follow up with primary care physician.    Diagnosis: HLD (hyperlipidemia)  Assessment and Plan of Treatment: Low fat diet, exercise daily and continue current medications. Follow up with primary care physician and cardiologist for management. PRINCIPAL DISCHARGE DIAGNOSIS  Diagnosis: CHF (congestive heart failure)  Assessment and Plan of Treatment: Low salt diet, fluid restriction to 1500 ml daily, monitor your fluid intake and weight daily, exercise as tolerated 30 minutes daily, and follow up with your physician within 1 to 2 weeks.      SECONDARY DISCHARGE DIAGNOSES  Diagnosis: HLD (hyperlipidemia)  Assessment and Plan of Treatment: Low fat diet, exercise daily and continue current medications. Follow up with primary care physician and cardiologist for management.    Diagnosis: HTN (hypertension)  Assessment and Plan of Treatment: Low sodium and fat diet, continue anti-hypertensive medications, and follow up with primary care physician.    Diagnosis: DM (diabetes mellitus)  Assessment and Plan of Treatment: low salt, fat and carbohydrate diet, minimize glucose intake.  Exercise daily for at least 30 minutes and weight loss.  Follow up with primary care physician and endocrinologist for routine Hemoglobin A1C checks and management.  Follow up with your ophthalmologist for routine yearly vision exams.    Diagnosis: ESRD (end stage renal disease)  Assessment and Plan of Treatment: Please follow up with your nephrologist for management, and continue you schedule hemodialysis. PRINCIPAL DISCHARGE DIAGNOSIS  Diagnosis: CHF (congestive heart failure)  Assessment and Plan of Treatment: Low salt intake. Restrict fluid intake based on your doctors recommendations. Monitor daily weights. If you note weight gain >3-4lbs in one week, follow up with your doctor or return to the hospital immediately. Follow up with your cardiologist as outpatient in 1-2 weeks for further monitoring. Please call for appointment. Ensure compliance with your medications as directed.      SECONDARY DISCHARGE DIAGNOSES  Diagnosis: HLD (hyperlipidemia)  Assessment and Plan of Treatment: Follow up with your primary care physician for further monitoring in 1-2 weeks. Please call to arrange appointment. Low cholesterol diet. Salt restriction. 1800Kcal diabetic diet with carb restriction.    Diagnosis: HTN (hypertension)  Assessment and Plan of Treatment: Continue anti-hypertensive medications. Follow up with your primary care physician for further monitoring in 1-2 weeks. Please call to arrange appointment. Low cholesterol diet. Salt restriction. 1800Kcal diabetic diet with carb restriction.    Diagnosis: DM (diabetes mellitus)  Assessment and Plan of Treatment: Follow up with your primary care physician for further monitoring in 1-2 weeks. Please call to arrange appointment. Continue diet modification. Avoid complex carbohydrates such as bread, pasta, cereal, white rice, white potatoes, etc. Avoid concentrated sugar as found in desserts, candy, soda, juice, etc. Consume a diet based on lean protein (chicken, fish) and vegetables.    Diagnosis: ESRD (end stage renal disease)  Assessment and Plan of Treatment: Please follow up with your nephrologist for management, and continue you schedule hemodialysis. PRINCIPAL DISCHARGE DIAGNOSIS  Diagnosis: CHF (congestive heart failure)  Assessment and Plan of Treatment: Low salt intake. Restrict fluid intake. Ensure compliance with your hemodialysis! Monitor daily weights. If you note weight gain >3-4lbs in one week, follow up with your doctor or return to the hospital immediately. Follow up with your cardiologist as outpatient in 1-2 weeks for further monitoring. Please call for appointment. Ensure compliance with your medications as directed.      SECONDARY DISCHARGE DIAGNOSES  Diagnosis: HLD (hyperlipidemia)  Assessment and Plan of Treatment: Follow up with your primary care physician for further monitoring in 1-2 weeks. Please call to arrange appointment. Low cholesterol diet. Salt restriction. 1800Kcal diabetic diet with carb restriction.    Diagnosis: HTN (hypertension)  Assessment and Plan of Treatment: Continue anti-hypertensive medications. Follow up with your primary care physician for further monitoring in 1-2 weeks. Please call to arrange appointment. Low cholesterol diet. Salt restriction. 1800Kcal diabetic diet with carb restriction.    Diagnosis: DM (diabetes mellitus)  Assessment and Plan of Treatment: Continue Januvia 25mg daily. Follow up with your primary care physician for further monitoring in 1-2 weeks. Please call to arrange appointment. Continue diet modification. Avoid complex carbohydrates such as bread, pasta, cereal, white rice, white potatoes, etc. Avoid concentrated sugar as found in desserts, candy, soda, juice, etc. Consume a diet based on lean protein (chicken, fish) and vegetables.    Diagnosis: ESRD (end stage renal disease)  Assessment and Plan of Treatment: Please follow up with your nephrologist Dr. Francisco for management, and continue your scheduled hemodialysis. Do not miss a session!

## 2019-05-20 NOTE — PROGRESS NOTE ADULT - ASSESSMENT
Echo 1/5/20189: EF 45-50%, global LV sys dysfx, mild diastolic dysfx (stgI)  Stress test in 6/2018 normal with no evidence of mi or ischemia   Echo from 3/ 22/2019, mild to mod MR, moderate global lv sys dysfx small pericardial effusion to left ventricle EF 41%   Echo 5/5/2019: EF 40-45%, small pericardial effusion, normal RV fx, mod global lv sys dysfx     a/p  53 yo Female with PMHx of Systolic CHF, DM, HTN, HLD, ESRD (Tues, Thurs, Sat) , Gout presenting with cp and sob due to CHF from missed HD    1. Chest pain, atypical from CHF  symptoms likely in the setting of fluid overload, secondary to missed HD session   cv stable, no cp/sob, no evidence of acute ischemia   HST elevated, type II MI, demand ischemia 2/2 to ESRD , EKG unchanged   Recent echo performed, pt. with known systolic dysfunction (noted on echo from 1/2018), EF 41% (overall unchanged), sml pericardial effusion noted   chest xray with small left pleural effusion and intersitial edema unchanged from prior exam  c/w BB, ASA, imdur, plavix     2. Acute on chronic Systolic chf   fluid overloaded likely secondary to missed HD session  cont fluid removal with HD / lasix po  continue bb, imdur, hydral. lasix   acei or arb per renal     3. HTN  stable   continue current meds     4. ESRD on HD  renal f/u     dvt ppx

## 2019-05-20 NOTE — PROGRESS NOTE ADULT - SUBJECTIVE AND OBJECTIVE BOX
No pain, no shortness of breath      VITAL:  T(C): , Max: 37.1 (05-19-19 @ 21:56)  T(F): , Max: 98.8 (05-19-19 @ 21:56)  HR: 89 (05-20-19 @ 13:00)  BP: 130/78 (05-20-19 @ 13:00)  BP(mean): --  RR: 16 (05-20-19 @ 13:00)  SpO2: 96% (05-20-19 @ 13:00)      PHYSICAL EXAM:  Constitutional: NAD, Alert  HEENT: NCAT, MMM  Neck: Supple, No JVD  Respiratory: crackles L>R  Cardiovascular: RRR s1s2, no m/r/g  Gastrointestinal: BS+, soft, NT/ND  Extremities: No peripheral edema b/l  Neurological: no focal deficits; strength grossly intact  Back: no CVAT b/l  Skin: No rashes, no nevi  Access: LUE AVF (+)thrill      LABS:                        10.5   2.96  )-----------( 226      ( 20 May 2019 07:38 )             33.2     Na(138)/K(4.6)/Cl(100)/HCO3(24)/BUN(43)/Cr(9.79)Glu(112)/Ca(9.2)/Mg(2.3)/PO4(5.0)    05-20 @ 07:38  Na(139)/K(4.1)/Cl(98)/HCO3(27)/BUN(31)/Cr(7.67)Glu(92)/Ca(9.5)/Mg(2.2)/PO4(4.5)    05-19 @ 05:49  Na(139)/K(4.1)/Cl(100)/HCO3(27)/BUN(35)/Cr(8.24)Glu(132)/Ca(8.9)/Mg(2.2)/PO4(4.5)    05-18 @ 07:12      IMPRESSION: 54F w/ HTN, DM2, NICM, CVA, and ESRD-HD TTS, 5/16/19 a/w CP/SOB    (1)Renal - ESRD - HD TTS - due for next HD tomorrow  (2)Chest pain - improved  (3)CV - hypervolemia - improved relative to admission    RECOMMEND:  (1)D/C planning per primary team  (2)Next HD tomorrow - inpatient versus outpatient          Wale Francisco MD  [facility]  [phone] No pain, no shortness of breath      VITAL:  T(C): , Max: 37.1 (05-19-19 @ 21:56)  T(F): , Max: 98.8 (05-19-19 @ 21:56)  HR: 89 (05-20-19 @ 13:00)  BP: 130/78 (05-20-19 @ 13:00)  BP(mean): --  RR: 16 (05-20-19 @ 13:00)  SpO2: 96% (05-20-19 @ 13:00)      PHYSICAL EXAM:  Constitutional: NAD, Alert  HEENT: NCAT, MMM  Neck: Supple, No JVD  Respiratory: crackles L>R  Cardiovascular: RRR s1s2, no m/r/g  Gastrointestinal: BS+, soft, NT/ND  Extremities: No peripheral edema b/l  Neurological: no focal deficits; strength grossly intact  Back: no CVAT b/l  Skin: No rashes, no nevi  Access: LUE AVF (+)thrill      LABS:                        10.5   2.96  )-----------( 226      ( 20 May 2019 07:38 )             33.2     Na(138)/K(4.6)/Cl(100)/HCO3(24)/BUN(43)/Cr(9.79)Glu(112)/Ca(9.2)/Mg(2.3)/PO4(5.0)    05-20 @ 07:38  Na(139)/K(4.1)/Cl(98)/HCO3(27)/BUN(31)/Cr(7.67)Glu(92)/Ca(9.5)/Mg(2.2)/PO4(4.5)    05-19 @ 05:49  Na(139)/K(4.1)/Cl(100)/HCO3(27)/BUN(35)/Cr(8.24)Glu(132)/Ca(8.9)/Mg(2.2)/PO4(4.5)    05-18 @ 07:12      IMPRESSION: 54F w/ HTN, DM2, NICM, CVA, and ESRD-HD TTS, 5/16/19 a/w CP/SOB    (1)Renal - ESRD - HD TTS - due for next HD tomorrow  (2)Chest pain - improved  (3)CV - hypervolemia - improved relative to admission    RECOMMEND:  (1)D/C planning per primary team  (2)Next HD tomorrow - early at [facility] if still here tomorrow - discussed with HD staff          Wale Francisco MD  [employer]  [phone]

## 2019-05-20 NOTE — DISCHARGE NOTE PROVIDER - HOSPITAL COURSE
53 y/o female with a PMHx of NICM with moderate LV dysfunction, ESRD on HD T/T/S via left AVF, CVA with residual RLE weakness, HTN, HLD, DM, GERD, anemia of chronic disease and gout presents to ED with exertional chest pain and dyspnea on exertion since yesterday. Pt reports that she experienced intermittent, 10/10, non-pleuritic, non-radiating, exertional, left sided chest pain which was associated with dyspnea on exertion. Pt was "running errands" at the time that her symptoms started and admits to an exertional component. Pt did not take any pain medications because her symptoms improved with rest. Pt also admits to three to four pillow orthopnea and bilateral lower extremity edema. Pt still urinates and admits to taking Lasix daily. However, pt was supposed to go to dialysis yesterday but didn't go because she "had too many things to do." Pt denies fever, chills, recent travel, headache, dizziness, visual deficits, cough, palpitations, abdominal pain, N/V/D/C, hematochezia, melena, dysuria, hematuria, LOC, syncope. Upon arrival to ED, EKG: NSR at 88 bpm, QTc 471. CE x2: Trop 73-->61. H/H: 10.7/34.8. BUN/Cr: 48/9.61. Alk phos: 124. ProBNP: 22001. CXR: Mild interstitial edema. (17 May 2019 09:40)         Test Results:    May 2019, Echo: EF 40-45%. Moderate global left ventricular systolic dysfunction. Small pericardial effusion (measures up to 1.0 cm posterior to LV in parasternal long axis views).    ----------    EKG: NSR at 88 bpm, QTc 471    CE x2: Trop 73-->61    H/H: 10.7/34.8    BUN/Cr: 48/9.61    Alk phos: 124    ProBNP: 09281        5/17 CXR: Mild interstitial edema.        Acute on chronic systolic (congestive) heart failure.  Plan: Patient appears hypervolemic in the setting of a missed hemodialysis session (pulmonary edema on CXR, bilateral pedal edema, BNP 14305)    EKG shows NSR with no ischemic changes    Delta troponin mildly positive (73-->61) likely in the setting of ESRD and volume overload, no need to treat for ACS    Cardiology appreciated    Strict I&Os, monitor daily weights, 1500 cc fluid restriction, sodium restriction    Renal diet    Continue Lasix    No need to repeat TTE    PT eval for disposition.          Problem/Plan - 2:    ·  Problem: Angina pectoris.  Plan: Chest pain possibly in the setting of fluid overload    EKG shows NSR with no ischemic changes    Delta troponin mildly positive (73-->61) likely in the setting of ESRD and volume overload, no need to treat for ACS    Continue ASA, Plavix, Simvastatin, Coreg    no need to repeat TTE    DASH diet.          Problem/Plan - 3:    ·  Problem: ESRD (end stage renal disease).  Plan: Monitor renal function, lytes and volume status    Electrolytes are stable but patient appears hypervolemic in the setting of a missed hemodialysis session    Renal consult appreciated    Continue calcium acetate    Avoid NSAIDs and nephrotoxic agents    Renal diet.          Problem/Plan - 4:    ·  Problem: History of CVA (cerebrovascular accident).  Plan: Continue ASA, Plavix and Simvastatin    No neurological deficits    DASH diet.          Problem/Plan - 5:    ·  Problem: HTN (hypertension).  Plan: Continue Hydralazine, Isosorbide, Lasix and Coreg    Monitor BP serially    Sodium restriction.          Problem/Plan - 6:    Problem: HLD (hyperlipidemia). Plan: Continue Simvastatin    DASH diet.         Problem/Plan - 7:    ·  Problem: DM (diabetes mellitus).  Plan: Cont insulin sliding scale for coverage while inpatient    Hold oral hypoglycemics    Recent HgA1C 6.1, no need to repeat    Monitor finger sticks    Diabetic diet.          Problem/Plan - 8:    ·  Problem: GERD (gastroesophageal reflux disease).  Plan: Continue Pantoprazole    GERD precautions.          Problem/Plan - 9:    ·  Problem: Anemia of chronic disease.  Plan: CBC stable at this time, will continue to monitor    Renal to determine need for Epogen with HD.          Problem/Plan - 10:    Problem: Need for prophylactic measure. Plan; Heparin 5000 units SQ TID.        Discussed case with Dr. Griggs, pt stable for discharge 55 y/o female with a PMHx of NICM with moderate LV dysfunction, ESRD on HD T/T/S via left AVF, CVA with residual RLE weakness, HTN, HLD, DM, GERD, anemia of chronic disease and gout presents to ED with exertional chest pain and dyspnea on exertion since yesterday. Pt reports that she experienced intermittent, 10/10, non-pleuritic, non-radiating, exertional, left sided chest pain which was associated with dyspnea on exertion. Pt was "running errands" at the time that her symptoms started and admits to an exertional component. Pt did not take any pain medications because her symptoms improved with rest. Pt also admits to three to four pillow orthopnea and bilateral lower extremity edema. Pt still urinates and admits to taking Lasix daily. However, pt was supposed to go to dialysis yesterday but didn't go because she "had too many things to do." Pt denies fever, chills, recent travel, headache, dizziness, visual deficits, cough, palpitations, abdominal pain, N/V/D/C, hematochezia, melena, dysuria, hematuria, LOC, syncope. Upon arrival to ED, EKG: NSR at 88 bpm, QTc 471. CE x2: Trop 73-->61. H/H: 10.7/34.8. BUN/Cr: 48/9.61. Alk phos: 124. ProBNP: 98918. CXR: Mild interstitial edema. (17 May 2019 09:40)         Test Results:    May 2019, Echo: EF 40-45%. Moderate global left ventricular systolic dysfunction. Small pericardial effusion (measures up to 1.0 cm posterior to LV in parasternal long axis views).    ----------    EKG: NSR at 88 bpm, QTc 471    CE x2: Trop 73-->61    H/H: 10.7/34.8    BUN/Cr: 48/9.61    Alk phos: 124    ProBNP: 68488        5/17 CXR: Mild interstitial edema.        + Acute on chronic systolic (congestive) heart failure.   Patient appeared hypervolemic in the setting of a missed hemodialysis session (pulmonary edema on CXR, bilateral pedal edema, BNP 04201), EKG shows NSR with no ischemic changes    Delta troponin mildly positive (73-->61) likely in the setting of ESRD and volume overload, no need to treat for ACS    Cardiology appreciated    Strict I&Os, monitor daily weights, 1500 cc fluid restriction, sodium restriction    Renal diet    Continue Lasix    No need to repeat TTE    PT eval for disposition.          Problem/Plan - 2:    ·  Problem: Angina pectoris.  Plan: Chest pain possibly in the setting of fluid overload    EKG shows NSR with no ischemic changes    Delta troponin mildly positive (73-->61) likely in the setting of ESRD and volume overload, no need to treat for ACS    Continue ASA, Plavix, Simvastatin, Coreg    no need to repeat TTE    DASH diet.          Problem/Plan - 3:    ·  Problem: ESRD (end stage renal disease).  Plan: Monitor renal function, lytes and volume status    Electrolytes are stable but patient appears hypervolemic in the setting of a missed hemodialysis session    Renal consult appreciated    Continue calcium acetate    Avoid NSAIDs and nephrotoxic agents    Renal diet.          Problem/Plan - 4:    ·  Problem: History of CVA (cerebrovascular accident).  Plan: Continue ASA, Plavix and Simvastatin    No neurological deficits    DASH diet.          Problem/Plan - 5:    ·  Problem: HTN (hypertension).  Plan: Continue Hydralazine, Isosorbide, Lasix and Coreg    Monitor BP serially    Sodium restriction.          Problem/Plan - 6:    Problem: HLD (hyperlipidemia). Plan: Continue Simvastatin    DASH diet.         Problem/Plan - 7:    ·  Problem: DM (diabetes mellitus).  Plan: Cont insulin sliding scale for coverage while inpatient    Hold oral hypoglycemics    Recent HgA1C 6.1, no need to repeat    Monitor finger sticks    Diabetic diet.          Problem/Plan - 8:    ·  Problem: GERD (gastroesophageal reflux disease).  Plan: Continue Pantoprazole    GERD precautions.          Problem/Plan - 9:    ·  Problem: Anemia of chronic disease.  Plan: CBC stable at this time, will continue to monitor    Renal to determine need for Epogen with HD.          Problem/Plan - 10:    Problem: Need for prophylactic measure. Plan; Heparin 5000 units SQ TID.        Discussed case with Dr. Griggs, pt stable for discharge 55 y/o female with a PMHx of NICM with moderate LV dysfunction, ESRD on HD T/T/S via left AVF, CVA with residual RLE weakness, HTN, HLD, DM, GERD, anemia of chronic disease and gout presents to ED with exertional chest pain and dyspnea on exertion since yesterday. Pt reports that she experienced intermittent, 10/10, non-pleuritic, non-radiating, exertional, left sided chest pain which was associated with dyspnea on exertion. Pt was "running errands" at the time that her symptoms started and admits to an exertional component. Pt did not take any pain medications because her symptoms improved with rest. Pt also admits to three to four pillow orthopnea and bilateral lower extremity edema. Pt still urinates and admits to taking Lasix daily. However, pt was supposed to go to dialysis yesterday but didn't go because she "had too many things to do." Pt denies fever, chills, recent travel, headache, dizziness, visual deficits, cough, palpitations, abdominal pain, N/V/D/C, hematochezia, melena, dysuria, hematuria, LOC, syncope. Upon arrival to ED, EKG: NSR at 88 bpm, QTc 471. CE x2: Trop 73-->61. H/H: 10.7/34.8. BUN/Cr: 48/9.61. Alk phos: 124. ProBNP: 25964. CXR: Mild interstitial edema. (17 May 2019 09:40)         Test Results:    May 2019, Echo: EF 40-45%. Moderate global left ventricular systolic dysfunction. Small pericardial effusion (measures up to 1.0 cm posterior to LV in parasternal long axis views).    ----------    EKG: NSR at 88 bpm, QTc 471    CE x2: Trop 73-->61    H/H: 10.7/34.8    BUN/Cr: 48/9.61    Alk phos: 124    ProBNP: 28559        5/17 CXR: Mild interstitial edema.        + Acute on chronic systolic (congestive) heart failure.   Patient appeared hypervolemic in the setting of a missed hemodialysis session (pulmonary edema on CXR, bilateral pedal edema, BNP 25083), EKG shows NSR with no ischemic changes. Delta troponin mildly positive (73-->61) likely in the setting of ESRD and volume overload, no need to treat for ACS    Strict I&Os, monitor daily weights, 1500 cc fluid restriction, sodium restriction, Renal diet, Continue Lasix    No need to repeat TTE        + Angina pectoris.Chest pain possibly in the setting of fluid overload - EKG shows NSR with no ischemic changes    Delta troponin mildly positive (73-->61) likely in the setting of ESRD and volume overload, no need to treat for ACS - Continue ASA, Plavix, Simvastatin, Coreg, no need to repeat TTE        + ESRD Monitor renal function, lytes and volume status - Electrolytes are stable but patient appears hypervolemic in the setting of a missed hemodialysis session    Continue calcium acetate, Avoid NSAIDs and nephrotoxic agents, Renal diet.         + History of CVA (cerebrovascular accident).  Plan: Continue ASA, Plavix and Simvastatin    No neurological deficits    DASH diet.         + HTN (hypertension).  Continue Hydralazine, Isosorbide, Lasix and Coreg        + HLD (hyperlipidemia).Continue Simvastatin        + DM (diabetes mellitus) - Recent HgA1C 6.1, no need to repeat        + GERD (gastroesophageal reflux disease). Continue Pantoprazole        + Anemia of chronic disease.  CBC stable at this time, will continue to monitor, Renal to determine need for Epogen with HD.         Discussed case with Dr. Griggs, pt stable for discharge back to assisted living. 55 y/o female with a PMHx of NICM with moderate LV dysfunction, ESRD on HD T/T/S via left AVF, CVA with residual RLE weakness, HTN, HLD, DM, GERD, anemia of chronic disease and gout presents to ED with exertional chest pain and dyspnea on exertion since yesterday. Pt reports that she experienced intermittent, 10/10, non-pleuritic, non-radiating, exertional, left sided chest pain which was associated with dyspnea on exertion. Pt was "running errands" at the time that her symptoms started and admits to an exertional component. Pt did not take any pain medications because her symptoms improved with rest. Pt also admits to three to four pillow orthopnea and bilateral lower extremity edema. Pt still urinates and admits to taking Lasix daily. However, pt was supposed to go to dialysis yesterday but didn't go because she "had too many things to do."         Test Results:    May 2019, Echo: EF 40-45%. Moderate global left ventricular systolic dysfunction. Small pericardial effusion (measures up to 1.0 cm posterior to LV in parasternal long axis views).        EKG: NSR at 88 bpm, QTc 471. CE x2: Trop 73-->61. H/H: 10.7/34.8. BUN/Cr: 48/9.61. Alk phos: 124. ProBNP: 53548. CXR: Mild interstitial edema.        Hospital Course:     Acute on chronic systolic (congestive) heart failure. Patient appeared hypervolemic in the setting of a missed hemodialysis session (pulmonary edema on CXR, bilateral pedal edema, BNP 09616), EKG shows NSR with no ischemic changes. Delta troponin mildly positive (73-->61) likely in the setting of ESRD and volume overload, no need to treat for ACS. Strict I&Os, monitor daily weights, 1500 cc fluid restriction, sodium restriction, Renal diet, Continue Lasix. No need to repeat TTE.    Angina pectoris: Chest pain possibly in the setting of fluid overload - EKG shows NSR with no ischemic changes. Delta troponin mildly positive (73-->61) likely in the setting of ESRD and volume overload, no need to treat for ACS - Continue ASA, Plavix, Simvastatin, Coreg, no need to repeat TTE.    ESRD Monitor renal function, lytes and volume status - Electrolytes are stable but patient appears hypervolemic in the setting of a missed hemodialysis session. Continue calcium acetate, Avoid NSAIDs and nephrotoxic agents, Renal diet.     History of CVA (cerebrovascular accident).  Plan: Continue ASA, Plavix and Simvastatin. No neurological deficits. DASH diet.    HTN (hypertension).  Continue Hydralazine, Isosorbide, Lasix and Coreg.    HLD (hyperlipidemia).Continue Simvastatin.    DM (diabetes mellitus) - Recent HgA1C 6.1, no need to repeat.    GERD (gastroesophageal reflux disease). Continue Pantoprazole.    Anemia of chronic disease.  CBC stable at this time, will continue to monitor, Renal to determine need for Epogen with HD.         Discussed case with Dr. Griggs, Pt stable for discharge back to assisted living. 55 y/o female with a PMHx of NICM with moderate LV dysfunction, ESRD on HD T/T/S via left AVF, CVA with residual RLE weakness, HTN, HLD, DM, GERD, anemia of chronic disease and gout presents to ED with exertional chest pain and dyspnea on exertion since yesterday. Pt reports that she experienced intermittent, 10/10, non-pleuritic, non-radiating, exertional, left sided chest pain which was associated with dyspnea on exertion. Pt was "running errands" at the time that her symptoms started and admits to an exertional component. Pt did not take any pain medications because her symptoms improved with rest. Pt also admits to three to four pillow orthopnea and bilateral lower extremity edema. Pt still urinates and admits to taking Lasix daily. However, pt was supposed to go to dialysis yesterday but didn't go because she "had too many things to do."         Test Results:    May 2019, Echo: EF 40-45%. Moderate global left ventricular systolic dysfunction. Small pericardial effusion (measures up to 1.0 cm posterior to LV in parasternal long axis views).        EKG: NSR at 88 bpm, QTc 471. CE x2: Trop 73-->61. H/H: 10.7/34.8. BUN/Cr: 48/9.61. Alk phos: 124. ProBNP: 03946. CXR: Mild interstitial edema.        Hospital Course:     Acute on chronic systolic (congestive) heart failure. Patient appeared hypervolemic in the setting of a missed hemodialysis session (pulmonary edema on CXR, bilateral pedal edema, BNP 69584), EKG shows NSR with no ischemic changes. Delta troponin mildly positive (73-->61) likely in the setting of ESRD and volume overload, no need to treat for ACS. Strict I&Os, monitor daily weights, 1500 cc fluid restriction, sodium restriction, Renal diet, Continue Lasix. No need to repeat TTE.    Angina pectoris: Chest pain possibly in the setting of fluid overload - EKG shows NSR with no ischemic changes. Delta troponin mildly positive (73-->61) likely in the setting of ESRD and volume overload, no need to treat for ACS - Continue ASA, Plavix, Simvastatin, Coreg, no need to repeat TTE.    ESRD Monitor renal function, lytes and volume status - Electrolytes are stable but patient appears hypervolemic in the setting of a missed hemodialysis session. Continue calcium acetate, Avoid NSAIDs and nephrotoxic agents, Renal diet.     History of CVA (cerebrovascular accident).  Plan: Continue ASA, Plavix and Simvastatin. No neurological deficits. DASH diet.    HTN (hypertension).  Continue Hydralazine, Isosorbide, Lasix and Coreg.    HLD (hyperlipidemia). Continue Simvastatin.    DM (diabetes mellitus) - Recent HgA1C 6.1, no need to repeat.    GERD (gastroesophageal reflux disease). Continue Pantoprazole.    Anemia of chronic disease.  CBC stable at this time, will continue to monitor, Renal to determine need for Epogen with HD.         Discussed case with Dr. Griggs, Pt stable for discharge back to assisted living. 53 y/o female with a PMHx of NICM with moderate LV dysfunction, ESRD on HD T/T/S via left AVF, CVA with residual RLE weakness, HTN, HLD, DM, GERD, anemia of chronic disease and gout presents to ED with exertional chest pain and dyspnea on exertion since yesterday. Pt reports that she experienced intermittent, 10/10, non-pleuritic, non-radiating, exertional, left sided chest pain which was associated with dyspnea on exertion. Pt was "running errands" at the time that her symptoms started and admits to an exertional component. Pt did not take any pain medications because her symptoms improved with rest. Pt also admits to three to four pillow orthopnea and bilateral lower extremity edema. Pt still urinates and admits to taking Lasix daily. However, pt was supposed to go to dialysis yesterday but didn't go because she "had too many things to do."         Test Results:    May 2019, Echo: EF 40-45%. Moderate global left ventricular systolic dysfunction. Small pericardial effusion (measures up to 1.0 cm posterior to LV in parasternal long axis views).        EKG: NSR at 88 bpm, QTc 471. CE x2: Trop 73-->61. H/H: 10.7/34.8. BUN/Cr: 48/9.61. Alk phos: 124. ProBNP: 16555. CXR: Mild interstitial edema.        Hospital Course:     Acute on chronic systolic (congestive) heart failure. Patient appeared hypervolemic in the setting of a missed hemodialysis session (pulmonary edema on CXR, bilateral pedal edema, BNP 56246), EKG shows NSR with no ischemic changes. Delta troponin mildly positive (73-->61) likely in the setting of ESRD and volume overload, no need to treat for ACS. Strict I&Os, monitor daily weights, 1500 cc fluid restriction, sodium restriction, Renal diet, Continue Lasix. No need to repeat TTE.    Angina pectoris: Chest pain possibly in the setting of fluid overload - EKG shows NSR with no ischemic changes. Delta troponin mildly positive (73-->61) likely in the setting of ESRD and volume overload, no need to treat for ACS - Continue ASA, Plavix, Simvastatin, Coreg, no need to repeat TTE.    ESRD Monitor renal function, lytes and volume status - Electrolytes are stable but patient appears hypervolemic in the setting of a missed hemodialysis session. Continue calcium acetate, Avoid NSAIDs and nephrotoxic agents, Renal diet.     History of CVA (cerebrovascular accident).  Plan: Continue ASA, Plavix and Simvastatin. No neurological deficits. DASH diet.    HTN (hypertension).  Continue Hydralazine, Isosorbide, Lasix and Coreg.    HLD (hyperlipidemia). Continue Simvastatin.    DM (diabetes mellitus) - Recent HgA1C 6.1%    GERD (gastroesophageal reflux disease). Continue Pantoprazole.    Anemia of chronic disease.  CBC stable at this time, will continue to monitor, renal rec Epogen with HD.         Discussed case with Dr. Griggs, Pt stable for discharge back to assisted living. 53 y/o female with a PMHx of NICM with moderate LV dysfunction, ESRD on HD T/T/S via left AVF, CVA with residual RLE weakness, HTN, HLD, DM, GERD, anemia of chronic disease and gout presents to ED with exertional chest pain and dyspnea on exertion since yesterday. Pt reports that she experienced intermittent, 10/10, non-pleuritic, non-radiating, exertional, left sided chest pain which was associated with dyspnea on exertion. Pt was "running errands" at the time that her symptoms started and admits to an exertional component. Pt did not take any pain medications because her symptoms improved with rest. Pt also admits to three to four pillow orthopnea and bilateral lower extremity edema. Pt still urinates and admits to taking Lasix daily. However, pt was supposed to go to dialysis yesterday but didn't go because she "had too many things to do."         Test Results:    May 2019, Echo: EF 40-45%. Moderate global left ventricular systolic dysfunction. Small pericardial effusion (measures up to 1.0 cm posterior to LV in parasternal long axis views).        EKG: NSR at 88 bpm, QTc 471. CE x2: Trop 73-->61. H/H: 10.7/34.8. BUN/Cr: 48/9.61. Alk phos: 124. ProBNP: 40963. CXR: Mild interstitial edema.        Hospital Course:     Acute on chronic systolic (congestive) heart failure. Patient appeared hypervolemic in the setting of a missed hemodialysis session (pulmonary edema on CXR, bilateral pedal edema, BNP 48124), EKG shows NSR with no ischemic changes. Delta troponin mildly positive (73-->61) likely in the setting of ESRD and volume overload, no need to treat for ACS. Strict I&Os, monitor daily weights, 1500 cc fluid restriction, sodium restriction, Renal diet, Continue Lasix. No need to repeat TTE.    Angina pectoris: Chest pain possibly in the setting of fluid overload - EKG shows NSR with no ischemic changes. Delta troponin mildly positive (73-->61) likely in the setting of ESRD and volume overload, no need to treat for ACS - Continue ASA, Plavix, Simvastatin, Coreg, no need to repeat TTE.    ESRD Monitor renal function, lytes and volume status - Electrolytes are stable but patient appears hypervolemic in the setting of a missed hemodialysis session. Continue calcium acetate, Avoid NSAIDs and nephrotoxic agents, Renal diet.     History of CVA (cerebrovascular accident).  Plan: Continue ASA, Plavix and Simvastatin. No neurological deficits. DASH diet.    HTN (hypertension).  Continue Hydralazine, Isosorbide, Lasix and Coreg.    HLD (hyperlipidemia). Continue Simvastatin.    DM (diabetes mellitus) - Recent HgA1C 6.1%    GERD (gastroesophageal reflux disease). Continue Pantoprazole.    Anemia of chronic disease.  CBC stable at this time, will continue to monitor, renal rec Epogen with HD.         Discussed case with Dr. Griggs, labs/vitals/medications reviewed, c/w current medications, with regard to oral DM medications recommend resume home dose of Januvia 25mg daily and hold Prandin, Pt stable for discharge back to assisted living.

## 2019-05-20 NOTE — DISCHARGE NOTE PROVIDER - CARE PROVIDER_API CALL
Wale Francisco)  Internal Medicine; Nephrology  [address] [address]  Phone: [phone]  Fax: [phone]  Follow Up Time: Wale Francisco)  Internal Medicine; Nephrology  [address] [facility] [address]  [place] [address]  Phone: [phone]  Fax: [phone]  Follow Up Time:     Uvaldo Andrew)  Cardiovascular Disease; Interventional Cardiology; Nuclear Cardiology  [address] [facility] [address]  [place] [address]  Phone: [phone]  Fax: [phone]  Follow Up Time:

## 2019-05-20 NOTE — PROGRESS NOTE ADULT - PROBLEM SELECTOR PLAN 1
Patient appears hypervolemic in the setting of a missed hemodialysis session (pulmonary edema on CXR, bilateral pedal edema, BNP 17249)  EKG shows NSR with no ischemic changes  Delta troponin mildly positive (73-->61) likely in the setting of ESRD and volume overload, no need to treat for ACS  Cardiology appreciated  Strict I&Os, monitor daily weights, 1500 cc fluid restriction, sodium restriction  Renal diet  Continue Lasix  No need to repeat TTE  PT eval for disposition

## 2019-05-20 NOTE — PROGRESS NOTE ADULT - SUBJECTIVE AND OBJECTIVE BOX
Felt what she described as gas-like discomfort last evening  EKG unchanged  Given colace/maalox    Vital Signs Last 24 Hrs  T(C): 36.6 (20 May 2019 06:40), Max: 37.1 (19 May 2019 21:56)  T(F): 97.9 (20 May 2019 06:40), Max: 98.8 (19 May 2019 21:56)  HR: 77 (20 May 2019 06:40) (77 - 98)  BP: 125/75 (20 May 2019 06:40) (109/69 - 141/81)  BP(mean): --  RR: 18 (20 May 2019 06:40) (18 - 20)  SpO2: 100% (20 May 2019 06:40) (95% - 100%)    GENERAL: NAD, well-developed  HEAD:  Atraumatic, Normocephalic  EYES: EOMI, PERRLA, conjunctiva and sclera clear  NECK: Supple, No JVD, No LAD, No carotid bruits  CHEST/LUNG: decreased BS at both bases b/l  HEART: Regular rate and rhythm; No murmurs, rubs, or gallops  ABDOMEN: Soft, Nontender, Nondistended; Bowel sounds present  EXTREMITIES:  2+ Peripheral Pulses, 2-3+ pitting b/l distal LE edema  SKIN: No rashes or lesions  NEURO: A+O x 3; nonfocal CN/motor/sensory/reflexes  PSYCH: Nl affect; no agitation or delirium; no suicidal or homicidal ideation    LABS:                        10.5   2.96  )-----------( 226      ( 20 May 2019 07:38 )             33.2     05-20    138  |  100  |  43<H>  ----------------------------<  112<H>  4.6   |  24  |  9.79<H>    Ca    9.2      20 May 2019 07:38  Phos  5.0     05-20  Mg     2.3     05-20    TPro  6.7  /  Alb  3.6  /  TBili  0.2  /  DBili  x   /  AST  11  /  ALT  11  /  AlkPhos  110  05-20      CAPILLARY BLOOD GLUCOSE      POCT Blood Glucose.: 113 mg/dL (20 May 2019 08:50)  POCT Blood Glucose.: 103 mg/dL (19 May 2019 22:13)  POCT Blood Glucose.: 95 mg/dL (19 May 2019 18:37)  POCT Blood Glucose.: 117 mg/dL (19 May 2019 12:39)    CARDIAC MARKERS ( 19 May 2019 15:26 )  x     / x     / 35 u/L / 1.09 ng/mL / x      CARDIAC MARKERS ( 19 May 2019 05:49 )  x     / x     / 56 u/L / 1.05 ng/mL / x

## 2019-05-20 NOTE — PROGRESS NOTE ADULT - PROBLEM SELECTOR PLAN 3
Monitor renal function, lytes and volume status  Electrolytes are stable but patient appears hypervolemic in the setting of a missed hemodialysis session  Renal consult appreciated  Continue calcium acetate  Avoid NSAIDs and nephrotoxic agents  Renal diet

## 2019-05-20 NOTE — DISCHARGE NOTE PROVIDER - PROVIDER TOKENS
PROVIDER:[TOKEN:[[id number]]] PROVIDER:[TOKEN:[[id number]]],PROVIDER:[TOKEN:[[id number]]]

## 2019-05-21 ENCOUNTER — TRANSCRIPTION ENCOUNTER (OUTPATIENT)
Age: 54
End: 2019-05-21

## 2019-05-21 VITALS
WEIGHT: 177.47 LBS | RESPIRATION RATE: 18 BRPM | DIASTOLIC BLOOD PRESSURE: 72 MMHG | HEART RATE: 80 BPM | SYSTOLIC BLOOD PRESSURE: 150 MMHG | TEMPERATURE: 98 F

## 2019-05-21 LAB
GLUCOSE BLDC GLUCOMTR-MCNC: 119 MG/DL — HIGH (ref 70–99)
GLUCOSE BLDC GLUCOMTR-MCNC: 124 MG/DL — HIGH (ref 70–99)
GLUCOSE BLDC GLUCOMTR-MCNC: 135 MG/DL — HIGH (ref 70–99)

## 2019-05-21 RX ORDER — ESOMEPRAZOLE MAGNESIUM 40 MG/1
1 CAPSULE, DELAYED RELEASE ORAL
Qty: 0 | Refills: 0 | DISCHARGE

## 2019-05-21 RX ORDER — PANTOPRAZOLE SODIUM 20 MG/1
1 TABLET, DELAYED RELEASE ORAL
Qty: 0 | Refills: 0 | DISCHARGE
Start: 2019-05-21

## 2019-05-21 RX ADMIN — AMLODIPINE BESYLATE 5 MILLIGRAM(S): 2.5 TABLET ORAL at 11:36

## 2019-05-21 RX ADMIN — Medication 10 MILLIGRAM(S): at 05:33

## 2019-05-21 RX ADMIN — CLOPIDOGREL BISULFATE 75 MILLIGRAM(S): 75 TABLET, FILM COATED ORAL at 11:35

## 2019-05-21 RX ADMIN — PANTOPRAZOLE SODIUM 40 MILLIGRAM(S): 20 TABLET, DELAYED RELEASE ORAL at 05:33

## 2019-05-21 RX ADMIN — Medication 20 MILLIGRAM(S): at 11:36

## 2019-05-21 RX ADMIN — Medication 667 MILLIGRAM(S): at 11:36

## 2019-05-21 RX ADMIN — Medication 100 MILLIGRAM(S): at 11:36

## 2019-05-21 RX ADMIN — Medication 81 MILLIGRAM(S): at 11:36

## 2019-05-21 NOTE — DISCHARGE NOTE NURSING/CASE MANAGEMENT/SOCIAL WORK - MODE OF TRANSPORTATION
senior ride arrived to  patient to transfer to her assisted living. Pt refusing ambulette at this time due to wanting to be around for sons doctor appointment and then will take bus to her home. dialysis social work aware. awaiting return phone call/Ambulette

## 2019-05-21 NOTE — PROGRESS NOTE ADULT - ASSESSMENT
Echo 1/5/20189: EF 45-50%, global LV sys dysfx, mild diastolic dysfx (stgI)  Stress test in 6/2018 normal with no evidence of mi or ischemia   Echo from 3/ 22/2019, mild to mod MR, moderate global lv sys dysfx small pericardial effusion to left ventricle EF 41%   Echo 5/5/2019: EF 40-45%, small pericardial effusion, normal RV fx, mod global lv sys dysfx     a/p  55 yo Female with PMHx of Systolic CHF, DM, HTN, HLD, ESRD (Tues, Thurs, Sat) , Gout presenting with cp and sob due to CHF from missed HD    1. Chest pain, atypical from CHF  symptoms likely in the setting of fluid overload, secondary to missed HD session   cv stable, no cp/sob, no evidence of acute ischemia   HST elevated, type II MI, demand ischemia 2/2 to ESRD , EKG unchanged   Recent echo performed, pt. with known systolic dysfunction (noted on echo from 1/2018), EF 41% (overall unchanged), sml pericardial effusion noted   chest xray with small left pleural effusion and intersitial edema unchanged from prior exam  c/w BB, ASA, imdur, plavix     2. Acute on chronic Systolic chf   fluid overloaded likely secondary to missed HD session  cont fluid removal with HD / lasix po  continue bb, imdur, hydral. lasix   acei or arb per renal     3. HTN  stable   continue current meds     4. ESRD on HD  renal f/u     dvt ppx   dc planning per primary team

## 2019-05-21 NOTE — PROGRESS NOTE ADULT - SUBJECTIVE AND OBJECTIVE BOX
Patient seen and examined in bed. s/p HD this AM with 3 kg removed.       MEDICATIONS  (STANDING):  allopurinol 100 milliGRAM(s) Oral daily  amLODIPine   Tablet 5 milliGRAM(s) Oral daily  aspirin enteric coated 81 milliGRAM(s) Oral daily  calcium acetate 667 milliGRAM(s) Oral three times a day with meals  carvedilol 12.5 milliGRAM(s) Oral every 12 hours  clopidogrel Tablet 75 milliGRAM(s) Oral daily  dextrose 5%. 1000 milliLiter(s) (50 mL/Hr) IV Continuous <Continuous>  dextrose 50% Injectable 12.5 Gram(s) IV Push once  dextrose 50% Injectable 25 Gram(s) IV Push once  dextrose 50% Injectable 25 Gram(s) IV Push once  docusate sodium 100 milliGRAM(s) Oral daily  furosemide    Tablet 20 milliGRAM(s) Oral daily  heparin  Injectable 5000 Unit(s) SubCutaneous every 8 hours  hydrALAZINE 100 milliGRAM(s) Oral every 8 hours  insulin lispro (HumaLOG) corrective regimen sliding scale   SubCutaneous three times a day before meals  insulin lispro (HumaLOG) corrective regimen sliding scale   SubCutaneous at bedtime  isosorbide   dinitrate Tablet (ISORDIL) 10 milliGRAM(s) Oral three times a day  oxybutynin 10 milliGRAM(s) Oral two times a day  pantoprazole    Tablet 40 milliGRAM(s) Oral before breakfast  simvastatin 20 milliGRAM(s) Oral at bedtime      VITAL:  T(C): , Max: 36.9 (05-20-19 @ 20:57)  T(F): , Max: 98.5 (05-20-19 @ 20:57)  HR: 80 (05-21-19 @ 09:05)  BP: 150/72 (05-21-19 @ 09:05)  RR: 18 (05-21-19 @ 09:05)  SpO2: 100% (05-21-19 @ 05:30)    I and O's:    05-20 @ 07:01  -  05-21 @ 07:00  --------------------------------------------------------  IN: 420 mL / OUT: 0 mL / NET: 420 mL    05-21 @ 07:01  -  05-21 @ 10:04  --------------------------------------------------------  IN: 400 mL / OUT: 3400 mL / NET: -3000 mL          PHYSICAL EXAM:    Constitutional: NAD  Neck:  No JVD  Respiratory: CTAB/L  Cardiovascular: S1 and S2  Gastrointestinal: BS+, soft, NT/ND  Extremities: No peripheral edema  : No John  Skin: No rashes  Access: Left AV fistula, +thrill    LABS:                        10.5   2.96  )-----------( 226      ( 20 May 2019 07:38 )             33.2     05-20    138  |  100  |  43<H>  ----------------------------<  112<H>  4.6   |  24  |  9.79<H>    Ca    9.2      20 May 2019 07:38  Phos  5.0     05-20  Mg     2.3     05-20    TPro  6.7  /  Alb  3.6  /  TBili  0.2  /  DBili  x   /  AST  11  /  ALT  11  /  AlkPhos  110  05-20      IMPRESSION: 54F w/ HTN, DM2, NICM, CVA, and ESRD-HD TTS, 5/16/19 a/w CP/SOB  (1)Renal - ESRD - HD TTS  (2)Chest pain - improved  (3)CV - hypervolemia - improved relative to admission    RECOMMEND:  (1)Next HD Thursday  (2)D/C planning per primary team      Elina Dallas NP-C  [facility]  [phone]

## 2019-05-21 NOTE — DISCHARGE NOTE NURSING/CASE MANAGEMENT/SOCIAL WORK - NSDCPEPTSTRK_GEN_ALL_CORE
Stroke education booklet/Stroke warning signs and symptoms/Signs and symptoms of stroke/Stroke support groups for patients, families, and friends/Prescribed medications/Risk factors for stroke/Call 911 for stroke/Need for follow up after discharge

## 2019-05-21 NOTE — PROGRESS NOTE ADULT - REASON FOR ADMISSION
Chest pain, shortness of breath

## 2019-05-21 NOTE — DISCHARGE NOTE NURSING/CASE MANAGEMENT/SOCIAL WORK - NSDCPEEMAIL_GEN_ALL_CORE
[facility] for Tobacco Control email [email or web address]

## 2019-05-21 NOTE — PROGRESS NOTE ADULT - SUBJECTIVE AND OBJECTIVE BOX
CARDIOLOGY FOLLOW UP - Dr. Andrew    CC no cp or sob      PHYSICAL EXAM:  T(C): 36.6 (05-21-19 @ 09:05), Max: 36.9 (05-20-19 @ 20:57)  HR: 80 (05-21-19 @ 09:05) (73 - 89)  BP: 150/72 (05-21-19 @ 09:05) (121/72 - 150/72)  RR: 18 (05-21-19 @ 09:05) (16 - 20)  SpO2: 100% (05-21-19 @ 05:30) (96% - 100%)  Wt(kg): --  I&O's Summary    20 May 2019 07:01  -  21 May 2019 07:00  --------------------------------------------------------  IN: 420 mL / OUT: 0 mL / NET: 420 mL    21 May 2019 07:01  -  21 May 2019 11:45  --------------------------------------------------------  IN: 400 mL / OUT: 3400 mL / NET: -3000 mL        Appearance: Normal	  Cardiovascular: Normal S1 S2,RRR, No JVD, No murmurs  Respiratory: Lungs clear to auscultation	  Gastrointestinal:  Soft, Non-tender, + BS	  Extremities: Normal range of motion, No clubbing, cyanosis or edema        MEDICATIONS  (STANDING):  allopurinol 100 milliGRAM(s) Oral daily  amLODIPine   Tablet 5 milliGRAM(s) Oral daily  aspirin enteric coated 81 milliGRAM(s) Oral daily  calcium acetate 667 milliGRAM(s) Oral three times a day with meals  carvedilol 12.5 milliGRAM(s) Oral every 12 hours  clopidogrel Tablet 75 milliGRAM(s) Oral daily  dextrose 5%. 1000 milliLiter(s) (50 mL/Hr) IV Continuous <Continuous>  dextrose 50% Injectable 12.5 Gram(s) IV Push once  dextrose 50% Injectable 25 Gram(s) IV Push once  dextrose 50% Injectable 25 Gram(s) IV Push once  docusate sodium 100 milliGRAM(s) Oral daily  furosemide    Tablet 20 milliGRAM(s) Oral daily  heparin  Injectable 5000 Unit(s) SubCutaneous every 8 hours  hydrALAZINE 100 milliGRAM(s) Oral every 8 hours  insulin lispro (HumaLOG) corrective regimen sliding scale   SubCutaneous three times a day before meals  insulin lispro (HumaLOG) corrective regimen sliding scale   SubCutaneous at bedtime  isosorbide   dinitrate Tablet (ISORDIL) 10 milliGRAM(s) Oral three times a day  oxybutynin 10 milliGRAM(s) Oral two times a day  pantoprazole    Tablet 40 milliGRAM(s) Oral before breakfast  simvastatin 20 milliGRAM(s) Oral at bedtime      TELEMETRY: 	ref    ECG:  	  RADIOLOGY:   DIAGNOSTIC TESTING:  [ ] Echocardiogram:  [ ]  Catheterization:  [ ] Stress Test:    OTHER: 	    LABS:	 	                                10.5   2.96  )-----------( 226      ( 20 May 2019 07:38 )             33.2     05-20    138  |  100  |  43<H>  ----------------------------<  112<H>  4.6   |  24  |  9.79<H>    Ca    9.2      20 May 2019 07:38  Phos  5.0     05-20  Mg     2.3     05-20    TPro  6.7  /  Alb  3.6  /  TBili  0.2  /  DBili  x   /  AST  11  /  ALT  11  /  AlkPhos  110  05-20

## 2019-05-21 NOTE — PROGRESS NOTE ADULT - PROVIDER SPECIALTY LIST ADULT
Cardiology
Internal Medicine
Nephrology
Nephrology

## 2019-05-21 NOTE — DISCHARGE NOTE NURSING/CASE MANAGEMENT/SOCIAL WORK - NSDCPEWEB_GEN_ALL_CORE
[place] website --- [email or web address] Tobacco [email or web address] website --- [email or web address]

## 2019-07-02 NOTE — PRE-OP CHECKLIST - SITE MARKED BY ANESTHESIOLOGIST
Anesthesia Volume In Cc (Will Not Render If 0): 0.5 Consent: Risks were reviewed including but not limited to scarring, infection, bleeding, scabbing, incomplete removal, nerve damage and allergy to anesthesia. Type Of Destruction Used: Curettage Biopsy Type: H and E Electrodesiccation Text: The wound bed was treated with electrodesiccation after the biopsy was performed. X Size Of Lesion In Cm: 0 Depth Of Biopsy: dermis Bill For Surgical Tray: no Silver Nitrate Text: The wound bed was treated with silver nitrate after the biopsy was performed. Biopsy Method: Personna blade Hemostasis: Monopolar electrocoagulation Was A Bandage Applied: Yes Dressing: bandage Electrodesiccation And Curettage Text: The wound bed was treated with electrodesiccation and curettage after the biopsy was performed. Lab: [id number] Billing Type: Third-Party Bill Detail Level: Detailed Notification Instructions: Patient will be notified of biopsy results. However, patient instructed to call the office if not contacted within 2 weeks. Wound Care: Bacitracin Lab Facility: [id number] Anesthesia Type: 1% lidocaine with epinephrine Cryotherapy Text: The wound bed was treated with cryotherapy after the biopsy was performed. Post-Care Instructions: I reviewed with the patient in detail post-care instructions. Patient is to keep the biopsy site dry overnight, and then apply bacitracin twice daily until healed. Patient may apply hydrogen peroxide soaks to remove any crusting. n/a yes left arm/yes

## 2019-07-29 PROBLEM — K21.9 GASTRO-ESOPHAGEAL REFLUX DISEASE WITHOUT ESOPHAGITIS: Chronic | Status: ACTIVE | Noted: 2019-05-17

## 2019-07-29 PROBLEM — I42.8 OTHER CARDIOMYOPATHIES: Chronic | Status: ACTIVE | Noted: 2019-05-17

## 2019-07-29 PROBLEM — I63.9 CEREBRAL INFARCTION, UNSPECIFIED: Chronic | Status: ACTIVE | Noted: 2017-07-09

## 2019-07-29 PROBLEM — E78.5 HYPERLIPIDEMIA, UNSPECIFIED: Chronic | Status: ACTIVE | Noted: 2019-05-17

## 2019-07-29 PROBLEM — Z00.00 ENCOUNTER FOR PREVENTIVE HEALTH EXAMINATION: Status: ACTIVE | Noted: 2019-07-29

## 2019-07-29 PROBLEM — N18.6 END STAGE RENAL DISEASE: Chronic | Status: ACTIVE | Noted: 2019-03-16

## 2019-07-29 PROBLEM — D63.8 ANEMIA IN OTHER CHRONIC DISEASES CLASSIFIED ELSEWHERE: Chronic | Status: ACTIVE | Noted: 2019-05-17

## 2019-08-01 ENCOUNTER — OUTPATIENT (OUTPATIENT)
Dept: OUTPATIENT SERVICES | Facility: HOSPITAL | Age: 54
LOS: 1 days | End: 2019-08-01
Payer: MEDICAID

## 2019-08-01 ENCOUNTER — OUTPATIENT (OUTPATIENT)
Dept: OUTPATIENT SERVICES | Facility: HOSPITAL | Age: 54
LOS: 1 days | End: 2019-08-01

## 2019-08-01 DIAGNOSIS — Z90.711 ACQUIRED ABSENCE OF UTERUS WITH REMAINING CERVICAL STUMP: Chronic | ICD-10-CM

## 2019-08-01 DIAGNOSIS — I77.0 ARTERIOVENOUS FISTULA, ACQUIRED: Chronic | ICD-10-CM

## 2019-08-01 DIAGNOSIS — Z98.83 FILTERING (VITREOUS) BLEB AFTER GLAUCOMA SURGERY STATUS: Chronic | ICD-10-CM

## 2019-08-01 DIAGNOSIS — Z90.49 ACQUIRED ABSENCE OF OTHER SPECIFIED PARTS OF DIGESTIVE TRACT: Chronic | ICD-10-CM

## 2019-08-01 PROCEDURE — G9001: CPT

## 2019-08-02 DIAGNOSIS — Z71.89 OTHER SPECIFIED COUNSELING: ICD-10-CM

## 2019-08-09 DIAGNOSIS — Z71.89 OTHER SPECIFIED COUNSELING: ICD-10-CM

## 2019-08-11 ENCOUNTER — INPATIENT (INPATIENT)
Facility: HOSPITAL | Age: 54
LOS: 2 days | Discharge: HOME | End: 2019-08-14
Attending: INTERNAL MEDICINE | Admitting: INTERNAL MEDICINE
Payer: MEDICAID

## 2019-08-11 VITALS
SYSTOLIC BLOOD PRESSURE: 159 MMHG | OXYGEN SATURATION: 100 % | RESPIRATION RATE: 18 BRPM | HEART RATE: 88 BPM | TEMPERATURE: 98 F | DIASTOLIC BLOOD PRESSURE: 101 MMHG

## 2019-08-11 DIAGNOSIS — Z90.49 ACQUIRED ABSENCE OF OTHER SPECIFIED PARTS OF DIGESTIVE TRACT: Chronic | ICD-10-CM

## 2019-08-11 DIAGNOSIS — Z98.83 FILTERING (VITREOUS) BLEB AFTER GLAUCOMA SURGERY STATUS: Chronic | ICD-10-CM

## 2019-08-11 DIAGNOSIS — Z90.711 ACQUIRED ABSENCE OF UTERUS WITH REMAINING CERVICAL STUMP: Chronic | ICD-10-CM

## 2019-08-11 DIAGNOSIS — I77.0 ARTERIOVENOUS FISTULA, ACQUIRED: Chronic | ICD-10-CM

## 2019-08-11 NOTE — ED ADULT TRIAGE NOTE - CHIEF COMPLAINT QUOTE
pt with hx of chf/esrd states she missed dialysis on saturday and has been feeling sob with leg swelling and chest pain on exertion. +3 pedal edema to ble, appears comfortable, left av fistula noted. pmh htn hld dm esrd.

## 2019-08-11 NOTE — ED PROVIDER NOTE - PROGRESS NOTE DETAILS
Lauro PGY3: K wnl, elevated trop in setting of elevated cr repeat pending. cxr pending. will admit for hd tomorrow on tele service. Dr. Francisco called to notify of need for hd, left voicemail awaiting callback

## 2019-08-11 NOTE — ED PROVIDER NOTE - PMH
Anemia of chronic disease    CVA (cerebral vascular accident)  2013- Residual RLE weakness  Depression    DM (diabetes mellitus)    ESRD (end stage renal disease)  HD T/T/S  GERD (gastroesophageal reflux disease)    Glaucoma    Gout    HLD (hyperlipidemia)    HTN (hypertension)    NICM (nonischemic cardiomyopathy)    KERI (obstructive sleep apnea)

## 2019-08-11 NOTE — ED PROVIDER NOTE - ATTENDING CONTRIBUTION TO CARE
agree with resident note  "53 yo F c PMH of DM, HTN, CHF, ESRD (on T/Th/Sat, last HD Th, pt does make a little bit of urine), CVA (with residual RUE and BLE weakness) presents after missing HD Saturday due to family emergency with PICKARD, BLE edema. pt hasn't taken her home meds in 1 week since she's been living at her mothers house. pt normally takes lasix. "  States since 2016 when overexerts herself does get PICKARD.  States family issue "will be alright".    PE: mildly hypertensive in no distress; CTAB/L; s1 s2 no m/r/g abd soft/NT/ND ext: 2+ pitting edema    Imp: ESRD missing dialysis and meds likely causing fluid overload; EKG, labs, CXR; admission

## 2019-08-11 NOTE — ED PROVIDER NOTE - PSH
AVF (arteriovenous fistula)    S/P cholecystectomy    S/P partial hysterectomy    Status post glaucoma surgery

## 2019-08-11 NOTE — ED PROVIDER NOTE - OBJECTIVE STATEMENT
55 yo F c PMH of DM, HTN, CHF, ESRD (on T/Th/Sat, last HD Th, pt does make a little bit of urine), CVA (with residual RUE and BLE weakness) presents after missing HD Saturday due to family emergency with PICKARD, BLE edema. pt hasn't taken her home meds in 1 week since she's been living at her mothers house. pt normally takes lasix.     home meds:     nephrologist: Wale Francisco  pmd: Francois Garcia

## 2019-08-12 DIAGNOSIS — M10.9 GOUT, UNSPECIFIED: ICD-10-CM

## 2019-08-12 DIAGNOSIS — Z29.9 ENCOUNTER FOR PROPHYLACTIC MEASURES, UNSPECIFIED: ICD-10-CM

## 2019-08-12 DIAGNOSIS — Z99.2 DEPENDENCE ON RENAL DIALYSIS: ICD-10-CM

## 2019-08-12 DIAGNOSIS — I10 ESSENTIAL (PRIMARY) HYPERTENSION: ICD-10-CM

## 2019-08-12 DIAGNOSIS — N18.6 END STAGE RENAL DISEASE: ICD-10-CM

## 2019-08-12 DIAGNOSIS — K21.9 GASTRO-ESOPHAGEAL REFLUX DISEASE WITHOUT ESOPHAGITIS: ICD-10-CM

## 2019-08-12 DIAGNOSIS — R06.02 SHORTNESS OF BREATH: ICD-10-CM

## 2019-08-12 DIAGNOSIS — E11.9 TYPE 2 DIABETES MELLITUS WITHOUT COMPLICATIONS: ICD-10-CM

## 2019-08-12 DIAGNOSIS — I42.8 OTHER CARDIOMYOPATHIES: ICD-10-CM

## 2019-08-12 LAB
ALBUMIN SERPL ELPH-MCNC: 4.5 G/DL — SIGNIFICANT CHANGE UP (ref 3.3–5)
ALP SERPL-CCNC: 127 U/L — HIGH (ref 40–120)
ALT FLD-CCNC: 18 U/L — SIGNIFICANT CHANGE UP (ref 4–33)
ANION GAP SERPL CALC-SCNC: 14 MMO/L — SIGNIFICANT CHANGE UP (ref 7–14)
ANION GAP SERPL CALC-SCNC: 15 MMO/L — HIGH (ref 7–14)
APTT BLD: 31.9 SEC — SIGNIFICANT CHANGE UP (ref 27.5–36.3)
AST SERPL-CCNC: 18 U/L — SIGNIFICANT CHANGE UP (ref 4–32)
BASOPHILS # BLD AUTO: 0.05 K/UL — SIGNIFICANT CHANGE UP (ref 0–0.2)
BASOPHILS NFR BLD AUTO: 1 % — SIGNIFICANT CHANGE UP (ref 0–2)
BILIRUB SERPL-MCNC: 0.3 MG/DL — SIGNIFICANT CHANGE UP (ref 0.2–1.2)
BUN SERPL-MCNC: 40 MG/DL — HIGH (ref 7–23)
BUN SERPL-MCNC: 54 MG/DL — HIGH (ref 7–23)
CALCIUM SERPL-MCNC: 9.2 MG/DL — SIGNIFICANT CHANGE UP (ref 8.4–10.5)
CALCIUM SERPL-MCNC: 9.8 MG/DL — SIGNIFICANT CHANGE UP (ref 8.4–10.5)
CHLORIDE SERPL-SCNC: 98 MMOL/L — SIGNIFICANT CHANGE UP (ref 98–107)
CHLORIDE SERPL-SCNC: 99 MMOL/L — SIGNIFICANT CHANGE UP (ref 98–107)
CO2 SERPL-SCNC: 26 MMOL/L — SIGNIFICANT CHANGE UP (ref 22–31)
CO2 SERPL-SCNC: 26 MMOL/L — SIGNIFICANT CHANGE UP (ref 22–31)
CREAT SERPL-MCNC: 10.55 MG/DL — HIGH (ref 0.5–1.3)
CREAT SERPL-MCNC: 7.83 MG/DL — HIGH (ref 0.5–1.3)
EOSINOPHIL # BLD AUTO: 0.27 K/UL — SIGNIFICANT CHANGE UP (ref 0–0.5)
EOSINOPHIL NFR BLD AUTO: 5.3 % — SIGNIFICANT CHANGE UP (ref 0–6)
GLUCOSE SERPL-MCNC: 102 MG/DL — HIGH (ref 70–99)
GLUCOSE SERPL-MCNC: 130 MG/DL — HIGH (ref 70–99)
HCT VFR BLD CALC: 33.7 % — LOW (ref 34.5–45)
HCT VFR BLD CALC: 38.9 % — SIGNIFICANT CHANGE UP (ref 34.5–45)
HGB BLD-MCNC: 10.6 G/DL — LOW (ref 11.5–15.5)
HGB BLD-MCNC: 11.8 G/DL — SIGNIFICANT CHANGE UP (ref 11.5–15.5)
IMM GRANULOCYTES NFR BLD AUTO: 0.2 % — SIGNIFICANT CHANGE UP (ref 0–1.5)
INR BLD: 1.01 — SIGNIFICANT CHANGE UP (ref 0.88–1.17)
LYMPHOCYTES # BLD AUTO: 1.68 K/UL — SIGNIFICANT CHANGE UP (ref 1–3.3)
LYMPHOCYTES # BLD AUTO: 33 % — SIGNIFICANT CHANGE UP (ref 13–44)
MAGNESIUM SERPL-MCNC: 2.1 MG/DL — SIGNIFICANT CHANGE UP (ref 1.6–2.6)
MAGNESIUM SERPL-MCNC: 2.4 MG/DL — SIGNIFICANT CHANGE UP (ref 1.6–2.6)
MCHC RBC-ENTMCNC: 29 PG — SIGNIFICANT CHANGE UP (ref 27–34)
MCHC RBC-ENTMCNC: 29.1 PG — SIGNIFICANT CHANGE UP (ref 27–34)
MCHC RBC-ENTMCNC: 30.3 % — LOW (ref 32–36)
MCHC RBC-ENTMCNC: 31.5 % — LOW (ref 32–36)
MCV RBC AUTO: 92.1 FL — SIGNIFICANT CHANGE UP (ref 80–100)
MCV RBC AUTO: 96 FL — SIGNIFICANT CHANGE UP (ref 80–100)
MONOCYTES # BLD AUTO: 0.44 K/UL — SIGNIFICANT CHANGE UP (ref 0–0.9)
MONOCYTES NFR BLD AUTO: 8.6 % — SIGNIFICANT CHANGE UP (ref 2–14)
NEUTROPHILS # BLD AUTO: 2.64 K/UL — SIGNIFICANT CHANGE UP (ref 1.8–7.4)
NEUTROPHILS NFR BLD AUTO: 51.9 % — SIGNIFICANT CHANGE UP (ref 43–77)
NRBC # FLD: 0 K/UL — SIGNIFICANT CHANGE UP (ref 0–0)
NRBC # FLD: 0 K/UL — SIGNIFICANT CHANGE UP (ref 0–0)
NT-PROBNP SERPL-SCNC: 8868 PG/ML — SIGNIFICANT CHANGE UP
PHOSPHATE SERPL-MCNC: 4.2 MG/DL — SIGNIFICANT CHANGE UP (ref 2.5–4.5)
PHOSPHATE SERPL-MCNC: 6.4 MG/DL — HIGH (ref 2.5–4.5)
PLATELET # BLD AUTO: 148 K/UL — LOW (ref 150–400)
PLATELET # BLD AUTO: 154 K/UL — SIGNIFICANT CHANGE UP (ref 150–400)
PMV BLD: 11.2 FL — SIGNIFICANT CHANGE UP (ref 7–13)
PMV BLD: 11.3 FL — SIGNIFICANT CHANGE UP (ref 7–13)
POTASSIUM SERPL-MCNC: 4.1 MMOL/L — SIGNIFICANT CHANGE UP (ref 3.5–5.3)
POTASSIUM SERPL-MCNC: 5.2 MMOL/L — SIGNIFICANT CHANGE UP (ref 3.5–5.3)
POTASSIUM SERPL-SCNC: 4.1 MMOL/L — SIGNIFICANT CHANGE UP (ref 3.5–5.3)
POTASSIUM SERPL-SCNC: 5.2 MMOL/L — SIGNIFICANT CHANGE UP (ref 3.5–5.3)
PROT SERPL-MCNC: 8.1 G/DL — SIGNIFICANT CHANGE UP (ref 6–8.3)
PROTHROM AB SERPL-ACNC: 11.5 SEC — SIGNIFICANT CHANGE UP (ref 9.8–13.1)
RBC # BLD: 3.66 M/UL — LOW (ref 3.8–5.2)
RBC # BLD: 4.05 M/UL — SIGNIFICANT CHANGE UP (ref 3.8–5.2)
RBC # FLD: 13.2 % — SIGNIFICANT CHANGE UP (ref 10.3–14.5)
RBC # FLD: 13.2 % — SIGNIFICANT CHANGE UP (ref 10.3–14.5)
SODIUM SERPL-SCNC: 138 MMOL/L — SIGNIFICANT CHANGE UP (ref 135–145)
SODIUM SERPL-SCNC: 140 MMOL/L — SIGNIFICANT CHANGE UP (ref 135–145)
TROPONIN T, HIGH SENSITIVITY: 64 NG/L — CRITICAL HIGH (ref ?–14)
TROPONIN T, HIGH SENSITIVITY: 79 NG/L — CRITICAL HIGH (ref ?–14)
WBC # BLD: 3.76 K/UL — LOW (ref 3.8–10.5)
WBC # BLD: 5.09 K/UL — SIGNIFICANT CHANGE UP (ref 3.8–10.5)
WBC # FLD AUTO: 3.76 K/UL — LOW (ref 3.8–10.5)
WBC # FLD AUTO: 5.09 K/UL — SIGNIFICANT CHANGE UP (ref 3.8–10.5)

## 2019-08-12 PROCEDURE — 71046 X-RAY EXAM CHEST 2 VIEWS: CPT | Mod: 26

## 2019-08-12 PROCEDURE — 99223 1ST HOSP IP/OBS HIGH 75: CPT

## 2019-08-12 RX ORDER — SEVELAMER CARBONATE 2400 MG/1
800 POWDER, FOR SUSPENSION ORAL
Refills: 0 | Status: DISCONTINUED | OUTPATIENT
Start: 2019-08-12 | End: 2019-08-14

## 2019-08-12 RX ORDER — FUROSEMIDE 40 MG
20 TABLET ORAL ONCE
Refills: 0 | Status: COMPLETED | OUTPATIENT
Start: 2019-08-12 | End: 2019-08-12

## 2019-08-12 RX ORDER — CARVEDILOL PHOSPHATE 80 MG/1
12.5 CAPSULE, EXTENDED RELEASE ORAL EVERY 12 HOURS
Refills: 0 | Status: DISCONTINUED | OUTPATIENT
Start: 2019-08-12 | End: 2019-08-14

## 2019-08-12 RX ORDER — ASPIRIN/CALCIUM CARB/MAGNESIUM 324 MG
81 TABLET ORAL ONCE
Refills: 0 | Status: COMPLETED | OUTPATIENT
Start: 2019-08-12 | End: 2019-08-12

## 2019-08-12 RX ORDER — CLOPIDOGREL BISULFATE 75 MG/1
75 TABLET, FILM COATED ORAL DAILY
Refills: 0 | Status: DISCONTINUED | OUTPATIENT
Start: 2019-08-12 | End: 2019-08-14

## 2019-08-12 RX ORDER — ISOSORBIDE DINITRATE 5 MG/1
10 TABLET ORAL THREE TIMES A DAY
Refills: 0 | Status: DISCONTINUED | OUTPATIENT
Start: 2019-08-12 | End: 2019-08-14

## 2019-08-12 RX ORDER — DOCUSATE SODIUM 100 MG
100 CAPSULE ORAL DAILY
Refills: 0 | Status: DISCONTINUED | OUTPATIENT
Start: 2019-08-12 | End: 2019-08-14

## 2019-08-12 RX ORDER — CALCIUM ACETATE 667 MG
667 TABLET ORAL
Refills: 0 | Status: DISCONTINUED | OUTPATIENT
Start: 2019-08-12 | End: 2019-08-12

## 2019-08-12 RX ORDER — AMLODIPINE BESYLATE 2.5 MG/1
5 TABLET ORAL ONCE
Refills: 0 | Status: COMPLETED | OUTPATIENT
Start: 2019-08-12 | End: 2019-08-12

## 2019-08-12 RX ORDER — HYDRALAZINE HCL 50 MG
100 TABLET ORAL ONCE
Refills: 0 | Status: COMPLETED | OUTPATIENT
Start: 2019-08-12 | End: 2019-08-12

## 2019-08-12 RX ORDER — PANTOPRAZOLE SODIUM 20 MG/1
40 TABLET, DELAYED RELEASE ORAL
Refills: 0 | Status: DISCONTINUED | OUTPATIENT
Start: 2019-08-12 | End: 2019-08-14

## 2019-08-12 RX ORDER — ALLOPURINOL 300 MG
100 TABLET ORAL DAILY
Refills: 0 | Status: DISCONTINUED | OUTPATIENT
Start: 2019-08-12 | End: 2019-08-14

## 2019-08-12 RX ORDER — AMLODIPINE BESYLATE 2.5 MG/1
5 TABLET ORAL DAILY
Refills: 0 | Status: DISCONTINUED | OUTPATIENT
Start: 2019-08-12 | End: 2019-08-14

## 2019-08-12 RX ORDER — HYDRALAZINE HCL 50 MG
100 TABLET ORAL THREE TIMES A DAY
Refills: 0 | Status: DISCONTINUED | OUTPATIENT
Start: 2019-08-12 | End: 2019-08-14

## 2019-08-12 RX ORDER — FUROSEMIDE 40 MG
20 TABLET ORAL DAILY
Refills: 0 | Status: DISCONTINUED | OUTPATIENT
Start: 2019-08-12 | End: 2019-08-14

## 2019-08-12 RX ORDER — SIMVASTATIN 20 MG/1
20 TABLET, FILM COATED ORAL AT BEDTIME
Refills: 0 | Status: DISCONTINUED | OUTPATIENT
Start: 2019-08-12 | End: 2019-08-14

## 2019-08-12 RX ORDER — CARVEDILOL PHOSPHATE 80 MG/1
12.5 CAPSULE, EXTENDED RELEASE ORAL ONCE
Refills: 0 | Status: COMPLETED | OUTPATIENT
Start: 2019-08-12 | End: 2019-08-12

## 2019-08-12 RX ORDER — ASPIRIN/CALCIUM CARB/MAGNESIUM 324 MG
81 TABLET ORAL DAILY
Refills: 0 | Status: DISCONTINUED | OUTPATIENT
Start: 2019-08-12 | End: 2019-08-14

## 2019-08-12 RX ORDER — ISOSORBIDE DINITRATE 5 MG/1
10 TABLET ORAL ONCE
Refills: 0 | Status: COMPLETED | OUTPATIENT
Start: 2019-08-12 | End: 2019-08-12

## 2019-08-12 RX ADMIN — PANTOPRAZOLE SODIUM 40 MILLIGRAM(S): 20 TABLET, DELAYED RELEASE ORAL at 08:47

## 2019-08-12 RX ADMIN — Medication 667 MILLIGRAM(S): at 08:47

## 2019-08-12 RX ADMIN — Medication 81 MILLIGRAM(S): at 00:43

## 2019-08-12 RX ADMIN — Medication 100 MILLIGRAM(S): at 00:43

## 2019-08-12 RX ADMIN — ISOSORBIDE DINITRATE 10 MILLIGRAM(S): 5 TABLET ORAL at 15:19

## 2019-08-12 RX ADMIN — Medication 20 MILLIGRAM(S): at 00:43

## 2019-08-12 RX ADMIN — SEVELAMER CARBONATE 800 MILLIGRAM(S): 2400 POWDER, FOR SUSPENSION ORAL at 17:33

## 2019-08-12 RX ADMIN — Medication 100 MILLIGRAM(S): at 08:47

## 2019-08-12 RX ADMIN — CLOPIDOGREL BISULFATE 75 MILLIGRAM(S): 75 TABLET, FILM COATED ORAL at 08:47

## 2019-08-12 RX ADMIN — Medication 20 MILLIGRAM(S): at 08:48

## 2019-08-12 RX ADMIN — Medication 100 MILLIGRAM(S): at 22:25

## 2019-08-12 RX ADMIN — AMLODIPINE BESYLATE 5 MILLIGRAM(S): 2.5 TABLET ORAL at 08:48

## 2019-08-12 RX ADMIN — Medication 81 MILLIGRAM(S): at 08:48

## 2019-08-12 RX ADMIN — SIMVASTATIN 20 MILLIGRAM(S): 20 TABLET, FILM COATED ORAL at 22:25

## 2019-08-12 RX ADMIN — CARVEDILOL PHOSPHATE 12.5 MILLIGRAM(S): 80 CAPSULE, EXTENDED RELEASE ORAL at 17:31

## 2019-08-12 RX ADMIN — CARVEDILOL PHOSPHATE 12.5 MILLIGRAM(S): 80 CAPSULE, EXTENDED RELEASE ORAL at 00:43

## 2019-08-12 RX ADMIN — ISOSORBIDE DINITRATE 10 MILLIGRAM(S): 5 TABLET ORAL at 00:43

## 2019-08-12 RX ADMIN — ISOSORBIDE DINITRATE 10 MILLIGRAM(S): 5 TABLET ORAL at 22:25

## 2019-08-12 RX ADMIN — Medication 100 MILLIGRAM(S): at 15:19

## 2019-08-12 RX ADMIN — AMLODIPINE BESYLATE 5 MILLIGRAM(S): 2.5 TABLET ORAL at 00:43

## 2019-08-12 NOTE — H&P ADULT - PROBLEM SELECTOR PLAN 1
-subjective SOB however lungs clear on exam, satting % on RA, CXR clear. Bnp lower than prior values. Appears comfortable on exam  -Plan for HD today, can reassess following HD  -check EKG  -monitor VS

## 2019-08-12 NOTE — H&P ADULT - PROBLEM SELECTOR PLAN 2
-BMP reviewed, electrolytes stable. Dr. Scott Francisco's group called for routine HD today  -continue with calcium acetate and lasix  -Follow up BMP after HD

## 2019-08-12 NOTE — ED ADULT NURSE NOTE - NSSUSCREENINGQ3_ED_ALL_ED
Patient is a 18 year-old male, history of Bipolar, psychotropic management Zoloft and Lamictal, one in-patient hospitalization at [facility] 2015, no prior suicide attempt, history of marijuana use and alcohol use, history of aggression, no legal history, eczema, was referred by and brought in by mother for depressive symptoms, suicidal ideation and assault ideation (Aren). Patient presenting impaired reasoning. Patient calm and cooperative. Alert and oriented. Denies recent substance use. No

## 2019-08-12 NOTE — H&P ADULT - NSHPLABSRESULTS_GEN_ALL_CORE
(08-12 @ 00:20)                      11.8  5.09 )-----------( 154                 38.9    Neutrophils = 2.64 (51.9%)  Lymphocytes = 1.68 (33.0%)  Eosinophils = 0.27 (5.3%)  Basophils = 0.05 (1.0%)  Monocytes = 0.44 (8.6%)  Bands = --%    08-12    138  |  98  |  54<H>  ----------------------------<  130<H>  5.2   |  26  |  10.55<H>    Ca    9.8      12 Aug 2019 00:20  Phos  6.4     08-12  Mg     2.4     08-12    TPro  8.1  /  Alb  4.5  /  TBili  0.3  /  DBili  x   /  AST  18  /  ALT  18  /  AlkPhos  127<H>  08-12    ( 12 Aug 2019 00:20 )   PT: 11.5 SEC;   INR: 1.01 ;       PTT:31.9 SEC    Labs reviewed. Cr 10, electrolytes table  Trops downtrending    < from: Xray Chest 2 Views PA/Lat (08.12.19 @ 04:04) >    IMPRESSION:  Cardiomegaly with clear lungs.    < end of copied text >

## 2019-08-12 NOTE — H&P ADULT - PROBLEM SELECTOR PROBLEM 4
Fitted and dispensed with Ossur walker small black for left ankle. Patient is encouraged to elevate her foot higher than the level of the heart for 45 min to 1 hour at a time.  Both medial and lateral ankle incisions were dressed with Vaseline gauze then non woven padding secured with Kerlix dressing.  Stockinet placed to lower leg then fitted to boot.    GERD (gastroesophageal reflux disease)

## 2019-08-12 NOTE — ED ADULT NURSE NOTE - NSFALLRSKHRMRISKTYPE_ED_ALL_ED
gait, locomotion, and balance/tone/muscle strength
coagulation(Bleeding disorder R/T clinical cond/anti-coags)

## 2019-08-12 NOTE — CONSULT NOTE ADULT - SUBJECTIVE AND OBJECTIVE BOX
Patient is a 54y old  Female who presents with a chief complaint of missed HD (12 Aug 2019 08:36)      HPI:  History limited as pt uncooperative with history taking  53 yo F c PMH of DM, HTN, CHF, ESRD (on T/Th/Sat, last HD Th, pt does make a little bit of urine), CVA (with residual RUE and BLE weakness) presents after missing HD Saturday due to family emergency with PICKARD, BLE edema. Pt admits hasn't taken her home meds in 1 week since she's been living at her mothers house. Reports mild shortness and LE edema. Otherwise denies acute complaints including chest pain, abdominal pain, n/v, diarrhea.   In the ED, Tmax: 99 HR: 71 - 88 BP: 133/61 - 173/95 RR: 13 - 19 SpO2: 95% - 100% on RA. CXR with clear lungs. Pt was treated with 20 mg IV lasix and restarted on her hypertensive meds. (12 Aug 2019 08:36)      PAST MEDICAL & SURGICAL HISTORY:  GERD (gastroesophageal reflux disease)  Anemia of chronic disease  HLD (hyperlipidemia)  NICM (nonischemic cardiomyopathy)  KERI (obstructive sleep apnea)  ESRD (end stage renal disease): HD T/T/S  Depression  Gout  CVA (cerebral vascular accident): 2013- Residual RLE weakness  DM (diabetes mellitus)  HTN (hypertension)  Glaucoma  S/P cholecystectomy  S/P partial hysterectomy  Status post glaucoma surgery  AVF (arteriovenous fistula)      MEDICATIONS  (STANDING):  allopurinol 100 milliGRAM(s) Oral daily  amLODIPine   Tablet 5 milliGRAM(s) Oral daily  aspirin enteric coated 81 milliGRAM(s) Oral daily  calcium acetate 667 milliGRAM(s) Oral three times a day with meals  carvedilol 12.5 milliGRAM(s) Oral every 12 hours  clopidogrel Tablet 75 milliGRAM(s) Oral daily  docusate sodium 100 milliGRAM(s) Oral daily  furosemide    Tablet 20 milliGRAM(s) Oral daily  hydrALAZINE 100 milliGRAM(s) Oral three times a day  isosorbide   dinitrate Tablet (ISORDIL) 10 milliGRAM(s) Oral three times a day  pantoprazole    Tablet 40 milliGRAM(s) Oral before breakfast  simvastatin 20 milliGRAM(s) Oral at bedtime      Allergies    iodine (Unknown)  Seafood (Unknown)  shellfish (Nausea (Mild to Mod); Hives (Mild to Mod))    Intolerances        SOCIAL HISTORY:  Denies alcohol or tobacco abuse.    FAMILY HISTORY:  Family history of heart attack    No kidney disease in family.    REVIEW OF SYSTEMS:  CONSTITUTIONAL: No weakness, fevers or chills  EYES/ENT: No visual changes  NECK: No pain or stiffness  RESPIRATORY: No cough, wheezing, hemoptysis. No shortness of breath  CARDIOVASCULAR: No chest pain or palpitations  GASTROINTESTINAL: No abdominal or epigastric pain. No nausea, vomiting, or hematemesis. No diarrhea or constipation. No melena or hematochezia  GENITOURINARY: No dysuria, frequency or hematuria. No stones or infection  NEUROLOGICAL: No numbness or weakness  SKIN: No itching, burning, rashes, or lesions   All other review of systems is negative unless indicated above    VITAL:  T(C): , Max: 37.2 (08-12-19 @ 00:03)  T(F): , Max: 99 (08-12-19 @ 00:03)  HR: 73 (08-12-19 @ 08:44)  BP: 131/69 (08-12-19 @ 08:44)  BP(mean): --  RR: 16 (08-12-19 @ 08:44)  SpO2: 96% (08-12-19 @ 08:44)  Wt(kg): --    PHYSICAL EXAM:  General: NAD, Alert, Pleasent  HEENT: NCAT, PERRLA  Neck: Supple, No JVD  Respiratory: CTA-b/l  Cardiovascular: RRR s1s2, no m/r/g  Gastrointestinal: +BS, soft, NT/ND  Extremities: No peripheral edema b/l  Neurological: no focal deficits; strength grossly intact  Psychiatric: Normal mood, normal affect  Back: no CVAT b/l  Skin: No rashes, no nevi  Access:    LABS:                        11.8   5.09  )-----------( 154      ( 12 Aug 2019 00:20 )             38.9     Na(138)/K(5.2)/Cl(98)/HCO3(26)/BUN(54)/Cr(10.55)Glu(130)/Ca(9.8)/Mg(2.4)/PO4(6.4)    08-12 @ 00:20              IMAGING:    ASSESSMENT:    RECOMMEND:    Thank you for involving [facility] in this patient's care.    With warm regards,    Camelia Rea, DO  [facility]  [phone] Patient is a 54y old  Female who presents with a chief complaint of missed HD (12 Aug 2019 08:36)      HPI:  Ms. Herr is a 54 year-old woman with history of multiple medical issues including hypertension, type 2 diabetes mellitus, nonischemic cardiomyopathy, cerebrovascular disease, and end stage renal disease with residual renal function. She undergoes hemodialysis Tuesdays, Thursdays, and Saturdays the care of my associate,  Dr. Francisco at the [facility] unit in [place].   She presented after missing HD Saturday on 8/10/19 due to family emergency with PICKARD, BLE edema. Pt admits hasn't taken her home meds in 1 week since she's been living at her mothers house. Reports mild shortness and LE edema. Otherwise denies acute complaints including chest pain, abdominal pain, n/v, diarrhea. In the ED, Tmax: 99 HR: 71 - 88 BP: 133/61 - 173/95 RR: 13 - 19 SpO2: 95% - 100% on RA. CXR with clear lungs. Pt was treated with 20 mg IV lasix and restarted on her hypertensive meds. (12 Aug 2019 08:36). Nephrology consulted for dialysis needs.       PAST MEDICAL & SURGICAL HISTORY:  GERD (gastroesophageal reflux disease)  Anemia of chronic disease  HLD (hyperlipidemia)  NICM (nonischemic cardiomyopathy)  KERI (obstructive sleep apnea)  ESRD (end stage renal disease): HD T/T/S  Depression  Gout  CVA (cerebral vascular accident): 2013- Residual RLE weakness  DM (diabetes mellitus)  HTN (hypertension)  Glaucoma  S/P cholecystectomy  S/P partial hysterectomy  Status post glaucoma surgery  AVF (arteriovenous fistula)      MEDICATIONS  (STANDING):  allopurinol 100 milliGRAM(s) Oral daily  amLODIPine   Tablet 5 milliGRAM(s) Oral daily  aspirin enteric coated 81 milliGRAM(s) Oral daily  calcium acetate 667 milliGRAM(s) Oral three times a day with meals  carvedilol 12.5 milliGRAM(s) Oral every 12 hours  clopidogrel Tablet 75 milliGRAM(s) Oral daily  docusate sodium 100 milliGRAM(s) Oral daily  furosemide    Tablet 20 milliGRAM(s) Oral daily  hydrALAZINE 100 milliGRAM(s) Oral three times a day  isosorbide   dinitrate Tablet (ISORDIL) 10 milliGRAM(s) Oral three times a day  pantoprazole    Tablet 40 milliGRAM(s) Oral before breakfast  simvastatin 20 milliGRAM(s) Oral at bedtime      Allergies  iodine (Unknown)  Seafood (Unknown)  shellfish (Nausea (Mild to Mod); Hives (Mild to Mod))      SOCIAL HISTORY:  Denies alcohol or tobacco abuse.    FAMILY HISTORY:  Family history of heart attack    No kidney disease in family.    REVIEW OF SYSTEMS:  CONSTITUTIONAL: No weakness, fevers or chills  EYES/ENT: No visual changes  NECK: No pain or stiffness  RESPIRATORY: No cough, wheezing, hemoptysis. No shortness of breath  CARDIOVASCULAR: No chest pain or palpitations  GASTROINTESTINAL: No abdominal or epigastric pain. No nausea, vomiting, or hematemesis. No diarrhea or constipation. No melena or hematochezia  GENITOURINARY: No dysuria, frequency or hematuria. No stones or infection  NEUROLOGICAL: No numbness or weakness  SKIN: No itching, burning, rashes, or lesions   All other review of systems is negative unless indicated above    VITAL:  T(C): , Max: 37.2 (08-12-19 @ 00:03)  T(F): , Max: 99 (08-12-19 @ 00:03)  HR: 73 (08-12-19 @ 08:44)  BP: 131/69 (08-12-19 @ 08:44)  RR: 16 (08-12-19 @ 08:44)  SpO2: 96% (08-12-19 @ 08:44)      PHYSICAL EXAM:  General: NAD, Alert  HEENT: NCAT, PERRLA  Neck: Supple, No JVD  Respiratory: Bibasilar crackles.   Cardiovascular: RRR s1s2, no m/r/g  Gastrointestinal: +BS, soft, NT/ND  Extremities: Trace peripheral edema b/l  Neurological: no focal deficits; strength grossly intact  Psychiatric: Normal mood, normal affect  Back: no CVAT b/l  Skin: No rashes, no nevi  Access: left upper arm AVF with positive thrill and bruit.     LABS:                        11.8   5.09  )-----------( 154      ( 12 Aug 2019 00:20 )             38.9     138  |  98  |  54<H>  ----------------------------<  130<H>  5.2   |  26  |  10.55<H>    Ca    9.8      12 Aug 2019 00:20  Phos  6.4     08-12  Mg     2.4     08-12    TPro  8.1  /  Alb  4.5  /  TBili  0.3  /  DBili  x   /  AST  18  /  ALT  18  /  AlkPhos  127<H>  08-12 08-12    138  |  98  |  54<H>  ----------------------------<  130<H>  5.2   |  26  |  10.55<H>    Ca    9.8      12 Aug 2019 00:20  Phos  6.4     08-12  Mg     2.4     08-12    TPro  8.1  /  Alb  4.5  /  TBili  0.3  /  DBili  x   /  AST  18  /  ALT  18  /  AlkPhos  127<H>  08-12

## 2019-08-12 NOTE — H&P ADULT - ASSESSMENT
55 yo F c PMH of DM, HTN, CHF, ESRD (on T/Th/Sat, last HD Th, pt does make a little bit of urine), CVA (with residual RUE and BLE weakness) presents w/ SOB requiring HD after missed session

## 2019-08-12 NOTE — CONSULT NOTE ADULT - ASSESSMENT
IMAGING:    ASSESSMENT:  Ms. Herr is a 54 year-old woman with history of multiple medical issues including hypertension, type 2 diabetes mellitus, nonischemic cardiomyopathy, cerebrovascular disease, and end stage renal disease. She undergoes hemodialysis Tuesdays, Thursdays, and Saturdays the care of my associate Dr. Francisco at the [facility] unit in [place]. She missed her dialysis session on Saturday because of a family emergency. Her chief complaint was shortness of breath and leg swelling. Nephrology consulted for dialysis needs. She has a mild elevation of her troponin of 79 and now 64 with BNP of 8968.     1. ESRD on hemodialysis Tuesday, Thursday and Saturday.  Will hemodialysis her today and tomorrow.   2. Electrolytes are acceptable.   3. Bicarb acceptable.   4. Mildly hypervolemic with elevated BNP of 8968 with no respiratory compromise.   5. Secondary hyperparathyroidism of renal origin with calcium of high normal side.   6. CV- SOB with elevated bump in troponin will be careful today on dialysis.     RECOMMEND:  1. HD today for 3 hours and tomorrow for 3 hours on 3 k bath and will take one liter off today gently and tomorrow be more aggressive once her troponin comes down.   2. Fluid restrict patient to 1.2 liters per day.   3. Daily BMP, phosphorus and magnesium.  4. Will like to switch Calcium acetate of 667 mg po TID with meals and give Renvela of 800 mg po TID with meals.   5. Low potassium and low phosphorus diet.   6. Need social work involved for the family emergency she had.        Thank you for involving [facility] in this patient's care.    With warm regards,    Camelia Rea, DO  [place]  [phone]

## 2019-08-12 NOTE — ED ADULT NURSE NOTE - OBJECTIVE STATEMENT
Pt received to room 17. Pt comes to ED after missing dialysis tx on Saturday. Pt gets dialysis Tues, Thurs, Sat, last dialyzed on Thursday. AV fistula to right forearm, bruit heard upon auscultation & thrill felt upon palpitation. Pt endorses dyspnea on exertion starting yesterday and bilat edema to lower extremities starting "last week." +3 pitting edema noted to lower extremities bilat. Respirations are even & unlabored, lung sounds clear, heart sounds normal, abd is soft, non-distended. Denies chest pain, dyspnea, N/v/D, chills, fever, weakness, dizziness, headaches, palpitations at this time. Pt is A&Ox4, ambulates independently at baseline. 20 G IV placed to right forearm.

## 2019-08-12 NOTE — H&P ADULT - HISTORY OF PRESENT ILLNESS
History limited as pt uncooperative with history taking  55 yo F c PMH of DM, HTN, CHF, ESRD (on T/Th/Sat, last HD Th, pt does make a little bit of urine), CVA (with residual RUE and BLE weakness) presents after missing HD Saturday due to family emergency with PICKARD, BLE edema. Pt admits hasn't taken her home meds in 1 week since she's been living at her mothers house. Reports mild shortness and LE edema. Otherwise denies acute complaints including chest pain, abdominal pain, n/v, diarrhea.   In the ED, Tmax: 99 HR: 71 - 88 BP: 133/61 - 173/95 RR: 13 - 19 SpO2: 95% - 100% on RA. CXR with clear lungs. Pt was treated with 20 mg IV lasix and restarted on her hypertensive meds.

## 2019-08-12 NOTE — H&P ADULT - NSHPPHYSICALEXAM_GEN_ALL_CORE
GENERAL APPEARANCE: Well developed, well nourished, alert and cooperative. NAD.   HEENT: EOMI. External auditory canals normal, hearing grossly intact.  NECK: Neck supple, non-tender without lymphadenopathy, masses or thyromegaly.  CARDIAC: Normal S1 and S2. No S3, S4 or murmurs. Rhythm is regular.  LUNGS: Clear to auscultation and percussion without rales, rhonchi, wheezing or diminished breath sounds.  ABDOMEN: Positive bowel sounds. Soft, nondistended, nontender. No guarding or rebound.   BACK: normal posture, no spinal deformity, symmetry of spinal muscles, without tenderness, decreased range of motion.  EXTREMITIES: No significant deformity or joint abnormality. +1 edema in LE. Peripheral pulses intact. No varicosities.  NEUROLOGICAL: Grossly intact, no focal deficits   SKIN: Skin normal color, texture and turgor with no lesions or eruptions.  PSYCHIATRIC: AOx3.Normal affect and behavior.

## 2019-08-12 NOTE — H&P ADULT - NSHPREVIEWOFSYSTEMS_GEN_ALL_CORE
Declined full ROS  Reported mild SOB and LE edema. Denied chest pain, abdominal pain, N/V or diarrhea

## 2019-08-12 NOTE — H&P ADULT - PROBLEM SELECTOR PLAN 3
-continue with aspirin and plavix, hydralazine, isordil, coreg and statin  -no chest pain, trops negative. Follow up EKG  -monitor on telemetry

## 2019-08-13 ENCOUNTER — TRANSCRIPTION ENCOUNTER (OUTPATIENT)
Age: 54
End: 2019-08-13

## 2019-08-13 LAB
ANION GAP SERPL CALC-SCNC: 13 MMO/L — SIGNIFICANT CHANGE UP (ref 7–14)
BUN SERPL-MCNC: 35 MG/DL — HIGH (ref 7–23)
CALCIUM SERPL-MCNC: 9.3 MG/DL — SIGNIFICANT CHANGE UP (ref 8.4–10.5)
CHLORIDE SERPL-SCNC: 96 MMOL/L — LOW (ref 98–107)
CO2 SERPL-SCNC: 27 MMOL/L — SIGNIFICANT CHANGE UP (ref 22–31)
CREAT SERPL-MCNC: 7.15 MG/DL — HIGH (ref 0.5–1.3)
GLUCOSE SERPL-MCNC: 119 MG/DL — HIGH (ref 70–99)
HCT VFR BLD CALC: 36.2 % — SIGNIFICANT CHANGE UP (ref 34.5–45)
HGB BLD-MCNC: 11.4 G/DL — LOW (ref 11.5–15.5)
MAGNESIUM SERPL-MCNC: 2 MG/DL — SIGNIFICANT CHANGE UP (ref 1.6–2.6)
MCHC RBC-ENTMCNC: 28.8 PG — SIGNIFICANT CHANGE UP (ref 27–34)
MCHC RBC-ENTMCNC: 31.5 % — LOW (ref 32–36)
MCV RBC AUTO: 91.4 FL — SIGNIFICANT CHANGE UP (ref 80–100)
NRBC # FLD: 0 K/UL — SIGNIFICANT CHANGE UP (ref 0–0)
PHOSPHATE SERPL-MCNC: 4.2 MG/DL — SIGNIFICANT CHANGE UP (ref 2.5–4.5)
PLATELET # BLD AUTO: 166 K/UL — SIGNIFICANT CHANGE UP (ref 150–400)
PMV BLD: 11.7 FL — SIGNIFICANT CHANGE UP (ref 7–13)
POTASSIUM SERPL-MCNC: 4.1 MMOL/L — SIGNIFICANT CHANGE UP (ref 3.5–5.3)
POTASSIUM SERPL-SCNC: 4.1 MMOL/L — SIGNIFICANT CHANGE UP (ref 3.5–5.3)
RBC # BLD: 3.96 M/UL — SIGNIFICANT CHANGE UP (ref 3.8–5.2)
RBC # FLD: 13 % — SIGNIFICANT CHANGE UP (ref 10.3–14.5)
SODIUM SERPL-SCNC: 136 MMOL/L — SIGNIFICANT CHANGE UP (ref 135–145)
WBC # BLD: 3.27 K/UL — LOW (ref 3.8–10.5)
WBC # FLD AUTO: 3.27 K/UL — LOW (ref 3.8–10.5)

## 2019-08-13 RX ORDER — SIMETHICONE 80 MG/1
80 TABLET, CHEWABLE ORAL
Refills: 0 | Status: DISCONTINUED | OUTPATIENT
Start: 2019-08-13 | End: 2019-08-14

## 2019-08-13 RX ORDER — CHLORHEXIDINE GLUCONATE 213 G/1000ML
1 SOLUTION TOPICAL DAILY
Refills: 0 | Status: DISCONTINUED | OUTPATIENT
Start: 2019-08-13 | End: 2019-08-14

## 2019-08-13 RX ADMIN — Medication 81 MILLIGRAM(S): at 11:13

## 2019-08-13 RX ADMIN — SEVELAMER CARBONATE 800 MILLIGRAM(S): 2400 POWDER, FOR SUSPENSION ORAL at 13:23

## 2019-08-13 RX ADMIN — CARVEDILOL PHOSPHATE 12.5 MILLIGRAM(S): 80 CAPSULE, EXTENDED RELEASE ORAL at 21:56

## 2019-08-13 RX ADMIN — Medication 100 MILLIGRAM(S): at 11:13

## 2019-08-13 RX ADMIN — ISOSORBIDE DINITRATE 10 MILLIGRAM(S): 5 TABLET ORAL at 21:56

## 2019-08-13 RX ADMIN — Medication 100 MILLIGRAM(S): at 21:56

## 2019-08-13 RX ADMIN — Medication 20 MILLIGRAM(S): at 13:23

## 2019-08-13 RX ADMIN — SEVELAMER CARBONATE 800 MILLIGRAM(S): 2400 POWDER, FOR SUSPENSION ORAL at 11:13

## 2019-08-13 RX ADMIN — CLOPIDOGREL BISULFATE 75 MILLIGRAM(S): 75 TABLET, FILM COATED ORAL at 11:13

## 2019-08-13 RX ADMIN — SIMVASTATIN 20 MILLIGRAM(S): 20 TABLET, FILM COATED ORAL at 21:56

## 2019-08-13 NOTE — DISCHARGE NOTE PROVIDER - NSDCFUSCHEDAPPT_GEN_ALL_CORE_FT
MIMI HICKS ; 09/04/2019 ; [facility] [address] MIMI Yang ; 09/04/2019 ; [facility] [address] MIMI Yang ; 09/04/2019 ; [facility] [address] MIMI Yang ; 09/04/2019 ; [facility]

## 2019-08-13 NOTE — DISCHARGE NOTE PROVIDER - PROVIDER TOKENS
FREE:[LAST:[Appointment:],PHONE:[(   )    -],FAX:[(   )    -],ADDRESS:[Follow up with Primary Care Provider within 1 week of discharge]]

## 2019-08-13 NOTE — PROGRESS NOTE ADULT - SUBJECTIVE AND OBJECTIVE BOX
No acute changes in her breathing pattern last night  For HD today again    Vital Signs Last 24 Hrs  T(C): 36.7 (13 Aug 2019 05:55), Max: 36.9 (12 Aug 2019 22:29)  T(F): 98.1 (13 Aug 2019 05:55), Max: 98.4 (12 Aug 2019 22:29)  HR: 78 (13 Aug 2019 05:55) (73 - 87)  BP: 154/86 (13 Aug 2019 05:55) (135/71 - 154/86)  BP(mean): --  RR: 16 (13 Aug 2019 05:55) (16 - 18)  SpO2: 100% (13 Aug 2019 05:55) (98% - 100%)      GENERAL APPEARANCE: Well developed, well nourished, alert and cooperative. NAD.   HEENT: EOMI. External auditory canals normal, hearing grossly intact.  NECK: Neck supple, non-tender without lymphadenopathy, masses or thyromegaly.  CARDIAC: Normal S1 and S2. No S3, S4 or murmurs. Rhythm is regular.  LUNGS: Clear to auscultation and percussion without rales, rhonchi, wheezing or diminished breath sounds.  ABDOMEN: Positive bowel sounds. Soft, nondistended, nontender. No guarding or rebound.   BACK: normal posture, no spinal deformity, symmetry of spinal muscles, without tenderness, decreased range of motion.  EXTREMITIES: No significant deformity or joint abnormality. +1 edema in LE. Peripheral pulses intact. No varicosities.  NEUROLOGICAL: Grossly intact, no focal deficits   SKIN: Skin normal color, texture and turgor with no lesions or eruptions.  PSYCHIATRIC: AOx3.Normal affect and behavior.    LABS:                        10.6   3.76  )-----------( 148      ( 12 Aug 2019 12:00 )             33.7     08-12    140  |  99  |  40<H>  ----------------------------<  102<H>  4.1   |  26  |  7.83<H>    Ca    9.2      12 Aug 2019 12:00  Phos  4.2     08-12  Mg     2.1     08-12    TPro  8.1  /  Alb  4.5  /  TBili  0.3  /  DBili  x   /  AST  18  /  ALT  18  /  AlkPhos  127<H>  08-12    PT/INR - ( 12 Aug 2019 00:20 )   PT: 11.5 SEC;   INR: 1.01          PTT - ( 12 Aug 2019 00:20 )  PTT:31.9 SEC  CAPILLARY BLOOD GLUCOSE      POCT Blood Glucose.: 116 mg/dL (12 Aug 2019 21:26)  POCT Blood Glucose.: 104 mg/dL (12 Aug 2019 17:17)

## 2019-08-13 NOTE — DISCHARGE NOTE PROVIDER - HOSPITAL COURSE
53 YO F PMH of DM, HTN, CHF, ESRD (on T/Th/Sat, last HD Th, pt does make a little bit of urine), CVA (with residual RUE and BLE weakness) presents with SOB requiring HD after missed session        # Shortness of breath     -Clear lungs     -SAT 95% on RA    -CXR: clear          # ESRD (end stage renal disease)    -HD session yesterday     -continue with calcium acetate and lasix        # NICM (nonischemic cardiomyopathy).    -continue with aspirin and plavix, hydralazine, isordil, coreg and statin    -no chest pain, trops negative.         # DM (diabetes mellitus)    -Continue oral hypoglycemics        # HTN (hypertension)     -Continue with hydralazine, isordil, coreg    -monitor blood pressure.        # Gout    -Continue with allopurinol.         8/14: Patient stable for discharge as per Dr. Griggs 53 YO F PMH of DM, HTN, CHF, ESRD (on T/Th/Sat, last HD Th, pt does make a little bit of urine), CVA (with residual RUE and BLE weakness) presents with SOB requiring HD after missed session        # Shortness of breath     -Clear lungs     -SAT 95% on RA    -CXR: clear          # ESRD (end stage renal disease)    -HD session yesterday     -continue with calcium acetate and lasix        # NICM (nonischemic cardiomyopathy).    -continue with aspirin and plavix, hydralazine, isordil, coreg and statin    -no chest pain, trops negative.         # DM (diabetes mellitus)    -Continue oral hypoglycemics        # HTN (hypertension)     -Continue with hydralazine, isordil, coreg    -monitor blood pressure.        # Gout    -Continue with allopurinol.         8/14: Patient stable for discharge as per Dr. Hays 55 YO F PMH of DM, HTN, CHF, ESRD (on T/Th/Sat, last HD Th, pt does make a little bit of urine), CVA (with residual RUE and BLE weakness) presents with SOB requiring HD after missed session        # Shortness of breath     -Clear lungs     -SAT 95% on RA    -CXR: clear          # ESRD (end stage renal disease)    -HD session yesterday     -continue with calcium acetate and lasix        # NICM (nonischemic cardiomyopathy).    -continue with aspirin and plavix, hydralazine, isordil, coreg and statin    -no chest pain, trops negative.         # DM (diabetes mellitus)    -Continue oral hypoglycemics        # HTN (hypertension)     -Continue with hydralazine, isordil, coreg    -monitor blood pressure.        # Gout    -Continue with allopurinol.         8/14: Patient stable for discharge as per Dr. Hays         Attending Addendum:     Dyspnea likely due to mild fluid overload after missed dialysis session.    Feeling better post dialysis. dc planning.

## 2019-08-13 NOTE — PROGRESS NOTE ADULT - SUBJECTIVE AND OBJECTIVE BOX
No pain, no shortness of breath    Review of systems: All 10 points ROS was obtained except as above.     allopurinol 100 milliGRAM(s) Oral daily  amLODIPine   Tablet 5 milliGRAM(s) Oral daily  aspirin enteric coated 81 milliGRAM(s) Oral daily  carvedilol 12.5 milliGRAM(s) Oral every 12 hours  chlorhexidine 4% Liquid 1 Application(s) Topical daily  clopidogrel Tablet 75 milliGRAM(s) Oral daily  docusate sodium 100 milliGRAM(s) Oral daily  furosemide    Tablet 20 milliGRAM(s) Oral daily  hydrALAZINE 100 milliGRAM(s) Oral three times a day  isosorbide   dinitrate Tablet (ISORDIL) 10 milliGRAM(s) Oral three times a day  pantoprazole    Tablet 40 milliGRAM(s) Oral before breakfast  sevelamer carbonate 800 milliGRAM(s) Oral three times a day with meals  simethicone 80 milliGRAM(s) Chew two times a day PRN  simvastatin 20 milliGRAM(s) Oral at bedtime      VITAL:  T(C): , Max: 36.9 (08-12-19 @ 22:29)  T(F): , Max: 98.4 (08-12-19 @ 22:29)  HR: 85 (08-13-19 @ 18:16)  BP: 148/69 (08-13-19 @ 18:16)  RR: 17 (08-13-19 @ 18:16)  SpO2: 99% (08-13-19 @ 13:20)      08-12-19 @ 07:01  -  08-13-19 @ 07:00  --------------------------------------------------------  IN: 700 mL / OUT: 1500 mL / NET: -800 mL    08-13-19 @ 07:01  -  08-13-19 @ 21:45  --------------------------------------------------------  IN: 400 mL / OUT: 3400 mL / NET: -3000 mL        PHYSICAL EXAM:  General: NAD; Alert  HEENT:  NCAT; PERRLA  Neck: No JVD; supple  Respiratory: CTA-b/l  Cardiac: RRR s1s2  Gastrointestinal: BS+, soft, NT/ND  Urologic: No malcolm  Extremities: No peripheral edema  Access: Left upper arm AVF with positive thrill and bruit.       LABS:                          11.4   3.27  )-----------( 166      ( 13 Aug 2019 15:35 )             36.2     Na(136)/K(4.1)/Cl(96)/HCO3(27)/BUN(35)/Cr(7.15)Glu(119)/Ca(9.3)/Mg(2.0)/PO4(4.2)    08-13 @ 15:35  Na(140)/K(4.1)/Cl(99)/HCO3(26)/BUN(40)/Cr(7.83)Glu(102)/Ca(9.2)/Mg(2.1)/PO4(4.2)    08-12 @ 12:00  Na(138)/K(5.2)/Cl(98)/HCO3(26)/BUN(54)/Cr(10.55)Glu(130)/Ca(9.8)/Mg(2.4)/PO4(6.4)    08-12 @ 00:20    08-13    136  |  96<L>  |  35<H>  ----------------------------<  119<H>  4.1   |  27  |  7.15<H>    Ca    9.3      13 Aug 2019 15:35  Phos  4.2     08-13  Mg     2.0     08-13    TPro  8.1  /  Alb  4.5  /  TBili  0.3  /  DBili  x   /  AST  18  /  ALT  18  /  AlkPhos  127<H>  08-12

## 2019-08-13 NOTE — DISCHARGE NOTE PROVIDER - CARE PROVIDER_API CALL
Appointment:,   Follow up with Primary Care Provider within 1 week of discharge  Phone: (   )    -  Fax: (   )    -  Follow Up Time:

## 2019-08-13 NOTE — DISCHARGE NOTE PROVIDER - NSDCFUADDINST_GEN_ALL_CORE_FT
Continue blood pressure, cholesterol and diabetic medications. Goal of hemoglobin A1C (HgbA1C) < 7%.  Avoid nephrotoxic drugs such as nonsteroidal anti-inflammatory agents (NSAIDs).   Please follow up with your nephrologist to monitor your kidney function, continue with low protein and potassium diet.

## 2019-08-13 NOTE — DISCHARGE NOTE PROVIDER - NSDCCPCAREPLAN_GEN_ALL_CORE_FT
PRINCIPAL DISCHARGE DIAGNOSIS  Diagnosis: Hemodialysis patient  Assessment and Plan of Treatment: Please follow up with your nephrologist for management, and continue your schedule hemodialysis. To improve quality of life and reduce mortality by preventing fluid overload and electrolytes abnormalities, and blood pressure control. Follow up with your primary care provider within 1 week of discharge.

## 2019-08-14 ENCOUNTER — TRANSCRIPTION ENCOUNTER (OUTPATIENT)
Age: 54
End: 2019-08-14

## 2019-08-14 VITALS — DIASTOLIC BLOOD PRESSURE: 76 MMHG | SYSTOLIC BLOOD PRESSURE: 126 MMHG | HEART RATE: 74 BPM

## 2019-08-14 LAB
ANION GAP SERPL CALC-SCNC: 14 MMO/L — SIGNIFICANT CHANGE UP (ref 7–14)
BUN SERPL-MCNC: 30 MG/DL — HIGH (ref 7–23)
CALCIUM SERPL-MCNC: 9.6 MG/DL — SIGNIFICANT CHANGE UP (ref 8.4–10.5)
CHLORIDE SERPL-SCNC: 96 MMOL/L — LOW (ref 98–107)
CO2 SERPL-SCNC: 27 MMOL/L — SIGNIFICANT CHANGE UP (ref 22–31)
CREAT SERPL-MCNC: 7.26 MG/DL — HIGH (ref 0.5–1.3)
GLUCOSE SERPL-MCNC: 108 MG/DL — HIGH (ref 70–99)
HCT VFR BLD CALC: 38.9 % — SIGNIFICANT CHANGE UP (ref 34.5–45)
HGB BLD-MCNC: 12 G/DL — SIGNIFICANT CHANGE UP (ref 11.5–15.5)
MAGNESIUM SERPL-MCNC: 2 MG/DL — SIGNIFICANT CHANGE UP (ref 1.6–2.6)
MCHC RBC-ENTMCNC: 28.6 PG — SIGNIFICANT CHANGE UP (ref 27–34)
MCHC RBC-ENTMCNC: 30.8 % — LOW (ref 32–36)
MCV RBC AUTO: 92.8 FL — SIGNIFICANT CHANGE UP (ref 80–100)
MRSA SPEC QL CULT: SIGNIFICANT CHANGE UP
NRBC # FLD: 0 K/UL — SIGNIFICANT CHANGE UP (ref 0–0)
PLATELET # BLD AUTO: 166 K/UL — SIGNIFICANT CHANGE UP (ref 150–400)
PMV BLD: 11.1 FL — SIGNIFICANT CHANGE UP (ref 7–13)
POTASSIUM SERPL-MCNC: 4.4 MMOL/L — SIGNIFICANT CHANGE UP (ref 3.5–5.3)
POTASSIUM SERPL-SCNC: 4.4 MMOL/L — SIGNIFICANT CHANGE UP (ref 3.5–5.3)
RBC # BLD: 4.19 M/UL — SIGNIFICANT CHANGE UP (ref 3.8–5.2)
RBC # FLD: 12.9 % — SIGNIFICANT CHANGE UP (ref 10.3–14.5)
SODIUM SERPL-SCNC: 137 MMOL/L — SIGNIFICANT CHANGE UP (ref 135–145)
WBC # BLD: 3.48 K/UL — LOW (ref 3.8–10.5)
WBC # FLD AUTO: 3.48 K/UL — LOW (ref 3.8–10.5)

## 2019-08-14 RX ADMIN — PANTOPRAZOLE SODIUM 40 MILLIGRAM(S): 20 TABLET, DELAYED RELEASE ORAL at 06:30

## 2019-08-14 RX ADMIN — CLOPIDOGREL BISULFATE 75 MILLIGRAM(S): 75 TABLET, FILM COATED ORAL at 11:34

## 2019-08-14 RX ADMIN — ISOSORBIDE DINITRATE 10 MILLIGRAM(S): 5 TABLET ORAL at 12:58

## 2019-08-14 RX ADMIN — Medication 100 MILLIGRAM(S): at 12:58

## 2019-08-14 RX ADMIN — Medication 100 MILLIGRAM(S): at 06:31

## 2019-08-14 RX ADMIN — AMLODIPINE BESYLATE 5 MILLIGRAM(S): 2.5 TABLET ORAL at 06:31

## 2019-08-14 RX ADMIN — CHLORHEXIDINE GLUCONATE 1 APPLICATION(S): 213 SOLUTION TOPICAL at 11:35

## 2019-08-14 RX ADMIN — Medication 100 MILLIGRAM(S): at 11:35

## 2019-08-14 RX ADMIN — Medication 81 MILLIGRAM(S): at 11:34

## 2019-08-14 RX ADMIN — Medication 100 MILLIGRAM(S): at 11:34

## 2019-08-14 RX ADMIN — Medication 20 MILLIGRAM(S): at 06:30

## 2019-08-14 RX ADMIN — CARVEDILOL PHOSPHATE 12.5 MILLIGRAM(S): 80 CAPSULE, EXTENDED RELEASE ORAL at 06:30

## 2019-08-14 RX ADMIN — SEVELAMER CARBONATE 800 MILLIGRAM(S): 2400 POWDER, FOR SUSPENSION ORAL at 11:34

## 2019-08-14 RX ADMIN — ISOSORBIDE DINITRATE 10 MILLIGRAM(S): 5 TABLET ORAL at 06:30

## 2019-08-14 NOTE — PROGRESS NOTE ADULT - ASSESSMENT
ASSESSMENT/PLAN    ASSESSMENT:  Ms. Herr is a 54 year-old woman with history of multiple medical issues including hypertension, type 2 diabetes mellitus, nonischemic cardiomyopathy, cerebrovascular disease, and end stage renal disease. She undergoes hemodialysis Tuesdays, Thursdays, and Saturdays the care of my associate Dr. Francisco at the [facility] unit in [place]. She missed her dialysis session on Saturday 8/10/19 because of a family emergency. Her chief complaint was shortness of breath and leg swelling. Nephrology consulted for dialysis needs. She has a mild elevation of her troponin of 79 and now 64 with BNP of 8968.     1. ESRD on hemodialysis Tuesday, Thursday and Saturday.  Was dialyzed today for 3 liters of UF.    2. Electrolytes are acceptable.   3. Bicarb acceptable.   4. Mildly hypervolemic with elevated BNP of 8968 with no respiratory compromise.   5. Secondary hyperparathyroidism of renal origin with calcium of high normal side.   6. CV- SOB with elevated bump in troponin will be careful today on dialysis.     RECOMMEND:  1. HD today for 3 hours with 3 liters of UF.  2. Fluid restrict patient to 1.2 liters per day.   3. Daily BMP, phosphorus and magnesium.  4. Will give Renvela of 800 mg po TID with meals.   5. Low potassium and low phosphorus diet.   6. Need social work involved for the family emergency she had.   7. Please get BNP tomorrow.        Thank you for involving [facility] in this patient's care.    With warm regards,    Camelia Rea, DO  [employer]  [phone]
53 yo F c PMH of DM, HTN, CHF, ESRD (on T/Th/Sat, last HD Th, pt does make a little bit of urine), CVA (with residual RUE and BLE weakness) presents w/ SOB requiring HD after missed session.
53 yo F c PMH of DM, HTN, CHF, ESRD (on T/Th/Sat, last HD Th, pt does make a little bit of urine), CVA (with residual RUE and BLE weakness) presents w/ SOB requiring HD after missed session

## 2019-08-14 NOTE — PROGRESS NOTE ADULT - PROBLEM SELECTOR PLAN 2
-appreciate nephrology  -for HD again today  -continue with calcium acetate and lasix  -Follow up BMP after HD
-appreciate nephrology  -HD per renal  -continue with calcium acetate and lasix

## 2019-08-14 NOTE — PROGRESS NOTE ADULT - PROBLEM SELECTOR PLAN 5
-last hgba1c of 6.1, on oral hypoglycemics  -continue with low dose ISS  -monitor FS
-last hgba1c of 6.1, on oral hypoglycemics  -continue with low dose ISS  -monitor FS

## 2019-08-14 NOTE — PROGRESS NOTE ADULT - PROBLEM SELECTOR PLAN 3
-continue with aspirin and plavix, hydralazine, isordil, coreg and statin  -no chest pain, trops negative. Follow up EKG  -monitor on telemetry
-continue with aspirin and plavix, hydralazine, isordil, coreg and statin  -no chest pain, trops negative. Follow up EKG  -monitor on telemetry

## 2019-08-14 NOTE — PROGRESS NOTE ADULT - SUBJECTIVE AND OBJECTIVE BOX
Patient is a 54y old  Female who presents with a chief complaint of missed HD (13 Aug 2019 21:44)      SUBJECTIVE / OVERNIGHT EVENTS:  feels well.  no events.   no cp, no sob, no n/v/d. no abdominal pain.  no headache, no dizziness.   Her adult son sleeping on the couch.       Vital Signs Last 24 Hrs  T(C): 36.9 (14 Aug 2019 11:42), Max: 36.9 (14 Aug 2019 11:42)  T(F): 98.5 (14 Aug 2019 11:42), Max: 98.5 (14 Aug 2019 11:42)  HR: 74 (14 Aug 2019 11:42) (71 - 85)  BP: 122/66 (14 Aug 2019 11:42) (122/66 - 150/88)  BP(mean): --  RR: 16 (14 Aug 2019 11:42) (16 - 18)  SpO2: 100% (14 Aug 2019 11:42) (94% - 100%)  I&O's Summary    13 Aug 2019 07:01  -  14 Aug 2019 07:00  --------------------------------------------------------  IN: 600 mL / OUT: 3400 mL / NET: -2800 mL        PHYSICAL EXAM:  GENERAL: NAD, Comfortable  HEAD:  Atraumatic, Normocephalic  EYES: EOMI, PERRLA, conjunctiva and sclera clear  NECK: Supple, No JVD  CHEST/LUNG: Clear to auscultation bilaterally; No wheeze  HEART: Regular rate and rhythm; No murmurs, rubs, or gallops  ABDOMEN: Soft, Nontender, Nondistended; Bowel sounds present  Neuro: AAO x 3, no focal deficit, 5/5 b/l extremities  EXTREMITIES:  2+ Peripheral Pulses, No clubbing, cyanosis, or edema  SKIN: No rashes or lesions    LABS:                        12.0   3.48  )-----------( 166      ( 14 Aug 2019 06:30 )             38.9     08-14    137  |  96<L>  |  30<H>  ----------------------------<  108<H>  4.4   |  27  |  7.26<H>    Ca    9.6      14 Aug 2019 06:30  Phos  4.2     08-13  Mg     2.0     08-14        CAPILLARY BLOOD GLUCOSE                RADIOLOGY & ADDITIONAL TESTS:    Imaging Personally Reviewed:  [x] YES  [ ] NO    Consultant(s) Notes Reviewed:  [x] YES  [ ] NO      MEDICATIONS  (STANDING):  allopurinol 100 milliGRAM(s) Oral daily  amLODIPine   Tablet 5 milliGRAM(s) Oral daily  aspirin enteric coated 81 milliGRAM(s) Oral daily  carvedilol 12.5 milliGRAM(s) Oral every 12 hours  chlorhexidine 4% Liquid 1 Application(s) Topical daily  clopidogrel Tablet 75 milliGRAM(s) Oral daily  docusate sodium 100 milliGRAM(s) Oral daily  furosemide    Tablet 20 milliGRAM(s) Oral daily  hydrALAZINE 100 milliGRAM(s) Oral three times a day  isosorbide   dinitrate Tablet (ISORDIL) 10 milliGRAM(s) Oral three times a day  pantoprazole    Tablet 40 milliGRAM(s) Oral before breakfast  sevelamer carbonate 800 milliGRAM(s) Oral three times a day with meals  simvastatin 20 milliGRAM(s) Oral at bedtime    MEDICATIONS  (PRN):  simethicone 80 milliGRAM(s) Chew two times a day PRN Indigestion      Care Discussed with Consultants/Other Providers [x] YES  [ ] NO    HEALTH ISSUES - PROBLEM Dx:  Need for prophylactic measure: Need for prophylactic measure  Gout: Gout  HTN (hypertension): HTN (hypertension)  DM (diabetes mellitus): DM (diabetes mellitus)  GERD (gastroesophageal reflux disease): GERD (gastroesophageal reflux disease)  NICM (nonischemic cardiomyopathy): NICM (nonischemic cardiomyopathy)  ESRD (end stage renal disease): ESRD (end stage renal disease)  Shortness of breath: Shortness of breath

## 2019-08-14 NOTE — PROGRESS NOTE ADULT - ATTENDING COMMENTS
dc planning after HD set up and after being cleared by renal.     [facility]  - [phone]

## 2019-08-14 NOTE — PROGRESS NOTE ADULT - PROBLEM SELECTOR PLAN 6
-continue with hydralazine, isordil, coreg  -monitor blood pressure
-continue with hydralazine, isordil, coreg  -monitor blood pressure

## 2019-08-14 NOTE — DISCHARGE NOTE NURSING/CASE MANAGEMENT/SOCIAL WORK - NSDCDPATPORTLINK_GEN_ALL_CORE
You can access the [facility] Patient Portal, offered by [facility], by registering with the following website: [email or web address]

## 2019-08-14 NOTE — PROGRESS NOTE ADULT - PROBLEM SELECTOR PLAN 1
-subjective SOB however lungs clear on exam, satting % on RA, CXR clear. Bnp lower than prior values. Appears comfortable on exam  -s/p HD per renal  -Titrate O2 sats to above 92%
-subjective SOB however lungs clear on exam, satting % on RA, CXR clear. Bnp lower than prior values. Appears comfortable on exam  -Plan for HD again today  -Titrate O2 sats to above 92%

## 2019-08-19 ENCOUNTER — EMERGENCY (EMERGENCY)
Facility: HOSPITAL | Age: 54
LOS: 1 days | Discharge: HOME | End: 2019-08-19
Attending: EMERGENCY MEDICINE | Admitting: EMERGENCY MEDICINE
Payer: MEDICAID

## 2019-08-19 VITALS
OXYGEN SATURATION: 100 % | DIASTOLIC BLOOD PRESSURE: 64 MMHG | RESPIRATION RATE: 16 BRPM | SYSTOLIC BLOOD PRESSURE: 112 MMHG | HEART RATE: 67 BPM | TEMPERATURE: 98 F

## 2019-08-19 VITALS
TEMPERATURE: 97 F | OXYGEN SATURATION: 100 % | DIASTOLIC BLOOD PRESSURE: 65 MMHG | SYSTOLIC BLOOD PRESSURE: 133 MMHG | HEART RATE: 69 BPM | RESPIRATION RATE: 16 BRPM

## 2019-08-19 DIAGNOSIS — Z98.83 FILTERING (VITREOUS) BLEB AFTER GLAUCOMA SURGERY STATUS: Chronic | ICD-10-CM

## 2019-08-19 DIAGNOSIS — I77.0 ARTERIOVENOUS FISTULA, ACQUIRED: Chronic | ICD-10-CM

## 2019-08-19 DIAGNOSIS — Z99.2 DEPENDENCE ON RENAL DIALYSIS: ICD-10-CM

## 2019-08-19 DIAGNOSIS — Z90.711 ACQUIRED ABSENCE OF UTERUS WITH REMAINING CERVICAL STUMP: Chronic | ICD-10-CM

## 2019-08-19 DIAGNOSIS — Z90.49 ACQUIRED ABSENCE OF OTHER SPECIFIED PARTS OF DIGESTIVE TRACT: Chronic | ICD-10-CM

## 2019-08-19 LAB
ALBUMIN SERPL ELPH-MCNC: 4.7 G/DL — SIGNIFICANT CHANGE UP (ref 3.3–5)
ALP SERPL-CCNC: 126 U/L — HIGH (ref 40–120)
ALT FLD-CCNC: 9 U/L — SIGNIFICANT CHANGE UP (ref 4–33)
ANION GAP SERPL CALC-SCNC: 20 MMO/L — HIGH (ref 7–14)
APTT BLD: 27.6 SEC — SIGNIFICANT CHANGE UP (ref 27.5–36.3)
AST SERPL-CCNC: 12 U/L — SIGNIFICANT CHANGE UP (ref 4–32)
BASE EXCESS BLDV CALC-SCNC: 0.8 MMOL/L — SIGNIFICANT CHANGE UP
BASOPHILS # BLD AUTO: 0.05 K/UL — SIGNIFICANT CHANGE UP (ref 0–0.2)
BASOPHILS NFR BLD AUTO: 0.9 % — SIGNIFICANT CHANGE UP (ref 0–2)
BILIRUB SERPL-MCNC: 0.3 MG/DL — SIGNIFICANT CHANGE UP (ref 0.2–1.2)
BLOOD GAS VENOUS - CREATININE: 14.6 MG/DL — HIGH (ref 0.5–1.3)
BUN SERPL-MCNC: 61 MG/DL — HIGH (ref 7–23)
CALCIUM SERPL-MCNC: 9.6 MG/DL — SIGNIFICANT CHANGE UP (ref 8.4–10.5)
CHLORIDE BLDV-SCNC: 102 MMOL/L — SIGNIFICANT CHANGE UP (ref 96–108)
CHLORIDE SERPL-SCNC: 94 MMOL/L — LOW (ref 98–107)
CO2 SERPL-SCNC: 19 MMOL/L — LOW (ref 22–31)
CREAT SERPL-MCNC: 14.38 MG/DL — HIGH (ref 0.5–1.3)
EOSINOPHIL # BLD AUTO: 0.24 K/UL — SIGNIFICANT CHANGE UP (ref 0–0.5)
EOSINOPHIL NFR BLD AUTO: 4.4 % — SIGNIFICANT CHANGE UP (ref 0–6)
GAS PNL BLDV: 129 MMOL/L — LOW (ref 136–146)
GLUCOSE BLDV-MCNC: 91 MG/DL — SIGNIFICANT CHANGE UP (ref 70–99)
GLUCOSE SERPL-MCNC: 87 MG/DL — SIGNIFICANT CHANGE UP (ref 70–99)
HCG SERPL-ACNC: < 5 MIU/ML — SIGNIFICANT CHANGE UP
HCO3 BLDV-SCNC: 24 MMOL/L — SIGNIFICANT CHANGE UP (ref 20–27)
HCT VFR BLD CALC: 38.6 % — SIGNIFICANT CHANGE UP (ref 34.5–45)
HCT VFR BLDV CALC: 35.5 % — SIGNIFICANT CHANGE UP (ref 34.5–45)
HGB BLD-MCNC: 12.1 G/DL — SIGNIFICANT CHANGE UP (ref 11.5–15.5)
HGB BLDV-MCNC: 11.5 G/DL — SIGNIFICANT CHANGE UP (ref 11.5–15.5)
IMM GRANULOCYTES NFR BLD AUTO: 0.2 % — SIGNIFICANT CHANGE UP (ref 0–1.5)
INR BLD: 0.96 — SIGNIFICANT CHANGE UP (ref 0.88–1.17)
LACTATE BLDV-MCNC: 0.9 MMOL/L — SIGNIFICANT CHANGE UP (ref 0.5–2)
LYMPHOCYTES # BLD AUTO: 2.2 K/UL — SIGNIFICANT CHANGE UP (ref 1–3.3)
LYMPHOCYTES # BLD AUTO: 40.7 % — SIGNIFICANT CHANGE UP (ref 13–44)
MCHC RBC-ENTMCNC: 28.8 PG — SIGNIFICANT CHANGE UP (ref 27–34)
MCHC RBC-ENTMCNC: 31.3 % — LOW (ref 32–36)
MCV RBC AUTO: 91.9 FL — SIGNIFICANT CHANGE UP (ref 80–100)
MONOCYTES # BLD AUTO: 0.55 K/UL — SIGNIFICANT CHANGE UP (ref 0–0.9)
MONOCYTES NFR BLD AUTO: 10.2 % — SIGNIFICANT CHANGE UP (ref 2–14)
NEUTROPHILS # BLD AUTO: 2.35 K/UL — SIGNIFICANT CHANGE UP (ref 1.8–7.4)
NEUTROPHILS NFR BLD AUTO: 43.6 % — SIGNIFICANT CHANGE UP (ref 43–77)
NRBC # FLD: 0 K/UL — SIGNIFICANT CHANGE UP (ref 0–0)
PCO2 BLDV: 47 MMHG — SIGNIFICANT CHANGE UP (ref 41–51)
PH BLDV: 7.36 PH — SIGNIFICANT CHANGE UP (ref 7.32–7.43)
PLATELET # BLD AUTO: 171 K/UL — SIGNIFICANT CHANGE UP (ref 150–400)
PMV BLD: 11.6 FL — SIGNIFICANT CHANGE UP (ref 7–13)
PO2 BLDV: 36 MMHG — SIGNIFICANT CHANGE UP (ref 35–40)
POTASSIUM BLDV-SCNC: 4.8 MMOL/L — HIGH (ref 3.4–4.5)
POTASSIUM SERPL-MCNC: 4.8 MMOL/L — SIGNIFICANT CHANGE UP (ref 3.5–5.3)
POTASSIUM SERPL-SCNC: 4.8 MMOL/L — SIGNIFICANT CHANGE UP (ref 3.5–5.3)
PROT SERPL-MCNC: 8.4 G/DL — HIGH (ref 6–8.3)
PROTHROM AB SERPL-ACNC: 10.6 SEC — SIGNIFICANT CHANGE UP (ref 9.8–13.1)
RBC # BLD: 4.2 M/UL — SIGNIFICANT CHANGE UP (ref 3.8–5.2)
RBC # FLD: 13 % — SIGNIFICANT CHANGE UP (ref 10.3–14.5)
SAO2 % BLDV: 60.4 % — SIGNIFICANT CHANGE UP (ref 60–85)
SODIUM SERPL-SCNC: 133 MMOL/L — LOW (ref 135–145)
TROPONIN T, HIGH SENSITIVITY: 84 NG/L — CRITICAL HIGH (ref ?–14)
TROPONIN T, HIGH SENSITIVITY: 89 NG/L — CRITICAL HIGH (ref ?–14)
WBC # BLD: 5.4 K/UL — SIGNIFICANT CHANGE UP (ref 3.8–10.5)
WBC # FLD AUTO: 5.4 K/UL — SIGNIFICANT CHANGE UP (ref 3.8–10.5)

## 2019-08-19 PROCEDURE — 99285 EMERGENCY DEPT VISIT HI MDM: CPT | Mod: 25

## 2019-08-19 PROCEDURE — 93010 ELECTROCARDIOGRAM REPORT: CPT

## 2019-08-19 PROCEDURE — 71046 X-RAY EXAM CHEST 2 VIEWS: CPT | Mod: 26

## 2019-08-19 RX ORDER — ACETAMINOPHEN 500 MG
650 TABLET ORAL ONCE
Refills: 0 | Status: COMPLETED | OUTPATIENT
Start: 2019-08-19 | End: 2019-08-19

## 2019-08-19 RX ORDER — ACETAMINOPHEN 500 MG
650 TABLET ORAL ONCE
Refills: 0 | Status: DISCONTINUED | OUTPATIENT
Start: 2019-08-19 | End: 2019-08-19

## 2019-08-19 RX ADMIN — Medication 650 MILLIGRAM(S): at 20:49

## 2019-08-19 NOTE — ED PROVIDER NOTE - CLINICAL SUMMARY MEDICAL DECISION MAKING FREE TEXT BOX
55 yo F PMHx of DM, HTN, CHF, ESRD (on T/Th/Sat, last HD Th, pt does make a little bit of urine), CVA (with residual RUE and BLE weakness) pw chest pain. patient reports acute onset chest pain starting at 11am. reports missing HD Saturday and today as well. will get EKG, pt on monitor, enzymes, aspirin, cxr for pneumothorax or infiltrates, less likely PE with low risk on wells and PERC neg, unlikely dissection, will admit for hd with powers

## 2019-08-19 NOTE — ED PROVIDER NOTE - ATTENDING CONTRIBUTION TO CARE
54F ESRD on HD.  CP and missed HD.  similar CP episodes in the past.  ?mini heart attack last week.  11 AM had blurry vision.  no SOB. Sx resolved after tylenol.  asymptomatic now.  Called EMS due to missed dialysis.  Also missed sat, last full HD was 4 days ago.  No SOB, no N/V/D.  (+)UO.  Lives in a shelter.  Exam benign.  EKG no peaked TW.  EKG SR at 68 no oneil no std no twi.  Rx ASA likely will need HD, check labs and xray.    VS:  unremarkable    GEN - NAD; well appearing; A+O x3   HEAD - NC/AT     ENT - PEERL, EOMI, mucous membranes  moist , no discharge      NECK: Neck supple, non-tender without lymphadenopathy, no masses, no JVD  PULM - CTA b/l,  symmetric breath sounds  COR -  normal heart sounds    ABD - , ND, NT, soft,  BACK - no CVA tenderness, nontender spine     EXTREMS - no edema, no deformity, warm and well perfused    SKIN - no rash or bruising      NEUROLOGIC - alert, CN 2-12 intact, sensation nl, motor no focal deficit. 54F ESRD on HD.  CP and missed HD.  similar CP episodes in the past.  ?mini heart attack last week.  11 AM had blurry vision.  no SOB. Sx resolved after tylenol.  asymptomatic now.  Called EMS due to missed dialysis.  Also missed sat, last full HD was 4 days ago.  No SOB, no N/V/D.  (+)UO.  Lives in a shelter.  Exam benign.  EKG no peaked TW.  EKG SR at 68 no oneil no std no twi.  Rx ASA likely will need HD, check labs and xray.  Unlikely ACS; EKG no ischemic change and sx subsiding, trop consistent with prev trop - elevated but in setting of ESRD on HD and unchanged.    VS:  unremarkable    GEN - NAD; well appearing; A+O x3   HEAD - NC/AT     ENT - PEERL, EOMI, mucous membranes  moist , no discharge      NECK: Neck supple, non-tender without lymphadenopathy, no masses, no JVD  PULM - CTA b/l,  symmetric breath sounds  COR -  normal heart sounds    ABD - , ND, NT, soft,  BACK - no CVA tenderness, nontender spine     EXTREMS - no edema, no deformity, warm and well perfused    SKIN - no rash or bruising      NEUROLOGIC - alert, CN 2-12 intact, sensation nl, motor no focal deficit.

## 2019-08-19 NOTE — ED ADULT NURSE NOTE - NSIMPLEMENTINTERV_GEN_ALL_ED
Implemented All Fall Risk Interventions:  [place] to call system. Call bell, personal items and telephone within reach. Instruct patient to call for assistance. Room bathroom lighting operational. Non-slip footwear when patient is off stretcher. Physically safe environment: no spills, clutter or unnecessary equipment. Stretcher in lowest position, wheels locked, appropriate side rails in place. Provide visual cue, wrist band, yellow gown, etc. Monitor gait and stability. Monitor for mental status changes and reorient to person, place, and time. Review medications for side effects contributing to fall risk. Reinforce activity limits and safety measures with patient and family.

## 2019-08-19 NOTE — ED ADULT TRIAGE NOTE - CHIEF COMPLAINT QUOTE
from home, c/o chest pain, dizziness, blurry vision since 11am. HD pt, last complete treatment was Thursday. pt missed Saturday and today. av fistula noted to left arm. asa 162mg given by ems

## 2019-08-19 NOTE — ED PROVIDER NOTE - OBJECTIVE STATEMENT
55 yo F PMHx of DM, HTN, CHF, ESRD (on T/Th/Sat, last HD Th, pt does make a little bit of urine), CVA (with residual RUE and BLE weakness) pw chest pain. patient reports acute onset chest pain starting at 11am. reports pain exacerbated by exercise and position and relieved by rest. does not radiate and is now chest pain free. not aw Dyspnea and palpitations. Denies fever, chills, sweats, fatigue, weight loss, Alcohol, tobacco, or illicit drug use. Episodes last approx hours in duration. reports missing HD Saturday and today as well. compliant with her lasix. reports blurry vision with episode of chest pain today. now asymptomatic. ems gave 182 asa    nephrologist: Wale Francisco  pmd: Francois Garcia

## 2019-08-19 NOTE — ED PROVIDER NOTE - PROGRESS NOTE DETAILS
called dr. sidhu office to arrange for hd Adi Valente PGY3: dr ignacio d/w dr. powers - per him as long as labs wnl, pt appropriate for regular scheduled outpatient dialysis tomorrow, will recheck delta trop - likely dc Adi Valente PGY3: sx stable no complaints besides mild headache ordered tyl, will dc w/ outpt f/u
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
## 2019-08-19 NOTE — ED PROVIDER NOTE - NS ED ROS FT
ROS:  GENERAL: No fever, no chills  EYES: +change in vision  HEENT: no trouble swallowing, no trouble speaking  CARDIAC: +chest pain  PULMONARY: no cough, no shortness of breath  GI: no abdominal pain, no nausea, no vomiting, no diarrhea, no constipation  : No dysuria, no frequency, no change in appearance, or odor of urine  SKIN: no rashes  NEURO: no headache, no weakness  MSK: No joint pain  ~Jeet Rai D.O. -Resident

## 2019-08-19 NOTE — ED ADULT NURSE REASSESSMENT NOTE - NS ED NURSE REASSESS COMMENT FT1
Received report from JACQUELYN KHALIL Pt Aox3, ambulatory at baseline, pt given tyelnol as ordered, Pt appears in NAD, pt to be DC, will continue to monitor.

## 2019-08-19 NOTE — ED PROVIDER NOTE - PHYSICAL EXAMINATION
Physical Exam:  Gen: NAD, AOx3, non-toxic appearing, able to ambulate without assistance  Head: NCAT  HEENT: EOMI, PEERLA, normal conjunctiva, tongue midline, oral mucosa moist  Lung: CTAB, no respiratory distress, no wheezes/rhonchi/rales B/L, speaking in full sentences  CV: RRR, no murmurs, rubs or gallops, no chest wall ttp, no rashes  Abd: soft, NT, ND, no guarding, no rigidity, no rebound tenderness, no CVA tenderness   MSK: no visible deformities, ROM normal in UE/LE, no back pain  Neuro: No focal sensory or motor deficits  Skin: Warm, well perfused, no rash, no leg swelling  Psych: normal affect, calm  ~Jeet Rai D.O. -Resident

## 2019-08-19 NOTE — ED PROVIDER NOTE - CARE PLAN
Principal Discharge DX:	Chest pain Principal Discharge DX:	Chest pain  Secondary Diagnosis:	Noncompliance with renal dialysis

## 2019-08-19 NOTE — ED ADULT TRIAGE NOTE - TEMPERATURE IN CELSIUS (DEGREES C)
[facility] [address] [id number] EMERGENCY DEPT  [address] [facility] [place] [address]  Phone: [phone]             October 7, 2017    Patient: Deborah Vizcarra   YOB: 1981   Date of Visit: 10/7/2017       To Whom It May Concern:    Suzie Ramos was seen and treated in our emergency department on 10/7/2017. She may return to work on 10/8/2017.       Sincerely,       PATTI Wyatt         Signature:__________________________________
36.7

## 2019-08-19 NOTE — ED PROVIDER NOTE - NSFOLLOWUPINSTRUCTIONS_ED_ALL_ED_FT
Follow up with your primary care doctor and cardiologist within the next 3-5 days for re-evaluation and continued care.    PLEASE HAVE DIALYSIS SCHEDULED TOMORROW (TUESDAY), AS PER USUAL.

## 2019-09-04 ENCOUNTER — APPOINTMENT (OUTPATIENT)
Dept: TRANSPLANT | Facility: CLINIC | Age: 54
End: 2019-09-04

## 2019-09-04 ENCOUNTER — APPOINTMENT (OUTPATIENT)
Dept: NEPHROLOGY | Facility: CLINIC | Age: 54
End: 2019-09-04

## 2019-09-10 ENCOUNTER — INPATIENT (INPATIENT)
Facility: HOSPITAL | Age: 54
LOS: 2 days | Discharge: HOME | DRG: 640 | End: 2019-09-13
Attending: HOSPITALIST | Admitting: HOSPITALIST
Payer: MEDICAID

## 2019-09-10 VITALS
WEIGHT: 199.96 LBS | DIASTOLIC BLOOD PRESSURE: 75 MMHG | HEART RATE: 82 BPM | RESPIRATION RATE: 18 BRPM | SYSTOLIC BLOOD PRESSURE: 132 MMHG | TEMPERATURE: 99 F | OXYGEN SATURATION: 95 %

## 2019-09-10 DIAGNOSIS — Z90.711 ACQUIRED ABSENCE OF UTERUS WITH REMAINING CERVICAL STUMP: Chronic | ICD-10-CM

## 2019-09-10 DIAGNOSIS — Z90.49 ACQUIRED ABSENCE OF OTHER SPECIFIED PARTS OF DIGESTIVE TRACT: Chronic | ICD-10-CM

## 2019-09-10 DIAGNOSIS — I77.0 ARTERIOVENOUS FISTULA, ACQUIRED: Chronic | ICD-10-CM

## 2019-09-10 DIAGNOSIS — Z98.83 FILTERING (VITREOUS) BLEB AFTER GLAUCOMA SURGERY STATUS: Chronic | ICD-10-CM

## 2019-09-10 LAB
ALBUMIN SERPL ELPH-MCNC: 3.4 G/DL — LOW (ref 3.5–5)
ALP SERPL-CCNC: 127 U/L — HIGH (ref 40–120)
ALT FLD-CCNC: 22 U/L DA — SIGNIFICANT CHANGE UP (ref 10–60)
ANION GAP SERPL CALC-SCNC: 9 MMOL/L — SIGNIFICANT CHANGE UP (ref 5–17)
AST SERPL-CCNC: 16 U/L — SIGNIFICANT CHANGE UP (ref 10–40)
BILIRUB SERPL-MCNC: 0.4 MG/DL — SIGNIFICANT CHANGE UP (ref 0.2–1.2)
BUN SERPL-MCNC: 81 MG/DL — HIGH (ref 7–18)
CALCIUM SERPL-MCNC: 8.5 MG/DL — SIGNIFICANT CHANGE UP (ref 8.4–10.5)
CHLORIDE SERPL-SCNC: 101 MMOL/L — SIGNIFICANT CHANGE UP (ref 96–108)
CO2 SERPL-SCNC: 24 MMOL/L — SIGNIFICANT CHANGE UP (ref 22–31)
CREAT SERPL-MCNC: 14.6 MG/DL — HIGH (ref 0.5–1.3)
GLUCOSE SERPL-MCNC: 94 MG/DL — SIGNIFICANT CHANGE UP (ref 70–99)
HCT VFR BLD CALC: 27.7 % — LOW (ref 34.5–45)
HGB BLD-MCNC: 8.6 G/DL — LOW (ref 11.5–15.5)
INR BLD: 1.05 RATIO — SIGNIFICANT CHANGE UP (ref 0.88–1.16)
MAGNESIUM SERPL-MCNC: 2.2 MG/DL — SIGNIFICANT CHANGE UP (ref 1.6–2.6)
MCHC RBC-ENTMCNC: 28.9 PG — SIGNIFICANT CHANGE UP (ref 27–34)
MCHC RBC-ENTMCNC: 31 GM/DL — LOW (ref 32–36)
MCV RBC AUTO: 93 FL — SIGNIFICANT CHANGE UP (ref 80–100)
NRBC # BLD: 0 /100 WBCS — SIGNIFICANT CHANGE UP (ref 0–0)
PLATELET # BLD AUTO: 199 K/UL — SIGNIFICANT CHANGE UP (ref 150–400)
POTASSIUM SERPL-MCNC: 4.8 MMOL/L — SIGNIFICANT CHANGE UP (ref 3.5–5.3)
POTASSIUM SERPL-SCNC: 4.8 MMOL/L — SIGNIFICANT CHANGE UP (ref 3.5–5.3)
PROT SERPL-MCNC: 7.4 G/DL — SIGNIFICANT CHANGE UP (ref 6–8.3)
PROTHROM AB SERPL-ACNC: 11.7 SEC — SIGNIFICANT CHANGE UP (ref 10–12.9)
RBC # BLD: 2.98 M/UL — LOW (ref 3.8–5.2)
RBC # FLD: 14 % — SIGNIFICANT CHANGE UP (ref 10.3–14.5)
SODIUM SERPL-SCNC: 134 MMOL/L — LOW (ref 135–145)
TROPONIN I SERPL-MCNC: 0.02 NG/ML — SIGNIFICANT CHANGE UP (ref 0–0.04)
WBC # BLD: 3.94 K/UL — SIGNIFICANT CHANGE UP (ref 3.8–10.5)
WBC # FLD AUTO: 3.94 K/UL — SIGNIFICANT CHANGE UP (ref 3.8–10.5)

## 2019-09-10 PROCEDURE — 71045 X-RAY EXAM CHEST 1 VIEW: CPT | Mod: 26
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
## 2019-09-11 DIAGNOSIS — R07.9 CHEST PAIN, UNSPECIFIED: ICD-10-CM

## 2019-09-11 DIAGNOSIS — D64.9 ANEMIA, UNSPECIFIED: ICD-10-CM

## 2019-09-11 DIAGNOSIS — D63.8 ANEMIA IN OTHER CHRONIC DISEASES CLASSIFIED ELSEWHERE: ICD-10-CM

## 2019-09-11 LAB
24R-OH-CALCIDIOL SERPL-MCNC: 20.3 NG/ML — LOW (ref 30–80)
ANION GAP SERPL CALC-SCNC: 10 MMOL/L — SIGNIFICANT CHANGE UP (ref 5–17)
BUN SERPL-MCNC: 87 MG/DL — HIGH (ref 7–18)
CALCIUM SERPL-MCNC: 8.4 MG/DL — SIGNIFICANT CHANGE UP (ref 8.4–10.5)
CHLORIDE SERPL-SCNC: 103 MMOL/L — SIGNIFICANT CHANGE UP (ref 96–108)
CHOLEST SERPL-MCNC: 89 MG/DL — SIGNIFICANT CHANGE UP (ref 10–199)
CK MB BLD-MCNC: 1.1 % — SIGNIFICANT CHANGE UP (ref 0–3.5)
CK MB CFR SERPL CALC: 1.7 NG/ML — SIGNIFICANT CHANGE UP (ref 0–3.6)
CK SERPL-CCNC: 160 U/L — SIGNIFICANT CHANGE UP (ref 21–215)
CO2 SERPL-SCNC: 23 MMOL/L — SIGNIFICANT CHANGE UP (ref 22–31)
CREAT SERPL-MCNC: 15.5 MG/DL — HIGH (ref 0.5–1.3)
FOLATE SERPL-MCNC: 7.3 NG/ML — SIGNIFICANT CHANGE UP
GLUCOSE BLDC GLUCOMTR-MCNC: 109 MG/DL — HIGH (ref 70–99)
GLUCOSE BLDC GLUCOMTR-MCNC: 87 MG/DL — SIGNIFICANT CHANGE UP (ref 70–99)
GLUCOSE BLDC GLUCOMTR-MCNC: 94 MG/DL — SIGNIFICANT CHANGE UP (ref 70–99)
GLUCOSE BLDC GLUCOMTR-MCNC: 99 MG/DL — SIGNIFICANT CHANGE UP (ref 70–99)
GLUCOSE SERPL-MCNC: 96 MG/DL — SIGNIFICANT CHANGE UP (ref 70–99)
HBA1C BLD-MCNC: 6.7 % — HIGH (ref 4–5.6)
HCT VFR BLD CALC: 26.1 % — LOW (ref 34.5–45)
HDLC SERPL-MCNC: 48 MG/DL — LOW
HGB BLD-MCNC: 8.2 G/DL — LOW (ref 11.5–15.5)
LIPID PNL WITH DIRECT LDL SERPL: 26 MG/DL — SIGNIFICANT CHANGE UP
MAGNESIUM SERPL-MCNC: 2.2 MG/DL — SIGNIFICANT CHANGE UP (ref 1.6–2.6)
MCHC RBC-ENTMCNC: 29.4 PG — SIGNIFICANT CHANGE UP (ref 27–34)
MCHC RBC-ENTMCNC: 31.4 GM/DL — LOW (ref 32–36)
MCV RBC AUTO: 93.5 FL — SIGNIFICANT CHANGE UP (ref 80–100)
NRBC # BLD: 0 /100 WBCS — SIGNIFICANT CHANGE UP (ref 0–0)
PHOSPHATE SERPL-MCNC: 6 MG/DL — HIGH (ref 2.5–4.5)
PLATELET # BLD AUTO: 189 K/UL — SIGNIFICANT CHANGE UP (ref 150–400)
POTASSIUM SERPL-MCNC: 4.6 MMOL/L — SIGNIFICANT CHANGE UP (ref 3.5–5.3)
POTASSIUM SERPL-SCNC: 4.6 MMOL/L — SIGNIFICANT CHANGE UP (ref 3.5–5.3)
RBC # BLD: 2.79 M/UL — LOW (ref 3.8–5.2)
RBC # FLD: 14.2 % — SIGNIFICANT CHANGE UP (ref 10.3–14.5)
SODIUM SERPL-SCNC: 136 MMOL/L — SIGNIFICANT CHANGE UP (ref 135–145)
TOTAL CHOLESTEROL/HDL RATIO MEASUREMENT: 1.9 RATIO — LOW (ref 3.3–7.1)
TRIGL SERPL-MCNC: 74 MG/DL — SIGNIFICANT CHANGE UP (ref 10–149)
TROPONIN I SERPL-MCNC: 0.02 NG/ML — SIGNIFICANT CHANGE UP (ref 0–0.04)
TSH SERPL-MCNC: 1.01 UU/ML — SIGNIFICANT CHANGE UP (ref 0.34–4.82)
VIT B12 SERPL-MCNC: 292 PG/ML — SIGNIFICANT CHANGE UP (ref 232–1245)
WBC # BLD: 4.74 K/UL — SIGNIFICANT CHANGE UP (ref 3.8–10.5)
WBC # FLD AUTO: 4.74 K/UL — SIGNIFICANT CHANGE UP (ref 3.8–10.5)

## 2019-09-11 PROCEDURE — 99285 EMERGENCY DEPT VISIT HI MDM: CPT

## 2019-09-11 PROCEDURE — 99223 1ST HOSP IP/OBS HIGH 75: CPT | Mod: GC

## 2019-09-11 RX ORDER — INSULIN LISPRO 100/ML
VIAL (ML) SUBCUTANEOUS AT BEDTIME
Refills: 0 | Status: DISCONTINUED | OUTPATIENT
Start: 2019-09-11 | End: 2019-09-13

## 2019-09-11 RX ORDER — CARVEDILOL PHOSPHATE 80 MG/1
12.5 CAPSULE, EXTENDED RELEASE ORAL EVERY 12 HOURS
Refills: 0 | Status: DISCONTINUED | OUTPATIENT
Start: 2019-09-11 | End: 2019-09-13

## 2019-09-11 RX ORDER — ALLOPURINOL 300 MG
100 TABLET ORAL DAILY
Refills: 0 | Status: DISCONTINUED | OUTPATIENT
Start: 2019-09-11 | End: 2019-09-13

## 2019-09-11 RX ORDER — CALCIUM ACETATE 667 MG
667 TABLET ORAL
Refills: 0 | Status: DISCONTINUED | OUTPATIENT
Start: 2019-09-11 | End: 2019-09-13

## 2019-09-11 RX ORDER — SIMVASTATIN 20 MG/1
20 TABLET, FILM COATED ORAL AT BEDTIME
Refills: 0 | Status: DISCONTINUED | OUTPATIENT
Start: 2019-09-11 | End: 2019-09-13

## 2019-09-11 RX ORDER — ASPIRIN/CALCIUM CARB/MAGNESIUM 324 MG
81 TABLET ORAL DAILY
Refills: 0 | Status: DISCONTINUED | OUTPATIENT
Start: 2019-09-11 | End: 2019-09-13

## 2019-09-11 RX ORDER — ISOSORBIDE DINITRATE 5 MG/1
10 TABLET ORAL THREE TIMES A DAY
Refills: 0 | Status: DISCONTINUED | OUTPATIENT
Start: 2019-09-11 | End: 2019-09-11

## 2019-09-11 RX ORDER — HYDRALAZINE HCL 50 MG
100 TABLET ORAL EVERY 8 HOURS
Refills: 0 | Status: DISCONTINUED | OUTPATIENT
Start: 2019-09-11 | End: 2019-09-11

## 2019-09-11 RX ORDER — CHLORHEXIDINE GLUCONATE 213 G/1000ML
1 SOLUTION TOPICAL DAILY
Refills: 0 | Status: DISCONTINUED | OUTPATIENT
Start: 2019-09-11 | End: 2019-09-13

## 2019-09-11 RX ORDER — AMLODIPINE BESYLATE 2.5 MG/1
1 TABLET ORAL
Qty: 0 | Refills: 0 | DISCHARGE

## 2019-09-11 RX ORDER — CLOPIDOGREL BISULFATE 75 MG/1
75 TABLET, FILM COATED ORAL DAILY
Refills: 0 | Status: DISCONTINUED | OUTPATIENT
Start: 2019-09-11 | End: 2019-09-13

## 2019-09-11 RX ORDER — INSULIN LISPRO 100/ML
VIAL (ML) SUBCUTANEOUS
Refills: 0 | Status: DISCONTINUED | OUTPATIENT
Start: 2019-09-11 | End: 2019-09-13

## 2019-09-11 RX ORDER — HEPARIN SODIUM 5000 [USP'U]/ML
5000 INJECTION INTRAVENOUS; SUBCUTANEOUS EVERY 12 HOURS
Refills: 0 | Status: DISCONTINUED | OUTPATIENT
Start: 2019-09-11 | End: 2019-09-13

## 2019-09-11 RX ORDER — TOLTERODINE TARTRATE 1 MG/1
1 TABLET, FILM COATED ORAL
Qty: 0 | Refills: 0 | DISCHARGE

## 2019-09-11 RX ORDER — HYDRALAZINE HCL 50 MG
1 TABLET ORAL
Qty: 0 | Refills: 0 | DISCHARGE

## 2019-09-11 RX ORDER — SITAGLIPTIN 50 MG/1
1 TABLET, FILM COATED ORAL
Qty: 0 | Refills: 0 | DISCHARGE

## 2019-09-11 RX ORDER — FUROSEMIDE 40 MG
40 TABLET ORAL ONCE
Refills: 0 | Status: DISCONTINUED | OUTPATIENT
Start: 2019-09-11 | End: 2019-09-11

## 2019-09-11 RX ADMIN — HEPARIN SODIUM 5000 UNIT(S): 5000 INJECTION INTRAVENOUS; SUBCUTANEOUS at 07:02

## 2019-09-11 RX ADMIN — CARVEDILOL PHOSPHATE 12.5 MILLIGRAM(S): 80 CAPSULE, EXTENDED RELEASE ORAL at 17:08

## 2019-09-11 RX ADMIN — Medication 30 MILLILITER(S): at 19:52

## 2019-09-11 RX ADMIN — CARVEDILOL PHOSPHATE 12.5 MILLIGRAM(S): 80 CAPSULE, EXTENDED RELEASE ORAL at 07:01

## 2019-09-11 RX ADMIN — Medication 667 MILLIGRAM(S): at 17:08

## 2019-09-11 RX ADMIN — HEPARIN SODIUM 5000 UNIT(S): 5000 INJECTION INTRAVENOUS; SUBCUTANEOUS at 17:10

## 2019-09-11 RX ADMIN — Medication 81 MILLIGRAM(S): at 17:10

## 2019-09-11 RX ADMIN — Medication 100 MILLIGRAM(S): at 17:12

## 2019-09-11 RX ADMIN — SIMVASTATIN 20 MILLIGRAM(S): 20 TABLET, FILM COATED ORAL at 22:33

## 2019-09-11 RX ADMIN — CLOPIDOGREL BISULFATE 75 MILLIGRAM(S): 75 TABLET, FILM COATED ORAL at 17:08

## 2019-09-11 NOTE — H&P ADULT - NSHPPHYSICALEXAM_GEN_ALL_CORE
Vital Signs Last 24 Hrs  T(C): 37.2 (11 Sep 2019 05:00), Max: 37.9 (11 Sep 2019 02:37)  T(F): 99 (11 Sep 2019 05:00), Max: 100.3 (11 Sep 2019 02:37)  HR: 92 (11 Sep 2019 03:50) (82 - 96)  BP: 125/70 (11 Sep 2019 03:50) (125/70 - 132/75)  BP(mean): --  RR: 18 (11 Sep 2019 02:37) (18 - 18)  SpO2: 95% (11 Sep 2019 02:37) (95% - 95%)    PHYSICAL EXAM:  GENERAL: NAD, well-developed, obese female  HEAD:  Atraumatic, Normocephalic  EYES: EOMI, PERRLA, conjunctiva and sclera clear  NECK: Supple, No JVD  CHEST/LUNG: Clear to auscultation bilaterally; No wheeze  HEART: Regular rate and rhythm; No murmurs, rubs, or gallops  ABDOMEN: Soft, Nontender, Nondistended; Bowel sounds present  EXTREMITIES:, 3+ bilateral pitting edema upto knees  PSYCH: AAOx3  NEUROLOGY: non-focal  SKIN: No rashes or lesions

## 2019-09-11 NOTE — CONSULT NOTE ADULT - SUBJECTIVE AND OBJECTIVE BOX
[facility] NEPHROLOGY- CONSULTATION NOTE    Patient is a 55yo Female with ESRD on HD (TTS @ [facility]; Nephrologist Dr. Francisco) a/w chest pain and missed HD.  Last HD 9/5/19. Pt missed 2 sessions of HD. Currently denies any SOB or chest pain.     PAST MEDICAL & SURGICAL HISTORY:  GERD (gastroesophageal reflux disease)  Anemia of chronic disease  HLD (hyperlipidemia)  NICM (nonischemic cardiomyopathy)  KERI (obstructive sleep apnea)  ESRD (end stage renal disease): HD T/T/S  Depression  Gout  CVA (cerebral vascular accident): 2013- Residual RLE weakness  DM (diabetes mellitus)  HTN (hypertension)  Glaucoma  S/P cholecystectomy  S/P partial hysterectomy  Status post glaucoma surgery  AVF (arteriovenous fistula)    iodine (Unknown)  Seafood (Unknown)  shellfish (Nausea (Mild to Mod); Hives (Mild to Mod))    Home Medications Reviewed  Hospital Medications:   MEDICATIONS  (STANDING):  allopurinol 100 milliGRAM(s) Oral daily  aspirin enteric coated 81 milliGRAM(s) Oral daily  calcium acetate 667 milliGRAM(s) Oral three times a day with meals  carvedilol 12.5 milliGRAM(s) Oral every 12 hours  chlorhexidine 2% Cloths 1 Application(s) Topical daily  clopidogrel Tablet 75 milliGRAM(s) Oral daily  heparin  Injectable 5000 Unit(s) SubCutaneous every 12 hours  insulin lispro (HumaLOG) corrective regimen sliding scale   SubCutaneous three times a day before meals  insulin lispro (HumaLOG) corrective regimen sliding scale   SubCutaneous at bedtime  simvastatin 20 milliGRAM(s) Oral at bedtime      FAMILY HISTORY:  Family history of heart attack      REVIEW OF SYSTEMS:  Gen: no changes in weight  HEENT: no rhinorrhea  Neck: no sore throat  Cards: +chest pain resolved  Resp: no dyspnea  GI: no nausea or vomiting or diarrhea  : no dysuria or hematuria  Vascular: no LE edema  Derm: no rashes  Neuro: no numbness/tingling  All other review of systems is negative unless indicated above.    VITALS:  T(F): 99 (09-11-19 @ 16:39), Max: 100.3 (09-11-19 @ 02:37)  HR: 83 (09-11-19 @ 16:39)  BP: 148/70 (09-11-19 @ 16:39)  RR: 18 (09-11-19 @ 16:39)  SpO2: 100% (09-11-19 @ 16:39)  Wt(kg): --      PHYSICAL EXAM:  Gen: NAD, calm  HEENT: MMM  Neck: no JVD  Cards: RRR, +S1/S2  Resp: mild rales at left base; right clear  GI: soft, NT/ND, NABS  Extremities: no LE edema B/L  Derm: no rashes  Neuro: non-focal  Access: LUE AVF +thrill +bruit    LABS:  09-11    136  |  103  |  87<H>  ----------------------------<  96  4.6   |  23  |  15.50<H>    Ca    8.4      11 Sep 2019 12:13  Phos  6.0     09-11  Mg     2.2     09-11    TPro  7.4  /  Alb  3.4<L>  /  TBili  0.4  /  DBili      /  AST  16  /  ALT  22  /  AlkPhos  127<H>  09-10    Creatinine Trend: 15.50 <--, 14.60 <--                        8.2    4.74  )-----------( 189      ( 11 Sep 2019 12:13 )             26.1     Urine Studies:      RADIOLOGY & ADDITIONAL STUDIES:

## 2019-09-11 NOTE — H&P ADULT - PROBLEM SELECTOR PLAN 2
Patient on HD TTS via LUE AVF  Missed 2 sessions of HD  Will consult nephro for HD Patient on HD TTS via LUE AVF  Patient clinically volume overloaded, sodium 134  Missed 2 sessions of HD  Will consult nephro for HD Patient on HD TTS via LUE AVF  Patient clinically volume overloaded, sodium 134  Missed 2 sessions of HD  Needs nephro consult for HD

## 2019-09-11 NOTE — H&P ADULT - ASSESSMENT
53yo female with PMH of ESRD (on HD at [facility] satellite dialysis, outpt nephro Dr Echevarria, on TTS via LUE AVF, makes little urine), CHF, NICM, prior CVA, DM, HTN, HLD, presents to the ED with chief complaint of chest pain today with fluid overload due to 2 missed HD sessions.  In ER, her temp was initially 98.7, but developed a low grade fever of 100.3 which subsided, hemodynamically stable, saturating well on RA. Labs noted with H/H of 8.6/14.6, BUN/Cr 8.6/27.7, troponin I 0.024. CXR with clear lungs.    **patient does not remember her medications, reconciled frmo recent admission to [facility]. To be confirmed with [facility] pharmacy, # [phone] 53yo female with PMH of ESRD (on HD at [facility] satellite dialysis, outpt nephro Dr Echevarria, on TTS via LUE AVF, makes little urine), CHF, NICM, prior CVA, DM, HTN, HLD, presents to the ED with chief complaint of chest pain today with fluid overload due to 2 missed HD sessions.  In ER, her temp was initially 98.7, but developed a low grade fever of 100.3 which subsided, hemodynamically stable, saturating well on RA. Labs noted with H/H of 8.6/14.6, BUN/Cr 8.6/27.7, troponin I 0.024. CXR with clear lungs.    **patient does not remember her medications, reconciled from recent admission to [facility]. To be confirmed with [facility] pharmacy, # [phone]

## 2019-09-11 NOTE — PATIENT PROFILE ADULT - NSPROGENPREVTRANSF_GEN_A_NUR
Patient seen in anticoagulation clinic. Reviewed past inr and dose with patient. Patient denies any missed dose of Warfarin. Patient denies any changes in meds or health. Reports Vit K intake has been consistent. Patient reminded to call office with any changes in meds or health. Warfarin dosed per protocol.  On site provider is Dr MEDHAT Collins.   Medical Food Supplements/Meals and Snack/Enteral Nutrition no

## 2019-09-11 NOTE — H&P ADULT - PROBLEM SELECTOR PLAN 6
continue hydralazine, isordil, coreg with holding parameters  monitor BP continue coreg with holding parameters  will hold hydralazine, isordil for now as BP wnl  monitor BP and resume medications as indicated
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
## 2019-09-11 NOTE — ED PROVIDER NOTE - OBJECTIVE STATEMENT
55 y/o female with PMHx of ESRD (on HD Tue, Thu, Sat), CHF, prior CVA, HLD, HTN, and DM, and NICM presents to the ED with c/o CP and PICKARD today. Pt states that she was on her way to receive dialysis but was unable to go due to her Sx. Pt received her most recent dialysis on September 5th. Pt also reports some increased bilateral leg swelling. Pt denies any fever, cough, or other complaints. Pt goes to [facility] dialysis in [place]; nephrologist is Dr. Wale Francisco.

## 2019-09-11 NOTE — H&P ADULT - PROBLEM SELECTOR PLAN 7
IMPROVE VTE Individual Risk Assessment  RISK                                                                Points  [  ] Previous VTE                                                  3  [  ] Thrombophilia                                               2  [  ] Lower limb paralysis                                      2       (unable to hold up >15 seconds)    [  ] Current Cancer                                              2        (within 6 months)  [ x ] Immobilization > 24 hrs                                1  [  ] ICU/CCU stay > 24 hours                              1  [  ] Age > 60                                                      1  IMPROVE VTE Score 1, however would place on heparin sq as patient would be relatively immobile while in the hospital
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
## 2019-09-11 NOTE — H&P ADULT - PROBLEM SELECTOR PLAN 4
continue protonix Noted to have hgb of 8.6 on admission, known AOCD  Likely from hemodilution in setting of fluid overload  F/u fobt,   Monitor h/h

## 2019-09-11 NOTE — H&P ADULT - PROBLEM SELECTOR PLAN 1
P/w chest pain which has since resolved P/w chest pain which has since resolved, troponin1 -ve, ekg with no significant changes  Patient currently asymptomatic  Atypical chest pain likely in setting of volume overload from missed HD  -would benefit from HD  -continue asa, plavix, statin, coreg, imdur P/w chest pain which has since resolved, troponin1 -ve, ekg with no significant changes  Patient currently asymptomatic  Atypical chest pain likely in setting of volume overload from missed HD  -would benefit from HD  -continue asa, plavix, statin, coreg

## 2019-09-11 NOTE — CONSULT NOTE ADULT - ASSESSMENT
Patient is a 53yo Female with ESRD on HD a/w chest pain and missed HD. Nephrology consulted for ESRD status.    1) ESRD: Pt missed 2 sessions of HD. Last HD earlier today, tolerated well with 3L removed. Plan for next maintenance HD 9/12/19. Monitor electrolytes.  2) HTN with ESRD: BP controlled. Continue with current medications and low sodium diet. Monitor BP.  3) Anemia of renal disease: Hb low. Will check iron including ferritin. Will consider VIRAJ if iron replete. Monitor Hb.  4) Hyperphosphatemia: Phosphorus elevated with acceptable serum calcium. c/w Phoslo tid with meals. Serum phos likely elevated due to non compliance with HD. Monitor serum calcium and phosphorus.

## 2019-09-11 NOTE — ED PROVIDER NOTE - PMH
Anemia of chronic disease    CHF (congestive heart failure)    CVA (cerebral vascular accident)  2013- Residual RLE weakness  Depression    DM (diabetes mellitus)    ESRD (end stage renal disease)  HD T/T/S  GERD (gastroesophageal reflux disease)    Glaucoma    Gout    HLD (hyperlipidemia)    HTN (hypertension)    NICM (nonischemic cardiomyopathy)    KERI (obstructive sleep apnea)

## 2019-09-11 NOTE — H&P ADULT - HISTORY OF PRESENT ILLNESS
53yo female with PMH of ESRD (on HD at [facility] dialysis, outpt nephro Dr Echevarria, on TTS via LUE AVF, makes little urine), CHF, NICM, prior CVA, DM, HTN, HLD, presents to the ED with chief complaint of chest pain today. History limited as patient was sleeping, and cooperated minimally. According to patient she was on her way to HD when she developed 10/10 sharp pain over right side of her chest, non radiating, associated with lightheadedness, difficulty breathing and palpitations. She reports pain subsided on its own, currently asymptomatic. Patient says she missed her HD on Saturday as she did not want to go, last HD being on September 5th. Reports swelling of legs with pain in ankles, reports developing a fever now, denies cough, abdominal pain, diarrhea, nausea, vomiting, burning in urine, or chills.   In ER, her temp was initially 98.7, but developed a low grade fever of 100.3 which subsided, hemodynamically stable, saturating well on RA. Labs noted with H/H of 8.6/14.6, BUN/Cr 8.6/27.7, troponin I 0.024. CXR with clear lungs 55yo female with PMH of ESRD (on HD at [facility] dialysis, outpt nephro Dr Echevarria, on TTS via LUE AVF, makes little urine), CHF, NICM, prior CVA, DM, HTN, HLD, presents to the ED with chief complaint of chest pain today. History limited as patient was sleeping, and cooperated minimally. According to patient she was on her way to HD when she developed 10/10 sharp pain over right side of her chest, non radiating, associated with lightheadedness, difficulty breathing and palpitations. She reports pain subsided on its own, currently asymptomatic. Patient says she missed her HD on Saturday as she did not want to go, last HD being on September 5th. Reports swelling of legs with pain in ankles, reports developing a fever now, denies cough, abdominal pain, diarrhea, nausea, vomiting, burning in urine, or chills.   In ER, her temp was initially 98.7, but developed a low grade fever of 100.3 which subsided, hemodynamically stable, saturating well on RA. Labs noted with H/H of 8.6/14.6, BUN/Cr 8.6/27.7, troponin I 0.024. CXR with clear lungs.

## 2019-09-11 NOTE — ED PROVIDER NOTE - PROGRESS NOTE DETAILS
Informed Dr. Trinidad for hospitalist admission, covering for Dr. Anderson Garcia, PMD.  Pt's nephrologist is Dr. Francisco, so paged on-call nephrologist, Dr. Richmond.

## 2019-09-11 NOTE — ED PROVIDER NOTE - CARE PLAN
Principal Discharge DX:	Chest pain, unspecified type  Secondary Diagnosis:	End stage renal disease on dialysis

## 2019-09-11 NOTE — H&P ADULT - PROBLEM SELECTOR PLAN 3
continue cardiac medications echo from May 2019 with moderate global LV systolic dysfunction  pt in fluid overload likely from missed HD  will give 1 dose of lasix 40mg iv   continue cardiac medications echo from May 2019 with moderate global LV systolic dysfunction  pt in fluid overload likely from missed HD  continue asa, plavix statin, coreg,   will hold hydralazine and isordil for now, resume if BP stable

## 2019-09-11 NOTE — CONSULT NOTE ADULT - ATTENDING COMMENTS
[facility] NEPHROLOGY  Soy Richmond M.D.  Moo Bain D.O.  Marilin Khan M.D.  Elizabeth Valdes, MSN, ANP-C  [phone]    [address] [place] [address]

## 2019-09-11 NOTE — H&P ADULT - NSICDXFAMILYHX_GEN_ALL_CORE_FT
Detail Level: Detailed
Procedure To Be Performed At Next Visit: Mohs surgery
Instructions (Optional): Patient is declining treatment because of age. She would like to have destruction & curretage performed today.  She understands BCC may recur and that this treatment is a lower cure rate and not our standard of care for BCC on the nose.
FAMILY HISTORY:  Family history of heart attack

## 2019-09-11 NOTE — ED PROVIDER NOTE - MUSCULOSKELETAL, MLM
Spine appears normal, range of motion is not limited, mild swelling to bilateral legs, no tenderness to bilateral lower extremities.

## 2019-09-11 NOTE — ED PROVIDER NOTE - CLINICAL SUMMARY MEDICAL DECISION MAKING FREE TEXT BOX
53 y/o female presents s/p missed dialysis x 2 with chest pain. Will check labs, EKG, CXR, and attempt to arrange dialysis. Anticipate admission.

## 2019-09-11 NOTE — H&P ADULT - ATTENDING COMMENTS
Agree with all the above   HPI and ROS as above   patient seen and examined twice today: First time in her room and second time at dialysis room.  She was sleeping and did not answer to any questions. Keeps saying " check my chart"   Two family members are also were  at bedside and sleeping.     Vital signs and labs reviewed   Vital Signs Last 24 Hrs  T(C): 37.2 (11 Sep 2019 06:12), Max: 37.9 (11 Sep 2019 02:37)  T(F): 98.9 (11 Sep 2019 06:12), Max: 100.3 (11 Sep 2019 02:37)  HR: 87 (11 Sep 2019 06:12) (82 - 96)  BP: 123/59 (11 Sep 2019 06:12) (123/59 - 132/75)  BP(mean): --  RR: 18 (11 Sep 2019 06:12) (18 - 18)  SpO2: 98% (11 Sep 2019 06:12) (95% - 98%)    1. Fluid overload secondary to missed HD: consent for HD obtain, getting HD as of now   2. ESRD on HD: electrolytes within normal limits   3. Nonischemic cardiomyopathy; resume home medications: carvedilol, Aspirin, Plavix and atorvastatin   4. HTN: BP controlled   5. Normocytic anemia likely secondary to combination of chronic and renal disease; H/H is stable   6. Social issues; patient appears to be homeless.   7. DVT prophylaxis.     Plan of care discussed with Dr. Mobley PGY2

## 2019-09-12 ENCOUNTER — TRANSCRIPTION ENCOUNTER (OUTPATIENT)
Age: 54
End: 2019-09-12

## 2019-09-12 LAB
ALBUMIN SERPL ELPH-MCNC: 3.1 G/DL — LOW (ref 3.5–5)
ALP SERPL-CCNC: 106 U/L — SIGNIFICANT CHANGE UP (ref 40–120)
ALT FLD-CCNC: 16 U/L DA — SIGNIFICANT CHANGE UP (ref 10–60)
ANION GAP SERPL CALC-SCNC: 9 MMOL/L — SIGNIFICANT CHANGE UP (ref 5–17)
AST SERPL-CCNC: 12 U/L — SIGNIFICANT CHANGE UP (ref 10–40)
BASOPHILS # BLD AUTO: 0.01 K/UL — SIGNIFICANT CHANGE UP (ref 0–0.2)
BASOPHILS NFR BLD AUTO: 0.3 % — SIGNIFICANT CHANGE UP (ref 0–2)
BILIRUB SERPL-MCNC: 0.4 MG/DL — SIGNIFICANT CHANGE UP (ref 0.2–1.2)
BUN SERPL-MCNC: 55 MG/DL — HIGH (ref 7–18)
CALCIUM SERPL-MCNC: 9.2 MG/DL — SIGNIFICANT CHANGE UP (ref 8.4–10.5)
CHLORIDE SERPL-SCNC: 98 MMOL/L — SIGNIFICANT CHANGE UP (ref 96–108)
CO2 SERPL-SCNC: 28 MMOL/L — SIGNIFICANT CHANGE UP (ref 22–31)
CREAT SERPL-MCNC: 11.3 MG/DL — HIGH (ref 0.5–1.3)
EOSINOPHIL # BLD AUTO: 0.12 K/UL — SIGNIFICANT CHANGE UP (ref 0–0.5)
EOSINOPHIL NFR BLD AUTO: 3.9 % — SIGNIFICANT CHANGE UP (ref 0–6)
GLUCOSE BLDC GLUCOMTR-MCNC: 108 MG/DL — HIGH (ref 70–99)
GLUCOSE BLDC GLUCOMTR-MCNC: 138 MG/DL — HIGH (ref 70–99)
GLUCOSE BLDC GLUCOMTR-MCNC: 94 MG/DL — SIGNIFICANT CHANGE UP (ref 70–99)
GLUCOSE BLDC GLUCOMTR-MCNC: 97 MG/DL — SIGNIFICANT CHANGE UP (ref 70–99)
GLUCOSE SERPL-MCNC: 101 MG/DL — HIGH (ref 70–99)
HAV IGM SER-ACNC: SIGNIFICANT CHANGE UP
HBV CORE IGM SER-ACNC: ABNORMAL
HBV SURFACE AG SER-ACNC: SIGNIFICANT CHANGE UP
HCT VFR BLD CALC: 28 % — LOW (ref 34.5–45)
HCV AB S/CO SERPL IA: 0.11 S/CO — SIGNIFICANT CHANGE UP (ref 0–0.99)
HCV AB SERPL-IMP: SIGNIFICANT CHANGE UP
HGB BLD-MCNC: 8.8 G/DL — LOW (ref 11.5–15.5)
IMM GRANULOCYTES NFR BLD AUTO: 0.3 % — SIGNIFICANT CHANGE UP (ref 0–1.5)
IRON SATN MFR SERPL: 45 % — SIGNIFICANT CHANGE UP (ref 15–50)
IRON SATN MFR SERPL: 75 UG/DL — SIGNIFICANT CHANGE UP (ref 40–150)
LYMPHOCYTES # BLD AUTO: 0.85 K/UL — LOW (ref 1–3.3)
LYMPHOCYTES # BLD AUTO: 27.8 % — SIGNIFICANT CHANGE UP (ref 13–44)
MAGNESIUM SERPL-MCNC: 2.6 MG/DL — SIGNIFICANT CHANGE UP (ref 1.6–2.6)
MCHC RBC-ENTMCNC: 28.8 PG — SIGNIFICANT CHANGE UP (ref 27–34)
MCHC RBC-ENTMCNC: 31.4 GM/DL — LOW (ref 32–36)
MCV RBC AUTO: 91.5 FL — SIGNIFICANT CHANGE UP (ref 80–100)
MONOCYTES # BLD AUTO: 0.35 K/UL — SIGNIFICANT CHANGE UP (ref 0–0.9)
MONOCYTES NFR BLD AUTO: 11.4 % — SIGNIFICANT CHANGE UP (ref 2–14)
MRSA PCR RESULT.: SIGNIFICANT CHANGE UP
NEUTROPHILS # BLD AUTO: 1.72 K/UL — LOW (ref 1.8–7.4)
NEUTROPHILS NFR BLD AUTO: 56.3 % — SIGNIFICANT CHANGE UP (ref 43–77)
NRBC # BLD: 0 /100 WBCS — SIGNIFICANT CHANGE UP (ref 0–0)
PHOSPHATE SERPL-MCNC: 4.7 MG/DL — HIGH (ref 2.5–4.5)
PLATELET # BLD AUTO: 188 K/UL — SIGNIFICANT CHANGE UP (ref 150–400)
POTASSIUM SERPL-MCNC: 4.2 MMOL/L — SIGNIFICANT CHANGE UP (ref 3.5–5.3)
POTASSIUM SERPL-SCNC: 4.2 MMOL/L — SIGNIFICANT CHANGE UP (ref 3.5–5.3)
PROT SERPL-MCNC: 7 G/DL — SIGNIFICANT CHANGE UP (ref 6–8.3)
RBC # BLD: 3.06 M/UL — LOW (ref 3.8–5.2)
RBC # FLD: 13.9 % — SIGNIFICANT CHANGE UP (ref 10.3–14.5)
S AUREUS DNA NOSE QL NAA+PROBE: SIGNIFICANT CHANGE UP
SODIUM SERPL-SCNC: 135 MMOL/L — SIGNIFICANT CHANGE UP (ref 135–145)
TIBC SERPL-MCNC: 166 UG/DL — LOW (ref 250–450)
UIBC SERPL-MCNC: 91 UG/DL — LOW (ref 110–370)
WBC # BLD: 3.06 K/UL — LOW (ref 3.8–10.5)
WBC # FLD AUTO: 3.06 K/UL — LOW (ref 3.8–10.5)

## 2019-09-12 PROCEDURE — 99232 SBSQ HOSP IP/OBS MODERATE 35: CPT | Mod: GC

## 2019-09-12 RX ORDER — CARVEDILOL PHOSPHATE 80 MG/1
1 CAPSULE, EXTENDED RELEASE ORAL
Qty: 0 | Refills: 0 | DISCHARGE

## 2019-09-12 RX ORDER — ERYTHROPOIETIN 10000 [IU]/ML
8000 INJECTION, SOLUTION INTRAVENOUS; SUBCUTANEOUS
Refills: 0 | Status: DISCONTINUED | OUTPATIENT
Start: 2019-09-12 | End: 2019-09-13

## 2019-09-12 RX ORDER — ERYTHROPOIETIN 10000 [IU]/ML
8000 INJECTION, SOLUTION INTRAVENOUS; SUBCUTANEOUS
Qty: 0 | Refills: 0 | DISCHARGE
Start: 2019-09-12

## 2019-09-12 RX ORDER — ACETAMINOPHEN 500 MG
650 TABLET ORAL ONCE
Refills: 0 | Status: COMPLETED | OUTPATIENT
Start: 2019-09-12 | End: 2019-09-12

## 2019-09-12 RX ORDER — CARVEDILOL PHOSPHATE 80 MG/1
1 CAPSULE, EXTENDED RELEASE ORAL
Qty: 0 | Refills: 0 | DISCHARGE
Start: 2019-09-12

## 2019-09-12 RX ADMIN — HEPARIN SODIUM 5000 UNIT(S): 5000 INJECTION INTRAVENOUS; SUBCUTANEOUS at 05:40

## 2019-09-12 RX ADMIN — CARVEDILOL PHOSPHATE 12.5 MILLIGRAM(S): 80 CAPSULE, EXTENDED RELEASE ORAL at 17:29

## 2019-09-12 RX ADMIN — HEPARIN SODIUM 5000 UNIT(S): 5000 INJECTION INTRAVENOUS; SUBCUTANEOUS at 17:29

## 2019-09-12 RX ADMIN — Medication 667 MILLIGRAM(S): at 12:18

## 2019-09-12 RX ADMIN — Medication 81 MILLIGRAM(S): at 12:18

## 2019-09-12 RX ADMIN — Medication 650 MILLIGRAM(S): at 23:36

## 2019-09-12 RX ADMIN — CLOPIDOGREL BISULFATE 75 MILLIGRAM(S): 75 TABLET, FILM COATED ORAL at 12:18

## 2019-09-12 RX ADMIN — Medication 100 MILLIGRAM(S): at 12:18

## 2019-09-12 RX ADMIN — Medication 667 MILLIGRAM(S): at 17:29

## 2019-09-12 RX ADMIN — CHLORHEXIDINE GLUCONATE 1 APPLICATION(S): 213 SOLUTION TOPICAL at 12:18

## 2019-09-12 RX ADMIN — CARVEDILOL PHOSPHATE 12.5 MILLIGRAM(S): 80 CAPSULE, EXTENDED RELEASE ORAL at 05:40

## 2019-09-12 RX ADMIN — ERYTHROPOIETIN 8000 UNIT(S): 10000 INJECTION, SOLUTION INTRAVENOUS; SUBCUTANEOUS at 15:38

## 2019-09-12 RX ADMIN — SIMVASTATIN 20 MILLIGRAM(S): 20 TABLET, FILM COATED ORAL at 22:21

## 2019-09-12 RX ADMIN — Medication 667 MILLIGRAM(S): at 08:32

## 2019-09-12 NOTE — PROGRESS NOTE ADULT - SUBJECTIVE AND OBJECTIVE BOX
[facility] NEPHROLOGY- PROGRESS NOTE    Patient is a 55yo Female with ESRD on HD a/w chest pain and missed HD. Nephrology consulted for ESRD status.    Hospital Medications: Medications reviewed.  REVIEW OF SYSTEMS:  CONSTITUTIONAL: No fevers or chills  RESPIRATORY: No shortness of breath  CARDIOVASCULAR: No chest pain.  GASTROINTESTINAL: No nausea, vomiting, diarrhea or abdominal pain.   VASCULAR: No bilateral lower extremity edema.     VITALS:  T(F): 98.6 (09-12-19 @ 08:15), Max: 99.3 (09-11-19 @ 23:45)  HR: 76 (09-12-19 @ 08:15)  BP: 129/65 (09-12-19 @ 08:15)  RR: 16 (09-12-19 @ 08:15)  SpO2: 97% (09-12-19 @ 08:15)  Wt(kg): --    Weight (kg): 90.7 (09-10 @ 19:38)    PHYSICAL EXAM:  Gen: NAD, calm  Cards: RRR, +S1/S2  Resp: mild rales at left base; right clear  GI: soft, NT/ND, NABS  Extremities: no LE edema B/L  Derm: no rashes  Neuro: non-focal  Access: LUE AVF +thrill +bruit      LABS:  09-11    136  |  103  |  87<H>  ----------------------------<  96  4.6   |  23  |  15.50<H>    Ca    8.4      11 Sep 2019 12:13  Phos  6.0     09-11  Mg     2.2     09-11    TPro  7.4  /  Alb  3.4<L>  /  TBili  0.4  /  DBili      /  AST  16  /  ALT  22  /  AlkPhos  127<H>  09-10    Creatinine Trend: 15.50 <--, 14.60 <--                        8.2    4.74  )-----------( 189      ( 11 Sep 2019 12:13 )             26.1     Urine Studies:      RADIOLOGY & ADDITIONAL STUDIES:

## 2019-09-12 NOTE — DISCHARGE NOTE NURSING/CASE MANAGEMENT/SOCIAL WORK - PATIENT PORTAL LINK FT
You can access the FollowMyHealth Patient Portal offered by [facility] by registering at the following website: [email or web address]. By joining [employer]’s FollowMyHealth portal, you will also be able to view your health information using other applications (apps) compatible with our system.

## 2019-09-12 NOTE — DISCHARGE NOTE PROVIDER - NSDCPNSUBOBJ_GEN_ALL_CORE
MEDICAL ATTENDING DISCHARGE NOTE :        Patient is a 54y old  Female who presents with a chief complaint of missed hemodialysis (12 Sep 2019 13:04)            INTERVAL HPI / OVERNIGHT EVENTS: patient with uneventful night and offers no new complaints        ----------------------------------------------------------------------------------    REVIEW OF SYSTEMS: no fever; no SOB            Vital Signs Last 24 Hrs    T(C): 37 (12 Sep 2019 17:16), Max: 37.4 (11 Sep 2019 23:45)    T(F): 98.6 (12 Sep 2019 17:16), Max: 99.3 (11 Sep 2019 23:45)    HR: 80 (12 Sep 2019 17:16) (74 - 87)    BP: 137/62 (12 Sep 2019 17:16) (118/57 - 164/83)    BP(mean): --    RR: 18 (12 Sep 2019 17:16) (16 - 18)    SpO2: 96% (12 Sep 2019 17:16) (95% - 97%)        _________________    PHYSICAL EXAM:    ---------------------------     NAD; Normocephalic    LUNGS - no wheezing    HEART: S1 S2+     ABDOMEN: Soft, Nontender, non distended    EXTREMITIES: no cyanosis; no edema            _________________________________________________    LABS:                            8.8      3.06  )-----------( 188      ( 12 Sep 2019 14:15 )               28.0         09-12        135  |  98  |  55<H>    ----------------------------<  101<H>    4.2   |  28  |  11.30<H>        Ca    9.2      12 Sep 2019 14:15    Phos  4.7     09-12    Mg     2.6     09-12        TPro  7.0  /  Alb  3.1<L>  /  TBili  0.4  /  DBili  x   /  AST  12  /  ALT  16  /  AlkPhos  106  09-12                A/P: 55 y/o F with     1. Fluid overload secondary to missed HD: consent for HD obtain, getting HD as of now     2. ESRD on HD: electrolytes within normal limits     3. Nonischemic cardiomyopathy; resume home medications: carvedilol, Aspirin, Plavix and atorvastatin     4. HTN: BP stable    5. Normocytic anemia likely secondary to combination of chronic and renal disease; H/H is stable         clinically improved post dialysis.     Stable for discharge    Plan of care, test results and findings were  discussed with patient ( and HCP &/or primary care giver) ; all questions and concerns were addressed and care was aligned with patient's wishes.    PMD follow up after discharge from the hospital for continued care and outpatient monitoring was advised.    Discharge plans has been  discussed with care team including the housestaff, nursing staff  and discharge planners- ( /  .)

## 2019-09-12 NOTE — PROGRESS NOTE ADULT - ATTENDING COMMENTS
[facility] NEPHROLOGY  Soy Richmond M.D.  Moo Bain D.O.  Marilin Khan M.D.  Elizabeth Valdes, MSN, ANP-C  [phone]    [address] [place] [address]

## 2019-09-12 NOTE — PROGRESS NOTE ADULT - ASSESSMENT
Patient is a 55yo Female with ESRD on HD a/w chest pain and missed HD. Nephrology consulted for ESRD status.    1) ESRD:  Last HD 9/11 (since pt missed HD 9/10), tolerated well with 3L removed. Plan for next maintenance HD today; 9/12/19. Monitor electrolytes.  2) HTN with ESRD: BP controlled. Continue with current medications and low sodium diet. Monitor BP.  3) Anemia of renal disease: Hb low. Check iron including ferritin. Start Epogen 8k units IV tiw with HD. Monitor Hb.  4) Hyperphosphatemia: Phosphorus elevated with acceptable serum calcium. c/w Phoslo tid with meals. Serum phos likely elevated due to non compliance with HD. Monitor serum calcium and phosphorus.

## 2019-09-12 NOTE — DISCHARGE NOTE PROVIDER - HOSPITAL COURSE
55yo female with PMH of ESRD (on HD at [facility] dialysis, outpt nephro Dr Echevarria, on TTS via LUE AVF, makes little urine), CHF, NICM, prior CVA, DM, HTN, HLD, presents to the ED with chief complaint of chest pain today. History limited as patient was sleeping, and cooperated minimally. According to patient she was on her way to HD when she developed 10/10 sharp pain over right side of her chest, non radiating, associated with lightheadedness, difficulty breathing and palpitations. She reports pain subsided on its own, currently asymptomatic. Patient says she missed her HD on Saturday as she did not want to go, last HD being on September 5th. Reports swelling of legs with pain in ankles, reports developing a fever now, denies cough, abdominal pain, diarrhea, nausea, vomiting, burning in urine, or chills.     In ER, her temp was initially 98.7, but developed a low grade fever of 100.3 which subsided, hemodynamically stable, saturating well on RA. Labs noted with H/H of 8.6/14.6, BUN/Cr 8.6/27.7, troponin I 0.024. CXR with clear lungs.

## 2019-09-12 NOTE — DISCHARGE NOTE PROVIDER - NSDCCPCAREPLAN_GEN_ALL_CORE_FT
PRINCIPAL DISCHARGE DIAGNOSIS  Diagnosis: Fluid overload  Assessment and Plan of Treatment: Pt came in with atypical chest pain due to fluidover secondary to  2 missed HD. We did your cardio work up. There were no raised troponins. Your chest pain was most likely due to your heart under more work load from increased fluid in your body. Please dont miss your dialysis and try to follow a renal diet. Please followup with your PCP within 1 week of discahrge.      SECONDARY DISCHARGE DIAGNOSES  Diagnosis: End stage renal disease on dialysis  Assessment and Plan of Treatment: You history of ESRD with Cr OF 15. You came in with chest pain which was from fluuid overload and increased work load on your heart. You were dialyzed during your stay 2 times and your BMP was monitored.  You should continue following your established dialysis schedule. Please followup with your nephrologist within 1 week of discharge.      Diagnosis: Anemia of chronic disease  Assessment and Plan of Treatment: You came in with anemia HB OF 8.8. Which is mpst likely due to your ESRD which results in decreased in production of erythropoitin. We gave you IV epoitin. Please keep recievig it once a week during your dialysis.

## 2019-09-13 VITALS
OXYGEN SATURATION: 97 % | SYSTOLIC BLOOD PRESSURE: 145 MMHG | HEART RATE: 78 BPM | RESPIRATION RATE: 20 BRPM | DIASTOLIC BLOOD PRESSURE: 77 MMHG | TEMPERATURE: 99 F

## 2019-09-13 LAB
FERRITIN SERPL-MCNC: 851 NG/ML — HIGH (ref 15–150)
GLUCOSE BLDC GLUCOMTR-MCNC: 105 MG/DL — HIGH (ref 70–99)
GLUCOSE BLDC GLUCOMTR-MCNC: 115 MG/DL — HIGH (ref 70–99)
HAV IGM SER-ACNC: SIGNIFICANT CHANGE UP
HBV CORE IGM SER-ACNC: ABNORMAL
HBV SURFACE AG SER-ACNC: SIGNIFICANT CHANGE UP
HCV AB S/CO SERPL IA: 0.11 S/CO — SIGNIFICANT CHANGE UP (ref 0–0.99)
HCV AB SERPL-IMP: SIGNIFICANT CHANGE UP

## 2019-09-13 PROCEDURE — 36415 COLL VENOUS BLD VENIPUNCTURE: CPT

## 2019-09-13 PROCEDURE — 82553 CREATINE MB FRACTION: CPT

## 2019-09-13 PROCEDURE — 82607 VITAMIN B-12: CPT

## 2019-09-13 PROCEDURE — 71045 X-RAY EXAM CHEST 1 VIEW: CPT

## 2019-09-13 PROCEDURE — 99285 EMERGENCY DEPT VISIT HI MDM: CPT | Mod: 25

## 2019-09-13 PROCEDURE — 82728 ASSAY OF FERRITIN: CPT

## 2019-09-13 PROCEDURE — 82746 ASSAY OF FOLIC ACID SERUM: CPT

## 2019-09-13 PROCEDURE — 99261: CPT

## 2019-09-13 PROCEDURE — 83735 ASSAY OF MAGNESIUM: CPT

## 2019-09-13 PROCEDURE — 80053 COMPREHEN METABOLIC PANEL: CPT

## 2019-09-13 PROCEDURE — 85027 COMPLETE CBC AUTOMATED: CPT

## 2019-09-13 PROCEDURE — 80074 ACUTE HEPATITIS PANEL: CPT

## 2019-09-13 PROCEDURE — 84484 ASSAY OF TROPONIN QUANT: CPT

## 2019-09-13 PROCEDURE — 93005 ELECTROCARDIOGRAM TRACING: CPT

## 2019-09-13 PROCEDURE — 84443 ASSAY THYROID STIM HORMONE: CPT

## 2019-09-13 PROCEDURE — 80048 BASIC METABOLIC PNL TOTAL CA: CPT

## 2019-09-13 PROCEDURE — 87640 STAPH A DNA AMP PROBE: CPT

## 2019-09-13 PROCEDURE — 83550 IRON BINDING TEST: CPT

## 2019-09-13 PROCEDURE — 99239 HOSP IP/OBS DSCHRG MGMT >30: CPT | Mod: GC

## 2019-09-13 PROCEDURE — 82962 GLUCOSE BLOOD TEST: CPT

## 2019-09-13 PROCEDURE — 85610 PROTHROMBIN TIME: CPT

## 2019-09-13 PROCEDURE — 80061 LIPID PANEL: CPT

## 2019-09-13 PROCEDURE — 82550 ASSAY OF CK (CPK): CPT

## 2019-09-13 PROCEDURE — 82306 VITAMIN D 25 HYDROXY: CPT

## 2019-09-13 PROCEDURE — 87641 MR-STAPH DNA AMP PROBE: CPT

## 2019-09-13 PROCEDURE — 83540 ASSAY OF IRON: CPT

## 2019-09-13 PROCEDURE — 83036 HEMOGLOBIN GLYCOSYLATED A1C: CPT

## 2019-09-13 PROCEDURE — 84100 ASSAY OF PHOSPHORUS: CPT

## 2019-09-13 RX ORDER — INSULIN GLARGINE 100 [IU]/ML
1 INJECTION, SOLUTION SUBCUTANEOUS
Qty: 0 | Refills: 0 | DISCHARGE

## 2019-09-13 RX ORDER — SIMVASTATIN 20 MG/1
1 TABLET, FILM COATED ORAL
Qty: 30 | Refills: 0
Start: 2019-09-13 | End: 2019-10-12

## 2019-09-13 RX ORDER — SIMETHICONE 80 MG/1
80 TABLET, CHEWABLE ORAL ONCE
Refills: 0 | Status: COMPLETED | OUTPATIENT
Start: 2019-09-13 | End: 2019-09-13

## 2019-09-13 RX ORDER — SIMVASTATIN 20 MG/1
1 TABLET, FILM COATED ORAL
Qty: 0 | Refills: 0 | DISCHARGE

## 2019-09-13 RX ADMIN — Medication 650 MILLIGRAM(S): at 01:15

## 2019-09-13 RX ADMIN — Medication 667 MILLIGRAM(S): at 08:29

## 2019-09-13 RX ADMIN — CLOPIDOGREL BISULFATE 75 MILLIGRAM(S): 75 TABLET, FILM COATED ORAL at 11:03

## 2019-09-13 RX ADMIN — CARVEDILOL PHOSPHATE 12.5 MILLIGRAM(S): 80 CAPSULE, EXTENDED RELEASE ORAL at 06:32

## 2019-09-13 RX ADMIN — HEPARIN SODIUM 5000 UNIT(S): 5000 INJECTION INTRAVENOUS; SUBCUTANEOUS at 06:33

## 2019-09-13 RX ADMIN — Medication 81 MILLIGRAM(S): at 11:03

## 2019-09-13 RX ADMIN — SIMETHICONE 80 MILLIGRAM(S): 80 TABLET, CHEWABLE ORAL at 11:27

## 2019-09-13 RX ADMIN — Medication 100 MILLIGRAM(S): at 11:03

## 2019-09-13 RX ADMIN — CHLORHEXIDINE GLUCONATE 1 APPLICATION(S): 213 SOLUTION TOPICAL at 11:04

## 2019-09-13 NOTE — PROGRESS NOTE ADULT - PROBLEM SELECTOR PLAN 4
Noted to have hgb of 8.6 on admission, known AOCD  Likely from hemodilution in setting of fluid overload  Monitor h/h 8.8/ 28  ferritin 851

## 2019-09-13 NOTE — PROGRESS NOTE ADULT - PROBLEM SELECTOR PLAN 3
echo from May 2019 with moderate global LV systolic dysfunction  pt in fluid overload likely from missed HD  continue asa, plavix statin, coreg,   will hold hydralazine and isordil for now, resume if BP stable

## 2019-09-13 NOTE — PROGRESS NOTE ADULT - ATTENDING COMMENTS
[facility] NEPHROLOGY  Soy Richmond M.D.  Moo Bain D.O.  Marilin Khan M.D.  Elizabeth Valdes, MSN, ANP-C  [phone]    [address] [place] [address]

## 2019-09-13 NOTE — PROGRESS NOTE ADULT - PROBLEM SELECTOR PLAN 6
continue coreg with holding parameters  will hold hydralazine, isordil for now as BP wnl  monitor BP and resume medications as indicated

## 2019-09-13 NOTE — PROGRESS NOTE ADULT - PROBLEM SELECTOR PLAN 1
P/w chest pain which has since resolved, troponin1 -ve, ekg with no significant changes  Patient currently asymptomatic  Atypical chest pain likely in setting of volume overload from missed HD  -continue asa, plavix, statin, coreg  PT got 2 dialysis at hospital  ready for discharge

## 2019-09-13 NOTE — PROGRESS NOTE ADULT - ASSESSMENT
Patient is a 55yo Female with ESRD on HD a/w chest pain and missed HD. Nephrology consulted for ESRD status.    1) ESRD:  Last HD 9/12, tolerated well with 3L removed. Plan for next maintenance HD 9/14/19 @ [facility]. Monitor electrolytes.  2) HTN with ESRD: BP controlled. Continue with current medications and low sodium diet. Monitor BP.  3) Anemia of renal disease: Hb low but improving. Check iron including ferritin. Start Epogen 8k units IV tiw with HD. Monitor Hb.  4) Hyperphosphatemia: Phosphorus and serum calcium acceptable . c/w Phoslo tid with meals.

## 2019-09-13 NOTE — PROGRESS NOTE ADULT - PROBLEM SELECTOR PLAN 2
Patient on HD TTS via LUE AVF  Patient clinically volume overloaded,   Missed 2 sessions of HD   nephro consult for HD dr white

## 2019-09-13 NOTE — PROGRESS NOTE ADULT - SUBJECTIVE AND OBJECTIVE BOX
[facility] NEPHROLOGY- PROGRESS NOTE    Patient is a 55yo Female with ESRD on HD a/w chest pain and missed HD. Nephrology consulted for ESRD status.    Hospital Medications: Medications reviewed.  REVIEW OF SYSTEMS:  CONSTITUTIONAL: No fevers or chills  RESPIRATORY: No shortness of breath  CARDIOVASCULAR: No chest pain.  GASTROINTESTINAL: No nausea, vomiting, diarrhea or abdominal pain.   VASCULAR: No bilateral lower extremity edema.     VITALS:  T(F): 98.9 (09-13-19 @ 09:26), Max: 98.9 (09-13-19 @ 09:26)  HR: 78 (09-13-19 @ 09:26)  BP: 145/77 (09-13-19 @ 09:26)  RR: 20 (09-13-19 @ 09:26)  SpO2: 97% (09-13-19 @ 09:26)  Wt(kg): --        PHYSICAL EXAM:  Gen: NAD, calm  Cards: RRR, +S1/S2  Resp: CTA b/l  GI: soft, NT/ND, NABS  Extremities: no LE edema B/L  Access: LUE AVF +thrill +bruit    LABS:  09-12    135  |  98  |  55<H>  ----------------------------<  101<H>  4.2   |  28  |  11.30<H>    Ca    9.2      12 Sep 2019 14:15  Phos  4.7     09-12  Mg     2.6     09-12    TPro  7.0  /  Alb  3.1<L>  /  TBili  0.4  /  DBili      /  AST  12  /  ALT  16  /  AlkPhos  106  09-12    Creatinine Trend: 11.30 <--, 15.50 <--, 14.60 <--                        8.8    3.06  )-----------( 188      ( 12 Sep 2019 14:15 )             28.0     Urine Studies:            RADIOLOGY & ADDITIONAL STUDIES:

## 2019-09-13 NOTE — PROGRESS NOTE ADULT - SUBJECTIVE AND OBJECTIVE BOX
PGY1 Note discussed with Supervising Resident and Primary Attending.    Patient is a 54y old  Female who presents with a chief complaint of missed hemodialysis (12 Sep 2019 13:04)      INTERVAL HPI/OVERNIGHT EVENTS :    ***********************************************************************************************************    MEDICATIONS  (STANDING):  allopurinol 100 milliGRAM(s) Oral daily  aspirin enteric coated 81 milliGRAM(s) Oral daily  calcium acetate 667 milliGRAM(s) Oral three times a day with meals  carvedilol 12.5 milliGRAM(s) Oral every 12 hours  chlorhexidine 2% Cloths 1 Application(s) Topical daily  clopidogrel Tablet 75 milliGRAM(s) Oral daily  epoetin karla Injectable 8000 Unit(s) IV Push <User Schedule>  heparin  Injectable 5000 Unit(s) SubCutaneous every 12 hours  insulin lispro (HumaLOG) corrective regimen sliding scale   SubCutaneous three times a day before meals  insulin lispro (HumaLOG) corrective regimen sliding scale   SubCutaneous at bedtime  simvastatin 20 milliGRAM(s) Oral at bedtime    MEDICATIONS  (PRN):      ***********************************************************************************************************    Allergies    iodine (Unknown)  Seafood (Unknown)  shellfish (Nausea (Mild to Mod); Hives (Mild to Mod))    Intolerances        ***********************************************************************************************************    REVIEW OF SYSTEMS :  * CONSTITUTIONAL      : No Fever, Weight loss, or Fatigue  * EYES                             : No eye pain , Visual disturbances or Discharge  * RESPIRATORY             : No Cough, Wheezing, Chills or Hemoptysis; No shortness of breath  * CARDIOVASCULAR     : No Chest pain, Palpitations, Dizziness, or Leg swelling  * GASTROINTESTINAL  : No Abdominal or Epigastric pain. No Nausea, Vomiting or Hematemesis; No Diarrhea or Constipation. No Melena or Hematochezia.  * GENITOURINARY        : No Dysuria , Frequency , Haematuria   * NEUROLOGICAL          : No Headaches, Memory loss, Loss of trength, Numbness, or Tremors  * MUSCULOSKELETAL   : No Joint pain  * PSYCHIATRY                 : No Depression or Anxiety   * HEME/LYMPH              : No Easy Bruising or Bleeding gums  * SKIN                               : No Itching, Burning, Rashes, or Lesions     ***********************************************************************************************************    Vital Signs Last 24 Hrs  T(C): 37 (12 Sep 2019 17:16), Max: 37 (12 Sep 2019 17:16)  T(F): 98.6 (12 Sep 2019 17:16), Max: 98.6 (12 Sep 2019 17:16)  HR: 81 (13 Sep 2019 05:45) (74 - 81)  BP: 137/68 (13 Sep 2019 05:45) (137/62 - 164/83)  BP(mean): --  RR: 18 (12 Sep 2019 17:16) (18 - 18)  SpO2: 96% (12 Sep 2019 17:16) (96% - 96%)    ***********************************************************************************************************    PHYSICAL EXAM :  * GENERAL                 : NAD, Well-groomed, Well-developed  * HEAD                       :  Atraumatic, Normocephalic  * EYES                         : EOMI, PERRLA, Conjunctiva and Sclera clear  * ENT                           : Moist Mucous Membranes  * NECK                         : Supple, No JVD, Normal Thyroid  * CHEST/LUNG           : Clear to Auscultation bilaterally; No Rales, Rhonchi, Wheezing or Rubs  * HEART                       : Regular Rate and Rhythm; No murmurs, Rubs or gallops  * ABDOMEN                : Soft, Non-tender, Non-distended; Bowel Sounds present  * NERVOUS SYSTEM  :  Alert & Oriented X3, Good Concentration; Motor Strength 5/5 B/L UL LL ; DTRs 2+ Intact and Symmetric  * EXTREMITIES            :  2+ Peripheral Pulses, No clubbing, cyanosis, or edema  * SKIN                           : No Rashes or Lesions    **********************************************************************************************************  LABS:                          8.8    3.06  )-----------( 188      ( 12 Sep 2019 14:15 )             28.0     09-12    135  |  98  |  55<H>  ----------------------------<  101<H>  4.2   |  28  |  11.30<H>    Ca    9.2      12 Sep 2019 14:15  Phos  4.7     09-12  Mg     2.6     09-12    TPro  7.0  /  Alb  3.1<L>  /  TBili  0.4  /  DBili  x   /  AST  12  /  ALT  16  /  AlkPhos  106  09-12        CAPILLARY BLOOD GLUCOSE      POCT Blood Glucose.: 105 mg/dL (13 Sep 2019 08:28)  POCT Blood Glucose.: 138 mg/dL (12 Sep 2019 22:46)  POCT Blood Glucose.: 108 mg/dL (12 Sep 2019 17:20)  POCT Blood Glucose.: 97 mg/dL (12 Sep 2019 11:16)      **********************************************************************************************************    RADIOLOGY & ADDITIONAL TESTS:   No radiological imaging was required    Imaging Personally Reviewed   :  [ ] YES  [ ] NO    Consultant(s) Notes Reviewed :  [ ] YES  [ ] NO

## 2019-09-13 NOTE — PROGRESS NOTE ADULT - ASSESSMENT
55yo female with PMH of ESRD (on HD at [facility] satellite dialysis, outpt nephro Dr Echevarria, on TTS via LUE AVF, makes little urine), CHF, NICM, prior CVA, DM, HTN, HLD, presents to the ED with chief complaint of chest pain today with fluid overload due to 2 missed HD sessions.  In ER, her temp was initially 98.7, but developed a low grade fever of 100.3 which subsided, hemodynamically stable, saturating well on RA. Labs noted with H/H of 8.6/14.6, BUN/Cr 8.6/27.7, troponin I 0.024. CXR with clear lungs.    **patient does not remember her medications, reconciled from recent admission to [facility]. To be confirmed with [facility] pharmacy, # [phone]

## 2019-09-24 NOTE — DISCHARGE NOTE ADULT - CARE PROVIDERS DIRECT ADDRESSES
65 ,DirectAddress_Unknown ,DirectAddress_Unknown,DirectAddress_Unknown ,DirectAddress_Unknown,DirectAddress_Unknown,DirectAddress_Unknown

## 2019-11-09 NOTE — DISCHARGE NOTE NURSING/CASE MANAGEMENT/SOCIAL WORK - NSDCPEPT PROEDHF_GEN_ALL_CORE
Patient was not available for the therapy session at this time.   . Pt reports she really needs to rest had been sitting up in recliner for over 2 hours. PTA agreeable to come by later.      Re-Attempt Plan: Will re-attempt later today;Will re-attempt per established treatment plan (11/09/19 1029).   Low salt diet/Call primary care provider for follow up after discharge/Activities as tolerated/Monitor weight daily/Report signs and symptoms to primary care provider

## 2019-11-11 NOTE — H&P ADULT - PROBLEM/PLAN-2
Received Fax from Pharmacy requesting medication refills amlodipine/atorvastatin   DISPLAY PLAN FREE TEXT

## 2019-11-30 ENCOUNTER — INPATIENT (INPATIENT)
Facility: HOSPITAL | Age: 54
LOS: 2 days | Discharge: HOME | End: 2019-12-03
Attending: HOSPITALIST | Admitting: HOSPITALIST
Payer: MEDICAID

## 2019-11-30 VITALS
RESPIRATION RATE: 18 BRPM | DIASTOLIC BLOOD PRESSURE: 52 MMHG | TEMPERATURE: 99 F | SYSTOLIC BLOOD PRESSURE: 117 MMHG | OXYGEN SATURATION: 100 % | HEART RATE: 44 BPM

## 2019-11-30 DIAGNOSIS — Z90.49 ACQUIRED ABSENCE OF OTHER SPECIFIED PARTS OF DIGESTIVE TRACT: Chronic | ICD-10-CM

## 2019-11-30 DIAGNOSIS — Z90.711 ACQUIRED ABSENCE OF UTERUS WITH REMAINING CERVICAL STUMP: Chronic | ICD-10-CM

## 2019-11-30 DIAGNOSIS — I77.0 ARTERIOVENOUS FISTULA, ACQUIRED: Chronic | ICD-10-CM

## 2019-11-30 DIAGNOSIS — Z98.83 FILTERING (VITREOUS) BLEB AFTER GLAUCOMA SURGERY STATUS: Chronic | ICD-10-CM

## 2019-11-30 NOTE — ED ADULT TRIAGE NOTE - CHIEF COMPLAINT QUOTE
Pt arrives via [facility], son is with patient . As per EMT" She is a diaylsis patient missed treatments x 2 this week has complaints of chest pain, headache and difficulty breathing. " fbs at scene 162" . Pt st" I have chest pain and headaches, chills and feel short of breath...going on all week I am not feeling good. "  pt has left arm fistula

## 2019-12-01 DIAGNOSIS — E11.22 TYPE 2 DIABETES MELLITUS WITH DIABETIC CHRONIC KIDNEY DISEASE: ICD-10-CM

## 2019-12-01 DIAGNOSIS — I49.9 CARDIAC ARRHYTHMIA, UNSPECIFIED: ICD-10-CM

## 2019-12-01 DIAGNOSIS — I10 ESSENTIAL (PRIMARY) HYPERTENSION: ICD-10-CM

## 2019-12-01 DIAGNOSIS — N18.6 END STAGE RENAL DISEASE: ICD-10-CM

## 2019-12-01 DIAGNOSIS — I50.22 CHRONIC SYSTOLIC (CONGESTIVE) HEART FAILURE: ICD-10-CM

## 2019-12-01 DIAGNOSIS — R07.89 OTHER CHEST PAIN: ICD-10-CM

## 2019-12-01 DIAGNOSIS — D63.8 ANEMIA IN OTHER CHRONIC DISEASES CLASSIFIED ELSEWHERE: ICD-10-CM

## 2019-12-01 DIAGNOSIS — Z29.9 ENCOUNTER FOR PROPHYLACTIC MEASURES, UNSPECIFIED: ICD-10-CM

## 2019-12-01 DIAGNOSIS — Z02.9 ENCOUNTER FOR ADMINISTRATIVE EXAMINATIONS, UNSPECIFIED: ICD-10-CM

## 2019-12-01 LAB
ALBUMIN SERPL ELPH-MCNC: 3.8 G/DL — SIGNIFICANT CHANGE UP (ref 3.3–5)
ALBUMIN SERPL ELPH-MCNC: 4 G/DL — SIGNIFICANT CHANGE UP (ref 3.3–5)
ALP SERPL-CCNC: 163 U/L — HIGH (ref 40–120)
ALP SERPL-CCNC: 172 U/L — HIGH (ref 40–120)
ALT FLD-CCNC: 53 U/L — HIGH (ref 4–33)
ALT FLD-CCNC: 60 U/L — HIGH (ref 4–33)
ANION GAP SERPL CALC-SCNC: 14 MMO/L — SIGNIFICANT CHANGE UP (ref 7–14)
ANION GAP SERPL CALC-SCNC: 16 MMO/L — HIGH (ref 7–14)
ANION GAP SERPL CALC-SCNC: 17 MMO/L — HIGH (ref 7–14)
APTT BLD: 31.3 SEC — SIGNIFICANT CHANGE UP (ref 27.5–36.3)
AST SERPL-CCNC: 48 U/L — HIGH (ref 4–32)
AST SERPL-CCNC: 61 U/L — HIGH (ref 4–32)
BASE EXCESS BLDV CALC-SCNC: 0.5 MMOL/L — SIGNIFICANT CHANGE UP
BASOPHILS # BLD AUTO: 0.03 K/UL — SIGNIFICANT CHANGE UP (ref 0–0.2)
BASOPHILS NFR BLD AUTO: 0.6 % — SIGNIFICANT CHANGE UP (ref 0–2)
BILIRUB SERPL-MCNC: < 0.2 MG/DL — LOW (ref 0.2–1.2)
BILIRUB SERPL-MCNC: < 0.2 MG/DL — LOW (ref 0.2–1.2)
BLOOD GAS VENOUS - CREATININE: 12.6 MG/DL — HIGH (ref 0.5–1.3)
BUN SERPL-MCNC: 64 MG/DL — HIGH (ref 7–23)
BUN SERPL-MCNC: 64 MG/DL — HIGH (ref 7–23)
BUN SERPL-MCNC: 67 MG/DL — HIGH (ref 7–23)
CALCIUM SERPL-MCNC: 8.3 MG/DL — LOW (ref 8.4–10.5)
CALCIUM SERPL-MCNC: 8.4 MG/DL — SIGNIFICANT CHANGE UP (ref 8.4–10.5)
CALCIUM SERPL-MCNC: 8.6 MG/DL — SIGNIFICANT CHANGE UP (ref 8.4–10.5)
CHLORIDE BLDV-SCNC: 103 MMOL/L — SIGNIFICANT CHANGE UP (ref 96–108)
CHLORIDE SERPL-SCNC: 100 MMOL/L — SIGNIFICANT CHANGE UP (ref 98–107)
CHLORIDE SERPL-SCNC: 99 MMOL/L — SIGNIFICANT CHANGE UP (ref 98–107)
CHLORIDE SERPL-SCNC: 99 MMOL/L — SIGNIFICANT CHANGE UP (ref 98–107)
CO2 SERPL-SCNC: 19 MMOL/L — LOW (ref 22–31)
CO2 SERPL-SCNC: 23 MMOL/L — SIGNIFICANT CHANGE UP (ref 22–31)
CO2 SERPL-SCNC: 24 MMOL/L — SIGNIFICANT CHANGE UP (ref 22–31)
CREAT SERPL-MCNC: 12.27 MG/DL — HIGH (ref 0.5–1.3)
CREAT SERPL-MCNC: 12.34 MG/DL — HIGH (ref 0.5–1.3)
CREAT SERPL-MCNC: 12.63 MG/DL — HIGH (ref 0.5–1.3)
EOSINOPHIL # BLD AUTO: 0.19 K/UL — SIGNIFICANT CHANGE UP (ref 0–0.5)
EOSINOPHIL NFR BLD AUTO: 3.6 % — SIGNIFICANT CHANGE UP (ref 0–6)
GAS PNL BLDV: 136 MMOL/L — SIGNIFICANT CHANGE UP (ref 136–146)
GLUCOSE BLDV-MCNC: 111 MG/DL — HIGH (ref 70–99)
GLUCOSE SERPL-MCNC: 107 MG/DL — HIGH (ref 70–99)
GLUCOSE SERPL-MCNC: 115 MG/DL — HIGH (ref 70–99)
GLUCOSE SERPL-MCNC: 160 MG/DL — HIGH (ref 70–99)
HCG SERPL-ACNC: < 5 MIU/ML — SIGNIFICANT CHANGE UP
HCO3 BLDV-SCNC: 24 MMOL/L — SIGNIFICANT CHANGE UP (ref 20–27)
HCT VFR BLD CALC: 35 % — SIGNIFICANT CHANGE UP (ref 34.5–45)
HCT VFR BLDV CALC: 34.1 % — LOW (ref 34.5–45)
HGB BLD-MCNC: 10.8 G/DL — LOW (ref 11.5–15.5)
HGB BLDV-MCNC: 11.1 G/DL — LOW (ref 11.5–15.5)
IMM GRANULOCYTES NFR BLD AUTO: 0.2 % — SIGNIFICANT CHANGE UP (ref 0–1.5)
INR BLD: 0.91 — SIGNIFICANT CHANGE UP (ref 0.88–1.17)
LACTATE BLDV-MCNC: 1.3 MMOL/L — SIGNIFICANT CHANGE UP (ref 0.5–2)
LYMPHOCYTES # BLD AUTO: 1.15 K/UL — SIGNIFICANT CHANGE UP (ref 1–3.3)
LYMPHOCYTES # BLD AUTO: 21.6 % — SIGNIFICANT CHANGE UP (ref 13–44)
MAGNESIUM SERPL-MCNC: 2.5 MG/DL — SIGNIFICANT CHANGE UP (ref 1.6–2.6)
MAGNESIUM SERPL-MCNC: 2.7 MG/DL — HIGH (ref 1.6–2.6)
MCHC RBC-ENTMCNC: 30.1 PG — SIGNIFICANT CHANGE UP (ref 27–34)
MCHC RBC-ENTMCNC: 30.9 % — LOW (ref 32–36)
MCV RBC AUTO: 97.5 FL — SIGNIFICANT CHANGE UP (ref 80–100)
MONOCYTES # BLD AUTO: 0.53 K/UL — SIGNIFICANT CHANGE UP (ref 0–0.9)
MONOCYTES NFR BLD AUTO: 9.9 % — SIGNIFICANT CHANGE UP (ref 2–14)
NEUTROPHILS # BLD AUTO: 3.42 K/UL — SIGNIFICANT CHANGE UP (ref 1.8–7.4)
NEUTROPHILS NFR BLD AUTO: 64.1 % — SIGNIFICANT CHANGE UP (ref 43–77)
NRBC # FLD: 0 K/UL — SIGNIFICANT CHANGE UP (ref 0–0)
NT-PROBNP SERPL-SCNC: SIGNIFICANT CHANGE UP PG/ML
PCO2 BLDV: 48 MMHG — SIGNIFICANT CHANGE UP (ref 41–51)
PH BLDV: 7.34 PH — SIGNIFICANT CHANGE UP (ref 7.32–7.43)
PHOSPHATE SERPL-MCNC: 6.2 MG/DL — HIGH (ref 2.5–4.5)
PHOSPHATE SERPL-MCNC: 6.2 MG/DL — HIGH (ref 2.5–4.5)
PLATELET # BLD AUTO: 196 K/UL — SIGNIFICANT CHANGE UP (ref 150–400)
PMV BLD: 10.9 FL — SIGNIFICANT CHANGE UP (ref 7–13)
PO2 BLDV: 40 MMHG — SIGNIFICANT CHANGE UP (ref 35–40)
POTASSIUM BLDV-SCNC: 5.2 MMOL/L — HIGH (ref 3.4–4.5)
POTASSIUM SERPL-MCNC: 5.2 MMOL/L — SIGNIFICANT CHANGE UP (ref 3.5–5.3)
POTASSIUM SERPL-MCNC: 5.3 MMOL/L — SIGNIFICANT CHANGE UP (ref 3.5–5.3)
POTASSIUM SERPL-MCNC: 5.5 MMOL/L — HIGH (ref 3.5–5.3)
POTASSIUM SERPL-SCNC: 5.2 MMOL/L — SIGNIFICANT CHANGE UP (ref 3.5–5.3)
POTASSIUM SERPL-SCNC: 5.3 MMOL/L — SIGNIFICANT CHANGE UP (ref 3.5–5.3)
POTASSIUM SERPL-SCNC: 5.5 MMOL/L — HIGH (ref 3.5–5.3)
PROT SERPL-MCNC: 6.8 G/DL — SIGNIFICANT CHANGE UP (ref 6–8.3)
PROT SERPL-MCNC: 7.3 G/DL — SIGNIFICANT CHANGE UP (ref 6–8.3)
PROTHROM AB SERPL-ACNC: 10.3 SEC — SIGNIFICANT CHANGE UP (ref 9.8–13.1)
RBC # BLD: 3.59 M/UL — LOW (ref 3.8–5.2)
RBC # FLD: 14 % — SIGNIFICANT CHANGE UP (ref 10.3–14.5)
SAO2 % BLDV: 68 % — SIGNIFICANT CHANGE UP (ref 60–85)
SODIUM SERPL-SCNC: 136 MMOL/L — SIGNIFICANT CHANGE UP (ref 135–145)
SODIUM SERPL-SCNC: 137 MMOL/L — SIGNIFICANT CHANGE UP (ref 135–145)
SODIUM SERPL-SCNC: 138 MMOL/L — SIGNIFICANT CHANGE UP (ref 135–145)
TROPONIN T, HIGH SENSITIVITY: 84 NG/L — CRITICAL HIGH (ref ?–14)
TROPONIN T, HIGH SENSITIVITY: 87 NG/L — CRITICAL HIGH (ref ?–14)
TROPONIN T, HIGH SENSITIVITY: 89 NG/L — CRITICAL HIGH (ref ?–14)
WBC # BLD: 5.33 K/UL — SIGNIFICANT CHANGE UP (ref 3.8–10.5)
WBC # FLD AUTO: 5.33 K/UL — SIGNIFICANT CHANGE UP (ref 3.8–10.5)

## 2019-12-01 PROCEDURE — 71046 X-RAY EXAM CHEST 2 VIEWS: CPT | Mod: 26

## 2019-12-01 PROCEDURE — 99223 1ST HOSP IP/OBS HIGH 75: CPT

## 2019-12-01 RX ORDER — ISOSORBIDE DINITRATE 5 MG/1
20 TABLET ORAL THREE TIMES A DAY
Refills: 0 | Status: DISCONTINUED | OUTPATIENT
Start: 2019-12-01 | End: 2019-12-03

## 2019-12-01 RX ORDER — DEXTROSE 50 % IN WATER 50 %
12.5 SYRINGE (ML) INTRAVENOUS ONCE
Refills: 0 | Status: DISCONTINUED | OUTPATIENT
Start: 2019-12-01 | End: 2019-12-03

## 2019-12-01 RX ORDER — CINACALCET 30 MG/1
30 TABLET, FILM COATED ORAL DAILY
Refills: 0 | Status: DISCONTINUED | OUTPATIENT
Start: 2019-12-01 | End: 2019-12-03

## 2019-12-01 RX ORDER — DEXTROSE 50 % IN WATER 50 %
25 SYRINGE (ML) INTRAVENOUS ONCE
Refills: 0 | Status: DISCONTINUED | OUTPATIENT
Start: 2019-12-01 | End: 2019-12-03

## 2019-12-01 RX ORDER — INSULIN LISPRO 100/ML
VIAL (ML) SUBCUTANEOUS
Refills: 0 | Status: DISCONTINUED | OUTPATIENT
Start: 2019-12-01 | End: 2019-12-03

## 2019-12-01 RX ORDER — SODIUM CHLORIDE 9 MG/ML
1000 INJECTION, SOLUTION INTRAVENOUS
Refills: 0 | Status: DISCONTINUED | OUTPATIENT
Start: 2019-12-01 | End: 2019-12-03

## 2019-12-01 RX ORDER — DEXTROSE 50 % IN WATER 50 %
15 SYRINGE (ML) INTRAVENOUS ONCE
Refills: 0 | Status: DISCONTINUED | OUTPATIENT
Start: 2019-12-01 | End: 2019-12-03

## 2019-12-01 RX ORDER — CARVEDILOL PHOSPHATE 80 MG/1
12.5 CAPSULE, EXTENDED RELEASE ORAL EVERY 12 HOURS
Refills: 0 | Status: DISCONTINUED | OUTPATIENT
Start: 2019-12-01 | End: 2019-12-03

## 2019-12-01 RX ORDER — ALLOPURINOL 300 MG
100 TABLET ORAL DAILY
Refills: 0 | Status: DISCONTINUED | OUTPATIENT
Start: 2019-12-01 | End: 2019-12-03

## 2019-12-01 RX ORDER — INSULIN LISPRO 100/ML
VIAL (ML) SUBCUTANEOUS AT BEDTIME
Refills: 0 | Status: DISCONTINUED | OUTPATIENT
Start: 2019-12-01 | End: 2019-12-03

## 2019-12-01 RX ORDER — CALCIUM ACETATE 667 MG
667 TABLET ORAL
Refills: 0 | Status: DISCONTINUED | OUTPATIENT
Start: 2019-12-01 | End: 2019-12-03

## 2019-12-01 RX ORDER — GLUCAGON INJECTION, SOLUTION 0.5 MG/.1ML
1 INJECTION, SOLUTION SUBCUTANEOUS ONCE
Refills: 0 | Status: DISCONTINUED | OUTPATIENT
Start: 2019-12-01 | End: 2019-12-03

## 2019-12-01 RX ORDER — ASPIRIN/CALCIUM CARB/MAGNESIUM 324 MG
81 TABLET ORAL DAILY
Refills: 0 | Status: DISCONTINUED | OUTPATIENT
Start: 2019-12-01 | End: 2019-12-03

## 2019-12-01 RX ORDER — CALCIUM GLUCONATE 100 MG/ML
2 VIAL (ML) INTRAVENOUS ONCE
Refills: 0 | Status: COMPLETED | OUTPATIENT
Start: 2019-12-01 | End: 2019-12-01

## 2019-12-01 RX ORDER — AMLODIPINE BESYLATE 2.5 MG/1
10 TABLET ORAL DAILY
Refills: 0 | Status: DISCONTINUED | OUTPATIENT
Start: 2019-12-01 | End: 2019-12-03

## 2019-12-01 RX ORDER — ASPIRIN/CALCIUM CARB/MAGNESIUM 324 MG
162 TABLET ORAL DAILY
Refills: 0 | Status: DISCONTINUED | OUTPATIENT
Start: 2019-12-01 | End: 2019-12-01

## 2019-12-01 RX ORDER — HYDRALAZINE HCL 50 MG
100 TABLET ORAL THREE TIMES A DAY
Refills: 0 | Status: DISCONTINUED | OUTPATIENT
Start: 2019-12-01 | End: 2019-12-03

## 2019-12-01 RX ADMIN — Medication 200 GRAM(S): at 00:41

## 2019-12-01 RX ADMIN — Medication 162 MILLIGRAM(S): at 01:28

## 2019-12-01 RX ADMIN — Medication 100 MILLIGRAM(S): at 12:48

## 2019-12-01 RX ADMIN — Medication 667 MILLIGRAM(S): at 17:11

## 2019-12-01 RX ADMIN — AMLODIPINE BESYLATE 10 MILLIGRAM(S): 2.5 TABLET ORAL at 06:17

## 2019-12-01 RX ADMIN — ISOSORBIDE DINITRATE 20 MILLIGRAM(S): 5 TABLET ORAL at 06:56

## 2019-12-01 RX ADMIN — Medication 100 MILLIGRAM(S): at 06:58

## 2019-12-01 RX ADMIN — CARVEDILOL PHOSPHATE 12.5 MILLIGRAM(S): 80 CAPSULE, EXTENDED RELEASE ORAL at 06:17

## 2019-12-01 RX ADMIN — CARVEDILOL PHOSPHATE 12.5 MILLIGRAM(S): 80 CAPSULE, EXTENDED RELEASE ORAL at 17:12

## 2019-12-01 RX ADMIN — CINACALCET 30 MILLIGRAM(S): 30 TABLET, FILM COATED ORAL at 12:00

## 2019-12-01 RX ADMIN — Medication 667 MILLIGRAM(S): at 12:48

## 2019-12-01 NOTE — H&P ADULT - PROBLEM SELECTOR PLAN 9
1.  Name of PCP: Unknown  2.  PCP Contacted on Admission: [ ] Y    [x] N    3.  PCP contacted at Discharge: [ ] Y    [ ] N    [ ] N/A  4.  Post-Discharge Appointment Date and Location:  5.  Summary of Handoff given to PCP:

## 2019-12-01 NOTE — H&P ADULT - PROBLEM SELECTOR PLAN 1
-Atypical description with stabbing nature, worsening more with lying down flat.  Most likely related to pulmonary edema from missed HD sessions.  Review of prior records showed prior admission in 9/2019 for the same with improvement with HD.  Low concern for ACS given history and downtrending troponins.  CXR without PNA or ptx.  -Will trend troponins and monitor on telemetry  -TTE w/ EF 40-45% 5/2019.    -Reevaluate after HD for resolution.  If not resolved, would consider further evaluation. -Atypical description with stabbing nature, worsening more with lying down flat.  Most likely related to pulmonary edema from missed HD sessions.  Review of prior records showed prior admission in 9/2019 for the same with improvement with HD.  Low concern for ACS given history and downtrending troponins.  CXR without PNA or ptx.  -Will trend troponins and monitor on telemetry  -TTE w/ EF 40-45% 5/2019.    -Reevaluate after HD for resolution.  If not resolved, would consider further evaluation.  -Troponin elevation likely chronic from ESRD or demand from volume overload.

## 2019-12-01 NOTE — H&P ADULT - NSHPPHYSICALEXAM_GEN_ALL_CORE
Physical exam:  Constitutional-Vitals signs reviewed and afebrile, hr 44->82, bp 117-134/52-59, rr 16-18, % on RA, awake, alert, not in acute distress  Neuro-awake, alert, appropriately interactive, moving all extremities,  face symmetric  Eyes-pupils equally round and reactive to light, non icteric, no discharge noted  Ears, nose, mouth, throat-no abnormal discharge, moist mucous membranes, oropharynx clear without noted exudate  CV-irregular normal rate, no rubs, murmurs, gallops appreciated, 2+ b/l lower extremity edema, palpable distal pulses (radial, dorsalis pedis, posterior tibial), LUE AVF.  Chest pain not reproducible  Resp-few bibasilar crackles, normal respiratory effort,   GI-abdomen soft and nontender, no rebound or guarding present  -not examined  MSK-normal range of motion of bilateral elbows and knees, no obvious deformities   Skin-warm, dry, no rash appreciated  Psych-calm, cooperative, normal affect, not attending to internal stimuli

## 2019-12-01 NOTE — H&P ADULT - HISTORY OF PRESENT ILLNESS
Ninoska Herr is a 54 year old woman with a history of ESRD HD TThS, HFrEF (EF 40-45%), HTN, HLD, T2DM who presents for chest pain after missed HD sessions.    She last got her HD 11/26 and then missed her 11/28 and 11/30 sessions because she wasn't feeling well.  On 11/29, she noticed intermittent stabbing substernal chest pain that would last a few minutes when it would come on.  It would worsen with lying flat or exertion.  It was associated with shortness of breath and some sweats.  No fevers, radiation.  Rated as a 9/10.  She suffers from chronic migraines and reported she also developed a migraine over the past 2-3 days.  Her intermittent chest pain did not fully resolve, so she presented to [facility] ED for further evaluation.    In the ED, she was afebrile initially charted with a HR of 44 but subsequently normalized to 82.  Diagnostics revealed a troponin of 87 downtrended to 84 and a EKG showing bigeminy.  sHe was given , calcium gluconate and admitted for further management.    On evaluation, she gave the above history and reported continued intermittent chest pain.

## 2019-12-01 NOTE — H&P ADULT - NSHPREVIEWOFSYSTEMS_GEN_ALL_CORE
ROS:  Constitutional:  (+) sweats, (-) fevers, chills, unintentional weight loss, fatigue, sick contacts, recent travel, trauma  Ears/Nose/Mouth/Throat: (+) none; (-) throat pain, rhinorrhea, dysphagia/odynophagia  CV: (+) chest pain, lower extremity edema, orthopnea, (-)  palpitations,PND  Resp: (+) shortness of breath, cough(-) hemoptysis  GI: (+) none; (-) abdominal pain, nausea, vomitting, diarrhea, constipation, melana, hematochezia  : (+) none; (-) dysuria, hematuria  MSK: (+) none; (-) joint pain, joint swelling  Skin: (+) none; (-) rash, new yellowing/darkening of skin  Neuro: (+) HA, (-) vision changes, weakness, confusion, lightheadedness/dizziness  Endo: (+) none; (-) heat/cold intolerance, recent skin/hair/nail changes  Heme/Lymph: (+) none; (-) swollen lymph nodes, night sweats

## 2019-12-01 NOTE — ED ADULT NURSE NOTE - OBJECTIVE STATEMENT
Break Shift RN: Pt received to spot 26 ESRD pt with left arm fistula missing dialysis this past Thursday and Sat. Pt reports she normally receives dialysis on Tuesdays, Thursdays and Saturdays with last session this past Tuesday. Pt reports missing dialysis due to "headache". Pt a&ox4 and ambulatory at baseline, skin intact, respirations even and unlabored, abd soft and non-distended. Pt complaining of intermittent CP x 1wk, radiating to rt side of chest, described as "sharp". 20G placed in rt arm, labs drawn and sent. Will endorse report to primary RN.

## 2019-12-01 NOTE — PATIENT PROFILE ADULT - HAS THE PATIENT EXPERIENCED ANY OF THE FOLLOWING WITHIN THE WEEK PRIOR TO ADMISSION?
Hide Include Location In Plan Question?: No Detail Level: Generalized Include Location In Plan?: Yes Detail Level: Simple Detail Level: Zone no

## 2019-12-01 NOTE — ED ADULT NURSE NOTE - CHIEF COMPLAINT QUOTE
Pt arrives via [facility], son is with patient . As per EMT" She is a diaylsis patient missed treatments x 2 this week has complaints of chest pain, headache and difficulty breathing. " fbs at scene 162" . Pt st" I have chest pain and headaches, chills and feel short of breath...going on all week I am not feeling good. "  pt has left arm fistula

## 2019-12-01 NOTE — ED ADULT NURSE NOTE - ED STAT RN HANDOFF DETAILS
Pt a&ox4 and ambulatory with cane.  Pt aware of plan of care and awaiting telemetry bed assignment at this time. Pt being transported to [facility].  Vital sign as noted. Will continue to monitor.

## 2019-12-01 NOTE — H&P ADULT - PROBLEM SELECTOR PLAN 2
-Possibly related to volume overload.    -Electrolytes overall not warranting emergent management.  Phos and K mildly elevated  -Plan for HD in the daytime

## 2019-12-01 NOTE — ED ADULT NURSE REASSESSMENT NOTE - NS ED NURSE REASSESS COMMENT FT1
Report received from [facility] coverage JACQUELYN Munoz. Pt A&Ox4, received with 20G IV in right antecubital. Pt respiration even and unlabored. Resting comfortably. Pt on cardiac monitor. Will continue to monitor.

## 2019-12-01 NOTE — ED PROVIDER NOTE - OBJECTIVE STATEMENT
Smita Rockwell MD PGY-2 53 yo F with PMH ESRD on HD Tues, Thurs, Sat (last received on Tuesday), CHF, HTN, HLD, DM p/w 2 days of L sided intermittent sharp chest pain, worsening SOB (paroxysmal nocturnal dyspnea and orthopnea), and headache and cough. Chest pain and SOB worse with exertion. had a stress test in September, reportedly normal. States LE are swollen but they are at baseline. States she did not receive her dialysis the last 2 session because she "did not feel well". Pt is on Plavix "for my heart" but reports no stents    card: Dr. Francisco Smita Rockwell MD PGY-2 55 yo F with PMH ESRD on HD Tues, Thurs, Sat (last received on Tuesday), CHF, HTN, HLD, DM p/w 2 days of L sided intermittent sharp chest pain, worsening SOB (paroxysmal nocturnal dyspnea and orthopnea), and headache and cough. Chest pain and SOB worse with exertion. had a stress test in September, reportedly normal. States LE are swollen but they are at baseline. States she did not receive her dialysis the last 2 session because she "did not feel well". Pt is on Plavix "for my heart" but reports no stents    card: Dr. Francisco  Nephro: [facility]-Dr. Klaus Vaughn

## 2019-12-01 NOTE — H&P ADULT - ASSESSMENT
54 year old woman with a history of ESRD HD TThS, HFrEF (EF 40-45%), HTN, HLD, T2DM who presents for chest pain after missed HD sessions.

## 2019-12-01 NOTE — ED PROVIDER NOTE - PROGRESS NOTE DETAILS
Smita Rockwell MD PGY-2 pt is bigeminy on EKG, new from baseline ekg in system. pt states she has a history of arrhythmia but does not know the name. for this reason and possible hyperK, will tx empirically with Ca gluconate for cardioprotection in setting of ekg changes before labs result. will consider treating hyper K further when labs result Smita Rockwell MD PGY-2  accepted to dr aceves, will place on tele for new ekg changes in setting of missed dialysis. will admit for dialysis

## 2019-12-01 NOTE — ED ADULT NURSE NOTE - NSIMPLEMENTINTERV_GEN_ALL_ED
Implemented All Universal Safety Interventions:  [place] to call system. Call bell, personal items and telephone within reach. Instruct patient to call for assistance. Room bathroom lighting operational. Non-slip footwear when patient is off stretcher. Physically safe environment: no spills, clutter or unnecessary equipment. Stretcher in lowest position, wheels locked, appropriate side rails in place.

## 2019-12-01 NOTE — ED PROVIDER NOTE - PHYSICAL EXAMINATION
PHYSICAL EXAM:   General: well-appearing, appears stated age, in no acute distress  HEENT: NC/AT, airway patent  Cardiovascular: bradycardic, + S1/S2, no murmurs, rubs, gallops appreciated  Respiratory: clear to auscultation bilaterally, good aeration bilaterally, nonlabored respirations  Abdominal: soft, nontender, nondistended, no rebound, guarding or rigidity  Psychiatric: appropriate mood and affect.   -Smita Rockwell PGY-2 PHYSICAL EXAM:   General: well-appearing, appears stated age, in no acute distress  HEENT: NC/AT, airway patent, mmm  Cardiovascular: bradycardic, + S1/S2, no murmurs, rubs, gallops appreciated  Respiratory: clear to auscultation bilaterally, good aeration bilaterally, nonlabored respirations  Abdominal: soft, nontender, nondistended, no rebound, guarding or rigidity  Neuro: aaox3, no focal deficit  MSK: limbs warm well perfused  Psychiatric: appropriate mood and affect.

## 2019-12-01 NOTE — ED PROVIDER NOTE - NS ED ROS FT
REVIEW OF SYSTEMS:  HEENT: +HA per HPI  Cardiac: +CP per HPI  Respiratory: +shortness of breath  Gastrointestinal: no abdominal pain, no nausea, no vomiting  -Smita Rockwell PGY-2

## 2019-12-01 NOTE — CHART NOTE - NSCHARTNOTEFT_GEN_A_CORE
Pt seen and examined at bedside. Please refer to the H&P done today for details.  Feels okay. no complaints. States she missed dialysis (two sessions) due to food poisoning which is now improved.  Her son is at bedside playing video game.    pt is chest pain this am. (previously having sharp stabbing chest pain). no n/v/d. no abd pain.     Labs/vitals reviewed.   Cardiology (Dr. Andrew) and renal consulted. (pt known to Dr. Richmond's group).  Trend cardiac enzymes and HD per renal. (dialysis arrangement for tomorrow will be made per renal)  tele monitoring, TTE to r/o pericardial effusion.    - Dr. KOROMA Htet ([facility])  - [phone]

## 2019-12-01 NOTE — ED PROVIDER NOTE - CLINICAL SUMMARY MEDICAL DECISION MAKING FREE TEXT BOX
Smita Rockwell MD PGY-2 55 yo F with PMH ESRD on HD Tues, Thurs, Sat (last received on Tuesday), HTN, HLD, DM p/w 2 days of L sided intermittent sharp chest pain, worsening SOB (paroxysmal nocturnal dyspnea and orthopnea), and headache and cough. does not appear or sound fluid overload on exam, will get ekg/trop to eval for cardiac causes of chest pain, probnp to assess for chf exacerbation, chest xray, likely admission for dialysis and echo/stress test given high risk chest pain. BP within normal limits, appears comfortable. Tyson Crump DO: 55 yo F PMH ESRD on HD TTS (last received on Tuesday), HTN, HLD, CAD, DM with 2 days of L sided intermittent, non-radiating sharp chest pain, worsening SOB, mild headache and cough. suspect symptoms related to missed HD although lungs cta. ekg bigeminy-poss related to cardiac cp vs electrolyte abnormality in context of missed HD. plan: labs, cxr, likely admit for HD

## 2019-12-01 NOTE — ED ADULT NURSE REASSESSMENT NOTE - NS ED NURSE REASSESS COMMENT FT1
Report received from JACQUELYN German.  Pt a&ox4 and ambulatory with a cane.  Pt resting comfortably and has no complaints of pain or discomfort.  Pt awaiting bed assignment at this time.  Vital signs as noted, call bell in reach. Will continue to monitor for comfort and safety.

## 2019-12-01 NOTE — H&P ADULT - PROBLEM SELECTOR PLAN 5
-Patient of Dr. Francisco at [facility]  -Consult renal in daytime for evaluation  -Monitor phos and K.  Continue cinacalcet, ca acetate  Renal diet

## 2019-12-01 NOTE — H&P ADULT - NSHPLABSRESULTS_GEN_ALL_CORE
Diagnostics reviewed and remarkable for   CBC w/ hgb 10.8 prior 8.8  BMP w/ K 5.5 improved to 5.3, BUN 64, Cr 12.27, ag 16  LFT wnl  Mg 2.7 Phos 6.2  Troponin 87 downtrended to 84.  Priors reviewed and frequently with Troponin >70 on admission  HCG negative  BNP 31358  VBG 7.34/48/24 lactate 1.3  CXR personally reviewed and more globular appearing heart and some increased vasculature.  However, on review of prior CXR heart shape seems similar to priors  EKG personally reviweed and patient in bigeminy with LAE, poor R wave progression.  bigeminy is new compared to 9/2019

## 2019-12-01 NOTE — ED ADULT NURSE REASSESSMENT NOTE - NS ED NURSE REASSESS COMMENT FT1
Pt a&ox4 and ambulatory. MD made aware of BP of 109/46. Pt asymptomatic denies dizziness, HA, or weakness at this time.  As per MD no interventions necessary at this time. Pt admitted and awaiting bedside.  Will continue to monitor for comfort and safety.

## 2019-12-01 NOTE — CONSULT NOTE ADULT - SUBJECTIVE AND OBJECTIVE BOX
CHIEF COMPLAINT:    HPI:  Ninoska Herr is a 54 year old woman with a history of ESRD HD TThS, HFrEF (EF 40-45%), HTN, HLD, T2DM who presents for chest pain after missed HD sessions.    She last got her HD 11/26 and then missed her 11/28 and 11/30 sessions because she wasn't feeling well.  On 11/29, she noticed intermittent stabbing substernal chest pain that would last a few minutes when it would come on.  It would worsen with lying flat or exertion.  It was associated with shortness of breath and some sweats.  No fevers, radiation.  Rated as a 9/10.  She suffers from chronic migraines and reported she also developed a migraine over the past 2-3 days.  Her intermittent chest pain did not fully resolve, so she presented to [facility] ED for further evaluation.    In the ED, she was afebrile initially charted with a HR of 44 but subsequently normalized to 82.  Diagnostics revealed a troponin of 87 downtrended to 84 and a EKG showing bigeminy.  sHe was given , calcium gluconate and admitted for further management.    On evaluation, she gave the above history and reported continued intermittent chest pain.       PAST MEDICAL & SURGICAL HISTORY:  CHF (congestive heart failure)  GERD (gastroesophageal reflux disease)  Anemia of chronic disease  HLD (hyperlipidemia)  NICM (nonischemic cardiomyopathy)  KERI (obstructive sleep apnea)  ESRD (end stage renal disease): HD T/T/S  Depression  Gout  CVA (cerebral vascular accident): 2013- Residual RLE weakness  DM (diabetes mellitus)  HTN (hypertension)  Glaucoma  S/P cholecystectomy  S/P partial hysterectomy  Status post glaucoma surgery  AVF (arteriovenous fistula)          PREVIOUS DIAGNOSTIC TESTING:    [ ] Echocardiogram:  [ ]  Catheterization:  [ ] Stress Test:  	    MEDICATIONS:  MEDICATIONS  (STANDING):  allopurinol 100 milliGRAM(s) Oral daily  amLODIPine   Tablet 10 milliGRAM(s) Oral daily  aspirin enteric coated 81 milliGRAM(s) Oral daily  calcium acetate 667 milliGRAM(s) Oral three times a day with meals  carvedilol 12.5 milliGRAM(s) Oral every 12 hours  cinacalcet 30 milliGRAM(s) Oral daily  dextrose 5%. 1000 milliLiter(s) (50 mL/Hr) IV Continuous <Continuous>  dextrose 50% Injectable 12.5 Gram(s) IV Push once  dextrose 50% Injectable 25 Gram(s) IV Push once  dextrose 50% Injectable 25 Gram(s) IV Push once  hydrALAZINE 100 milliGRAM(s) Oral three times a day  insulin lispro (HumaLOG) corrective regimen sliding scale   SubCutaneous three times a day before meals  insulin lispro (HumaLOG) corrective regimen sliding scale   SubCutaneous at bedtime  isosorbide   dinitrate Tablet (ISORDIL) 20 milliGRAM(s) Oral three times a day      FAMILY HISTORY:  Family history of heart attack      SOCIAL HISTORY:    [ ] Non-smoker  [ ] Smoker  [ ] Alcohol    Allergies    iodine (Unknown)  Seafood (Unknown)  shellfish (Nausea (Mild to Mod); Hives (Mild to Mod))    Intolerances    	    REVIEW OF SYSTEMS:  CONSTITUTIONAL: No fever, weight loss, or fatigue  EYES: No eye pain, visual disturbances, or discharge  ENMT:  No difficulty hearing, tinnitus, vertigo; No sinus or throat pain  NECK: No pain or stiffness  RESPIRATORY: No cough, wheezing, chills or hemoptysis; No Shortness of Breath  CARDIOVASCULAR: No chest pain, palpitations, passing out, dizziness, or leg swelling  GASTROINTESTINAL: No abdominal or epigastric pain. No nausea, vomiting, or hematemesis; No diarrhea or constipation. No melena or hematochezia.  GENITOURINARY: No dysuria, frequency, hematuria, or incontinence  NEUROLOGICAL: No headaches, memory loss, loss of strength, numbness, or tremors  SKIN: No itching, burning, rashes, or lesions   	    [ ] All others negative	  [ ] Unable to obtain    PHYSICAL EXAM:  T(C): 36.6 (12-01-19 @ 08:30), Max: 37.2 (12-01-19 @ 03:08)  HR: 78 (12-01-19 @ 08:30) (44 - 88)  BP: 109/62 (12-01-19 @ 08:30) (109/62 - 149/87)  RR: 17 (12-01-19 @ 08:30) (16 - 18)  SpO2: 96% (12-01-19 @ 08:30) (95% - 100%)  Wt(kg): --  I&O's Summary      Appearance: Normal	  Psychiatry: A & O x 3, Mood & affect appropriate  HEENT:   Normal oral mucosa, PERRL, EOMI	  Lymphatic: No lymphadenopathy  Cardiovascular: Normal S1 S2,RRR, No JVD, No murmurs  Respiratory: Lungs clear to auscultation	  Gastrointestinal:  Soft, Non-tender, + BS	  Skin: No rashes, No ecchymoses, No cyanosis	  Neurologic: Non-focal  Extremities: Normal range of motion, No clubbing, cyanosis or edema  Vascular: Peripheral pulses palpable 2+ bilaterally    TELEMETRY: 	    ECG:  	  RADIOLOGY:  OTHER: 	  	  LABS:	 	    CARDIAC MARKERS:                                  10.8   5.33  )-----------( 196      ( 01 Dec 2019 00:03 )             35.0     12-01    136  |  100  |  67<H>  ----------------------------<  160<H>  5.2   |  19<L>  |  12.63<H>    Ca    8.3<L>      01 Dec 2019 06:15  Phos  6.2     12-01  Mg     2.5     12-01    TPro  6.8  /  Alb  3.8  /  TBili  < 0.2<L>  /  DBili  x   /  AST  48<H>  /  ALT  53<H>  /  AlkPhos  163<H>  12-01    PT/INR - ( 01 Dec 2019 00:03 )   PT: 10.3 SEC;   INR: 0.91          PTT - ( 01 Dec 2019 00:03 )  PTT:31.3 SEC  proBNP: Serum Pro-Brain Natriuretic Peptide: 92824 pg/mL (12-01 @ 00:03)    Lipid Profile:   HgA1c:   TSH:     ASSESSMENT/PLAN:

## 2019-12-01 NOTE — H&P ADULT - NSICDXPASTMEDICALHX_GEN_ALL_CORE_FT
PAST MEDICAL HISTORY:  Anemia of chronic disease     CHF (congestive heart failure)     CVA (cerebral vascular accident) 2013- Residual RLE weakness    Depression     DM (diabetes mellitus)     ESRD (end stage renal disease) HD T/T/S    GERD (gastroesophageal reflux disease)     Glaucoma     Gout     HLD (hyperlipidemia)     HTN (hypertension)     NICM (nonischemic cardiomyopathy)     KERI (obstructive sleep apnea)

## 2019-12-02 LAB
ANION GAP SERPL CALC-SCNC: 16 MMO/L — HIGH (ref 7–14)
BASOPHILS # BLD AUTO: 0.02 K/UL — SIGNIFICANT CHANGE UP (ref 0–0.2)
BASOPHILS NFR BLD AUTO: 0.5 % — SIGNIFICANT CHANGE UP (ref 0–2)
BUN SERPL-MCNC: 79 MG/DL — HIGH (ref 7–23)
CALCIUM SERPL-MCNC: 8.3 MG/DL — LOW (ref 8.4–10.5)
CHLORIDE SERPL-SCNC: 98 MMOL/L — SIGNIFICANT CHANGE UP (ref 98–107)
CO2 SERPL-SCNC: 20 MMOL/L — LOW (ref 22–31)
CREAT SERPL-MCNC: 13.52 MG/DL — HIGH (ref 0.5–1.3)
EOSINOPHIL # BLD AUTO: 0.14 K/UL — SIGNIFICANT CHANGE UP (ref 0–0.5)
EOSINOPHIL NFR BLD AUTO: 3.8 % — SIGNIFICANT CHANGE UP (ref 0–6)
GLUCOSE SERPL-MCNC: 125 MG/DL — HIGH (ref 70–99)
HBA1C BLD-MCNC: 5.3 % — SIGNIFICANT CHANGE UP (ref 4–5.6)
HBV SURFACE AG SER-ACNC: NEGATIVE — SIGNIFICANT CHANGE UP
HCT VFR BLD CALC: 31.2 % — LOW (ref 34.5–45)
HGB BLD-MCNC: 9.8 G/DL — LOW (ref 11.5–15.5)
IMM GRANULOCYTES NFR BLD AUTO: 0.3 % — SIGNIFICANT CHANGE UP (ref 0–1.5)
LYMPHOCYTES # BLD AUTO: 0.77 K/UL — LOW (ref 1–3.3)
LYMPHOCYTES # BLD AUTO: 21.1 % — SIGNIFICANT CHANGE UP (ref 13–44)
MAGNESIUM SERPL-MCNC: 2.4 MG/DL — SIGNIFICANT CHANGE UP (ref 1.6–2.6)
MCHC RBC-ENTMCNC: 30.8 PG — SIGNIFICANT CHANGE UP (ref 27–34)
MCHC RBC-ENTMCNC: 31.4 % — LOW (ref 32–36)
MCV RBC AUTO: 98.1 FL — SIGNIFICANT CHANGE UP (ref 80–100)
MONOCYTES # BLD AUTO: 0.27 K/UL — SIGNIFICANT CHANGE UP (ref 0–0.9)
MONOCYTES NFR BLD AUTO: 7.4 % — SIGNIFICANT CHANGE UP (ref 2–14)
NEUTROPHILS # BLD AUTO: 2.44 K/UL — SIGNIFICANT CHANGE UP (ref 1.8–7.4)
NEUTROPHILS NFR BLD AUTO: 66.9 % — SIGNIFICANT CHANGE UP (ref 43–77)
NRBC # FLD: 0 K/UL — SIGNIFICANT CHANGE UP (ref 0–0)
PHOSPHATE SERPL-MCNC: 6 MG/DL — HIGH (ref 2.5–4.5)
PLATELET # BLD AUTO: 152 K/UL — SIGNIFICANT CHANGE UP (ref 150–400)
PMV BLD: 11.1 FL — SIGNIFICANT CHANGE UP (ref 7–13)
POTASSIUM SERPL-MCNC: 5.7 MMOL/L — HIGH (ref 3.5–5.3)
POTASSIUM SERPL-SCNC: 5.7 MMOL/L — HIGH (ref 3.5–5.3)
RBC # BLD: 3.18 M/UL — LOW (ref 3.8–5.2)
RBC # FLD: 13.9 % — SIGNIFICANT CHANGE UP (ref 10.3–14.5)
SODIUM SERPL-SCNC: 134 MMOL/L — LOW (ref 135–145)
WBC # BLD: 3.65 K/UL — LOW (ref 3.8–10.5)
WBC # FLD AUTO: 3.65 K/UL — LOW (ref 3.8–10.5)

## 2019-12-02 PROCEDURE — 93306 TTE W/DOPPLER COMPLETE: CPT | Mod: 26

## 2019-12-02 RX ORDER — CHLORHEXIDINE GLUCONATE 213 G/1000ML
1 SOLUTION TOPICAL DAILY
Refills: 0 | Status: DISCONTINUED | OUTPATIENT
Start: 2019-12-02 | End: 2019-12-03

## 2019-12-02 RX ADMIN — Medication 667 MILLIGRAM(S): at 13:59

## 2019-12-02 RX ADMIN — Medication 100 MILLIGRAM(S): at 13:59

## 2019-12-02 RX ADMIN — ISOSORBIDE DINITRATE 20 MILLIGRAM(S): 5 TABLET ORAL at 23:46

## 2019-12-02 RX ADMIN — Medication 81 MILLIGRAM(S): at 13:59

## 2019-12-02 RX ADMIN — Medication 100 MILLIGRAM(S): at 23:46

## 2019-12-02 RX ADMIN — Medication 667 MILLIGRAM(S): at 09:17

## 2019-12-02 RX ADMIN — CARVEDILOL PHOSPHATE 12.5 MILLIGRAM(S): 80 CAPSULE, EXTENDED RELEASE ORAL at 23:45

## 2019-12-02 RX ADMIN — CINACALCET 30 MILLIGRAM(S): 30 TABLET, FILM COATED ORAL at 13:59

## 2019-12-02 NOTE — PROGRESS NOTE ADULT - PROBLEM SELECTOR PLAN 2
-Possibly related to volume overload.    -Electrolytes overall not warranting emergent management.  Phos and K mildly elevated  -Plan for HD per renal

## 2019-12-02 NOTE — PROGRESS NOTE ADULT - ATTENDING COMMENTS
agree with above NP note.  cv stable  cont current tx
- Dr. GA Hays ([facility])  - [phone]

## 2019-12-02 NOTE — PROGRESS NOTE ADULT - PROBLEM SELECTOR PLAN 3
Appears mildly volume overloaded  HD for volume management  Continue hydral, isordil, coreg.    -Daily weight, I&Os, tele, fluid restrictions

## 2019-12-02 NOTE — PROGRESS NOTE ADULT - PROBLEM SELECTOR PLAN 1
-Atypical description with stabbing nature, worsening more with lying down flat.  Most likely related to pulmonary edema from missed HD sessions.  Review of prior records showed prior admission in 9/2019 for the same with improvement with HD.  Low concern for ACS given history and downtrending troponins.  CXR without PNA or ptx.  -stable troponin. tele no event.   -TTE w/ EF 40-45% 5/2019.    -Reevaluate after HD for resolution.  If not resolved, would consider further evaluation.  -Troponin elevation likely chronic from ESRD or demand from volume overload.  - cardio eval appreciated. limited TTE to r/o pericardial effusion.

## 2019-12-02 NOTE — CONSULT NOTE ADULT - SUBJECTIVE AND OBJECTIVE BOX
Patient is a 54y old  Female who presents with a chief complaint of CC: Chest pain and missed HD (02 Dec 2019 10:44)        HPI:  Ms. Herr is a 54 year-old woman with history of multiple medical issues including hypertension, type 2 diabetes mellitus, systolic congestive heart failure, and end stage renal disease. She is slotted for TIW dialysis Tuesdays, Thursdays, and Saturdays at the [facility]; she often misses dialysis sessions, however. She presented yesterday to the [facility] ER with intermittent 9/10 stabbing substernal chest pain for 2 days, as well as headaches. She was noted in the ER to be bradycardic with a HR of 44bpm; Troponin 87-->84. She was last dialyzed on 11/26/19 (6days ago) as she missed her HD sessions 11/28 and 11/30.      PAST MEDICAL & SURGICAL HISTORY:  CHF (congestive heart failure)  GERD (gastroesophageal reflux disease)  Anemia of chronic disease  HLD (hyperlipidemia)  NICM (nonischemic cardiomyopathy)  KERI (obstructive sleep apnea)  ESRD (end stage renal disease): HD T/T/S  Depression  Gout  CVA (cerebral vascular accident): 2013- Residual RLE weakness  DM (diabetes mellitus)  HTN (hypertension)  Glaucoma  S/P cholecystectomy  S/P partial hysterectomy  Status post glaucoma surgery  AVF (arteriovenous fistula)    Allergies  iodine (Unknown)  shellfish (Nausea (Mild to Mod); Hives (Mild to Mod))    SOCIAL HISTORY:  Denies ETOh,Smoking,     FAMILY HISTORY:  Family history of heart attack    REVIEW OF SYSTEMS:  CONSTITUTIONAL: No weakness, fevers or chills  EYES/ENT: No visual changes;  No vertigo or throat pain   NECK: No pain or stiffness  RESPIRATORY: No cough, wheezing, hemoptysis; No shortness of breath  CARDIOVASCULAR: (+)chest pain  GASTROINTESTINAL: No abdominal or epigastric pain. No nausea, vomiting, or hematemesis; No diarrhea or constipation. No melena or hematochezia.  GENITOURINARY: No dysuria, frequency or hematuria  NEUROLOGICAL: (+)headache  SKIN: No itching, burning, rashes, or lesions   All other review of systems is negative unless indicated above.    VITAL:  T(C): , Max: 36.9 (12-01-19 @ 17:08)  T(F): , Max: 98.4 (12-01-19 @ 17:08)  HR: 80 (12-02-19 @ 06:35)  BP: 111/63 (12-02-19 @ 06:35)  RR: 16 (12-02-19 @ 06:35)  SpO2: 100% (12-02-19 @ 06:35)    PHYSICAL EXAM:  Constitutional: NAD, Alert  HEENT: NCAT, MMM  Neck: Supple, No JVD  Respiratory: CTA-b/l  Cardiovascular: RRR s1s2, no m/r/g  Gastrointestinal: BS+, soft, NT/ND  Extremities: No peripheral edema b/l  Neurological: no focal deficits; strength grossly intact  Back: no CVAT b/l  Skin: No rashes, no nevi    LABS:                        10.8   5.33  )-----------( 196      ( 01 Dec 2019 00:03 )             35.0     Na(136)/K(5.2)/Cl(100)/HCO3(19)/BUN(67)/Cr(12.63)Glu(160)/Ca(8.3)/Mg(2.5)/PO4(6.2)    12-01 @ 06:15  Na(137)/K(5.3)/Cl(99)/HCO3(24)/BUN(64)/Cr(12.34)Glu(107)/Ca(8.6)/Mg(--)/PO4(--)    12-01 @ 01:04  Na(138)/K(5.5)/Cl(99)/HCO3(23)/BUN(64)/Cr(12.27)Glu(115)/Ca(8.4)/Mg(2.7)/PO4(6.2)    12-01 @ 00:03    IMAGING:  < from: Xray Chest 2 Views PA/Lat (12.01.19 @ 01:16) >  IMPRESSION: Cardiomegaly with clear lungs.    ASSESSMENT:  (1)Renal - ESRD - HD TTS - highly indicated for HD today, as she has not been dialyzed for 6 days. Seems worthwhile to dialyze both today and tomorrow. This would (a)allow her to get back on her regular TTS schedule, (b)allow for gentle HD today so as to avoid dialysis disequilibrium, and (c)allow for aggressive UF x 2 consecutive days.  (2)Hyperkalemia - should improve with HD  (3)Hyperphosphatemia - on Phoslo TIW with meals  (4)Chest pain - will she require cath?     RECOMMEND:  (1)HD today and tomorrow - aggressive UF; low-K bath; will set HD up today to minimize dysequilibrium risk  (2)Renal diet/Phoslo as ordered  (3)No renal objection to cardiac cath  (4)Will  patient on importance of complying with outpatient HD as ordered      Thank you for involving [facility] in this patient's care.    With warm regards,    Wale Francisco MD   [facility]  [phone] Patient is a 54y old  Female who presents with a chief complaint of CC: Chest pain and missed HD (02 Dec 2019 10:44)        HPI:  Ms. Herr is a 54 year-old woman with history of multiple medical issues including hypertension, type 2 diabetes mellitus, systolic congestive heart failure, and end stage renal disease. She is slotted for TIW dialysis Tuesdays, Thursdays, and Saturdays at the [facility]; she often misses dialysis sessions, however. She presented yesterday to the [facility] ER with intermittent 9/10 stabbing substernal chest pain for 2 days, as well as headaches. She was noted in the ER to be bradycardic with a HR of 44bpm; Troponin 87-->84. She was last dialyzed on 11/26/19 (6days ago) as she missed her HD sessions 11/28 and 11/30.      PAST MEDICAL & SURGICAL HISTORY:  CHF (congestive heart failure)  GERD (gastroesophageal reflux disease)  Anemia of chronic disease  HLD (hyperlipidemia)  NICM (nonischemic cardiomyopathy)  KERI (obstructive sleep apnea)  ESRD (end stage renal disease): HD T/T/S  Depression  Gout  CVA (cerebral vascular accident): 2013- Residual RLE weakness  DM (diabetes mellitus)  HTN (hypertension)  Glaucoma  S/P cholecystectomy  S/P partial hysterectomy  Status post glaucoma surgery  AVF (arteriovenous fistula)    Allergies  iodine (Unknown)  shellfish (Nausea (Mild to Mod); Hives (Mild to Mod))    SOCIAL HISTORY:  Denies ETOh,Smoking,     FAMILY HISTORY:  Family history of heart attack    REVIEW OF SYSTEMS:  CONSTITUTIONAL: No weakness, fevers or chills  EYES/ENT: No visual changes;  No vertigo or throat pain   NECK: No pain or stiffness  RESPIRATORY: No cough, wheezing, hemoptysis; No shortness of breath  CARDIOVASCULAR: (+)chest pain  GASTROINTESTINAL: No abdominal or epigastric pain. No nausea, vomiting, or hematemesis; No diarrhea or constipation. No melena or hematochezia.  GENITOURINARY: No dysuria, frequency or hematuria  NEUROLOGICAL: (+)headache  SKIN: No itching, burning, rashes, or lesions   All other review of systems is negative unless indicated above.    VITAL:  T(C): , Max: 36.9 (12-01-19 @ 17:08)  T(F): , Max: 98.4 (12-01-19 @ 17:08)  HR: 80 (12-02-19 @ 06:35)  BP: 111/63 (12-02-19 @ 06:35)  RR: 16 (12-02-19 @ 06:35)  SpO2: 100% (12-02-19 @ 06:35)    PHYSICAL EXAM:  Constitutional: NAD, Alert  HEENT: NCAT, MMM  Neck: Supple, No JVD  Respiratory: CTA-b/l  Cardiovascular: RRR s1s2, no m/r/g  Gastrointestinal: BS+, soft, NT/ND  Extremities: No peripheral edema b/l  Neurological: no focal deficits; strength grossly intact  Back: no CVAT b/l  Skin: No rashes, no nevi  LUE AVF (+)thrill    LABS:                        10.8   5.33  )-----------( 196      ( 01 Dec 2019 00:03 )             35.0     Na(136)/K(5.2)/Cl(100)/HCO3(19)/BUN(67)/Cr(12.63)Glu(160)/Ca(8.3)/Mg(2.5)/PO4(6.2)    12-01 @ 06:15  Na(137)/K(5.3)/Cl(99)/HCO3(24)/BUN(64)/Cr(12.34)Glu(107)/Ca(8.6)/Mg(--)/PO4(--)    12-01 @ 01:04  Na(138)/K(5.5)/Cl(99)/HCO3(23)/BUN(64)/Cr(12.27)Glu(115)/Ca(8.4)/Mg(2.7)/PO4(6.2)    12-01 @ 00:03    IMAGING:  < from: Xray Chest 2 Views PA/Lat (12.01.19 @ 01:16) >  IMPRESSION: Cardiomegaly with clear lungs.    ASSESSMENT:  (1)Renal - ESRD - HD TTS - highly indicated for HD today, as she has not been dialyzed for 6 days. Seems worthwhile to dialyze both today and tomorrow. This would (a)allow her to get back on her regular TTS schedule, (b)allow for gentle HD today so as to avoid dialysis disequilibrium, and (c)allow for aggressive UF x 2 consecutive days.  (2)Hyperkalemia - should improve with HD  (3)Hyperphosphatemia - on Phoslo TIW with meals  (4)Chest pain - will she require cath?     RECOMMEND:  (1)HD today and tomorrow - aggressive UF; low-K bath; will set HD up today to minimize dysequilibrium risk  (2)Renal diet/Phoslo as ordered  (3)No renal objection to cardiac cath  (4)Will  patient on importance of complying with outpatient HD as ordered      Thank you for involving [facility] in this patient's care.    With warm regards,    Wale Francisco MD   [facility]  [phone]

## 2019-12-02 NOTE — PROGRESS NOTE ADULT - SUBJECTIVE AND OBJECTIVE BOX
CARDIOLOGY FOLLOW UP - Dr. Andrew    CC no cp or sob       PHYSICAL EXAM:  T(C): 36.9 (12-02-19 @ 06:35), Max: 36.9 (12-01-19 @ 17:08)  HR: 80 (12-02-19 @ 06:35) (80 - 83)  BP: 111/63 (12-02-19 @ 06:35) (109/46 - 124/60)  RR: 16 (12-02-19 @ 06:35) (16 - 18)  SpO2: 100% (12-02-19 @ 06:35) (93% - 100%)  Wt(kg): --  I&O's Summary    01 Dec 2019 07:01  -  02 Dec 2019 07:00  --------------------------------------------------------  IN: 200 mL / OUT: 0 mL / NET: 200 mL        Appearance: Normal	  Cardiovascular: Normal S1 S2,RRR   Respiratory: diminished   Gastrointestinal:  Soft, Non-tender, + BS	  Extremities: Normal range of motion, + 1 bl LE  edema        MEDICATIONS  (STANDING):  allopurinol 100 milliGRAM(s) Oral daily  amLODIPine   Tablet 10 milliGRAM(s) Oral daily  aspirin enteric coated 81 milliGRAM(s) Oral daily  calcium acetate 667 milliGRAM(s) Oral three times a day with meals  carvedilol 12.5 milliGRAM(s) Oral every 12 hours  cinacalcet 30 milliGRAM(s) Oral daily  dextrose 5%. 1000 milliLiter(s) (50 mL/Hr) IV Continuous <Continuous>  dextrose 50% Injectable 12.5 Gram(s) IV Push once  dextrose 50% Injectable 25 Gram(s) IV Push once  dextrose 50% Injectable 25 Gram(s) IV Push once  hydrALAZINE 100 milliGRAM(s) Oral three times a day  insulin lispro (HumaLOG) corrective regimen sliding scale   SubCutaneous three times a day before meals  insulin lispro (HumaLOG) corrective regimen sliding scale   SubCutaneous at bedtime  isosorbide   dinitrate Tablet (ISORDIL) 20 milliGRAM(s) Oral three times a day      TELEMETRY: NSr pac 	    ECG:  	  RADIOLOGY:   DIAGNOSTIC TESTING:  [ ] Echocardiogram:  [ ]  Catheterization:  [ ] Stress Test:    OTHER: 	    LABS:	 	    Troponin T, High Sensitivity: 89 ng/L [<6 - 14] (12-01 @ 06:15)  Troponin T, High Sensitivity: 84 ng/L [<6 - 14] (12-01 @ 01:04)  Troponin T, High Sensitivity: 87 ng/L [<6 - 14] (12-01 @ 00:03)                          10.8   5.33  )-----------( 196      ( 01 Dec 2019 00:03 )             35.0     12-01    136  |  100  |  67<H>  ----------------------------<  160<H>  5.2   |  19<L>  |  12.63<H>    Ca    8.3<L>      01 Dec 2019 06:15  Phos  6.2     12-01  Mg     2.5     12-01    TPro  6.8  /  Alb  3.8  /  TBili  < 0.2<L>  /  DBili  x   /  AST  48<H>  /  ALT  53<H>  /  AlkPhos  163<H>  12-01    PT/INR - ( 01 Dec 2019 00:03 )   PT: 10.3 SEC;   INR: 0.91          PTT - ( 01 Dec 2019 00:03 )  PTT:31.3 SEC

## 2019-12-02 NOTE — PROGRESS NOTE ADULT - ASSESSMENT
Echo 1/5/20189: EF 45-50%, global LV sys dysfx, mild diastolic dysfx (stgI)  Stress test in 6/2018 normal with no evidence of mi or ischemia   Echo 3/ 22/2019, mild to mod MR, moderate global lv sys dysfx small pericardial effusion to left ventricle EF 41%   Echo 5/5/19: ef 40-45%, mod global lv sys dysfx, small pericardial effusion post to left ventricle    a/p  55 yo Female with PMHx of Systolic CHF, DM, HTN, HLD, ESRD (Tues, Thurs, Sat) , Gout presenting with cp and sob due to CHF from missed HD    1. Chest pain, atypical from CHF  symptoms likely in the setting of fluid overload, secondary to missed HD session   cv stable, no cp/sob, no evidence of acute ischemia   HST elevated, type II MI, demand ischemia 2/2 to ESRD, CHF , EKG unchanged   can check limited Echo to eval pericardial effusion   c/w BB, ASA, imdur, plavix     2. Acute on chronic Systolic chf   fluid overloaded likely secondary to missed HD session  cont fluid removal with HD   continue bb, imdur, hydral  no acie/arb hx of hyperkalemia     3. HTN  stable   continue current meds     4. ESRD on HD  renal f/u     dvt ppx

## 2019-12-02 NOTE — PROGRESS NOTE ADULT - SUBJECTIVE AND OBJECTIVE BOX
Patient is a 54y old  Female who presents with a chief complaint of CC: Chest pain and missed HD (02 Dec 2019 10:52)      SUBJECTIVE / OVERNIGHT EVENTS:  no events on tele.  no cp, no sob, no n/v/d. no abdominal pain.  no headache, no dizziness.  dialysis today  await TTE        Vital Signs Last 24 Hrs  T(C): 36.9 (02 Dec 2019 06:35), Max: 36.9 (01 Dec 2019 17:08)  T(F): 98.4 (02 Dec 2019 06:35), Max: 98.4 (01 Dec 2019 17:08)  HR: 80 (02 Dec 2019 06:35) (80 - 82)  BP: 111/63 (02 Dec 2019 06:35) (109/46 - 124/60)  BP(mean): --  RR: 16 (02 Dec 2019 06:35) (16 - 18)  SpO2: 100% (02 Dec 2019 06:35) (95% - 100%)  I&O's Summary    01 Dec 2019 07:01  -  02 Dec 2019 07:00  --------------------------------------------------------  IN: 200 mL / OUT: 0 mL / NET: 200 mL        PHYSICAL EXAM:  GENERAL: NAD, Comfortable  HEAD:  Atraumatic, Normocephalic  EYES: EOMI, PERRLA, conjunctiva and sclera clear  NECK: Supple, No JVD  CHEST/LUNG: mild decrease breath sounds bilaterally; No wheeze   HEART: Regular rate and rhythm; No murmurs, rubs, or gallops  ABDOMEN: Soft, Nontender, Nondistended; Bowel sounds present  Neuro: AAO x 3, no focal deficit, 5/5 b/l extremities  EXTREMITIES:  2+ Peripheral Pulses, No clubbing, cyanosis, +edema, +AVF  SKIN: No rashes or lesions    LABS:                        10.8   5.33  )-----------( 196      ( 01 Dec 2019 00:03 )             35.0     12-01    136  |  100  |  67<H>  ----------------------------<  160<H>  5.2   |  19<L>  |  12.63<H>    Ca    8.3<L>      01 Dec 2019 06:15  Phos  6.2     12-01  Mg     2.5     12-01    TPro  6.8  /  Alb  3.8  /  TBili  < 0.2<L>  /  DBili  x   /  AST  48<H>  /  ALT  53<H>  /  AlkPhos  163<H>  12-01    PT/INR - ( 01 Dec 2019 00:03 )   PT: 10.3 SEC;   INR: 0.91          PTT - ( 01 Dec 2019 00:03 )  PTT:31.3 SEC  CAPILLARY BLOOD GLUCOSE      POCT Blood Glucose.: 112 mg/dL (02 Dec 2019 11:54)  POCT Blood Glucose.: 110 mg/dL (02 Dec 2019 08:46)  POCT Blood Glucose.: 93 mg/dL (01 Dec 2019 23:40)  POCT Blood Glucose.: 90 mg/dL (01 Dec 2019 15:57)            RADIOLOGY & ADDITIONAL TESTS:    Imaging Personally Reviewed:  [x] YES  [ ] NO    Consultant(s) Notes Reviewed:  [x] YES  [ ] NO      MEDICATIONS  (STANDING):  allopurinol 100 milliGRAM(s) Oral daily  amLODIPine   Tablet 10 milliGRAM(s) Oral daily  aspirin enteric coated 81 milliGRAM(s) Oral daily  calcium acetate 667 milliGRAM(s) Oral three times a day with meals  carvedilol 12.5 milliGRAM(s) Oral every 12 hours  cinacalcet 30 milliGRAM(s) Oral daily  dextrose 5%. 1000 milliLiter(s) (50 mL/Hr) IV Continuous <Continuous>  dextrose 50% Injectable 12.5 Gram(s) IV Push once  dextrose 50% Injectable 25 Gram(s) IV Push once  dextrose 50% Injectable 25 Gram(s) IV Push once  hydrALAZINE 100 milliGRAM(s) Oral three times a day  insulin lispro (HumaLOG) corrective regimen sliding scale   SubCutaneous three times a day before meals  insulin lispro (HumaLOG) corrective regimen sliding scale   SubCutaneous at bedtime  isosorbide   dinitrate Tablet (ISORDIL) 20 milliGRAM(s) Oral three times a day    MEDICATIONS  (PRN):  dextrose 40% Gel 15 Gram(s) Oral once PRN Blood Glucose LESS THAN 70 milliGRAM(s)/deciliter  glucagon  Injectable 1 milliGRAM(s) IntraMuscular once PRN Glucose LESS THAN 70 milligrams/deciliter      Care Discussed with Consultants/Other Providers [x] YES  [ ] NO    HEALTH ISSUES - PROBLEM Dx:  Anemia due to chronic kidney disease, on chronic dialysis: Anemia due to chronic kidney disease, on chronic dialysis  Discharge planning issues: Discharge planning issues  Need for prophylactic measure: Need for prophylactic measure  Essential hypertension: Essential hypertension  Type 2 diabetes mellitus with chronic kidney disease on chronic dialysis, without long-term current use of insulin: Type 2 diabetes mellitus with chronic kidney disease on chronic dialysis, without long-term current use of insulin  ESRD (end stage renal disease): ESRD (end stage renal disease)  Anemia of chronic disease: Anemia of chronic disease  Chronic systolic heart failure: Chronic systolic heart failure  Bigeminy: Bigeminy  Chest pain, atypical: Chest pain, atypical

## 2019-12-03 ENCOUNTER — TRANSCRIPTION ENCOUNTER (OUTPATIENT)
Age: 54
End: 2019-12-03

## 2019-12-03 VITALS
DIASTOLIC BLOOD PRESSURE: 74 MMHG | RESPIRATION RATE: 16 BRPM | HEART RATE: 77 BPM | SYSTOLIC BLOOD PRESSURE: 133 MMHG | TEMPERATURE: 98 F | OXYGEN SATURATION: 100 %

## 2019-12-03 LAB — SPECIMEN SOURCE: SIGNIFICANT CHANGE UP

## 2019-12-03 RX ORDER — ASPIRIN/CALCIUM CARB/MAGNESIUM 324 MG
1 TABLET ORAL
Qty: 0 | Refills: 0 | DISCHARGE
Start: 2019-12-03

## 2019-12-03 RX ADMIN — Medication 667 MILLIGRAM(S): at 12:17

## 2019-12-03 RX ADMIN — Medication 667 MILLIGRAM(S): at 09:02

## 2019-12-03 RX ADMIN — CHLORHEXIDINE GLUCONATE 1 APPLICATION(S): 213 SOLUTION TOPICAL at 14:41

## 2019-12-03 RX ADMIN — Medication 100 MILLIGRAM(S): at 14:41

## 2019-12-03 RX ADMIN — Medication 81 MILLIGRAM(S): at 14:41

## 2019-12-03 RX ADMIN — CINACALCET 30 MILLIGRAM(S): 30 TABLET, FILM COATED ORAL at 14:41

## 2019-12-03 RX ADMIN — ISOSORBIDE DINITRATE 20 MILLIGRAM(S): 5 TABLET ORAL at 14:41

## 2019-12-03 NOTE — DISCHARGE NOTE NURSING/CASE MANAGEMENT/SOCIAL WORK - PATIENT PORTAL LINK FT
You can access the FollowMyHealth Patient Portal offered by [facility] by registering at the following website: [email or web address]. By joining [facility]’s FollowMyHealth portal, you will also be able to view your health information using other applications (apps) compatible with our system.

## 2019-12-03 NOTE — DISCHARGE NOTE NURSING/CASE MANAGEMENT/SOCIAL WORK - NSDCCRNAME_GEN_ALL_CORE_FT
Resume Out Patient dialysis at [facility] [phone] [facility]  on Thursday 12/05/2019.Patient can return to [facility] [address] [facility] Phone  before 9:00pm with Discharge Summary.

## 2019-12-03 NOTE — DISCHARGE NOTE PROVIDER - NSDCMRMEDTOKEN_GEN_ALL_CORE_FT
amLODIPine 10 mg oral tablet: 1 tab(s) orally once a day  aspirin 81 mg oral delayed release tablet: 1 tab(s) orally once a day  calcium acetate 667 mg oral tablet: 1 tab(s) orally 3 times a day  carvedilol 12.5 mg oral tablet: 1 tab(s) orally every 12 hours    **per pharmacy last filled 8/7/2019 for a 30 day supply, not filled since   cinacalcet 30 mg oral tablet: 1 tab(s) orally once a day  hydrALAZINE 100 mg oral tablet: 1 tab(s) orally 3 times a day  isosorbide dinitrate 20 mg oral tablet: 1  orally 3 times a day  Januvia 25 mg oral tablet: 1 tab(s) orally once a day  Lasix 20 mg oral tablet: 1 tab(s) orally once a day  Zyloprim 100 mg oral tablet: 1 tab(s) orally once a day

## 2019-12-03 NOTE — PROGRESS NOTE ADULT - SUBJECTIVE AND OBJECTIVE BOX
No pain, no shortness of breath      VITAL:  T(C): , Max: 37.1 (12-03-19 @ 06:35)  T(F): , Max: 98.7 (12-03-19 @ 06:35)  HR: 80 (12-03-19 @ 06:35)  BP: 118/60 (12-03-19 @ 06:35)  BP(mean): --  RR: 16 (12-03-19 @ 06:35)  SpO2: 98% (12-03-19 @ 06:35)        PHYSICAL EXAM:  Constitutional: NAD, Alert  HEENT: NCAT, MMM  Neck: Supple, No JVD  Respiratory: CTA-b/l  Cardiovascular: RRR s1s2, no m/r/g  Gastrointestinal: BS+, soft, NT/ND  Extremities: No peripheral edema b/l  Neurological: no focal deficits; strength grossly intact  Back: no CVAT b/l  Skin: No rashes, no nevi  LUE AVF (+)thrill      LABS:                        9.8    3.65  )-----------( 152      ( 02 Dec 2019 18:57 )             31.2     Na(134)/K(5.7)/Cl(98)/HCO3(20)/BUN(79)/Cr(13.52)Glu(125)/Ca(8.3)/Mg(2.4)/PO4(6.0)    12-02 @ 18:57  Na(136)/K(5.2)/Cl(100)/HCO3(19)/BUN(67)/Cr(12.63)Glu(160)/Ca(8.3)/Mg(2.5)/PO4(6.2)    12-01 @ 06:15  Na(137)/K(5.3)/Cl(99)/HCO3(24)/BUN(64)/Cr(12.34)Glu(107)/Ca(8.6)/Mg(--)/PO4(--)    12-01 @ 01:04  Na(138)/K(5.5)/Cl(99)/HCO3(23)/BUN(64)/Cr(12.27)Glu(115)/Ca(8.4)/Mg(2.7)/PO4(6.2)    12-01 @ 00:03      IMPRESSION: 54F w/ HTN, DM2, HFrEF, and ESRD-HD TTS, 12/1/19 a/w chest pain/headaches    (1)Renal - ESRD - HD TTS - s/p HD last night; planned for HD today as well  (2)Hyperkalemia - should have improved with HD last night (the labs from 1857 on 12/2 are pre-HD)  (3)Hyperphosphatemia - on Phoslo TIW with meals  (4)Chest pain - will she require cath?       RECOMMEND:  (1)HD today and tomorrow - aggressive UF; low-K bath; will set HD up today to minimize dysequilibrium risk  (2)Renal diet/Phoslo as ordered  (3)Potential ischemic workup per Cardiology        Wale Francisco MD  [facility]  [phone]

## 2019-12-03 NOTE — DISCHARGE NOTE PROVIDER - NSDCCPCAREPLAN_GEN_ALL_CORE_FT
PRINCIPAL DISCHARGE DIAGNOSIS  Diagnosis: Chest pain  Assessment and Plan of Treatment: Likely related to pulmonary edema for missed HD sessions, please refrain from missing your HD sessions in the comuuinty to prevent fluid accumulation.      SECONDARY DISCHARGE DIAGNOSES  Diagnosis: Chronic systolic heart failure  Assessment and Plan of Treatment: Low salt diet, fluid restriction to 1500 ml daily, monitor your fluid intake and weight daily, follow up with your Cardiologist in 1 to 2 weeks. Continue your cardiac medications as prescribed.       Diagnosis: Anemia due to chronic kidney disease, on chronic dialysis  Assessment and Plan of Treatment: currently stable follow up with your Nephrologist.    Diagnosis: Essential hypertension  Assessment and Plan of Treatment: Low sodium diet, continue anti-hypertensive medications, and follow up with primary care physician.      Diagnosis: ESRD (end stage renal disease)  Assessment and Plan of Treatment: continue HD sessions in the community Tues. Thurs, Sat. Follow up with your Nephrologist routinely.

## 2019-12-03 NOTE — DISCHARGE NOTE PROVIDER - HOSPITAL COURSE
54 year old woman with a history of ESRD HD TThS, HFrEF (EF 40-45%), HTN, HLD, T2DM who presents for chest pain after missed HD sessions.        # Chest pain, atypical.  Plan: -Atypical description with stabbing nature, worsening more with lying down flat.  Most likely related to pulmonary edema from missed HD sessions.  Review of prior records showed prior admission in 9/2019 for the same with improvement with HD.  Low concern for ACS given history and downtrending troponins.  CXR without PNA or ptx.    -stable troponin. tele no event.     -TTE w/ EF 40-45% 5/2019.      -Reevaluate after HD for resolution.  If not resolved, would consider further evaluation.    -Troponin elevation likely chronic from ESRD or demand from volume overload.    - cardio eval appreciated. limited TTE to r/o pericardial effusion.         # Bigeminy.  Plan: -Possibly related to volume overload.      -Electrolytes overall not warranting emergent management.  Phos and K mildly elevated    -Plan for HD per renal.         # Chronic systolic heart failure.  Plan: Appears mildly volume overloaded    HD for volume management    Continue hydral, isordil, coreg.      -Daily weight, I&Os, tele, fluid restrictions.          # Anemia due to chronic kidney disease, on chronic dialysis.  Plan: -Hgb above recent baseline    -Continue to monitor.         # ESRD (end stage renal disease).  Plan: HD per renal.     eval appreciated.         # Type 2 diabetes mellitus with chronic kidney disease on chronic dialysis, without long-term current use of insulin. Plan: Hold home oral medications    -SSI, acck, dm diet.    # Essential hypertension.  Plan: Continue home meds with hold parameters. 54 year old woman with a history of ESRD HD TThS, HFrEF (EF 40-45%), HTN, HLD, T2DM who presents for chest pain after missed HD sessions.        # Chest pain, atypical.  Plan: -Atypical description with stabbing nature, worsening more with lying down flat.  Most likely related to pulmonary edema from missed HD sessions.  Review of prior records showed prior admission in 9/2019 for the same with improvement with HD.  Low concern for ACS given history and downtrending troponins.  CXR without PNA or ptx.    -stable troponin. tele no event.     -TTE w/ EF 40-45% 5/2019.      -repeat TTE: EF 36%, mod to sev global LV dysfxn, small pericardial effusion seen.     -Troponin elevation likely chronic from ESRD or demand from volume overload.        # Bigeminy.  Plan: -Possibly related to volume overload.      -Electrolytes overall not warranting emergent management.  Phos and K mildly elevated    -Plan for HD per renal.         # Chronic systolic heart failure.  Plan: Appears mildly volume overloaded    HD for volume management    Continue hydral, isordil, coreg.      -Daily weight, I&Os, tele, fluid restrictions.          # Anemia due to chronic kidney disease, on chronic dialysis.  Plan: -Hgb above recent baseline    -Continue to monitor.         # ESRD (end stage renal disease).  Plan: HD per renal. Patient was dialyzed 12/2 and 12/3 with aggressive UF.             # Type 2 diabetes mellitus with chronic kidney disease on chronic dialysis, without long-term current use of insulin. Plan: Hold home oral medications    -SSI, acck, dm diet.        # Essential hypertension.  Plan: Continue home meds with hold parameters.          Patient stable and cleared fro discharge home d/w Dr. Hays. Outpt HD reinstated by ISIAH.

## 2019-12-03 NOTE — DISCHARGE NOTE PROVIDER - CARE PROVIDER_API CALL
Wale Francisco)  Internal Medicine; Nephrology  [address]  [place] [address]  Phone: [phone]  Fax: [phone]  Follow Up Time:     Jose Andrew)  Cardiology; Internal Medicine  [address]  [place] [address]  Phone: [phone]  Fax: [phone]  Follow Up Time:

## 2019-12-05 LAB — BACTERIA NPH CULT: SIGNIFICANT CHANGE UP

## 2019-12-06 ENCOUNTER — INPATIENT (INPATIENT)
Facility: HOSPITAL | Age: 54
LOS: 4 days | Discharge: HOME | End: 2019-12-11
Attending: HOSPITALIST | Admitting: HOSPITALIST
Payer: MEDICAID

## 2019-12-06 VITALS
TEMPERATURE: 98 F | RESPIRATION RATE: 16 BRPM | HEART RATE: 42 BPM | DIASTOLIC BLOOD PRESSURE: 57 MMHG | OXYGEN SATURATION: 100 % | SYSTOLIC BLOOD PRESSURE: 127 MMHG

## 2019-12-06 DIAGNOSIS — R07.9 CHEST PAIN, UNSPECIFIED: ICD-10-CM

## 2019-12-06 DIAGNOSIS — E11.9 TYPE 2 DIABETES MELLITUS WITHOUT COMPLICATIONS: ICD-10-CM

## 2019-12-06 DIAGNOSIS — Z98.83 FILTERING (VITREOUS) BLEB AFTER GLAUCOMA SURGERY STATUS: Chronic | ICD-10-CM

## 2019-12-06 DIAGNOSIS — D63.8 ANEMIA IN OTHER CHRONIC DISEASES CLASSIFIED ELSEWHERE: ICD-10-CM

## 2019-12-06 DIAGNOSIS — Z90.711 ACQUIRED ABSENCE OF UTERUS WITH REMAINING CERVICAL STUMP: Chronic | ICD-10-CM

## 2019-12-06 DIAGNOSIS — I77.0 ARTERIOVENOUS FISTULA, ACQUIRED: Chronic | ICD-10-CM

## 2019-12-06 DIAGNOSIS — I50.9 HEART FAILURE, UNSPECIFIED: ICD-10-CM

## 2019-12-06 DIAGNOSIS — Z90.49 ACQUIRED ABSENCE OF OTHER SPECIFIED PARTS OF DIGESTIVE TRACT: Chronic | ICD-10-CM

## 2019-12-06 DIAGNOSIS — N18.6 END STAGE RENAL DISEASE: ICD-10-CM

## 2019-12-06 DIAGNOSIS — I10 ESSENTIAL (PRIMARY) HYPERTENSION: ICD-10-CM

## 2019-12-06 DIAGNOSIS — Z29.9 ENCOUNTER FOR PROPHYLACTIC MEASURES, UNSPECIFIED: ICD-10-CM

## 2019-12-06 LAB
ALBUMIN SERPL ELPH-MCNC: 3.9 G/DL — SIGNIFICANT CHANGE UP (ref 3.3–5)
ALP SERPL-CCNC: 152 U/L — HIGH (ref 40–120)
ALT FLD-CCNC: 33 U/L — SIGNIFICANT CHANGE UP (ref 4–33)
ANION GAP SERPL CALC-SCNC: 16 MMO/L — HIGH (ref 7–14)
AST SERPL-CCNC: 35 U/L — HIGH (ref 4–32)
BASE EXCESS BLDV CALC-SCNC: 1.7 MMOL/L — SIGNIFICANT CHANGE UP
BASOPHILS # BLD AUTO: 0.04 K/UL — SIGNIFICANT CHANGE UP (ref 0–0.2)
BASOPHILS NFR BLD AUTO: 1.2 % — SIGNIFICANT CHANGE UP (ref 0–2)
BILIRUB SERPL-MCNC: 0.2 MG/DL — SIGNIFICANT CHANGE UP (ref 0.2–1.2)
BLOOD GAS VENOUS - CREATININE: 12.4 MG/DL — HIGH (ref 0.5–1.3)
BLOOD GAS VENOUS - FIO2: 21 — SIGNIFICANT CHANGE UP
BUN SERPL-MCNC: 52 MG/DL — HIGH (ref 7–23)
CALCIUM SERPL-MCNC: 8.5 MG/DL — SIGNIFICANT CHANGE UP (ref 8.4–10.5)
CHLORIDE BLDV-SCNC: 96 MMOL/L — SIGNIFICANT CHANGE UP (ref 96–108)
CHLORIDE SERPL-SCNC: 92 MMOL/L — LOW (ref 98–107)
CK MB BLD-MCNC: 1.6 — SIGNIFICANT CHANGE UP (ref 0–2.5)
CK MB BLD-MCNC: 2.15 NG/ML — SIGNIFICANT CHANGE UP (ref 1–4.7)
CK MB BLD-MCNC: 2.71 NG/ML — SIGNIFICANT CHANGE UP (ref 1–4.7)
CK MB BLD-MCNC: SIGNIFICANT CHANGE UP (ref 0–2.5)
CK SERPL-CCNC: 124 U/L — SIGNIFICANT CHANGE UP (ref 25–170)
CK SERPL-CCNC: 171 U/L — HIGH (ref 25–170)
CO2 SERPL-SCNC: 22 MMOL/L — SIGNIFICANT CHANGE UP (ref 22–31)
CREAT SERPL-MCNC: 11.64 MG/DL — HIGH (ref 0.5–1.3)
EOSINOPHIL # BLD AUTO: 0.08 K/UL — SIGNIFICANT CHANGE UP (ref 0–0.5)
EOSINOPHIL NFR BLD AUTO: 2.4 % — SIGNIFICANT CHANGE UP (ref 0–6)
GAS PNL BLDV: 131 MMOL/L — LOW (ref 136–146)
GLUCOSE BLDV-MCNC: 132 MG/DL — HIGH (ref 70–99)
GLUCOSE SERPL-MCNC: 136 MG/DL — HIGH (ref 70–99)
HCO3 BLDV-SCNC: 25 MMOL/L — SIGNIFICANT CHANGE UP (ref 20–27)
HCT VFR BLD CALC: 33.7 % — LOW (ref 34.5–45)
HCT VFR BLDV CALC: 33.4 % — LOW (ref 34.5–45)
HGB BLD-MCNC: 10.6 G/DL — LOW (ref 11.5–15.5)
HGB BLDV-MCNC: 10.8 G/DL — LOW (ref 11.5–15.5)
IMM GRANULOCYTES NFR BLD AUTO: 0 % — SIGNIFICANT CHANGE UP (ref 0–1.5)
LACTATE BLDV-MCNC: 1.5 MMOL/L — SIGNIFICANT CHANGE UP (ref 0.5–2)
LIDOCAIN IGE QN: 49.4 U/L — SIGNIFICANT CHANGE UP (ref 7–60)
LYMPHOCYTES # BLD AUTO: 0.88 K/UL — LOW (ref 1–3.3)
LYMPHOCYTES # BLD AUTO: 26.3 % — SIGNIFICANT CHANGE UP (ref 13–44)
MCHC RBC-ENTMCNC: 29.9 PG — SIGNIFICANT CHANGE UP (ref 27–34)
MCHC RBC-ENTMCNC: 31.5 % — LOW (ref 32–36)
MCV RBC AUTO: 95.2 FL — SIGNIFICANT CHANGE UP (ref 80–100)
MONOCYTES # BLD AUTO: 0.36 K/UL — SIGNIFICANT CHANGE UP (ref 0–0.9)
MONOCYTES NFR BLD AUTO: 10.8 % — SIGNIFICANT CHANGE UP (ref 2–14)
NEUTROPHILS # BLD AUTO: 1.98 K/UL — SIGNIFICANT CHANGE UP (ref 1.8–7.4)
NEUTROPHILS NFR BLD AUTO: 59.3 % — SIGNIFICANT CHANGE UP (ref 43–77)
NRBC # FLD: 0 K/UL — SIGNIFICANT CHANGE UP (ref 0–0)
NT-PROBNP SERPL-SCNC: SIGNIFICANT CHANGE UP PG/ML
PCO2 BLDV: 47 MMHG — SIGNIFICANT CHANGE UP (ref 41–51)
PH BLDV: 7.37 PH — SIGNIFICANT CHANGE UP (ref 7.32–7.43)
PLATELET # BLD AUTO: 148 K/UL — LOW (ref 150–400)
PMV BLD: 12 FL — SIGNIFICANT CHANGE UP (ref 7–13)
PO2 BLDV: 45 MMHG — HIGH (ref 35–40)
POTASSIUM BLDV-SCNC: 5 MMOL/L — HIGH (ref 3.4–4.5)
POTASSIUM SERPL-MCNC: 5.3 MMOL/L — SIGNIFICANT CHANGE UP (ref 3.5–5.3)
POTASSIUM SERPL-SCNC: 5.3 MMOL/L — SIGNIFICANT CHANGE UP (ref 3.5–5.3)
PROT SERPL-MCNC: 7.3 G/DL — SIGNIFICANT CHANGE UP (ref 6–8.3)
RBC # BLD: 3.54 M/UL — LOW (ref 3.8–5.2)
RBC # FLD: 13.3 % — SIGNIFICANT CHANGE UP (ref 10.3–14.5)
SAO2 % BLDV: 75.3 % — SIGNIFICANT CHANGE UP (ref 60–85)
SODIUM SERPL-SCNC: 130 MMOL/L — LOW (ref 135–145)
TROPONIN T, HIGH SENSITIVITY: 100 NG/L — CRITICAL HIGH (ref ?–14)
TROPONIN T, HIGH SENSITIVITY: 95 NG/L — CRITICAL HIGH (ref ?–14)
WBC # BLD: 3.34 K/UL — LOW (ref 3.8–10.5)
WBC # FLD AUTO: 3.34 K/UL — LOW (ref 3.8–10.5)

## 2019-12-06 PROCEDURE — 99233 SBSQ HOSP IP/OBS HIGH 50: CPT

## 2019-12-06 PROCEDURE — 71046 X-RAY EXAM CHEST 2 VIEWS: CPT | Mod: 26

## 2019-12-06 RX ORDER — SODIUM CHLORIDE 9 MG/ML
1000 INJECTION, SOLUTION INTRAVENOUS
Refills: 0 | Status: DISCONTINUED | OUTPATIENT
Start: 2019-12-06 | End: 2019-12-11

## 2019-12-06 RX ORDER — ASPIRIN/CALCIUM CARB/MAGNESIUM 324 MG
162 TABLET ORAL ONCE
Refills: 0 | Status: COMPLETED | OUTPATIENT
Start: 2019-12-06 | End: 2019-12-06

## 2019-12-06 RX ORDER — ISOSORBIDE DINITRATE 5 MG/1
20 TABLET ORAL THREE TIMES A DAY
Refills: 0 | Status: DISCONTINUED | OUTPATIENT
Start: 2019-12-06 | End: 2019-12-11

## 2019-12-06 RX ORDER — DEXTROSE 50 % IN WATER 50 %
12.5 SYRINGE (ML) INTRAVENOUS ONCE
Refills: 0 | Status: DISCONTINUED | OUTPATIENT
Start: 2019-12-06 | End: 2019-12-11

## 2019-12-06 RX ORDER — DEXTROSE 50 % IN WATER 50 %
15 SYRINGE (ML) INTRAVENOUS ONCE
Refills: 0 | Status: DISCONTINUED | OUTPATIENT
Start: 2019-12-06 | End: 2019-12-11

## 2019-12-06 RX ORDER — HYDRALAZINE HCL 50 MG
100 TABLET ORAL THREE TIMES A DAY
Refills: 0 | Status: DISCONTINUED | OUTPATIENT
Start: 2019-12-06 | End: 2019-12-11

## 2019-12-06 RX ORDER — CINACALCET 30 MG/1
30 TABLET, FILM COATED ORAL DAILY
Refills: 0 | Status: DISCONTINUED | OUTPATIENT
Start: 2019-12-06 | End: 2019-12-11

## 2019-12-06 RX ORDER — INSULIN LISPRO 100/ML
VIAL (ML) SUBCUTANEOUS AT BEDTIME
Refills: 0 | Status: DISCONTINUED | OUTPATIENT
Start: 2019-12-06 | End: 2019-12-11

## 2019-12-06 RX ORDER — DEXTROSE 50 % IN WATER 50 %
25 SYRINGE (ML) INTRAVENOUS ONCE
Refills: 0 | Status: DISCONTINUED | OUTPATIENT
Start: 2019-12-06 | End: 2019-12-11

## 2019-12-06 RX ORDER — HEPARIN SODIUM 5000 [USP'U]/ML
5000 INJECTION INTRAVENOUS; SUBCUTANEOUS
Refills: 0 | Status: DISCONTINUED | OUTPATIENT
Start: 2019-12-06 | End: 2019-12-11

## 2019-12-06 RX ORDER — CARVEDILOL PHOSPHATE 80 MG/1
12.5 CAPSULE, EXTENDED RELEASE ORAL EVERY 12 HOURS
Refills: 0 | Status: DISCONTINUED | OUTPATIENT
Start: 2019-12-06 | End: 2019-12-11

## 2019-12-06 RX ORDER — PANTOPRAZOLE SODIUM 20 MG/1
40 TABLET, DELAYED RELEASE ORAL
Refills: 0 | Status: DISCONTINUED | OUTPATIENT
Start: 2019-12-06 | End: 2019-12-10

## 2019-12-06 RX ORDER — CALCIUM ACETATE 667 MG
667 TABLET ORAL
Refills: 0 | Status: DISCONTINUED | OUTPATIENT
Start: 2019-12-06 | End: 2019-12-11

## 2019-12-06 RX ORDER — ALLOPURINOL 300 MG
100 TABLET ORAL DAILY
Refills: 0 | Status: DISCONTINUED | OUTPATIENT
Start: 2019-12-06 | End: 2019-12-11

## 2019-12-06 RX ORDER — INSULIN LISPRO 100/ML
VIAL (ML) SUBCUTANEOUS
Refills: 0 | Status: DISCONTINUED | OUTPATIENT
Start: 2019-12-06 | End: 2019-12-11

## 2019-12-06 RX ORDER — ASPIRIN/CALCIUM CARB/MAGNESIUM 324 MG
81 TABLET ORAL DAILY
Refills: 0 | Status: DISCONTINUED | OUTPATIENT
Start: 2019-12-06 | End: 2019-12-11

## 2019-12-06 RX ORDER — AMLODIPINE BESYLATE 2.5 MG/1
10 TABLET ORAL DAILY
Refills: 0 | Status: DISCONTINUED | OUTPATIENT
Start: 2019-12-06 | End: 2019-12-11

## 2019-12-06 RX ORDER — FUROSEMIDE 40 MG
20 TABLET ORAL DAILY
Refills: 0 | Status: DISCONTINUED | OUTPATIENT
Start: 2019-12-06 | End: 2019-12-11

## 2019-12-06 RX ORDER — GLUCAGON INJECTION, SOLUTION 0.5 MG/.1ML
1 INJECTION, SOLUTION SUBCUTANEOUS ONCE
Refills: 0 | Status: DISCONTINUED | OUTPATIENT
Start: 2019-12-06 | End: 2019-12-11

## 2019-12-06 RX ADMIN — HEPARIN SODIUM 5000 UNIT(S): 5000 INJECTION INTRAVENOUS; SUBCUTANEOUS at 22:19

## 2019-12-06 RX ADMIN — ISOSORBIDE DINITRATE 20 MILLIGRAM(S): 5 TABLET ORAL at 22:18

## 2019-12-06 RX ADMIN — AMLODIPINE BESYLATE 10 MILLIGRAM(S): 2.5 TABLET ORAL at 12:32

## 2019-12-06 RX ADMIN — Medication 100 MILLIGRAM(S): at 22:18

## 2019-12-06 RX ADMIN — Medication 81 MILLIGRAM(S): at 12:32

## 2019-12-06 RX ADMIN — CINACALCET 30 MILLIGRAM(S): 30 TABLET, FILM COATED ORAL at 12:32

## 2019-12-06 RX ADMIN — Medication 100 MILLIGRAM(S): at 12:32

## 2019-12-06 RX ADMIN — Medication 20 MILLIGRAM(S): at 12:32

## 2019-12-06 RX ADMIN — Medication 667 MILLIGRAM(S): at 12:32

## 2019-12-06 RX ADMIN — Medication 162 MILLIGRAM(S): at 01:40

## 2019-12-06 RX ADMIN — CARVEDILOL PHOSPHATE 12.5 MILLIGRAM(S): 80 CAPSULE, EXTENDED RELEASE ORAL at 22:18

## 2019-12-06 NOTE — H&P ADULT - PROBLEM SELECTOR PLAN 3
ESRD  Renal consult for HD ( Dr Francisco) for HD today and tomorrow   Continue calcium acetate, cinacalcet

## 2019-12-06 NOTE — ED PROVIDER NOTE - PHYSICAL EXAMINATION
Gen:  NAD, alert and oriented  HEENT:  Sclera clear, MMM  Heart:  RRR, no M/R/G  Lung:  CTA b/l, no rales or wheeze  Abd:  Obese, NT, soft  Ext:  2+ pitting edema b/l  Skin:  No rash or ecchymosis  Neuro:  Nonfocal

## 2019-12-06 NOTE — H&P ADULT - NEUROLOGICAL DETAILS
no spontaneous movement/superficial reflexes intact/responds to verbal commands/sensation intact/deep reflexes intact/cranial nerves intact/normal strength/alert and oriented x 3/responds to pain

## 2019-12-06 NOTE — ED PROVIDER NOTE - ATTENDING CONTRIBUTION TO CARE
MD Almaraz:  I performed a face to face bedside interview with patient regarding history of present illness, review of symptoms and past medical history. I completed an independent physical exam(documented below).  I have discussed patient's plan of care with resident.   I agree with note as stated above, having amended the EMR as needed to reflect my findings. I have discussed the assessment and plan of care.  This includes during the time I functioned as the attending physician for this patient.  PE:  Gen: Alert, NAD  Head: NC, AT,  EOMI, normal lids/conjunctiva  ENT:  normal hearing, patent oropharynx without erythema/exudate  Neck: +supple, no tenderness/meningismus/JVD, +Trachea midline  Chest: no chest wall tenderness, equal chest rise  Pulm: Bilateral BS, normal resp effort, no wheeze/stridor/retractions  CV: RRR, no M/R/G, +dist pulses  Abd: +BS, soft, NT/ND  Rectal: deferred  Mskel: pitting edema BLE  Skin: no rash  Neuro: AAOx3  MDM:   55yo F w/ pmh of htn, hld, DM, ESRD on HD TTS(last dialyzed Tuesday), CHF, KERI, c/o exertional cp since the morning associated w/ sob. Concerning for ACS. ECG, labs, cxr, asa, likely admit.

## 2019-12-06 NOTE — H&P ADULT - ASSESSMENT
53 yo F anemia, CHF, CVA, depression, GERD, gout, glaucoma, chol, HTN, KERI, NICMP asthma, DM, ESRD T/Th/sat with chest pain

## 2019-12-06 NOTE — H&P ADULT - NEGATIVE CARDIOVASCULAR SYMPTOMS
no claudication/no palpitations/no dyspnea on exertion/no paroxysmal nocturnal dyspnea/no peripheral edema/no orthopnea

## 2019-12-06 NOTE — CONSULT NOTE ADULT - ASSESSMENT
54 year old female with PMH chronic HFrEF, CVA RLE weakness, NICM, HTN, HLD, DMT2, ESRD-HD, KERI, depression, anemia, GERD, gout, glaucoma and recent admission for CP presented with chest pain found to have persistent SR with ventricular bigeminy.  Patient endorsed "chest pain with palpitaitons" and "dizziness" at times.  Her recent echo on 12/2 showed reduced LVEF of 36% compared to the previous echo done on 5/5/2019 with LVEF 40-45%.  The etiology of worsening LVEF possibly secondary to increased PVC burden.   EKG with ventricular bigeminy with PVC morphology suggests possible right ventricular outflow tract origin vs fascicular origin.  Therefore patient will likely benefit from an EP study with possible PVC ablation.    -Telemetry monitor for VT/persistent V bigeminy   -Plan for EPS with possible PVC ablation  -Continue BB and care per primary cardiology   -Discussed with Dr. Farias

## 2019-12-06 NOTE — ED PROVIDER NOTE - CLINICAL SUMMARY MEDICAL DECISION MAKING FREE TEXT BOX
Multiple comorbid 54F p/w vague complaints of chest pain.  R/o ACS, pulm edema.  Labs, CXR, ASA, reassess.

## 2019-12-06 NOTE — H&P ADULT - GASTROINTESTINAL DETAILS
no bruit/no rebound tenderness/no rigidity/bowel sounds normal/soft/nontender/no guarding/no distention/no masses palpable/normal/no organomegaly

## 2019-12-06 NOTE — ED ADULT NURSE NOTE - OBJECTIVE STATEMENT
Kerrie RN: 55 yo F AAOx4 received to [facility] c/o intermittent CP since saturday, midsternal in origin with no radiation, also reporting sudden onset SOB that started last night, arrives with resps even and unlabored with 100% saturation on RA, no distress noted, hx COPD HTN ESRD with LAVF (Tu-Thurs-Sat), missed todays dialysis, last full session on Tuesday, NSR on CM, additional VS charted, 20G placed to L arm, report endorsed to primary RN Dee, awaiting MD robins, will reassess

## 2019-12-06 NOTE — CONSULT NOTE ADULT - ASSESSMENT
Echo 1/5/20189: EF 45-50%, global LV sys dysfx, mild diastolic dysfx (stgI)  Stress test in 6/2018 normal with no evidence of mi or ischemia   Echo 3/ 22/2019, mild to mod MR, moderate global lv sys dysfx small pericardial effusion to left ventricle EF 41%   Echo 5/5/19: ef 40-45%, mod global lv sys dysfx, small pericardial effusion post to left ventricle    A/P 54 year old woman with history of CHF, nicmp, CVA, depression, GERD, gout, glaucoma, chol, HTN, KERI, asthma, DM, ESRD T/Th/sat recent admit for CP now returns with back with chest pain.    1. Chest pain, atypical  -in the setting of fluid overload, secondary to missed HD session   -HST elevated, type II MI, demand ischemia 2/2 to ESRD, CHF   -recent echo with lv dysfxn, eF 36%  -cont with BB, ASA, imdur, plavix     2. Acute on chronic Systolic chf   -volume overloaded likely secondary to missed HD session  -cont fluid removal with HD   -continue bb, imdur, hydral  -no ace/arb hx of hyperkalemia     3. HTN  -stable, continue current meds     4. ESRD on HD  -renal f/u     5. Bigeminy  -cont bb  -eps eval for ectopy with lv dysfxn      dvt ppx

## 2019-12-06 NOTE — CONSULT NOTE ADULT - ATTENDING COMMENTS
54 year old female with PMH chronic HFrEF, CVA RLE weakness, NICM, HTN, HLD, DMT2, ESRD-HD, KERI, depression, anemia, GERD, gout, glaucoma and recent admission for CP presented with chest pain found to have persistent SR with ventricular bigeminy. Will continue beta blockers and observe. May need an ablation for likely RVOT PVCs. Will follow.

## 2019-12-06 NOTE — ED PROVIDER NOTE - OBJECTIVE STATEMENT
54 y.o. female PMH asthma, CHF, DM, ESRD on HD T/T/S p/w chest pain.  The chest pain is worse on exertion.  She was last dialyzed on Tuesday, and was supposed to get it Thursday night, but was feeling unwell so went to the ER instead.  She denies cough, wheeze, fevers.  She also says her legs are swollen.  Patient has been living in shelter since August.

## 2019-12-06 NOTE — ED ADULT TRIAGE NOTE - CHIEF COMPLAINT QUOTE
C/o chest pain since AM with sob. H/o HTN, CHF, DM, ESRD- missed dialysis today. Pt states she was admitted here Saturday-Tuesday for same. HR 42 in triage, ekg in progress. Charge RN made aware.

## 2019-12-06 NOTE — H&P ADULT - HISTORY OF PRESENT ILLNESS
53 yo F anemia, CHF, CVA, depression, GERD, gout, glaucoma, chol, HTN, KERI, NICMP asthma, DM, ESRD T/Th/sat recent admit for CP now back with chest pain. Patient states that CP is 4/10 pressure left sided non radiating and constant since onset, worse on exertion and improved with rest. Not related to meals or positional changes. No other alleviating ro aggravating factors. + associated PICKARD, palpitations, increased leg swelling, cough. NO  PND, orthopnea, palpitations, diaphoresis, lightheadedness, dizziness, syncope,, fever chills, malaise, myalgias, anorexia, weight changes ( loss or gain), night sweats, generalized fatigue abdominal pain, N/V/C/D BRBPR, melena, urinary symptoms,  and wheezing.

## 2019-12-06 NOTE — H&P ADULT - NS NEC GEN PE MLT EXAM PC
Delilah called requesting fax from 12/4/2018 and 12/18/2018.     Faxed to [phone]   No bruits; no thyromegaly or nodules

## 2019-12-06 NOTE — H&P ADULT - NEGATIVE GENERAL SYMPTOMS
no chills/no sweating/no anorexia/no weight loss/no fever/no polydipsia/no weight gain/no polyphagia/no polyuria/no malaise

## 2019-12-06 NOTE — CONSULT NOTE ADULT - SUBJECTIVE AND OBJECTIVE BOX
HPI:  Ms. Herr is a 54 year-old woman with history of multiple medical issues including hypertension, type 2 diabetes mellitus, stroke, diastolic congestive heart failure, and end stage renal disease. She undergoes hemodialysis Tuesdays, Thursdays, and Saturdays at the [facility], under my care. She presented overnight to the [facility] ER with generalized malaise. She was admitted days ago at [facility] with chest pain and in the setting of having missed 2 dialysis sessions. She missed her HD session yesterday and was last dialyzed on Tuesday 12/3/19.    PAST MEDICAL & SURGICAL HISTORY:  CHF (congestive heart failure)  GERD (gastroesophageal reflux disease)  Anemia of chronic disease  HLD (hyperlipidemia)  NICM (nonischemic cardiomyopathy)  KERI (obstructive sleep apnea)  ESRD (end stage renal disease): HD T/T/S  Depression  Gout  CVA (cerebral vascular accident): 2013- Residual RLE weakness  DM (diabetes mellitus)  HTN (hypertension)  Glaucoma  S/P cholecystectomy  S/P partial hysterectomy  Status post glaucoma surgery  AVF (arteriovenous fistula)    Allergies  iodine (Unknown)    SOCIAL HISTORY:  Denies ETOh,Smoking,     FAMILY HISTORY:  Family history of heart attack    REVIEW OF SYSTEMS:  CONSTITUTIONAL: No weakness, fevers or chills; (+)generalized malaise  EYES/ENT: No visual changes;  No vertigo or throat pain   NECK: No pain or stiffness  RESPIRATORY: No cough, wheezing, hemoptysis; No shortness of breath  CARDIOVASCULAR: No chest pain or palpitations  GASTROINTESTINAL: No abdominal or epigastric pain. No nausea, vomiting, or hematemesis; No diarrhea or constipation. No melena or hematochezia.  GENITOURINARY: No dysuria, frequency or hematuria  NEUROLOGICAL: No numbness or weakness  SKIN: No itching, burning, rashes, or lesions   All other review of systems is negative unless indicated above.    VITAL:  T(C): , Max: 37.1 (12-06-19 @ 10:08)  T(F): , Max: 98.8 (12-06-19 @ 10:08)  HR: 84 (12-06-19 @ 10:45)  BP: 142/60 (12-06-19 @ 10:45)  RR: 18 (12-06-19 @ 10:45)  SpO2: 98% (12-06-19 @ 10:45)    PHYSICAL EXAM:  Constitutional: NAD, Alert  HEENT: NCAT, MMM  Neck: Supple, No JVD  Respiratory: CTA-b/l  Cardiovascular: RRR s1s2, no m/r/g  Gastrointestinal: BS+, soft, NT/ND  Extremities: No peripheral edema b/l  Neurological: no focal deficits; strength grossly intact  Back: no CVAT b/l  Skin: No rashes, no nevi  Access: LUE AVF (+)Thrill      LABS:                        10.6   3.34  )-----------( 148      ( 06 Dec 2019 01:30 )             33.7     Na(130)/K(5.3)/Cl(92)/HCO3(22)/BUN(52)/Cr(11.64)Glu(136)/Ca(8.5)/Mg(--)/PO4(--)    12-06 @ 01:30      IMAGING:  < from: Xray Chest 2 Views PA/Lat (12.06.19 @ 05:15) >  Hazy obscuration of lower two thirds of left hemithorax could be due to   overlying soft tissues however pleural effusion and/or underlying   airspace consolidation, including possible infection/pneumonia in the   properclinical context, cannot be excluded. Sharp right CP angle. Clear   remaining visualized lungs. No pneumothorax.   Stable cardiomegaly.   Trachea midline.  Unremarkable osseous structures.      ASSESSMENT:  (1)Renal - ESRD - HD TTS - missed HD yesterday; therefore, indicated for HD today; we could dialyze tomorrow as well to get her back on TTS schedule  (2)    RECOMMEND:  (1)HD today - 3 hours; 2k bath; 2.5kg UF as able  (2)Next HD tomorrow - inpatient versus outpatient    Thank you for involving [facility] in this patient's care.    With warm regards,    Wale Francisco MD   [facility]  [phone] HPI:  Ms. Herr is a 54 year-old woman with history of multiple medical issues including hypertension, type 2 diabetes mellitus, stroke, diastolic congestive heart failure, and end stage renal disease. She undergoes hemodialysis Tuesdays, Thursdays, and Saturdays at the [facility], under my care. She presented overnight to the [facility] ER with generalized malaise, chest pain, dizziness, and shortness of breath. She was admitted days ago at [facility] with similar symptoms and in the setting of having missed 2 dialysis sessions. She missed her HD session yesterday and was last dialyzed on Tuesday 12/3/19.    PAST MEDICAL & SURGICAL HISTORY:  CHF (congestive heart failure)  GERD (gastroesophageal reflux disease)  Anemia of chronic disease  HLD (hyperlipidemia)  NICM (nonischemic cardiomyopathy)  KERI (obstructive sleep apnea)  ESRD (end stage renal disease): HD T/T/S  Depression  Gout  CVA (cerebral vascular accident): 2013- Residual RLE weakness  DM (diabetes mellitus)  HTN (hypertension)  Glaucoma  S/P cholecystectomy  S/P partial hysterectomy  Status post glaucoma surgery  AVF (arteriovenous fistula)    Allergies  iodine (Unknown)    SOCIAL HISTORY:  Denies ETOh,Smoking,     FAMILY HISTORY:  Family history of heart attack    REVIEW OF SYSTEMS:  CONSTITUTIONAL: No weakness, fevers or chills; (+)generalized malaise  EYES/ENT: No visual changes;  No vertigo or throat pain   NECK: No pain or stiffness  RESPIRATORY: No cough, wheezing, hemoptysis; (+)shortness of breath  CARDIOVASCULAR: (+)chest pain, (+)dizziness  GASTROINTESTINAL: No abdominal or epigastric pain. No nausea, vomiting, or hematemesis; No diarrhea or constipation. No melena or hematochezia.  GENITOURINARY: No dysuria, frequency or hematuria  NEUROLOGICAL: No numbness or weakness  SKIN: No itching, burning, rashes, or lesions   All other review of systems is negative unless indicated above.    VITAL:  T(C): , Max: 37.1 (12-06-19 @ 10:08)  T(F): , Max: 98.8 (12-06-19 @ 10:08)  HR: 84 (12-06-19 @ 10:45)  BP: 142/60 (12-06-19 @ 10:45)  RR: 18 (12-06-19 @ 10:45)  SpO2: 98% (12-06-19 @ 10:45)    PHYSICAL EXAM:  Constitutional: NAD, Alert  HEENT: NCAT, MMM  Neck: Supple, No JVD  Respiratory: CTA-b/l  Cardiovascular: RRR s1s2, no m/r/g  Gastrointestinal: BS+, soft, NT/ND  Extremities: No peripheral edema b/l  Neurological: no focal deficits; strength grossly intact  Back: no CVAT b/l  Skin: No rashes, no nevi  Access: LUE AVF (+)Thrill      LABS:                        10.6   3.34  )-----------( 148      ( 06 Dec 2019 01:30 )             33.7     Na(130)/K(5.3)/Cl(92)/HCO3(22)/BUN(52)/Cr(11.64)Glu(136)/Ca(8.5)/Mg(--)/PO4(--)    12-06 @ 01:30      IMAGING:  < from: Xray Chest 2 Views PA/Lat (12.06.19 @ 05:15) >  Hazy obscuration of lower two thirds of left hemithorax could be due to   overlying soft tissues however pleural effusion and/or underlying   airspace consolidation, including possible infection/pneumonia in the   properclinical context, cannot be excluded. Sharp right CP angle. Clear   remaining visualized lungs. No pneumothorax.   Stable cardiomegaly.   Trachea midline.  Unremarkable osseous structures.      ASSESSMENT:  (1)Renal - ESRD - HD TTS - missed HD yesterday; therefore, indicated for HD today; we could dialyze tomorrow as well to get her back on TTS schedule  (2)CP/SOB - atypical chest pain - Cardiology on board    RECOMMEND:  (1)HD today - 3 hours; 2k bath; 2.5kg UF as able  (2)Next HD tomorrow   (3)F/U Cardiology input    Thank you for involving [facility] in this patient's care.    With warm regards,    Wale Francisco MD   [facility]  [phone]

## 2019-12-06 NOTE — H&P ADULT - PROBLEM SELECTOR PLAN 1
Chest pain likely related to missed HD session  Ekg: SR@ 86 bpm + PVC Flipped T;s V1- V5 Q V1- V2  krjc=714--> 95 CPKs negative   Cardiology: Dr Pinto- no ischemia work up as per attending  continue asa, coreg, isordil\  case dw Dr Duonget

## 2019-12-06 NOTE — CONSULT NOTE ADULT - SUBJECTIVE AND OBJECTIVE BOX
CARDIOLOGY CONSULT NOTE - DR. DEE    HPI:    Patient is a 54 year old woman with history of CHF, nicmp, CVA, depression, GERD, gout, glaucoma, chol, HTN, KERI, asthma, DM, ESRD T/Th/sat recent admit for CP now returns with back with chest pain.    Chest pain at rest and on exertion  + dyspnea  missed Hd sessions     Patient denies any palpitations, cough, syncope, edema, exertional symptoms, nausea, abdominal pain, fever, chills,  or rash.       PAST MEDICAL & SURGICAL HISTORY:  CHF (congestive heart failure)  GERD (gastroesophageal reflux disease)  Anemia of chronic disease  HLD (hyperlipidemia)  NICM (nonischemic cardiomyopathy)  KERI (obstructive sleep apnea)  ESRD (end stage renal disease): HD T/T/S  Depression  Gout  CVA (cerebral vascular accident): 2013- Residual RLE weakness  DM (diabetes mellitus)  HTN (hypertension)  Glaucoma  S/P cholecystectomy  S/P partial hysterectomy  Status post glaucoma surgery  AVF (arteriovenous fistula)        PREVIOUS DIAGNOSTIC TESTING:    [ ] Echocardiogram:  [ ]  Catheterization:  [ ] Stress Test:  	    MEDICATIONS:    Home Medications:  amLODIPine 10 mg oral tablet: 1 tab(s) orally once a day (06 Dec 2019 11:36)  aspirin 81 mg oral delayed release tablet: 1 tab(s) orally once a day (06 Dec 2019 11:36)  carvedilol 12.5 mg oral tablet: 1 tab(s) orally every 12 hours    **per pharmacy last filled 8/7/2019 for a 30 day supply, not filled since  (06 Dec 2019 11:36)  cinacalcet 30 mg oral tablet: 1 tab(s) orally once a day (06 Dec 2019 11:36)  hydrALAZINE 100 mg oral tablet: 1 tab(s) orally 3 times a day (06 Dec 2019 11:36)  isosorbide dinitrate 20 mg oral tablet: 1  orally 3 times a day (06 Dec 2019 11:36)  Januvia 25 mg oral tablet: 1 tab(s) orally once a day (06 Dec 2019 11:36)  Lasix 20 mg oral tablet: 1 tab(s) orally once a day (06 Dec 2019 11:36)      MEDICATIONS  (STANDING):  allopurinol 100 milliGRAM(s) Oral daily  amLODIPine   Tablet 10 milliGRAM(s) Oral daily  aspirin enteric coated 81 milliGRAM(s) Oral daily  calcium acetate 667 milliGRAM(s) Oral three times a day with meals  carvedilol 12.5 milliGRAM(s) Oral every 12 hours  cinacalcet 30 milliGRAM(s) Oral daily  dextrose 5%. 1000 milliLiter(s) (50 mL/Hr) IV Continuous <Continuous>  dextrose 50% Injectable 12.5 Gram(s) IV Push once  dextrose 50% Injectable 25 Gram(s) IV Push once  dextrose 50% Injectable 25 Gram(s) IV Push once  furosemide    Tablet 20 milliGRAM(s) Oral daily  heparin  Injectable 5000 Unit(s) SubCutaneous two times a day  hydrALAZINE 100 milliGRAM(s) Oral three times a day  insulin lispro (HumaLOG) corrective regimen sliding scale   SubCutaneous three times a day before meals  insulin lispro (HumaLOG) corrective regimen sliding scale   SubCutaneous at bedtime  isosorbide   dinitrate Tablet (ISORDIL) 20 milliGRAM(s) Oral three times a day  pantoprazole    Tablet 40 milliGRAM(s) Oral before breakfast      FAMILY HISTORY:  Family history of heart attack      SOCIAL HISTORY:    [x] Non-smoker  [ ] Smoker  [ ] Alcohol    Allergies    iodine (Unknown)  Seafood (Unknown)  shellfish (Nausea (Mild to Mod); Hives (Mild to Mod))    Intolerances    	    REVIEW OF SYSTEMS:  CONSTITUTIONAL: No fever, weight loss, or fatigue  EYES: No eye pain, visual disturbances, or discharge  ENMT:  No difficulty hearing, tinnitus, vertigo; No sinus or throat pain  NECK: No pain or stiffness  RESPIRATORY: No cough, wheezing, chills or hemoptysis; No Shortness of Breath  CARDIOVASCULAR: as HPI  GASTROINTESTINAL: No abdominal or epigastric pain. No nausea, vomiting, or hematemesis; No diarrhea or constipation. No melena or hematochezia.  GENITOURINARY: No dysuria, frequency, hematuria, or incontinence  NEUROLOGICAL: No headaches, memory loss, loss of strength, numbness, or tremors  SKIN: No itching, burning, rashes, or lesions   	  [ ] All others negative	  [ ] Unable to obtain    PHYSICAL EXAM:    T(C): 36.7 (12-06-19 @ 14:32), Max: 37.1 (12-06-19 @ 10:08)  HR: 70 (12-06-19 @ 14:32) (42 - 91)  BP: 123/53 (12-06-19 @ 14:32) (122/54 - 142/60)  RR: 18 (12-06-19 @ 14:32) (16 - 18)  SpO2: 98% (12-06-19 @ 14:32) (98% - 100%)  Wt(kg): --  I&O's Summary    Daily     Daily     Appearance: Normal	  Psychiatry: A & O x 3, Mood & affect appropriate  HEENT:   Normal oral mucosa, PERRL, EOMI	  Lymphatic: No lymphadenopathy  Cardiovascular: Normal S1 S2,RRR, No JVD, No murmurs  Respiratory: Lungs clear to auscultation	  Gastrointestinal:  Soft, Non-tender, + BS	  Skin: No rashes, No ecchymoses, No cyanosis	  Neurologic: Non-focal  Extremities: Normal range of motion, No clubbing, cyanosis or edema  Vascular: Peripheral pulses palpable 2+ bilaterally    TELEMETRY: 	    ECG:  	sinus rhythm bigeminy  RADIOLOGY:  OTHER: 	  	  LABS:	 	    CARDIAC MARKERS:  Troponin T, High Sensitivity: 95 ng/L (12-06-19 @ 05:00)  Troponin T, High Sensitivity: 100 ng/L (12-06-19 @ 01:30)  Troponin T, High Sensitivity: 89 ng/L (12-01-19 @ 06:15)  Troponin T, High Sensitivity: 84 ng/L (12-01-19 @ 01:04)  Troponin T, High Sensitivity: 87 ng/L (12-01-19 @ 00:03)    CKMB: 2.15 ng/mL (12-06-19 @ 05:00)  CKMB Relative Index: Test not performed (12-06-19 @ 05:00)  CKMB: 2.71 ng/mL (12-06-19 @ 01:30)  CKMB Relative Index: 1.6 (12-06-19 @ 01:30)      proBNP: Serum Pro-Brain Natriuretic Peptide: 31999 pg/mL (12-06 @ 01:30)      Lipid Profile:   HgA1c: Hemoglobin A1C, Whole Blood: 5.3 % (12-02-19 @ 18:40)    TSH:                           10.6   3.34  )-----------( 148      ( 06 Dec 2019 01:30 )             33.7     12-06    130<L>  |  92<L>  |  52<H>  ----------------------------<  136<H>  5.3   |  22  |  11.64<H>    Ca    8.5      06 Dec 2019 01:30    TPro  7.3  /  Alb  3.9  /  TBili  0.2  /  DBili  x   /  AST  35<H>  /  ALT  33  /  AlkPhos  152<H>  12-06        Creatinine, Serum: 11.64 mg/dL (12-06-19 @ 01:30)        ASSESSMENT/PLAN:

## 2019-12-06 NOTE — H&P ADULT - PROBLEM SELECTOR PLAN 2
CHF chronic systolic HF 2/2/ NICMP EF=36-45%  isordil, hydralazine, lasix, coreg  OCQ=68477, alk phos=152.   I and Os daiy weights  for HD today

## 2019-12-06 NOTE — H&P ADULT - ATTENDING COMMENTS
Chart reviewed. Vitals and Labs noted.   Pt seen and examined at bedside. Plan formulated with the resident/PA/NP. Detail H&P as above.     Admitted for atypical chest pain. States her pain is mainly midepigastric, started after she came out of her shower.   Miss dialysis only because she was told to go to the hospital instead.   No nausea, no vomiting. Hx of similar pain in the past. no recent dietary changes. Similar pain in the past. hx of severe GERD.   ddx: r/o ACS vs. GI etiology such as GERD/esophagitis  Will start PPI, Maalox. will check CT abd to r/o pancreatitis (though low suspicion). check Lipase.   will order LE dopplers to r/o DVT.   cardiology consult. trend cardiac enzymes (not too far off her baseline)  consulted pt's outpt renal Dr. Francisco.   DV[facility] ppx  [facility]  - [phone] Chart reviewed. Vitals and Labs noted.   Pt seen and examined at bedside. Plan formulated with the resident/PA/NP. Detail H&P as above.     Admitted for atypical chest pain. States her pain is mainly midepigastric, started after she came out of her shower.   Missed dialysis only because she was told to go to the hospital instead.   No nausea, no vomiting. Hx of similar pain in the past. no recent dietary changes. Similar pain in the past. hx of severe GERD.   ddx: r/o ACS vs. GI etiology such as GERD/esophagitis  Will start PPI, Maalox PRN. will check CT abd to r/o pancreatitis (though low suspicion). check Lipase (added on)  will order LE dopplers to r/o DVT.   cardiology consult. trend cardiac enzymes (not too far off her baseline)  consulted pt's outpt renal Dr. Francisco.   DVT ppx  - Dr[facility] KOR[facility]  - [phone]

## 2019-12-06 NOTE — CONSULT NOTE ADULT - SUBJECTIVE AND OBJECTIVE BOX
Patient is a 54y old  Female who presents with a chief complaint of         HPI:    54 year old female with PMH chronic HFrEF, CVA RLE weakness, NICM, HTN, HLD, DMT2, ESRD-HD, KERI, depression, anemia, GERD, gout, glaucoma and recent admission for CP presented with chest pain found to have persistent SR with ventricular bigeminy.  Patient endorsed "chest pain with ectopic beats" and "dizziness" at times.  Her recent echo on 12/2 showed reduced LVEF of 36% compared to the previous echo done on 5/5/2019 with LVEF 40-45%.  She has been on guideline directed medical therapy with BB/hydralazine/Imdur for NICM.  She denies syncope or worsening dyspnea.      PAST MEDICAL & SURGICAL HISTORY:  CHF (congestive heart failure)  GERD (gastroesophageal reflux disease)  Anemia of chronic disease  HLD (hyperlipidemia)  NICM (nonischemic cardiomyopathy)  KERI (obstructive sleep apnea)  ESRD (end stage renal disease): HD T/T/S  Depression  Gout  CVA (cerebral vascular accident): 2013- Residual RLE weakness  DM (diabetes mellitus)  HTN (hypertension)  Glaucoma  S/P cholecystectomy  S/P partial hysterectomy  Status post glaucoma surgery  AVF (arteriovenous fistula)      MEDICATIONS  (STANDING):  allopurinol 100 milliGRAM(s) Oral daily  amLODIPine   Tablet 10 milliGRAM(s) Oral daily  aspirin enteric coated 81 milliGRAM(s) Oral daily  calcium acetate 667 milliGRAM(s) Oral three times a day with meals  carvedilol 12.5 milliGRAM(s) Oral every 12 hours  cinacalcet 30 milliGRAM(s) Oral daily  dextrose 5%. 1000 milliLiter(s) (50 mL/Hr) IV Continuous <Continuous>  dextrose 50% Injectable 12.5 Gram(s) IV Push once  dextrose 50% Injectable 25 Gram(s) IV Push once  dextrose 50% Injectable 25 Gram(s) IV Push once  furosemide    Tablet 20 milliGRAM(s) Oral daily  heparin  Injectable 5000 Unit(s) SubCutaneous two times a day  hydrALAZINE 100 milliGRAM(s) Oral three times a day  insulin lispro (HumaLOG) corrective regimen sliding scale   SubCutaneous three times a day before meals  insulin lispro (HumaLOG) corrective regimen sliding scale   SubCutaneous at bedtime  isosorbide   dinitrate Tablet (ISORDIL) 20 milliGRAM(s) Oral three times a day  pantoprazole    Tablet 40 milliGRAM(s) Oral before breakfast    MEDICATIONS  (PRN):  aluminum hydroxide/magnesium hydroxide/simethicone Suspension 30 milliLiter(s) Oral every 6 hours PRN Dyspepsia  dextrose 40% Gel 15 Gram(s) Oral once PRN Blood Glucose LESS THAN 70 milliGRAM(s)/deciliter  glucagon  Injectable 1 milliGRAM(s) IntraMuscular once PRN Glucose LESS THAN 70 milligrams/deciliter    Allergies    iodine (Unknown)  Seafood (Unknown)  shellfish (Nausea (Mild to Mod); Hives (Mild to Mod))    Intolerances      FAMILY HISTORY:  Family history of heart attack      SOCIAL HISTORY:  Denies smoking; no   Alcohol  or  Drug abuse       REVIEW OF SYSTEMS:    CONSTITUTIONAL: No fever, weight loss, chills, shakes, or fatigue  EYES: No eye pain, visual disturbances, or discharge  ENMT:  No difficulty hearing, tinnitus, vertigo; No sinus or throat pain  NECK: No pain or stiffness  RESPIRATORY: No cough, wheezing, hemoptysis, or shortness of breath  CARDIOVASCULAR: No dyspnea, syncope, paroxysmal nocturnal dyspnea, orthopnea, or arm or leg swelling palpitations, dizziness, chest pain,  GASTROINTESTINAL: No abdominal  or epigastric pain, nausea, vomiting, hematemesis, diarrhea, constipation, melena or bright red blood.  GENITOURINARY: No dysuria, nocturia, hematuria, or urinary incontinence  NEUROLOGICAL: No headaches, memory loss, slurred speech, limb weakness, loss of strength, numbness, or tremors  SKIN: No itching, burning, rashes, or lesions   LYMPH NODES: No enlarged glands  ENDOCRINE: No heat or cold intolerance, or hair loss  MUSCULOSKELETAL: No joint pain or swelling, muscle, back, or extremity pain  PSYCHIATRIC: No depression, anxiety, or difficulty sleeping  HEME/LYMPH: No easy bruising or bleeding gums  ALLERY AND IMMUNOLOGIC: No hives or rash.      Vital Signs Last 24 Hrs  T(C): 36.7 (06 Dec 2019 14:32), Max: 37.1 (06 Dec 2019 10:08)  T(F): 98.1 (06 Dec 2019 14:32), Max: 98.8 (06 Dec 2019 10:08)  HR: 70 (06 Dec 2019 14:32) (42 - 91)  BP: 123/53 (06 Dec 2019 14:32) (122/54 - 142/60)  BP(mean): --  RR: 18 (06 Dec 2019 14:32) (16 - 18)  SpO2: 98% (06 Dec 2019 14:32) (98% - 100%)    PHYSICAL EXAM:    GENERAL: In no apparent distress, well nourished, and hydrated.  HEAD:  Atraumatic, Normocephalic  NECK: Supple . No JVD or carotid bruit or thyroidmegaly.  Carotid pulse is 2+ bilaterally.  PULMONARY: Clear to auscultation and perfusion.  No rales, wheezing, or rhonchi bilaterally.  HEART: Regular rate and rhythm +ectopy; No murmurs, rubs, or gallops. L arm AVF +thrill/bruit  ABDOMEN: Soft, Nontender, Nondistended; Bowel sounds present  EXTREMITIES: No clubbing, cyanosis, or edema  NEUROLOGICAL: Alert oriented to person, place and time.  Speech clear. RLE weakness  Skin: Dry intact, no rashes or lesions.          INTERPRETATION OF TELEMETRY:  Sinus rhythm with V bigeminy    ECG: SR with V bigeminy         LABS:                        10.6   3.34  )-----------( 148      ( 06 Dec 2019 01:30 )             33.7     12-06    130<L>  |  92<L>  |  52<H>  ----------------------------<  136<H>  5.3   |  22  |  11.64<H>    Ca    8.5      06 Dec 2019 01:30    TPro  7.3  /  Alb  3.9  /  TBili  0.2  /  DBili  x   /  AST  35<H>  /  ALT  33  /  AlkPhos  152<H>  12-06    CARDIAC MARKERS ( 06 Dec 2019 05:00 )  x     / x     / 124 u/L / 2.15 ng/mL / x      CARDIAC MARKERS ( 06 Dec 2019 01:30 )  x     / x     / 171 u/L / 2.71 ng/mL / x                BNPSerum Pro-Brain Natriuretic Peptide: [id number] pg/mL (12-06 @ 01:30)    RADIOLOGY & ADDITIONAL STUDIES:  PREVIOUS DIAGNOSTIC TESTING:      ECHO FINDINGS:  CONCLUSIONS:  Frequent premature ventricular complexes (often in a  pattern of ventricular bigeminy) noted throughout the  study.  1. Normal mitral valve. Mild mitral regurgitation.  2. Normal trileaflet aortic valve. Mild aortic  regurgitation.  3. Severely dilated left atrium.  LA volume index = 52  cc/m2.  4. Moderate left ventricular enlargement.  5. Moderate to severe global left ventricular systolic  dysfunction.  6. Normal right ventricular size and function.  7. Small pericardial effusion posterior to the left  ventricle and superior to the right atrium.  ------------------------------------------------------------------------  Confirmed on  12/2/2019 - 16:10:12 by Uvaldo Hickey M.D.  ------------------------------------------------------------------------    STRESS FINDINGS:  PROCEDURE:  ------------------------------------------------------------------------  NUCLEARFINDINGS:  The left ventricle was hypertrophied. Normal myocardial  perfusion scan,with no evidence of infarction or inducible  ischemia.  ------------------------------------------------------------------------  GATED ANALYSIS:  Post-stress gated wall motion analysis was performed (LVEF  = 47 %;LVEDV = 155 ml.), revealing mild hypokinesis.  ------------------------------------------------------------------------  IMPRESSIONS:Normal Study  * Normal study; no evidence for myocardial infarction or  ischemia.  * Post-stress gated wall motion analysis was performed  (LVEF = 47 %;LVEDV = 155 ml.), revealing mild hypokinesis.  * The LV was hypertrophied.  ------------------------------------------------------------------------  Confirmed on  6/24/2018 - 12:15:31 by Julian Mauro M.D.  ------------------------------------------------------------------------    CATHETERIZATION FINDINGS:

## 2019-12-07 LAB
ANION GAP SERPL CALC-SCNC: 17 MMO/L — HIGH (ref 7–14)
BASOPHILS # BLD AUTO: 0.02 K/UL — SIGNIFICANT CHANGE UP (ref 0–0.2)
BASOPHILS NFR BLD AUTO: 0.3 % — SIGNIFICANT CHANGE UP (ref 0–2)
BUN SERPL-MCNC: 36 MG/DL — HIGH (ref 7–23)
CALCIUM SERPL-MCNC: 8.4 MG/DL — SIGNIFICANT CHANGE UP (ref 8.4–10.5)
CHLORIDE SERPL-SCNC: 95 MMOL/L — LOW (ref 98–107)
CHOLEST SERPL-MCNC: 124 MG/DL — SIGNIFICANT CHANGE UP (ref 120–199)
CK SERPL-CCNC: 100 U/L — SIGNIFICANT CHANGE UP (ref 25–170)
CO2 SERPL-SCNC: 23 MMOL/L — SIGNIFICANT CHANGE UP (ref 22–31)
CREAT SERPL-MCNC: 9.1 MG/DL — HIGH (ref 0.5–1.3)
EOSINOPHIL # BLD AUTO: 0.14 K/UL — SIGNIFICANT CHANGE UP (ref 0–0.5)
EOSINOPHIL NFR BLD AUTO: 2.2 % — SIGNIFICANT CHANGE UP (ref 0–6)
GLUCOSE SERPL-MCNC: 94 MG/DL — SIGNIFICANT CHANGE UP (ref 70–99)
HBA1C BLD-MCNC: 5.4 % — SIGNIFICANT CHANGE UP (ref 4–5.6)
HCT VFR BLD CALC: 34 % — LOW (ref 34.5–45)
HDLC SERPL-MCNC: 56 MG/DL — SIGNIFICANT CHANGE UP (ref 45–65)
HGB BLD-MCNC: 10.6 G/DL — LOW (ref 11.5–15.5)
IMM GRANULOCYTES NFR BLD AUTO: 0.3 % — SIGNIFICANT CHANGE UP (ref 0–1.5)
LIPID PNL WITH DIRECT LDL SERPL: 53 MG/DL — SIGNIFICANT CHANGE UP
LYMPHOCYTES # BLD AUTO: 0.47 K/UL — LOW (ref 1–3.3)
LYMPHOCYTES # BLD AUTO: 7.5 % — LOW (ref 13–44)
MAGNESIUM SERPL-MCNC: 2.1 MG/DL — SIGNIFICANT CHANGE UP (ref 1.6–2.6)
MCHC RBC-ENTMCNC: 30 PG — SIGNIFICANT CHANGE UP (ref 27–34)
MCHC RBC-ENTMCNC: 31.2 % — LOW (ref 32–36)
MCV RBC AUTO: 96.3 FL — SIGNIFICANT CHANGE UP (ref 80–100)
MONOCYTES # BLD AUTO: 0.1 K/UL — SIGNIFICANT CHANGE UP (ref 0–0.9)
MONOCYTES NFR BLD AUTO: 1.6 % — LOW (ref 2–14)
NEUTROPHILS # BLD AUTO: 5.48 K/UL — SIGNIFICANT CHANGE UP (ref 1.8–7.4)
NEUTROPHILS NFR BLD AUTO: 88.1 % — HIGH (ref 43–77)
NRBC # FLD: 0 K/UL — SIGNIFICANT CHANGE UP (ref 0–0)
PHOSPHATE SERPL-MCNC: 4.4 MG/DL — SIGNIFICANT CHANGE UP (ref 2.5–4.5)
PLATELET # BLD AUTO: 134 K/UL — LOW (ref 150–400)
PMV BLD: 11.4 FL — SIGNIFICANT CHANGE UP (ref 7–13)
POTASSIUM SERPL-MCNC: 4.4 MMOL/L — SIGNIFICANT CHANGE UP (ref 3.5–5.3)
POTASSIUM SERPL-SCNC: 4.4 MMOL/L — SIGNIFICANT CHANGE UP (ref 3.5–5.3)
RBC # BLD: 3.53 M/UL — LOW (ref 3.8–5.2)
RBC # FLD: 13.2 % — SIGNIFICANT CHANGE UP (ref 10.3–14.5)
SODIUM SERPL-SCNC: 135 MMOL/L — SIGNIFICANT CHANGE UP (ref 135–145)
TRIGL SERPL-MCNC: 105 MG/DL — SIGNIFICANT CHANGE UP (ref 10–149)
TROPONIN T, HIGH SENSITIVITY: 106 NG/L — CRITICAL HIGH (ref ?–14)
TSH SERPL-MCNC: 1.73 UIU/ML — SIGNIFICANT CHANGE UP (ref 0.27–4.2)
WBC # BLD: 6.23 K/UL — SIGNIFICANT CHANGE UP (ref 3.8–10.5)
WBC # FLD AUTO: 6.23 K/UL — SIGNIFICANT CHANGE UP (ref 3.8–10.5)

## 2019-12-07 PROCEDURE — 93970 EXTREMITY STUDY: CPT | Mod: 26

## 2019-12-07 RX ORDER — ACETAMINOPHEN 500 MG
650 TABLET ORAL ONCE
Refills: 0 | Status: COMPLETED | OUTPATIENT
Start: 2019-12-07 | End: 2019-12-07

## 2019-12-07 RX ADMIN — Medication 100 MILLIGRAM(S): at 07:35

## 2019-12-07 RX ADMIN — CARVEDILOL PHOSPHATE 12.5 MILLIGRAM(S): 80 CAPSULE, EXTENDED RELEASE ORAL at 16:52

## 2019-12-07 RX ADMIN — ISOSORBIDE DINITRATE 20 MILLIGRAM(S): 5 TABLET ORAL at 22:42

## 2019-12-07 RX ADMIN — HEPARIN SODIUM 5000 UNIT(S): 5000 INJECTION INTRAVENOUS; SUBCUTANEOUS at 16:53

## 2019-12-07 RX ADMIN — HEPARIN SODIUM 5000 UNIT(S): 5000 INJECTION INTRAVENOUS; SUBCUTANEOUS at 07:08

## 2019-12-07 RX ADMIN — Medication 667 MILLIGRAM(S): at 07:35

## 2019-12-07 RX ADMIN — ISOSORBIDE DINITRATE 20 MILLIGRAM(S): 5 TABLET ORAL at 15:05

## 2019-12-07 RX ADMIN — Medication 650 MILLIGRAM(S): at 23:22

## 2019-12-07 RX ADMIN — Medication 30 MILLILITER(S): at 09:08

## 2019-12-07 RX ADMIN — Medication 20 MILLIGRAM(S): at 07:11

## 2019-12-07 RX ADMIN — Medication 100 MILLIGRAM(S): at 15:05

## 2019-12-07 RX ADMIN — PANTOPRAZOLE SODIUM 40 MILLIGRAM(S): 20 TABLET, DELAYED RELEASE ORAL at 07:11

## 2019-12-07 RX ADMIN — Medication 30 MILLILITER(S): at 23:22

## 2019-12-07 RX ADMIN — Medication 667 MILLIGRAM(S): at 16:52

## 2019-12-07 RX ADMIN — Medication 100 MILLIGRAM(S): at 22:42

## 2019-12-07 RX ADMIN — CINACALCET 30 MILLIGRAM(S): 30 TABLET, FILM COATED ORAL at 15:05

## 2019-12-07 RX ADMIN — AMLODIPINE BESYLATE 10 MILLIGRAM(S): 2.5 TABLET ORAL at 07:09

## 2019-12-07 RX ADMIN — Medication 81 MILLIGRAM(S): at 15:05

## 2019-12-07 RX ADMIN — ISOSORBIDE DINITRATE 20 MILLIGRAM(S): 5 TABLET ORAL at 07:09

## 2019-12-07 RX ADMIN — CARVEDILOL PHOSPHATE 12.5 MILLIGRAM(S): 80 CAPSULE, EXTENDED RELEASE ORAL at 07:09

## 2019-12-07 NOTE — PROGRESS NOTE ADULT - SUBJECTIVE AND OBJECTIVE BOX
Patient is a 54y old  Female who presents with a chief complaint of chest pain (06 Dec 2019 16:43)      SUBJECTIVE / OVERNIGHT EVENTS:  no cp, no sob, no n/v/d. no abdominal pain.  no headache, no dizziness.   stable on tele  bigemini/ pvcs      Vital Signs Last 24 Hrs  T(C): 36.9 (07 Dec 2019 15:03), Max: 37.7 (07 Dec 2019 11:30)  T(F): 98.4 (07 Dec 2019 15:03), Max: 99.9 (07 Dec 2019 11:30)  HR: 89 (07 Dec 2019 15:03) (75 - 94)  BP: 122/57 (07 Dec 2019 15:03) (122/57 - 148/77)  BP(mean): --  RR: 16 (07 Dec 2019 15:03) (16 - 18)  SpO2: 96% (07 Dec 2019 15:03) (96% - 100%)  I&O's Summary    06 Dec 2019 07:01  -  07 Dec 2019 07:00  --------------------------------------------------------  IN: 475 mL / OUT: 2960 mL / NET: -2485 mL    07 Dec 2019 07:01  -  07 Dec 2019 20:39  --------------------------------------------------------  IN: 400 mL / OUT: 1900 mL / NET: -1500 mL        PHYSICAL EXAM:  GENERAL: NAD, Comfortable  HEAD:  Atraumatic, Normocephalic  EYES: EOMI, PERRLA, conjunctiva and sclera clear  NECK: Supple, No JVD  CHEST/LUNG: mild decrease breath sounds bilaterally; No wheeze   HEART: Regular rate and rhythm; No murmurs, rubs, or gallops  ABDOMEN: Soft, Nontender, Nondistended; Bowel sounds present  Neuro: AAO x 3, no focal deficit, 5/5 b/l extremities  EXTREMITIES:  2+ Peripheral Pulses, No clubbing, cyanosis, trace edema  SKIN: No rashes or lesions    LABS:                        10.6   6.23  )-----------( 134      ( 07 Dec 2019 07:27 )             34.0     12-07    135  |  95<L>  |  36<H>  ----------------------------<  94  4.4   |  23  |  9.10<H>    Ca    8.4      07 Dec 2019 07:27  Phos  4.4     12-07  Mg     2.1     12-07    TPro  7.3  /  Alb  3.9  /  TBili  0.2  /  DBili  x   /  AST  35<H>  /  ALT  33  /  AlkPhos  152<H>  12-06      CAPILLARY BLOOD GLUCOSE      POCT Blood Glucose.: 101 mg/dL (07 Dec 2019 16:57)  POCT Blood Glucose.: 111 mg/dL (07 Dec 2019 12:27)  POCT Blood Glucose.: 97 mg/dL (07 Dec 2019 07:46)  POCT Blood Glucose.: 89 mg/dL (06 Dec 2019 21:49)    CARDIAC MARKERS ( 07 Dec 2019 07:27 )  x     / x     / 100 u/L / x     / x      CARDIAC MARKERS ( 06 Dec 2019 05:00 )  x     / x     / 124 u/L / 2.15 ng/mL / x      CARDIAC MARKERS ( 06 Dec 2019 01:30 )  x     / x     / 171 u/L / 2.71 ng/mL / x              RADIOLOGY & ADDITIONAL TESTS:    Imaging Personally Reviewed:  [x] YES  [ ] NO    Consultant(s) Notes Reviewed:  [x] YES  [ ] NO      MEDICATIONS  (STANDING):  allopurinol 100 milliGRAM(s) Oral daily  amLODIPine   Tablet 10 milliGRAM(s) Oral daily  aspirin enteric coated 81 milliGRAM(s) Oral daily  calcium acetate 667 milliGRAM(s) Oral three times a day with meals  carvedilol 12.5 milliGRAM(s) Oral every 12 hours  cinacalcet 30 milliGRAM(s) Oral daily  dextrose 5%. 1000 milliLiter(s) (50 mL/Hr) IV Continuous <Continuous>  dextrose 50% Injectable 12.5 Gram(s) IV Push once  dextrose 50% Injectable 25 Gram(s) IV Push once  dextrose 50% Injectable 25 Gram(s) IV Push once  furosemide    Tablet 20 milliGRAM(s) Oral daily  heparin  Injectable 5000 Unit(s) SubCutaneous two times a day  hydrALAZINE 100 milliGRAM(s) Oral three times a day  insulin lispro (HumaLOG) corrective regimen sliding scale   SubCutaneous three times a day before meals  insulin lispro (HumaLOG) corrective regimen sliding scale   SubCutaneous at bedtime  isosorbide   dinitrate Tablet (ISORDIL) 20 milliGRAM(s) Oral three times a day  pantoprazole    Tablet 40 milliGRAM(s) Oral before breakfast    MEDICATIONS  (PRN):  aluminum hydroxide/magnesium hydroxide/simethicone Suspension 30 milliLiter(s) Oral every 6 hours PRN Dyspepsia  dextrose 40% Gel 15 Gram(s) Oral once PRN Blood Glucose LESS THAN 70 milliGRAM(s)/deciliter  glucagon  Injectable 1 milliGRAM(s) IntraMuscular once PRN Glucose LESS THAN 70 milligrams/deciliter      Care Discussed with Consultants/Other Providers [x] YES  [ ] NO    HEALTH ISSUES - PROBLEM Dx:  Prophylactic measure: Prophylactic measure  Anemia of chronic disease: Anemia of chronic disease  HTN (hypertension): HTN (hypertension)  DM (diabetes mellitus): DM (diabetes mellitus)  ESRD (end stage renal disease): ESRD (end stage renal disease)  CHF (congestive heart failure): CHF (congestive heart failure)  Chest pain: Chest pain

## 2019-12-07 NOTE — PROGRESS NOTE ADULT - SUBJECTIVE AND OBJECTIVE BOX
NEPHROLOGY - NSN    Patient seen and examined.    MEDICATIONS  (STANDING):  allopurinol 100 milliGRAM(s) Oral daily  amLODIPine   Tablet 10 milliGRAM(s) Oral daily  aspirin enteric coated 81 milliGRAM(s) Oral daily  calcium acetate 667 milliGRAM(s) Oral three times a day with meals  carvedilol 12.5 milliGRAM(s) Oral every 12 hours  cinacalcet 30 milliGRAM(s) Oral daily  dextrose 5%. 1000 milliLiter(s) (50 mL/Hr) IV Continuous <Continuous>  dextrose 50% Injectable 12.5 Gram(s) IV Push once  dextrose 50% Injectable 25 Gram(s) IV Push once  dextrose 50% Injectable 25 Gram(s) IV Push once  furosemide    Tablet 20 milliGRAM(s) Oral daily  heparin  Injectable 5000 Unit(s) SubCutaneous two times a day  hydrALAZINE 100 milliGRAM(s) Oral three times a day  insulin lispro (HumaLOG) corrective regimen sliding scale   SubCutaneous three times a day before meals  insulin lispro (HumaLOG) corrective regimen sliding scale   SubCutaneous at bedtime  isosorbide   dinitrate Tablet (ISORDIL) 20 milliGRAM(s) Oral three times a day  pantoprazole    Tablet 40 milliGRAM(s) Oral before breakfast    VITALS:  T(C): , Max: 37.6 (12-07-19 @ 07:04)  T(F): , Max: 99.6 (12-07-19 @ 07:04)  HR: 94 (12-07-19 @ 09:03)  BP: 136/74 (12-07-19 @ 09:03)  BP(mean): --  RR: 16 (12-07-19 @ 09:03)  SpO2: 96% (12-07-19 @ 09:03)  Wt(kg): --  I and O's:    12-06 @ 07:01  -  12-07 @ 07:00  --------------------------------------------------------  IN: 475 mL / OUT: 2960 mL / NET: -2485 mL      Height (cm): 162.6 (12-07 @ 07:04)  Weight (kg): 88.9 (12-07 @ 07:04)  BMI (kg/m2): 33.6 (12-07 @ 07:04)  BSA (m2): 1.94 (12-07 @ 07:04)    REVIEW OF SYSTEMS:  Full ROS done and were negative unless otherwise indicated in HPI/assessment.     PHYSICAL EXAM:  Constitutional: NAD  Respiratory: CTA B/L  Cardiovascular: S1 and S2, RRR  Gastrointestinal: + BS, soft, NT, ND  Extremities: No peripheral edema  Neurological: AAO x 3, CN 2-12 intact  : No John  Access: Not applicable    LABS:                        10.6   6.23  )-----------( 134      ( 07 Dec 2019 07:27 )             34.0     12-07    135  |  95<L>  |  36<H>  ----------------------------<  94  4.4   |  23  |  9.10<H>    Ca    8.4      07 Dec 2019 07:27  Phos  4.4     12-07  Mg     2.1     12-07    TPro  7.3  /  Alb  3.9  /  TBili  0.2  /  DBili  x   /  AST  35<H>  /  ALT  33  /  AlkPhos  152<H>  12-06 NEPHROLOGY         Patient seen and examined laying in bed, feels tired, no complaints offered. Last HD yesterday (12/6/19) removed 2.5L.     MEDICATIONS  (STANDING):  allopurinol 100 milliGRAM(s) Oral daily  amLODIPine   Tablet 10 milliGRAM(s) Oral daily  aspirin enteric coated 81 milliGRAM(s) Oral daily  calcium acetate 667 milliGRAM(s) Oral three times a day with meals  carvedilol 12.5 milliGRAM(s) Oral every 12 hours  cinacalcet 30 milliGRAM(s) Oral daily  dextrose 5%. 1000 milliLiter(s) (50 mL/Hr) IV Continuous <Continuous>  dextrose 50% Injectable 12.5 Gram(s) IV Push once  dextrose 50% Injectable 25 Gram(s) IV Push once  dextrose 50% Injectable 25 Gram(s) IV Push once  furosemide    Tablet 20 milliGRAM(s) Oral daily  heparin  Injectable 5000 Unit(s) SubCutaneous two times a day  hydrALAZINE 100 milliGRAM(s) Oral three times a day  insulin lispro (HumaLOG) corrective regimen sliding scale   SubCutaneous three times a day before meals  insulin lispro (HumaLOG) corrective regimen sliding scale   SubCutaneous at bedtime  isosorbide   dinitrate Tablet (ISORDIL) 20 milliGRAM(s) Oral three times a day  pantoprazole    Tablet 40 milliGRAM(s) Oral before breakfast    VITALS:  T(C): , Max: 37.6 (12-07-19 @ 07:04)  T(F): , Max: 99.6 (12-07-19 @ 07:04)  HR: 94 (12-07-19 @ 09:03)  BP: 136/74 (12-07-19 @ 09:03)  RR: 16 (12-07-19 @ 09:03)  SpO2: 96% (12-07-19 @ 09:03)    Height (cm): 162.6 (12-07 @ 07:04)  Weight (kg): 88.9 (12-07 @ 07:04)  BMI (kg/m2): 33.6 (12-07 @ 07:04)  BSA (m2): 1.94 (12-07 @ 07:04)    PHYSICAL EXAM:  Constitutional: NAD, Alert  HEENT: NCAT, MMM  Neck: Supple, No JVD  Respiratory: CTA-b/l  Cardiovascular: RRR s1s2, no m/r/g  Gastrointestinal: BS+, soft, NT/ND  Extremities: No peripheral edema b/l  Neurological: no focal deficits; strength grossly intact  Back: no CVAT b/l  Skin: No rashes, no nevi  Access: DAVID MCDONALD (+)Thrill    LABS:                        10.6   6.23  )-----------( 134      ( 07 Dec 2019 07:27 )             34.0     12-07    135  |  95<L>  |  36<H>  ----------------------------<  94  4.4   |  23  |  9.10<H>    Ca    8.4      07 Dec 2019 07:27  Phos  4.4     12-07  Mg     2.1     12-07    TPro  7.3  /  Alb  3.9  /  TBili  0.2  /  DBili  x   /  AST  35<H>  /  ALT  33  /  AlkPhos  152<H>  12-06

## 2019-12-07 NOTE — PROGRESS NOTE ADULT - PROBLEM SELECTOR PLAN 1
Admitted for atypical chest pain.   States her pain is mainly midepigastric, started after she came out of her shower.   Missed dialysis only because she was told to go to the hospital instead.   No nausea, no vomiting. Hx of similar pain in the past. no recent dietary changes. Similar pain in the past. hx of severe GERD.   ddx: r/o ACS vs. GI etiology such as GERD/esophagitis  c/w PPI, Maalox PRN. will check CT abd to r/o pancreatitis (though low suspicion). Lipase neg  will order LE dopplers to r/o DVT given edema  cardiology consult. trend cardiac enzymes (not too far off her baseline)  EP on the case for frequent PVCs

## 2019-12-07 NOTE — PROGRESS NOTE ADULT - PROBLEM SELECTOR PLAN 2
CHF chronic systolic HF 2/2/ NICMP EF=36-45%  isordil, hydralazine, lasix, coreg  HDZ=81510, alk phos=152.   I and Os daiy weights  HD per renal

## 2019-12-07 NOTE — PROGRESS NOTE ADULT - SUBJECTIVE AND OBJECTIVE BOX
CARDIOLOGY FOLLOW UP - Dr. Andrew    CC no cp/sob, c/o abd pain       PHYSICAL EXAM:  T(C): 37 (12-07-19 @ 09:03), Max: 37.6 (12-07-19 @ 07:04)  HR: 94 (12-07-19 @ 09:03) (70 - 94)  BP: 136/74 (12-07-19 @ 09:03) (123/53 - 142/60)  RR: 16 (12-07-19 @ 09:03) (16 - 18)  SpO2: 96% (12-07-19 @ 09:03) (96% - 100%)  Wt(kg): --  I&O's Summary    06 Dec 2019 07:01  -  07 Dec 2019 07:00  --------------------------------------------------------  IN: 475 mL / OUT: 2960 mL / NET: -2485 mL        Appearance: Normal	  Cardiovascular: Normal S1 S2,RRR, No JVD, No murmurs  Respiratory: Lungs clear to auscultation	  Gastrointestinal:  Soft, Non-tender, + BS	  Extremities: Normal range of motion, No clubbing, cyanosis or edema        MEDICATIONS  (STANDING):  allopurinol 100 milliGRAM(s) Oral daily  amLODIPine   Tablet 10 milliGRAM(s) Oral daily  aspirin enteric coated 81 milliGRAM(s) Oral daily  calcium acetate 667 milliGRAM(s) Oral three times a day with meals  carvedilol 12.5 milliGRAM(s) Oral every 12 hours  cinacalcet 30 milliGRAM(s) Oral daily  dextrose 5%. 1000 milliLiter(s) (50 mL/Hr) IV Continuous <Continuous>  dextrose 50% Injectable 12.5 Gram(s) IV Push once  dextrose 50% Injectable 25 Gram(s) IV Push once  dextrose 50% Injectable 25 Gram(s) IV Push once  furosemide    Tablet 20 milliGRAM(s) Oral daily  heparin  Injectable 5000 Unit(s) SubCutaneous two times a day  hydrALAZINE 100 milliGRAM(s) Oral three times a day  insulin lispro (HumaLOG) corrective regimen sliding scale   SubCutaneous three times a day before meals  insulin lispro (HumaLOG) corrective regimen sliding scale   SubCutaneous at bedtime  isosorbide   dinitrate Tablet (ISORDIL) 20 milliGRAM(s) Oral three times a day  pantoprazole    Tablet 40 milliGRAM(s) Oral before breakfast      TELEMETRY:   nsr, pvc  ECG:  	  RADIOLOGY:   DIAGNOSTIC TESTING:  [ ] Echocardiogram:  [ ]  Catheterization:  [ ] Stress Test:    OTHER: 	    LABS:	 	                                10.6   6.23  )-----------( 134      ( 07 Dec 2019 07:27 )             34.0     12-07    135  |  95<L>  |  36<H>  ----------------------------<  94  4.4   |  23  |  9.10<H>    Ca    8.4      07 Dec 2019 07:27  Phos  4.4     12-07  Mg     2.1     12-07    TPro  7.3  /  Alb  3.9  /  TBili  0.2  /  DBili  x   /  AST  35<H>  /  ALT  33  /  AlkPhos  152<H>  12-06

## 2019-12-07 NOTE — CHART NOTE - NSCHARTNOTEFT_GEN_A_CORE
Pt reportedly had multiple episodes of watery uncontrollable diarrhea today, Pt febrile to 100.1 with no other obvious source of infection.    ICU Vital Signs Last 24 Hrs  T(C): 37.8 (07 Dec 2019 22:39), Max: 37.8 (07 Dec 2019 22:39)  T(F): 100.1 (07 Dec 2019 22:39), Max: 100.1 (07 Dec 2019 22:39)  HR: 89 (07 Dec 2019 15:03) (75 - 94)  BP: 125/70 (07 Dec 2019 22:39) (122/57 - 148/77)  BP(mean): --  ABP: --  ABP(mean): --  RR: 17 (07 Dec 2019 22:39) (16 - 18)  SpO2: 97% (07 Dec 2019 22:39) (96% - 98%)                            10.6   6.23  )-----------( 134      ( 07 Dec 2019 07:27 )             34.0       A/P:  Stool ordered for C Diff and culture  Incentive spirometry for possible atelectasis for  volume overload/PNA on CXR  Continue to monitor VS and clinical picture  CTAP was ordered on admission, still Pending

## 2019-12-08 DIAGNOSIS — R19.7 DIARRHEA, UNSPECIFIED: ICD-10-CM

## 2019-12-08 LAB
ALBUMIN SERPL ELPH-MCNC: 3.4 G/DL — SIGNIFICANT CHANGE UP (ref 3.3–5)
ALP SERPL-CCNC: 119 U/L — SIGNIFICANT CHANGE UP (ref 40–120)
ALT FLD-CCNC: 17 U/L — SIGNIFICANT CHANGE UP (ref 4–33)
ANION GAP SERPL CALC-SCNC: 13 MMO/L — SIGNIFICANT CHANGE UP (ref 7–14)
AST SERPL-CCNC: 13 U/L — SIGNIFICANT CHANGE UP (ref 4–32)
BILIRUB SERPL-MCNC: 0.3 MG/DL — SIGNIFICANT CHANGE UP (ref 0.2–1.2)
BUN SERPL-MCNC: 28 MG/DL — HIGH (ref 7–23)
C DIFF TOX GENS STL QL NAA+PROBE: SIGNIFICANT CHANGE UP
CALCIUM SERPL-MCNC: 8.5 MG/DL — SIGNIFICANT CHANGE UP (ref 8.4–10.5)
CHLORIDE SERPL-SCNC: 96 MMOL/L — LOW (ref 98–107)
CO2 SERPL-SCNC: 27 MMOL/L — SIGNIFICANT CHANGE UP (ref 22–31)
CREAT SERPL-MCNC: 8.61 MG/DL — HIGH (ref 0.5–1.3)
GLUCOSE SERPL-MCNC: 87 MG/DL — SIGNIFICANT CHANGE UP (ref 70–99)
HCT VFR BLD CALC: 34.8 % — SIGNIFICANT CHANGE UP (ref 34.5–45)
HGB BLD-MCNC: 10.8 G/DL — LOW (ref 11.5–15.5)
MAGNESIUM SERPL-MCNC: 2 MG/DL — SIGNIFICANT CHANGE UP (ref 1.6–2.6)
MCHC RBC-ENTMCNC: 29.7 PG — SIGNIFICANT CHANGE UP (ref 27–34)
MCHC RBC-ENTMCNC: 31 % — LOW (ref 32–36)
MCV RBC AUTO: 95.6 FL — SIGNIFICANT CHANGE UP (ref 80–100)
NRBC # FLD: 0 K/UL — SIGNIFICANT CHANGE UP (ref 0–0)
PHOSPHATE SERPL-MCNC: 4.4 MG/DL — SIGNIFICANT CHANGE UP (ref 2.5–4.5)
PLATELET # BLD AUTO: 143 K/UL — LOW (ref 150–400)
PMV BLD: 11.6 FL — SIGNIFICANT CHANGE UP (ref 7–13)
POTASSIUM SERPL-MCNC: 4.3 MMOL/L — SIGNIFICANT CHANGE UP (ref 3.5–5.3)
POTASSIUM SERPL-SCNC: 4.3 MMOL/L — SIGNIFICANT CHANGE UP (ref 3.5–5.3)
PROT SERPL-MCNC: 6.7 G/DL — SIGNIFICANT CHANGE UP (ref 6–8.3)
RBC # BLD: 3.64 M/UL — LOW (ref 3.8–5.2)
RBC # FLD: 13.2 % — SIGNIFICANT CHANGE UP (ref 10.3–14.5)
SODIUM SERPL-SCNC: 136 MMOL/L — SIGNIFICANT CHANGE UP (ref 135–145)
WBC # BLD: 2.07 K/UL — LOW (ref 3.8–10.5)
WBC # FLD AUTO: 2.07 K/UL — LOW (ref 3.8–10.5)

## 2019-12-08 PROCEDURE — 74176 CT ABD & PELVIS W/O CONTRAST: CPT | Mod: 26

## 2019-12-08 RX ADMIN — ISOSORBIDE DINITRATE 20 MILLIGRAM(S): 5 TABLET ORAL at 06:45

## 2019-12-08 RX ADMIN — Medication 100 MILLIGRAM(S): at 14:00

## 2019-12-08 RX ADMIN — ISOSORBIDE DINITRATE 20 MILLIGRAM(S): 5 TABLET ORAL at 14:00

## 2019-12-08 RX ADMIN — Medication 81 MILLIGRAM(S): at 11:47

## 2019-12-08 RX ADMIN — Medication 667 MILLIGRAM(S): at 11:47

## 2019-12-08 RX ADMIN — Medication 667 MILLIGRAM(S): at 08:00

## 2019-12-08 RX ADMIN — HEPARIN SODIUM 5000 UNIT(S): 5000 INJECTION INTRAVENOUS; SUBCUTANEOUS at 17:30

## 2019-12-08 RX ADMIN — CARVEDILOL PHOSPHATE 12.5 MILLIGRAM(S): 80 CAPSULE, EXTENDED RELEASE ORAL at 17:30

## 2019-12-08 RX ADMIN — Medication 100 MILLIGRAM(S): at 22:08

## 2019-12-08 RX ADMIN — AMLODIPINE BESYLATE 10 MILLIGRAM(S): 2.5 TABLET ORAL at 06:45

## 2019-12-08 RX ADMIN — Medication 100 MILLIGRAM(S): at 11:47

## 2019-12-08 RX ADMIN — ISOSORBIDE DINITRATE 20 MILLIGRAM(S): 5 TABLET ORAL at 22:08

## 2019-12-08 RX ADMIN — HEPARIN SODIUM 5000 UNIT(S): 5000 INJECTION INTRAVENOUS; SUBCUTANEOUS at 06:45

## 2019-12-08 RX ADMIN — Medication 20 MILLIGRAM(S): at 06:45

## 2019-12-08 RX ADMIN — CARVEDILOL PHOSPHATE 12.5 MILLIGRAM(S): 80 CAPSULE, EXTENDED RELEASE ORAL at 06:45

## 2019-12-08 RX ADMIN — Medication 667 MILLIGRAM(S): at 17:30

## 2019-12-08 RX ADMIN — Medication 100 MILLIGRAM(S): at 06:45

## 2019-12-08 RX ADMIN — CINACALCET 30 MILLIGRAM(S): 30 TABLET, FILM COATED ORAL at 11:47

## 2019-12-08 RX ADMIN — PANTOPRAZOLE SODIUM 40 MILLIGRAM(S): 20 TABLET, DELAYED RELEASE ORAL at 06:45

## 2019-12-08 NOTE — PROGRESS NOTE ADULT - SUBJECTIVE AND OBJECTIVE BOX
CC: no cp/sob, _+ diarrhea    TELEMETRY:     PHYSICAL EXAM:    T(C): 36.9 (12-08-19 @ 06:43), Max: 37.8 (12-07-19 @ 22:39)  HR: 80 (12-08-19 @ 06:43) (80 - 94)  BP: 133/71 (12-08-19 @ 06:43) (122/57 - 148/77)  RR: 16 (12-08-19 @ 06:43) (16 - 18)  SpO2: 100% (12-08-19 @ 06:43) (96% - 100%)  Wt(kg): --  I&O's Summary    07 Dec 2019 07:01  -  08 Dec 2019 07:00  --------------------------------------------------------  IN: 400 mL / OUT: 1900 mL / NET: -1500 mL        Appearance: Normal	  Cardiovascular: Normal S1 S2,RRR, No JVD, No murmurs  Respiratory: Lungs clear to auscultation	  Gastrointestinal:  Soft, Non-tender, + BS	  Extremities: Normal range of motion, No clubbing, cyanosis or edema  Vascular: Peripheral pulses palpable 2+ bilaterally     LABS:	 	                          10.8   2.07  )-----------( 143      ( 08 Dec 2019 06:20 )             34.8     12-08    136  |  96<L>  |  28<H>  ----------------------------<  87  4.3   |  27  |  8.61<H>    Ca    8.5      08 Dec 2019 06:20  Phos  4.4     12-08  Mg     2.0     12-08    TPro  6.7  /  Alb  3.4  /  TBili  0.3  /  DBili  x   /  AST  13  /  ALT  17  /  AlkPhos  119  12-08          CARDIAC MARKERS:

## 2019-12-08 NOTE — PROGRESS NOTE ADULT - SUBJECTIVE AND OBJECTIVE BOX
Overnight Events:   Denies CP or SOB, having diarrhea    Medications:  allopurinol 100 milliGRAM(s) Oral daily  aluminum hydroxide/magnesium hydroxide/simethicone Suspension 30 milliLiter(s) Oral every 6 hours PRN  amLODIPine   Tablet 10 milliGRAM(s) Oral daily  aspirin enteric coated 81 milliGRAM(s) Oral daily  calcium acetate 667 milliGRAM(s) Oral three times a day with meals  carvedilol 12.5 milliGRAM(s) Oral every 12 hours  cinacalcet 30 milliGRAM(s) Oral daily  dextrose 40% Gel 15 Gram(s) Oral once PRN  dextrose 5%. 1000 milliLiter(s) IV Continuous <Continuous>  dextrose 50% Injectable 12.5 Gram(s) IV Push once  dextrose 50% Injectable 25 Gram(s) IV Push once  dextrose 50% Injectable 25 Gram(s) IV Push once  furosemide    Tablet 20 milliGRAM(s) Oral daily  glucagon  Injectable 1 milliGRAM(s) IntraMuscular once PRN  heparin  Injectable 5000 Unit(s) SubCutaneous two times a day  hydrALAZINE 100 milliGRAM(s) Oral three times a day  insulin lispro (HumaLOG) corrective regimen sliding scale   SubCutaneous three times a day before meals  insulin lispro (HumaLOG) corrective regimen sliding scale   SubCutaneous at bedtime  isosorbide   dinitrate Tablet (ISORDIL) 20 milliGRAM(s) Oral three times a day  pantoprazole    Tablet 40 milliGRAM(s) Oral before breakfast      PAST MEDICAL & SURGICAL HISTORY:  CHF (congestive heart failure)  GERD (gastroesophageal reflux disease)  Anemia of chronic disease  HLD (hyperlipidemia)  NICM (nonischemic cardiomyopathy)  KERI (obstructive sleep apnea)  ESRD (end stage renal disease): HD T/T/S  Depression  Gout  CVA (cerebral vascular accident): 2013- Residual RLE weakness  DM (diabetes mellitus)  HTN (hypertension)  Glaucoma  S/P cholecystectomy  S/P partial hysterectomy  Status post glaucoma surgery  AVF (arteriovenous fistula)      Vitals:  T(F): 98.4 (12-08), Max: 100.1 (12-07)  HR: 80 (12-08) (80 - 89)  BP: 133/71 (12-08) (122/57 - 148/77)  RR: 16 (12-08)  SpO2: 100% (12-08)  I&O's Summary    07 Dec 2019 07:01  -  08 Dec 2019 07:00  --------------------------------------------------------  IN: 400 mL / OUT: 1900 mL / NET: -1500 mL        Physical Exam:  Appearance: No acute distress; well appearing  Eyes: PERRL, EOMI, pink conjunctiva  HENT: Normal oral muscosa  Cardiovascular: RRR, S1, S2, no murmurs, rubs, or gallops; no edema; no JVD  Respiratory: Clear to auscultation bilaterally  Gastrointestinal: soft, non-tender, non-distended with normal bowel sounds  Musculoskeletal: No clubbing; no joint deformity   Neurologic: Non-focal  Lymphatic: No lymphadenopathy  Psychiatry: AAOx3, mood & affect appropriate  Skin: No rashes, ecchymoses, or cyanosis                          10.8   2.07  )-----------( 143      ( 08 Dec 2019 06:20 )             34.8     12-08    136  |  96<L>  |  28<H>  ----------------------------<  87  4.3   |  27  |  8.61<H>    Ca    8.5      08 Dec 2019 06:20  Phos  4.4     12-08  Mg     2.0     12-08    TPro  6.7  /  Alb  3.4  /  TBili  0.3  /  DBili  x   /  AST  13  /  ALT  17  /  AlkPhos  119  12-08      CARDIAC MARKERS ( 07 Dec 2019 07:27 )  x     / x     / 100 u/L / x     / x          Serum Pro-Brain Natriuretic Peptide: 22217 pg/mL (12-06 @ 01:30)          New ECG(s): Personally reviewed    Echo:    Stress Testing:     Cath:    Imaging:    Interpretation of Telemetry:

## 2019-12-08 NOTE — PROGRESS NOTE ADULT - PROBLEM SELECTOR PLAN 3
CHF chronic systolic HF 2/2/ NICMP EF=36-45%  isordil, hydralazine, lasix, coreg  XHB=46290, alk phos=152.   I and Os daiy weights  HD per renal

## 2019-12-08 NOTE — PROGRESS NOTE ADULT - PROBLEM SELECTOR PLAN 1
+multiple loose stool  c.diff neg  GI PCR ordered  stool culture pending (sent)  CT abd without intraabdominal etiology.

## 2019-12-08 NOTE — PROGRESS NOTE ADULT - PROBLEM SELECTOR PLAN 2
Admitted for atypical chest pain.   States her pain is mainly midepigastric, started after she came out of her shower.   Missed dialysis only because she was told to go to the hospital instead.   No nausea, no vomiting. Hx of similar pain in the past. no recent dietary changes. Similar pain in the past. hx of severe GERD.   ddx: r/o ACS vs. GI etiology such as GERD/esophagitis  c/w PPI, Maalox PRN.   CT abd r/o pancreatitis, no intraabdominal etiology. Lipase neg  LE dopplers to r/o DVT given edema  cardiology consult appreciated. Trend cardiac enzymes (not too far off her baseline)  EP on the case for frequent PVCs, planning posible SVT ablation.

## 2019-12-08 NOTE — PROGRESS NOTE ADULT - SUBJECTIVE AND OBJECTIVE BOX
Patient is a 54y old  Female who presents with a chief complaint of chest pain (06 Dec 2019 16:43)      SUBJECTIVE / OVERNIGHT EVENTS:  feeling okay  "stomach bothering"  +multiple loose stool  c.diff neg  GI PCR ordered  stool culture pending  no cp, no n/v.   no HA/ no dizziness       Vital Signs Last 24 Hrs  T(C): 36.8 (08 Dec 2019 10:16), Max: 37.8 (07 Dec 2019 22:39)  T(F): 98.2 (08 Dec 2019 10:16), Max: 100.1 (07 Dec 2019 22:39)  HR: 82 (08 Dec 2019 10:16) (80 - 89)  BP: 126/73 (08 Dec 2019 10:16) (122/57 - 148/77)  BP(mean): --  RR: 17 (08 Dec 2019 10:16) (16 - 18)  SpO2: 100% (08 Dec 2019 10:16) (96% - 100%)  I&O's Summary    07 Dec 2019 07:01  -  08 Dec 2019 07:00  --------------------------------------------------------  IN: 400 mL / OUT: 1900 mL / NET: -1500 mL      PHYSICAL EXAM:  GENERAL: NAD, Comfortable  HEAD:  Atraumatic, Normocephalic  EYES: EOMI, PERRLA, conjunctiva and sclera clear  NECK: Supple, No JVD  CHEST/LUNG: mild decrease breath sounds bilaterally; No wheeze   HEART: Regular rate and rhythm; No murmurs, rubs, or gallops  ABDOMEN: Soft, Nontender, Nondistended; Bowel sounds present  Neuro: AAO x 3, no focal deficit, 5/5 b/l extremities  EXTREMITIES:  2+ Peripheral Pulses, No clubbing, cyanosis, trace edema  SKIN: No rashes or lesions      LABS:                        10.8   2.07  )-----------( 143      ( 08 Dec 2019 06:20 )             34.8     12-08    136  |  96<L>  |  28<H>  ----------------------------<  87  4.3   |  27  |  8.61<H>    Ca    8.5      08 Dec 2019 06:20  Phos  4.4     12-08  Mg     2.0     12-08    TPro  6.7  /  Alb  3.4  /  TBili  0.3  /  DBili  x   /  AST  13  /  ALT  17  /  AlkPhos  119  12-08      CAPILLARY BLOOD GLUCOSE      POCT Blood Glucose.: 101 mg/dL (08 Dec 2019 11:32)  POCT Blood Glucose.: 86 mg/dL (08 Dec 2019 07:30)  POCT Blood Glucose.: 94 mg/dL (07 Dec 2019 22:27)  POCT Blood Glucose.: 83 mg/dL (07 Dec 2019 21:45)  POCT Blood Glucose.: 101 mg/dL (07 Dec 2019 16:57)    CARDIAC MARKERS ( 07 Dec 2019 07:27 )  x     / x     / 100 u/L / x     / x              RADIOLOGY & ADDITIONAL TESTS:    Imaging Personally Reviewed:  [x] YES  [ ] NO    Consultant(s) Notes Reviewed:  [x] YES  [ ] NO      MEDICATIONS  (STANDING):  allopurinol 100 milliGRAM(s) Oral daily  amLODIPine   Tablet 10 milliGRAM(s) Oral daily  aspirin enteric coated 81 milliGRAM(s) Oral daily  calcium acetate 667 milliGRAM(s) Oral three times a day with meals  carvedilol 12.5 milliGRAM(s) Oral every 12 hours  cinacalcet 30 milliGRAM(s) Oral daily  dextrose 5%. 1000 milliLiter(s) (50 mL/Hr) IV Continuous <Continuous>  dextrose 50% Injectable 12.5 Gram(s) IV Push once  dextrose 50% Injectable 25 Gram(s) IV Push once  dextrose 50% Injectable 25 Gram(s) IV Push once  furosemide    Tablet 20 milliGRAM(s) Oral daily  heparin  Injectable 5000 Unit(s) SubCutaneous two times a day  hydrALAZINE 100 milliGRAM(s) Oral three times a day  insulin lispro (HumaLOG) corrective regimen sliding scale   SubCutaneous three times a day before meals  insulin lispro (HumaLOG) corrective regimen sliding scale   SubCutaneous at bedtime  isosorbide   dinitrate Tablet (ISORDIL) 20 milliGRAM(s) Oral three times a day  pantoprazole    Tablet 40 milliGRAM(s) Oral before breakfast    MEDICATIONS  (PRN):  aluminum hydroxide/magnesium hydroxide/simethicone Suspension 30 milliLiter(s) Oral every 6 hours PRN Dyspepsia  dextrose 40% Gel 15 Gram(s) Oral once PRN Blood Glucose LESS THAN 70 milliGRAM(s)/deciliter  glucagon  Injectable 1 milliGRAM(s) IntraMuscular once PRN Glucose LESS THAN 70 milligrams/deciliter      Care Discussed with Consultants/Other Providers [x] YES  [ ] NO    HEALTH ISSUES - PROBLEM Dx:  Prophylactic measure: Prophylactic measure  Anemia of chronic disease: Anemia of chronic disease  HTN (hypertension): HTN (hypertension)  DM (diabetes mellitus): DM (diabetes mellitus)  ESRD (end stage renal disease): ESRD (end stage renal disease)  CHF (congestive heart failure): CHF (congestive heart failure)  Chest pain: Chest pain

## 2019-12-09 LAB
ALBUMIN SERPL ELPH-MCNC: 3.3 G/DL — SIGNIFICANT CHANGE UP (ref 3.3–5)
ALP SERPL-CCNC: 102 U/L — SIGNIFICANT CHANGE UP (ref 40–120)
ALT FLD-CCNC: 15 U/L — SIGNIFICANT CHANGE UP (ref 4–33)
ANION GAP SERPL CALC-SCNC: 12 MMO/L — SIGNIFICANT CHANGE UP (ref 7–14)
AST SERPL-CCNC: 11 U/L — SIGNIFICANT CHANGE UP (ref 4–32)
BILIRUB SERPL-MCNC: 0.2 MG/DL — SIGNIFICANT CHANGE UP (ref 0.2–1.2)
BUN SERPL-MCNC: 37 MG/DL — HIGH (ref 7–23)
CALCIUM SERPL-MCNC: 8 MG/DL — LOW (ref 8.4–10.5)
CHLORIDE SERPL-SCNC: 96 MMOL/L — LOW (ref 98–107)
CO2 SERPL-SCNC: 26 MMOL/L — SIGNIFICANT CHANGE UP (ref 22–31)
CREAT SERPL-MCNC: 10.68 MG/DL — HIGH (ref 0.5–1.3)
GLUCOSE SERPL-MCNC: 95 MG/DL — SIGNIFICANT CHANGE UP (ref 70–99)
HCT VFR BLD CALC: 30.7 % — LOW (ref 34.5–45)
HGB BLD-MCNC: 9.7 G/DL — LOW (ref 11.5–15.5)
MAGNESIUM SERPL-MCNC: 2.1 MG/DL — SIGNIFICANT CHANGE UP (ref 1.6–2.6)
MCHC RBC-ENTMCNC: 30.8 PG — SIGNIFICANT CHANGE UP (ref 27–34)
MCHC RBC-ENTMCNC: 31.6 % — LOW (ref 32–36)
MCV RBC AUTO: 97.5 FL — SIGNIFICANT CHANGE UP (ref 80–100)
NRBC # FLD: 0 K/UL — SIGNIFICANT CHANGE UP (ref 0–0)
PHOSPHATE SERPL-MCNC: 4.4 MG/DL — SIGNIFICANT CHANGE UP (ref 2.5–4.5)
PLATELET # BLD AUTO: 147 K/UL — LOW (ref 150–400)
PMV BLD: 11.4 FL — SIGNIFICANT CHANGE UP (ref 7–13)
POTASSIUM SERPL-MCNC: 4.2 MMOL/L — SIGNIFICANT CHANGE UP (ref 3.5–5.3)
POTASSIUM SERPL-SCNC: 4.2 MMOL/L — SIGNIFICANT CHANGE UP (ref 3.5–5.3)
PROT SERPL-MCNC: 6.4 G/DL — SIGNIFICANT CHANGE UP (ref 6–8.3)
RBC # BLD: 3.15 M/UL — LOW (ref 3.8–5.2)
RBC # FLD: 13.2 % — SIGNIFICANT CHANGE UP (ref 10.3–14.5)
SODIUM SERPL-SCNC: 134 MMOL/L — LOW (ref 135–145)
SPECIMEN SOURCE: SIGNIFICANT CHANGE UP
WBC # BLD: 3.31 K/UL — LOW (ref 3.8–10.5)
WBC # FLD AUTO: 3.31 K/UL — LOW (ref 3.8–10.5)

## 2019-12-09 PROCEDURE — 99232 SBSQ HOSP IP/OBS MODERATE 35: CPT

## 2019-12-09 RX ORDER — ERYTHROPOIETIN 10000 [IU]/ML
10000 INJECTION, SOLUTION INTRAVENOUS; SUBCUTANEOUS
Refills: 0 | Status: DISCONTINUED | OUTPATIENT
Start: 2019-12-09 | End: 2019-12-11

## 2019-12-09 RX ADMIN — ISOSORBIDE DINITRATE 20 MILLIGRAM(S): 5 TABLET ORAL at 13:48

## 2019-12-09 RX ADMIN — Medication 667 MILLIGRAM(S): at 11:47

## 2019-12-09 RX ADMIN — CARVEDILOL PHOSPHATE 12.5 MILLIGRAM(S): 80 CAPSULE, EXTENDED RELEASE ORAL at 06:50

## 2019-12-09 RX ADMIN — CINACALCET 30 MILLIGRAM(S): 30 TABLET, FILM COATED ORAL at 11:47

## 2019-12-09 RX ADMIN — PANTOPRAZOLE SODIUM 40 MILLIGRAM(S): 20 TABLET, DELAYED RELEASE ORAL at 06:50

## 2019-12-09 RX ADMIN — ISOSORBIDE DINITRATE 20 MILLIGRAM(S): 5 TABLET ORAL at 23:20

## 2019-12-09 RX ADMIN — Medication 81 MILLIGRAM(S): at 11:47

## 2019-12-09 RX ADMIN — AMLODIPINE BESYLATE 10 MILLIGRAM(S): 2.5 TABLET ORAL at 06:50

## 2019-12-09 RX ADMIN — Medication 100 MILLIGRAM(S): at 11:47

## 2019-12-09 RX ADMIN — Medication 20 MILLIGRAM(S): at 06:50

## 2019-12-09 RX ADMIN — Medication 100 MILLIGRAM(S): at 06:50

## 2019-12-09 RX ADMIN — ISOSORBIDE DINITRATE 20 MILLIGRAM(S): 5 TABLET ORAL at 06:50

## 2019-12-09 RX ADMIN — Medication 100 MILLIGRAM(S): at 13:48

## 2019-12-09 RX ADMIN — HEPARIN SODIUM 5000 UNIT(S): 5000 INJECTION INTRAVENOUS; SUBCUTANEOUS at 06:51

## 2019-12-09 RX ADMIN — Medication 667 MILLIGRAM(S): at 09:46

## 2019-12-09 RX ADMIN — Medication 667 MILLIGRAM(S): at 17:52

## 2019-12-09 RX ADMIN — HEPARIN SODIUM 5000 UNIT(S): 5000 INJECTION INTRAVENOUS; SUBCUTANEOUS at 17:52

## 2019-12-09 RX ADMIN — Medication 100 MILLIGRAM(S): at 23:20

## 2019-12-09 NOTE — PROGRESS NOTE ADULT - SUBJECTIVE AND OBJECTIVE BOX
CARDIOLOGY FOLLOW UP - Dr. Andrew    CC no cp or sob      PHYSICAL EXAM:  T(C): 37 (12-09-19 @ 13:42), Max: 37.4 (12-08-19 @ 17:28)  HR: 71 (12-09-19 @ 13:42) (67 - 79)  BP: 119/70 (12-09-19 @ 13:42) (119/70 - 138/81)  RR: 18 (12-09-19 @ 13:42) (16 - 18)  SpO2: 98% (12-09-19 @ 13:42) (96% - 100%)  Wt(kg): --  I&O's Summary      Appearance: Normal	  Cardiovascular: Normal S1 S2,RRR, No JVD, No murmurs  Respiratory: Lungs clear to auscultation	  Gastrointestinal:  Soft, Non-tender, + BS	  Extremities: Normal range of motion, No clubbing, cyanosis or edema        MEDICATIONS  (STANDING):  allopurinol 100 milliGRAM(s) Oral daily  amLODIPine   Tablet 10 milliGRAM(s) Oral daily  aspirin enteric coated 81 milliGRAM(s) Oral daily  calcium acetate 667 milliGRAM(s) Oral three times a day with meals  carvedilol 12.5 milliGRAM(s) Oral every 12 hours  cinacalcet 30 milliGRAM(s) Oral daily  dextrose 5%. 1000 milliLiter(s) (50 mL/Hr) IV Continuous <Continuous>  dextrose 50% Injectable 12.5 Gram(s) IV Push once  dextrose 50% Injectable 25 Gram(s) IV Push once  dextrose 50% Injectable 25 Gram(s) IV Push once  epoetin karla Injectable 67888 Unit(s) IV Push <User Schedule>  furosemide    Tablet 20 milliGRAM(s) Oral daily  heparin  Injectable 5000 Unit(s) SubCutaneous two times a day  hydrALAZINE 100 milliGRAM(s) Oral three times a day  insulin lispro (HumaLOG) corrective regimen sliding scale   SubCutaneous three times a day before meals  insulin lispro (HumaLOG) corrective regimen sliding scale   SubCutaneous at bedtime  isosorbide   dinitrate Tablet (ISORDIL) 20 milliGRAM(s) Oral three times a day  pantoprazole    Tablet 40 milliGRAM(s) Oral before breakfast      TELEMETRY: nsr	    ECG:  	  RADIOLOGY:   DIAGNOSTIC TESTING:  [ ] Echocardiogram:  < from: Transthoracic Echocardiogram (12.02.19 @ 15:08) >  Ejection Fraction (Teicholtz): 36 %  ------------------------------------------------------------------------  OBSERVATIONS:  Mitral Valve: Normal mitral valve. Mild mitral  regurgitation.  Aortic Root: Normal aortic root.  Aortic Valve: Normal trileaflet aortic valve. Mild aortic  regurgitation.  Left Atrium: Severely dilated left atrium.  LA volume index  = 52 cc/m2.  Left Ventricle: Moderate to severe global left ventricular  systolic dysfunction. Moderate left ventricular  enlargement.  Right Heart: Normal right atrium. Normal right ventricular  size and function. Normal tricuspid valve.  Mild-moderate  tricuspid regurgitation. Normal pulmonic valve. Minimal  pulmonic regurgitation.  Pericardium/PleuraSmall pericardial effusion posterior to  the left ventricle and superior to the right atrium.  ------------------------------------------------------------------------  CONCLUSIONS:  Frequent premature ventricular complexes (often in a  pattern of ventricular bigeminy) noted throughout the  study.  1. Normal mitral valve. Mild mitral regurgitation.  2. Normal trileaflet aortic valve. Mild aortic  regurgitation.  3. Severely dilated left atrium.  LA volume index = 52  cc/m2.  4. Moderate left ventricular enlargement.  5. Moderate to severe global left ventricular systolic  dysfunction.  6. Normal right ventricular size and function.  7. Small pericardial effusion posterior to the left  ventricle and superior to the right atrium.  ------------------------------------------------------------------------  Confirmed on  12/2/2019 - 16:10:12 by Uvaldo Hickey M.D.  ------------------------------------------------------------------------    < end of copied text >    [ ]  Catheterization:  [ ] Stress Test:    OTHER: 	    LABS:	 	    Troponin T, High Sensitivity: 106 ng/L [<6 - 14] (12-07 @ 07:27)  Creatine Kinase, Serum: 100 u/L [25 - 170] (12-07 @ 07:27)  Troponin T, High Sensitivity: 95 ng/L [<6 - 14] (12-06 @ 05:00)  Creatine Kinase, Serum: 124 u/L [25 - 170] (12-06 @ 05:00)  CKMB: 2.15 ng/mL [1 - 4.7] (12-06 @ 05:00)  CKMB Relative Index: Test not performed [0.0 - 2.5] (12-06 @ 05:00)  Troponin T, High Sensitivity: 100 ng/L [<6 - 14] (12-06 @ 01:30)  CKMB: 2.71 ng/mL [1 - 4.7] (12-06 @ 01:30)  CKMB Relative Index: 1.6 [0.0 - 2.5] (12-06 @ 01:30)  Creatine Kinase, Serum: 171 u/L [25 - 170] (12-06 @ 01:30)                          9.7    3.31  )-----------( 147      ( 09 Dec 2019 06:09 )             30.7     12-09    134<L>  |  96<L>  |  37<H>  ----------------------------<  95  4.2   |  26  |  10.68<H>    Ca    8.0<L>      09 Dec 2019 06:09  Phos  4.4     12-09  Mg     2.1     12-09    TPro  6.4  /  Alb  3.3  /  TBili  0.2  /  DBili  x   /  AST  11  /  ALT  15  /  AlkPhos  102  12-09

## 2019-12-09 NOTE — PROGRESS NOTE ADULT - PROBLEM SELECTOR PLAN 2
Admitted for atypical chest pain.   States her pain is mainly midepigastric, started after she came out of her shower.   Missed dialysis only because she was told to go to the hospital instead.   No nausea, no vomiting. Hx of similar pain in the past. no recent dietary changes. Similar pain in the past. hx of severe GERD.   ddx: r/o ACS vs. GI etiology such as GERD/esophagitis  c/w PPI, Maalox PRN.   CT abd r/o pancreatitis, no intraabdominal etiology. Lipase neg  LE dopplers to r/o DVT given edema  cardiology consult appreciated. Trend cardiac enzymes (not too far off her baseline)  EP on the case for frequent PVCs, planning possible SVT ablation as outpt.

## 2019-12-09 NOTE — PROGRESS NOTE ADULT - PROBLEM SELECTOR PLAN 1
+multiple loose stool per pt.  c.diff neg, GI PCR ordered (pending)  stool culture neg  CT abd without intraabdominal etiology.  ?malingering?

## 2019-12-09 NOTE — PROGRESS NOTE ADULT - SUBJECTIVE AND OBJECTIVE BOX
Patient is a 54y old  Female who presents with a chief complaint of chest pain (06 Dec 2019 16:43)  Reports "still having diarrhea" since 2 days ago and had low grade temp and mild abd pain yesterday.  Denies palpitations or chest pain at present time.    PAST MEDICAL & SURGICAL HISTORY:  CHF (congestive heart failure)  GERD (gastroesophageal reflux disease)  Anemia of chronic disease  HLD (hyperlipidemia)  NICM (nonischemic cardiomyopathy)  KERI (obstructive sleep apnea)  Chronic CHF  ESRD (end stage renal disease): HD T/T/S  Depression  Gout  Coronary artery disease involving native coronary artery of native heart without angina pectoris  CVA (cerebral vascular accident): 2013- Residual RLE weakness  CHF (congestive heart failure)  DM (diabetes mellitus)  HTN (hypertension)  CVA (cerebral vascular accident)  Glaucoma  Enlarged heart  HTN (hypertension)  Diabetes  Gout  S/P cholecystectomy  S/P partial hysterectomy  History of hysterectomy  Status post glaucoma surgery  History of cholecystectomy  AVF (arteriovenous fistula)  No significant past surgical history      MEDICATIONS  (STANDING):  allopurinol 100 milliGRAM(s) Oral daily  amLODIPine   Tablet 10 milliGRAM(s) Oral daily  aspirin enteric coated 81 milliGRAM(s) Oral daily  calcium acetate 667 milliGRAM(s) Oral three times a day with meals  carvedilol 12.5 milliGRAM(s) Oral every 12 hours  cinacalcet 30 milliGRAM(s) Oral daily  dextrose 5%. 1000 milliLiter(s) (50 mL/Hr) IV Continuous <Continuous>  dextrose 50% Injectable 12.5 Gram(s) IV Push once  dextrose 50% Injectable 25 Gram(s) IV Push once  dextrose 50% Injectable 25 Gram(s) IV Push once  epoetin karla Injectable 23965 Unit(s) IV Push <User Schedule>  furosemide    Tablet 20 milliGRAM(s) Oral daily  heparin  Injectable 5000 Unit(s) SubCutaneous two times a day  hydrALAZINE 100 milliGRAM(s) Oral three times a day  insulin lispro (HumaLOG) corrective regimen sliding scale   SubCutaneous three times a day before meals  insulin lispro (HumaLOG) corrective regimen sliding scale   SubCutaneous at bedtime  isosorbide   dinitrate Tablet (ISORDIL) 20 milliGRAM(s) Oral three times a day  pantoprazole    Tablet 40 milliGRAM(s) Oral before breakfast    MEDICATIONS  (PRN):  aluminum hydroxide/magnesium hydroxide/simethicone Suspension 30 milliLiter(s) Oral every 6 hours PRN Dyspepsia  dextrose 40% Gel 15 Gram(s) Oral once PRN Blood Glucose LESS THAN 70 milliGRAM(s)/deciliter  glucagon  Injectable 1 milliGRAM(s) IntraMuscular once PRN Glucose LESS THAN 70 milligrams/deciliter            Vital Signs Last 24 Hrs  T(C): 36.8 (09 Dec 2019 06:40), Max: 37.8 (08 Dec 2019 14:19)  T(F): 98.3 (09 Dec 2019 06:40), Max: 100 (08 Dec 2019 14:19)  HR: 75 (09 Dec 2019 06:40) (73 - 80)  BP: 138/81 (09 Dec 2019 06:40) (121/67 - 138/81)  BP(mean): --  RR: 18 (09 Dec 2019 06:40) (16 - 18)  SpO2: 96% (09 Dec 2019 06:40) (96% - 100%)            INTERPRETATION OF TELEMETRY:  SR with occasional PVC's; runs of ventricular bigeminy last seen on 12/7    ECG:        LABS:                        9.7    3.31  )-----------( 147      ( 09 Dec 2019 06:09 )             30.7     12-09    134<L>  |  96<L>  |  37<H>  ----------------------------<  95  4.2   |  26  |  10.68<H>    Ca    8.0<L>      09 Dec 2019 06:09  Phos  4.4     12-09  Mg     2.1     12-09    TPro  6.4  /  Alb  3.3  /  TBili  0.2  /  DBili  x   /  AST  11  /  ALT  15  /  AlkPhos  102  12-09              BNP  RADIOLOGY & ADDITIONAL STUDIES:    MPRESSION:     No CT evidence of pancreatitis. Small ascites and anasarca, likely   representing a fluid overloaded state.        PHYSICAL EXAM:    GENERAL: In no apparent distress, well nourished, and hydrated.  NECK: Supple and normal thyroid.  No JVD or carotid bruit.  Carotid pulse is 2+ bilaterally.  HEART: Regular rate and rhythm; No murmurs, rubs, or gallops.  L arm AVF +thrill/bruit  PULMONARY: Clear to auscultation and perfusion.  No rales, wheezing, or rhonchi bilaterally.  ABDOMEN: Soft, Nontender, Nondistended; Bowel sounds present  EXTREMITIES:  2+ Peripheral Pulses, No clubbing, cyanosis, or edema  NEUROLOGICAL: Grossly nonfocal

## 2019-12-09 NOTE — PROGRESS NOTE ADULT - PROBLEM SELECTOR PLAN 3
CHF chronic systolic HF 2/2/ NICMP EF=36-45%  isordil, hydralazine, lasix, coreg  HJJ=70254, alk phos=152.   I and Os daiy weights  HD per renal

## 2019-12-09 NOTE — PROGRESS NOTE ADULT - SUBJECTIVE AND OBJECTIVE BOX
No pain, no shortness of breath      VITAL:  T(C): , Max: 37.8 (12-08-19 @ 14:19)  T(F): , Max: 100 (12-08-19 @ 14:19)  HR: 75 (12-09-19 @ 06:40)  BP: 138/81 (12-09-19 @ 06:40)  RR: 18 (12-09-19 @ 06:40)  SpO2: 96% (12-09-19 @ 06:40)      PHYSICAL EXAM:  Constitutional: NAD, Alert  HEENT: NCAT, MMM  Neck: Supple, No JVD  Respiratory: CTA-b/l  Cardiovascular: RRR s1s2, no m/r/g  Gastrointestinal: BS+, soft, NT/ND  Extremities: No peripheral edema b/l  Neurological: no focal deficits; strength grossly intact  Back: no CVAT b/l  Skin: No rashes, no nevi  Access: LUE AVF (+)Thrill    LABS:                        9.7    3.31  )-----------( 147      ( 09 Dec 2019 06:09 )             30.7     Na(134)/K(4.2)/Cl(96)/HCO3(26)/BUN(37)/Cr(10.68)Glu(95)/Ca(8.0)/Mg(2.1)/PO4(4.4)    12-09 @ 06:09  Na(136)/K(4.3)/Cl(96)/HCO3(27)/BUN(28)/Cr(8.61)Glu(87)/Ca(8.5)/Mg(2.0)/PO4(4.4)    12-08 @ 06:20  Na(135)/K(4.4)/Cl(95)/HCO3(23)/BUN(36)/Cr(9.10)Glu(94)/Ca(8.4)/Mg(2.1)/PO4(4.4)    12-07 @ 07:27        ASSESSMENT: 54F w/ HTN, DM2, CVA, HFrEF, and ESRD-HD TTS, 12/6/19 a/w CP/SOB/dizziness  (1)Renal - ESRD - HD TTS - due for next HD tomorrow  (2)Anemia - indicated for Epogen with HD  (3)EP - possibly for PVC ablation        RECOMMEND:  (1)Next HD tomorrow; epogen with HD  (2)PVC ablation per EP          Wale Francisco MD  [facility]  [phone] (+)diarrhea. No chest pain or shortness of breath      VITAL:  T(C): , Max: 37.8 (12-08-19 @ 14:19)  T(F): , Max: 100 (12-08-19 @ 14:19)  HR: 75 (12-09-19 @ 06:40)  BP: 138/81 (12-09-19 @ 06:40)  RR: 18 (12-09-19 @ 06:40)  SpO2: 96% (12-09-19 @ 06:40)      PHYSICAL EXAM:  Constitutional: NAD, Alert  HEENT: NCAT, MMM  Neck: Supple, No JVD  Respiratory: CTA-b/l  Cardiovascular: RRR s1s2, no m/r/g  Gastrointestinal: BS+, soft, NT/ND  Extremities: No peripheral edema b/l  Neurological: no focal deficits; strength grossly intact  Back: no CVAT b/l  Skin: No rashes, no nevi  Access: LUE AVF (+)Thrill    LABS:                        9.7    3.31  )-----------( 147      ( 09 Dec 2019 06:09 )             30.7     Na(134)/K(4.2)/Cl(96)/HCO3(26)/BUN(37)/Cr(10.68)Glu(95)/Ca(8.0)/Mg(2.1)/PO4(4.4)    12-09 @ 06:09  Na(136)/K(4.3)/Cl(96)/HCO3(27)/BUN(28)/Cr(8.61)Glu(87)/Ca(8.5)/Mg(2.0)/PO4(4.4)    12-08 @ 06:20  Na(135)/K(4.4)/Cl(95)/HCO3(23)/BUN(36)/Cr(9.10)Glu(94)/Ca(8.4)/Mg(2.1)/PO4(4.4)    12-07 @ 07:27        ASSESSMENT: 54F w/ HTN, DM2, CVA, HFrEF, and ESRD-HD TTS, 12/6/19 a/w CP/SOB/dizziness  (1)Renal - ESRD - HD TTS - due for next HD tomorrow  (2)Anemia - indicated for Epogen with HD  (3)EP - possibly for PVC ablation  (4)GI - diarrhea      RECOMMEND:  (1)Next HD tomorrow; epogen with HD  (2)Management of diarrhea per primary team  (3)PVC ablation per EP          Wale Francisco MD  [facility]  [phone]

## 2019-12-09 NOTE — PROGRESS NOTE ADULT - SUBJECTIVE AND OBJECTIVE BOX
Patient is a 54y old  Female who presents with a chief complaint of chest pain (09 Dec 2019 10:44)      SUBJECTIVE / OVERNIGHT EVENTS:  stool still loose but much less frequent  no cp, no sob, no n/v/d. no headache, no dizziness.   abd pain is not mid epigastric any more. states now its LLQ.  CT abd is neg.   stable on tele.       Vital Signs Last 24 Hrs  T(C): 36.8 (09 Dec 2019 17:51), Max: 37 (08 Dec 2019 22:00)  T(F): 98.2 (09 Dec 2019 17:51), Max: 98.6 (08 Dec 2019 22:00)  HR: 78 (09 Dec 2019 17:51) (67 - 78)  BP: 106/56 (09 Dec 2019 17:51) (106/56 - 138/81)  BP(mean): --  RR: 16 (09 Dec 2019 17:51) (16 - 18)  SpO2: 98% (09 Dec 2019 17:51) (96% - 100%)  I&O's Summary      PHYSICAL EXAM:  GENERAL: NAD, Comfortable  HEAD:  Atraumatic, Normocephalic  EYES: EOMI, PERRLA, conjunctiva and sclera clear  NECK: Supple, No JVD  CHEST/LUNG: mild decrease breath sounds bilaterally; No wheeze   HEART: Regular rate and rhythm; No murmurs, rubs, or gallops  ABDOMEN: Soft, Nontender, Nondistended; Bowel sounds present  Neuro: AAO x 3, no focal deficit, 5/5 b/l extremities  EXTREMITIES:  2+ Peripheral Pulses, No clubbing, cyanosis, trace edema  SKIN: No rashes or lesions      LABS:                        9.7    3.31  )-----------( 147      ( 09 Dec 2019 06:09 )             30.7     12-09    134<L>  |  96<L>  |  37<H>  ----------------------------<  95  4.2   |  26  |  10.68<H>    Ca    8.0<L>      09 Dec 2019 06:09  Phos  4.4     12-09  Mg     2.1     12-09    TPro  6.4  /  Alb  3.3  /  TBili  0.2  /  DBili  x   /  AST  11  /  ALT  15  /  AlkPhos  102  12-09      CAPILLARY BLOOD GLUCOSE      POCT Blood Glucose.: 89 mg/dL (09 Dec 2019 16:36)  POCT Blood Glucose.: 90 mg/dL (09 Dec 2019 11:13)  POCT Blood Glucose.: 82 mg/dL (09 Dec 2019 07:35)  POCT Blood Glucose.: 87 mg/dL (08 Dec 2019 22:06)            RADIOLOGY & ADDITIONAL TESTS:    Imaging Personally Reviewed:  [x] YES  [ ] NO    Consultant(s) Notes Reviewed:  [x] YES  [ ] NO      MEDICATIONS  (STANDING):  allopurinol 100 milliGRAM(s) Oral daily  amLODIPine   Tablet 10 milliGRAM(s) Oral daily  aspirin enteric coated 81 milliGRAM(s) Oral daily  calcium acetate 667 milliGRAM(s) Oral three times a day with meals  carvedilol 12.5 milliGRAM(s) Oral every 12 hours  cinacalcet 30 milliGRAM(s) Oral daily  dextrose 5%. 1000 milliLiter(s) (50 mL/Hr) IV Continuous <Continuous>  dextrose 50% Injectable 12.5 Gram(s) IV Push once  dextrose 50% Injectable 25 Gram(s) IV Push once  dextrose 50% Injectable 25 Gram(s) IV Push once  epoetin karla Injectable 31669 Unit(s) IV Push <User Schedule>  furosemide    Tablet 20 milliGRAM(s) Oral daily  heparin  Injectable 5000 Unit(s) SubCutaneous two times a day  hydrALAZINE 100 milliGRAM(s) Oral three times a day  insulin lispro (HumaLOG) corrective regimen sliding scale   SubCutaneous three times a day before meals  insulin lispro (HumaLOG) corrective regimen sliding scale   SubCutaneous at bedtime  isosorbide   dinitrate Tablet (ISORDIL) 20 milliGRAM(s) Oral three times a day  pantoprazole    Tablet 40 milliGRAM(s) Oral before breakfast    MEDICATIONS  (PRN):  aluminum hydroxide/magnesium hydroxide/simethicone Suspension 30 milliLiter(s) Oral every 6 hours PRN Dyspepsia  dextrose 40% Gel 15 Gram(s) Oral once PRN Blood Glucose LESS THAN 70 milliGRAM(s)/deciliter  glucagon  Injectable 1 milliGRAM(s) IntraMuscular once PRN Glucose LESS THAN 70 milligrams/deciliter      Care Discussed with Consultants/Other Providers [x] YES  [ ] NO    HEALTH ISSUES - PROBLEM Dx:  Diarrhea: Diarrhea  Prophylactic measure: Prophylactic measure  Anemia of chronic disease: Anemia of chronic disease  HTN (hypertension): HTN (hypertension)  DM (diabetes mellitus): DM (diabetes mellitus)  ESRD (end stage renal disease): ESRD (end stage renal disease)  CHF (congestive heart failure): CHF (congestive heart failure)  Chest pain: Chest pain

## 2019-12-10 DIAGNOSIS — R10.84 GENERALIZED ABDOMINAL PAIN: ICD-10-CM

## 2019-12-10 DIAGNOSIS — N18.6 END STAGE RENAL DISEASE: ICD-10-CM

## 2019-12-10 DIAGNOSIS — Z71.89 OTHER SPECIFIED COUNSELING: ICD-10-CM

## 2019-12-10 LAB
ANION GAP SERPL CALC-SCNC: 14 MMO/L — SIGNIFICANT CHANGE UP (ref 7–14)
BACTERIA STL CULT: SIGNIFICANT CHANGE UP
BUN SERPL-MCNC: 44 MG/DL — HIGH (ref 7–23)
CALCIUM SERPL-MCNC: 8.3 MG/DL — LOW (ref 8.4–10.5)
CHLORIDE SERPL-SCNC: 94 MMOL/L — LOW (ref 98–107)
CO2 SERPL-SCNC: 23 MMOL/L — SIGNIFICANT CHANGE UP (ref 22–31)
CREAT SERPL-MCNC: 12.18 MG/DL — HIGH (ref 0.5–1.3)
GLUCOSE SERPL-MCNC: 100 MG/DL — HIGH (ref 70–99)
HCT VFR BLD CALC: 31.7 % — LOW (ref 34.5–45)
HGB BLD-MCNC: 10 G/DL — LOW (ref 11.5–15.5)
MCHC RBC-ENTMCNC: 29.9 PG — SIGNIFICANT CHANGE UP (ref 27–34)
MCHC RBC-ENTMCNC: 31.5 % — LOW (ref 32–36)
MCV RBC AUTO: 94.9 FL — SIGNIFICANT CHANGE UP (ref 80–100)
NRBC # FLD: 0 K/UL — SIGNIFICANT CHANGE UP (ref 0–0)
PLATELET # BLD AUTO: 174 K/UL — SIGNIFICANT CHANGE UP (ref 150–400)
PMV BLD: 11.9 FL — SIGNIFICANT CHANGE UP (ref 7–13)
POTASSIUM SERPL-MCNC: 4.6 MMOL/L — SIGNIFICANT CHANGE UP (ref 3.5–5.3)
POTASSIUM SERPL-SCNC: 4.6 MMOL/L — SIGNIFICANT CHANGE UP (ref 3.5–5.3)
RBC # BLD: 3.34 M/UL — LOW (ref 3.8–5.2)
RBC # FLD: 12.9 % — SIGNIFICANT CHANGE UP (ref 10.3–14.5)
SODIUM SERPL-SCNC: 131 MMOL/L — LOW (ref 135–145)
WBC # BLD: 3.41 K/UL — LOW (ref 3.8–10.5)
WBC # FLD AUTO: 3.41 K/UL — LOW (ref 3.8–10.5)

## 2019-12-10 PROCEDURE — 99232 SBSQ HOSP IP/OBS MODERATE 35: CPT

## 2019-12-10 RX ORDER — LACTOBACILLUS ACIDOPHILUS 100MM CELL
1 CAPSULE ORAL THREE TIMES A DAY
Refills: 0 | Status: DISCONTINUED | OUTPATIENT
Start: 2019-12-10 | End: 2019-12-11

## 2019-12-10 RX ORDER — PANTOPRAZOLE SODIUM 20 MG/1
40 TABLET, DELAYED RELEASE ORAL
Refills: 0 | Status: DISCONTINUED | OUTPATIENT
Start: 2019-12-10 | End: 2019-12-11

## 2019-12-10 RX ORDER — CHOLESTYRAMINE 4 G/9G
4 POWDER, FOR SUSPENSION ORAL DAILY
Refills: 0 | Status: DISCONTINUED | OUTPATIENT
Start: 2019-12-10 | End: 2019-12-11

## 2019-12-10 RX ADMIN — Medication 667 MILLIGRAM(S): at 14:25

## 2019-12-10 RX ADMIN — CARVEDILOL PHOSPHATE 12.5 MILLIGRAM(S): 80 CAPSULE, EXTENDED RELEASE ORAL at 11:31

## 2019-12-10 RX ADMIN — Medication 1 TABLET(S): at 22:31

## 2019-12-10 RX ADMIN — Medication 100 MILLIGRAM(S): at 14:23

## 2019-12-10 RX ADMIN — Medication 20 MILLIGRAM(S): at 11:31

## 2019-12-10 RX ADMIN — PANTOPRAZOLE SODIUM 40 MILLIGRAM(S): 20 TABLET, DELAYED RELEASE ORAL at 14:24

## 2019-12-10 RX ADMIN — HEPARIN SODIUM 5000 UNIT(S): 5000 INJECTION INTRAVENOUS; SUBCUTANEOUS at 18:11

## 2019-12-10 RX ADMIN — CINACALCET 30 MILLIGRAM(S): 30 TABLET, FILM COATED ORAL at 18:06

## 2019-12-10 RX ADMIN — Medication 81 MILLIGRAM(S): at 14:24

## 2019-12-10 RX ADMIN — Medication 100 MILLIGRAM(S): at 11:31

## 2019-12-10 RX ADMIN — Medication 1 TABLET(S): at 14:25

## 2019-12-10 RX ADMIN — CARVEDILOL PHOSPHATE 12.5 MILLIGRAM(S): 80 CAPSULE, EXTENDED RELEASE ORAL at 18:08

## 2019-12-10 RX ADMIN — ISOSORBIDE DINITRATE 20 MILLIGRAM(S): 5 TABLET ORAL at 22:32

## 2019-12-10 RX ADMIN — Medication 10 MILLIGRAM(S): at 19:48

## 2019-12-10 RX ADMIN — Medication 100 MILLIGRAM(S): at 22:30

## 2019-12-10 RX ADMIN — ERYTHROPOIETIN 10000 UNIT(S): 10000 INJECTION, SOLUTION INTRAVENOUS; SUBCUTANEOUS at 08:11

## 2019-12-10 RX ADMIN — Medication 667 MILLIGRAM(S): at 18:07

## 2019-12-10 RX ADMIN — CHOLESTYRAMINE 4 GRAM(S): 4 POWDER, FOR SUSPENSION ORAL at 21:16

## 2019-12-10 RX ADMIN — ISOSORBIDE DINITRATE 20 MILLIGRAM(S): 5 TABLET ORAL at 11:31

## 2019-12-10 RX ADMIN — AMLODIPINE BESYLATE 10 MILLIGRAM(S): 2.5 TABLET ORAL at 11:31

## 2019-12-10 NOTE — PROGRESS NOTE ADULT - SUBJECTIVE AND OBJECTIVE BOX
Patient is a 54y old  Female who presents with a chief complaint of chest pain (09 Dec 2019 10:44)      SUBJECTIVE / OVERNIGHT EVENTS:  Diarrhea resolving  only 1 x today per pt  still reporting midepigastric pain  tolerated breakfast  already on PPI.  no n/v. no HA/ no dizziness.        Vital Signs Last 24 Hrs  T(C): 36.8 (10 Dec 2019 09:50), Max: 37.1 (09 Dec 2019 21:57)  T(F): 98.2 (10 Dec 2019 09:50), Max: 98.7 (09 Dec 2019 21:57)  HR: 77 (10 Dec 2019 10:46) (40 - 80)  BP: 149/82 (10 Dec 2019 10:46) (106/56 - 153/77)  BP(mean): --  RR: 16 (10 Dec 2019 09:50) (16 - 18)  SpO2: 100% (10 Dec 2019 01:57) (96% - 100%)  I&O's Summary    09 Dec 2019 07:01  -  10 Dec 2019 07:00  --------------------------------------------------------  IN: 60 mL / OUT: 200 mL / NET: -140 mL    10 Dec 2019 07:01  -  10 Dec 2019 12:40  --------------------------------------------------------  IN: 500 mL / OUT: 3000 mL / NET: -2500 mL        PHYSICAL EXAM:  GENERAL: NAD, Comfortable, lying in bed  HEAD:  Atraumatic, Normocephalic  EYES: EOMI, PERRLA, conjunctiva and sclera clear  NECK: Supple, No JVD  CHEST/LUNG: mild decrease breath sounds bilaterally; No wheeze   HEART: Regular rate and rhythm; No murmurs, rubs, or gallops  ABDOMEN: Soft, Nontender, Nondistended; Bowel sounds present  Neuro: AAO x 3, no focal deficit, 5/5 b/l extremities  EXTREMITIES:  2+ Peripheral Pulses, No clubbing, cyanosis, trace edema  SKIN: No rashes or lesions      LABS:                        10.0   3.41  )-----------( 174      ( 10 Dec 2019 06:35 )             31.7     12-10    131<L>  |  94<L>  |  44<H>  ----------------------------<  100<H>  4.6   |  23  |  12.18<H>    Ca    8.3<L>      10 Dec 2019 06:35  Phos  4.4     12-09  Mg     2.1     12-09    TPro  6.4  /  Alb  3.3  /  TBili  0.2  /  DBili  x   /  AST  11  /  ALT  15  /  AlkPhos  102  12-09      CAPILLARY BLOOD GLUCOSE      POCT Blood Glucose.: 132 mg/dL (10 Dec 2019 11:37)  POCT Blood Glucose.: 98 mg/dL (10 Dec 2019 08:26)  POCT Blood Glucose.: 109 mg/dL (10 Dec 2019 05:41)  POCT Blood Glucose.: 103 mg/dL (09 Dec 2019 22:56)  POCT Blood Glucose.: 89 mg/dL (09 Dec 2019 16:36)            RADIOLOGY & ADDITIONAL TESTS:    Imaging Personally Reviewed:  [x] YES  [ ] NO    Consultant(s) Notes Reviewed:  [x] YES  [ ] NO      MEDICATIONS  (STANDING):  allopurinol 100 milliGRAM(s) Oral daily  amLODIPine   Tablet 10 milliGRAM(s) Oral daily  aspirin enteric coated 81 milliGRAM(s) Oral daily  calcium acetate 667 milliGRAM(s) Oral three times a day with meals  carvedilol 12.5 milliGRAM(s) Oral every 12 hours  cinacalcet 30 milliGRAM(s) Oral daily  dextrose 5%. 1000 milliLiter(s) (50 mL/Hr) IV Continuous <Continuous>  dextrose 50% Injectable 12.5 Gram(s) IV Push once  dextrose 50% Injectable 25 Gram(s) IV Push once  dextrose 50% Injectable 25 Gram(s) IV Push once  epoetin karla Injectable 76057 Unit(s) IV Push <User Schedule>  furosemide    Tablet 20 milliGRAM(s) Oral daily  heparin  Injectable 5000 Unit(s) SubCutaneous two times a day  hydrALAZINE 100 milliGRAM(s) Oral three times a day  insulin lispro (HumaLOG) corrective regimen sliding scale   SubCutaneous three times a day before meals  insulin lispro (HumaLOG) corrective regimen sliding scale   SubCutaneous at bedtime  isosorbide   dinitrate Tablet (ISORDIL) 20 milliGRAM(s) Oral three times a day  pantoprazole    Tablet 40 milliGRAM(s) Oral before breakfast    MEDICATIONS  (PRN):  aluminum hydroxide/magnesium hydroxide/simethicone Suspension 30 milliLiter(s) Oral every 6 hours PRN Dyspepsia  dextrose 40% Gel 15 Gram(s) Oral once PRN Blood Glucose LESS THAN 70 milliGRAM(s)/deciliter  glucagon  Injectable 1 milliGRAM(s) IntraMuscular once PRN Glucose LESS THAN 70 milligrams/deciliter      Care Discussed with Consultants/Other Providers [x] YES  [ ] NO    HEALTH ISSUES - PROBLEM Dx:  Diarrhea: Diarrhea  Prophylactic measure: Prophylactic measure  Anemia of chronic disease: Anemia of chronic disease  HTN (hypertension): HTN (hypertension)  DM (diabetes mellitus): DM (diabetes mellitus)  ESRD (end stage renal disease): ESRD (end stage renal disease)  CHF (congestive heart failure): CHF (congestive heart failure)  Chest pain: Chest pain

## 2019-12-10 NOTE — PROGRESS NOTE ADULT - PROBLEM SELECTOR PLAN 1
+multiple loose stool per pt. now much less frequent  c.diff neg, GI PCR ordered (pending)  stool culture neg prelimp  CT abd without intraabdominal etiology.  ?malingering?   GI eval

## 2019-12-10 NOTE — CONSULT NOTE ADULT - PROBLEM SELECTOR RECOMMENDATION 9
- unclear etiology; appears to be improving   - stool studies negative so far; c.diff negative   - awaiting GI PCR and Stool Elastase which are pending   - bacid tid   - cholestyramine qd

## 2019-12-10 NOTE — PROGRESS NOTE ADULT - PROBLEM SELECTOR PLAN 2
atypical chest pain: GERD?   States her pain is mainly midepigastric, started after she came out of her shower.   Missed dialysis only because she was told to go to the hospital instead.   No nausea, no vomiting. Hx of similar pain in the past. no recent dietary changes. Similar pain in the past. hx of severe GERD.   ddx: r/o ACS vs. GI etiology such as GERD/esophagitis  c/w PPI, Maalox PRN.   CT abd r/o pancreatitis, no intraabdominal etiology. Lipase neg  LE dopplers neg DVT   cardiology consult appreciated. Trend cardiac enzymes (not too far off her baseline)  EP on the case for frequent PVCs, planning possible SVT ablation as outpt.

## 2019-12-10 NOTE — PROGRESS NOTE ADULT - SUBJECTIVE AND OBJECTIVE BOX
Patient is a 54y old  Female who presents with a chief complaint of chest pain (09 Dec 2019 10:44)  States "no longer having diarrhea".  Patient denies CP, SOB or palpitations.      PAST MEDICAL & SURGICAL HISTORY:  CHF (congestive heart failure)  GERD (gastroesophageal reflux disease)  Anemia of chronic disease  HLD (hyperlipidemia)  NICM (nonischemic cardiomyopathy)  KERI (obstructive sleep apnea)  Chronic CHF  ESRD (end stage renal disease): HD T/T/S  Depression  Gout  Coronary artery disease involving native coronary artery of native heart without angina pectoris  CVA (cerebral vascular accident): 2013- Residual RLE weakness  CHF (congestive heart failure)  DM (diabetes mellitus)  HTN (hypertension)  CVA (cerebral vascular accident)  Glaucoma  Enlarged heart  HTN (hypertension)  Diabetes  Gout  S/P cholecystectomy  S/P partial hysterectomy  History of hysterectomy  Status post glaucoma surgery  History of cholecystectomy  AVF (arteriovenous fistula)  No significant past surgical history      MEDICATIONS  (STANDING):  allopurinol 100 milliGRAM(s) Oral daily  amLODIPine   Tablet 10 milliGRAM(s) Oral daily  aspirin enteric coated 81 milliGRAM(s) Oral daily  calcium acetate 667 milliGRAM(s) Oral three times a day with meals  carvedilol 12.5 milliGRAM(s) Oral every 12 hours  cholestyramine Powder (Sugar-Free) 4 Gram(s) Oral daily  cinacalcet 30 milliGRAM(s) Oral daily  dextrose 5%. 1000 milliLiter(s) (50 mL/Hr) IV Continuous <Continuous>  dextrose 50% Injectable 12.5 Gram(s) IV Push once  dextrose 50% Injectable 25 Gram(s) IV Push once  dextrose 50% Injectable 25 Gram(s) IV Push once  dicyclomine 10 milliGRAM(s) Oral two times a day before meals  epoetin karla Injectable 13374 Unit(s) IV Push <User Schedule>  furosemide    Tablet 20 milliGRAM(s) Oral daily  heparin  Injectable 5000 Unit(s) SubCutaneous two times a day  hydrALAZINE 100 milliGRAM(s) Oral three times a day  insulin lispro (HumaLOG) corrective regimen sliding scale   SubCutaneous three times a day before meals  insulin lispro (HumaLOG) corrective regimen sliding scale   SubCutaneous at bedtime  isosorbide   dinitrate Tablet (ISORDIL) 20 milliGRAM(s) Oral three times a day  lactobacillus acidophilus 1 Tablet(s) Oral three times a day  pantoprazole    Tablet 40 milliGRAM(s) Oral two times a day    MEDICATIONS  (PRN):  aluminum hydroxide/magnesium hydroxide/simethicone Suspension 30 milliLiter(s) Oral every 6 hours PRN Dyspepsia  dextrose 40% Gel 15 Gram(s) Oral once PRN Blood Glucose LESS THAN 70 milliGRAM(s)/deciliter  glucagon  Injectable 1 milliGRAM(s) IntraMuscular once PRN Glucose LESS THAN 70 milligrams/deciliter            Vital Signs Last 24 Hrs  T(C): 37 (10 Dec 2019 14:07), Max: 37.1 (09 Dec 2019 21:57)  T(F): 98.6 (10 Dec 2019 14:07), Max: 98.7 (09 Dec 2019 21:57)  HR: 75 (10 Dec 2019 14:07) (40 - 80)  BP: 125/68 (10 Dec 2019 14:07) (106/56 - 153/77)  BP(mean): --  RR: 18 (10 Dec 2019 14:07) (16 - 18)  SpO2: 97% (10 Dec 2019 14:07) (96% - 100%)            INTERPRETATION OF TELEMETRY:  SR with few episodes of transient ventricular bigeminy     ECG:        LABS:                        10.0   3.41  )-----------( 174      ( 10 Dec 2019 06:35 )             31.7     12-10    131<L>  |  94<L>  |  44<H>  ----------------------------<  100<H>  4.6   |  23  |  12.18<H>    Ca    8.3<L>      10 Dec 2019 06:35  Phos  4.4     12-09  Mg     2.1     12-09    TPro  6.4  /  Alb  3.3  /  TBili  0.2  /  DBili  x   /  AST  11  /  ALT  15  /  AlkPhos  102  12-09              BNP  RADIOLOGY & ADDITIONAL STUDIES:  CONCLUSIONS:  Frequent premature ventricular complexes (often in a  pattern of ventricular bigeminy) noted throughout the  study.  1. Normal mitral valve. Mild mitral regurgitation.  2. Normal trileaflet aortic valve. Mild aortic  regurgitation.  3. Severely dilated left atrium.  LA volume index = 52  cc/m2.  4. Moderate left ventricular enlargement.  5. Moderate to severe global left ventricular systolic  dysfunction.  6. Normal right ventricular size and function.  7. Small pericardial effusion posterior to the left  ventricle and superior to the right atrium.  ------------------------------------------------------------------------  Confirmed on  12/2/2019 - 16:10:12 by Uvaldo Hickey M.D.  ------------------------------------------------------------------------      PHYSICAL EXAM:    GENERAL: In no apparent distress, well nourished, and hydrated.  NECK: Supple and normal thyroid.  No JVD or carotid bruit.  Carotid pulse is 2+ bilaterally.  HEART: Regular rate and rhythm; No murmurs, rubs, or gallops.  L AVF  PULMONARY: Clear to auscultation and perfusion.  No rales, wheezing, or rhonchi bilaterally.  ABDOMEN: Soft, Nontender, Nondistended; Bowel sounds present  EXTREMITIES:  2+ Peripheral Pulses, No clubbing, cyanosis,  1+ edema  NEUROLOGICAL: Grossly nonfocal

## 2019-12-10 NOTE — CONSULT NOTE ADULT - SUBJECTIVE AND OBJECTIVE BOX
Chief Complaint:  Patient is a 54y old  Female who presents with a chief complaint of chest pain (09 Dec 2019 10:44)    CHF (congestive heart failure)  GERD (gastroesophageal reflux disease)  Anemia of chronic disease  HLD (hyperlipidemia)  NICM (nonischemic cardiomyopathy)  KERI (obstructive sleep apnea)  Chronic CHF  ESRD (end stage renal disease)  Depression  Gout  Coronary artery disease involving native coronary artery of native heart without angina pectoris  CVA (cerebral vascular accident)  CHF (congestive heart failure)  DM (diabetes mellitus)  HTN (hypertension)  CVA (cerebral vascular accident)  Glaucoma  Enlarged heart  HTN (hypertension)  Diabetes  Gout  S/P cholecystectomy  S/P partial hysterectomy  History of hysterectomy  Status post glaucoma surgery  History of cholecystectomy  AVF (arteriovenous fistula)  No significant past surgical history     HPI:  53 yo F anemia, CHF, CVA, depression, GERD, gout, glaucoma, chol, HTN, KERI, NICMP asthma, DM, ESRD T//sat recent admit for CP now back with chest pain. Patient states that CP is 4/10 pressure left sided non radiating and constant since onset, worse on exertion and improved with rest. Not related to meals or positional changes. No other alleviating ro aggravating factors. + associated PICKARD, palpitations, increased leg swelling, cough. NO  PND, orthopnea, palpitations, diaphoresis, lightheadedness, dizziness, syncope,, fever chills, malaise, myalgias, anorexia, weight changes ( loss or gain), night sweats, generalized fatigue abdominal pain, N/V/C/D BRBPR, melena, urinary symptoms,  and wheezing. gi consulted for diarrhea and abdominal pain. The symptoms are periodic and appear to be improving since admission as fluid is removed. She has no vomiting or nausea. She states the diarrhea is intermittent as well and is without red or black coloring.       iodine (Unknown)  Seafood (Unknown)  shellfish (Nausea (Mild to Mod); Hives (Mild to Mod))      allopurinol 100 milliGRAM(s) Oral daily  aluminum hydroxide/magnesium hydroxide/simethicone Suspension 30 milliLiter(s) Oral every 6 hours PRN  amLODIPine   Tablet 10 milliGRAM(s) Oral daily  aspirin enteric coated 81 milliGRAM(s) Oral daily  calcium acetate 667 milliGRAM(s) Oral three times a day with meals  carvedilol 12.5 milliGRAM(s) Oral every 12 hours  cinacalcet 30 milliGRAM(s) Oral daily  dextrose 40% Gel 15 Gram(s) Oral once PRN  dextrose 5%. 1000 milliLiter(s) IV Continuous <Continuous>  dextrose 50% Injectable 12.5 Gram(s) IV Push once  dextrose 50% Injectable 25 Gram(s) IV Push once  dextrose 50% Injectable 25 Gram(s) IV Push once  epoetin karla Injectable 03689 Unit(s) IV Push <User Schedule>  furosemide    Tablet 20 milliGRAM(s) Oral daily  glucagon  Injectable 1 milliGRAM(s) IntraMuscular once PRN  heparin  Injectable 5000 Unit(s) SubCutaneous two times a day  hydrALAZINE 100 milliGRAM(s) Oral three times a day  insulin lispro (HumaLOG) corrective regimen sliding scale   SubCutaneous three times a day before meals  insulin lispro (HumaLOG) corrective regimen sliding scale   SubCutaneous at bedtime  isosorbide   dinitrate Tablet (ISORDIL) 20 milliGRAM(s) Oral three times a day  pantoprazole    Tablet 40 milliGRAM(s) Oral before breakfast        FAMILY HISTORY:  Family history of heart attack        Review of Systems:    General:  No wt loss, fevers, chills, night sweats, fatigue  Eyes:  Good vision, no reported pain  ENT:  No sore throat, pain, runny nose, dysphagia  CV:  No pain, palpitations, no lightheadedness  Resp:  No dyspnea, cough, tachypnea, wheezing  GI: as above  :  No pain, bleeding, incontinence, nocturia  Muscle:  No pain, weakness  Neuro:  No weakness, tingling, memory problems  Psych:  No fatigue, insomnia, mood problems, depression  Endocrine:  No polyuria, polydypsia, cold/heat intolerance  Heme:  No petechiae, ecchymosis, easy bruisability  Skin:  No rash, tattoos, scars, edema    Relevant Family History:   n/c    Relevant Social History: n/c      Physical Exam:    Vital Signs:  Vital Signs Last 24 Hrs  T(C): 36.8 (10 Dec 2019 09:50), Max: 37.1 (09 Dec 2019 21:57)  T(F): 98.2 (10 Dec 2019 09:50), Max: 98.7 (09 Dec 2019 21:57)  HR: 77 (10 Dec 2019 10:46) (40 - 80)  BP: 149/82 (10 Dec 2019 10:46) (106/56 - 153/77)  BP(mean): --  RR: 16 (10 Dec 2019 09:50) (16 - 18)  SpO2: 100% (10 Dec 2019 01:57) (96% - 100%)  Daily     Daily Weight in k.5 (10 Dec 2019 09:50)    General:  Appears stated age, well-groomed, nad  HEENT:  NC/AT,  conjunctivae clear and pink, no thyromegaly, nodules, adenopathy, no JVD  Chest:  Full & symmetric excursion, no increased effort, breath sounds clear  Cardiovascular:  Regular rhythm, S1, S2, no murmur/rub/S3/S4, no abdominal bruit, no edema  Abdomen:  Soft, non-tender, non-distended, normoactive bowel sounds,  no masses ,no hepatosplenomeagaly, no signs of chronic liver disease  Extremities:  no cyanosis,clubbing or edema  Skin:  No rash/erythema/ecchymoses/petechiae/wounds/abscess/warm/dry  Neuro/Psych:  A&O x3 , no asterixis, no tremor, no encephalopathy    Laboratory:                            10.0   3.41  )-----------( 174      ( 10 Dec 2019 06:35 )             31.7     12-10    131<L>  |  94<L>  |  44<H>  ----------------------------<  100<H>  4.6   |  23  |  12.18<H>    Ca    8.3<L>      10 Dec 2019 06:35  Phos  4.4     12-  Mg     2.1         TPro  6.4  /  Alb  3.3  /  TBili  0.2  /  DBili  x   /  AST  11  /  ALT  15  /  AlkPhos  102  12-09    LIVER FUNCTIONS - ( 09 Dec 2019 06:09 )  Alb: 3.3 g/dL / Pro: 6.4 g/dL / ALK PHOS: 102 u/L / ALT: 15 u/L / AST: 11 u/L / GGT: x                 Imaging:    < from: CT Abdomen and Pelvis No Cont (19 @ 09:45) >    EXAM:  CT ABDOMEN AND PELVIS        PROCEDURE DATE:  Dec  8 2019         INTERPRETATION:  CLINICAL INFORMATION: Patient with CHF and end-stage   renal disease presenting with mid epigastric pain, evaluate for   pancreatitis.    COMPARISON: CT abdomen and pelvis 2019    PROCEDURE:   CT of the Abdomen and Pelvis was performed without intravenous contrast.   Intravenous contrast: None.  Oral contrast: None.  Sagittal and coronal reformats were performed.    FINDINGS:    LOWER CHEST: Cardiomegaly.    LIVER: Within normal limits.  BILE DUCTS: Normal caliber.  GALLBLADDER: Cholecystectomy.  SPLEEN: Within normal limits.  PANCREAS: Within normal limits.  ADRENALS: Thickened left adrenal gland.  KIDNEYS/URETERS: Left lower pole cyst. No hydronephrosis.    BLADDER: Minimally distended.  REPRODUCTIVE ORGANS: Uterus and adnexa within normal limits.    BOWEL: No bowel obstruction.   PERITONEUM: Small ascites is new.  VESSELS: Atherosclerotic changes. The right gonadal vein is abnormally   enlarged measuring 1.1 cm, previously 0.9 cm.  RETROPERITONEUM/LYMPH NODES: No lymphadenopathy.    ABDOMINAL WALL: Anasarca. Small umbilical hernia.  BONES: Degenerative changes.    IMPRESSION:     No CT evidence of pancreatitis. Small ascites and anasarca, likely   representing a fluid overloaded state.              WALDEMAR ABBOTT M.D., RADIOLOGY RESIDENT  This document has been electronically signed.  PALOMO BRYSON M.D., ATTENDING RADIOLOGIST  This document has been electronically signed. 2019 10:08AM                  < end of copied text >

## 2019-12-10 NOTE — PROGRESS NOTE ADULT - SUBJECTIVE AND OBJECTIVE BOX
CARDIOLOGY FOLLOW UP - Dr. Andrew    CC no cp/sob     PHYSICAL EXAM:  T(C): 36.8 (12-10-19 @ 09:50), Max: 37.1 (12-09-19 @ 21:57)  HR: 77 (12-10-19 @ 10:46) (40 - 80)  BP: 149/82 (12-10-19 @ 10:46) (106/56 - 153/77)  RR: 16 (12-10-19 @ 09:50) (16 - 18)  SpO2: 100% (12-10-19 @ 01:57) (96% - 100%)  Wt(kg): --  I&O's Summary    09 Dec 2019 07:01  -  10 Dec 2019 07:00  --------------------------------------------------------  IN: 60 mL / OUT: 200 mL / NET: -140 mL    10 Dec 2019 07:01  -  10 Dec 2019 13:29  --------------------------------------------------------  IN: 500 mL / OUT: 3000 mL / NET: -2500 mL        Appearance: Normal	  Cardiovascular: Normal S1 S2,RRR, No JVD, No murmurs  Respiratory: Lungs clear to auscultation	  Gastrointestinal:  Soft, Non-tender, + BS	  Extremities: Normal range of motion, No clubbing, cyanosis or edema        MEDICATIONS  (STANDING):  allopurinol 100 milliGRAM(s) Oral daily  amLODIPine   Tablet 10 milliGRAM(s) Oral daily  aspirin enteric coated 81 milliGRAM(s) Oral daily  calcium acetate 667 milliGRAM(s) Oral three times a day with meals  carvedilol 12.5 milliGRAM(s) Oral every 12 hours  cinacalcet 30 milliGRAM(s) Oral daily  dextrose 5%. 1000 milliLiter(s) (50 mL/Hr) IV Continuous <Continuous>  dextrose 50% Injectable 12.5 Gram(s) IV Push once  dextrose 50% Injectable 25 Gram(s) IV Push once  dextrose 50% Injectable 25 Gram(s) IV Push once  dicyclomine 10 milliGRAM(s) Oral two times a day before meals  epoetin karla Injectable 12267 Unit(s) IV Push <User Schedule>  furosemide    Tablet 20 milliGRAM(s) Oral daily  heparin  Injectable 5000 Unit(s) SubCutaneous two times a day  hydrALAZINE 100 milliGRAM(s) Oral three times a day  insulin lispro (HumaLOG) corrective regimen sliding scale   SubCutaneous three times a day before meals  insulin lispro (HumaLOG) corrective regimen sliding scale   SubCutaneous at bedtime  isosorbide   dinitrate Tablet (ISORDIL) 20 milliGRAM(s) Oral three times a day  lactobacillus acidophilus 1 Tablet(s) Oral three times a day  pantoprazole    Tablet 40 milliGRAM(s) Oral before breakfast      TELEMETRY: nsr, pvc 	    ECG:  	  RADIOLOGY:   DIAGNOSTIC TESTING:  [ ] Echocardiogram:  [ ]  Catheterization:  [ ] Stress Test:    OTHER: 	    LABS:	 	                                10.0   3.41  )-----------( 174      ( 10 Dec 2019 06:35 )             31.7     12-10    131<L>  |  94<L>  |  44<H>  ----------------------------<  100<H>  4.6   |  23  |  12.18<H>    Ca    8.3<L>      10 Dec 2019 06:35  Phos  4.4     12-09  Mg     2.1     12-09    TPro  6.4  /  Alb  3.3  /  TBili  0.2  /  DBili  x   /  AST  11  /  ALT  15  /  AlkPhos  102  12-09

## 2019-12-10 NOTE — PROGRESS NOTE ADULT - PROBLEM SELECTOR PLAN 3
CHF chronic systolic HF 2/2/ NICMP EF=36-45%  isordil, hydralazine, lasix, coreg  XAV=05253, alk phos=152.   I and Os daiy weights  HD per renal

## 2019-12-10 NOTE — CONSULT NOTE ADULT - PROBLEM SELECTOR RECOMMENDATION 2
- possibly 2/2 low flow state vs worsening gerd  - CT with no intra-abdominal findings   - increase protonix bid   - Bentyl bid   - maalox prn   - if no improvements on above, may need endoscopic evaluation once cleared by cardiology

## 2019-12-10 NOTE — PROGRESS NOTE ADULT - SUBJECTIVE AND OBJECTIVE BOX
patient seen and examined at bedside. Family at bedside. Patient still c/o of mild diarrhea. s/p HD today   VITAL:  T(C): , Max: 37.1 (12-09-19 @ 21:57)  T(F): , Max: 98.7 (12-09-19 @ 21:57)  HR: 77 (12-10-19 @ 10:46)  BP: 149/82 (12-10-19 @ 10:46)  RR: 16 (12-10-19 @ 09:50)  SpO2: 100% (12-10-19 @ 01:57)        PHYSICAL EXAM:    Constitutional: NAD; Alert  HEENT:  NCAT; DMM  Neck: No JVD; supple  Respiratory: CTA-b/l  Cardiac: RRR s1s2  Gastrointestinal: BS+, soft, NT/ND  Urologic: No malcolm  Extremities: No peripheral edema  Back: No CVAT b/l  Access: L AVF +Thrill    LABS:                        10.0   3.41  )-----------( 174      ( 10 Dec 2019 06:35 )             31.7     Na(131)/K(4.6)/Cl(94)/HCO3(23)/BUN(44)/Cr(12.18)Glu(100)/Ca(8.3)/Mg(--)/PO4(--)    12-10 @ 06:35  Na(134)/K(4.2)/Cl(96)/HCO3(26)/BUN(37)/Cr(10.68)Glu(95)/Ca(8.0)/Mg(2.1)/PO4(4.4)    12-09 @ 06:09  Na(136)/K(4.3)/Cl(96)/HCO3(27)/BUN(28)/Cr(8.61)Glu(87)/Ca(8.5)/Mg(2.0)/PO4(4.4)    12-08 @ 06:20          ASSESSMENT: 54F w/ HTN, DM2, CVA, HFrEF, and ESRD-HD TTS, 12/6/19 a/w CP/SOB/dizziness  (1)Renal - ESRD - HD TTS - s/p HD today  (2)Anemia - indicated for Epogen with HD  (3)EP -  PVC ablation outpatient as per EP  (4)GI - diarrhea      RECOMMEND:  (1)Next HD thursday; epogen with HD  (2)Management of diarrhea per primary team  (3)PVC ablation outpatient per EP                  Abdullahi Deal NP  [facility]  [phone] patient seen and examined at bedside. Family at bedside. Patient still c/o of mild diarrhea. s/p HD today with 2.5L removed  VITAL:  T(C): , Max: 37.1 (12-09-19 @ 21:57)  T(F): , Max: 98.7 (12-09-19 @ 21:57)  HR: 77 (12-10-19 @ 10:46)  BP: 149/82 (12-10-19 @ 10:46)  RR: 16 (12-10-19 @ 09:50)  SpO2: 100% (12-10-19 @ 01:57)        PHYSICAL EXAM:    Constitutional: NAD; Alert  HEENT:  NCAT; DMM  Neck: No JVD; supple  Respiratory: CTA-b/l  Cardiac: RRR s1s2  Gastrointestinal: BS+, soft, NT/ND  Urologic: No malcolm  Extremities: No peripheral edema  Back: No CVAT b/l  Access: L AVF +Thrill    LABS:                        10.0   3.41  )-----------( 174      ( 10 Dec 2019 06:35 )             31.7     Na(131)/K(4.6)/Cl(94)/HCO3(23)/BUN(44)/Cr(12.18)Glu(100)/Ca(8.3)/Mg(--)/PO4(--)    12-10 @ 06:35  Na(134)/K(4.2)/Cl(96)/HCO3(26)/BUN(37)/Cr(10.68)Glu(95)/Ca(8.0)/Mg(2.1)/PO4(4.4)    12-09 @ 06:09  Na(136)/K(4.3)/Cl(96)/HCO3(27)/BUN(28)/Cr(8.61)Glu(87)/Ca(8.5)/Mg(2.0)/PO4(4.4)    12-08 @ 06:20          ASSESSMENT: 54F w/ HTN, DM2, CVA, HFrEF, and ESRD-HD TTS, 12/6/19 a/w CP/SOB/dizziness  (1)Renal - ESRD - HD TTS - s/p HD today  (2)Anemia - indicated for Epogen with HD  (3)EP -  PVC ablation outpatient as per EP  (4)GI - diarrhea      RECOMMEND:  (1)Next HD thursday; epogen with HD  (2)Management of diarrhea per primary team  (3)PVC ablation outpatient per EP                  Abdullahi Deal NP  [facility]  [phone]

## 2019-12-10 NOTE — CONSULT NOTE ADULT - ASSESSMENT
55 yo F anemia, CHF, CVA, depression, GERD, gout, glaucoma, chol, HTN, KERI, NICMP asthma, DM, ESRD T/Th/sat recent admit for CP now back with chest pain. GI consulted for abd pain and diarrhea

## 2019-12-11 ENCOUNTER — TRANSCRIPTION ENCOUNTER (OUTPATIENT)
Age: 54
End: 2019-12-11

## 2019-12-11 VITALS
RESPIRATION RATE: 17 BRPM | OXYGEN SATURATION: 98 % | TEMPERATURE: 98 F | SYSTOLIC BLOOD PRESSURE: 124 MMHG | DIASTOLIC BLOOD PRESSURE: 61 MMHG | HEART RATE: 75 BPM

## 2019-12-11 LAB
ANION GAP SERPL CALC-SCNC: 16 MMO/L — HIGH (ref 7–14)
BUN SERPL-MCNC: 27 MG/DL — HIGH (ref 7–23)
CALCIUM SERPL-MCNC: 8.4 MG/DL — SIGNIFICANT CHANGE UP (ref 8.4–10.5)
CHLORIDE SERPL-SCNC: 93 MMOL/L — LOW (ref 98–107)
CO2 SERPL-SCNC: 23 MMOL/L — SIGNIFICANT CHANGE UP (ref 22–31)
CREAT SERPL-MCNC: 9.18 MG/DL — HIGH (ref 0.5–1.3)
GLUCOSE SERPL-MCNC: 111 MG/DL — HIGH (ref 70–99)
HCT VFR BLD CALC: 31.8 % — LOW (ref 34.5–45)
HGB BLD-MCNC: 10.3 G/DL — LOW (ref 11.5–15.5)
MAGNESIUM SERPL-MCNC: 2.1 MG/DL — SIGNIFICANT CHANGE UP (ref 1.6–2.6)
MCHC RBC-ENTMCNC: 30.7 PG — SIGNIFICANT CHANGE UP (ref 27–34)
MCHC RBC-ENTMCNC: 32.4 % — SIGNIFICANT CHANGE UP (ref 32–36)
MCV RBC AUTO: 94.6 FL — SIGNIFICANT CHANGE UP (ref 80–100)
NRBC # FLD: 0 K/UL — SIGNIFICANT CHANGE UP (ref 0–0)
PHOSPHATE SERPL-MCNC: 3.8 MG/DL — SIGNIFICANT CHANGE UP (ref 2.5–4.5)
PLATELET # BLD AUTO: 175 K/UL — SIGNIFICANT CHANGE UP (ref 150–400)
PMV BLD: 11 FL — SIGNIFICANT CHANGE UP (ref 7–13)
POTASSIUM SERPL-MCNC: 4.5 MMOL/L — SIGNIFICANT CHANGE UP (ref 3.5–5.3)
POTASSIUM SERPL-SCNC: 4.5 MMOL/L — SIGNIFICANT CHANGE UP (ref 3.5–5.3)
RBC # BLD: 3.36 M/UL — LOW (ref 3.8–5.2)
RBC # FLD: 12.6 % — SIGNIFICANT CHANGE UP (ref 10.3–14.5)
SODIUM SERPL-SCNC: 132 MMOL/L — LOW (ref 135–145)
WBC # BLD: 3 K/UL — LOW (ref 3.8–10.5)
WBC # FLD AUTO: 3 K/UL — LOW (ref 3.8–10.5)

## 2019-12-11 RX ORDER — PANTOPRAZOLE SODIUM 20 MG/1
1 TABLET, DELAYED RELEASE ORAL
Qty: 20 | Refills: 0
Start: 2019-12-11 | End: 2019-12-20

## 2019-12-11 RX ORDER — CHOLESTYRAMINE 4 G/9G
4 POWDER, FOR SUSPENSION ORAL
Qty: 120 | Refills: 0
Start: 2019-12-11 | End: 2020-01-09

## 2019-12-11 RX ORDER — LACTOBACILLUS ACIDOPHILUS 100MM CELL
1 CAPSULE ORAL
Qty: 90 | Refills: 0
Start: 2019-12-11 | End: 2020-01-09

## 2019-12-11 RX ADMIN — Medication 1 TABLET(S): at 14:13

## 2019-12-11 RX ADMIN — Medication 10 MILLIGRAM(S): at 17:00

## 2019-12-11 RX ADMIN — Medication 100 MILLIGRAM(S): at 06:01

## 2019-12-11 RX ADMIN — CARVEDILOL PHOSPHATE 12.5 MILLIGRAM(S): 80 CAPSULE, EXTENDED RELEASE ORAL at 06:01

## 2019-12-11 RX ADMIN — Medication 667 MILLIGRAM(S): at 12:44

## 2019-12-11 RX ADMIN — Medication 10 MILLIGRAM(S): at 07:09

## 2019-12-11 RX ADMIN — Medication 667 MILLIGRAM(S): at 17:00

## 2019-12-11 RX ADMIN — Medication 81 MILLIGRAM(S): at 12:44

## 2019-12-11 RX ADMIN — ISOSORBIDE DINITRATE 20 MILLIGRAM(S): 5 TABLET ORAL at 06:01

## 2019-12-11 RX ADMIN — Medication 1 TABLET(S): at 06:02

## 2019-12-11 RX ADMIN — Medication 20 MILLIGRAM(S): at 06:01

## 2019-12-11 RX ADMIN — CARVEDILOL PHOSPHATE 12.5 MILLIGRAM(S): 80 CAPSULE, EXTENDED RELEASE ORAL at 17:00

## 2019-12-11 RX ADMIN — PANTOPRAZOLE SODIUM 40 MILLIGRAM(S): 20 TABLET, DELAYED RELEASE ORAL at 17:01

## 2019-12-11 RX ADMIN — CHOLESTYRAMINE 4 GRAM(S): 4 POWDER, FOR SUSPENSION ORAL at 10:20

## 2019-12-11 RX ADMIN — AMLODIPINE BESYLATE 10 MILLIGRAM(S): 2.5 TABLET ORAL at 06:01

## 2019-12-11 RX ADMIN — Medication 100 MILLIGRAM(S): at 14:14

## 2019-12-11 RX ADMIN — Medication 100 MILLIGRAM(S): at 12:44

## 2019-12-11 RX ADMIN — ISOSORBIDE DINITRATE 20 MILLIGRAM(S): 5 TABLET ORAL at 14:13

## 2019-12-11 RX ADMIN — HEPARIN SODIUM 5000 UNIT(S): 5000 INJECTION INTRAVENOUS; SUBCUTANEOUS at 06:01

## 2019-12-11 RX ADMIN — CINACALCET 30 MILLIGRAM(S): 30 TABLET, FILM COATED ORAL at 12:45

## 2019-12-11 RX ADMIN — PANTOPRAZOLE SODIUM 40 MILLIGRAM(S): 20 TABLET, DELAYED RELEASE ORAL at 06:02

## 2019-12-11 NOTE — DISCHARGE NOTE PROVIDER - NSDCMRMEDTOKEN_GEN_ALL_CORE_FT
amLODIPine 10 mg oral tablet: 1 tab(s) orally once a day  aspirin 81 mg oral delayed release tablet: 1 tab(s) orally once a day  calcium acetate 667 mg oral tablet: 1 tab(s) orally 3 times a day  carvedilol 12.5 mg oral tablet: 1 tab(s) orally every 12 hours    **per pharmacy last filled 8/7/2019 for a 30 day supply, not filled since   cinacalcet 30 mg oral tablet: 1 tab(s) orally once a day  hydrALAZINE 100 mg oral tablet: 1 tab(s) orally 3 times a day  isosorbide dinitrate 20 mg oral tablet: 1  orally 3 times a day  Januvia 25 mg oral tablet: 1 tab(s) orally once a day  Lasix 20 mg oral tablet: 1 tab(s) orally once a day  Zyloprim 100 mg oral tablet: 1 tab(s) orally once a day aluminum hydroxide-magnesium hydroxide 200 mg-200 mg/5 mL oral suspension: 30 milliliter(s) orally every 6 hours, As needed, Dyspepsia  amLODIPine 10 mg oral tablet: 1 tab(s) orally once a day  aspirin 81 mg oral delayed release tablet: 1 tab(s) orally once a day  calcium acetate 667 mg oral tablet: 1 tab(s) orally 3 times a day  carvedilol 12.5 mg oral tablet: 1 tab(s) orally every 12 hours    **per pharmacy last filled 8/7/2019 for a 30 day supply, not filled since   cholestyramine 4 g/9 g oral powder for reconstitution: 4 gram(s) orally once a day   cinacalcet 30 mg oral tablet: 1 tab(s) orally once a day  dicyclomine 10 mg oral capsule: 1 cap(s) orally 2 times a day (before meals)  hydrALAZINE 100 mg oral tablet: 1 tab(s) orally 3 times a day  isosorbide dinitrate 20 mg oral tablet: 1  orally 3 times a day  Januvia 25 mg oral tablet: 1 tab(s) orally once a day  lactobacillus acidophilus oral capsule: 1 tab(s) orally 3 times a day   Lasix 20 mg oral tablet: 1 tab(s) orally once a day  pantoprazole 40 mg oral delayed release tablet: 1 tab(s) orally 2 times a day  Zyloprim 100 mg oral tablet: 1 tab(s) orally once a day

## 2019-12-11 NOTE — DISCHARGE NOTE PROVIDER - HOSPITAL COURSE
3 yo F anemia, CHF, CVA, depression, GERD, gout, glaucoma, chol, HTN, KERI, NICMP asthma, DM, ESRD T/Th/sat with chest pain.        Chest pain. States her pain is mainly midepigastric, started after she came out of her shower.     Missed dialysis only because she was told to go to the hospital instead.     No nausea, no vomiting. Hx of similar pain in the past. no recent dietary changes. Similar pain in the past. hx of severe GERD.     ddx: r/o ACS vs. GI etiology such as GERD/esophagitis    c/w PPI, Maalox PRN. will check CT abd to r/o pancreatitis (though low suspicion). Lipase neg    will order LE dopplers to r/o DVT given edema    cardiology consult. trend cardiac enzymes (not too far off her baseline)    EP on the case for frequent PVCs. 55 yo F anemia, CHF, CVA, depression, GERD, gout, glaucoma, chol, HTN, KERI, NICMP asthma, DM, ESRD T/Th/sat with chest pain.        Chest pain. States her pain is mainly midepigastric, started after she came out of her shower.     Pt Missed dialysis only because she was told to go to the hospital instead.     No nausea, no vomiting. Hx of similar pain in the past. no recent dietary changes. Similar pain in the past. hx of severe GERD.     ddx: r/o ACS vs. GI etiology such as GERD/esophagitis    c/w PPI, Maalox PRN. will check CT abd to r/o pancreatitis (though low suspicion). Lipase neg    Pt's telemetry monitoring with persistent SR with ventricular bigeminy. A PVC ablation is planned for outpatient.     Pt then complained of diarrhea. C.Diff and stool cultures negative. After several days, diarrhea resolved. Pt  is now medically cleared for discharge home.

## 2019-12-11 NOTE — DISCHARGE NOTE NURSING/CASE MANAGEMENT/SOCIAL WORK - PATIENT PORTAL LINK FT
You can access the FollowMyHealth Patient Portal offered by [facility] by registering at the following website: [email or web address]. By joining [employer]’s FollowMyHealth portal, you will also be able to view your health information using other applications (apps) compatible with our system.

## 2019-12-11 NOTE — PROGRESS NOTE ADULT - ATTENDING COMMENTS
12/7/19: agree with above
Agree with above NP note.  cv stable  chest pain resolved  cont bb for cmp, ectopy   eps eval noted  pvc ablation likely as outpt
Agree with above NP note.  cv stable  chest pain resolved  cont bb for cmp, ectopy   eps eval noted  pvc ablation likely as outpt
Agree with above NP note.  cv stable  chest pain resolved  cont bb for cmp, ectopy   pvc ablation likely as outpt
54 year old female with PMH chronic HFrEF, CVA RLE weakness, NICM, HTN, HLD, DMT2, ESRD-HD, KERI, depression, anemia, GERD, gout, glaucoma and recent admission for CP presented with chest pain found to have persistent SR with ventricular bigeminy. Will continue beta blockers and observe. May need an ablation for likely RVOT PVCs as outpt. Will follow.
54 year old female with PMH chronic HFrEF, CVA RLE weakness, NICM, HTN, HLD, DMT2, ESRD-HD, KERI, depression, anemia, GERD, gout, glaucoma and recent admission for CP presented with chest pain found to have persistent SR with ventricular bigeminy. Will continue beta blockers and observe. May need an ablation for likely RVOT PVCs as outpt. Will follow.
- Dr. GA Hays ([facility])  - [phone]
- Dr. GA Hays ([facility])  - [phone]
- Dr. GA Hays ([facility])  - [phone]
Dc planning soon after GI eval and dialysis outpt set up.    - Dr. GA Hays ([facility])  - [phone]

## 2019-12-11 NOTE — PROGRESS NOTE ADULT - SUBJECTIVE AND OBJECTIVE BOX
CARDIOLOGY FOLLOW UP - Dr. Andrew    CC no cp/sob        PHYSICAL EXAM:  T(C): 37.1 (12-11-19 @ 05:59), Max: 37.2 (12-10-19 @ 17:28)  HR: 71 (12-11-19 @ 05:59) (71 - 79)  BP: 133/79 (12-11-19 @ 05:59) (122/70 - 149/82)  RR: 18 (12-11-19 @ 05:59) (18 - 18)  SpO2: 100% (12-11-19 @ 05:59) (97% - 100%)  Wt(kg): --  I&O's Summary    10 Dec 2019 07:01  -  11 Dec 2019 07:00  --------------------------------------------------------  IN: 500 mL / OUT: 3000 mL / NET: -2500 mL        Appearance: Normal	  Cardiovascular: Normal S1 S2,RRR, No JVD, No murmurs  Respiratory: Lungs clear to auscultation	  Gastrointestinal:  Soft, Non-tender, + BS	  Extremities: Normal range of motion, No clubbing, cyanosis or edema        MEDICATIONS  (STANDING):  allopurinol 100 milliGRAM(s) Oral daily  amLODIPine   Tablet 10 milliGRAM(s) Oral daily  aspirin enteric coated 81 milliGRAM(s) Oral daily  calcium acetate 667 milliGRAM(s) Oral three times a day with meals  carvedilol 12.5 milliGRAM(s) Oral every 12 hours  cholestyramine Powder (Sugar-Free) 4 Gram(s) Oral daily  cinacalcet 30 milliGRAM(s) Oral daily  dextrose 5%. 1000 milliLiter(s) (50 mL/Hr) IV Continuous <Continuous>  dextrose 50% Injectable 12.5 Gram(s) IV Push once  dextrose 50% Injectable 25 Gram(s) IV Push once  dextrose 50% Injectable 25 Gram(s) IV Push once  dicyclomine 10 milliGRAM(s) Oral two times a day before meals  epoetin karla Injectable 53359 Unit(s) IV Push <User Schedule>  furosemide    Tablet 20 milliGRAM(s) Oral daily  heparin  Injectable 5000 Unit(s) SubCutaneous two times a day  hydrALAZINE 100 milliGRAM(s) Oral three times a day  insulin lispro (HumaLOG) corrective regimen sliding scale   SubCutaneous three times a day before meals  insulin lispro (HumaLOG) corrective regimen sliding scale   SubCutaneous at bedtime  isosorbide   dinitrate Tablet (ISORDIL) 20 milliGRAM(s) Oral three times a day  lactobacillus acidophilus 1 Tablet(s) Oral three times a day  pantoprazole    Tablet 40 milliGRAM(s) Oral two times a day      TELEMETRY: NSR, few pvc  	    ECG:  	  RADIOLOGY:   DIAGNOSTIC TESTING:  [ ] Echocardiogram:  [ ]  Catheterization:  [ ] Stress Test:    OTHER: 	    LABS:	 	                                10.0   3.41  )-----------( 174      ( 10 Dec 2019 06:35 )             31.7     12-10    131<L>  |  94<L>  |  44<H>  ----------------------------<  100<H>  4.6   |  23  |  12.18<H>    Ca    8.3<L>      10 Dec 2019 06:35

## 2019-12-11 NOTE — PROGRESS NOTE ADULT - ASSESSMENT
53 yo F anemia, CHF, CVA, depression, GERD, gout, glaucoma, chol, HTN, KERI, NICMP asthma, DM, ESRD T/Th/sat with chest pain.
54 year old female with PMH chronic HFrEF, CVA RLE weakness, NICM, HTN, HLD, DMT2, ESRD-HD, KERI, depression, anemia, GERD, gout, glaucoma and recent admission for CP presented with chest pain found to have persistent SR with ventricular bigeminy.  Patient endorsed "chest pain with palpitaitons" and "dizziness" at times.  Her recent echo on 12/2 showed reduced LVEF of 36% compared to the previous echo done on 5/5/2019 with LVEF 40-45%.  The etiology of worsening LVEF possibly secondary to increased PVC burden.   EKG with ventricular bigeminy with PVC morphology suggests possible right ventricular outflow tract origin vs fascicular origin.  Therefore patient will likely benefit from an EP study with possible PVC ablation.   -continue tele, patient with PVCs overnight, no bigeminy  -Plan for EPS with possible PVC ablation  -Continue BB and care per primary cardiology
54 year old female with PMH chronic HFrEF, CVA RLE weakness, NICM, HTN, HLD, DMT2, ESRD-HD, KERI, depression, anemia, GERD, gout, glaucoma and recent admission for CP presented with chest pain found to have persistent SR with ventricular bigeminy.  Patient endorsed "chest pain with palpitaitons" and "dizziness" at times.  Her recent echo on 12/2 showed reduced LVEF of 36% compared to the previous echo done on 5/5/2019 with LVEF 40-45%.  The etiology of worsening LVEF possibly secondary to increased PVC burden.   EKG with ventricular bigeminy with PVC morphology suggests possible right ventricular outflow tract origin vs fascicular origin.  Therefore patient will likely benefit from an EP study with possible PVC ablation.  Hospital course complicated by current diarrhea/low grade fever-now resolved.  Recommended elective EPS/PVC ablation for PVC ventricular bigeminy as an outpatient.   -continue tele (no persistent bigeminy seen since 12/7)  -Follow up with Dr. Farias for EPS with possible PVC ablation on 12/30 at 12 noon  -Continue BB and care per primary cardiology   -Continue care per primary team, keep K >4 and mg >1.8
54 year old female with PMH chronic HFrEF, CVA RLE weakness, NICM, HTN, HLD, DMT2, ESRD-HD, KERI, depression, anemia, GERD, gout, glaucoma and recent admission for CP presented with chest pain found to have persistent SR with ventricular bigeminy.  Patient endorsed "chest pain with palpitaitons" and "dizziness" at times.  Her recent echo on 12/2 showed reduced LVEF of 36% compared to the previous echo done on 5/5/2019 with LVEF 40-45%.  The etiology of worsening LVEF possibly secondary to increased PVC burden.   EKG with ventricular bigeminy with PVC morphology suggests possible right ventricular outflow tract origin vs fascicular origin.  Therefore patient will likely benefit from an EP study with possible PVC ablation.  Hospital course complicated by current diarrhea/low grade fever.  Discussed options of elective EPS/PVC ablation.    -continue tele, patient with PVCs overnight, no persistent bigeminy seen since 12/7  -Plan for EPS with possible PVC ablation-likely as an outpt  -Continue BB and care per primary cardiology   -Continue care per primary team, keep K >4 and mg >1.8
55 yo F anemia, CHF, CVA, depression, GERD, gout, glaucoma, chol, HTN, KERI, NICMP asthma, DM, ESRD T/Th/sat with chest pain.
Echo 1/5/20189: EF 45-50%, global LV sys dysfx, mild diastolic dysfx (stgI)  Stress test in 6/2018 normal with no evidence of mi or ischemia   Echo 3/ 22/2019, mild to mod MR, moderate global lv sys dysfx small pericardial effusion to left ventricle EF 41%   Echo 5/5/19: ef 40-45%, mod global lv sys dysfx, small pericardial effusion post to left ventricle    A/P 54 year old woman with history of CHF, nicmp, CVA, depression, GERD, gout, glaucoma, chol, HTN, KERI, asthma, DM, ESRD T/Th/sat recent admit for CP now returns with back with chest pain.    1. Chest pain, atypical  -in the setting of fluid overload, secondary to missed HD session   -HST elevated, type II MI, demand ischemia 2/2 to ESRD, CHF   -echo with mod to severe lv dysfxn, eF now 36%  -cont with BB, ASA, imdur, plavix     2. Acute on chronic Systolic chf   -volume overloaded likely secondary to missed HD session  -cont fluid removal with HD   -continue bb, imdur, hydral  -no ace/arb hx of hyperkalemia   - echo as mentioned above, ep eval noted    3. HTN  -stable, continue current meds     4. ESRD on HD  -renal f/u     5. Bigeminy  -cont bb  -ep eval noted   -Plan for EPS with possible PVC ablation as outpt. 12/30      dvt ppx
Echo 1/5/20189: EF 45-50%, global LV sys dysfx, mild diastolic dysfx (stgI)  Stress test in 6/2018 normal with no evidence of mi or ischemia   Echo 3/ 22/2019, mild to mod MR, moderate global lv sys dysfx small pericardial effusion to left ventricle EF 41%   Echo 5/5/19: ef 40-45%, mod global lv sys dysfx, small pericardial effusion post to left ventricle    A/P 54 year old woman with history of CHF, nicmp, CVA, depression, GERD, gout, glaucoma, chol, HTN, KERI, asthma, DM, ESRD T/Th/sat recent admit for CP now returns with back with chest pain.    1. Chest pain, atypical  -in the setting of fluid overload, secondary to missed HD session   -HST elevated, type II MI, demand ischemia 2/2 to ESRD, CHF   -echo with mod to severe lv dysfxn, eF now 36%  -cont with BB, ASA, imdur, plavix     2. Acute on chronic Systolic chf   -volume overloaded likely secondary to missed HD session  -cont fluid removal with HD   -continue bb, imdur, hydral  -no ace/arb hx of hyperkalemia   - echo as mentioned above, ep eval noted    3. HTN  -stable, continue current meds     4. ESRD on HD  -renal f/u     5. Bigeminy  -cont bb  -ep eval noted   -Plan for EPS with possible PVC ablation, likely as outpt       dvt ppx
Echo 1/5/20189: EF 45-50%, global LV sys dysfx, mild diastolic dysfx (stgI)  Stress test in 6/2018 normal with no evidence of mi or ischemia   Echo 3/ 22/2019, mild to mod MR, moderate global lv sys dysfx small pericardial effusion to left ventricle EF 41%   Echo 5/5/19: ef 40-45%, mod global lv sys dysfx, small pericardial effusion post to left ventricle    A/P 54 year old woman with history of CHF, nicmp, CVA, depression, GERD, gout, glaucoma, chol, HTN, KERI, asthma, DM, ESRD T/Th/sat recent admit for CP now returns with back with chest pain.    1. Chest pain, atypical  -in the setting of fluid overload, secondary to missed HD session   -HST elevated, type II MI, demand ischemia 2/2 to ESRD, CHF   -echo with mod to severe lv dysfxn, eF now 36%  -cont with BB, ASA, imdur, plavix     2. Acute on chronic Systolic chf   -volume overloaded likely secondary to missed HD session  -cont fluid removal with HD   -continue bb, imdur, hydral  -no ace/arb hx of hyperkalemia   - echo as mentioned above, ep eval noted    3. HTN  -stable, continue current meds     4. ESRD on HD  -renal f/u     5. Bigeminy  -cont bb  -ep eval noted   -Plan for EPS with possible PVC ablation, likely as outpt       dvt ppx
Echo 1/5/20189: EF 45-50%, global LV sys dysfx, mild diastolic dysfx (stgI)  Stress test in 6/2018 normal with no evidence of mi or ischemia   Echo 3/ 22/2019, mild to mod MR, moderate global lv sys dysfx small pericardial effusion to left ventricle EF 41%   Echo 5/5/19: ef 40-45%, mod global lv sys dysfx, small pericardial effusion post to left ventricle    A/P 54 year old woman with history of CHF, nicmp, CVA, depression, GERD, gout, glaucoma, chol, HTN, KERI, asthma, DM, ESRD T/Th/sat recent admit for CP now returns with back with chest pain.    1. Chest pain, atypical  -in the setting of fluid overload, secondary to missed HD session   -HST elevated, type II MI, demand ischemia 2/2 to ESRD, CHF   -recent echo with lv dysfxn, eF 36%  -cont with BB, ASA, imdur, plavix     2. Acute on chronic Systolic chf   -volume overloaded likely secondary to missed HD session  -cont fluid removal with HD   -continue bb, imdur, hydral  -no ace/arb hx of hyperkalemia     3. HTN  -stable, continue current meds     4. ESRD on HD  -renal f/u     5. Bigeminy  -cont bb  -ep eval noted   -Plan for EPS with possible PVC ablation      dvt ppx
Echo 1/5/20189: EF 45-50%, global LV sys dysfx, mild diastolic dysfx (stgI)  Stress test in 6/2018 normal with no evidence of mi or ischemia   Echo 3/ 22/2019, mild to mod MR, moderate global lv sys dysfx small pericardial effusion to left ventricle EF 41%   Echo 5/5/19: ef 40-45%, mod global lv sys dysfx, small pericardial effusion post to left ventricle    A/P 54 year old woman with history of CHF, nicmp, CVA, depression, GERD, gout, glaucoma, chol, HTN, KERI, asthma, DM, ESRD T/Th/sat recent admit for CP now returns with back with chest pain.    1. Chest pain, atypical  -in the setting of fluid overload, secondary to missed HD session   -HST elevated, type II MI, demand ischemia 2/2 to ESRD, CHF   -recent echo with lv dysfxn, eF 36%  -cont with BB, ASA, imdur, plavix     2. Acute on chronic Systolic chf   -volume overloaded likely secondary to missed HD session  -cont fluid removal with HD   -continue bb, imdur, hydral  -no ace/arb hx of hyperkalemia     3. HTN  -stable, continue current meds     4. ESRD on HD  -renal f/u     5. Bigeminy  -cont bb  -ep eval noted   -Plan for EPS with possible PVC ablation      dvt ppx
ASSESSMENT:  (1)Renal - ESRD - HD TTS - missed HD Thursday (12/5/19) ; therefore, indicated for HD yesterday (12/6/19); will dialyze today to get her back on TTS schedule  (2)CP/SOB - atypical chest pain - Cardiology and EP on board, plan for EPS with possible PVC ablation    RECOMMEND:  (1)HD today - 2 hours; 2k bath; 1.5kg UF as able  (2)Next HD Tuesday    SHREYAS PeacockC  [facility]  [phone]

## 2019-12-11 NOTE — DISCHARGE NOTE PROVIDER - NSDCFUSCHEDAPPT_GEN_ALL_CORE_FT
MIMI HICKS ; 12/30/2019 ; P Cardio [facility] [phone] MIMI HICKS ; 12/30/2019 ; P Card[facility] [phone]

## 2019-12-11 NOTE — DISCHARGE NOTE NURSING/CASE MANAGEMENT/SOCIAL WORK - NSDCCRNAME_GEN_ALL_CORE_FT
Resume out patient HD at [facility] dialysis  [address] [facility] on Thursday 12/12/2019.

## 2019-12-11 NOTE — PROGRESS NOTE ADULT - SUBJECTIVE AND OBJECTIVE BOX
No new complaints. Denies any diarrhea today. Patient states she is "sleepy". Seen and examined in bed with her children at bedside.     VITAL:  T(C): , Max: 37.2 (12-10-19 @ 17:28)  T(F): , Max: 99 (12-10-19 @ 17:28)  HR: 71 (12-11-19 @ 05:59)  BP: 133/79 (12-11-19 @ 05:59)  RR: 18 (12-11-19 @ 05:59)  SpO2: 100% (12-11-19 @ 05:59)        PHYSICAL EXAM:    Constitutional: NAD; Alert  HEENT:  NCAT; DMM  Neck: No JVD; supple  Respiratory: CTA-b/l  Cardiac: RRR s1s2  Gastrointestinal: BS+, soft, NT/ND  Urologic: No malcolm  Extremities: No peripheral edema  Back: No CVAT b/l  Access: L AVF +Thrill    LABS:                        10.3   3.00  )-----------( 175      ( 11 Dec 2019 09:46 )             31.8     Na(132)/K(4.5)/Cl(93)/HCO3(23)/BUN(27)/Cr(9.18)Glu(111)/Ca(8.4)/Mg(2.1)/PO4(3.8)    12-11 @ 09:46  Na(131)/K(4.6)/Cl(94)/HCO3(23)/BUN(44)/Cr(12.18)Glu(100)/Ca(8.3)/Mg(--)/PO4(--)    12-10 @ 06:35  Na(134)/K(4.2)/Cl(96)/HCO3(26)/BUN(37)/Cr(10.68)Glu(95)/Ca(8.0)/Mg(2.1)/PO4(4.4)    12-09 @ 06:09      ASSESSMENT: 54F w/ HTN, DM2, CVA, HFrEF, and ESRD-HD TTS, 12/6/19 a/w CP/SOB/dizziness  (1)Renal - ESRD - HD TTS - can resume outpatient HD tomorrow  (2)Anemia - indicated for Epogen with HD  (3)EP -  PVC ablation outpatient as per EP  (4)GI - diarrhea      RECOMMEND:  (1)Next HD tomorrow as outpatient; epogen with HD  (2)Management of diarrhea per primary team  (3)PVC ablation outpatient per EP  (4) No renal objection to discharge    Abdullahi Deal NP  [facility]  [phone] No new complaints. Denies any diarrhea today. Patient states she is "sleepy". Seen and examined in bed with her children at bedside.     VITAL:  T(C): , Max: 37.2 (12-10-19 @ 17:28)  T(F): , Max: 99 (12-10-19 @ 17:28)  HR: 71 (12-11-19 @ 05:59)  BP: 133/79 (12-11-19 @ 05:59)  RR: 18 (12-11-19 @ 05:59)  SpO2: 100% (12-11-19 @ 05:59)        PHYSICAL EXAM:    Constitutional: NAD; Alert  HEENT:  NCAT; DMM  Neck: No JVD; supple  Respiratory: CTA-b/l  Cardiac: RRR s1s2  Gastrointestinal: BS+, soft, NT/ND  Urologic: No malcolm  Extremities: No peripheral edema  Back: No CVAT b/l  Access: L AVF +Thrill    LABS:                        10.3   3.00  )-----------( 175      ( 11 Dec 2019 09:46 )             31.8     Na(132)/K(4.5)/Cl(93)/HCO3(23)/BUN(27)/Cr(9.18)Glu(111)/Ca(8.4)/Mg(2.1)/PO4(3.8)    12-11 @ 09:46  Na(131)/K(4.6)/Cl(94)/HCO3(23)/BUN(44)/Cr(12.18)Glu(100)/Ca(8.3)/Mg(--)/PO4(--)    12-10 @ 06:35  Na(134)/K(4.2)/Cl(96)/HCO3(26)/BUN(37)/Cr(10.68)Glu(95)/Ca(8.0)/Mg(2.1)/PO4(4.4)    12-09 @ 06:09      ASSESSMENT: 54F w/ HTN, DM2, CVA, HFrEF, and ESRD-HD TTS, 12/6/19 a/w CP/SOB/dizziness  (1)Renal - ESRD - HD TTS - can resume outpatient HD tomorrow  (2)Anemia - indicated for Epogen with HD  (3)EP -  PVC ablation outpatient as per EP  (4)GI - diarrhea      RECOMMEND:  (1)Next HD tomorrow as outpatient; epogen with HD  (2)Management of diarrhea per primary team  (3)PVC ablation outpatient per EP  (4) No renal objection to discharge    Abdullahi Deal NP  [facility]  [phone]      RENAL ATTENDING NOTE  Patient seen and examined with NP. Agree with assessment and plan as above.    Wale Francisco MD  [facility]  [phone]

## 2019-12-11 NOTE — DISCHARGE NOTE PROVIDER - CARE PROVIDER_API CALL
Enzo Farias)  Cardiac Electrophysiology; Cardiology; Internal Medicine  [address] [address] [place] [address]  Phone: [phone]  Fax: [phone]  Follow Up Time:

## 2019-12-26 NOTE — ED PROVIDER NOTE - PROGRESS NOTE DETAILS
52 F with PMH of CVA (residual L sided weakness), DM2, HTN, Gout, dCHF, CAD, depression, CKD V sent in by PCP for hyperglycemia and kidney failure. Pt states she was called by Dr. Francisco for worsening kidney failure. She states she has been having high bloods sugas low 250's . No fever, no chills, no abdominal pain, No diarrhea, no cough. She states she has had LE swelling which has worsened over several days. CBC, cmp, xray, vbg, pt/inr, betahydroxy Spoke with Dr. Francisco Nephrology, discussed case, no current indication for emergent HD initiation. Will treat hyperglycemia and d.c with nephrology f/u Attending Attestation (For Attendings USE Only)...

## 2019-12-27 NOTE — ED PROVIDER NOTE - PROGRESS NOTE
12/27/19 Patient: Talib Hurtado YOB: 1942 Date of Visit: 12/27/2019 Bartolome Mcclellan DO 
[address] [address] [place] [address] VIA In Basket Dear Bartolome Mcclellan DO, Thank you for referring Mr. Elisabeth Clark to [facility] for evaluation. My notes for this consultation are attached. If you have questions, please do not hesitate to call me. I look forward to following your patient along with you. Sincerely, Agus Gann MD 
 
 Stable.

## 2020-01-01 NOTE — PATIENT PROFILE ADULT. - HEALTH/HEALTHCARE ANXIETIES, PROFILE
Problem: Occupational Therapy Goal  Goal: Occupational Therapy Goal  Description: Goals to be met by: 2020    Pt to be properly positioned 100% of time by family & staff  Pt will remain in quiet organized state for 25% of session  Pt will tolerate tactile stimulation with <50% signs of stress during 3 consecutive sessions  Pt eyes will remain open for 25% of session  Parents will demonstrate dev handling caregiving techniques while pt is calm & organized  Pt will bring hands to mouth & midline 2-3 times per session  Pt will suck pacifier with fair suck & latch in prep for oral fdg  Family will be independent with hep for development stimulation      Outcome: Ongoing, Progressing   Pt with fairly poor tolerance for therapeutic handling. HR acceleration to 202 with x1 min supported sit and required increased time and removal of stim to calm. No suck/root to positive oral stim provided and did not allow positive oral stim into mouth for NNS this session. Cont OT POC.   n/a

## 2020-01-02 ENCOUNTER — INPATIENT (INPATIENT)
Facility: HOSPITAL | Age: 55
LOS: 4 days | Discharge: HOME | DRG: 280 | End: 2020-01-07
Attending: STUDENT IN AN ORGANIZED HEALTH CARE EDUCATION/TRAINING PROGRAM | Admitting: STUDENT IN AN ORGANIZED HEALTH CARE EDUCATION/TRAINING PROGRAM
Payer: MEDICAID

## 2020-01-02 VITALS
WEIGHT: 184.09 LBS | TEMPERATURE: 98 F | OXYGEN SATURATION: 96 % | HEART RATE: 41 BPM | SYSTOLIC BLOOD PRESSURE: 118 MMHG | HEIGHT: 64 IN | RESPIRATION RATE: 18 BRPM | DIASTOLIC BLOOD PRESSURE: 68 MMHG

## 2020-01-02 DIAGNOSIS — Z90.49 ACQUIRED ABSENCE OF OTHER SPECIFIED PARTS OF DIGESTIVE TRACT: Chronic | ICD-10-CM

## 2020-01-02 DIAGNOSIS — Z90.711 ACQUIRED ABSENCE OF UTERUS WITH REMAINING CERVICAL STUMP: Chronic | ICD-10-CM

## 2020-01-02 DIAGNOSIS — Z98.83 FILTERING (VITREOUS) BLEB AFTER GLAUCOMA SURGERY STATUS: Chronic | ICD-10-CM

## 2020-01-02 DIAGNOSIS — I77.0 ARTERIOVENOUS FISTULA, ACQUIRED: Chronic | ICD-10-CM

## 2020-01-02 NOTE — ED ADULT TRIAGE NOTE - CHIEF COMPLAINT QUOTE
Mid sternal chest pain and sob since evening ,missed 2 scheduled dialysis due to personal reasons ,last dialysis before [facility](TTS)

## 2020-01-03 DIAGNOSIS — Z71.89 OTHER SPECIFIED COUNSELING: ICD-10-CM

## 2020-01-03 DIAGNOSIS — E11.9 TYPE 2 DIABETES MELLITUS WITHOUT COMPLICATIONS: ICD-10-CM

## 2020-01-03 DIAGNOSIS — R07.9 CHEST PAIN, UNSPECIFIED: ICD-10-CM

## 2020-01-03 DIAGNOSIS — N18.6 END STAGE RENAL DISEASE: ICD-10-CM

## 2020-01-03 DIAGNOSIS — Z29.9 ENCOUNTER FOR PROPHYLACTIC MEASURES, UNSPECIFIED: ICD-10-CM

## 2020-01-03 DIAGNOSIS — E87.5 HYPERKALEMIA: ICD-10-CM

## 2020-01-03 DIAGNOSIS — J40 BRONCHITIS, NOT SPECIFIED AS ACUTE OR CHRONIC: ICD-10-CM

## 2020-01-03 LAB
24R-OH-CALCIDIOL SERPL-MCNC: 15.3 NG/ML — LOW (ref 30–80)
ALBUMIN SERPL ELPH-MCNC: 3.4 G/DL — LOW (ref 3.5–5)
ALBUMIN SERPL ELPH-MCNC: 3.4 G/DL — LOW (ref 3.5–5)
ALP SERPL-CCNC: 155 U/L — HIGH (ref 40–120)
ALP SERPL-CCNC: 161 U/L — HIGH (ref 40–120)
ALT FLD-CCNC: 24 U/L DA — SIGNIFICANT CHANGE UP (ref 10–60)
ALT FLD-CCNC: 24 U/L DA — SIGNIFICANT CHANGE UP (ref 10–60)
ANION GAP SERPL CALC-SCNC: 10 MMOL/L — SIGNIFICANT CHANGE UP (ref 5–17)
ANION GAP SERPL CALC-SCNC: 9 MMOL/L — SIGNIFICANT CHANGE UP (ref 5–17)
APPEARANCE UR: CLEAR — SIGNIFICANT CHANGE UP
APTT BLD: 28.6 SEC — SIGNIFICANT CHANGE UP (ref 27.5–36.3)
AST SERPL-CCNC: 14 U/L — SIGNIFICANT CHANGE UP (ref 10–40)
AST SERPL-CCNC: 19 U/L — SIGNIFICANT CHANGE UP (ref 10–40)
BASOPHILS # BLD AUTO: 0.05 K/UL — SIGNIFICANT CHANGE UP (ref 0–0.2)
BASOPHILS NFR BLD AUTO: 1.2 % — SIGNIFICANT CHANGE UP (ref 0–2)
BILIRUB SERPL-MCNC: 0.5 MG/DL — SIGNIFICANT CHANGE UP (ref 0.2–1.2)
BILIRUB SERPL-MCNC: 0.6 MG/DL — SIGNIFICANT CHANGE UP (ref 0.2–1.2)
BILIRUB UR-MCNC: ABNORMAL
BUN SERPL-MCNC: 73 MG/DL — HIGH (ref 7–18)
BUN SERPL-MCNC: 73 MG/DL — HIGH (ref 7–18)
CALCIUM SERPL-MCNC: 8.5 MG/DL — SIGNIFICANT CHANGE UP (ref 8.4–10.5)
CALCIUM SERPL-MCNC: 8.8 MG/DL — SIGNIFICANT CHANGE UP (ref 8.4–10.5)
CHLORIDE SERPL-SCNC: 99 MMOL/L — SIGNIFICANT CHANGE UP (ref 96–108)
CHLORIDE SERPL-SCNC: 99 MMOL/L — SIGNIFICANT CHANGE UP (ref 96–108)
CHOLEST SERPL-MCNC: 111 MG/DL — SIGNIFICANT CHANGE UP (ref 10–199)
CK MB CFR SERPL CALC: 1.9 NG/ML — SIGNIFICANT CHANGE UP (ref 0–3.6)
CO2 SERPL-SCNC: 25 MMOL/L — SIGNIFICANT CHANGE UP (ref 22–31)
CO2 SERPL-SCNC: 27 MMOL/L — SIGNIFICANT CHANGE UP (ref 22–31)
COLOR SPEC: YELLOW — SIGNIFICANT CHANGE UP
CREAT SERPL-MCNC: 14.2 MG/DL — HIGH (ref 0.5–1.3)
CREAT SERPL-MCNC: 14.6 MG/DL — HIGH (ref 0.5–1.3)
DIFF PNL FLD: ABNORMAL
EOSINOPHIL # BLD AUTO: 0.09 K/UL — SIGNIFICANT CHANGE UP (ref 0–0.5)
EOSINOPHIL NFR BLD AUTO: 2.2 % — SIGNIFICANT CHANGE UP (ref 0–6)
GLUCOSE BLDC GLUCOMTR-MCNC: 100 MG/DL — HIGH (ref 70–99)
GLUCOSE BLDC GLUCOMTR-MCNC: 123 MG/DL — HIGH (ref 70–99)
GLUCOSE BLDC GLUCOMTR-MCNC: 135 MG/DL — HIGH (ref 70–99)
GLUCOSE SERPL-MCNC: 117 MG/DL — HIGH (ref 70–99)
GLUCOSE SERPL-MCNC: 119 MG/DL — HIGH (ref 70–99)
GLUCOSE UR QL: NEGATIVE — SIGNIFICANT CHANGE UP
HBA1C BLD-MCNC: 5.7 % — HIGH (ref 4–5.6)
HCG SERPL-ACNC: <1 MIU/ML — SIGNIFICANT CHANGE UP
HCT VFR BLD CALC: 33.7 % — LOW (ref 34.5–45)
HCT VFR BLD CALC: 34.9 % — SIGNIFICANT CHANGE UP (ref 34.5–45)
HDLC SERPL-MCNC: 68 MG/DL — SIGNIFICANT CHANGE UP
HGB BLD-MCNC: 10.4 G/DL — LOW (ref 11.5–15.5)
HGB BLD-MCNC: 10.7 G/DL — LOW (ref 11.5–15.5)
IMM GRANULOCYTES NFR BLD AUTO: 0.2 % — SIGNIFICANT CHANGE UP (ref 0–1.5)
INR BLD: 0.99 RATIO — SIGNIFICANT CHANGE UP (ref 0.88–1.16)
KETONES UR-MCNC: NEGATIVE — SIGNIFICANT CHANGE UP
LEUKOCYTE ESTERASE UR-ACNC: ABNORMAL
LIPID PNL WITH DIRECT LDL SERPL: 31 MG/DL — SIGNIFICANT CHANGE UP
LYMPHOCYTES # BLD AUTO: 1.04 K/UL — SIGNIFICANT CHANGE UP (ref 1–3.3)
LYMPHOCYTES # BLD AUTO: 25.6 % — SIGNIFICANT CHANGE UP (ref 13–44)
MAGNESIUM SERPL-MCNC: 3 MG/DL — HIGH (ref 1.6–2.6)
MAGNESIUM SERPL-MCNC: 3.2 MG/DL — HIGH (ref 1.6–2.6)
MCHC RBC-ENTMCNC: 29.4 PG — SIGNIFICANT CHANGE UP (ref 27–34)
MCHC RBC-ENTMCNC: 29.5 PG — SIGNIFICANT CHANGE UP (ref 27–34)
MCHC RBC-ENTMCNC: 30.7 GM/DL — LOW (ref 32–36)
MCHC RBC-ENTMCNC: 30.9 GM/DL — LOW (ref 32–36)
MCV RBC AUTO: 95.7 FL — SIGNIFICANT CHANGE UP (ref 80–100)
MCV RBC AUTO: 95.9 FL — SIGNIFICANT CHANGE UP (ref 80–100)
MONOCYTES # BLD AUTO: 0.42 K/UL — SIGNIFICANT CHANGE UP (ref 0–0.9)
MONOCYTES NFR BLD AUTO: 10.3 % — SIGNIFICANT CHANGE UP (ref 2–14)
NEUTROPHILS # BLD AUTO: 2.45 K/UL — SIGNIFICANT CHANGE UP (ref 1.8–7.4)
NEUTROPHILS NFR BLD AUTO: 60.5 % — SIGNIFICANT CHANGE UP (ref 43–77)
NITRITE UR-MCNC: NEGATIVE — SIGNIFICANT CHANGE UP
NRBC # BLD: 0 /100 WBCS — SIGNIFICANT CHANGE UP (ref 0–0)
NRBC # BLD: 0 /100 WBCS — SIGNIFICANT CHANGE UP (ref 0–0)
NT-PROBNP SERPL-SCNC: HIGH PG/ML (ref 0–125)
PH UR: 6 — SIGNIFICANT CHANGE UP (ref 5–8)
PHOSPHATE SERPL-MCNC: 5.3 MG/DL — HIGH (ref 2.5–4.5)
PLATELET # BLD AUTO: 195 K/UL — SIGNIFICANT CHANGE UP (ref 150–400)
PLATELET # BLD AUTO: 202 K/UL — SIGNIFICANT CHANGE UP (ref 150–400)
POTASSIUM SERPL-MCNC: 5.1 MMOL/L — SIGNIFICANT CHANGE UP (ref 3.5–5.3)
POTASSIUM SERPL-MCNC: 5.4 MMOL/L — HIGH (ref 3.5–5.3)
POTASSIUM SERPL-SCNC: 5.1 MMOL/L — SIGNIFICANT CHANGE UP (ref 3.5–5.3)
POTASSIUM SERPL-SCNC: 5.4 MMOL/L — HIGH (ref 3.5–5.3)
PROT SERPL-MCNC: 7.3 G/DL — SIGNIFICANT CHANGE UP (ref 6–8.3)
PROT SERPL-MCNC: 7.3 G/DL — SIGNIFICANT CHANGE UP (ref 6–8.3)
PROT UR-MCNC: 500 MG/DL
PROTHROM AB SERPL-ACNC: 11 SEC — SIGNIFICANT CHANGE UP (ref 10–12.9)
RBC # BLD: 3.52 M/UL — LOW (ref 3.8–5.2)
RBC # BLD: 3.64 M/UL — LOW (ref 3.8–5.2)
RBC # FLD: 14.6 % — HIGH (ref 10.3–14.5)
RBC # FLD: 14.6 % — HIGH (ref 10.3–14.5)
SODIUM SERPL-SCNC: 134 MMOL/L — LOW (ref 135–145)
SODIUM SERPL-SCNC: 135 MMOL/L — SIGNIFICANT CHANGE UP (ref 135–145)
SP GR SPEC: 1.01 — SIGNIFICANT CHANGE UP (ref 1.01–1.02)
TOTAL CHOLESTEROL/HDL RATIO MEASUREMENT: 1.6 RATIO — LOW (ref 3.3–7.1)
TRIGL SERPL-MCNC: 61 MG/DL — SIGNIFICANT CHANGE UP (ref 10–149)
TROPONIN I SERPL-MCNC: 0.09 NG/ML — HIGH (ref 0–0.04)
TROPONIN I SERPL-MCNC: 0.12 NG/ML — HIGH (ref 0–0.04)
TSH SERPL-MCNC: 1.33 UU/ML — SIGNIFICANT CHANGE UP (ref 0.34–4.82)
UROBILINOGEN FLD QL: NEGATIVE — SIGNIFICANT CHANGE UP
VIT B12 SERPL-MCNC: 379 PG/ML — SIGNIFICANT CHANGE UP (ref 232–1245)
WBC # BLD: 3.55 K/UL — LOW (ref 3.8–10.5)
WBC # BLD: 4.06 K/UL — SIGNIFICANT CHANGE UP (ref 3.8–10.5)
WBC # FLD AUTO: 3.55 K/UL — LOW (ref 3.8–10.5)
WBC # FLD AUTO: 4.06 K/UL — SIGNIFICANT CHANGE UP (ref 3.8–10.5)

## 2020-01-03 PROCEDURE — 71045 X-RAY EXAM CHEST 1 VIEW: CPT | Mod: 26

## 2020-01-03 PROCEDURE — 99222 1ST HOSP IP/OBS MODERATE 55: CPT

## 2020-01-03 PROCEDURE — 99283 EMERGENCY DEPT VISIT LOW MDM: CPT

## 2020-01-03 RX ORDER — CARVEDILOL PHOSPHATE 80 MG/1
12.5 CAPSULE, EXTENDED RELEASE ORAL EVERY 12 HOURS
Refills: 0 | Status: DISCONTINUED | OUTPATIENT
Start: 2020-01-03 | End: 2020-01-07

## 2020-01-03 RX ORDER — INSULIN LISPRO 100/ML
VIAL (ML) SUBCUTANEOUS
Refills: 0 | Status: DISCONTINUED | OUTPATIENT
Start: 2020-01-03 | End: 2020-01-07

## 2020-01-03 RX ORDER — HEPARIN SODIUM 5000 [USP'U]/ML
5000 INJECTION INTRAVENOUS; SUBCUTANEOUS EVERY 8 HOURS
Refills: 0 | Status: DISCONTINUED | OUTPATIENT
Start: 2020-01-03 | End: 2020-01-07

## 2020-01-03 RX ORDER — GLUCAGON INJECTION, SOLUTION 0.5 MG/.1ML
1 INJECTION, SOLUTION SUBCUTANEOUS ONCE
Refills: 0 | Status: DISCONTINUED | OUTPATIENT
Start: 2020-01-03 | End: 2020-01-07

## 2020-01-03 RX ORDER — DEXTROSE 50 % IN WATER 50 %
12.5 SYRINGE (ML) INTRAVENOUS ONCE
Refills: 0 | Status: DISCONTINUED | OUTPATIENT
Start: 2020-01-03 | End: 2020-01-05

## 2020-01-03 RX ORDER — CALCIUM ACETATE 667 MG
667 TABLET ORAL
Refills: 0 | Status: DISCONTINUED | OUTPATIENT
Start: 2020-01-03 | End: 2020-01-07

## 2020-01-03 RX ORDER — DEXTROSE 50 % IN WATER 50 %
25 SYRINGE (ML) INTRAVENOUS ONCE
Refills: 0 | Status: DISCONTINUED | OUTPATIENT
Start: 2020-01-03 | End: 2020-01-05

## 2020-01-03 RX ORDER — ASPIRIN/CALCIUM CARB/MAGNESIUM 324 MG
81 TABLET ORAL DAILY
Refills: 0 | Status: DISCONTINUED | OUTPATIENT
Start: 2020-01-03 | End: 2020-01-07

## 2020-01-03 RX ORDER — DEXTROSE 50 % IN WATER 50 %
15 SYRINGE (ML) INTRAVENOUS ONCE
Refills: 0 | Status: DISCONTINUED | OUTPATIENT
Start: 2020-01-03 | End: 2020-01-05

## 2020-01-03 RX ORDER — DEXTROSE 50 % IN WATER 50 %
25 SYRINGE (ML) INTRAVENOUS ONCE
Refills: 0 | Status: DISCONTINUED | OUTPATIENT
Start: 2020-01-03 | End: 2020-01-07

## 2020-01-03 RX ORDER — FUROSEMIDE 40 MG
20 TABLET ORAL DAILY
Refills: 0 | Status: DISCONTINUED | OUTPATIENT
Start: 2020-01-03 | End: 2020-01-07

## 2020-01-03 RX ORDER — AMLODIPINE BESYLATE 2.5 MG/1
10 TABLET ORAL DAILY
Refills: 0 | Status: DISCONTINUED | OUTPATIENT
Start: 2020-01-03 | End: 2020-01-07

## 2020-01-03 RX ORDER — ISOSORBIDE DINITRATE 5 MG/1
20 TABLET ORAL THREE TIMES A DAY
Refills: 0 | Status: DISCONTINUED | OUTPATIENT
Start: 2020-01-03 | End: 2020-01-07

## 2020-01-03 RX ORDER — ALLOPURINOL 300 MG
1 TABLET ORAL
Qty: 30 | Refills: 0

## 2020-01-03 RX ORDER — SODIUM ZIRCONIUM CYCLOSILICATE 10 G/10G
5 POWDER, FOR SUSPENSION ORAL ONCE
Refills: 0 | Status: COMPLETED | OUTPATIENT
Start: 2020-01-03 | End: 2020-01-03

## 2020-01-03 RX ORDER — CINACALCET 30 MG/1
30 TABLET, FILM COATED ORAL DAILY
Refills: 0 | Status: DISCONTINUED | OUTPATIENT
Start: 2020-01-03 | End: 2020-01-07

## 2020-01-03 RX ORDER — AZITHROMYCIN 500 MG/1
250 TABLET, FILM COATED ORAL DAILY
Refills: 0 | Status: COMPLETED | OUTPATIENT
Start: 2020-01-03 | End: 2020-01-04

## 2020-01-03 RX ORDER — INSULIN LISPRO 100/ML
VIAL (ML) SUBCUTANEOUS AT BEDTIME
Refills: 0 | Status: DISCONTINUED | OUTPATIENT
Start: 2020-01-03 | End: 2020-01-07

## 2020-01-03 RX ORDER — HYDRALAZINE HCL 50 MG
100 TABLET ORAL THREE TIMES A DAY
Refills: 0 | Status: DISCONTINUED | OUTPATIENT
Start: 2020-01-03 | End: 2020-01-07

## 2020-01-03 RX ADMIN — Medication 20 MILLIGRAM(S): at 06:11

## 2020-01-03 RX ADMIN — Medication 667 MILLIGRAM(S): at 12:48

## 2020-01-03 RX ADMIN — HEPARIN SODIUM 5000 UNIT(S): 5000 INJECTION INTRAVENOUS; SUBCUTANEOUS at 06:11

## 2020-01-03 RX ADMIN — ISOSORBIDE DINITRATE 20 MILLIGRAM(S): 5 TABLET ORAL at 12:47

## 2020-01-03 RX ADMIN — ISOSORBIDE DINITRATE 20 MILLIGRAM(S): 5 TABLET ORAL at 21:51

## 2020-01-03 RX ADMIN — AMLODIPINE BESYLATE 10 MILLIGRAM(S): 2.5 TABLET ORAL at 06:10

## 2020-01-03 RX ADMIN — Medication 81 MILLIGRAM(S): at 12:47

## 2020-01-03 RX ADMIN — CARVEDILOL PHOSPHATE 12.5 MILLIGRAM(S): 80 CAPSULE, EXTENDED RELEASE ORAL at 06:11

## 2020-01-03 RX ADMIN — Medication 667 MILLIGRAM(S): at 10:06

## 2020-01-03 RX ADMIN — HEPARIN SODIUM 5000 UNIT(S): 5000 INJECTION INTRAVENOUS; SUBCUTANEOUS at 21:52

## 2020-01-03 RX ADMIN — SODIUM ZIRCONIUM CYCLOSILICATE 5 GRAM(S): 10 POWDER, FOR SUSPENSION ORAL at 10:07

## 2020-01-03 RX ADMIN — CINACALCET 30 MILLIGRAM(S): 30 TABLET, FILM COATED ORAL at 12:48

## 2020-01-03 RX ADMIN — AZITHROMYCIN 250 MILLIGRAM(S): 500 TABLET, FILM COATED ORAL at 12:47

## 2020-01-03 RX ADMIN — Medication 100 MILLIGRAM(S): at 21:52

## 2020-01-03 RX ADMIN — Medication 100 MILLIGRAM(S): at 06:11

## 2020-01-03 NOTE — H&P ADULT - ASSESSMENT
Patient is a 54 year old female, with PMH of stroke, HTN, DM, CHF, and ESRD on HD, who came in to the ED due to chest pain. Patient is a 54 year old female, with PMH of stroke, HTN, DM, CHF, and ESRD on HD, who came in to the ED due to chest pain.     UA negative. Trop 1 elevated at 0.118. Potassium of 5.4. No changes on EKG from hyperkalemia.   Creatinine noted of 14.20. Previously has been around 8-10.      Admitted for chest pain and for HD.

## 2020-01-03 NOTE — ED PROVIDER NOTE - CLINICAL SUMMARY MEDICAL DECISION MAKING FREE TEXT BOX
54 year old female with missed dialysis and CP. vitals WNL. PE as above.  multiple lab abnormalities inclusing elevated trop, BNP, mild hyperkalemia. no ecg changes consistent with hyperK. cxr unremarkable. in NAD. will admit for ACS r/o and for dialysis.

## 2020-01-03 NOTE — H&P ADULT - ATTENDING COMMENTS
Pt seen and examined. Case discussed with Medicine Intern. Agree with HPI with addendum below.    Vital Signs Last 24 Hrs  T(C): 36.7 (03 Jan 2020 04:25), Max: 36.8 (02 Jan 2020 22:35)  T(F): 98.1 (03 Jan 2020 04:25), Max: 98.3 (02 Jan 2020 22:35)  HR: 75 (03 Jan 2020 04:25) (41 - 75)  BP: 140/76 (03 Jan 2020 04:25) (118/68 - 140/76)  RR: 18 (03 Jan 2020 04:25) (18 - 18)  SpO2: 97% (03 Jan 2020 04:25) (96% - 97%)    Middle aged woman, sleeping but easily rousable, NAD, AAO X 3  CTA B/L RRR S1S2 only  Soft NT ND BS +  No pedal edema    Labs                        10.4   4.06  )-----------( 202      ( 03 Jan 2020 01:24 )             33.7     01-03    134<L>  |  99  |  73<H>  ----------------------------<  119<H>  5.4<H>   |  25  |  14.20<H>    Ca    8.5      03 Jan 2020 01:24  Mg     3.0     01-03    TPro  7.3  /  Alb  3.4<L>  /  TBili  0.6  /  DBili  x   /  AST  19  /  ALT  24  /  AlkPhos  161<H>  01-03    CARDIAC MARKERS ( 03 Jan 2020 01:24 )  0.118 ng/mL / x     / x     / x     / 1.9 ng/mL    Pro-BNP - 96641    CXR - widespread B/L interstitial edema dayo on the right - pulm vasc congestion  OLD ECHO (12/2019)  CONCLUSIONS:  Frequent premature ventricular complexes (often in a pattern of ventricular bigeminy) noted throughout the study.  1. Normal mitral valve. Mild mitral regurgitation.     2. Normal trileaflet aortic valve. Mild aortic  regurgitation.  3. Severely dilated left atrium.  LA volume index = 52cc/m2.  4. Moderate left ventricular enlargement.  5. Moderate to severe global left ventricular systolic dysfunction.(36%)  6. Normal right ventricular size and function.  7. Small pericardial effusion posterior to the left ventricle and superior to the right atrium.    Impression  54 year old woman with extensive med hx as above here with worsening SOB and CP ( now resolved ) in the setting of 2 missed HD sessions. Also noted is improving acute bronchitis on treatment    A/P  - Fluid overload with SOB/CP with ESRD and severe systolic CHF  - Elevated trop in the setting of ESRD - r/o ?? ACS  - resolving bronchitis  - Mild hyperkalemia; no tele or EKG changes    Plan  Admit to Medicine  Nephrology consult for HD session today - Dr Khan  resume home meds  Serial trop; re-evaluate if uptrending

## 2020-01-03 NOTE — H&P ADULT - PROBLEM SELECTOR PLAN 7
discussed goals of care with patient   patient stated she would like for everything to be done  patient is FULL CODE

## 2020-01-03 NOTE — H&P ADULT - PROBLEM SELECTOR PLAN 3
history of DM  takes janumet at home  will start SSI  monitor blood sugars recently admitted to [facility] for bronchitis  given azithromycin   already took 3 days of 250mg PO zithro  continue zithro 250mg for two more days to complete course

## 2020-01-03 NOTE — CONSULT NOTE ADULT - SUBJECTIVE AND OBJECTIVE BOX
Pt interviewed and examined. Full note to follow. [facility] NEPHROLOGY- CONSULTATION NOTE    Patient is a 54y Female with ESRD on HD at [facility] dialysis (Nephrologist Dr. Wale Francisco) who presented to the hospital with SOB.  She was recently treated for bronchitis in [facility], where she missed dialysis.  She was to go yesterday, but then was told to come to hospital since she was still SOB.  Pt reports in general, fairly low po fluid intake.    PAST MEDICAL & SURGICAL HISTORY:  CHF (congestive heart failure)  GERD (gastroesophageal reflux disease)  Anemia of chronic disease  HLD (hyperlipidemia)  NICM (nonischemic cardiomyopathy)  KERI (obstructive sleep apnea)  ESRD (end stage renal disease): HD T/T/S  Depression  Gout  CVA (cerebral vascular accident): - Residual RLE weakness  DM (diabetes mellitus)  HTN (hypertension)  Glaucoma  S/P cholecystectomy  S/P partial hysterectomy  Status post glaucoma surgery  AVF (arteriovenous fistula)    iodine (Unknown)  Seafood (Unknown)  shellfish (Nausea (Mild to Mod); Hives (Mild to Mod))    Home Medications Reviewed  Hospital Medications:   MEDICATIONS  (STANDING):  amLODIPine   Tablet 10 milliGRAM(s) Oral daily  aspirin enteric coated 81 milliGRAM(s) Oral daily  azithromycin   Tablet 250 milliGRAM(s) Oral daily  calcium acetate 667 milliGRAM(s) Oral three times a day with meals  carvedilol 12.5 milliGRAM(s) Oral every 12 hours  cinacalcet 30 milliGRAM(s) Oral daily  dextrose 50% Injectable 12.5 Gram(s) IV Push once  dextrose 50% Injectable 25 Gram(s) IV Push once  dextrose 50% Injectable 25 Gram(s) IV Push once  furosemide    Tablet 20 milliGRAM(s) Oral daily  heparin  Injectable 5000 Unit(s) SubCutaneous every 8 hours  hydrALAZINE 100 milliGRAM(s) Oral three times a day  insulin lispro (HumaLOG) corrective regimen sliding scale   SubCutaneous three times a day before meals  insulin lispro (HumaLOG) corrective regimen sliding scale   SubCutaneous at bedtime  isosorbide   dinitrate Tablet (ISORDIL) 20 milliGRAM(s) Oral three times a day    SOCIAL HISTORY:  Denies ETOh,Smoking,   FAMILY HISTORY:  Family history of heart attack    REVIEW OF SYSTEMS:  CONSTITUTIONAL: No weakness, fevers or chills  EYES/ENT: No visual changes;  No vertigo or throat pain   NECK: No pain or stiffness  RESPIRATORY: + cough, + wheezing, no hemoptysis; + shortness of breath  CARDIOVASCULAR: No chest pain or palpitations.  GASTROINTESTINAL: No abdominal or epigastric pain. No nausea, vomiting, or hematemesis; No diarrhea or constipation. No melena or hematochezia.  GENITOURINARY: No dysuria, frequency, foamy urine, urinary urgency, incontinence or hematuria  NEUROLOGICAL: No numbness or weakness  SKIN: No itching, burning, rashes, or lesions   VASCULAR: No bilateral lower extremity edema.   All other review of systems is negative unless indicated above.    VITALS:  T(F): 98.1 (20 @ 21:32), Max: 98.2 (20 @ 16:05)  HR: 80 (20 @ 21:32)  BP: 137/74 (20 @ 21:32)  RR: 18 (20 @ 21:32)  SpO2: 96% (20 @ 21:32)  Wt(kg): --        PHYSICAL EXAM:  Constitutional: NAD  HEENT: anicteric sclera, oropharynx clear, MMM  Neck: No JVD  Respiratory: CTAB, no wheezes, rales or rhonchi  Cardiovascular: S1, S2, RRR  Gastrointestinal: BS+, soft, NT/ND  Extremities: No cyanosis or clubbing. 1-2+ B LE edema  Neurological: A/O x 3, no focal deficits  Psychiatric: Normal mood, normal affect  : No CVA tenderness. No malcolm.   Skin: No rashes  Vascular Access: benign    LABS:      135  |  99  |  73<H>  ----------------------------<  117<H>  5.1   |  27  |  14.60<H>    Ca    8.8      2020 10:31  Phos  5.3       Mg     3.2         TPro  7.3  /  Alb  3.4<L>  /  TBili  0.5  /  DBili      /  AST  14  /  ALT  24  /  AlkPhos  155<H>      Creatinine Trend: 14.60 <--, 14.20 <--                        10.7   3.55  )-----------( 195      ( 2020 10:31 )             34.9     Urine Studies:  Urinalysis Basic - ( 2020 02:40 )    Color: Yellow / Appearance: Clear / S.015 / pH:   Gluc:  / Ketone: Negative  / Bili: Small / Urobili: Negative   Blood:  / Protein: 500 mg/dL / Nitrite: Negative   Leuk Esterase: Small / RBC: 2-5 /HPF / WBC 3-5 /HPF   Sq Epi:  / Non Sq Epi: Few /HPF / Bacteria: Moderate /HPF        RADIOLOGY & ADDITIONAL STUDIES:    EXAM:  XR CHEST PORTABLE URGENT 1V                            PROCEDURE DATE:  2020          INTERPRETATION:  CLINICAL INDICATION: 54 years  Female with sob, cp, missed dialysis.    COMPARISON: 2019    AP view of the chest demonstrates the lungs to be clear. There is no pleural effusion. There is no pneumothorax.    The heart is moderately enlarged. There is no mediastinal or hilar mass.     There is mild pulmonary vascular congestion.    Mild thoracic degenerative changes are present.    IMPRESSION:    Mild pulmonary vascular congestion. Cardiomegaly.                FREDERIC ABDI M.D., ATTENDING RADIOLOGIST  This document has been electronically signed. Terry  3 2020  8:16AM                < from: Transthoracic Echocardiogram (19 @ 15:08) >    Patient name: MIMI HICKS  YOB: 1965   Age: 54 (F)   MR#: [id number]  Study Date: 2019  Location: [id number]: Adela Alvarez RDCS  Study quality: Technically good  Referring Physician: Roz Hays MD  Blood Pressure: 148/91 mmHg  Height: 163 cm  Weight: 86 kg  BSA: 1.9 m2  ------------------------------------------------------------------------  PROCEDURE: Transthoracic echocardiogram with 2-D, M-Mode  and complete spectral and color flow Doppler.  INDICATION: Essential (primary) hypertension (I10)  ------------------------------------------------------------------------  DIMENSIONS:  Dimensions:     Normal Values:  LA:     3.4 cm    2.0 - 4.0 cm  Ao:     3.1 cm    2.0 - 3.8 cm  SEPTUM: 0.6 cm    0.6 - 1.2 cm  PWT:    0.6 cm    0.6 - 1.1 cm  LVIDd:  6.7 cm    3.0 - 5.6 cm  LVIDs:  5.5 cm    1.8 - 4.0 cm  Derived Variables:  LVMI: 84 g/m2  RWT: 0.17  Fractional short: 18 %  Ejection Fraction (Teicholtz): 36 %  ------------------------------------------------------------------------  OBSERVATIONS:  Mitral Valve: Normal mitral valve. Mild mitral  regurgitation.  Aortic Root: Normal aortic root.  Aortic Valve: Normal trileaflet aortic valve. Mild aortic  regurgitation.  Left Atrium: Severely dilated left atrium.  LA volume index  = 52 cc/m2.  Left Ventricle: Moderate to severe global left ventricular  systolic dysfunction. Moderate left ventricular  enlargement.  Right Heart: Normal right atrium. Normal right ventricular  size and function. Normal tricuspid valve.  Mild-moderate  tricuspid regurgitation. Normal pulmonic valve. Minimal  pulmonic regurgitation.  Pericardium/PleuraSmall pericardial effusion posterior to  the left ventricle and superior to the right atrium.  ------------------------------------------------------------------------  CONCLUSIONS:  Frequent premature ventricular complexes (often in a  pattern of ventricular bigeminy) noted throughout the  study.  1. Normal mitral valve. Mild mitral regurgitation.  2. Normal trileaflet aortic valve. Mild aortic  regurgitation.  3. Severely dilated left atrium.  LA volume index = 52  cc/m2.  4. Moderate left ventricular enlargement.  5. Moderate to severe global left ventricular systolic  dysfunction.  6. Normal right ventricular size and function.  7. Small pericardial effusion posterior to the left  ventricle and superior to the right atrium.  ------------------------------------------------------------------------  Confirmed on  2019 - 16:10:12 by Uvaldo Hickey M.D.  ------------------------------------------------------------------------    < end of copied text >

## 2020-01-03 NOTE — H&P ADULT - NSHPSOCIALHISTORY_GEN_ALL_CORE
Patient states she lives in shelter. Patient denies smoking or illicit drug use. States only drinking alcohol during special occasions.

## 2020-01-03 NOTE — CONSULT NOTE ADULT - ASSESSMENT
54 year-old woman with ESRD on HD with mild pulmonary edema and LE edema with missed dialysis and known CHF also with bronchitis.    1. ESRD- HD today. Continue with maintenance hemodialysis treatments. Monitor BMP.  2. LE edema/pulmonary edema- HD today. Fluid restriction. Consider extra treatment tomorrow if necessary.  Continue HF meds.  3. HTN- Continue with current anti-hypertensive medications. Monitor BP.  4. Chronic systolic HF- slightly overloaded as described above.  HD as above and fluid restriction. Continue HF meds.

## 2020-01-03 NOTE — H&P ADULT - HISTORY OF PRESENT ILLNESS
Patient is a 54 year old female, with PMH of stroke, HTN, DM, CHF, and ESRD on HD, who came in to the ED due to chest pain. Patient is a 54 year old female, with PMH of stroke, HTN, DM, CHF, and ESRD on HD T/Th/S, who came in to the ED due to shortness of breath. Patient states she was recently in [facility] on Monday for cough and SOB and was treated for bronchitis. Discharged on azithromycin, albuterol, asthma pump and nebulizer. Due to being in the hospital and not feeling well, patient missed her HD session this past Tuesday. Last HD session was last Friday (not regular day due to holidays as per patient). She states that her shortness of breath has worsened over the past 2 days. Patient was planning to go to HD yesterday (Thursday) but was told not to go and to go to the hospital due to having had missed previous session and also still having cough and shortness of breath. Patient also endorses chest pain which comes and goes that is midsternal and about a 7/10 at this time. Patient states this pain always comes whenever she misses her HD session. Patient complains of leg swelling and also having had a few episodes of diarrhea today. Patient denies any headache, dizziness, nausea or vomiting.

## 2020-01-03 NOTE — H&P ADULT - PROBLEM SELECTOR PLAN 5
discussed goals of care with patient   patient stated she would like for everything to be done  patient is FULL CODE history of DM  takes janumet at home  will start SSI  monitor blood sugars

## 2020-01-03 NOTE — ED PROVIDER NOTE - OBJECTIVE STATEMENT
54 year old female PMh CHF, CVA, GERD, HTN, asthma, DM, ESRD on HD TTS (skipped the last 2 sessions) coming in with cp starting today which is improving and radiates to back and asking for doalysis. states has had cp before in the past. also complains of mild SOB but states with nasal canula on feels improved. Denies fevers, chills, sweats, cough, uri symptoms, palpitations, abd pains, N/V/D/C.

## 2020-01-03 NOTE — H&P ADULT - PROBLEM SELECTOR PLAN 4
IMPROVE VTE Individual Risk Assessment  RISK                                                                Points  [  ] Previous VTE                                                  3  [  ] Thrombophilia                                               2  [  ] Lower limb paralysis                                      2        (unable to hold up >15 seconds)    [  ] Current Cancer                                              2        (within 6 months)  [  ] Immobilization > 24 hrs                                1  [  ] ICU/CCU stay > 24 hours                              1  [  ] Age > 60                                                      1    IMPROVE VTE Score 0    heparin for DVT ppx noted to have potassium of 5.4 on admission  ordered 1 dose of 5mg lokelma  monitor potassium  f/u nephro Dr. Khan

## 2020-01-03 NOTE — H&P ADULT - PROBLEM SELECTOR PLAN 6
IMPROVE VTE Individual Risk Assessment  RISK                                                                Points  [  ] Previous VTE                                                  3  [  ] Thrombophilia                                               2  [  ] Lower limb paralysis                                      2        (unable to hold up >15 seconds)    [  ] Current Cancer                                              2        (within 6 months)  [  ] Immobilization > 24 hrs                                1  [  ] ICU/CCU stay > 24 hours                              1  [  ] Age > 60                                                      1    IMPROVE VTE Score 0    heparin for DVT ppx

## 2020-01-03 NOTE — H&P ADULT - PROBLEM SELECTOR PLAN 1
patient normally has HD sessions on T/Th/Sat  left sided AV fistula  last HD session was last Friday  creatinine of 14.20 on admission, previously was around 8-10  nephro consult  monitor kidney function patient normally has HD sessions on T/Th/Sat  left sided AV fistula  last HD session was last Friday  creatinine of 14.20 on admission, previously was around 8-10  nephro Dr. Khan consulted  monitor kidney function patient normally has HD sessions on T/Th/Sat  left sided AV fistula  last HD session was last Friday  creatinine of 14.20 on admission, previously was around 8-10  nephro Dr. Khan consulted for HD  monitor kidney function

## 2020-01-03 NOTE — CONSULT NOTE ADULT - ATTENDING COMMENTS
[facility] NEPHROLOGY  Soy Richmond M.D.  Moo Bain D.O.  Marilin Khan M.D.  Elizabeth Valdes, MSN, ANP-C    Telephone: [phone]  Facsimile: [phone]    [address] [place] [address]

## 2020-01-03 NOTE — H&P ADULT - PROBLEM SELECTOR PLAN 2
patient stated having chest pain that was 7/10 and midsternal  troponin elevated at 0.118   patient states feeling this kind of pain when missing HD sessions  f/u T2   telemetry  f/u ECHO  cardio consult  continue home meds of aspirin, statin and BP meds with parameters patient stated having chest pain that was 7/10 and midsternal  troponin elevated at 0.118   patient states feeling this kind of pain when missing HD sessions  f/u T2   telemetry  continue home meds of aspirin, statin and BP meds with parameters patient stated having chest pain that was 7/10 and midsternal  r/o ACS  troponin elevated at 0.118   patient states feeling this kind of pain when missing HD sessions  f/u T2   telemetry  ECHO done in Dec 2019 showed EF of 36%  continue home meds of aspirin, statin and BP meds with parameters

## 2020-01-04 LAB
ANION GAP SERPL CALC-SCNC: 7 MMOL/L — SIGNIFICANT CHANGE UP (ref 5–17)
BUN SERPL-MCNC: 50 MG/DL — HIGH (ref 7–18)
CALCIUM SERPL-MCNC: 7.6 MG/DL — LOW (ref 8.4–10.5)
CHLORIDE SERPL-SCNC: 104 MMOL/L — SIGNIFICANT CHANGE UP (ref 96–108)
CO2 SERPL-SCNC: 29 MMOL/L — SIGNIFICANT CHANGE UP (ref 22–31)
CREAT SERPL-MCNC: 10.9 MG/DL — HIGH (ref 0.5–1.3)
CULTURE RESULTS: SIGNIFICANT CHANGE UP
GLUCOSE BLDC GLUCOMTR-MCNC: 117 MG/DL — HIGH (ref 70–99)
GLUCOSE BLDC GLUCOMTR-MCNC: 126 MG/DL — HIGH (ref 70–99)
GLUCOSE BLDC GLUCOMTR-MCNC: 182 MG/DL — HIGH (ref 70–99)
GLUCOSE BLDC GLUCOMTR-MCNC: 96 MG/DL — SIGNIFICANT CHANGE UP (ref 70–99)
GLUCOSE SERPL-MCNC: 147 MG/DL — HIGH (ref 70–99)
HCT VFR BLD CALC: 30.9 % — LOW (ref 34.5–45)
HGB BLD-MCNC: 9.7 G/DL — LOW (ref 11.5–15.5)
MCHC RBC-ENTMCNC: 30.1 PG — SIGNIFICANT CHANGE UP (ref 27–34)
MCHC RBC-ENTMCNC: 31.4 GM/DL — LOW (ref 32–36)
MCV RBC AUTO: 96 FL — SIGNIFICANT CHANGE UP (ref 80–100)
NRBC # BLD: 0 /100 WBCS — SIGNIFICANT CHANGE UP (ref 0–0)
PLATELET # BLD AUTO: 207 K/UL — SIGNIFICANT CHANGE UP (ref 150–400)
POTASSIUM SERPL-MCNC: 4.4 MMOL/L — SIGNIFICANT CHANGE UP (ref 3.5–5.3)
POTASSIUM SERPL-SCNC: 4.4 MMOL/L — SIGNIFICANT CHANGE UP (ref 3.5–5.3)
RBC # BLD: 3.22 M/UL — LOW (ref 3.8–5.2)
RBC # FLD: 14.2 % — SIGNIFICANT CHANGE UP (ref 10.3–14.5)
SODIUM SERPL-SCNC: 140 MMOL/L — SIGNIFICANT CHANGE UP (ref 135–145)
SPECIMEN SOURCE: SIGNIFICANT CHANGE UP
WBC # BLD: 3.25 K/UL — LOW (ref 3.8–10.5)
WBC # FLD AUTO: 3.25 K/UL — LOW (ref 3.8–10.5)

## 2020-01-04 PROCEDURE — 99233 SBSQ HOSP IP/OBS HIGH 50: CPT | Mod: GC

## 2020-01-04 RX ADMIN — CARVEDILOL PHOSPHATE 12.5 MILLIGRAM(S): 80 CAPSULE, EXTENDED RELEASE ORAL at 06:22

## 2020-01-04 RX ADMIN — HEPARIN SODIUM 5000 UNIT(S): 5000 INJECTION INTRAVENOUS; SUBCUTANEOUS at 13:15

## 2020-01-04 RX ADMIN — AMLODIPINE BESYLATE 10 MILLIGRAM(S): 2.5 TABLET ORAL at 06:22

## 2020-01-04 RX ADMIN — Medication 667 MILLIGRAM(S): at 09:25

## 2020-01-04 RX ADMIN — Medication 100 MILLIGRAM(S): at 22:00

## 2020-01-04 RX ADMIN — CARVEDILOL PHOSPHATE 12.5 MILLIGRAM(S): 80 CAPSULE, EXTENDED RELEASE ORAL at 18:09

## 2020-01-04 RX ADMIN — Medication 81 MILLIGRAM(S): at 13:15

## 2020-01-04 RX ADMIN — ISOSORBIDE DINITRATE 20 MILLIGRAM(S): 5 TABLET ORAL at 06:23

## 2020-01-04 RX ADMIN — Medication 20 MILLIGRAM(S): at 06:22

## 2020-01-04 RX ADMIN — Medication 667 MILLIGRAM(S): at 13:16

## 2020-01-04 RX ADMIN — ISOSORBIDE DINITRATE 20 MILLIGRAM(S): 5 TABLET ORAL at 13:15

## 2020-01-04 RX ADMIN — HEPARIN SODIUM 5000 UNIT(S): 5000 INJECTION INTRAVENOUS; SUBCUTANEOUS at 22:00

## 2020-01-04 RX ADMIN — HEPARIN SODIUM 5000 UNIT(S): 5000 INJECTION INTRAVENOUS; SUBCUTANEOUS at 06:23

## 2020-01-04 RX ADMIN — Medication 100 MILLIGRAM(S): at 06:23

## 2020-01-04 RX ADMIN — Medication 100 MILLIGRAM(S): at 13:15

## 2020-01-04 RX ADMIN — CINACALCET 30 MILLIGRAM(S): 30 TABLET, FILM COATED ORAL at 13:16

## 2020-01-04 RX ADMIN — Medication 667 MILLIGRAM(S): at 20:27

## 2020-01-04 RX ADMIN — AZITHROMYCIN 250 MILLIGRAM(S): 500 TABLET, FILM COATED ORAL at 13:15

## 2020-01-04 RX ADMIN — ISOSORBIDE DINITRATE 20 MILLIGRAM(S): 5 TABLET ORAL at 22:00

## 2020-01-04 NOTE — PROGRESS NOTE ADULT - ASSESSMENT
54 year-old woman with ESRD on HD with mild pulmonary edema and LE edema with missed dialysis and known CHF also with bronchitis.    1. ESRD- HD today again. Continue with maintenance hemodialysis treatments thereafter. Monitor BMP.  2. LE edema/pulmonary edema- HD today again. Fluid restriction. Continue HF meds.  3. HTN- Continue with current anti-hypertensive medications. Monitor BP.  4. Chronic systolic HF- slightly overloaded as described above.  HD as above and fluid restriction. Continue HF meds.

## 2020-01-04 NOTE — PROGRESS NOTE ADULT - SUBJECTIVE AND OBJECTIVE BOX
Patient is a 54y old  Female who presents with a chief complaint of shortness of breath after missing HD    Today  Patient was seen and examined at bedside today   Reports feeling better after HD yesterday  Scheduled for HD today     REVIEW OF SYSTEMS: denies fever, chills, SOB, palpitations, chest pain, abdominal pain, nausea, vomitting, diarrhea, constipation, dizziness    MEDICATIONS  (STANDING):  amLODIPine   Tablet 10 milliGRAM(s) Oral daily  aspirin enteric coated 81 milliGRAM(s) Oral daily  calcium acetate 667 milliGRAM(s) Oral three times a day with meals  carvedilol 12.5 milliGRAM(s) Oral every 12 hours  cinacalcet 30 milliGRAM(s) Oral daily  dextrose 50% Injectable 12.5 Gram(s) IV Push once  dextrose 50% Injectable 25 Gram(s) IV Push once  dextrose 50% Injectable 25 Gram(s) IV Push once  furosemide    Tablet 20 milliGRAM(s) Oral daily  heparin  Injectable 5000 Unit(s) SubCutaneous every 8 hours  hydrALAZINE 100 milliGRAM(s) Oral three times a day  insulin lispro (HumaLOG) corrective regimen sliding scale   SubCutaneous three times a day before meals  insulin lispro (HumaLOG) corrective regimen sliding scale   SubCutaneous at bedtime  isosorbide   dinitrate Tablet (ISORDIL) 20 milliGRAM(s) Oral three times a day    MEDICATIONS  (PRN):  dextrose 40% Gel 15 Gram(s) Oral once PRN Blood Glucose LESS THAN 70 milliGRAM(s)/deciliter  glucagon  Injectable 1 milliGRAM(s) IntraMuscular once PRN Glucose LESS THAN 70 milligrams/deciliter      PHYSICAL EXAM:  GENERAL: NAD, obese   NERVOUS SYSTEM:  Alert & Oriented X3, Good concentration; Motor Strength 5/5 B/L upper and lower extremities;  CHEST/LUNG: few scattered rales BL   HEART: Regular rate and rhythm; No murmurs, rubs, or gallops  ABDOMEN: Soft, Nontender, Nondistended; Bowel sounds present  EXTREMITIES:  2+ Peripheral Pulses, No clubbing, cyanosis, or edema  SKIN: No rashes or lesions  LABS:                        9.7    3.25  )-----------( 207      ( 2020 06:29 )             30.9     140  |  104  |  50<H>  ----------------------------<  147<H>  4.4   |  29  |  10.90<H>    Urinalysis Basic - ( 2020 02:40 )    Color: Yellow / Appearance: Clear / S.015 / pH: x  Gluc: x / Ketone: Negative  / Bili: Small / Urobili: Negative   Blood: x / Protein: 500 mg/dL / Nitrite: Negative   Leuk Esterase: Small / RBC: 2-5 /HPF / WBC 3-5 /HPF   Sq Epi: x / Non Sq Epi: Few /HPF / Bacteria: Moderate /HPF    CAPILLARY BLOOD GLUCOSE      POCT Blood Glucose.: 96 mg/dL (2020 12:06)  POCT Blood Glucose.: 126 mg/dL (2020 08:45)  POCT Blood Glucose.: 135 mg/dL (2020 21:43)        Urinalysis Basic - ( 2020 02:40 )    Color: Yellow / Appearance: Clear / S.015 / pH: x  Gluc: x / Ketone: Negative  / Bili: Small / Urobili: Negative   Blood: x / Protein: 500 mg/dL / Nitrite: Negative   Leuk Esterase: Small / RBC: 2-5 /HPF / WBC 3-5 /HPF   Sq Epi: x / Non Sq Epi: Few /HPF / Bacteria: Moderate /HPF

## 2020-01-04 NOTE — PROGRESS NOTE ADULT - SUBJECTIVE AND OBJECTIVE BOX
[facility] NEPHROLOGY- CONSULTATION NOTE    Patient is a 54y Female with ESRD on HD at [facility] dialysis (Nephrologist Dr. Wale Francisco) who presented to the hospital with SOB.  She was recently treated for bronchitis in [facility], where she missed dialysis.  She was to go to HD day prior to admission, but then was told to come to hospital since she was still SOB.  Pt reports in general, fairly low po fluid intake.    Pt feels better today, improved SOB.      REVIEW OF SYSTEMS: Mild SOB still. NO h/a, cp, abd pain.    VITALS:  T(F): 97.4 (20 @ 05:15), Max: 98.2 (20 @ 16:05)  HR: 78 (20 @ 13:14)  BP: 120/54 (20 @ 13:14)  RR: 18 (20 @ 13:14)  SpO2: 95% (20 @ 13:14)  Wt(kg): --        PHYSICAL EXAM:  Constitutional: NAD  HEENT: anicteric sclera, oropharynx clear, MMM  Neck: No JVD  Respiratory: CTAB, no wheezes, rales or rhonchi  Cardiovascular: S1, S2, RRR  Gastrointestinal: BS+, soft, NT/ND  Extremities: No cyanosis or clubbing. 1-2+ B LE edema  Neurological: A/O x 3, no focal deficits  Psychiatric: Normal mood, normal affect  : No CVA tenderness. No malcolm.   Skin: No rashes  Vascular Access: benign    LABS:      140  |  104  |  50<H>  ----------------------------<  147<H>  4.4   |  29  |  10.90<H>    Ca    7.6<L>      2020 06:29  Phos  5.3       Mg     3.2         TPro  7.3  /  Alb  3.4<L>  /  TBili  0.5  /  DBili      /  AST  14  /  ALT  24  /  AlkPhos  155<H>      Creatinine Trend: 10.90 <--, 14.60 <--, 14.20 <--                        9.7    3.25  )-----------( 207      ( 2020 06:29 )             30.9     Urine Studies:  Urinalysis Basic - ( 2020 02:40 )    Color: Yellow / Appearance: Clear / S.015 / pH:   Gluc:  / Ketone: Negative  / Bili: Small / Urobili: Negative   Blood:  / Protein: 500 mg/dL / Nitrite: Negative   Leuk Esterase: Small / RBC: 2-5 /HPF / WBC 3-5 /HPF   Sq Epi:  / Non Sq Epi: Few /HPF / Bacteria: Moderate /HPF

## 2020-01-04 NOTE — PROGRESS NOTE ADULT - ASSESSMENT
1. Acute pulm edema due to missed HD  Improved after HD  Will receive HD today again  Cont Lasix 20mg qd   Monitor electrolytes, BUN/Cr   Cont Cinacalcet and Calcium acetate    2. NSTEMI due to demand ischemia due to acute pulm edema  Trop trended down     3. Chronic systolic HF  EF 36% in Dec 2019  Cont Carvedilol. Imdur, hydralazine and Lasix  Monitor I/O   Water and salt restriction     4. DM  Cont ISS

## 2020-01-05 DIAGNOSIS — Z29.9 ENCOUNTER FOR PROPHYLACTIC MEASURES, UNSPECIFIED: ICD-10-CM

## 2020-01-05 DIAGNOSIS — I50.23 ACUTE ON CHRONIC SYSTOLIC (CONGESTIVE) HEART FAILURE: ICD-10-CM

## 2020-01-05 LAB
GLUCOSE BLDC GLUCOMTR-MCNC: 108 MG/DL — HIGH (ref 70–99)
GLUCOSE BLDC GLUCOMTR-MCNC: 145 MG/DL — HIGH (ref 70–99)
GLUCOSE BLDC GLUCOMTR-MCNC: 97 MG/DL — SIGNIFICANT CHANGE UP (ref 70–99)

## 2020-01-05 PROCEDURE — 99233 SBSQ HOSP IP/OBS HIGH 50: CPT | Mod: GC

## 2020-01-05 RX ORDER — ERGOCALCIFEROL 1.25 MG/1
50000 CAPSULE ORAL
Refills: 0 | Status: DISCONTINUED | OUTPATIENT
Start: 2020-01-05 | End: 2020-01-07

## 2020-01-05 RX ADMIN — Medication 100 MILLIGRAM(S): at 22:46

## 2020-01-05 RX ADMIN — HEPARIN SODIUM 5000 UNIT(S): 5000 INJECTION INTRAVENOUS; SUBCUTANEOUS at 15:03

## 2020-01-05 RX ADMIN — Medication 667 MILLIGRAM(S): at 13:30

## 2020-01-05 RX ADMIN — Medication 81 MILLIGRAM(S): at 15:02

## 2020-01-05 RX ADMIN — Medication 20 MILLIGRAM(S): at 06:15

## 2020-01-05 RX ADMIN — HEPARIN SODIUM 5000 UNIT(S): 5000 INJECTION INTRAVENOUS; SUBCUTANEOUS at 06:16

## 2020-01-05 RX ADMIN — HEPARIN SODIUM 5000 UNIT(S): 5000 INJECTION INTRAVENOUS; SUBCUTANEOUS at 22:46

## 2020-01-05 RX ADMIN — Medication 667 MILLIGRAM(S): at 09:00

## 2020-01-05 RX ADMIN — CARVEDILOL PHOSPHATE 12.5 MILLIGRAM(S): 80 CAPSULE, EXTENDED RELEASE ORAL at 18:06

## 2020-01-05 RX ADMIN — AMLODIPINE BESYLATE 10 MILLIGRAM(S): 2.5 TABLET ORAL at 06:15

## 2020-01-05 RX ADMIN — ERGOCALCIFEROL 50000 UNIT(S): 1.25 CAPSULE ORAL at 15:02

## 2020-01-05 RX ADMIN — CINACALCET 30 MILLIGRAM(S): 30 TABLET, FILM COATED ORAL at 15:02

## 2020-01-05 RX ADMIN — ISOSORBIDE DINITRATE 20 MILLIGRAM(S): 5 TABLET ORAL at 22:46

## 2020-01-05 RX ADMIN — ISOSORBIDE DINITRATE 20 MILLIGRAM(S): 5 TABLET ORAL at 15:02

## 2020-01-05 RX ADMIN — ISOSORBIDE DINITRATE 20 MILLIGRAM(S): 5 TABLET ORAL at 06:16

## 2020-01-05 RX ADMIN — Medication 667 MILLIGRAM(S): at 18:05

## 2020-01-05 RX ADMIN — Medication 100 MILLIGRAM(S): at 06:15

## 2020-01-05 RX ADMIN — Medication 100 MILLIGRAM(S): at 15:02

## 2020-01-05 RX ADMIN — CARVEDILOL PHOSPHATE 12.5 MILLIGRAM(S): 80 CAPSULE, EXTENDED RELEASE ORAL at 06:15

## 2020-01-05 NOTE — PROGRESS NOTE ADULT - SUBJECTIVE AND OBJECTIVE BOX
PGY 1 Note discussed with supervising resident and primary attending    Patient is a 54y old  Female who presents with a chief complaint of shortness of breath (04 Jan 2020 16:21)      INTERVAL HPI/OVERNIGHT EVENTS: offers no new complaints; current symptoms resolving    MEDICATIONS  (STANDING):  amLODIPine   Tablet 10 milliGRAM(s) Oral daily  aspirin enteric coated 81 milliGRAM(s) Oral daily  calcium acetate 667 milliGRAM(s) Oral three times a day with meals  carvedilol 12.5 milliGRAM(s) Oral every 12 hours  cinacalcet 30 milliGRAM(s) Oral daily  dextrose 50% Injectable 25 Gram(s) IV Push once  ergocalciferol 86611 Unit(s) Oral every week  furosemide    Tablet 20 milliGRAM(s) Oral daily  heparin  Injectable 5000 Unit(s) SubCutaneous every 8 hours  hydrALAZINE 100 milliGRAM(s) Oral three times a day  insulin lispro (HumaLOG) corrective regimen sliding scale   SubCutaneous three times a day before meals  insulin lispro (HumaLOG) corrective regimen sliding scale   SubCutaneous at bedtime  isosorbide   dinitrate Tablet (ISORDIL) 20 milliGRAM(s) Oral three times a day    MEDICATIONS  (PRN):  glucagon  Injectable 1 milliGRAM(s) IntraMuscular once PRN Glucose LESS THAN 70 milligrams/deciliter      __________________________________________________  REVIEW OF SYSTEMS:    CONSTITUTIONAL: No fever,   EYES: no acute visual disturbances  NECK: No pain or stiffness  RESPIRATORY: No cough; No shortness of breath  CARDIOVASCULAR: No chest pain, no palpitations  GASTROINTESTINAL: No pain. No nausea or vomiting; No diarrhea   NEUROLOGICAL: No headache or numbness, no tremors  MUSCULOSKELETAL: No joint pain, no muscle pain  GENITOURINARY: no dysuria, no frequency, no hesitancy  PSYCHIATRY: no depression , no anxiety  ALL OTHER  ROS negative        Vital Signs Last 24 Hrs  T(C): 36.7 (05 Jan 2020 05:45), Max: 37 (04 Jan 2020 21:27)  T(F): 98 (05 Jan 2020 05:45), Max: 98.6 (04 Jan 2020 21:27)  HR: 74 (05 Jan 2020 05:45) (74 - 84)  BP: 136/71 (05 Jan 2020 05:45) (120/54 - 136/71)  BP(mean): --  RR: 17 (05 Jan 2020 05:45) (17 - 18)  SpO2: 96% (05 Jan 2020 05:45) (95% - 96%)    ________________________________________________  PHYSICAL EXAM:  GENERAL: NAD  HEENT: Normocephalic;  conjunctivae and sclerae clear; moist mucous membranes;   NECK : supple  CHEST/LUNG: Clear to auscultation bilaterally with good air entry   HEART: S1 S2  regular; no murmurs, gallops or rubs  ABDOMEN: Soft, Nontender, Nondistended; Bowel sounds present  EXTREMITIES: no cyanosis; no edema; no calf tenderness  SKIN: warm and dry; no rash  NERVOUS SYSTEM:  Awake and alert; Oriented  to place, person and time ; no new deficits    _________________________________________________  LABS:                        9.7    3.25  )-----------( 207      ( 04 Jan 2020 06:29 )             30.9     01-04    140  |  104  |  50<H>  ----------------------------<  147<H>  4.4   |  29  |  10.90<H>    Ca    7.6<L>      04 Jan 2020 06:29  Phos  5.3     01-03  Mg     3.2     01-03    TPro  7.3  /  Alb  3.4<L>  /  TBili  0.5  /  DBili  x   /  AST  14  /  ALT  24  /  AlkPhos  155<H>  01-03        CAPILLARY BLOOD GLUCOSE      POCT Blood Glucose.: 145 mg/dL (05 Jan 2020 08:37)  POCT Blood Glucose.: 182 mg/dL (04 Jan 2020 21:56)  POCT Blood Glucose.: 117 mg/dL (04 Jan 2020 16:38)  POCT Blood Glucose.: 96 mg/dL (04 Jan 2020 12:06)        RADIOLOGY & ADDITIONAL TESTS:    Imaging Personally Reviewed:  YES/NO    Consultant(s) Notes Reviewed:   YES/ No    Care Discussed with Consultants :     Plan of care was discussed with patient and /or primary care giver; all questions and concerns were addressed and care was aligned with patient's wishes.

## 2020-01-05 NOTE — PROGRESS NOTE ADULT - PROBLEM SELECTOR PLAN 1
Acute pulm edema due to missed HD  Improved after HD  Pt was to  be dialyzed yesterday, but was not.  Will dialyze tomorrow prior to discharge. Continue with maintenance hemodialysis treatments thereafter. Monitor BMP.  Cont Lasix 20mg qd   Monitor electrolytes, BUN/Cr   Cont Cinacalcet and Calcium acetate

## 2020-01-05 NOTE — PROGRESS NOTE ADULT - ASSESSMENT
54 year-old woman with ESRD on HD with mild pulmonary edema and LE edema with missed dialysis and known CHF also with bronchitis.    1. ESRD- Pt was to  be dialyzed yesterday, but was not.  Will dialyze tomorrow prior to discharge. Continue with maintenance hemodialysis treatments thereafter. Monitor BMP.  2. LE edema/pulmonary edema- HD as above. Fluid restriction. Continue HF meds.  3. HTN- Continue with current anti-hypertensive medications. Monitor BP.  4. Chronic systolic HF- slightly overloaded as described above.  HD as above and fluid restriction. Continue HF meds.

## 2020-01-05 NOTE — PROGRESS NOTE ADULT - PROBLEM SELECTOR PLAN 2
EF 36% in Dec 2019  Cont Carvedilol. Imdur, hydralazine and Lasix  Monitor I/O   Water and salt restriction

## 2020-01-05 NOTE — PROGRESS NOTE ADULT - SUBJECTIVE AND OBJECTIVE BOX
[facility] NEPHROLOGY- CONSULTATION NOTE    Patient is a 54y Female with ESRD on HD at [facility] dialysis (Nephrologist Dr. Wale Francisco) who presented to the hospital with SOB.  She was recently treated for bronchitis in [facility], where she missed dialysis.  She was to go to HD day prior to admission, but then was told to come to hospital since she was still SOB.  Pt reports in general, fairly low po fluid intake.    Pt was to  be dialyzed yesterday, but was not.  Will dialyze tomorrow prior to discharge.      REVIEW OF SYSTEMS: No SOB. NO h/a, cp, abd pain.    VITALS:  T(F): 98.1 (20 @ 11:17), Max: 98.6 (20 @ 21:27)  HR: 76 (20 @ 11:17)  BP: 112/56 (20 @ 11:17)  RR: 18 (20 @ 11:17)  SpO2: 97% (20 @ 11:17)  Wt(kg): --      PHYSICAL EXAM:  Constitutional: NAD  HEENT: anicteric sclera, oropharynx clear, MMM  Neck: No JVD  Respiratory: CTAB, no wheezes, rales or rhonchi  Cardiovascular: S1, S2, RRR  Gastrointestinal: BS+, soft, NT/ND  Extremities: No cyanosis or clubbing. 1-2+ B LE edema  Neurological: A/O x 3, no focal deficits  Psychiatric: Normal mood, normal affect  : No CVA tenderness. No malcolm.   Skin: No rashes  Vascular Access: benign    LABS:      140  |  104  |  50<H>  ----------------------------<  147<H>  4.4   |  29  |  10.90<H>    Ca    7.6<L>      2020 06:29      Creatinine Trend: 10.90 <--, 14.60 <--, 14.20 <--                        9.7    3.25  )-----------( 207      ( 2020 06:29 )             30.9     Urine Studies:  Urinalysis Basic - ( 2020 02:40 )    Color: Yellow / Appearance: Clear / S.015 / pH:   Gluc:  / Ketone: Negative  / Bili: Small / Urobili: Negative   Blood:  / Protein: 500 mg/dL / Nitrite: Negative   Leuk Esterase: Small / RBC: 2-5 /HPF / WBC 3-5 /HPF   Sq Epi:  / Non Sq Epi: Few /HPF / Bacteria: Moderate /HPF

## 2020-01-05 NOTE — PROGRESS NOTE ADULT - SUBJECTIVE AND OBJECTIVE BOX
PGY 1 Note discussed with supervising resident and primary attending    Patient is a 54y old  Female who presents with a chief complaint of shortness of breath (05 Jan 2020 12:31)      INTERVAL HPI/OVERNIGHT EVENTS: offers no new complaints; current symptoms resolving    MEDICATIONS  (STANDING):  amLODIPine   Tablet 10 milliGRAM(s) Oral daily  aspirin enteric coated 81 milliGRAM(s) Oral daily  calcium acetate 667 milliGRAM(s) Oral three times a day with meals  carvedilol 12.5 milliGRAM(s) Oral every 12 hours  cinacalcet 30 milliGRAM(s) Oral daily  dextrose 50% Injectable 25 Gram(s) IV Push once  ergocalciferol 01230 Unit(s) Oral every week  furosemide    Tablet 20 milliGRAM(s) Oral daily  heparin  Injectable 5000 Unit(s) SubCutaneous every 8 hours  hydrALAZINE 100 milliGRAM(s) Oral three times a day  insulin lispro (HumaLOG) corrective regimen sliding scale   SubCutaneous three times a day before meals  insulin lispro (HumaLOG) corrective regimen sliding scale   SubCutaneous at bedtime  isosorbide   dinitrate Tablet (ISORDIL) 20 milliGRAM(s) Oral three times a day    MEDICATIONS  (PRN):  glucagon  Injectable 1 milliGRAM(s) IntraMuscular once PRN Glucose LESS THAN 70 milligrams/deciliter      __________________________________________________  REVIEW OF SYSTEMS:    CONSTITUTIONAL: No fever,   EYES: no acute visual disturbances  NECK: No pain or stiffness  RESPIRATORY: No cough; No shortness of breath  CARDIOVASCULAR: No chest pain, no palpitations  GASTROINTESTINAL: No pain. No nausea or vomiting; No diarrhea   NEUROLOGICAL: No headache or numbness, no tremors  MUSCULOSKELETAL: No joint pain, no muscle pain  GENITOURINARY: no dysuria, no frequency, no hesitancy  PSYCHIATRY: no depression , no anxiety  ALL OTHER  ROS negative        Vital Signs Last 24 Hrs  T(C): 36.7 (05 Jan 2020 11:17), Max: 37 (04 Jan 2020 21:27)  T(F): 98.1 (05 Jan 2020 11:17), Max: 98.6 (04 Jan 2020 21:27)  HR: 76 (05 Jan 2020 11:17) (74 - 84)  BP: 112/56 (05 Jan 2020 11:17) (112/56 - 136/71)  BP(mean): --  RR: 18 (05 Jan 2020 11:17) (17 - 18)  SpO2: 97% (05 Jan 2020 11:17) (95% - 97%)    ________________________________________________  PHYSICAL EXAM:  GENERAL: NAD  HEENT: Normocephalic;  conjunctivae and sclerae clear; moist mucous membranes;   NECK : supple  CHEST/LUNG: Clear to auscultation bilaterally  HEART: S1 S2  regular; no murmurs, gallops or rubs  ABDOMEN: Soft, Nontender, Nondistended; Bowel sounds present  EXTREMITIES: no cyanosis; 1+ edema; no calf tenderness  SKIN: warm and dry; no rash  NERVOUS SYSTEM:  Awake and alert; Oriented  to place, person and time ; no new deficits    _________________________________________________  LABS:                        9.7    3.25  )-----------( 207      ( 04 Jan 2020 06:29 )             30.9     01-04    140  |  104  |  50<H>  ----------------------------<  147<H>  4.4   |  29  |  10.90<H>    Ca    7.6<L>      04 Jan 2020 06:29          CAPILLARY BLOOD GLUCOSE      POCT Blood Glucose.: 97 mg/dL (05 Jan 2020 12:31)  POCT Blood Glucose.: 145 mg/dL (05 Jan 2020 08:37)  POCT Blood Glucose.: 182 mg/dL (04 Jan 2020 21:56)  POCT Blood Glucose.: 117 mg/dL (04 Jan 2020 16:38)        RADIOLOGY & ADDITIONAL TESTS:    Imaging Personally Reviewed:  YES/NO    Consultant(s) Notes Reviewed:   YES/ No    Care Discussed with Consultants :     Plan of care was discussed with patient and /or primary care giver; all questions and concerns were addressed and care was aligned with patient's wishes.

## 2020-01-05 NOTE — PROGRESS NOTE ADULT - ASSESSMENT
54 year-old woman with ESRD on HD with mild pulmonary edema and LE edema with missed dialysis and known CHF also with bronchitis.    1. Acute pulm edema due to missed HD  Improved after HD  Will receive HD today again  Cont Lasix 20mg qd   Monitor electrolytes, BUN/Cr   Cont Cinacalcet and Calcium acetate    2. NSTEMI due to demand ischemia due to acute pulm edema  Trop trended down     3. Chronic systolic HF  EF 36% in Dec 2019  Cont Carvedilol. Imdur, hydralazine and Lasix  Monitor I/O   Water and salt restriction     4. DM  Cont ISS 54 year-old woman with ESRD on HD with mild pulmonary edema and LE edema with missed dialysis and known CHF also with bronchitis.

## 2020-01-06 ENCOUNTER — TRANSCRIPTION ENCOUNTER (OUTPATIENT)
Age: 55
End: 2020-01-06

## 2020-01-06 ENCOUNTER — APPOINTMENT (OUTPATIENT)
Dept: ELECTROPHYSIOLOGY | Facility: CLINIC | Age: 55
End: 2020-01-06

## 2020-01-06 LAB
ANION GAP SERPL CALC-SCNC: 10 MMOL/L — SIGNIFICANT CHANGE UP (ref 5–17)
BUN SERPL-MCNC: 66 MG/DL — HIGH (ref 7–18)
CALCIUM SERPL-MCNC: 8.2 MG/DL — LOW (ref 8.4–10.5)
CHLORIDE SERPL-SCNC: 100 MMOL/L — SIGNIFICANT CHANGE UP (ref 96–108)
CO2 SERPL-SCNC: 26 MMOL/L — SIGNIFICANT CHANGE UP (ref 22–31)
CREAT SERPL-MCNC: 13.4 MG/DL — HIGH (ref 0.5–1.3)
GLUCOSE BLDC GLUCOMTR-MCNC: 107 MG/DL — HIGH (ref 70–99)
GLUCOSE BLDC GLUCOMTR-MCNC: 89 MG/DL — SIGNIFICANT CHANGE UP (ref 70–99)
GLUCOSE BLDC GLUCOMTR-MCNC: 91 MG/DL — SIGNIFICANT CHANGE UP (ref 70–99)
GLUCOSE BLDC GLUCOMTR-MCNC: 94 MG/DL — SIGNIFICANT CHANGE UP (ref 70–99)
GLUCOSE SERPL-MCNC: 94 MG/DL — SIGNIFICANT CHANGE UP (ref 70–99)
HCT VFR BLD CALC: 31.6 % — LOW (ref 34.5–45)
HGB BLD-MCNC: 9.9 G/DL — LOW (ref 11.5–15.5)
MAGNESIUM SERPL-MCNC: 2.5 MG/DL — SIGNIFICANT CHANGE UP (ref 1.6–2.6)
MCHC RBC-ENTMCNC: 29.9 PG — SIGNIFICANT CHANGE UP (ref 27–34)
MCHC RBC-ENTMCNC: 31.3 GM/DL — LOW (ref 32–36)
MCV RBC AUTO: 95.5 FL — SIGNIFICANT CHANGE UP (ref 80–100)
NRBC # BLD: 0 /100 WBCS — SIGNIFICANT CHANGE UP (ref 0–0)
PHOSPHATE SERPL-MCNC: 4.9 MG/DL — HIGH (ref 2.5–4.5)
PLATELET # BLD AUTO: 245 K/UL — SIGNIFICANT CHANGE UP (ref 150–400)
POTASSIUM SERPL-MCNC: 4.4 MMOL/L — SIGNIFICANT CHANGE UP (ref 3.5–5.3)
POTASSIUM SERPL-SCNC: 4.4 MMOL/L — SIGNIFICANT CHANGE UP (ref 3.5–5.3)
RBC # BLD: 3.31 M/UL — LOW (ref 3.8–5.2)
RBC # FLD: 14.3 % — SIGNIFICANT CHANGE UP (ref 10.3–14.5)
SODIUM SERPL-SCNC: 136 MMOL/L — SIGNIFICANT CHANGE UP (ref 135–145)
WBC # BLD: 3.58 K/UL — LOW (ref 3.8–10.5)
WBC # FLD AUTO: 3.58 K/UL — LOW (ref 3.8–10.5)

## 2020-01-06 PROCEDURE — 99232 SBSQ HOSP IP/OBS MODERATE 35: CPT | Mod: GC

## 2020-01-06 RX ORDER — ERGOCALCIFEROL 1.25 MG/1
1 CAPSULE ORAL
Qty: 1 | Refills: 0
Start: 2020-01-06 | End: 2020-01-12

## 2020-01-06 RX ADMIN — CARVEDILOL PHOSPHATE 12.5 MILLIGRAM(S): 80 CAPSULE, EXTENDED RELEASE ORAL at 06:37

## 2020-01-06 RX ADMIN — ISOSORBIDE DINITRATE 20 MILLIGRAM(S): 5 TABLET ORAL at 06:37

## 2020-01-06 RX ADMIN — Medication 667 MILLIGRAM(S): at 15:24

## 2020-01-06 RX ADMIN — Medication 20 MILLIGRAM(S): at 06:37

## 2020-01-06 RX ADMIN — ISOSORBIDE DINITRATE 20 MILLIGRAM(S): 5 TABLET ORAL at 22:30

## 2020-01-06 RX ADMIN — HEPARIN SODIUM 5000 UNIT(S): 5000 INJECTION INTRAVENOUS; SUBCUTANEOUS at 06:37

## 2020-01-06 RX ADMIN — Medication 81 MILLIGRAM(S): at 15:23

## 2020-01-06 RX ADMIN — Medication 100 MILLIGRAM(S): at 22:30

## 2020-01-06 RX ADMIN — AMLODIPINE BESYLATE 10 MILLIGRAM(S): 2.5 TABLET ORAL at 06:38

## 2020-01-06 RX ADMIN — HEPARIN SODIUM 5000 UNIT(S): 5000 INJECTION INTRAVENOUS; SUBCUTANEOUS at 22:30

## 2020-01-06 RX ADMIN — HEPARIN SODIUM 5000 UNIT(S): 5000 INJECTION INTRAVENOUS; SUBCUTANEOUS at 15:23

## 2020-01-06 RX ADMIN — Medication 100 MILLIGRAM(S): at 06:37

## 2020-01-06 RX ADMIN — CINACALCET 30 MILLIGRAM(S): 30 TABLET, FILM COATED ORAL at 15:22

## 2020-01-06 RX ADMIN — Medication 100 MILLIGRAM(S): at 15:23

## 2020-01-06 RX ADMIN — ISOSORBIDE DINITRATE 20 MILLIGRAM(S): 5 TABLET ORAL at 15:23

## 2020-01-06 NOTE — PROGRESS NOTE ADULT - PROBLEM SELECTOR PLAN 1
Acute pulm edema due to missed HD  Improved after HD  last HD 1/6. Continue with maintenance hemodialysis treatments thereafter. Monitor BMP.  Cont Lasix 20mg qd   Monitor electrolytes, BUN/Cr   Cont Cinacalcet and Calcium acetate  Pending shelter pending

## 2020-01-06 NOTE — DISCHARGE NOTE PROVIDER - NSDCMRMEDTOKEN_GEN_ALL_CORE_FT
amLODIPine 10 mg oral tablet: 1 tab(s) orally once a day  aspirin 81 mg oral delayed release tablet: 1 tab(s) orally once a day  calcium acetate 667 mg oral tablet: 1 tab(s) orally 3 times a day  carvedilol 12.5 mg oral tablet: 1 tab(s) orally every 12 hours    **per pharmacy last filled 8/7/2019 for a 30 day supply, not filled since   cinacalcet 30 mg oral tablet: 1 tab(s) orally once a day  ergocalciferol 50,000 intl units (1.25 mg) oral capsule: 1 cap(s) orally once a week  hydrALAZINE 100 mg oral tablet: 1 tab(s) orally 3 times a day  isosorbide dinitrate 20 mg oral tablet: 1  orally 3 times a day  Januvia 25 mg oral tablet: 1 tab(s) orally once a day  Lasix 20 mg oral tablet: 1 tab(s) orally once a day  simvastatin 40 mg oral tablet: 1 tab(s) orally once a day (at bedtime)

## 2020-01-06 NOTE — DISCHARGE NOTE PROVIDER - HOSPITAL COURSE
Patient is a 54 year old female, with PMH of stroke, HTN, DM, CHF, and ESRD on HD T/Th/S, who came in to the ED due to shortness of breath. Patient states she was recently in [facility] on Monday for cough and SOB and was treated for bronchitis. Discharged on azithromycin, albuterol, asthma pump and nebulizer. Due to being in the hospital and not feeling well, patient missed her HD session this past Tuesday. Last HD session was last Friday (not regular day due to holidays as per patient). She states that her shortness of breath has worsened over the past 2 days. Patient was planning to go to HD yesterday (Thursday) but was told not to go and to go to the hospital due to having had missed previous session and also still having cough and shortness of breath. Patient also endorses chest pain which comes and goes that is midsternal and about a 7/10 at this time. Patient states this pain always comes whenever she misses her HD session. Patient complains of leg swelling and also having had a few episodes of diarrhea today. Patient denies any headache, dizziness, nausea or vomiting.     Pt was admitted for acute pulmonary edema due to missed hemodialysis which improved HD and fluid restriction. Pt received     another session before discharge.     Pt has hx of heart failure and last echo showed low EJ 30-36 %- Home medications were continued             Given patient's improved clinical status and current hemodynamic stability, decision was made to discharge.    Please refer to patient's complete medical chart with documents for a full hospital course, for this is only a brief summary.

## 2020-01-06 NOTE — PROGRESS NOTE ADULT - ASSESSMENT
54 year-old woman with ESRD on HD with mild pulmonary edema and LE edema with missed dialysis and known CHF also with bronchitis. 54 year-old woman with ESRD on HD with mild pulmonary edema and LE edema with missed dialysis and known CHF also with bronchitis.    dispo: Pt is from shelter, Pending shelter placement

## 2020-01-06 NOTE — PROGRESS NOTE ADULT - PROBLEM SELECTOR PLAN 5
IMPROVE VTE Individual Risk Assessment  RISK                                                         Points  [  ] Previous VTE                                      3  [  ] Thrombophilia                                   2  [  ] Lower limb paralysis                         2 (unable to hold up >15 seconds)    [  ] Current Cancer                                  2       (within 6 months)  [ x ] Immobilization > 24 hrs                    1  [  ] ICU/CCU stay > 24 hrs                         1  [  ] Age > 60                                              1  hep sub q8h

## 2020-01-06 NOTE — DISCHARGE NOTE PROVIDER - CARE PROVIDER_API CALL
oSy Richmond)  Internal Medicine; Nephrology  [facility] [id number]  Phone: [phone]  Fax: [phone]  Follow Up Time:
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
## 2020-01-06 NOTE — DISCHARGE NOTE NURSING/CASE MANAGEMENT/SOCIAL WORK - PATIENT PORTAL LINK FT
You can access the FollowMyHealth Patient Portal offered by [facility] by registering at the following website: [email or web address]. By joining [employer]’s FollowMyHealth portal, you will also be able to view your health information using other applications (apps) compatible with our system.

## 2020-01-06 NOTE — PROGRESS NOTE ADULT - ASSESSMENT
54 year-old woman with ESRD on HD with mild pulmonary edema and LE edema with missed dialysis and known CHF also with bronchitis.    1. ESRD- Pt seen on HD today; tolerating UF goal of 3L. Monitor BMP.  2. LE edema/pulmonary edema- HD as above. Fluid restriction. Continue HF meds.  3. HTN- Continue with current anti-hypertensive medications. Monitor BP.  4. Chronic systolic HF- slightly overloaded as described above.  HD as above and fluid restriction. Continue HF meds.

## 2020-01-06 NOTE — DISCHARGE NOTE PROVIDER - NSDCFUSCHEDAPPT_GEN_ALL_CORE_FT
MIMI HICKS ; 01/06/2020 ; [facility] [phone] MIMI HICKS ; 02/06/2020 ; [facility] [phone] MIMI HICKS ; 02/06/2020 ; [facility] [phone]

## 2020-01-06 NOTE — DISCHARGE NOTE PROVIDER - NSDCCPCAREPLAN_GEN_ALL_CORE_FT
PRINCIPAL DISCHARGE DIAGNOSIS  Diagnosis: ESRD (end stage renal disease)  Assessment and Plan of Treatment: You presented for shortness of breath and told us about your missed dialysis. You were noted to be in fluid overload and were started on hemodialysis inpatient. You received two session and were ready for discharge. Continue your dialysis as intructed by your Nephrologist. Dialysis will be reinstated upon your discharge.  Please mantain a diet adequate for you condition  Continue dialysis on :scheduled days         SECONDARY DISCHARGE DIAGNOSES  Diagnosis: Acute on chronic systolic (congestive) heart failure  Assessment and Plan of Treatment: You have history of heart failure. Your last echocardiogram showed severely reduced ejection fraction 36%. Continue your home medications and follow up with your cardiologist after discharge    Diagnosis: Chest pain, unspecified type  Assessment and Plan of Treatment: You presented with chest pain and were admitted to ensure no cardiac cause. Your EKG showed normal sinus rhythm and your cardiac enzymes were elevated but decreased. Your Echocardiogram last month showed reduced ejection fraction. You symptoms resolved after hemodialysis.   Please follow up with your primary care provider. Continue your medication to reduce your risk of heart disease.      Diagnosis: DM (diabetes mellitus)  Assessment and Plan of Treatment: Continue with your blood sugar medication. HbAIC was found in admission on 5.7. You must maintain a healthy diet that consist of low sugar, low fat, low sodium diet. Exercise frequently if possible. Consider repeating your Hemoglobin A1c within 3 months after discharge to monitor your average blood glucose control. Follow up with primary care physician in one week after discharge.

## 2020-01-06 NOTE — PROGRESS NOTE ADULT - SUBJECTIVE AND OBJECTIVE BOX
[facility] NEPHROLOGY- PROGRESS NOTE    Patient is a 54y Female with ESRD on HD at [facility] dialysis (Nephrologist Dr. Wale Francisco) who presented to the hospital with SOB.  Nephrology consulted for ESRD status.     REVIEW OF SYSTEMS: No SOB. NO h/a, cp, abd pain.    VITALS:  T(F): 98.4 (20 @ 12:02), Max: 98.4 (20 @ 12:02)  HR: 69 (20 @ 12:02)  BP: 122/69 (20 @ 12:02)  RR: 17 (20 @ 12:02)  SpO2: 100% (20 @ 12:02)  Wt(kg): --          PHYSICAL EXAM:  Constitutional: NAD  HEENT: anicteric sclera,   Neck: No JVD  Respiratory: CTAB, no wheezes, rales or rhonchi  Cardiovascular: S1, S2, RRR  Gastrointestinal: BS+, soft, NT/ND  Extremities: 1-2+ B LE edema  Vascular Access: Left AVF- benign    LABS:      136  |  100  |  66<H>  ----------------------------<  94  4.4   |  26  |  13.40<H>    Ca    8.2<L>      2020 08:40  Phos  4.9       Mg     2.5           Creatinine Trend: 13.40 <--, 10.90 <--, 14.60 <--, 14.20 <--                        9.9    3.58  )-----------( 245      ( 2020 08:40 )             31.6     Urine Studies:  Urinalysis Basic - ( 2020 02:40 )    Color: Yellow / Appearance: Clear / S.015 / pH:   Gluc:  / Ketone: Negative  / Bili: Small / Urobili: Negative   Blood:  / Protein: 500 mg/dL / Nitrite: Negative   Leuk Esterase: Small / RBC: 2-5 /HPF / WBC 3-5 /HPF   Sq Epi:  / Non Sq Epi: Few /HPF / Bacteria: Moderate /HPF

## 2020-01-07 VITALS
OXYGEN SATURATION: 95 % | RESPIRATION RATE: 18 BRPM | TEMPERATURE: 98 F | HEART RATE: 81 BPM | DIASTOLIC BLOOD PRESSURE: 63 MMHG | SYSTOLIC BLOOD PRESSURE: 123 MMHG

## 2020-01-07 LAB
GLUCOSE BLDC GLUCOMTR-MCNC: 89 MG/DL — SIGNIFICANT CHANGE UP (ref 70–99)
GLUCOSE BLDC GLUCOMTR-MCNC: 95 MG/DL — SIGNIFICANT CHANGE UP (ref 70–99)

## 2020-01-07 PROCEDURE — 83880 ASSAY OF NATRIURETIC PEPTIDE: CPT

## 2020-01-07 PROCEDURE — 86850 RBC ANTIBODY SCREEN: CPT

## 2020-01-07 PROCEDURE — 82306 VITAMIN D 25 HYDROXY: CPT

## 2020-01-07 PROCEDURE — 86900 BLOOD TYPING SEROLOGIC ABO: CPT

## 2020-01-07 PROCEDURE — 85610 PROTHROMBIN TIME: CPT

## 2020-01-07 PROCEDURE — 82962 GLUCOSE BLOOD TEST: CPT

## 2020-01-07 PROCEDURE — 36415 COLL VENOUS BLD VENIPUNCTURE: CPT

## 2020-01-07 PROCEDURE — 81001 URINALYSIS AUTO W/SCOPE: CPT

## 2020-01-07 PROCEDURE — 84702 CHORIONIC GONADOTROPIN TEST: CPT

## 2020-01-07 PROCEDURE — 82553 CREATINE MB FRACTION: CPT

## 2020-01-07 PROCEDURE — 84484 ASSAY OF TROPONIN QUANT: CPT

## 2020-01-07 PROCEDURE — 85730 THROMBOPLASTIN TIME PARTIAL: CPT

## 2020-01-07 PROCEDURE — 99261: CPT

## 2020-01-07 PROCEDURE — 80053 COMPREHEN METABOLIC PANEL: CPT

## 2020-01-07 PROCEDURE — 93005 ELECTROCARDIOGRAM TRACING: CPT

## 2020-01-07 PROCEDURE — 84443 ASSAY THYROID STIM HORMONE: CPT

## 2020-01-07 PROCEDURE — 99238 HOSP IP/OBS DSCHRG MGMT 30/<: CPT | Mod: GC

## 2020-01-07 PROCEDURE — 87086 URINE CULTURE/COLONY COUNT: CPT

## 2020-01-07 PROCEDURE — 83735 ASSAY OF MAGNESIUM: CPT

## 2020-01-07 PROCEDURE — 83036 HEMOGLOBIN GLYCOSYLATED A1C: CPT

## 2020-01-07 PROCEDURE — 80048 BASIC METABOLIC PNL TOTAL CA: CPT

## 2020-01-07 PROCEDURE — 82607 VITAMIN B-12: CPT

## 2020-01-07 PROCEDURE — 99285 EMERGENCY DEPT VISIT HI MDM: CPT | Mod: 25

## 2020-01-07 PROCEDURE — 80061 LIPID PANEL: CPT

## 2020-01-07 PROCEDURE — 71045 X-RAY EXAM CHEST 1 VIEW: CPT

## 2020-01-07 PROCEDURE — 96372 THER/PROPH/DIAG INJ SC/IM: CPT

## 2020-01-07 PROCEDURE — 85027 COMPLETE CBC AUTOMATED: CPT

## 2020-01-07 PROCEDURE — 84100 ASSAY OF PHOSPHORUS: CPT

## 2020-01-07 PROCEDURE — 86901 BLOOD TYPING SEROLOGIC RH(D): CPT

## 2020-01-07 RX ADMIN — CINACALCET 30 MILLIGRAM(S): 30 TABLET, FILM COATED ORAL at 11:45

## 2020-01-07 RX ADMIN — Medication 100 MILLIGRAM(S): at 06:23

## 2020-01-07 RX ADMIN — Medication 81 MILLIGRAM(S): at 11:45

## 2020-01-07 RX ADMIN — Medication 667 MILLIGRAM(S): at 08:31

## 2020-01-07 RX ADMIN — Medication 667 MILLIGRAM(S): at 11:45

## 2020-01-07 RX ADMIN — ISOSORBIDE DINITRATE 20 MILLIGRAM(S): 5 TABLET ORAL at 14:28

## 2020-01-07 RX ADMIN — HEPARIN SODIUM 5000 UNIT(S): 5000 INJECTION INTRAVENOUS; SUBCUTANEOUS at 06:24

## 2020-01-07 RX ADMIN — Medication 20 MILLIGRAM(S): at 06:23

## 2020-01-07 RX ADMIN — Medication 100 MILLIGRAM(S): at 14:28

## 2020-01-07 RX ADMIN — CARVEDILOL PHOSPHATE 12.5 MILLIGRAM(S): 80 CAPSULE, EXTENDED RELEASE ORAL at 06:23

## 2020-01-07 RX ADMIN — AMLODIPINE BESYLATE 10 MILLIGRAM(S): 2.5 TABLET ORAL at 06:23

## 2020-01-07 RX ADMIN — ISOSORBIDE DINITRATE 20 MILLIGRAM(S): 5 TABLET ORAL at 06:23

## 2020-01-07 NOTE — PROGRESS NOTE ADULT - ASSESSMENT
54 year-old woman with ESRD on HD with mild pulmonary edema and LE edema with missed dialysis and known CHF also with bronchitis.    1. ESRD- Last HD 1/6/20 tolerated well with 3L removed. c/w maintenance HD.  Monitor BMP.  2. LE edema/pulmonary edema- Resolved. Fluid restriction. Continue HF meds.  3. HTN- Continue with current anti-hypertensive medications. Monitor BP.  4. Chronic systolic HF-   HD as above and fluid restriction. Continue HF meds.

## 2020-01-07 NOTE — PROGRESS NOTE ADULT - REASON FOR ADMISSION
shortness of breath

## 2020-01-07 NOTE — PROGRESS NOTE ADULT - SUBJECTIVE AND OBJECTIVE BOX
[facility] NEPHROLOGY- PROGRESS NOTE    Patient is a 54y Female with ESRD on HD at [facility] dialysis (Nephrologist Dr. Wale Francisco) who presented to the hospital with SOB.  Nephrology consulted for ESRD status.     REVIEW OF SYSTEMS: No SOB. NO h/a, cp, abd pain.    VITALS:  T(F): 97.9 (20 @ 14:03), Max: 98.2 (20 @ 21:11)  HR: 81 (20 @ 14:03)  BP: 123/63 (20 @ 14:03)  RR: 18 (20 @ 14:03)  SpO2: 95% (20 @ 14:03)  Wt(kg): --     @ 07:01  -   @ 07:00  --------------------------------------------------------  IN: 240 mL / OUT: 0 mL / NET: 240 mL      PHYSICAL EXAM:  Constitutional: NAD  HEENT: anicteric sclera,   Neck: No JVD  Respiratory: CTAB, no wheezes, rales or rhonchi  Cardiovascular: S1, S2, RRR  Gastrointestinal: BS+, soft, NT/ND  Extremities: trace B LE edema  Vascular Access: Left AVF- benign    LABS:      136  |  100  |  66<H>  ----------------------------<  94  4.4   |  26  |  13.40<H>    Ca    8.2<L>      2020 08:40  Phos  4.9       Mg     2.5     -06      Creatinine Trend: 13.40 <--, 10.90 <--, 14.60 <--, 14.20 <--                        9.9    3.58  )-----------( 245      ( 2020 08:40 )             31.6     Urine Studies:  Urinalysis Basic - ( 2020 02:40 )    Color: Yellow / Appearance: Clear / S.015 / pH:   Gluc:  / Ketone: Negative  / Bili: Small / Urobili: Negative   Blood:  / Protein: 500 mg/dL / Nitrite: Negative   Leuk Esterase: Small / RBC: 2-5 /HPF / WBC 3-5 /HPF   Sq Epi:  / Non Sq Epi: Few /HPF / Bacteria: Moderate /HPF

## 2020-01-07 NOTE — CHART NOTE - NSCHARTNOTEFT_GEN_A_CORE
Patient seen and examined. No complains. Last HD was yesterday.   Spoke to [facility], application for shelter sent and patient accepted back to shelter.   Vital signs and labs reviewed     Vital Signs Last 24 Hrs  T(C): 36.7 (07 Jan 2020 05:00), Max: 36.9 (06 Jan 2020 12:02)  T(F): 98.1 (07 Jan 2020 05:00), Max: 98.4 (06 Jan 2020 12:02)  HR: 77 (07 Jan 2020 05:00) (69 - 83)  BP: 135/66 (07 Jan 2020 05:00) (122/69 - 137/72)  BP(mean): --  RR: 18 (07 Jan 2020 05:00) (17 - 18)  SpO2: 99% (07 Jan 2020 05:00) (96% - 100%)    1. Fluid overload secondary to missed HD. improved   2. ESRD on HD:  outpatient HD resumed   3. Acute on chronic systolic heart failure : c/w Carvedilol, Hydralazine, Imdur and Furosemide   4. HTN: c/w Amlodipine   5. DM: resume Januvia     Stable for discharge.
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
## 2020-01-09 NOTE — H&P ADULT - NSICDXPASTMEDICALHX_GEN_ALL_CORE_FT
January 9, 2020        Lauren Lopes, TAMIE  [address]  [address]  [facility] [address]             [facility]  [address], [facility] [address]  [facility] [address]  Phone: [phone]  Fax: [phone]   Patient: Paty Gaona   MR Number: [id number]   YOB: 1965   Date of Visit: 1/9/2020       Dear Dr. Lopes:    Thank you for referring Paty Ganoa to me for evaluation. We will work together to achieve weight loss and prevent diverticulitis flare-ups.     If you have questions, please do not hesitate to call me. I look forward to following Paty along with you.    Sincerely,      Kandice Doe, RD, CDE           CC  No Recipients       
PAST MEDICAL HISTORY:  Anemia of chronic disease     CVA (cerebral vascular accident) 2013- Residual RLE weakness    Depression     DM (diabetes mellitus)     ESRD (end stage renal disease) HD T/T/S    GERD (gastroesophageal reflux disease)     Glaucoma     Gout     HLD (hyperlipidemia)     HTN (hypertension)     NICM (nonischemic cardiomyopathy)     KERI (obstructive sleep apnea)

## 2020-01-24 NOTE — ED ADULT TRIAGE NOTE - PAIN: PRESENCE, MLM
complains of pain/discomfort Banner Transposition Flap Text: The defect edges were debeveled with a #15 scalpel blade.  Given the location of the defect and the proximity to free margins a Banner transposition flap was deemed most appropriate.  Using a sterile surgical marker, an appropriate flap drawn around the defect. The area thus outlined was incised deep to adipose tissue with a #15 scalpel blade.  The skin margins were undermined to an appropriate distance in all directions utilizing iris scissors.

## 2020-02-28 NOTE — ED PROVIDER NOTE - NS ED ATTENDING STATEMENT MOD
English I have personally seen and examined this patient.  I have fully participated in the care of this patient. I have reviewed all pertinent clinical information, including history, physical exam, plan and the Resident’s note and agree except as noted.

## 2020-03-08 ENCOUNTER — INPATIENT (INPATIENT)
Facility: HOSPITAL | Age: 55
LOS: 5 days | Discharge: HOME | End: 2020-03-14
Attending: HOSPITALIST | Admitting: HOSPITALIST
Payer: MEDICAID

## 2020-03-08 VITALS
RESPIRATION RATE: 18 BRPM | DIASTOLIC BLOOD PRESSURE: 74 MMHG | OXYGEN SATURATION: 94 % | HEART RATE: 79 BPM | SYSTOLIC BLOOD PRESSURE: 148 MMHG | TEMPERATURE: 98 F

## 2020-03-08 DIAGNOSIS — R74.0 NONSPECIFIC ELEVATION OF LEVELS OF TRANSAMINASE AND LACTIC ACID DEHYDROGENASE [LDH]: ICD-10-CM

## 2020-03-08 DIAGNOSIS — R09.89 OTHER SPECIFIED SYMPTOMS AND SIGNS INVOLVING THE CIRCULATORY AND RESPIRATORY SYSTEMS: ICD-10-CM

## 2020-03-08 DIAGNOSIS — I77.0 ARTERIOVENOUS FISTULA, ACQUIRED: Chronic | ICD-10-CM

## 2020-03-08 DIAGNOSIS — R06.02 SHORTNESS OF BREATH: ICD-10-CM

## 2020-03-08 DIAGNOSIS — Z90.49 ACQUIRED ABSENCE OF OTHER SPECIFIED PARTS OF DIGESTIVE TRACT: Chronic | ICD-10-CM

## 2020-03-08 DIAGNOSIS — E78.5 HYPERLIPIDEMIA, UNSPECIFIED: ICD-10-CM

## 2020-03-08 DIAGNOSIS — Z90.711 ACQUIRED ABSENCE OF UTERUS WITH REMAINING CERVICAL STUMP: Chronic | ICD-10-CM

## 2020-03-08 DIAGNOSIS — I50.23 ACUTE ON CHRONIC SYSTOLIC (CONGESTIVE) HEART FAILURE: ICD-10-CM

## 2020-03-08 DIAGNOSIS — I10 ESSENTIAL (PRIMARY) HYPERTENSION: ICD-10-CM

## 2020-03-08 DIAGNOSIS — E11.69 TYPE 2 DIABETES MELLITUS WITH OTHER SPECIFIED COMPLICATION: ICD-10-CM

## 2020-03-08 DIAGNOSIS — Z98.83 FILTERING (VITREOUS) BLEB AFTER GLAUCOMA SURGERY STATUS: Chronic | ICD-10-CM

## 2020-03-08 DIAGNOSIS — N18.6 END STAGE RENAL DISEASE: ICD-10-CM

## 2020-03-08 LAB
ALBUMIN SERPL ELPH-MCNC: 3.8 G/DL — SIGNIFICANT CHANGE UP (ref 3.3–5)
ALP SERPL-CCNC: 171 U/L — HIGH (ref 40–120)
ALT FLD-CCNC: 53 U/L — HIGH (ref 4–33)
ANION GAP SERPL CALC-SCNC: 17 MMO/L — HIGH (ref 7–14)
AST SERPL-CCNC: 41 U/L — HIGH (ref 4–32)
BASE EXCESS BLDV CALC-SCNC: 2 MMOL/L — SIGNIFICANT CHANGE UP
BILIRUB SERPL-MCNC: 0.3 MG/DL — SIGNIFICANT CHANGE UP (ref 0.2–1.2)
BLOOD GAS VENOUS - CREATININE: SIGNIFICANT CHANGE UP MG/DL (ref 0.5–1.3)
BUN SERPL-MCNC: 64 MG/DL — HIGH (ref 7–23)
CALCIUM SERPL-MCNC: 9.1 MG/DL — SIGNIFICANT CHANGE UP (ref 8.4–10.5)
CHLORIDE BLDV-SCNC: 97 MMOL/L — SIGNIFICANT CHANGE UP (ref 96–108)
CHLORIDE SERPL-SCNC: 93 MMOL/L — LOW (ref 98–107)
CK MB BLD-MCNC: 2.91 NG/ML — SIGNIFICANT CHANGE UP (ref 1–4.7)
CK MB BLD-MCNC: SIGNIFICANT CHANGE UP (ref 0–2.5)
CK SERPL-CCNC: 144 U/L — SIGNIFICANT CHANGE UP (ref 25–170)
CO2 SERPL-SCNC: 23 MMOL/L — SIGNIFICANT CHANGE UP (ref 22–31)
CREAT SERPL-MCNC: 9.51 MG/DL — HIGH (ref 0.5–1.3)
GAS PNL BLDV: 135 MMOL/L — LOW (ref 136–146)
GLUCOSE BLDV-MCNC: 190 MG/DL — HIGH (ref 70–99)
GLUCOSE SERPL-MCNC: 192 MG/DL — HIGH (ref 70–99)
HCO3 BLDV-SCNC: 26 MMOL/L — SIGNIFICANT CHANGE UP (ref 20–27)
HCT VFR BLD CALC: 32.9 % — LOW (ref 34.5–45)
HCT VFR BLDV CALC: 33.8 % — LOW (ref 34.5–45)
HGB BLD-MCNC: 10.6 G/DL — LOW (ref 11.5–15.5)
HGB BLDV-MCNC: 11 G/DL — LOW (ref 11.5–15.5)
LACTATE BLDV-MCNC: 1.3 MMOL/L — SIGNIFICANT CHANGE UP (ref 0.5–2)
LIDOCAIN IGE QN: 55.2 U/L — SIGNIFICANT CHANGE UP (ref 7–60)
MAGNESIUM SERPL-MCNC: 2.3 MG/DL — SIGNIFICANT CHANGE UP (ref 1.6–2.6)
MCHC RBC-ENTMCNC: 30.2 PG — SIGNIFICANT CHANGE UP (ref 27–34)
MCHC RBC-ENTMCNC: 32.2 % — SIGNIFICANT CHANGE UP (ref 32–36)
MCV RBC AUTO: 93.7 FL — SIGNIFICANT CHANGE UP (ref 80–100)
NRBC # FLD: 0 K/UL — SIGNIFICANT CHANGE UP (ref 0–0)
NT-PROBNP SERPL-SCNC: SIGNIFICANT CHANGE UP PG/ML
PCO2 BLDV: 47 MMHG — SIGNIFICANT CHANGE UP (ref 41–51)
PH BLDV: 7.37 PH — SIGNIFICANT CHANGE UP (ref 7.32–7.43)
PLATELET # BLD AUTO: 196 K/UL — SIGNIFICANT CHANGE UP (ref 150–400)
PMV BLD: 10.6 FL — SIGNIFICANT CHANGE UP (ref 7–13)
PO2 BLDV: 50 MMHG — HIGH (ref 35–40)
POTASSIUM BLDV-SCNC: 4.6 MMOL/L — HIGH (ref 3.4–4.5)
POTASSIUM SERPL-MCNC: 5 MMOL/L — SIGNIFICANT CHANGE UP (ref 3.5–5.3)
POTASSIUM SERPL-SCNC: 5 MMOL/L — SIGNIFICANT CHANGE UP (ref 3.5–5.3)
PROT SERPL-MCNC: 7.3 G/DL — SIGNIFICANT CHANGE UP (ref 6–8.3)
RBC # BLD: 3.51 M/UL — LOW (ref 3.8–5.2)
RBC # FLD: 15.1 % — HIGH (ref 10.3–14.5)
SAO2 % BLDV: 82 % — SIGNIFICANT CHANGE UP (ref 60–85)
SODIUM SERPL-SCNC: 133 MMOL/L — LOW (ref 135–145)
TROPONIN T, HIGH SENSITIVITY: 94 NG/L — CRITICAL HIGH (ref ?–14)
TROPONIN T, HIGH SENSITIVITY: 95 NG/L — CRITICAL HIGH (ref ?–14)
WBC # BLD: 3.59 K/UL — LOW (ref 3.8–10.5)
WBC # FLD AUTO: 3.59 K/UL — LOW (ref 3.8–10.5)

## 2020-03-08 PROCEDURE — 71046 X-RAY EXAM CHEST 2 VIEWS: CPT | Mod: 26

## 2020-03-08 RX ORDER — DEXTROSE 50 % IN WATER 50 %
25 SYRINGE (ML) INTRAVENOUS ONCE
Refills: 0 | Status: DISCONTINUED | OUTPATIENT
Start: 2020-03-08 | End: 2020-03-14

## 2020-03-08 RX ORDER — CALCIUM ACETATE 667 MG
667 TABLET ORAL
Refills: 0 | Status: DISCONTINUED | OUTPATIENT
Start: 2020-03-08 | End: 2020-03-14

## 2020-03-08 RX ORDER — INSULIN LISPRO 100/ML
VIAL (ML) SUBCUTANEOUS
Refills: 0 | Status: DISCONTINUED | OUTPATIENT
Start: 2020-03-08 | End: 2020-03-14

## 2020-03-08 RX ORDER — POLYETHYLENE GLYCOL 3350 17 G/17G
17 POWDER, FOR SOLUTION ORAL DAILY
Refills: 0 | Status: DISCONTINUED | OUTPATIENT
Start: 2020-03-08 | End: 2020-03-14

## 2020-03-08 RX ORDER — SIMVASTATIN 20 MG/1
20 TABLET, FILM COATED ORAL AT BEDTIME
Refills: 0 | Status: DISCONTINUED | OUTPATIENT
Start: 2020-03-08 | End: 2020-03-14

## 2020-03-08 RX ORDER — GLUCAGON INJECTION, SOLUTION 0.5 MG/.1ML
1 INJECTION, SOLUTION SUBCUTANEOUS ONCE
Refills: 0 | Status: DISCONTINUED | OUTPATIENT
Start: 2020-03-08 | End: 2020-03-14

## 2020-03-08 RX ORDER — HEPARIN SODIUM 5000 [USP'U]/ML
5000 INJECTION INTRAVENOUS; SUBCUTANEOUS THREE TIMES A DAY
Refills: 0 | Status: DISCONTINUED | OUTPATIENT
Start: 2020-03-08 | End: 2020-03-14

## 2020-03-08 RX ORDER — CINACALCET 30 MG/1
30 TABLET, FILM COATED ORAL DAILY
Refills: 0 | Status: DISCONTINUED | OUTPATIENT
Start: 2020-03-08 | End: 2020-03-14

## 2020-03-08 RX ORDER — FUROSEMIDE 40 MG
20 TABLET ORAL DAILY
Refills: 0 | Status: DISCONTINUED | OUTPATIENT
Start: 2020-03-08 | End: 2020-03-14

## 2020-03-08 RX ORDER — HYDRALAZINE HCL 50 MG
100 TABLET ORAL THREE TIMES A DAY
Refills: 0 | Status: DISCONTINUED | OUTPATIENT
Start: 2020-03-08 | End: 2020-03-14

## 2020-03-08 RX ORDER — CARVEDILOL PHOSPHATE 80 MG/1
12.5 CAPSULE, EXTENDED RELEASE ORAL EVERY 12 HOURS
Refills: 0 | Status: DISCONTINUED | OUTPATIENT
Start: 2020-03-08 | End: 2020-03-14

## 2020-03-08 RX ORDER — INSULIN LISPRO 100/ML
VIAL (ML) SUBCUTANEOUS AT BEDTIME
Refills: 0 | Status: DISCONTINUED | OUTPATIENT
Start: 2020-03-08 | End: 2020-03-14

## 2020-03-08 RX ORDER — SIMVASTATIN 20 MG/1
40 TABLET, FILM COATED ORAL AT BEDTIME
Refills: 0 | Status: DISCONTINUED | OUTPATIENT
Start: 2020-03-08 | End: 2020-03-08

## 2020-03-08 RX ORDER — SODIUM CHLORIDE 9 MG/ML
1000 INJECTION, SOLUTION INTRAVENOUS
Refills: 0 | Status: DISCONTINUED | OUTPATIENT
Start: 2020-03-08 | End: 2020-03-14

## 2020-03-08 RX ORDER — ISOSORBIDE DINITRATE 5 MG/1
20 TABLET ORAL THREE TIMES A DAY
Refills: 0 | Status: DISCONTINUED | OUTPATIENT
Start: 2020-03-08 | End: 2020-03-14

## 2020-03-08 RX ORDER — ASPIRIN/CALCIUM CARB/MAGNESIUM 324 MG
81 TABLET ORAL DAILY
Refills: 0 | Status: DISCONTINUED | OUTPATIENT
Start: 2020-03-08 | End: 2020-03-14

## 2020-03-08 RX ORDER — AMLODIPINE BESYLATE 2.5 MG/1
10 TABLET ORAL DAILY
Refills: 0 | Status: DISCONTINUED | OUTPATIENT
Start: 2020-03-08 | End: 2020-03-14

## 2020-03-08 RX ORDER — DEXTROSE 50 % IN WATER 50 %
15 SYRINGE (ML) INTRAVENOUS ONCE
Refills: 0 | Status: DISCONTINUED | OUTPATIENT
Start: 2020-03-08 | End: 2020-03-14

## 2020-03-08 RX ORDER — DEXTROSE 50 % IN WATER 50 %
12.5 SYRINGE (ML) INTRAVENOUS ONCE
Refills: 0 | Status: DISCONTINUED | OUTPATIENT
Start: 2020-03-08 | End: 2020-03-14

## 2020-03-08 RX ADMIN — POLYETHYLENE GLYCOL 3350 17 GRAM(S): 17 POWDER, FOR SOLUTION ORAL at 23:26

## 2020-03-08 RX ADMIN — CARVEDILOL PHOSPHATE 12.5 MILLIGRAM(S): 80 CAPSULE, EXTENDED RELEASE ORAL at 18:30

## 2020-03-08 RX ADMIN — Medication 20 MILLIGRAM(S): at 23:02

## 2020-03-08 RX ADMIN — AMLODIPINE BESYLATE 10 MILLIGRAM(S): 2.5 TABLET ORAL at 11:15

## 2020-03-08 RX ADMIN — Medication 81 MILLIGRAM(S): at 11:15

## 2020-03-08 RX ADMIN — HEPARIN SODIUM 5000 UNIT(S): 5000 INJECTION INTRAVENOUS; SUBCUTANEOUS at 13:14

## 2020-03-08 RX ADMIN — ISOSORBIDE DINITRATE 20 MILLIGRAM(S): 5 TABLET ORAL at 23:01

## 2020-03-08 RX ADMIN — Medication 667 MILLIGRAM(S): at 13:14

## 2020-03-08 RX ADMIN — SIMVASTATIN 20 MILLIGRAM(S): 20 TABLET, FILM COATED ORAL at 23:01

## 2020-03-08 RX ADMIN — CINACALCET 30 MILLIGRAM(S): 30 TABLET, FILM COATED ORAL at 11:15

## 2020-03-08 RX ADMIN — Medication 667 MILLIGRAM(S): at 18:30

## 2020-03-08 RX ADMIN — HEPARIN SODIUM 5000 UNIT(S): 5000 INJECTION INTRAVENOUS; SUBCUTANEOUS at 23:01

## 2020-03-08 RX ADMIN — ISOSORBIDE DINITRATE 20 MILLIGRAM(S): 5 TABLET ORAL at 13:13

## 2020-03-08 RX ADMIN — Medication 100 MILLIGRAM(S): at 13:14

## 2020-03-08 RX ADMIN — Medication 100 MILLIGRAM(S): at 23:01

## 2020-03-08 NOTE — ED PROVIDER NOTE - ATTENDING CONTRIBUTION TO CARE
54F hx HTN, D<, ESRD on HD (got full HD 3/6), recent hospitalization 3/5 for SOB & LE edema, echo showed decr EF & pulm edema - pt d/c AMA for family reasons. now p/w increasing SOB, PICKARD, LE edema similar to what she was admitted for last week. no fever, CP, n/v/d, urinary sx.   Pt living in homeless shelter    VS: afebrile, others reassuring   Gen: Well appearing in NAD  Head: NC/AT  HEENT: moist mucous membranes   Neck: trachea midline  Resp:  No distress, speaking in full sentances, b/l basilar crackles  Ext: +2 b/l LE edema  Neuro:  A&Ox3  Skin:  Warm and dry as visualized  Psych:  appropriate affect and mood    MDM: fluid overload from CHF vs ESRD  CBC, CMP, CXR, trop, pBNP, possible diuresis, likely admission 54F hx HTN, D<, ESRD on HD (got full HD 3/6), recent hospitalization 3/5 for SOB & LE edema, echo showed decr EF & pulm edema - pt d/c AMA for family reasons. now p/w increasing SOB, PICKARD, LE edema similar to what she was admitted for last week. no fever, CP, n/v/d, urinary sx.   Pt living in homeless shelter    VS: afebrile, others reassuring   Gen: Well appearing in NAD  Head: NC/AT  Eyes: PERRL, EOMI   HEENT: moist mucous membranes   Neck: trachea midline  Resp:  No distress, speaking in full sentences, b/l basilar crackles  Ext: +2 b/l LE edema  Neuro:  A&Ox3  Skin:  Warm and dry as visualized  Psych:  appropriate affect and mood    MDM: fluid overload from CHF vs ESRD  CBC, CMP, CXR, trop, pBNP, possible diuresis, likely admission

## 2020-03-08 NOTE — ED ADULT NURSE NOTE - CHIEF COMPLAINT QUOTE
Pt arrives via EMS from street. Pt states she is homeless normally resides in a homeless shelter in [place]. Pt states she was "visiting" someone in [place] and called 911 after experiencing intermittent SOB since Tuesday.  Pt states she was seen in [facility] yesterday for same and also while there received dialysis. Pt appears very comfortable in triage; denies CP.

## 2020-03-08 NOTE — H&P ADULT - NSHPLABSRESULTS_GEN_ALL_CORE
10.6   3.59  )-----------( 196      ( 08 Mar 2020 03:55 )             32.9     03-08    133<L>  |  93<L>  |  64<H>  ----------------------------<  192<H>  5.0   |  23  |  9.51<H>    Ca    9.1      08 Mar 2020 03:55  Mg     2.3     03-08    TPro  7.3  /  Alb  3.8  /  TBili  0.3  /  DBili  x   /  AST  41<H>  /  ALT  53<H>  /  AlkPhos  171<H>  03-08    EKG NSR 78 c Flat T In III, V5  BMP 88411  CXR clear  Trop 95 > 94

## 2020-03-08 NOTE — H&P ADULT - ASSESSMENT
54 y.o male pmh of CVA 2013, HTN, DM Type II, Systolic CHF (EF 36%), ESRD on HD T/Th/Sat via left fore arm AVF, KERI (not on home CPAP/BIPAP)  presents with shortness of breath after AMa from OSH on 3/7 and missed HD. 54 y.o female pmh of CVA 2013, HTN, DM Type II, Systolic CHF (EF 36%), ESRD on HD T/Th/Sat via left fore arm AVF, KERI (not on home CPAP/BIPAP)  presents with shortness of breath after AMa from OSH on 3/7 and missed HD.
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
## 2020-03-08 NOTE — ED ADULT TRIAGE NOTE - CHIEF COMPLAINT QUOTE
Pt arrives via EMS from street. Pt states she is homeless normally resides in a homeless shelter in [place]. Pt states she was "visiting" someone in [place] and called 911 after experiencing intermittent SOB since Tuesday.  Pt states she was seen in [facility] yesterday for same and also while there received dialysis. Pt appears very comfortable in triage; denies CP.

## 2020-03-08 NOTE — H&P ADULT - ATTENDING COMMENTS
Chart reviewed. Vitals and Labs noted.   Pt seen and examined at bedside. Plan formulated with the resident/PA/NP. Detail H&P as above.    Admitted for shortness of breath, chest pain.  Pt not fully coorperative with history. unclear if due to missed dialysis vs. malingering.   CXR clear. Trop about her baseline in the setting of CKD.   Recent TTE EF 36%. will not repeat.  check LE dopplers given mild edema.   consult Dr. Francisco for routine dialysis. Dr. Andrew for chest pain.  CM/SW involvement given pt from Shelter.   DVT ppx    - Dr. KOROMA Htet ([facility])  - [phone]

## 2020-03-08 NOTE — ED PROVIDER NOTE - CLINICAL SUMMARY MEDICAL DECISION MAKING FREE TEXT BOX
54F presenting with sob and exertional dyspnea. due for dialysis. no fever. concern for esrd vs chf. plan for labs, ekg, cxr. will reassess. 54F presenting with sob and exertional dyspnea. due for dialysis. it is likely pt has been chronically behind on getting fluid taken off, & now it is catching up w/ her.    no fever. concern for esrd vs chf. plan for labs, ekg, cxr. will reassess.

## 2020-03-08 NOTE — ED PROVIDER NOTE - OBJECTIVE STATEMENT
54F, PMH of stroke, HTN, DM, CHF, and ESRD on HD T/Th/S, presenting with shortness of breath. last had dialysis on Friday. was admitted to outside hospital for similar symptoms. had an echo with reduced ejection fraction. left AMA due ot family issues. since then has had increasing shortness of breath, worse with exertion. mild chest pain. has increased swelling of legs. no fever, cough, abdominal pain, pain or burning with urination, nausea or vomiting.

## 2020-03-08 NOTE — H&P ADULT - PROBLEM SELECTOR PLAN 1
Admit to tele  Troponin elevated without acute EKG changes - likely demand ischemia in setting of ESRD.  Lasix 20mg IV  but unlikely to have much benefit - will need HD for fluid management  LE Duplex to r/o DVT given L > R edema  Daily I+O and weights

## 2020-03-08 NOTE — H&P ADULT - NSICDXPASTMEDICALHX_GEN_ALL_CORE_FT
PAST MEDICAL HISTORY:  Anemia of chronic disease     CHF (congestive heart failure) EF 36 12/19    CVA (cerebral vascular accident) 2013- Residual RLE weakness    Depression     DM (diabetes mellitus)     ESRD (end stage renal disease) HD T/T/S    GERD (gastroesophageal reflux disease)     Glaucoma     Gout     HLD (hyperlipidemia)     HTN (hypertension)     NICM (nonischemic cardiomyopathy)     KERI (obstructive sleep apnea)
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
## 2020-03-08 NOTE — CONSULT NOTE ADULT - SUBJECTIVE AND OBJECTIVE BOX
Patient is a 54y old  Female who presents with a chief complaint of Shortness of breath (08 Mar 2020 08:30)        HPI:  Ms. Herr is a 54 y.o lady with PMH of CVA 2013, HTN, DM Type II, Systolic CHF (EF 36%), ESRD on HD T/Th/Sat via left fore arm AVF under the care of Dr Francisco at [facility], KERI (not on home CPAP/BIPAP), Depression presents with shortness of breath. Patient states that she was in [facility] from 3/5/2020 to 3/07/2020 for shortness of breath, leg swelling, and chest pain but on 3/07/2020 she AMA'd due to her son getting arrested. She was meant to have Hemodialysis 3/07/2020 in evening but due to continued shortness of breath missed her HD session. She states that initially her chest pain started on 3/3/2020, mid chest stabbing type pain, non-radiating, 10/10 that occurs when exerting self and relieved when resting. Also notes shortness of breath and PICKARD worse than her previous baseline. Positive Cough with yellow sputum. +Left greater than right LE swelling with pain in left calf.  Her last full HD session 3/06/2020 for 4 hours. Denies fever, chills, nausea, vomiting, abdominal pain, diarrhea, constipation, melena, BRBPR, dysuria, hematuria, dizziness, syncope, LOC, fall, head trauma visual change, new or changed weakness. Nephrology consulted for dialysis needs.     Of note on previous admission patient was planned to follow up with EP for ROVT PVC ablation but patient was unable to make appointment due to frequent hospitalization. (08 Mar 2020 08:30)      PAST MEDICAL & SURGICAL HISTORY:  CHF (congestive heart failure): EF 36 12/19  GERD (gastroesophageal reflux disease)  Anemia of chronic disease  HLD (hyperlipidemia)  NICM (nonischemic cardiomyopathy)  KERI (obstructive sleep apnea)  ESRD (end stage renal disease): HD T/T/S  Depression  Gout  CVA (cerebral vascular accident): 2013- Residual RLE weakness  DM (diabetes mellitus)  HTN (hypertension)  Glaucoma  S/P cholecystectomy  S/P partial hysterectomy  Status post glaucoma surgery  AVF (arteriovenous fistula)      MEDICATIONS  (STANDING):  amLODIPine   Tablet 10 milliGRAM(s) Oral daily  aspirin enteric coated 81 milliGRAM(s) Oral daily  calcium acetate 667 milliGRAM(s) Oral three times a day with meals  carvedilol 12.5 milliGRAM(s) Oral every 12 hours  cinacalcet 30 milliGRAM(s) Oral daily  dextrose 5%. 1000 milliLiter(s) (50 mL/Hr) IV Continuous <Continuous>  dextrose 50% Injectable 12.5 Gram(s) IV Push once  dextrose 50% Injectable 25 Gram(s) IV Push once  dextrose 50% Injectable 25 Gram(s) IV Push once  furosemide   Injectable 20 milliGRAM(s) IV Push daily  heparin  Injectable 5000 Unit(s) SubCutaneous three times a day  hydrALAZINE 100 milliGRAM(s) Oral three times a day  insulin lispro (HumaLOG) corrective regimen sliding scale   SubCutaneous three times a day before meals  insulin lispro (HumaLOG) corrective regimen sliding scale   SubCutaneous at bedtime  isosorbide   dinitrate Tablet (ISORDIL) 20 milliGRAM(s) Oral three times a day  simvastatin 20 milliGRAM(s) Oral at bedtime    Allergies  iodine (Unknown)  Seafood (Unknown)  shellfish (Nausea (Mild to Mod); Hives (Mild to Mod))      SOCIAL HISTORY:  Denies alcohol or tobacco abuse.    FAMILY HISTORY:  Family history of heart attack    No kidney disease in family.    REVIEW OF SYSTEMS:  CONSTITUTIONAL: No weakness, fevers or chills  EYES/ENT: No visual changes  NECK: No pain or stiffness  RESPIRATORY: Has cough, wheezing, hemoptysis. Has shortness of breath  CARDIOVASCULAR: No chest pain or palpitations  GASTROINTESTINAL: No abdominal or epigastric pain.  nausea, vomiting, or hematemesis. No diarrhea or constipation. No melena or hematochezia  GENITOURINARY: No dysuria, frequency or hematuria. No stones or infection  NEUROLOGICAL: No numbness or weakness  SKIN: No itching, burning, rashes, or lesions   All other review of systems is negative unless indicated above    VITAL:  T(C): , Max: 37.2 (03-08-20 @ 07:52)  T(F): , Max: 98.9 (03-08-20 @ 07:52)  HR: 77 (03-08-20 @ 13:12)  BP: 135/68 (03-08-20 @ 13:12)  BP(mean): --  RR: 19 (03-08-20 @ 13:12)  SpO2: 100% (03-08-20 @ 10:32)  Wt(kg): --    PHYSICAL EXAM:  General: NAD, Alert, Pleasent  HEENT: NCAT, PERRLA  Neck: Supple, No JVD  Respiratory: CTA-b/l  Cardiovascular: RRR s1s2, no m/r/g  Gastrointestinal: +BS, soft, NT/ND  Extremities: No peripheral edema b/l  Neurological: no focal deficits; strength grossly intact  Psychiatric: Normal mood, normal affect  Back: no CVAT b/l  Skin: No rashes, no nevi  Access:    LABS:                        10.6   3.59  )-----------( 196      ( 08 Mar 2020 03:55 )             32.9     Na(133)/K(5.0)/Cl(93)/HCO3(23)/BUN(64)/Cr(9.51)Glu(192)/Ca(9.1)/Mg(2.3)/PO4(--)    03-08 @ 03:55    BNP 99713    03-08    133<L>  |  93<L>  |  64<H>  ----------------------------<  192<H>  5.0   |  23  |  9.51<H>    Ca    9.1      08 Mar 2020 03:55  Mg     2.3     03-08    TPro  7.3  /  Alb  3.8  /  TBili  0.3  /  DBili  x   /  AST  41<H>  /  ALT  53<H>  /  AlkPhos  171<H>  03-08          IMAGING  EXAM:  XR CHEST PA LAT 2V        PROCEDURE DATE:  Mar  8 2020         INTERPRETATION:  CLINICAL INFORMATION: Chest pain.    EXAM: 2 views of the chest.    COMPARISON: Chest radiograph from on 3/20/2020    FINDINGS:    Clear lungs. No pleural effusions or pneumothorax. The heart is enlarged. Degenerative changes of the spine. Surgical clips in the right upper quadrant.    IMPRESSION:    Clear lungs.              WALDEMAR ABBOTT M.D., RADIOLOGY RESIDENT  This document has been electronically signed.  AL RANGEL M.D., ATTENDING RADIOLOGIST  This document has been electronically signed. Mar  8 2020  6:19AM              RECOMMEND:    Thank you for involving [employer] in this patient's care.    With warm regards,    Camelia Rea, DO  [facility]  [phone] Patient is a 54y old  Female who presents with a chief complaint of Shortness of breath (08 Mar 2020 08:30)        HPI:  Ms. Herr is a 54 y.o lady with PMH of CVA 2013, HTN, DM Type II, Systolic CHF (EF 36%), ESRD on HD T/Th/Sat via left fore arm AVF under the care of Dr Francisco at [facility], KERI (not on home CPAP/BIPAP), Depression presents with shortness of breath. Patient states that she was in [facility] from 3/5/2020 to 3/07/2020 for shortness of breath, leg swelling, and chest pain but on 3/07/2020 she AMA'd due to her son getting arrested. She was meant to have Hemodialysis 3/07/2020 in evening but due to continued shortness of breath missed her HD session. She states that initially her chest pain started on 3/3/2020, mid chest stabbing type pain, non-radiating, 10/10 that occurs when exerting self and relieved when resting. Also notes shortness of breath and PICKARD worse than her previous baseline. Positive Cough with yellow sputum. +Left greater than right LE swelling with pain in left calf.  Her last full HD session 3/06/2020 for 4 hours. Denies fever, chills, nausea, vomiting, abdominal pain, diarrhea, constipation, melena, BRBPR, dysuria, hematuria, dizziness, syncope, LOC, fall, head trauma visual change, new or changed weakness. Nephrology consulted for dialysis needs.     Of note on previous admission patient was planned to follow up with EP for ROVT PVC ablation but patient was unable to make appointment due to frequent hospitalization. (08 Mar 2020 08:30)      PAST MEDICAL & SURGICAL HISTORY:  CHF (congestive heart failure): EF 36 12/19  GERD (gastroesophageal reflux disease)  Anemia of chronic disease  HLD (hyperlipidemia)  NICM (nonischemic cardiomyopathy)  KERI (obstructive sleep apnea)  ESRD (end stage renal disease): HD T/T/S  Depression  Gout  CVA (cerebral vascular accident): 2013- Residual RLE weakness  DM (diabetes mellitus)  HTN (hypertension)  Glaucoma  S/P cholecystectomy  S/P partial hysterectomy  Status post glaucoma surgery  AVF (arteriovenous fistula)      MEDICATIONS  (STANDING):  amLODIPine   Tablet 10 milliGRAM(s) Oral daily  aspirin enteric coated 81 milliGRAM(s) Oral daily  calcium acetate 667 milliGRAM(s) Oral three times a day with meals  carvedilol 12.5 milliGRAM(s) Oral every 12 hours  cinacalcet 30 milliGRAM(s) Oral daily  dextrose 5%. 1000 milliLiter(s) (50 mL/Hr) IV Continuous <Continuous>  dextrose 50% Injectable 12.5 Gram(s) IV Push once  dextrose 50% Injectable 25 Gram(s) IV Push once  dextrose 50% Injectable 25 Gram(s) IV Push once  furosemide   Injectable 20 milliGRAM(s) IV Push daily  heparin  Injectable 5000 Unit(s) SubCutaneous three times a day  hydrALAZINE 100 milliGRAM(s) Oral three times a day  insulin lispro (HumaLOG) corrective regimen sliding scale   SubCutaneous three times a day before meals  insulin lispro (HumaLOG) corrective regimen sliding scale   SubCutaneous at bedtime  isosorbide   dinitrate Tablet (ISORDIL) 20 milliGRAM(s) Oral three times a day  simvastatin 20 milliGRAM(s) Oral at bedtime    Allergies  iodine (Unknown)  Seafood (Unknown)  shellfish (Nausea (Mild to Mod); Hives (Mild to Mod))      SOCIAL HISTORY:  Denies alcohol or tobacco abuse.    FAMILY HISTORY:  Family history of heart attack    No kidney disease in family.    REVIEW OF SYSTEMS:  CONSTITUTIONAL: No weakness, fevers or chills  EYES/ENT: No visual changes  NECK: No pain or stiffness  RESPIRATORY: Has cough, wheezing, hemoptysis. Has shortness of breath  CARDIOVASCULAR: No chest pain or palpitations  GASTROINTESTINAL: No abdominal or epigastric pain.  nausea, vomiting, or hematemesis. No diarrhea or constipation. No melena or hematochezia  GENITOURINARY: No dysuria, frequency or hematuria. No stones or infection  NEUROLOGICAL: No numbness or weakness  SKIN: No itching, burning, rashes, or lesions   All other review of systems is negative unless indicated above    VITAL:  T(C): , Max: 37.2 (03-08-20 @ 07:52)  T(F): , Max: 98.9 (03-08-20 @ 07:52)  HR: 77 (03-08-20 @ 13:12)  BP: 135/68 (03-08-20 @ 13:12)  BP(mean): --  RR: 19 (03-08-20 @ 13:12)  SpO2: 100% (03-08-20 @ 10:32)  Wt(kg): --    PHYSICAL EXAM:  General: NAD, Alert, Pleasent  HEENT: NCAT, PERRLA  Neck: Supple, No JVD  Respiratory: CTA-b/l  Cardiovascular: RRR s1s2, no m/r/g  Gastrointestinal: +BS, soft, NT/ND  Extremities: No peripheral edema b/l  Neurological: no focal deficits; strength grossly intact  Psychiatric: Normal mood, normal affect  Back: no CVAT b/l  Skin: No rashes, no nevi  Access: Left AVF with positive thrill and bruit.     LABS:                        10.6   3.59  )-----------( 196      ( 08 Mar 2020 03:55 )             32.9     Na(133)/K(5.0)/Cl(93)/HCO3(23)/BUN(64)/Cr(9.51)Glu(192)/Ca(9.1)/Mg(2.3)/PO4(--)    03-08 @ 03:55    BNP 06201    03-08    133<L>  |  93<L>  |  64<H>  ----------------------------<  192<H>  5.0   |  23  |  9.51<H>    Ca    9.1      08 Mar 2020 03:55  Mg     2.3     03-08    TPro  7.3  /  Alb  3.8  /  TBili  0.3  /  DBili  x   /  AST  41<H>  /  ALT  53<H>  /  AlkPhos  171<H>  03-08          IMAGING  EXAM:  XR CHEST PA LAT 2V        PROCEDURE DATE:  Mar  8 2020         INTERPRETATION:  CLINICAL INFORMATION: Chest pain.    EXAM: 2 views of the chest.    COMPARISON: Chest radiograph from on 3/20/2020    FINDINGS:    Clear lungs. No pleural effusions or pneumothorax. The heart is enlarged. Degenerative changes of the spine. Surgical clips in the right upper quadrant.    IMPRESSION:    Clear lungs.              WALDEMAR ABBOTT M.D., RADIOLOGY RESIDENT  This document has been electronically signed.  AL RANGEL M.D., ATTENDING RADIOLOGIST  This document has been electronically signed. Mar  8 2020  6:19AM

## 2020-03-08 NOTE — ED ADULT NURSE NOTE - OBJECTIVE STATEMENT
ALOCx4 complaining of intermittent SOB since tuesday. last HD on friday. fistula noted to left upper extremities. swelling notd to b/l lower extremities

## 2020-03-08 NOTE — CONSULT NOTE ADULT - ASSESSMENT
ASSESSMENT:  Ms. Herr is a 54 year-old lady with history of multiple medical issues including hypertension, type 2 diabetes mellitus type 2, nonischemic cardiomyopathy, cerebrovascular disease, and end stage renal disease. She undergoes hemodialysis Tuesdays, Thursdays, and Saturdays the care of my associate Dr. Francisco at the [facility] Dialysis unit in [place]. She missed her dialysis session on Saturday because of a family emergency. Her chief complaint was shortness of breath and leg swelling. Nephrology consulted for dialysis needs. She has a mild elevation of her troponin,BNP 12739.    1. ESRD on hemodialysis Tuesday, Thursday and Saturday.  Will hemodialysis her tomorrow. Monday as he last dialysis was on 3/07/2020.  2. Hyponatremia..   3. Bicarb acceptable.   4. Hypervolemic [place] 40[address]  with no respiratory compromise.   5. Secondary hyperparathyroidism of renal origin with calcium of high normal side.   6. CV- SOB with elevated bump in troponin will be careful today on dialysis.     RECOMMEND:  1. HD tomorrow for 4 hours on 2 k bath and will take 3.5 liter off tomorrow.  2. Fluid restrict patient to 1.2 liters per day.   3. Daily BMP, phosphorus and magnesium.  4. Start Renvela of 800 mg po TID with meals.   5. Low potassium and low phosphorus diet.   6. Need social work involved for the family emergency she had. She has a lot of psychosocial issues.        Thank you for involving [employer] in this patient's care.    With warm regards,    Camelia Rea, DO  [employer]  [phone] ASSESSMENT:  Ms. Herr is a 54 year-old lady with history of multiple medical issues including hypertension, type 2 diabetes mellitus type 2, nonischemic cardiomyopathy, cerebrovascular disease, and end stage renal disease. She undergoes hemodialysis Tuesdays, Thursdays, and Saturdays the care of my associate Dr. Francisco at the [facility] Dialysis unit in [place]. She missed her dialysis session on Saturday because of a family emergency. Her chief complaint was shortness of breath and leg swelling. Nephrology consulted for dialysis needs. She has a mild elevation of her troponin,BNP 24706.    1. ESRD on hemodialysis Tuesday, Thursday and Saturday.  Will hemodialysis her tomorrow. Monday as he last dialysis was on 3/07/2020.  2. Hyponatremia..   3. Bicarb acceptable.   4. Hypervolemic with elevated [place] of 91764  with no respiratory compromise.   5. Secondary hyperparathyroidism of renal origin with calcium of high normal side.   6. CV- SOB with elevated bump in troponin will be careful today on dialysis.     RECOMMEND:  1. HD tomorrow for 4 hours on 2 k bath and will take 3.5 to 4 liters of UF off tomorrow as tolerated.  2. Fluid restrict patient to 1.0 liters per day.   3. Daily BMP, phosphorus and magnesium.  4. Start Renvela of 800 mg po TID with meals.   5. Low potassium and low phosphorus diet.   6. Need social work involved for the family emergency she had. She has a lot of psychosocial issues.   7. Consent signed.        Thank you for involving [employer] in this patient's care.    With warm regards,    Camelia Rea, DO  [employer]  [phone] ASSESSMENT:  Ms. Herr is a 54 year-old lady with history of multiple medical issues including hypertension, type 2 diabetes mellitus type 2, nonischemic cardiomyopathy, cerebrovascular disease, and end stage renal disease. She undergoes hemodialysis Tuesdays, Thursdays, and Saturdays the care of my associate Dr. Francisco at the [facility] Dialysis unit in [place]. She missed her dialysis session on Saturday because of a family emergency. Her chief complaint was shortness of breath and leg swelling. Nephrology consulted for dialysis needs. She has a mild elevation of her troponin,BNP 20831.    1. ESRD on hemodialysis Tuesday, Thursday and Saturday.  Will hemodialysis her tomorrow. Monday as her last dialysis was on 3/07/2020.  2. Hyponatremia..   3. Bicarb acceptable.   4. Hypervolemic with elevated BNP of 499[address]  with no respiratory compromise.   5. Secondary hyperparathyroidism of renal origin with calcium of high normal side.   6. CV- SOB with elevated bump in troponin will be careful today on dialysis.     RECOMMEND:  1. HD tomorrow for 4 hours on 2 k bath and will take 3.5 to 4 liters of UF off tomorrow as tolerated.  2. Fluid restrict patient to 1.0 liters per day.   3. Daily BMP, phosphorus and magnesium.  4. Start Renvela of 800 mg po TID with meals.   5. Low potassium and low phosphorus diet.   6. Need social work involved for the family emergency she had. She has a lot of psychosocial issues.   7. Consent signed.        Thank you for involving [employer] in this patient's care.    With warm regards,    Camelia Rea, DO  [employer]  [phone]

## 2020-03-08 NOTE — H&P ADULT - NSHPPHYSICALEXAM_GEN_ALL_CORE
PHYSICAL EXAM:  GENERAL: NAD, well-developed, resting comfortably laying in bed  HEAD:  Atraumatic, Normocephalic  EYES: EOMI, PERRLA, conjunctiva and sclera clear  NECK: Supple, +JVD  CHEST/LUNG: Clear to auscultation bilaterally; No wheeze  HEART: Regular rate and rhythm; No murmurs, rubs, or gallops  ABDOMEN: Soft, Nontender, Nondistended; Bowel sounds present  EXTREMITIES: L: > R pitting edema, Calf tenderness LLE. 2+ Peripheral Pulses, No clubbing, cyanosis,   PSYCH: AAOx3  NEUROLOGY: non-focal  SKIN: No rashes or lesions

## 2020-03-08 NOTE — ED PROVIDER NOTE - PHYSICAL EXAMINATION
General: well appearing female, no acute distress   HEENT: normocephalic, atraumatic   Respiratory: normal work of breathing, crackles at lung bases    Cardiac: regular rate and rhythm   Abdomen: soft, non-tender, no guarding or rebound   MSK: bilateral LE pitting edema   Skin: warm, dry   Neuro: A&Ox3  Psych: appropriate affect General: well appearing female, no acute distress   Eyes: PERRL, EOMI   HEENT: normocephalic, atraumatic   Respiratory: normal work of breathing, crackles at lung bases    Cardiac: regular rate and rhythm   Abdomen: soft, non-tender, no guarding or rebound   MSK: bilateral 2+ LE pitting edema   Skin: warm, dry   Neuro: A&Ox3  Psych: appropriate affect

## 2020-03-09 DIAGNOSIS — I42.8 OTHER CARDIOMYOPATHIES: ICD-10-CM

## 2020-03-09 LAB
ALBUMIN SERPL ELPH-MCNC: 3.4 G/DL — SIGNIFICANT CHANGE UP (ref 3.3–5)
ALP SERPL-CCNC: 140 U/L — HIGH (ref 40–120)
ALT FLD-CCNC: 38 U/L — HIGH (ref 4–33)
ANION GAP SERPL CALC-SCNC: 15 MMO/L — HIGH (ref 7–14)
AST SERPL-CCNC: 22 U/L — SIGNIFICANT CHANGE UP (ref 4–32)
BILIRUB DIRECT SERPL-MCNC: < 0.2 MG/DL — SIGNIFICANT CHANGE UP (ref 0.1–0.2)
BILIRUB SERPL-MCNC: 0.3 MG/DL — SIGNIFICANT CHANGE UP (ref 0.2–1.2)
BUN SERPL-MCNC: 75 MG/DL — HIGH (ref 7–23)
CALCIUM SERPL-MCNC: 8.6 MG/DL — SIGNIFICANT CHANGE UP (ref 8.4–10.5)
CHLORIDE SERPL-SCNC: 94 MMOL/L — LOW (ref 98–107)
CHOLEST SERPL-MCNC: 106 MG/DL — LOW (ref 120–199)
CO2 SERPL-SCNC: 24 MMOL/L — SIGNIFICANT CHANGE UP (ref 22–31)
CREAT SERPL-MCNC: 11.65 MG/DL — HIGH (ref 0.5–1.3)
GLUCOSE SERPL-MCNC: 97 MG/DL — SIGNIFICANT CHANGE UP (ref 70–99)
HBA1C BLD-MCNC: 5.3 % — SIGNIFICANT CHANGE UP (ref 4–5.6)
HCT VFR BLD CALC: 30.1 % — LOW (ref 34.5–45)
HDLC SERPL-MCNC: 62 MG/DL — SIGNIFICANT CHANGE UP (ref 45–65)
HGB BLD-MCNC: 9.5 G/DL — LOW (ref 11.5–15.5)
LIPID PNL WITH DIRECT LDL SERPL: 33 MG/DL — SIGNIFICANT CHANGE UP
MAGNESIUM SERPL-MCNC: 2.4 MG/DL — SIGNIFICANT CHANGE UP (ref 1.6–2.6)
MCHC RBC-ENTMCNC: 29.8 PG — SIGNIFICANT CHANGE UP (ref 27–34)
MCHC RBC-ENTMCNC: 31.6 % — LOW (ref 32–36)
MCV RBC AUTO: 94.4 FL — SIGNIFICANT CHANGE UP (ref 80–100)
NRBC # FLD: 0 K/UL — SIGNIFICANT CHANGE UP (ref 0–0)
PHOSPHATE SERPL-MCNC: 7 MG/DL — HIGH (ref 2.5–4.5)
PLATELET # BLD AUTO: 186 K/UL — SIGNIFICANT CHANGE UP (ref 150–400)
PMV BLD: 10.9 FL — SIGNIFICANT CHANGE UP (ref 7–13)
POTASSIUM SERPL-MCNC: 5.6 MMOL/L — HIGH (ref 3.5–5.3)
POTASSIUM SERPL-SCNC: 5.6 MMOL/L — HIGH (ref 3.5–5.3)
PROT SERPL-MCNC: 6.4 G/DL — SIGNIFICANT CHANGE UP (ref 6–8.3)
RBC # BLD: 3.19 M/UL — LOW (ref 3.8–5.2)
RBC # FLD: 15.2 % — HIGH (ref 10.3–14.5)
SODIUM SERPL-SCNC: 133 MMOL/L — LOW (ref 135–145)
TRIGL SERPL-MCNC: 66 MG/DL — SIGNIFICANT CHANGE UP (ref 10–149)
WBC # BLD: 2.88 K/UL — LOW (ref 3.8–10.5)
WBC # FLD AUTO: 2.88 K/UL — LOW (ref 3.8–10.5)

## 2020-03-09 PROCEDURE — 99233 SBSQ HOSP IP/OBS HIGH 50: CPT

## 2020-03-09 PROCEDURE — 93970 EXTREMITY STUDY: CPT | Mod: 26

## 2020-03-09 RX ORDER — SEVELAMER CARBONATE 2400 MG/1
800 POWDER, FOR SUSPENSION ORAL
Refills: 0 | Status: DISCONTINUED | OUTPATIENT
Start: 2020-03-09 | End: 2020-03-14

## 2020-03-09 RX ADMIN — SIMVASTATIN 20 MILLIGRAM(S): 20 TABLET, FILM COATED ORAL at 21:55

## 2020-03-09 RX ADMIN — Medication 81 MILLIGRAM(S): at 14:33

## 2020-03-09 RX ADMIN — CARVEDILOL PHOSPHATE 12.5 MILLIGRAM(S): 80 CAPSULE, EXTENDED RELEASE ORAL at 17:58

## 2020-03-09 RX ADMIN — CARVEDILOL PHOSPHATE 12.5 MILLIGRAM(S): 80 CAPSULE, EXTENDED RELEASE ORAL at 06:07

## 2020-03-09 RX ADMIN — Medication 100 MILLIGRAM(S): at 21:54

## 2020-03-09 RX ADMIN — ISOSORBIDE DINITRATE 20 MILLIGRAM(S): 5 TABLET ORAL at 14:33

## 2020-03-09 RX ADMIN — HEPARIN SODIUM 5000 UNIT(S): 5000 INJECTION INTRAVENOUS; SUBCUTANEOUS at 21:55

## 2020-03-09 RX ADMIN — Medication 100 MILLIGRAM(S): at 06:07

## 2020-03-09 RX ADMIN — CINACALCET 30 MILLIGRAM(S): 30 TABLET, FILM COATED ORAL at 14:33

## 2020-03-09 RX ADMIN — Medication 667 MILLIGRAM(S): at 17:58

## 2020-03-09 RX ADMIN — ISOSORBIDE DINITRATE 20 MILLIGRAM(S): 5 TABLET ORAL at 21:55

## 2020-03-09 RX ADMIN — AMLODIPINE BESYLATE 10 MILLIGRAM(S): 2.5 TABLET ORAL at 06:07

## 2020-03-09 RX ADMIN — POLYETHYLENE GLYCOL 3350 17 GRAM(S): 17 POWDER, FOR SOLUTION ORAL at 14:33

## 2020-03-09 RX ADMIN — ISOSORBIDE DINITRATE 20 MILLIGRAM(S): 5 TABLET ORAL at 06:07

## 2020-03-09 RX ADMIN — HEPARIN SODIUM 5000 UNIT(S): 5000 INJECTION INTRAVENOUS; SUBCUTANEOUS at 06:07

## 2020-03-09 RX ADMIN — Medication 20 MILLIGRAM(S): at 21:55

## 2020-03-09 RX ADMIN — Medication 667 MILLIGRAM(S): at 14:33

## 2020-03-09 RX ADMIN — Medication 667 MILLIGRAM(S): at 08:57

## 2020-03-09 RX ADMIN — Medication 100 MILLIGRAM(S): at 14:33

## 2020-03-09 RX ADMIN — HEPARIN SODIUM 5000 UNIT(S): 5000 INJECTION INTRAVENOUS; SUBCUTANEOUS at 14:32

## 2020-03-09 NOTE — CONSULT NOTE ADULT - CONSULT REASON
Cardiomyopathy
ESRD on HD Tuesday, Thursday and Saturday.
cp/sob   pmh of CVA 2013, HTN, DM Type II, Systolic CHF (EF 36%), ESRD on HD T/Th/Sat via left fore arm AVF, KERI (not on home CPAP/BIPAP), Depression

## 2020-03-09 NOTE — SBIRT NOTE ADULT - NSSBIRTUNABLESCR_GEN_A_CORE
Pt refused SBIRT screen at this time. She is aware of SW availability if she is agreeable to participate./Patient refused

## 2020-03-09 NOTE — PROGRESS NOTE ADULT - SUBJECTIVE AND OBJECTIVE BOX
No pain, no shortness of breath. Had HD with 3.3 liters of UF.     Review of systems: All 10 points ROS was obtained except as above.     amLODIPine   Tablet 10 milliGRAM(s) Oral daily  aspirin enteric coated 81 milliGRAM(s) Oral daily  calcium acetate 667 milliGRAM(s) Oral three times a day with meals  carvedilol 12.5 milliGRAM(s) Oral every 12 hours  cinacalcet 30 milliGRAM(s) Oral daily  dextrose 40% Gel 15 Gram(s) Oral once PRN  dextrose 5%. 1000 milliLiter(s) IV Continuous <Continuous>  dextrose 50% Injectable 12.5 Gram(s) IV Push once  dextrose 50% Injectable 25 Gram(s) IV Push once  dextrose 50% Injectable 25 Gram(s) IV Push once  furosemide   Injectable 20 milliGRAM(s) IV Push daily  glucagon  Injectable 1 milliGRAM(s) IntraMuscular once PRN  heparin  Injectable 5000 Unit(s) SubCutaneous three times a day  hydrALAZINE 100 milliGRAM(s) Oral three times a day  insulin lispro (HumaLOG) corrective regimen sliding scale   SubCutaneous three times a day before meals  insulin lispro (HumaLOG) corrective regimen sliding scale   SubCutaneous at bedtime  isosorbide   dinitrate Tablet (ISORDIL) 20 milliGRAM(s) Oral three times a day  polyethylene glycol 3350 17 Gram(s) Oral daily  simvastatin 20 milliGRAM(s) Oral at bedtime      VITAL:  T(C): , Max: 37 (03-08-20 @ 20:56)  T(F): , Max: 98.6 (03-08-20 @ 20:56)  HR: 74 (03-09-20 @ 17:56)  BP: 133/67 (03-09-20 @ 17:56)  RR: 17 (03-09-20 @ 17:56)  SpO2: 100% (03-09-20 @ 17:56)      03-09-20 @ 07:01  -  03-09-20 @ 18:06  --------------------------------------------------------  IN: 400 mL / OUT: 3700 mL / NET: -3300 mL        PHYSICAL EXAM:  General: NAD; Alert  HEENT:  NCAT; PERRLA  Neck: No JVD; supple  Respiratory: CTA-b/l  Cardiac: RRR s1s2  Gastrointestinal: BS+, soft, NT/ND  Urologic: No malcolm  Extremities: No peripheral edema  Access: Left AVF with positive thrill and bruit.     LABS:                          9.5    2.88  )-----------( 186      ( 09 Mar 2020 07:45 )             30.1     Na(133)/K(5.6)/Cl(94)/HCO3(24)/BUN(75)/Cr(11.65)Glu(97)/Ca(8.6)/Mg(2.4)/PO4(7.0)    03-09 @ 07:45  Na(133)/K(5.0)/Cl(93)/HCO3(23)/BUN(64)/Cr(9.51)Glu(192)/Ca(9.1)/Mg(2.3)/PO4(--)    03-08 @ 03:55    03-09    133<L>  |  94<L>  |  75<H>  ----------------------------<  97  5.6<H>   |  24  |  11.65<H>    Ca    8.6      09 Mar 2020 07:45  Phos  7.0     03-09  Mg     2.4     03-09    TPro  6.4  /  Alb  3.4  /  TBili  0.3  /  DBili  < 0.2  /  AST  22  /  ALT  38<H>  /  AlkPhos  140<H>  03-09

## 2020-03-09 NOTE — CONSULT NOTE ADULT - ASSESSMENT
54 y.o female pmh of HTN, CVA 2013 RLE weakness, HTN, DM Type II, NICM, Systolic CHF (EF 36%), ESRD on HD, KERI (not on home CPAP/BIPAP ), Depression, anemia, gout, glaucoma, dietary discrepancy  presents with shortness of breath PICKARD ,mild chest pain in the setting of missing HD.EP consult for worsening LVEF possibly secondary to increased PVC burden and  plan for elective EPS/PVC ablation likely RVOT PVCs . 54 y.o female pmh of HTN, CVA 2013 RLE weakness, HTN, DM Type II, NICM, Systolic CHF (EF 36%), ESRD on HD, KERI (not on home CPAP/BIPAP ), Depression, anemia, gout, glaucoma, dietary non adherence  presents with shortness of breath PICKARD ,mild chest pain in the setting of missing HD.EP consult for worsening LVEF possibly secondary to increased PVC burden, plan for elective EPS/PVC ablation likely RVOT PVCs .

## 2020-03-09 NOTE — PROGRESS NOTE ADULT - SUBJECTIVE AND OBJECTIVE BOX
Patient is a 54y old  Female who presents with a chief complaint of Shortness of breath (08 Mar 2020 12:30)      SUBJECTIVE / OVERNIGHT EVENTS:  Pt seen and examined at bedside in dialysis  No overnight event.  comfortable  on room air  Feeling better.  no cp, no sob, no n/v/d.         Vital Signs Last 24 Hrs  T(C): 36.7 (09 Mar 2020 06:06), Max: 37 (08 Mar 2020 20:56)  T(F): 98 (09 Mar 2020 06:06), Max: 98.6 (08 Mar 2020 20:56)  HR: 75 (09 Mar 2020 06:06) (71 - 84)  BP: 135/65 (09 Mar 2020 06:06) (111/55 - 135/68)  BP(mean): --  RR: 16 (09 Mar 2020 06:06) (16 - 19)  SpO2: 96% (09 Mar 2020 06:06) (95% - 100%)  I&O's Summary      PHYSICAL EXAM:  GENERAL: NAD, Comfortable  HEAD:  Atraumatic, Normocephalic  EYES: EOMI, PERRLA, conjunctiva and sclera clear  NECK: Supple, No JVD  CHEST/LUNG: Clear to auscultation bilaterally; No wheeze  HEART: Regular rate and rhythm; No murmurs, rubs, or gallops  ABDOMEN: Soft, Nontender, Nondistended; Bowel sounds present  Neuro: AAO x 3, no focal deficit, 5/5 b/l extremities  EXTREMITIES:  2+ Peripheral Pulses, No clubbing, cyanosis, +edema  SKIN: No rashes or lesions    LABS:                        9.5    2.88  )-----------( 186      ( 09 Mar 2020 07:45 )             30.1     03-09    133<L>  |  94<L>  |  75<H>  ----------------------------<  97  5.6<H>   |  24  |  11.65<H>    Ca    8.6      09 Mar 2020 07:45  Phos  7.0     03-09  Mg     2.4     03-09    TPro  6.4  /  Alb  3.4  /  TBili  0.3  /  DBili  < 0.2  /  AST  22  /  ALT  38<H>  /  AlkPhos  140<H>  03-09      CAPILLARY BLOOD GLUCOSE      POCT Blood Glucose.: 122 mg/dL (08 Mar 2020 22:09)  POCT Blood Glucose.: 115 mg/dL (08 Mar 2020 17:51)  POCT Blood Glucose.: 119 mg/dL (08 Mar 2020 12:52)    CARDIAC MARKERS ( 08 Mar 2020 05:56 )  x     / x     / 144 u/L / 2.91 ng/mL / x              RADIOLOGY & ADDITIONAL TESTS:    Imaging Personally Reviewed:  [x] YES  [ ] NO    Consultant(s) Notes Reviewed:  [x] YES  [ ] NO      MEDICATIONS  (STANDING):  amLODIPine   Tablet 10 milliGRAM(s) Oral daily  aspirin enteric coated 81 milliGRAM(s) Oral daily  calcium acetate 667 milliGRAM(s) Oral three times a day with meals  carvedilol 12.5 milliGRAM(s) Oral every 12 hours  cinacalcet 30 milliGRAM(s) Oral daily  dextrose 5%. 1000 milliLiter(s) (50 mL/Hr) IV Continuous <Continuous>  dextrose 50% Injectable 12.5 Gram(s) IV Push once  dextrose 50% Injectable 25 Gram(s) IV Push once  dextrose 50% Injectable 25 Gram(s) IV Push once  furosemide   Injectable 20 milliGRAM(s) IV Push daily  heparin  Injectable 5000 Unit(s) SubCutaneous three times a day  hydrALAZINE 100 milliGRAM(s) Oral three times a day  insulin lispro (HumaLOG) corrective regimen sliding scale   SubCutaneous three times a day before meals  insulin lispro (HumaLOG) corrective regimen sliding scale   SubCutaneous at bedtime  isosorbide   dinitrate Tablet (ISORDIL) 20 milliGRAM(s) Oral three times a day  polyethylene glycol 3350 17 Gram(s) Oral daily  simvastatin 20 milliGRAM(s) Oral at bedtime    MEDICATIONS  (PRN):  dextrose 40% Gel 15 Gram(s) Oral once PRN Blood Glucose LESS THAN 70 milliGRAM(s)/deciliter  glucagon  Injectable 1 milliGRAM(s) IntraMuscular once PRN Glucose LESS THAN 70 milligrams/deciliter      Care Discussed with Consultants/Other Providers [x] YES  [ ] NO    HEALTH ISSUES - PROBLEM Dx:  Suspected deep vein thrombosis (DVT)  Essential hypertension: Essential hypertension  Type 2 diabetes mellitus with other specified complication, without long-term current use of insulin: Type 2 diabetes mellitus with other specified complication, without long-term current use of insulin  Hyperlipidemia, unspecified hyperlipidemia type: Hyperlipidemia, unspecified hyperlipidemia type  Transaminitis: Transaminitis  ESRD (end stage renal disease): ESRD (end stage renal disease)  Acute on chronic systolic heart failure: Acute on chronic systolic heart failure

## 2020-03-09 NOTE — PROGRESS NOTE ADULT - ASSESSMENT
ASSESSMENT/PLAN  ASSESSMENT:  Ms. Herr is a 54 year-old lady with history of multiple medical issues including hypertension, type 2 diabetes mellitus type 2, nonischemic cardiomyopathy, cerebrovascular disease, and end stage renal disease. She undergoes hemodialysis Tuesdays, Thursdays, and Saturdays the care of my associate Dr. Francisco at the [facility] Dialysis unit in [place]. She missed her dialysis session on Saturday because of a family emergency. Her chief complaint was shortness of breath and leg swelling. Nephrology consulted for dialysis needs. She has a mild elevation of her troponin,  BNP 46116.    1. ESRD on hemodialysis Tuesday, Thursday and Saturday.  She was dialyzed today and will HD her back on her regular schedule tomorrow.  2. Hyponatremia..   3. Bicarb acceptable.   4. Hypervolemic with elevated BNP of 56808  with no respiratory compromise.   5. Secondary hyperparathyroidism of renal origin with calcium of high normal side.   6. CV- SOB with elevated bump in troponin.    RECOMMEND:  1. HD today for 3 hours on 2 k bath and took 3.3 liters of UF off. Will HD her again tomorrow for 3 hours with goal of 2.5 liters.   2. Fluid restrict patient to 1.0 liters per day.   3. Daily BMP, phosphorus and magnesium.  4. Start Renvela of 800 mg po TID with meals.   5. Low potassium and low phosphorus diet.   6. Need social work involved for the family emergency she had. She has a lot of psychosocial issues.   7. Consent signed.        Thank you for involving [employer] in this patient's care.    With warm regards,    Camelia Rea, DO  [employer]  [phone]

## 2020-03-09 NOTE — CONSULT NOTE ADULT - ATTENDING COMMENTS
Patient seen and examined.  Agree with above NP note.  patient with known history of severe nicmp, pvc', systolic HF, esrd admitted with chest pain, dyspnea in setting of volume overload  cont aggressive volume removal with HD  tele  cont lv dysfxn meds  remains off ace/arb sec to hx of hyperkalemia  repeat echo   will need to be evaluated for icd if EF still severely reduced  eps c/s called
54 y.o female pmh of HTN, CVA 2013 RLE weakness, HTN, DM Type II, NICM, Systolic CHF (EF 36%), ESRD on HD, KERI (not on home CPAP/BIPAP ), Depression, anemia, gout, glaucoma presents with shortness of breath PICKARD ,mild chest pain in the setting of missing HD. She was recently in [facility] from 3/5 to 3/7 for shortness of breath, leg swelling, and chest pain but on 3/7 she AMA'd due family emergency and missed HD on 3/7.She had HD today . Her recent echo on 12/2/19 showed reduced LVEF of 36% compared to the previous echo done on 5/5/2019 with LVEF 40-45%.  She was evaluated by EP on last admission 2019 for  worsening LVEF possibly secondary to increased PVC burden and was plan for elective EPS/PVC ablation likely RVOT PVCs as outpt but patient never follow up. Plan for repeat echo and possible ICD eval.

## 2020-03-09 NOTE — CONSULT NOTE ADULT - ASSESSMENT
Echo 1/5/20189: EF 45-50%, global LV sys dysfx, mild diastolic dysfx (stgI)  Stress test in 6/2018 normal with no evidence of mi or ischemia   Echo 3/ 22/2019, mild to mod MR, moderate global lv sys dysfx small pericardial effusion to left ventricle EF 41%   Echo 5/5/19: ef 40-45%, mod global lv sys dysfx, small pericardial effusion post to left ventricle  Echo 12/2/19: EF 36%, mild MR, mod to severe global LV sys dysfx , small pericardial effusion posterior  to lV and superior to right atrium         A/P   54 year old woman with history of CHF, nicmp, CVA, depression, GERD, gout, glaucoma, chol, HTN, KERI, asthma, DM, ESRD on HD, recent admit for CP, now returns with back with chest pain/ SOB     1. Chest pain, atypical  -in the setting of fluid overload, secondary to missed HD session   -HST elevated, type II MI, demand ischemia 2/2 to ESRD, CHF   -cont with BB, ASA, imdur, plavix   - recent echo with known nicmp ef 36%    2. Acute on chronic Systolic chf   -volume overloaded likely secondary to missed HD session  -cont fluid removal with HD/ lasix   -continue bb, imdur, hydral  -no ace/arb hx of hyperkalemia   -pending LE dopplers r/o dvt    3. HTN  -stable, continue current meds     4. ESRD on HD  -renal f/u     5. Bigeminy (hx)  -recently evaluated by EP in dec . Per EP etiology of worsening LVEF possibly secondary to increased PVC burden. Patient was planned to follow up with EP for EP study with possible PVC ablation but patient was unable to make appointment due to frequent hospitalization  -cont bb, monitor tele   -consider EP to re-eval pt while inpt        dvt ppx

## 2020-03-09 NOTE — CONSULT NOTE ADULT - PROBLEM SELECTOR RECOMMENDATION 9
-PVC ablation likely this admission  -echo to assess EF, if low may need ICD placement  -continue care as per primary team -PVC ablation likely this admission  -echo to assess EF, if low may need ICD placement  - educated about the risk of high salt and fat intake diet  -continue care as per primary team

## 2020-03-09 NOTE — CONSULT NOTE ADULT - SUBJECTIVE AND OBJECTIVE BOX
CHIEF COMPLAINT: sob, PICKARD ,LE edema    HISTORY OF PRESENT ILLNESS:54 y.o female pmh of HTN, CVA 2013 RLE weakness, HTN, DM Type II, NICM, Systolic CHF (EF 36%), ESRD on HD, KERI (not on home CPAP/BIPAP ), Depression, anemia, gout, glaucoma presents with shortness of breath PICKARD ,mild chest pain in the setting of missing HD. She was recently in [facility] from 3/5 to 3/7 for shortness of breath, leg swelling, and chest pain but on 3/7 she AMA'd due family emergency and missed HD on 3/7. Her recent echo on 12/2/19 showed reduced LVEF of 36% compared to the previous echo done on 5/5/2019 with LVEF 40-45%.  She was evaluated by EP on last admission 2019 for  worsening LVEF possibly secondary to increased PVC burden and was plan for elective EPS/PVC ablation likely RVOT PVCs as outpt.      Allergies  iodine (Unknown)  Seafood (Unknown)  shellfish (Nausea (Mild to Mod); Hives (Mild to Mod))    	    MEDICATIONS:  amLODIPine   Tablet 10 milliGRAM(s) Oral daily  aspirin enteric coated 81 milliGRAM(s) Oral daily  carvedilol 12.5 milliGRAM(s) Oral every 12 hours  furosemide   Injectable 20 milliGRAM(s) IV Push daily  heparin  Injectable 5000 Unit(s) SubCutaneous three times a day  hydrALAZINE 100 milliGRAM(s) Oral three times a day  isosorbide   dinitrate Tablet (ISORDIL) 20 milliGRAM(s) Oral three times a day  polyethylene glycol 3350 17 Gram(s) Oral daily  cinacalcet 30 milliGRAM(s) Oral daily  dextrose 40% Gel 15 Gram(s) Oral once PRN  dextrose 50% Injectable 12.5 Gram(s) IV Push once  dextrose 50% Injectable 25 Gram(s) IV Push once  dextrose 50% Injectable 25 Gram(s) IV Push once  glucagon  Injectable 1 milliGRAM(s) IntraMuscular once PRN  insulin lispro (HumaLOG) corrective regimen sliding scale   SubCutaneous three times a day before meals  insulin lispro (HumaLOG) corrective regimen sliding scale   SubCutaneous at bedtime  simvastatin 20 milliGRAM(s) Oral at bedtime    calcium acetate 667 milliGRAM(s) Oral three times a day with meals  dextrose 5%. 1000 milliLiter(s) IV Continuous <Continuous>      PAST MEDICAL & SURGICAL HISTORY:  CHF (congestive heart failure): EF 36 12/19  GERD (gastroesophageal reflux disease)  Anemia of chronic disease  HLD (hyperlipidemia)  NICM (nonischemic cardiomyopathy)  KERI (obstructive sleep apnea)  ESRD (end stage renal disease): HD T/T/S  Depression  Gout  CVA (cerebral vascular accident): 2013- Residual RLE weakness  DM (diabetes mellitus)  HTN (hypertension)  Glaucoma  S/P cholecystectomy  S/P partial hysterectomy  Status post glaucoma surgery  AVF (arteriovenous fistula)      FAMILY HISTORY:  Family history of heart attack      SOCIAL HISTORY:      Patient states she lives in shelter. Patient denies smoking or illicit drug use. States only drinking alcohol during special occasions.    REVIEW OF SYSTEMS:  General: no fatigue/malaise, weight loss/gain.  Skin: no rashes.  Ophthalmologic: no blurred vision, no loss of vision. 	  ENT: no sore throat, rhinorrhea, sinus congestion.  Cardiovascular:no chest pain ,no palpitation,no dizziness,no diaphoresis,no edema  Respiratory: no SOB, cough or wheeze.  Gastrointestinal:  no N/V/D, no melena/hematemesis/hematochezia.  Genitourinary: no dysuria/hesitancy or hematuria.  Musculoskeletal: no myalgias or arthralgias.  Neurological: no changes in vision or hearing, no lightheadedness/dizziness, no syncope/near syncope	  Psychiatric: no unusual stress/anxiety.       PHYSICAL EXAM:  T(C): 36.7 (03-09-20 @ 17:56), Max: 37 (03-09-20 @ 10:50)  HR: 74 (03-09-20 @ 17:56) (69 - 75)  BP: 133/67 (03-09-20 @ 17:56) (132/63 - 147/65)  RR: 17 (03-09-20 @ 17:56) (16 - 18)  SpO2: 100% (03-09-20 @ 17:56) (96% - 100%)      09 Mar 2020 07:01  -  09 Mar 2020 20:56  --------------------------------------------------------  IN: 400 mL / OUT: 3700 mL / NET: -3300 mL        Appearance: Normal	  HEENT:   Normal oral mucosa, PERRL, EOMI	  Cardiovascular: Normal S1 S2, No JVD, No murmurs, No edema  Respiratory: Lungs clear to auscultation	  Psychiatry: A & O x 3, Mood & affect appropriate  Gastrointestinal:  Soft, Non-tender, + BS	  Skin: No rashes, No ecchymoses, No cyanosis	  Neurologic: Non-focal  Extremities: Normal range of motion, No clubbing, cyanosis or edema  Vascular: Peripheral pulses palpable 2+ bilaterally        LABS:	 	    CBC Full  -  ( 09 Mar 2020 07:45 )  WBC Count : 2.88 K/uL  Hemoglobin : 9.5 g/dL  Hematocrit : 30.1 %  Platelet Count - Automated : 186 K/uL  Mean Cell Volume : 94.4 fL  Mean Cell Hemoglobin : 29.8 pg  Mean Cell Hemoglobin Concentration : 31.6 %  Auto Neutrophil # : x  Auto Lymphocyte # : x  Auto Monocyte # : x  Auto Eosinophil # : x  Auto Basophil # : x  Auto Neutrophil % : x  Auto Lymphocyte % : x  Auto Monocyte % : x  Auto Eosinophil % : x  Auto Basophil % : x    03-09    133<L>  |  94<L>  |  75<H>  ----------------------------<  97  5.6<H>   |  24  |  11.65<H>  03-08    133<L>  |  93<L>  |  64<H>  ----------------------------<  192<H>  5.0   |  23  |  9.51<H>    Ca    8.6      09 Mar 2020 07:45  Ca    9.1      08 Mar 2020 03:55  Phos  7.0     03-09  Mg     2.4     03-09  Mg     2.3     03-08    TPro  6.4  /  Alb  3.4  /  TBili  0.3  /  DBili  < 0.2  /  AST  22  /  ALT  38<H>  /  AlkPhos  140<H>  03-09  TPro  7.3  /  Alb  3.8  /  TBili  0.3  /  DBili  x   /  AST  41<H>  /  ALT  53<H>  /  AlkPhos  171<H>  03-08      proBNP:   Lipid Profile:   HgA1c: Hemoglobin A1C, Whole Blood: 5.3 % (03-09 @ 07:45)          CARDIAC MARKERS:      CKMB: 2.91 ng/mL (03-08 @ 05:56)    CKMB Relative Index: Test not performed (03-08 @ 05:56)    High sensitive trop      TELEMETRY: 	    ECG:  	  RADIOLOGY:  OTHER: 	    PREVIOUS DIAGNOSTIC TESTING:    [ ] Echocardiogram:  [ ]  Catheterization:  [ ] Stress Test:  	  	  ASSESSMENT/PLAN: CHIEF COMPLAINT: sob, PICKARD ,LE edema    HISTORY OF PRESENT ILLNESS:54 y.o female pmh of HTN, CVA 2013 RLE weakness, HTN, DM Type II, NICM, Systolic CHF (EF 36%), ESRD on HD, KERI (not on home CPAP/BIPAP ), Depression, anemia, gout, glaucoma presents with shortness of breath PICKARD ,mild chest pain in the setting of missing HD. She was recently in [facility] from 3/5 to 3/7 for shortness of breath, leg swelling, and chest pain but on 3/7 she AMA'd due family emergency and missed HD on 3/7.She had HD today . Her recent echo on 12/2/19 showed reduced LVEF of 36% compared to the previous echo done on 5/5/2019 with LVEF 40-45%.  She was evaluated by EP on last admission 2019 for  worsening LVEF possibly secondary to increased PVC burden and was plan for elective EPS/PVC ablation likely RVOT PVCs as outpt but patient never follow up.  Patient was seen and examined at bedside eating [employer] and french fries .Denies any chest pain ,sob ,dizziness ,palpitation. Reports improve in LE swelling. Telemetry monitor showed bigeminy PVCs early morning since then no further PVC      Allergies  iodine (Unknown)  Seafood (Unknown)  shellfish (Nausea (Mild to Mod); Hives (Mild to Mod)  	    MEDICATIONS:  amLODIPine   Tablet 10 milliGRAM(s) Oral daily  aspirin enteric coated 81 milliGRAM(s) Oral daily  carvedilol 12.5 milliGRAM(s) Oral every 12 hours  furosemide   Injectable 20 milliGRAM(s) IV Push daily  heparin  Injectable 5000 Unit(s) SubCutaneous three times a day  hydrALAZINE 100 milliGRAM(s) Oral three times a day  isosorbide   dinitrate Tablet (ISORDIL) 20 milliGRAM(s) Oral three times a day  polyethylene glycol 3350 17 Gram(s) Oral daily  cinacalcet 30 milliGRAM(s) Oral daily  dextrose 40% Gel 15 Gram(s) Oral once PRN  dextrose 50% Injectable 12.5 Gram(s) IV Push once  dextrose 50% Injectable 25 Gram(s) IV Push once  dextrose 50% Injectable 25 Gram(s) IV Push once  glucagon  Injectable 1 milliGRAM(s) IntraMuscular once PRN  insulin lispro (HumaLOG) corrective regimen sliding scale   SubCutaneous three times a day before meals  insulin lispro (HumaLOG) corrective regimen sliding scale   SubCutaneous at bedtime  simvastatin 20 milliGRAM(s) Oral at bedtime    calcium acetate 667 milliGRAM(s) Oral three times a day with meals  dextrose 5%. 1000 milliLiter(s) IV Continuous <Continuous>      PAST MEDICAL & SURGICAL HISTORY:  CHF (congestive heart failure): EF 36 12/19  GERD (gastroesophageal reflux disease)  Anemia of chronic disease  HLD (hyperlipidemia)  NICM (nonischemic cardiomyopathy)  KERI (obstructive sleep apnea)  ESRD (end stage renal disease): HD T/T/S  Depression  Gout  CVA (cerebral vascular accident): 2013- Residual RLE weakness  DM (diabetes mellitus)  HTN (hypertension)  Glaucoma  S/P cholecystectomy  S/P partial hysterectomy  Status post glaucoma surgery  AVF (arteriovenous fistula)      FAMILY HISTORY:  Family history of heart attack      SOCIAL HISTORY:      Patient states she lives in shelter. Patient denies smoking or illicit drug use. States only drinking alcohol during special occasions.    REVIEW OF SYSTEMS:  General: no fatigue/malaise, weight loss/gain.  Skin: no rashes.  Ophthalmologic: no blurred vision, no loss of vision. 	  ENT: no sore throat, rhinorrhea, sinus congestion.  Cardiovascular: no chest pain ,no palpitation ,no dizziness, no diaphoresis, no edema  Respiratory: no SOB, cough or wheeze.  Gastrointestinal:  no N/V/D, no melena/hematemesis/hematochezia.  Musculoskeletal: no myalgias or arthralgias.  Neurological: no changes in vision or hearing, no lightheadedness/dizziness, no syncope/near syncope,RLE weakness  Psychiatric: no unusual stress/anxiety.       PHYSICAL EXAM:  T(C): 36.7 (03-09-20 @ 17:56), Max: 37 (03-09-20 @ 10:50)  HR: 74 (03-09-20 @ 17:56) (69 - 75)  BP: 133/67 (03-09-20 @ 17:56) (132/63 - 147/65)  RR: 17 (03-09-20 @ 17:56) (16 - 18)  SpO2: 100% (03-09-20 @ 17:56) (96% - 100%)      09 Mar 2020 07:01  -  09 Mar 2020 20:56  --------------------------------------------------------  IN: 400 mL / OUT: 3700 mL / NET: -3300 mL        Appearance: Normal	  HEENT:   Normal oral mucosa, PERRL, EOMI	  Cardiovascular: Normal S1 S2, No JVD, No murmurs, No edema  Respiratory: Lungs clear to auscultation	  Psychiatry: A & O x 3, Mood & affect appropriate  Gastrointestinal:  Soft, Non-tender, + BS	  Skin: No rashes, No ecchymoses, No cyanosis	  Neurologic: Non-focal  Extremities: Normal range of motion, No clubbing, cyanosis or edema  Vascular: Peripheral pulses palpable 2+ bilaterally        LABS:	 	    CBC Full  -  ( 09 Mar 2020 07:45 )  WBC Count : 2.88 K/uL  Hemoglobin : 9.5 g/dL  Hematocrit : 30.1 %  Platelet Count - Automated : 186 K/uL  Mean Cell Volume : 94.4 fL  Mean Cell Hemoglobin : 29.8 pg  Mean Cell Hemoglobin Concentration : 31.6 %  Auto Neutrophil # : x  Auto Lymphocyte # : x  Auto Monocyte # : x  Auto Eosinophil # : x  Auto Basophil # : x  Auto Neutrophil % : x  Auto Lymphocyte % : x  Auto Monocyte % : x  Auto Eosinophil % : x  Auto Basophil % : x    03-09    133<L>  |  94<L>  |  75<H>  ----------------------------<  97  5.6<H>   |  24  |  11.65<H>  03-08    133<L>  |  93<L>  |  64<H>  ----------------------------<  192<H>  5.0   |  23  |  9.51<H>    Ca    8.6      09 Mar 2020 07:45  Ca    9.1      08 Mar 2020 03:55  Phos  7.0     03-09  Mg     2.4     03-09  Mg     2.3     03-08    TPro  6.4  /  Alb  3.4  /  TBili  0.3  /  DBili  < 0.2  /  AST  22  /  ALT  38<H>  /  AlkPhos  140<H>  03-09  TPro  7.3  /  Alb  3.8  /  TBili  0.3  /  DBili  x   /  AST  41<H>  /  ALT  53<H>  /  AlkPhos  171<H>  03-08      proBNP:   Lipid Profile:   HgA1c: Hemoglobin A1C, Whole Blood: 5.3 % (03-09 @ 07:45)          CARDIAC MARKERS:    CKMB: 2.91 ng/mL (03-08 @ 05:56)    CKMB Relative Index: Test not performed (03-08 @ 05:56)    High sensitive trop:94      TELEMETRY: NSR with PVC	    ECG:  	  RADIOLOGY:< from: Xray Chest 2 Views PA/Lat (03.08.20 @ 04:16) >Clear lungs.    OTHER: 	    PREVIOUS DIAGNOSTIC TESTING:    [ ] Echocardiogram:< from: Transthoracic Echocardiogram (12.02.19 @ 15:08) >Ef 36%  . Normal mitral valve. Mild mitral regurgitation.2. Normal trileaflet aortic valve. Mild aortic regurgitation.  3. Severely dilated left atrium.  4. Moderate left ventricular enlargement.5. Moderate to severe global left ventricular systolic  dysfunction.6. Normal right ventricular size and function.7. Small pericardial effusion posterior to the left ventricle and superior to the right atrium.      [ ] Stress Test: < from: Nuclear Stress Test-Pharmacologic (06.24.18 @ 10:15) >  Normal study; no evidence for myocardial infarction or ischemia.* Post-stress gated wall motion analysis was performed  (LVEF = 47 %;LVEDV = 155 ml.), revealing mild hypokinesis.* The LV was hypertrophied.

## 2020-03-09 NOTE — PROGRESS NOTE ADULT - ASSESSMENT
54 y.o female PMHx of CVA 2013, HTN, DM Type II, Systolic CHF (EF 36%), ESRD on HD T/Th/Sat via left fore arm AVF, KERI (not on home CPAP/BIPAP)  presents with shortness of breath after AMA from OSH on 3/7 and missed HD.      Problem/Plan - 1:  ·  Problem: Acute on chronic systolic heart failure.  Plan: Admit to tele  Troponin elevated without acute EKG changes - likely demand ischemia in setting of ESRD.  Lasix 20mg IV   HD for fluid management per renal.  check LE Duplex to r/o DVT given L > R edema  Daily I+O and weights.      Problem/Plan - 2:  ·  Problem: ESRD (end stage renal disease).  Plan: Nephro (pt of Dr. Francisco) called for consult  No emergent HD Indication at this time  Avoid nephrotoxins.      Problem/Plan - 3:  ·  Problem: Transaminitis.  Plan: Likely passive hepatic congestion  Trend - if gets worse then Abd US or GI eval.      Problem/Plan - 4:  ·  Problem: Hyperlipidemia, unspecified hyperlipidemia type.  Plan: Continue statin.      Problem/Plan - 5:  ·  Problem: Type 2 diabetes mellitus with other specified complication, without long-term current use of insulin.  Plan: ISS  FS  A1C.      Problem/Plan - 6:  Problem: Essential hypertension. Plan: Continue BP meds.

## 2020-03-09 NOTE — CONSULT NOTE ADULT - SUBJECTIVE AND OBJECTIVE BOX
CARDIOLOGY CONSULT - Dr. Andrew         HPI:  54 y.o female pmh of CVA 2013, HTN, DM Type II, Systolic CHF (EF 36%), ESRD on HD T/Th/Sat via left fore arm AVF, KERI (not on home CPAP/BIPAP), Depression presents with shortness of breath. Patient states that she was in [facility] from 3/5 to 3/7 for shortness of breath, leg swelling, and chest pain but on 3/7 she AMA'd due to her son getting arrested. She was meant to have HD 3/7 in evening but due to continued shortness of breath missed her HD session. She states that initially her chest pain started on 3/3/2020, mid chest stabbing type pain, non-radiating, 10/10 that occurs when exerting self and relieved when resting. Also notes shortness of breath and PICKARD, +Cough with yellow sputum. +Left greater than right LE swelling with pain in left calf.    Denies fever, chills, nausea, vomiting, abd pain, diarrhea, constipation, melena, brbpr, dysuria, hematuria, dizziness, syncope, loc, fall, head trauma visual change, new or changed weakness.  patient is known from previous admission.   Her recent echo on 12/2/19 showed reduced LVEF of 36% compared to the previous echo done on 5/5/2019 with LVEF 40-45%.  She was evaluated by EP on last admission. Per EP the etiology of worsening LVEF possibly secondary to increased PVC burden. patient was planned to follow up with EP for EP study with possible PVC ablation but patient was unable to make appointment due to frequent hospitalization. Currently in HD, denies cp, sob, or palps.  ROS otherwise negative        PAST MEDICAL & SURGICAL HISTORY:  CHF (congestive heart failure): EF 36 12/19  GERD (gastroesophageal reflux disease)  Anemia of chronic disease  HLD (hyperlipidemia)  NICM (nonischemic cardiomyopathy)  KERI (obstructive sleep apnea)  ESRD (end stage renal disease): HD T/T/S  Depression  Gout  CVA (cerebral vascular accident): 2013- Residual RLE weakness  DM (diabetes mellitus)  HTN (hypertension)  Glaucoma  S/P cholecystectomy  S/P partial hysterectomy  Status post glaucoma surgery  AVF (arteriovenous fistula)          PREVIOUS DIAGNOSTIC TESTING:    [ ] Echocardiogram:  Echo 1/5/20189: EF 45-50%, global LV sys dysfx, mild diastolic dysfx (stgI)  Stress test in 6/2018 normal with no evidence of mi or ischemia   Echo 3/ 22/2019, mild to mod MR, moderate global lv sys dysfx small pericardial effusion to left ventricle EF 41%   Echo 5/5/19: ef 40-45%, mod global lv sys dysfx, small pericardial effusion post to left ventricle  echo 12/2/19: EF 36%, mild MR, mod to severe global LV sys dysfx , small pericardial effusion posterior  to lV and superior to right atrium       [ ]  Catheterization:    [ ] Stress Test:  	    MEDICATIONS:  Home Medications:  amLODIPine 10 mg oral tablet: 1 tab(s) orally once a day (06 Dec 2019 11:36)  aspirin 81 mg oral delayed release tablet: 1 tab(s) orally once a day (06 Dec 2019 11:36)  carvedilol 12.5 mg oral tablet: 1 tab(s) orally every 12 hours    **per pharmacy last filled 8/7/2019 for a 30 day supply, not filled since  (06 Dec 2019 11:36)  cinacalcet 30 mg oral tablet: 1 tab(s) orally once a day (06 Dec 2019 11:36)  hydrALAZINE 100 mg oral tablet: 1 tab(s) orally 3 times a day (06 Dec 2019 11:36)  isosorbide dinitrate 20 mg oral tablet: 1  orally 3 times a day (06 Dec 2019 11:36)  Januvia 25 mg oral tablet: 1 tab(s) orally once a day (06 Dec 2019 11:36)  Lasix 20 mg oral tablet: 1 tab(s) orally once a day (06 Dec 2019 11:36)  simvastatin 40 mg oral tablet: 1 tab(s) orally once a day (at bedtime) (03 Jan 2020 05:10)      MEDICATIONS  (STANDING):  amLODIPine   Tablet 10 milliGRAM(s) Oral daily  aspirin enteric coated 81 milliGRAM(s) Oral daily  calcium acetate 667 milliGRAM(s) Oral three times a day with meals  carvedilol 12.5 milliGRAM(s) Oral every 12 hours  cinacalcet 30 milliGRAM(s) Oral daily  dextrose 5%. 1000 milliLiter(s) (50 mL/Hr) IV Continuous <Continuous>  dextrose 50% Injectable 12.5 Gram(s) IV Push once  dextrose 50% Injectable 25 Gram(s) IV Push once  dextrose 50% Injectable 25 Gram(s) IV Push once  furosemide   Injectable 20 milliGRAM(s) IV Push daily  heparin  Injectable 5000 Unit(s) SubCutaneous three times a day  hydrALAZINE 100 milliGRAM(s) Oral three times a day  insulin lispro (HumaLOG) corrective regimen sliding scale   SubCutaneous three times a day before meals  insulin lispro (HumaLOG) corrective regimen sliding scale   SubCutaneous at bedtime  isosorbide   dinitrate Tablet (ISORDIL) 20 milliGRAM(s) Oral three times a day  polyethylene glycol 3350 17 Gram(s) Oral daily  simvastatin 20 milliGRAM(s) Oral at bedtime      FAMILY HISTORY:  Family history of heart attack      SOCIAL HISTORY:    [ x] Non-smoker  [ ] Smoker  [ ] Alcohol    Allergies    iodine (Unknown)  Seafood (Unknown)  shellfish (Nausea (Mild to Mod); Hives (Mild to Mod))    Intolerances    	    REVIEW OF SYSTEMS:  CONSTITUTIONAL: No fever, weight loss, or fatigue  EYES: No eye pain, visual disturbances, or discharge  ENMT:  No difficulty hearing, tinnitus, vertigo; No sinus or throat pain  NECK: No pain or stiffness  RESPIRATORY: No cough, wheezing, chills or hemoptysis; No Shortness of Breath  CARDIOVASCULAR:  see hpi   GASTROINTESTINAL: No abdominal or epigastric pain. No nausea, vomiting, or hematemesis; No diarrhea or constipation. No melena or hematochezia.  GENITOURINARY: No dysuria, frequency, hematuria, or incontinence  NEUROLOGICAL: No headaches, memory loss, loss of strength, numbness, or tremors  SKIN: No itching, burning, rashes, or lesions   	    [x ] All others negative	  [ ] Unable to obtain    PHYSICAL EXAM:  T(C): 37 (03-09-20 @ 10:50), Max: 37 (03-08-20 @ 20:56)  HR: 70 (03-09-20 @ 10:50) (70 - 84)  BP: 132/63 (03-09-20 @ 10:50) (111/55 - 135/68)  RR: 18 (03-09-20 @ 10:50) (16 - 19)  SpO2: 96% (03-09-20 @ 06:06) (95% - 100%)  Wt(kg): --  I&O's Summary      Appearance: Normal	  Psychiatry: A & O x 3, Mood & affect appropriate  HEENT:   Normal oral mucosa, PERRL, EOMI	  Lymphatic: No lymphadenopathy  Cardiovascular: Normal S1 S2,RRR  Respiratory: Lungs clear to auscultation	  Gastrointestinal:  Soft, Non-tender, + BS	  Skin: No rashes, No ecchymoses, No cyanosis	  Neurologic: Non-focal  Extremities: bl le ++     TELEMETRY: 	    ECG: nsr hr 78 bpm   lae, nonspecific st abnl  	  RADIOLOGY:    < from: Xray Chest 2 Views PA/Lat (03.08.20 @ 04:16) >  FINDINGS:    Clear lungs. No pleural effusions or pneumothorax. The heart is enlarged. Degenerative changes of the spine. Surgical clips in the right upper quadrant.    IMPRESSION:    Clear lungs.        < end of copied text >  OTHER: 	  	  LABS:	 	    CARDIAC MARKERS:  Troponin T, High Sensitivity: 94 ng/L (03-08 @ 05:56)  Troponin T, High Sensitivity: 95 ng/L (03-08 @ 03:55)      CKMB: 2.91 ng/mL (03-08 @ 05:56)    CKMB Relative Index: Test not performed (03-08 @ 05:56)                            9.5    2.88  )-----------( 186      ( 09 Mar 2020 07:45 )             30.1     03-09    133<L>  |  94<L>  |  75<H>  ----------------------------<  97  5.6<H>   |  24  |  11.65<H>    Ca    8.6      09 Mar 2020 07:45  Phos  7.0     03-09  Mg     2.4     03-09    TPro  6.4  /  Alb  3.4  /  TBili  0.3  /  DBili  < 0.2  /  AST  22  /  ALT  38<H>  /  AlkPhos  140<H>  03-09      proBNP:   Lipid Profile:   HgA1c: Hemoglobin A1C, Whole Blood: 5.3 % (03-09 @ 07:45)    TSH: CARDIOLOGY CONSULT - Dr. Andrew         HPI:  54 y.o female pmh of CVA 2013, HTN, DM Type II, Systolic CHF (EF 36%), ESRD on HD, KERI (not on home CPAP/BIPAP), Depression presents with shortness of breath. Patient states that she was in [facility] from 3/5 to 3/7 for shortness of breath, leg swelling, and chest pain but on 3/7 she AMA'd due to her son getting arrested. She was meant to have HD 3/7 in evening but due to continued shortness of breath she missed her HD session. She states that initially her chest pain started on 3/3/2020, described as mid chest stabbing type pain, non-radiating, 10/10 that occurs when exerting  and relieved when resting. Also notes shortness of breath and PICKARD, +Cough with yellow sputum. +Left greater than right LE swelling with pain in left calf.    Denies fever, chills, nausea, vomiting, abd pain, diarrhea, constipation, melena, brbpr, dysuria, hematuria, dizziness, syncope, loc, fall, head trauma visual change, new or changed weakness.  patient is known from previous admission.   Her recent echo on 12/2/19 showed reduced LVEF of 36% compared to the previous echo done on 5/5/2019 with LVEF 40-45%.  She was evaluated by EP on last admission. Per EP the etiology of worsening LVEF possibly secondary to increased PVC burden. patient was planned to follow up with EP for EP study with possible PVC ablation but patient was unable to make appointment due to frequent hospitalization. Currently in HD, denies cp, sob, or palps.  ROS otherwise negative        PAST MEDICAL & SURGICAL HISTORY:  CHF (congestive heart failure): EF 36 12/19  GERD (gastroesophageal reflux disease)  Anemia of chronic disease  HLD (hyperlipidemia)  NICM (nonischemic cardiomyopathy)  KERI (obstructive sleep apnea)  ESRD (end stage renal disease): HD T/T/S  Depression  Gout  CVA (cerebral vascular accident): 2013- Residual RLE weakness  DM (diabetes mellitus)  HTN (hypertension)  Glaucoma  S/P cholecystectomy  S/P partial hysterectomy  Status post glaucoma surgery  AVF (arteriovenous fistula)          PREVIOUS DIAGNOSTIC TESTING:    [ ] Echocardiogram:  Echo 1/5/20189: EF 45-50%, global LV sys dysfx, mild diastolic dysfx (stgI)  Stress test in 6/2018 normal with no evidence of mi or ischemia   Echo 3/ 22/2019, mild to mod MR, moderate global lv sys dysfx small pericardial effusion to left ventricle EF 41%   Echo 5/5/19: ef 40-45%, mod global lv sys dysfx, small pericardial effusion post to left ventricle  echo 12/2/19: EF 36%, mild MR, mod to severe global LV sys dysfx , small pericardial effusion posterior  to lV and superior to right atrium       [ ]  Catheterization:    [ ] Stress Test:  	    MEDICATIONS:  Home Medications:  amLODIPine 10 mg oral tablet: 1 tab(s) orally once a day (06 Dec 2019 11:36)  aspirin 81 mg oral delayed release tablet: 1 tab(s) orally once a day (06 Dec 2019 11:36)  carvedilol 12.5 mg oral tablet: 1 tab(s) orally every 12 hours    **per pharmacy last filled 8/7/2019 for a 30 day supply, not filled since  (06 Dec 2019 11:36)  cinacalcet 30 mg oral tablet: 1 tab(s) orally once a day (06 Dec 2019 11:36)  hydrALAZINE 100 mg oral tablet: 1 tab(s) orally 3 times a day (06 Dec 2019 11:36)  isosorbide dinitrate 20 mg oral tablet: 1  orally 3 times a day (06 Dec 2019 11:36)  Januvia 25 mg oral tablet: 1 tab(s) orally once a day (06 Dec 2019 11:36)  Lasix 20 mg oral tablet: 1 tab(s) orally once a day (06 Dec 2019 11:36)  simvastatin 40 mg oral tablet: 1 tab(s) orally once a day (at bedtime) (03 Jan 2020 05:10)      MEDICATIONS  (STANDING):  amLODIPine   Tablet 10 milliGRAM(s) Oral daily  aspirin enteric coated 81 milliGRAM(s) Oral daily  calcium acetate 667 milliGRAM(s) Oral three times a day with meals  carvedilol 12.5 milliGRAM(s) Oral every 12 hours  cinacalcet 30 milliGRAM(s) Oral daily  dextrose 5%. 1000 milliLiter(s) (50 mL/Hr) IV Continuous <Continuous>  dextrose 50% Injectable 12.5 Gram(s) IV Push once  dextrose 50% Injectable 25 Gram(s) IV Push once  dextrose 50% Injectable 25 Gram(s) IV Push once  furosemide   Injectable 20 milliGRAM(s) IV Push daily  heparin  Injectable 5000 Unit(s) SubCutaneous three times a day  hydrALAZINE 100 milliGRAM(s) Oral three times a day  insulin lispro (HumaLOG) corrective regimen sliding scale   SubCutaneous three times a day before meals  insulin lispro (HumaLOG) corrective regimen sliding scale   SubCutaneous at bedtime  isosorbide   dinitrate Tablet (ISORDIL) 20 milliGRAM(s) Oral three times a day  polyethylene glycol 3350 17 Gram(s) Oral daily  simvastatin 20 milliGRAM(s) Oral at bedtime      FAMILY HISTORY:  Family history of heart attack      SOCIAL HISTORY:    [ x] Non-smoker  [ ] Smoker  [ ] Alcohol    Allergies    iodine (Unknown)  Seafood (Unknown)  shellfish (Nausea (Mild to Mod); Hives (Mild to Mod))    Intolerances    	    REVIEW OF SYSTEMS:  CONSTITUTIONAL: No fever, weight loss, or fatigue  EYES: No eye pain, visual disturbances, or discharge  ENMT:  No difficulty hearing, tinnitus, vertigo; No sinus or throat pain  NECK: No pain or stiffness  RESPIRATORY: No cough, wheezing, chills or hemoptysis; No Shortness of Breath  CARDIOVASCULAR:  see hpi   GASTROINTESTINAL: No abdominal or epigastric pain. No nausea, vomiting, or hematemesis; No diarrhea or constipation. No melena or hematochezia.  GENITOURINARY: No dysuria, frequency, hematuria, or incontinence  NEUROLOGICAL: No headaches, memory loss, loss of strength, numbness, or tremors  SKIN: No itching, burning, rashes, or lesions   	    [x ] All others negative	  [ ] Unable to obtain    PHYSICAL EXAM:  T(C): 37 (03-09-20 @ 10:50), Max: 37 (03-08-20 @ 20:56)  HR: 70 (03-09-20 @ 10:50) (70 - 84)  BP: 132/63 (03-09-20 @ 10:50) (111/55 - 135/68)  RR: 18 (03-09-20 @ 10:50) (16 - 19)  SpO2: 96% (03-09-20 @ 06:06) (95% - 100%)  Wt(kg): --  I&O's Summary      Appearance: Normal	  Psychiatry: A & O x 3, Mood & affect appropriate  HEENT:   Normal oral mucosa, PERRL, EOMI	  Lymphatic: No lymphadenopathy  Cardiovascular: Normal S1 S2,RRR  Respiratory: Lungs clear to auscultation	  Gastrointestinal:  Soft, Non-tender, + BS	  Skin: No rashes, No ecchymoses, No cyanosis	  Neurologic: Non-focal  Extremities: bl le ++     TELEMETRY: 	    ECG: nsr hr 78 bpm   lae, nonspecific st abnl  	  RADIOLOGY:    < from: Xray Chest 2 Views PA/Lat (03.08.20 @ 04:16) >  FINDINGS:    Clear lungs. No pleural effusions or pneumothorax. The heart is enlarged. Degenerative changes of the spine. Surgical clips in the right upper quadrant.    IMPRESSION:    Clear lungs.        < end of copied text >  OTHER: 	  	  LABS:	 	    CARDIAC MARKERS:  Troponin T, High Sensitivity: 94 ng/L (03-08 @ 05:56)  Troponin T, High Sensitivity: 95 ng/L (03-08 @ 03:55)      CKMB: 2.91 ng/mL (03-08 @ 05:56)    CKMB Relative Index: Test not performed (03-08 @ 05:56)                            9.5    2.88  )-----------( 186      ( 09 Mar 2020 07:45 )             30.1     03-09    133<L>  |  94<L>  |  75<H>  ----------------------------<  97  5.6<H>   |  24  |  11.65<H>    Ca    8.6      09 Mar 2020 07:45  Phos  7.0     03-09  Mg     2.4     03-09    TPro  6.4  /  Alb  3.4  /  TBili  0.3  /  DBili  < 0.2  /  AST  22  /  ALT  38<H>  /  AlkPhos  140<H>  03-09      proBNP:   Lipid Profile:   HgA1c: Hemoglobin A1C, Whole Blood: 5.3 % (03-09 @ 07:45)    TSH:

## 2020-03-10 LAB
ANION GAP SERPL CALC-SCNC: 18 MMO/L — HIGH (ref 7–14)
BUN SERPL-MCNC: 48 MG/DL — HIGH (ref 7–23)
CALCIUM SERPL-MCNC: 8.8 MG/DL — SIGNIFICANT CHANGE UP (ref 8.4–10.5)
CHLORIDE SERPL-SCNC: 94 MMOL/L — LOW (ref 98–107)
CO2 SERPL-SCNC: 24 MMOL/L — SIGNIFICANT CHANGE UP (ref 22–31)
CREAT SERPL-MCNC: 8.99 MG/DL — HIGH (ref 0.5–1.3)
GLUCOSE SERPL-MCNC: 118 MG/DL — HIGH (ref 70–99)
HBV CORE AB SER-ACNC: REACTIVE — SIGNIFICANT CHANGE UP
HBV SURFACE AB SER-ACNC: >1000 MLU/ML — SIGNIFICANT CHANGE UP
HBV SURFACE AG SER-ACNC: NEGATIVE — SIGNIFICANT CHANGE UP
HCT VFR BLD CALC: 33.1 % — LOW (ref 34.5–45)
HCV AB S/CO SERPL IA: 0.17 S/CO — SIGNIFICANT CHANGE UP (ref 0–0.99)
HCV AB SERPL-IMP: SIGNIFICANT CHANGE UP
HGB BLD-MCNC: 10.5 G/DL — LOW (ref 11.5–15.5)
MAGNESIUM SERPL-MCNC: 2.3 MG/DL — SIGNIFICANT CHANGE UP (ref 1.6–2.6)
MCHC RBC-ENTMCNC: 29.8 PG — SIGNIFICANT CHANGE UP (ref 27–34)
MCHC RBC-ENTMCNC: 31.7 % — LOW (ref 32–36)
MCV RBC AUTO: 94 FL — SIGNIFICANT CHANGE UP (ref 80–100)
NRBC # FLD: 0 K/UL — SIGNIFICANT CHANGE UP (ref 0–0)
PHOSPHATE SERPL-MCNC: 5.5 MG/DL — HIGH (ref 2.5–4.5)
PLATELET # BLD AUTO: 187 K/UL — SIGNIFICANT CHANGE UP (ref 150–400)
PMV BLD: 11.1 FL — SIGNIFICANT CHANGE UP (ref 7–13)
POTASSIUM SERPL-MCNC: 4.8 MMOL/L — SIGNIFICANT CHANGE UP (ref 3.5–5.3)
POTASSIUM SERPL-SCNC: 4.8 MMOL/L — SIGNIFICANT CHANGE UP (ref 3.5–5.3)
RBC # BLD: 3.52 M/UL — LOW (ref 3.8–5.2)
RBC # FLD: 15 % — HIGH (ref 10.3–14.5)
SODIUM SERPL-SCNC: 136 MMOL/L — SIGNIFICANT CHANGE UP (ref 135–145)
WBC # BLD: 2.43 K/UL — LOW (ref 3.8–10.5)
WBC # FLD AUTO: 2.43 K/UL — LOW (ref 3.8–10.5)

## 2020-03-10 PROCEDURE — 99232 SBSQ HOSP IP/OBS MODERATE 35: CPT

## 2020-03-10 RX ORDER — CHLORHEXIDINE GLUCONATE 213 G/1000ML
1 SOLUTION TOPICAL DAILY
Refills: 0 | Status: DISCONTINUED | OUTPATIENT
Start: 2020-03-10 | End: 2020-03-14

## 2020-03-10 RX ADMIN — Medication 100 MILLIGRAM(S): at 21:21

## 2020-03-10 RX ADMIN — SIMVASTATIN 20 MILLIGRAM(S): 20 TABLET, FILM COATED ORAL at 21:22

## 2020-03-10 RX ADMIN — Medication 20 MILLIGRAM(S): at 21:22

## 2020-03-10 RX ADMIN — Medication 667 MILLIGRAM(S): at 13:11

## 2020-03-10 RX ADMIN — Medication 81 MILLIGRAM(S): at 13:11

## 2020-03-10 RX ADMIN — Medication 667 MILLIGRAM(S): at 17:29

## 2020-03-10 RX ADMIN — SEVELAMER CARBONATE 800 MILLIGRAM(S): 2400 POWDER, FOR SUSPENSION ORAL at 17:29

## 2020-03-10 RX ADMIN — SEVELAMER CARBONATE 800 MILLIGRAM(S): 2400 POWDER, FOR SUSPENSION ORAL at 13:11

## 2020-03-10 RX ADMIN — HEPARIN SODIUM 5000 UNIT(S): 5000 INJECTION INTRAVENOUS; SUBCUTANEOUS at 21:19

## 2020-03-10 RX ADMIN — CARVEDILOL PHOSPHATE 12.5 MILLIGRAM(S): 80 CAPSULE, EXTENDED RELEASE ORAL at 17:29

## 2020-03-10 RX ADMIN — ISOSORBIDE DINITRATE 20 MILLIGRAM(S): 5 TABLET ORAL at 13:11

## 2020-03-10 RX ADMIN — CINACALCET 30 MILLIGRAM(S): 30 TABLET, FILM COATED ORAL at 13:11

## 2020-03-10 RX ADMIN — Medication 100 MILLIGRAM(S): at 13:11

## 2020-03-10 RX ADMIN — Medication 1: at 13:10

## 2020-03-10 RX ADMIN — HEPARIN SODIUM 5000 UNIT(S): 5000 INJECTION INTRAVENOUS; SUBCUTANEOUS at 13:11

## 2020-03-10 RX ADMIN — ISOSORBIDE DINITRATE 20 MILLIGRAM(S): 5 TABLET ORAL at 21:22

## 2020-03-10 NOTE — DIETITIAN INITIAL EVALUATION ADULT. - PERTINENT MEDS FT
MEDICATIONS  (STANDING):  amLODIPine   Tablet 10 milliGRAM(s) Oral daily  aspirin enteric coated 81 milliGRAM(s) Oral daily  calcium acetate 667 milliGRAM(s) Oral three times a day with meals  carvedilol 12.5 milliGRAM(s) Oral every 12 hours  cinacalcet 30 milliGRAM(s) Oral daily  dextrose 5%. 1000 milliLiter(s) (50 mL/Hr) IV Continuous <Continuous>  dextrose 50% Injectable 12.5 Gram(s) IV Push once  dextrose 50% Injectable 25 Gram(s) IV Push once  dextrose 50% Injectable 25 Gram(s) IV Push once  furosemide   Injectable 20 milliGRAM(s) IV Push daily  heparin  Injectable 5000 Unit(s) SubCutaneous three times a day  hydrALAZINE 100 milliGRAM(s) Oral three times a day  insulin lispro (HumaLOG) corrective regimen sliding scale   SubCutaneous three times a day before meals  insulin lispro (HumaLOG) corrective regimen sliding scale   SubCutaneous at bedtime  isosorbide   dinitrate Tablet (ISORDIL) 20 milliGRAM(s) Oral three times a day  polyethylene glycol 3350 17 Gram(s) Oral daily  sevelamer carbonate 800 milliGRAM(s) Oral three times a day with meals  simvastatin 20 milliGRAM(s) Oral at bedtime

## 2020-03-10 NOTE — PROGRESS NOTE ADULT - ASSESSMENT
54 y.o female PMHx of CVA 2013, HTN, DM Type II, Systolic CHF (EF 36%), ESRD on HD T/Th/Sat via left fore arm AVF, KERI (not on home CPAP/BIPAP)  presents with shortness of breath after AMA from OSH on 3/7 and missed HD.      Problem/Plan - 1:  ·  Problem: Acute on chronic systolic heart failure.    Troponin elevated without acute EKG changes - likely demand ischemia in setting of ESRD.  Lasix 20mg IV   HD for fluid management per renal.  LE Duplex neg for DVT    Daily I+O and weights.  Card eval appreciated  EP on the case for possible PVC ablation inpatient (?worsening LVEF possibly secondary to increased PVC burden)     Problem/Plan - 2:  ·  Problem: ESRD (end stage renal disease).  Plan: Nephro (pt of Dr. Francisco)   HD per renal  Avoid nephrotoxins.      Problem/Plan - 3:  ·  Problem: Transaminitis.  Plan: Likely passive hepatic congestion  Trend - if gets worse then Abd US or GI eval.   LFT trending down     Problem/Plan - 4:  ·  Problem: Hyperlipidemia, unspecified hyperlipidemia type.  Plan: Continue statin.      Problem/Plan - 5:  ·  Problem: Type 2 diabetes mellitus with other specified complication, without long-term current use of insulin.  Plan: ISS  FS  A1C.      Problem/Plan - 6:  Problem: Essential hypertension. Plan: Continue BP meds.

## 2020-03-10 NOTE — PROGRESS NOTE ADULT - ASSESSMENT
ASSESSMENT:  Ms. Herr is a 54 year-old lady with history of multiple medical issues including hypertension, type 2 diabetes mellitus type 2, nonischemic cardiomyopathy, cerebrovascular disease, and end stage renal disease. She undergoes hemodialysis Tuesdays, Thursdays, and Saturdays the care of my associate Dr. Francisco at the [facility] Dialysis unit in [place]. She missed her dialysis session on Saturday because of a family emergency. Her chief complaint was shortness of breath and leg swelling. Nephrology consulted for dialysis needs. She has a mild elevation of her troponin,  BNP 48304.    1. ESRD on hemodialysis Tuesday, Thursday and Saturday.  She is being dialyzed today; will resume regular schedule.  2. Hyponatremia. Improved  3. Bicarb acceptable.   4. Hypervolemic with [place] of 58[address]  with no respiratory compromise.   5. Secondary hyperparathyroidism of renal origin with calcium of high normal side.   6. CV- SOB with elevated bump in troponin.    RECOMMEND:  1. HD Wednesday for 3 hours with goal of 2.5 liters.   2. Fluid restrict patient to 1.0 liters per day.   3. Daily BMP, phosphorus and magnesium.  4. Start Renvela of 800 mg po TID with meals.   5. Low potassium and low phosphorus diet.   6. Need social work involved for the family emergency she had. She has a lot of psychosocial issues.   7. Consent signed. ASSESSMENT:  Ms. Herr is a 54 year-old lady with history of multiple medical issues including hypertension, type 2 diabetes mellitus type 2, nonischemic cardiomyopathy, cerebrovascular disease, and end stage renal disease. She undergoes hemodialysis Tuesdays, Thursdays, and Saturdays the care of my associate Dr. Francisco at the [facility] Dialysis unit in [place]. She missed her dialysis session on Saturday because of a family emergency. Her chief complaint was shortness of breath and leg swelling. Nephrology consulted for dialysis needs. She has a mild elevation of her troponin,  BNP 75469.    1. ESRD on hemodialysis Tuesday, Thursday and Saturday.  She is being dialyzed today; will resume regular schedule.  2. Hyponatremia. Improved  3. Bicarb acceptable.   4. Hypervolemic with elevated BNP of 72747  with no respiratory compromise.   5. Secondary hyperparathyroidism of renal origin with calcium of high normal side.   6. CV- SOB with elevated bump in troponin.    RECOMMEND:  1. HD Thursday for 3 hours with goal of 2.5 liters.   2. Fluid restrict patient to 1.0 liters per day.   3. Daily BMP, phosphorus and magnesium.  4. Start Renvela of 800 mg po TID with meals.   5. Low potassium and low phosphorus diet.   6. Need social work involved for the family emergency she had. She has a lot of psychosocial issues.     D/w Dr. Rona Sprague, NP-C  [facility] medical [facility]  [phone]

## 2020-03-10 NOTE — PROGRESS NOTE ADULT - SUBJECTIVE AND OBJECTIVE BOX
Patient is seen and examined. Denies any chest pain, SOB, palpitations or dizziness.     PAST MEDICAL & SURGICAL HISTORY:  CHF (congestive heart failure): EF 36 12/19  GERD (gastroesophageal reflux disease)  Anemia of chronic disease  HLD (hyperlipidemia)  NICM (nonischemic cardiomyopathy)  KERI (obstructive sleep apnea)  Chronic CHF  ESRD (end stage renal disease): HD T/T/S  Depression  Gout  Coronary artery disease involving native coronary artery of native heart without angina pectoris  CVA (cerebral vascular accident): 2013- Residual RLE weakness  CHF (congestive heart failure)  DM (diabetes mellitus)  HTN (hypertension)  CVA (cerebral vascular accident)  Glaucoma  Enlarged heart  HTN (hypertension)  Diabetes  Gout  S/P cholecystectomy  S/P partial hysterectomy  History of hysterectomy  Status post glaucoma surgery  History of cholecystectomy  AVF (arteriovenous fistula)  No significant past surgical history      MEDICATIONS  (STANDING):  amLODIPine   Tablet 10 milliGRAM(s) Oral daily  aspirin enteric coated 81 milliGRAM(s) Oral daily  calcium acetate 667 milliGRAM(s) Oral three times a day with meals  carvedilol 12.5 milliGRAM(s) Oral every 12 hours  cinacalcet 30 milliGRAM(s) Oral daily  dextrose 5%. 1000 milliLiter(s) (50 mL/Hr) IV Continuous <Continuous>  dextrose 50% Injectable 12.5 Gram(s) IV Push once  dextrose 50% Injectable 25 Gram(s) IV Push once  dextrose 50% Injectable 25 Gram(s) IV Push once  furosemide   Injectable 20 milliGRAM(s) IV Push daily  heparin  Injectable 5000 Unit(s) SubCutaneous three times a day  hydrALAZINE 100 milliGRAM(s) Oral three times a day  insulin lispro (HumaLOG) corrective regimen sliding scale   SubCutaneous three times a day before meals  insulin lispro (HumaLOG) corrective regimen sliding scale   SubCutaneous at bedtime  isosorbide   dinitrate Tablet (ISORDIL) 20 milliGRAM(s) Oral three times a day  polyethylene glycol 3350 17 Gram(s) Oral daily  sevelamer carbonate 800 milliGRAM(s) Oral three times a day with meals  simvastatin 20 milliGRAM(s) Oral at bedtime    MEDICATIONS  (PRN):  dextrose 40% Gel 15 Gram(s) Oral once PRN Blood Glucose LESS THAN 70 milliGRAM(s)/deciliter  glucagon  Injectable 1 milliGRAM(s) IntraMuscular once PRN Glucose LESS THAN 70 milligrams/deciliter    Vital Signs Last 24 Hrs  T(C): 36.6 (10 Mar 2020 13:07), Max: 36.9 (09 Mar 2020 21:14)  T(F): 97.9 (10 Mar 2020 13:07), Max: 98.5 (09 Mar 2020 21:14)  HR: 71 (10 Mar 2020 13:07) (66 - 74)  BP: 137/71 (10 Mar 2020 13:07) (133/67 - 154/81)  BP(mean): --  RR: 16 (10 Mar 2020 13:07) (16 - 18)  SpO2: 100% (10 Mar 2020 13:07) (95% - 100%)    INTERPRETATION OF TELEMETRY: Sinus rhythm with HR 70s, PVCs    LABS:                        10.5   2.43  )-----------( 187      ( 10 Mar 2020 06:35 )             33.1     03-10    136  |  94<L>  |  48<H>  ----------------------------<  118<H>  4.8   |  24  |  8.99<H>    Ca    8.8      10 Mar 2020 06:35  Phos  5.5     03-10  Mg     2.3     03-10    TPro  6.4  /  Alb  3.4  /  TBili  0.3  /  DBili  < 0.2  /  AST  22  /  ALT  38<H>  /  AlkPhos  140<H>  03-09    I&O's Summary    09 Mar 2020 07:01  -  10 Mar 2020 07:00  --------------------------------------------------------  IN: 800 mL / OUT: 3700 mL / NET: -2900 mL    10 Mar 2020 07:01  -  10 Mar 2020 15:49  --------------------------------------------------------  IN: 500 mL / OUT: 3000 mL / NET: -2500 mL    PHYSICAL EXAM:    GENERAL: In no apparent distress, well nourished, and hydrated.  HEART: Regular rate and rhythm; No murmurs, rubs, or gallops.  PULMONARY: Clear to auscultation and percussion.  No rales, wheezing, or rhonchi bilaterally.  ABDOMEN: Soft, Nontender, Nondistended; Bowel sounds present  EXTREMITIES:  2+ Peripheral Pulses, No clubbing, cyanosis, or edema

## 2020-03-10 NOTE — PROGRESS NOTE ADULT - ASSESSMENT
Echo 1/5/20189: EF 45-50%, global LV sys dysfx, mild diastolic dysfx (stgI)  Stress test in 6/2018 normal with no evidence of mi or ischemia   Echo 3/ 22/2019, mild to mod MR, moderate global lv sys dysfx small pericardial effusion to left ventricle EF 41%   Echo 5/5/19: ef 40-45%, mod global lv sys dysfx, small pericardial effusion post to left ventricle  Echo 12/2/19: EF 36%, mild MR, mod to severe global LV sys dysfx , small pericardial effusion posterior  to lV and superior to right atrium         A/P   54 year old woman with history of CHF, nicmp, CVA, depression, GERD, gout, glaucoma, chol, HTN, KERI, asthma, DM, ESRD on HD, recent admit for CP, now returns with back with chest pain/ SOB     1. Chest pain, atypical  -in the setting of fluid overload, secondary to missed HD session   -HST elevated, type II MI, demand ischemia 2/2 to ESRD, CHF   -cont with BB, ASA, imdur, plavix   -recent echo with known nicmp ef 36%    2. Acute on chronic Systolic chf   -volume overloaded likely secondary to missed HD session  -cont fluid removal with HD/ lasix   -continue bb, imdur, hydral  -no ace/arb hx of hyperkalemia   -LE dopplers neg for dvt  -echo pending to reassess EF     3. HTN  -stable, continue current meds     4. ESRD on HD  -renal f/u     5. Bigeminy (hx)  -recently evaluated by EP in dec . Per EP etiology of worsening LVEF possibly secondary to increased PVC burden. Patient was planned to follow up with EP for EP study with possible PVC ablation but patient was unable to make appointment due to frequent hospitalization  -cont bb  -ep f/u   -pending repeat echo       dvt ppx

## 2020-03-10 NOTE — DIETITIAN INITIAL EVALUATION ADULT. - ADD RECOMMEND
1- Continue current diet order, which remains appropriate at this time. 2- Monitor weights, labs, BM's, skin integrity, p.o. intake. 3- Recommend outpatient follow up with appropriate RD for purposes of longterm nutrition evaluation. 4- RD remains available, re-consult as needed.

## 2020-03-10 NOTE — PROGRESS NOTE ADULT - SUBJECTIVE AND OBJECTIVE BOX
NEPHROLOGY       Patient seen and examined on dialysis. Goal for removal set to 2.5L, tolerating well, pt without complaints.     MEDICATIONS  (STANDING):  amLODIPine   Tablet 10 milliGRAM(s) Oral daily  aspirin enteric coated 81 milliGRAM(s) Oral daily  calcium acetate 667 milliGRAM(s) Oral three times a day with meals  carvedilol 12.5 milliGRAM(s) Oral every 12 hours  cinacalcet 30 milliGRAM(s) Oral daily  dextrose 5%. 1000 milliLiter(s) (50 mL/Hr) IV Continuous <Continuous>  dextrose 50% Injectable 12.5 Gram(s) IV Push once  dextrose 50% Injectable 25 Gram(s) IV Push once  dextrose 50% Injectable 25 Gram(s) IV Push once  furosemide   Injectable 20 milliGRAM(s) IV Push daily  heparin  Injectable 5000 Unit(s) SubCutaneous three times a day  hydrALAZINE 100 milliGRAM(s) Oral three times a day  insulin lispro (HumaLOG) corrective regimen sliding scale   SubCutaneous three times a day before meals  insulin lispro (HumaLOG) corrective regimen sliding scale   SubCutaneous at bedtime  isosorbide   dinitrate Tablet (ISORDIL) 20 milliGRAM(s) Oral three times a day  polyethylene glycol 3350 17 Gram(s) Oral daily  sevelamer carbonate 800 milliGRAM(s) Oral three times a day with meals  simvastatin 20 milliGRAM(s) Oral at bedtime    VITALS:  T(C): , Max: 37 (03-09-20 @ 10:50)  T(F): , Max: 98.6 (03-09-20 @ 10:50)  HR: 71 (03-10-20 @ 06:30)  BP: 154/81 (03-10-20 @ 06:30)  RR: 16 (03-10-20 @ 06:30)  SpO2: 99% (03-10-20 @ 05:38)    PHYSICAL EXAM:  General: NAD; Alert  HEENT:  NCAT; PERRLA  Neck: No JVD; supple  Respiratory: CTA-b/l  Cardiac: RRR s1s2  Gastrointestinal: BS+, soft, NT/ND  Urologic: No malcolm  Extremities: No peripheral edema  Access: Left AVF with positive thrill and bruit.     LABS:                        10.5   2.43  )-----------( 187      ( 10 Mar 2020 06:35 )             33.1     03-10    136  |  94<L>  |  48<H>  ----------------------------<  118<H>  4.8   |  24  |  8.99<H>    Ca    8.8      10 Mar 2020 06:35  Phos  5.5     03-10  Mg     2.3     03-10    TPro  6.4  /  Alb  3.4  /  TBili  0.3  /  DBili  < 0.2  /  AST  22  /  ALT  38<H>  /  AlkPhos  140<H>  03-09 NEPHROLOGY       Patient seen and examined on dialysis. Goal for removal set to 2.5L, tolerating well, pt without complaints. Last HD yesterday removed 3.3L.    MEDICATIONS  (STANDING):  amLODIPine   Tablet 10 milliGRAM(s) Oral daily  aspirin enteric coated 81 milliGRAM(s) Oral daily  calcium acetate 667 milliGRAM(s) Oral three times a day with meals  carvedilol 12.5 milliGRAM(s) Oral every 12 hours  cinacalcet 30 milliGRAM(s) Oral daily  dextrose 5%. 1000 milliLiter(s) (50 mL/Hr) IV Continuous <Continuous>  dextrose 50% Injectable 12.5 Gram(s) IV Push once  dextrose 50% Injectable 25 Gram(s) IV Push once  dextrose 50% Injectable 25 Gram(s) IV Push once  furosemide   Injectable 20 milliGRAM(s) IV Push daily  heparin  Injectable 5000 Unit(s) SubCutaneous three times a day  hydrALAZINE 100 milliGRAM(s) Oral three times a day  insulin lispro (HumaLOG) corrective regimen sliding scale   SubCutaneous three times a day before meals  insulin lispro (HumaLOG) corrective regimen sliding scale   SubCutaneous at bedtime  isosorbide   dinitrate Tablet (ISORDIL) 20 milliGRAM(s) Oral three times a day  polyethylene glycol 3350 17 Gram(s) Oral daily  sevelamer carbonate 800 milliGRAM(s) Oral three times a day with meals  simvastatin 20 milliGRAM(s) Oral at bedtime    VITALS:  T(C): , Max: 37 (03-09-20 @ 10:50)  T(F): , Max: 98.6 (03-09-20 @ 10:50)  HR: 71 (03-10-20 @ 06:30)  BP: 154/81 (03-10-20 @ 06:30)  RR: 16 (03-10-20 @ 06:30)  SpO2: 99% (03-10-20 @ 05:38)    PHYSICAL EXAM:  General: NAD; Alert  HEENT:  NCAT; PERRLA  Neck: No JVD; supple  Respiratory: CTA-b/l  Cardiac: RRR s1s2  Gastrointestinal: BS+, soft, NT/ND  Urologic: No malcolm  Extremities: No peripheral edema  Access: Left AVF with positive thrill and bruit.     LABS:                        10.5   2.43  )-----------( 187      ( 10 Mar 2020 06:35 )             33.1     03-10    136  |  94<L>  |  48<H>  ----------------------------<  118<H>  4.8   |  24  |  8.99<H>    Ca    8.8      10 Mar 2020 06:35  Phos  5.5     03-10  Mg     2.3     03-10    TPro  6.4  /  Alb  3.4  /  TBili  0.3  /  DBili  < 0.2  /  AST  22  /  ALT  38<H>  /  AlkPhos  140<H>  03-09

## 2020-03-10 NOTE — DIETITIAN INITIAL EVALUATION ADULT. - PERTINENT LABORATORY DATA
03-10 Na136 mmol/L Glu 118 mg/dL<H> K+ 4.8 mmol/L Cr  8.99 mg/dL<H> BUN 48 mg/dL<H> 03-10 Phos 5.5 mg/dL<H> 03-09 Alb 3.4 g/dL 03-09 [id number] 5.3 % 03-09 Chol 106 mg/dL<L> LDL 33 mg/dL HDL 62 mg/dL Trig 66 mg/dL

## 2020-03-10 NOTE — PROGRESS NOTE ADULT - ASSESSMENT
54 y.o female PMHx of HTN, CVA 2013 RLE weakness, HTN, DM Type II, NICM, Systolic CHF (EF 36%), ESRD on HD, KERI (not on home CPAP/BIPAP ), Depression, anemia, gout, glaucoma, dietary non adherence  presented with shortness of breath, PICKARD, mild chest pain in the setting of missing HD. EP consulted for worsening LVEF possibly secondary to increased PVC burden, plan for elective EPS/PVC ablation likely RVOT PVCs and/or possible ICD implantation for secondary prevention if EF</=35%  - Continue current management as per HF team  - Monitor and maintain electrolytes  - Will d/w Dr. Farias

## 2020-03-10 NOTE — DIETITIAN INITIAL EVALUATION ADULT. - OTHER INFO
Nutrition consult received for RD-heart failure linked order set. Pt is a 54F with a past medical Hx inclusive of CVA 2013, HTN, DM Type II, Systolic CHF, ESRD on HD, KERI (not on home CPAP/BIPAP) who presents with shortness of breath after AMA from OSH on 3/7 and missed HD.  Pt with good PO intake.  No GI distress (nausea/vomiting/diarrhea/constipation.) No reported difficulties chewing and swallowing. Pt's weight noted on a prior admission 5/19/19 88.8 kgs, weight on this admission 86.4 kg 3/8 ---> 84.1 kg 3/10 ---> 81.6 kgs (post HD).  Pt denies wt changes, wt loss possible related to fluid losses. She declined diet education at this time. RD remains available, re-consult as needed.

## 2020-03-10 NOTE — PROGRESS NOTE ADULT - SUBJECTIVE AND OBJECTIVE BOX
Patient is a 54y old  Female who presents with a chief complaint of Shortness of breath (10 Mar 2020 11:46)      SUBJECTIVE / OVERNIGHT EVENTS:  Feeling okay  legs still edematous  No overnight event.  no cp, no sob, no n/v/d.       Vital Signs Last 24 Hrs  T(C): 36.6 (10 Mar 2020 13:07), Max: 36.9 (09 Mar 2020 14:31)  T(F): 97.9 (10 Mar 2020 13:07), Max: 98.5 (09 Mar 2020 21:14)  HR: 71 (10 Mar 2020 13:07) (66 - 74)  BP: 137/71 (10 Mar 2020 13:07) (133/67 - 154/81)  BP(mean): --  RR: 16 (10 Mar 2020 13:07) (16 - 18)  SpO2: 100% (10 Mar 2020 13:07) (95% - 100%)  I&O's Summary    09 Mar 2020 07:01  -  10 Mar 2020 07:00  --------------------------------------------------------  IN: 800 mL / OUT: 3700 mL / NET: -2900 mL    10 Mar 2020 07:01  -  10 Mar 2020 13:16  --------------------------------------------------------  IN: 500 mL / OUT: 3000 mL / NET: -2500 mL      PHYSICAL EXAM:  GENERAL: NAD, Comfortable  HEAD:  Atraumatic, Normocephalic  EYES: EOMI, PERRLA, conjunctiva and sclera clear  NECK: Supple, No JVD  CHEST/LUNG: Clear to auscultation bilaterally; No wheeze  HEART: Regular rate and rhythm; No murmurs, rubs, or gallops  ABDOMEN: Soft, Nontender, Nondistended; Bowel sounds present  Neuro: AAO x 3, no focal deficit, 5/5 b/l extremities  EXTREMITIES:  2+ Peripheral Pulses, No clubbing, cyanosis, +2 edema  SKIN: No rashes or lesions      LABS:                        10.5   2.43  )-----------( 187      ( 10 Mar 2020 06:35 )             33.1     03-10    136  |  94<L>  |  48<H>  ----------------------------<  118<H>  4.8   |  24  |  8.99<H>    Ca    8.8      10 Mar 2020 06:35  Phos  5.5     03-10  Mg     2.3     03-10    TPro  6.4  /  Alb  3.4  /  TBili  0.3  /  DBili  < 0.2  /  AST  22  /  ALT  38<H>  /  AlkPhos  140<H>  03-09      CAPILLARY BLOOD GLUCOSE      POCT Blood Glucose.: 180 mg/dL (10 Mar 2020 12:46)  POCT Blood Glucose.: 98 mg/dL (10 Mar 2020 08:25)  POCT Blood Glucose.: 116 mg/dL (10 Mar 2020 06:01)  POCT Blood Glucose.: 133 mg/dL (09 Mar 2020 21:41)  POCT Blood Glucose.: 116 mg/dL (09 Mar 2020 17:53)            RADIOLOGY & ADDITIONAL TESTS:    Imaging Personally Reviewed:  [x] YES  [ ] NO    Consultant(s) Notes Reviewed:  [x] YES  [ ] NO      MEDICATIONS  (STANDING):  amLODIPine   Tablet 10 milliGRAM(s) Oral daily  aspirin enteric coated 81 milliGRAM(s) Oral daily  calcium acetate 667 milliGRAM(s) Oral three times a day with meals  carvedilol 12.5 milliGRAM(s) Oral every 12 hours  cinacalcet 30 milliGRAM(s) Oral daily  dextrose 5%. 1000 milliLiter(s) (50 mL/Hr) IV Continuous <Continuous>  dextrose 50% Injectable 12.5 Gram(s) IV Push once  dextrose 50% Injectable 25 Gram(s) IV Push once  dextrose 50% Injectable 25 Gram(s) IV Push once  furosemide   Injectable 20 milliGRAM(s) IV Push daily  heparin  Injectable 5000 Unit(s) SubCutaneous three times a day  hydrALAZINE 100 milliGRAM(s) Oral three times a day  insulin lispro (HumaLOG) corrective regimen sliding scale   SubCutaneous three times a day before meals  insulin lispro (HumaLOG) corrective regimen sliding scale   SubCutaneous at bedtime  isosorbide   dinitrate Tablet (ISORDIL) 20 milliGRAM(s) Oral three times a day  polyethylene glycol 3350 17 Gram(s) Oral daily  sevelamer carbonate 800 milliGRAM(s) Oral three times a day with meals  simvastatin 20 milliGRAM(s) Oral at bedtime    MEDICATIONS  (PRN):  dextrose 40% Gel 15 Gram(s) Oral once PRN Blood Glucose LESS THAN 70 milliGRAM(s)/deciliter  glucagon  Injectable 1 milliGRAM(s) IntraMuscular once PRN Glucose LESS THAN 70 milligrams/deciliter      Care Discussed with Consultants/Other Providers [x] YES  [ ] NO    HEALTH ISSUES - PROBLEM Dx:  NICM (nonischemic cardiomyopathy): NICM (nonischemic cardiomyopathy)  Suspected deep vein thrombosis (DVT)  Essential hypertension: Essential hypertension  Type 2 diabetes mellitus with other specified complication, without long-term current use of insulin: Type 2 diabetes mellitus with other specified complication, without long-term current use of insulin  Hyperlipidemia, unspecified hyperlipidemia type: Hyperlipidemia, unspecified hyperlipidemia type  Transaminitis: Transaminitis  ESRD (end stage renal disease): ESRD (end stage renal disease)  Acute on chronic systolic heart failure: Acute on chronic systolic heart failure

## 2020-03-11 LAB
ANION GAP SERPL CALC-SCNC: 16 MMO/L — HIGH (ref 7–14)
BUN SERPL-MCNC: 35 MG/DL — HIGH (ref 7–23)
CALCIUM SERPL-MCNC: 8.6 MG/DL — SIGNIFICANT CHANGE UP (ref 8.4–10.5)
CHLORIDE SERPL-SCNC: 95 MMOL/L — LOW (ref 98–107)
CO2 SERPL-SCNC: 25 MMOL/L — SIGNIFICANT CHANGE UP (ref 22–31)
CREAT SERPL-MCNC: 7.39 MG/DL — HIGH (ref 0.5–1.3)
GLUCOSE SERPL-MCNC: 101 MG/DL — HIGH (ref 70–99)
HCT VFR BLD CALC: 31.2 % — LOW (ref 34.5–45)
HGB BLD-MCNC: 9.7 G/DL — LOW (ref 11.5–15.5)
MAGNESIUM SERPL-MCNC: 2.2 MG/DL — SIGNIFICANT CHANGE UP (ref 1.6–2.6)
MCHC RBC-ENTMCNC: 29.5 PG — SIGNIFICANT CHANGE UP (ref 27–34)
MCHC RBC-ENTMCNC: 31.1 % — LOW (ref 32–36)
MCV RBC AUTO: 94.8 FL — SIGNIFICANT CHANGE UP (ref 80–100)
NRBC # FLD: 0 K/UL — SIGNIFICANT CHANGE UP (ref 0–0)
PHOSPHATE SERPL-MCNC: 4.9 MG/DL — HIGH (ref 2.5–4.5)
PLATELET # BLD AUTO: 160 K/UL — SIGNIFICANT CHANGE UP (ref 150–400)
PMV BLD: 10.9 FL — SIGNIFICANT CHANGE UP (ref 7–13)
POTASSIUM SERPL-MCNC: 4.6 MMOL/L — SIGNIFICANT CHANGE UP (ref 3.5–5.3)
POTASSIUM SERPL-SCNC: 4.6 MMOL/L — SIGNIFICANT CHANGE UP (ref 3.5–5.3)
RBC # BLD: 3.29 M/UL — LOW (ref 3.8–5.2)
RBC # FLD: 14.7 % — HIGH (ref 10.3–14.5)
SODIUM SERPL-SCNC: 136 MMOL/L — SIGNIFICANT CHANGE UP (ref 135–145)
WBC # BLD: 2.34 K/UL — LOW (ref 3.8–10.5)
WBC # FLD AUTO: 2.34 K/UL — LOW (ref 3.8–10.5)

## 2020-03-11 PROCEDURE — 93306 TTE W/DOPPLER COMPLETE: CPT | Mod: 26

## 2020-03-11 PROCEDURE — 99232 SBSQ HOSP IP/OBS MODERATE 35: CPT

## 2020-03-11 RX ORDER — SIMETHICONE 80 MG/1
80 TABLET, CHEWABLE ORAL ONCE
Refills: 0 | Status: COMPLETED | OUTPATIENT
Start: 2020-03-11 | End: 2020-03-11

## 2020-03-11 RX ADMIN — Medication 667 MILLIGRAM(S): at 17:25

## 2020-03-11 RX ADMIN — CINACALCET 30 MILLIGRAM(S): 30 TABLET, FILM COATED ORAL at 13:45

## 2020-03-11 RX ADMIN — ISOSORBIDE DINITRATE 20 MILLIGRAM(S): 5 TABLET ORAL at 05:29

## 2020-03-11 RX ADMIN — CARVEDILOL PHOSPHATE 12.5 MILLIGRAM(S): 80 CAPSULE, EXTENDED RELEASE ORAL at 05:28

## 2020-03-11 RX ADMIN — SEVELAMER CARBONATE 800 MILLIGRAM(S): 2400 POWDER, FOR SUSPENSION ORAL at 13:45

## 2020-03-11 RX ADMIN — Medication 81 MILLIGRAM(S): at 13:45

## 2020-03-11 RX ADMIN — SEVELAMER CARBONATE 800 MILLIGRAM(S): 2400 POWDER, FOR SUSPENSION ORAL at 10:36

## 2020-03-11 RX ADMIN — HEPARIN SODIUM 5000 UNIT(S): 5000 INJECTION INTRAVENOUS; SUBCUTANEOUS at 21:09

## 2020-03-11 RX ADMIN — Medication 100 MILLIGRAM(S): at 05:27

## 2020-03-11 RX ADMIN — ISOSORBIDE DINITRATE 20 MILLIGRAM(S): 5 TABLET ORAL at 13:45

## 2020-03-11 RX ADMIN — Medication 667 MILLIGRAM(S): at 13:45

## 2020-03-11 RX ADMIN — Medication 20 MILLIGRAM(S): at 05:28

## 2020-03-11 RX ADMIN — CARVEDILOL PHOSPHATE 12.5 MILLIGRAM(S): 80 CAPSULE, EXTENDED RELEASE ORAL at 17:25

## 2020-03-11 RX ADMIN — Medication 100 MILLIGRAM(S): at 21:09

## 2020-03-11 RX ADMIN — ISOSORBIDE DINITRATE 20 MILLIGRAM(S): 5 TABLET ORAL at 21:10

## 2020-03-11 RX ADMIN — Medication 667 MILLIGRAM(S): at 10:36

## 2020-03-11 RX ADMIN — CHLORHEXIDINE GLUCONATE 1 APPLICATION(S): 213 SOLUTION TOPICAL at 17:25

## 2020-03-11 RX ADMIN — Medication 100 MILLIGRAM(S): at 13:45

## 2020-03-11 RX ADMIN — SEVELAMER CARBONATE 800 MILLIGRAM(S): 2400 POWDER, FOR SUSPENSION ORAL at 17:25

## 2020-03-11 RX ADMIN — SIMVASTATIN 20 MILLIGRAM(S): 20 TABLET, FILM COATED ORAL at 21:10

## 2020-03-11 RX ADMIN — HEPARIN SODIUM 5000 UNIT(S): 5000 INJECTION INTRAVENOUS; SUBCUTANEOUS at 13:45

## 2020-03-11 RX ADMIN — HEPARIN SODIUM 5000 UNIT(S): 5000 INJECTION INTRAVENOUS; SUBCUTANEOUS at 05:29

## 2020-03-11 RX ADMIN — AMLODIPINE BESYLATE 10 MILLIGRAM(S): 2.5 TABLET ORAL at 05:27

## 2020-03-11 RX ADMIN — SIMETHICONE 80 MILLIGRAM(S): 80 TABLET, CHEWABLE ORAL at 21:09

## 2020-03-11 NOTE — PROGRESS NOTE ADULT - SUBJECTIVE AND OBJECTIVE BOX
No overnight event.  no cp, no sob, no palpitations    Vital Signs Last 24 Hrs  T(C): 36.5 (11 Mar 2020 13:40), Max: 37.2 (10 Mar 2020 20:33)  T(F): 97.7 (11 Mar 2020 13:40), Max: 98.9 (10 Mar 2020 20:33)  HR: 68 (11 Mar 2020 17:23) (68 - 75)  BP: 141/81 (11 Mar 2020 17:23) (132/82 - 164/92)  BP(mean): --  RR: 16 (11 Mar 2020 13:40) (16 - 16)  SpO2: 100% (11 Mar 2020 13:40) (100% - 100%)      PHYSICAL EXAM:  GENERAL: NAD, Comfortable  HEAD:  Atraumatic, Normocephalic  EYES: EOMI, PERRLA, conjunctiva and sclera clear  NECK: Supple, No JVD  CHEST/LUNG: Clear to auscultation bilaterally; No wheeze  HEART: Regular rate and rhythm; No murmurs, rubs, or gallops  ABDOMEN: Soft, Nontender, Nondistended; Bowel sounds present  Neuro: AAO x 3, no focal deficit, 5/5 b/l extremities  EXTREMITIES:  2+ Peripheral Pulses, No clubbing, cyanosis, +2 edema  SKIN: No rashes or lesions    LABS:                        9.7    2.34  )-----------( 160      ( 11 Mar 2020 07:25 )             31.2     03-11    136  |  95<L>  |  35<H>  ----------------------------<  101<H>  4.6   |  25  |  7.39<H>    Ca    8.6      11 Mar 2020 07:25  Phos  4.9     03-11  Mg     2.2     03-11        CAPILLARY BLOOD GLUCOSE      POCT Blood Glucose.: 105 mg/dL (11 Mar 2020 12:52)  POCT Blood Glucose.: 101 mg/dL (11 Mar 2020 08:48)  POCT Blood Glucose.: 154 mg/dL (10 Mar 2020 22:16)

## 2020-03-11 NOTE — PROGRESS NOTE ADULT - ASSESSMENT
Echo 1/5/20189: EF 45-50%, global LV sys dysfx, mild diastolic dysfx (stgI)  Stress test in 6/2018 normal with no evidence of mi or ischemia   Echo 3/ 22/2019, mild to mod MR, moderate global lv sys dysfx small pericardial effusion to left ventricle EF 41%   Echo 5/5/19: ef 40-45%, mod global lv sys dysfx, small pericardial effusion post to left ventricle  Echo 12/2/19: EF 36%, mild MR, mod to severe global LV sys dysfx , small pericardial effusion posterior  to lV and superior to right atrium         A/P   54 year old woman with history of CHF, nicmp, CVA, depression, GERD, gout, glaucoma, chol, HTN, KERI, asthma, DM, ESRD on HD, recent admit for CP, now returns with back with chest pain/ SOB     1. Chest pain, atypical  -in the setting of fluid overload, secondary to missed HD session   -HST elevated, type II MI, demand ischemia 2/2 to ESRD, CHF   -cont with BB, ASA, imdur, plavix   -recent echo with known nicmp ef 36%    2. Acute on chronic Systolic chf   -volume overloaded likely secondary to missed HD session  -cont fluid removal with HD/ lasix   -continue bb, imdur, hydral  -no ace/arb hx of hyperkalemia   -LE dopplers neg for dvt  -echo pending to reassess EF     3. HTN  -stable, continue current meds     4. ESRD on HD  -renal f/u     5. Bigeminy (hx)  -recently evaluated by EP in dec . Per EP etiology of worsening LVEF possibly secondary to increased PVC burden. Patient was planned to follow up with EP for EP study with possible PVC ablation but patient was unable to make appointment due to frequent hospitalization  -cont bb  -ep f/u noted    -pending repeat echo; possible ICD implantation for secondary prevention if EF</=35% and possible elective PVC ablation       dvt ppx

## 2020-03-11 NOTE — PROGRESS NOTE ADULT - SUBJECTIVE AND OBJECTIVE BOX
No pain, no shortness of breath. Has diarrhea now.     Review of systems: All 10 points ROS was obtained except as above.     amLODIPine   Tablet 10 milliGRAM(s) Oral daily  aspirin enteric coated 81 milliGRAM(s) Oral daily  calcium acetate 667 milliGRAM(s) Oral three times a day with meals  carvedilol 12.5 milliGRAM(s) Oral every 12 hours  chlorhexidine 4% Liquid 1 Application(s) Topical daily  cinacalcet 30 milliGRAM(s) Oral daily  dextrose 40% Gel 15 Gram(s) Oral once PRN  dextrose 5%. 1000 milliLiter(s) IV Continuous <Continuous>  dextrose 50% Injectable 12.5 Gram(s) IV Push once  dextrose 50% Injectable 25 Gram(s) IV Push once  dextrose 50% Injectable 25 Gram(s) IV Push once  furosemide   Injectable 20 milliGRAM(s) IV Push daily  glucagon  Injectable 1 milliGRAM(s) IntraMuscular once PRN  heparin  Injectable 5000 Unit(s) SubCutaneous three times a day  hydrALAZINE 100 milliGRAM(s) Oral three times a day  insulin lispro (HumaLOG) corrective regimen sliding scale   SubCutaneous three times a day before meals  insulin lispro (HumaLOG) corrective regimen sliding scale   SubCutaneous at bedtime  isosorbide   dinitrate Tablet (ISORDIL) 20 milliGRAM(s) Oral three times a day  polyethylene glycol 3350 17 Gram(s) Oral daily  sevelamer carbonate 800 milliGRAM(s) Oral three times a day with meals  simvastatin 20 milliGRAM(s) Oral at bedtime      VITAL:  T(C): , Max: 37.2 (03-10-20 @ 20:33)  T(F): , Max: 98.9 (03-10-20 @ 20:33)  HR: 68 (03-11-20 @ 17:23)  BP: 141/81 (03-11-20 @ 17:23)  BP(mean): --  RR: 16 (03-11-20 @ 13:40)  SpO2: 100% (03-11-20 @ 13:40)  Wt(kg): --    03-10-20 @ 07:01  -  03-11-20 @ 07:00  --------------------------------------------------------  IN: 1230 mL / OUT: 3200 mL / NET: -1970 mL    03-11-20 @ 07:01  -  03-11-20 @ 18:05  --------------------------------------------------------  IN: 600 mL / OUT: 300 mL / NET: 300 mL        PHYSICAL EXAM:  General: NAD; Alert  HEENT:  NCAT; PERRLA  Neck: No JVD; supple  Respiratory: CTA-b/l  Cardiac: RRR s1s2  Gastrointestinal: BS+, soft, NT/ND  Urologic: No malcolm  Extremities: No peripheral edema      LABS:                          9.7    2.34  )-----------( 160      ( 11 Mar 2020 07:25 )             31.2     Na(136)/K(4.6)/Cl(95)/HCO3(25)/BUN(35)/Cr(7.39)Glu(101)/Ca(8.6)/Mg(2.2)/PO4(4.9)    03-11 @ 07:25  Na(136)/K(4.8)/Cl(94)/HCO3(24)/BUN(48)/Cr(8.99)Glu(118)/Ca(8.8)/Mg(2.3)/PO4(5.5)    03-10 @ 06:35  Na(133)/K(5.6)/Cl(94)/HCO3(24)/BUN(75)/Cr(11.65)Glu(97)/Ca(8.6)/Mg(2.4)/PO4(7.0)    03-09 @ 07:45    03-11    136  |  95<L>  |  35<H>  ----------------------------<  101<H>  4.6   |  25  |  7.39<H>    Ca    8.6      11 Mar 2020 07:25  Phos  4.9     03-11  Mg     2.2     03-11

## 2020-03-11 NOTE — PROGRESS NOTE ADULT - ASSESSMENT
54 y.o female PMHx of CVA 2013, HTN, DM Type II, Systolic CHF (EF 36%), ESRD on HD T/Th/Sat via left fore arm AVF, KERI (not on home CPAP/BIPAP)  presents with shortness of breath after AMA from OSH on 3/7 and missed HD.      Problem/Plan - 1:  ·  Problem: Acute on chronic systolic heart failure.    Troponin elevated without acute EKG changes - likely demand ischemia in setting of ESRD.  Lasix 20mg IV daily  HD for fluid management per renal.  LE Duplex neg for DVT    Daily I+O and weights.  Card eval appreciated  EP on the case for possible PVC ablation inpatient (?worsening LVEF possibly secondary to increased PVC burden) and ICD implantation     Problem/Plan - 2:  ·  Problem: ESRD (end stage renal disease).  Plan: Nephro (pt of Dr. Francisco)   HD per renal  Avoid nephrotoxins.      Problem/Plan - 3:  ·  Problem: Transaminitis.  Plan: Likely passive hepatic congestion  Trend - if gets worse then Abd US or GI eval.   LFT trending down     Problem/Plan - 4:  ·  Problem: Hyperlipidemia, unspecified hyperlipidemia type.  Plan: Continue statin.      Problem/Plan - 5:  ·  Problem: Type 2 diabetes mellitus with other specified complication, without long-term current use of insulin.  Plan: ISS  FS     Problem/Plan - 6:  Problem: Essential hypertension. Plan: Continue BP meds.

## 2020-03-11 NOTE — PROGRESS NOTE ADULT - SUBJECTIVE AND OBJECTIVE BOX
CARDIOLOGY FOLLOW UP - Dr. Andrew    CC no cp or sob       PHYSICAL EXAM:  T(C): 36.7 (03-11-20 @ 05:24), Max: 37.2 (03-10-20 @ 20:33)  HR: 75 (03-11-20 @ 05:24) (73 - 75)  BP: 164/92 (03-11-20 @ 05:24) (142/76 - 164/92)  RR: 16 (03-11-20 @ 05:24) (16 - 16)  SpO2: 100% (03-11-20 @ 05:24) (100% - 100%)  Wt(kg): --  I&O's Summary    10 Mar 2020 07:01  -  11 Mar 2020 07:00  --------------------------------------------------------  IN: 1230 mL / OUT: 3200 mL / NET: -1970 mL        Appearance: Normal	  Cardiovascular: Normal S1 S2,RRR   Respiratory: Lungs clear to auscultation	  Gastrointestinal:  Soft, Non-tender, + BS	  Extremities: le  edema        MEDICATIONS  (STANDING):  amLODIPine   Tablet 10 milliGRAM(s) Oral daily  aspirin enteric coated 81 milliGRAM(s) Oral daily  calcium acetate 667 milliGRAM(s) Oral three times a day with meals  carvedilol 12.5 milliGRAM(s) Oral every 12 hours  chlorhexidine 4% Liquid 1 Application(s) Topical daily  cinacalcet 30 milliGRAM(s) Oral daily  dextrose 5%. 1000 milliLiter(s) (50 mL/Hr) IV Continuous <Continuous>  dextrose 50% Injectable 12.5 Gram(s) IV Push once  dextrose 50% Injectable 25 Gram(s) IV Push once  dextrose 50% Injectable 25 Gram(s) IV Push once  furosemide   Injectable 20 milliGRAM(s) IV Push daily  heparin  Injectable 5000 Unit(s) SubCutaneous three times a day  hydrALAZINE 100 milliGRAM(s) Oral three times a day  insulin lispro (HumaLOG) corrective regimen sliding scale   SubCutaneous three times a day before meals  insulin lispro (HumaLOG) corrective regimen sliding scale   SubCutaneous at bedtime  isosorbide   dinitrate Tablet (ISORDIL) 20 milliGRAM(s) Oral three times a day  polyethylene glycol 3350 17 Gram(s) Oral daily  sevelamer carbonate 800 milliGRAM(s) Oral three times a day with meals  simvastatin 20 milliGRAM(s) Oral at bedtime      TELEMETRY: nsr  	    ECG:  	  RADIOLOGY:   DIAGNOSTIC TESTING:  [ ] Echocardiogram:  [ ]  Catheterization:  [ ] Stress Test:    OTHER: 	    LABS:	 	    Troponin T, High Sensitivity: 94 ng/L [<6 - 14] (03-08 @ 05:56)  Creatine Kinase, Serum: 144 u/L [25 - 170] (03-08 @ 05:56)  CKMB: 2.91 ng/mL [1 - 4.7] (03-08 @ 05:56)  CKMB Relative Index: Test not performed [0.0 - 2.5] (03-08 @ 05:56)  Troponin T, High Sensitivity: 95 ng/L [<6 - 14] (03-08 @ 03:55)                          9.7    2.34  )-----------( 160      ( 11 Mar 2020 07:25 )             31.2     03-11    136  |  95<L>  |  35<H>  ----------------------------<  101<H>  4.6   |  25  |  7.39<H>    Ca    8.6      11 Mar 2020 07:25  Phos  4.9     03-11  Mg     2.2     03-11

## 2020-03-11 NOTE — PROGRESS NOTE ADULT - SUBJECTIVE AND OBJECTIVE BOX
Patient is seen and examined. Denies any chest pain, SOB, palpitations or dizziness.     PAST MEDICAL & SURGICAL HISTORY:  CHF (congestive heart failure): EF 36 12/19  GERD (gastroesophageal reflux disease)  Anemia of chronic disease  HLD (hyperlipidemia)  NICM (nonischemic cardiomyopathy)  KERI (obstructive sleep apnea)  Chronic CHF  ESRD (end stage renal disease): HD T/T/S  Depression  Gout  Coronary artery disease involving native coronary artery of native heart without angina pectoris  CVA (cerebral vascular accident): 2013- Residual RLE weakness  CHF (congestive heart failure)  DM (diabetes mellitus)  HTN (hypertension)  CVA (cerebral vascular accident)  Glaucoma  Enlarged heart  HTN (hypertension)  Diabetes  Gout  S/P cholecystectomy  S/P partial hysterectomy  History of hysterectomy  Status post glaucoma surgery  History of cholecystectomy  AVF (arteriovenous fistula)  No significant past surgical history      MEDICATIONS  (STANDING):  amLODIPine   Tablet 10 milliGRAM(s) Oral daily  aspirin enteric coated 81 milliGRAM(s) Oral daily  calcium acetate 667 milliGRAM(s) Oral three times a day with meals  carvedilol 12.5 milliGRAM(s) Oral every 12 hours  chlorhexidine 4% Liquid 1 Application(s) Topical daily  cinacalcet 30 milliGRAM(s) Oral daily  dextrose 5%. 1000 milliLiter(s) (50 mL/Hr) IV Continuous <Continuous>  dextrose 50% Injectable 12.5 Gram(s) IV Push once  dextrose 50% Injectable 25 Gram(s) IV Push once  dextrose 50% Injectable 25 Gram(s) IV Push once  furosemide   Injectable 20 milliGRAM(s) IV Push daily  heparin  Injectable 5000 Unit(s) SubCutaneous three times a day  hydrALAZINE 100 milliGRAM(s) Oral three times a day  insulin lispro (HumaLOG) corrective regimen sliding scale   SubCutaneous three times a day before meals  insulin lispro (HumaLOG) corrective regimen sliding scale   SubCutaneous at bedtime  isosorbide   dinitrate Tablet (ISORDIL) 20 milliGRAM(s) Oral three times a day  polyethylene glycol 3350 17 Gram(s) Oral daily  sevelamer carbonate 800 milliGRAM(s) Oral three times a day with meals  simvastatin 20 milliGRAM(s) Oral at bedtime    MEDICATIONS  (PRN):  dextrose 40% Gel 15 Gram(s) Oral once PRN Blood Glucose LESS THAN 70 milliGRAM(s)/deciliter  glucagon  Injectable 1 milliGRAM(s) IntraMuscular once PRN Glucose LESS THAN 70 milligrams/deciliter      Vital Signs Last 24 Hrs  T(C): 36.7 (11 Mar 2020 05:24), Max: 37.2 (10 Mar 2020 20:33)  T(F): 98.1 (11 Mar 2020 05:24), Max: 98.9 (10 Mar 2020 20:33)  HR: 75 (11 Mar 2020 05:24) (71 - 75)  BP: 164/92 (11 Mar 2020 05:24) (137/71 - 164/92)  BP(mean): --  RR: 16 (11 Mar 2020 05:24) (16 - 16)  SpO2: 100% (11 Mar 2020 05:24) (100% - 100%)    INTERPRETATION OF TELEMETRY: Sinus rhythm with HR 70s,couplets    LABS:                        9.7    2.34  )-----------( 160      ( 11 Mar 2020 07:25 )             31.2     03-11    136  |  95<L>  |  35<H>  ----------------------------<  101<H>  4.6   |  25  |  7.39<H>    Ca    8.6      11 Mar 2020 07:25  Phos  4.9     03-11  Mg     2.2     03-11    I&O's Summary    10 Mar 2020 07:01  -  11 Mar 2020 07:00  --------------------------------------------------------  IN: 1230 mL / OUT: 3200 mL / NET: -1970 mL    PHYSICAL EXAM:    GENERAL: In no apparent distress, well nourished, and hydrated.  HEART: Regular rate and rhythm; No murmurs, rubs, or gallops.  PULMONARY: Clear to auscultation and percussion.  No rales, wheezing, or rhonchi bilaterally.  ABDOMEN: Soft, Nontender, Nondistended; Bowel sounds present  EXTREMITIES:  2+ Peripheral Pulses, No clubbing, cyanosis, or edema

## 2020-03-11 NOTE — PROGRESS NOTE ADULT - ASSESSMENT
54 y.o female PMHx of HTN, CVA 2013 RLE weakness, HTN, DM Type II, NICM, Systolic CHF (EF 36%), ESRD on HD, KERI (not on home CPAP/BIPAP ), Depression, anemia, gout, glaucoma, dietary non adherence  presented with shortness of breath, PICKARD, mild chest pain in the setting of missing HD. EP consulted for worsening LVEF possibly secondary to increased PVC burden, plan for possible ICD implantation for secondary prevention if EF</=35% and possible elective PVC ablation likely RVOT PVCs   - Continue current management as per HF team  - Monitor and maintain electrolytes  - Will d/w Dr. Farias 54 y.o female PMHx of HTN, CVA 2013 RLE weakness, HTN, DM Type II, NICM, Systolic CHF (EF 36%), ESRD on HD, KERI (not on home CPAP/BIPAP ), Depression, anemia, gout, glaucoma, dietary non adherence  presented with shortness of breath, PICKARD, mild chest pain in the setting of missing HD. EP consulted for worsening LVEF possibly secondary to increased PVC burden, plan for possible ICD implantation for secondary prevention if EF</=35% and possible elective PVC ablation likely RVOT PVCs   - Continue current management as per HF team  - Monitor and maintain electrolytes  - Dr. Farias discussed with patient regarding risks/benefits and alternatives of ICD implantation. 54 y.o female PMHx of HTN, CVA 2013 RLE weakness, HTN, DM Type II, NICM, Systolic CHF (EF 36%), ESRD on HD, KERI (not on home CPAP/BIPAP ), Depression, anemia, gout, glaucoma, dietary non adherence  presented with shortness of breath, PICKARD, mild chest pain in the setting of missing HD. EP consulted for worsening LVEF possibly secondary to increased PVC burden, plan for possible ICD implantation for secondary prevention if EF</=35% and possible elective PVC ablation likely RVOT PVCs   - Continue current management as per HF team  - Monitor and maintain electrolytes  - Dr. Farias discussed with patient regarding risks/benefits and alternatives of ICD implantation.  Addendum:  Echocardiogram shows moderate to severe LV dysfunction, unable to calculate EF.  MUGA scan to measure LVEF  Possible ICD implantation on Friday if EF</= 35%

## 2020-03-11 NOTE — PROGRESS NOTE ADULT - ASSESSMENT
ASSESSMENT/PLAN    ASSESSMENT:  Ms. Herr is a 54 year-old lady with history of multiple medical issues including hypertension, type 2 diabetes mellitus type 2, nonischemic cardiomyopathy, cerebrovascular disease, and end stage renal disease. She undergoes hemodialysis Tuesdays, Thursdays, and Saturdays the care of my associate Dr. Francisco at the [facility] in [place]. She missed her dialysis session on Saturday because of a family emergency. Her chief complaint was shortness of breath and leg swelling. Nephrology consulted for dialysis needs. She has a mild elevation of her troponin,  BNP 62825.    1. ESRD on hemodialysis Tuesday, Thursday and Saturday.    2. Hyponatremia resolved.  3. Bicarb acceptable.   4. [place]ole[place] 0[address]  with no respiratory compromise.   5. Secondary hyperparathyroidism of renal origin with calcium of high normal side.   6. CV- SOB with elevated bump in troponin.    RECOMMEND:  1. HD tomorrow for 3.5 hours with 3 liters of UF off.   2. Fluid restrict patient to 1.0 liters per day.   3. Daily BMP, phosphorus and magnesium.  4. Continue Renvela of 800 mg po TID with meals.   5. Low potassium and low phosphorus diet.   6. Need social work involved for the family emergency she had. She has a lot of psychosocial issues.   7. Consent signed.        Thank you for involving [employer] in this patient's care.    With warm regards,    Camelia Rea, DO  [employer]  [phone]

## 2020-03-12 ENCOUNTER — APPOINTMENT (OUTPATIENT)
Dept: ELECTROPHYSIOLOGY | Facility: CLINIC | Age: 55
End: 2020-03-12

## 2020-03-12 LAB
ANION GAP SERPL CALC-SCNC: 14 MMO/L — SIGNIFICANT CHANGE UP (ref 7–14)
BUN SERPL-MCNC: 49 MG/DL — HIGH (ref 7–23)
CALCIUM SERPL-MCNC: 8.9 MG/DL — SIGNIFICANT CHANGE UP (ref 8.4–10.5)
CHLORIDE SERPL-SCNC: 97 MMOL/L — LOW (ref 98–107)
CO2 SERPL-SCNC: 25 MMOL/L — SIGNIFICANT CHANGE UP (ref 22–31)
CREAT SERPL-MCNC: 9.01 MG/DL — HIGH (ref 0.5–1.3)
GLUCOSE SERPL-MCNC: 93 MG/DL — SIGNIFICANT CHANGE UP (ref 70–99)
HCG SERPL-ACNC: < 5 MIU/ML — SIGNIFICANT CHANGE UP
HCT VFR BLD CALC: 32.2 % — LOW (ref 34.5–45)
HGB BLD-MCNC: 10.2 G/DL — LOW (ref 11.5–15.5)
MAGNESIUM SERPL-MCNC: 2.4 MG/DL — SIGNIFICANT CHANGE UP (ref 1.6–2.6)
MCHC RBC-ENTMCNC: 29.8 PG — SIGNIFICANT CHANGE UP (ref 27–34)
MCHC RBC-ENTMCNC: 31.7 % — LOW (ref 32–36)
MCV RBC AUTO: 94.2 FL — SIGNIFICANT CHANGE UP (ref 80–100)
NRBC # FLD: 0 K/UL — SIGNIFICANT CHANGE UP (ref 0–0)
PHOSPHATE SERPL-MCNC: 5.3 MG/DL — HIGH (ref 2.5–4.5)
PLATELET # BLD AUTO: 172 K/UL — SIGNIFICANT CHANGE UP (ref 150–400)
PMV BLD: 11.4 FL — SIGNIFICANT CHANGE UP (ref 7–13)
POTASSIUM SERPL-MCNC: 4.8 MMOL/L — SIGNIFICANT CHANGE UP (ref 3.5–5.3)
POTASSIUM SERPL-SCNC: 4.8 MMOL/L — SIGNIFICANT CHANGE UP (ref 3.5–5.3)
RBC # BLD: 3.42 M/UL — LOW (ref 3.8–5.2)
RBC # FLD: 14.6 % — HIGH (ref 10.3–14.5)
SODIUM SERPL-SCNC: 136 MMOL/L — SIGNIFICANT CHANGE UP (ref 135–145)
WBC # BLD: 2.65 K/UL — LOW (ref 3.8–10.5)
WBC # FLD AUTO: 2.65 K/UL — LOW (ref 3.8–10.5)

## 2020-03-12 PROCEDURE — 99232 SBSQ HOSP IP/OBS MODERATE 35: CPT

## 2020-03-12 PROCEDURE — 78494 HEART IMAGE SPECT: CPT | Mod: 26

## 2020-03-12 RX ADMIN — Medication 81 MILLIGRAM(S): at 11:23

## 2020-03-12 RX ADMIN — Medication 20 MILLIGRAM(S): at 11:24

## 2020-03-12 RX ADMIN — POLYETHYLENE GLYCOL 3350 17 GRAM(S): 17 POWDER, FOR SOLUTION ORAL at 11:23

## 2020-03-12 RX ADMIN — SEVELAMER CARBONATE 800 MILLIGRAM(S): 2400 POWDER, FOR SUSPENSION ORAL at 07:53

## 2020-03-12 RX ADMIN — HEPARIN SODIUM 5000 UNIT(S): 5000 INJECTION INTRAVENOUS; SUBCUTANEOUS at 05:59

## 2020-03-12 RX ADMIN — Medication 667 MILLIGRAM(S): at 11:24

## 2020-03-12 RX ADMIN — Medication 667 MILLIGRAM(S): at 17:04

## 2020-03-12 RX ADMIN — AMLODIPINE BESYLATE 10 MILLIGRAM(S): 2.5 TABLET ORAL at 17:03

## 2020-03-12 RX ADMIN — CHLORHEXIDINE GLUCONATE 1 APPLICATION(S): 213 SOLUTION TOPICAL at 11:25

## 2020-03-12 RX ADMIN — ISOSORBIDE DINITRATE 20 MILLIGRAM(S): 5 TABLET ORAL at 22:30

## 2020-03-12 RX ADMIN — SEVELAMER CARBONATE 800 MILLIGRAM(S): 2400 POWDER, FOR SUSPENSION ORAL at 17:04

## 2020-03-12 RX ADMIN — HEPARIN SODIUM 5000 UNIT(S): 5000 INJECTION INTRAVENOUS; SUBCUTANEOUS at 22:30

## 2020-03-12 RX ADMIN — CINACALCET 30 MILLIGRAM(S): 30 TABLET, FILM COATED ORAL at 11:23

## 2020-03-12 RX ADMIN — Medication 100 MILLIGRAM(S): at 22:30

## 2020-03-12 RX ADMIN — SEVELAMER CARBONATE 800 MILLIGRAM(S): 2400 POWDER, FOR SUSPENSION ORAL at 12:36

## 2020-03-12 RX ADMIN — SIMVASTATIN 20 MILLIGRAM(S): 20 TABLET, FILM COATED ORAL at 22:30

## 2020-03-12 RX ADMIN — CARVEDILOL PHOSPHATE 12.5 MILLIGRAM(S): 80 CAPSULE, EXTENDED RELEASE ORAL at 17:04

## 2020-03-12 NOTE — PROGRESS NOTE ADULT - SUBJECTIVE AND OBJECTIVE BOX
CARDIOLOGY FOLLOW UP - Dr. Andrew    CC no cp or sob       PHYSICAL EXAM:  T(C): 36.7 (03-12-20 @ 10:30), Max: 37.6 (03-11-20 @ 20:40)  HR: 69 (03-12-20 @ 10:30) (68 - 74)  BP: 150/87 (03-12-20 @ 10:30) (132/82 - 152/83)  RR: 16 (03-12-20 @ 10:30) (16 - 18)  SpO2: 99% (03-12-20 @ 05:57) (96% - 100%)  Wt(kg): --  I&O's Summary    11 Mar 2020 07:01  -  12 Mar 2020 07:00  --------------------------------------------------------  IN: 840 mL / OUT: 500 mL / NET: 340 mL        Appearance: Normal	  Cardiovascular: Normal S1 S2,RRR  Respiratory: Lungs clear to auscultation	  Gastrointestinal:  Soft, Non-tender, + BS	  Extremities: bl le ++ edema        MEDICATIONS  (STANDING):  amLODIPine   Tablet 10 milliGRAM(s) Oral daily  aspirin enteric coated 81 milliGRAM(s) Oral daily  calcium acetate 667 milliGRAM(s) Oral three times a day with meals  carvedilol 12.5 milliGRAM(s) Oral every 12 hours  chlorhexidine 4% Liquid 1 Application(s) Topical daily  cinacalcet 30 milliGRAM(s) Oral daily  dextrose 5%. 1000 milliLiter(s) (50 mL/Hr) IV Continuous <Continuous>  dextrose 50% Injectable 12.5 Gram(s) IV Push once  dextrose 50% Injectable 25 Gram(s) IV Push once  dextrose 50% Injectable 25 Gram(s) IV Push once  furosemide   Injectable 20 milliGRAM(s) IV Push daily  heparin  Injectable 5000 Unit(s) SubCutaneous three times a day  hydrALAZINE 100 milliGRAM(s) Oral three times a day  insulin lispro (HumaLOG) corrective regimen sliding scale   SubCutaneous three times a day before meals  insulin lispro (HumaLOG) corrective regimen sliding scale   SubCutaneous at bedtime  isosorbide   dinitrate Tablet (ISORDIL) 20 milliGRAM(s) Oral three times a day  polyethylene glycol 3350 17 Gram(s) Oral daily  sevelamer carbonate 800 milliGRAM(s) Oral three times a day with meals  simvastatin 20 milliGRAM(s) Oral at bedtime      TELEMETRY: nsr	    ECG:  	  RADIOLOGY:   DIAGNOSTIC TESTING:  [ ] Echocardiogram:  < from: Transthoracic Echocardiogram (03.11.20 @ 11:40) >  OBSERVATIONS:  Mitral Valve: Normal mitral valve. Mild-moderate mitral  regurgitation.  Aortic Root: Normal aortic root.  Aortic Valve: Normal trileaflet aortic valve. Minimal  aortic regurgitation.  Left Atrium: Severely dilated left atrium.  LA volume index  = 54 cc/m2.  Left Ventricle: Moderate to severe global left ventricular  systolic dysfunction. Normal left ventricular internal  dimensions and wall thicknesses. Severe diastolic  dysfunction (Stage III).  Right Heart: Normal right atrium. Right ventricular  enlargement with decreased right ventricular systolic  function. Normal tricuspid valve. Mild-moderate tricuspid  regurgitation. Normal pulmonic valve.  Pericardium/PleuraSmall pericardial effusion.  Hemodynamic: Estimated right ventricular systolic pressure  equals 52mm Hg, assuming right atrial pressure equals 10  mm Hg, consistent with moderate pulmonary hypertension.  ------------------------------------------------------------------------  CONCLUSIONS:  1. Normal mitral valve. Mild-moderate mitral regurgitation.  2. Normal trileaflet aortic valve. Minimal aortic  regurgitation.  3. Severely dilated left atrium.  LA volume index = 54  cc/m2.  4. Normal left ventricular internal dimensions and wall  thicknesses.  5. Moderate to severe global left ventricular systolic  dysfunction.  6. Right ventricular enlargement with decreased right  ventricular systolic function.  7. Estimated pulmonary artery systolic pressure equals 52  mm Hg, assuming right atrial pressure equals 10  mm Hg,  consistent with moderate pulmonary hypertension.  8. Small pericardial effusion.  ------------------------------------------------------------------------  Confirmed on  3/11/2020 - 15:43:14 by Dorothy Gracia M.D. RPVI  ------------------------------------------------------------------------    < end of copied text >    [ ]  Catheterization:  [ ] Stress Test:    OTHER: 	    LABS:	 	    Troponin T, High Sensitivity: 94 ng/L [<6 - 14] (03-08 @ 05:56)  Creatine Kinase, Serum: 144 u/L [25 - 170] (03-08 @ 05:56)  CKMB: 2.91 ng/mL [1 - 4.7] (03-08 @ 05:56)  CKMB Relative Index: Test not performed [0.0 - 2.5] (03-08 @ 05:56)  Troponin T, High Sensitivity: 95 ng/L [<6 - 14] (03-08 @ 03:55)                          10.2   2.65  )-----------( 172      ( 12 Mar 2020 06:55 )             32.2     03-12    136  |  97<L>  |  49<H>  ----------------------------<  93  4.8   |  25  |  9.01<H>    Ca    8.9      12 Mar 2020 06:55  Phos  5.3     03-12  Mg     2.4     03-12

## 2020-03-12 NOTE — PROGRESS NOTE ADULT - ASSESSMENT
ASSESSMENT/PLAN    ASSESSMENT:  Ms. Herr is a 54 year-old lady with history of multiple medical issues including hypertension, type 2 diabetes mellitus type 2, nonischemic cardiomyopathy, cerebrovascular disease, and end stage renal disease. She undergoes hemodialysis Tuesdays, Thursdays, and Saturdays under the care of my associate Dr. Francisco at the [facility] unit in [place]. She missed her dialysis session on Saturday because of a family emergency. Her chief complaint was shortness of breath and leg swelling. Nephrology consulted for dialysis needs. She has a mild elevation of her troponin,  BNP 01459.    1. ESRD on hemodialysis Tuesday, Thursday and Saturday.    2. Electrolytes are acceptable.   3. Bicarb acceptable.   4. Hypervolemic.    5. Secondary hyperparathyroidism of renal origin with calcium of high normal side.   6. CV- SOB with elevated bump in troponin. Low EF  7. Moderate pulmonary HTN.    RECOMMEND:  1. HD today 3.5 hours with 3 liters of UF off.   2. Fluid restrict patient to 1.0 liters per day.   3. Daily BMP, phosphorus and magnesium.  4. Continue Renvela of 800 mg po TID with meals.   5. Low potassium and low phosphorus diet.   6. Need social work involved for the family emergency she had. She has a lot of psychosocial issues.   7. Will hemodialyze  pt again on Friday and Saturday for aggressive fluid removal.          Thank you for involving [employer] in this patient's care.    With warm regards,    Camelia Rea, DO  [employer]  [phone]

## 2020-03-12 NOTE — PROGRESS NOTE ADULT - SUBJECTIVE AND OBJECTIVE BOX
Patient is a 54y old  Female who presents with a chief complaint of Shortness of breath (12 Mar 2020 11:59)  c/o mild abdominal discomfort/pain "a lot gas".  Denies SOB/palpitations or chest pain.  s/p HD this am.     PAST MEDICAL & SURGICAL HISTORY:  CHF (congestive heart failure): EF 36 12/19  GERD (gastroesophageal reflux disease)  Anemia of chronic disease  HLD (hyperlipidemia)  NICM (nonischemic cardiomyopathy)  KERI (obstructive sleep apnea)  Chronic CHF  ESRD (end stage renal disease): HD T/T/S  Depression  Gout  Coronary artery disease involving native coronary artery of native heart without angina pectoris  CVA (cerebral vascular accident): 2013- Residual RLE weakness  CHF (congestive heart failure)  DM (diabetes mellitus)  HTN (hypertension)  CVA (cerebral vascular accident)  Glaucoma  Enlarged heart  HTN (hypertension)  Diabetes  Gout  S/P cholecystectomy  S/P partial hysterectomy  History of hysterectomy  Status post glaucoma surgery  History of cholecystectomy  AVF (arteriovenous fistula)  No significant past surgical history      MEDICATIONS  (STANDING):  amLODIPine   Tablet 10 milliGRAM(s) Oral daily  aspirin enteric coated 81 milliGRAM(s) Oral daily  calcium acetate 667 milliGRAM(s) Oral three times a day with meals  carvedilol 12.5 milliGRAM(s) Oral every 12 hours  chlorhexidine 4% Liquid 1 Application(s) Topical daily  cinacalcet 30 milliGRAM(s) Oral daily  dextrose 5%. 1000 milliLiter(s) (50 mL/Hr) IV Continuous <Continuous>  dextrose 50% Injectable 12.5 Gram(s) IV Push once  dextrose 50% Injectable 25 Gram(s) IV Push once  dextrose 50% Injectable 25 Gram(s) IV Push once  furosemide   Injectable 20 milliGRAM(s) IV Push daily  heparin  Injectable 5000 Unit(s) SubCutaneous three times a day  hydrALAZINE 100 milliGRAM(s) Oral three times a day  insulin lispro (HumaLOG) corrective regimen sliding scale   SubCutaneous three times a day before meals  insulin lispro (HumaLOG) corrective regimen sliding scale   SubCutaneous at bedtime  isosorbide   dinitrate Tablet (ISORDIL) 20 milliGRAM(s) Oral three times a day  polyethylene glycol 3350 17 Gram(s) Oral daily  sevelamer carbonate 800 milliGRAM(s) Oral three times a day with meals  simvastatin 20 milliGRAM(s) Oral at bedtime    MEDICATIONS  (PRN):  dextrose 40% Gel 15 Gram(s) Oral once PRN Blood Glucose LESS THAN 70 milliGRAM(s)/deciliter  glucagon  Injectable 1 milliGRAM(s) IntraMuscular once PRN Glucose LESS THAN 70 milligrams/deciliter            Vital Signs Last 24 Hrs  T(C): 36.7 (12 Mar 2020 11:20), Max: 37.6 (11 Mar 2020 20:40)  T(F): 98 (12 Mar 2020 11:20), Max: 99.6 (11 Mar 2020 20:40)  HR: 68 (12 Mar 2020 11:20) (68 - 74)  BP: 136/86 (12 Mar 2020 11:20) (132/82 - 152/83)  BP(mean): --  RR: 18 (12 Mar 2020 11:20) (16 - 18)  SpO2: 99% (12 Mar 2020 11:20) (96% - 100%)            INTERPRETATION OF TELEMETRY: SR     ECG:        LABS:                        10.2   2.65  )-----------( 172      ( 12 Mar 2020 06:55 )             32.2     03-12    136  |  97<L>  |  49<H>  ----------------------------<  93  4.8   |  25  |  9.01<H>    Ca    8.9      12 Mar 2020 06:55  Phos  5.3     03-12  Mg     2.4     03-12                BNP  RADIOLOGY & ADDITIONAL STUDIES:  CONCLUSIONS:  1. Normal mitral valve. Mild-moderate mitral regurgitation.  2. Normal trileaflet aortic valve. Minimal aortic  regurgitation.  3. Severely dilated left atrium.  LA volume index = 54  cc/m2.  4. Normal left ventricular internal dimensions and wall  thicknesses.  5. Moderate to severe global left ventricular systolic  dysfunction.  6. Right ventricular enlargement with decreased right  ventricular systolic function.  7. Estimated pulmonary artery systolic pressure equals 52  mm Hg, assuming right atrial pressure equals 10  mm Hg,  consistent with moderate pulmonary hypertension.  8. Small pericardial effusion.  ------------------------------------------------------------------------  Confirmed on  3/11/2020 - 15:43:14 by JULIEN Ortiz  ------------------------------------------------------------------------      PHYSICAL EXAM:    GENERAL: In no apparent distress, well nourished, and hydrated.  NECK: Supple and normal thyroid.  No JVD or carotid bruit.  Carotid pulse is 2+ bilaterally.  HEART: Regular rate and rhythm; No murmurs, rubs, or gallops.  L arm AVF +thrill/bruit  PULMONARY: Clear to auscultation and perfusion.  No rales, wheezing, or rhonchi bilaterally.  ABDOMEN: Soft, Nontender, Nondistended; Bowel sounds present  EXTREMITIES:  2+ Peripheral Pulses, No clubbing, cyanosis, or edema  NEUROLOGICAL: Grossly nonfocal

## 2020-03-12 NOTE — PROGRESS NOTE ADULT - ASSESSMENT
54 y.o female PMHx of HTN, CVA 2013 RLE weakness, HTN, DM Type II, NICM, Systolic CHF (EF 36%), ESRD on HD, KERI (not on home CPAP/BIPAP ), Depression, anemia, gout, glaucoma, dietary non adherence  presented with shortness of breath, PICKARD, mild chest pain in the setting of missing HD. EP consulted for worsening LVEF possibly secondary to increased PVC burden, plan for possible ICD implantation for secondary prevention if EF</=35% and possible elective PVC ablation likely RVOT PVCs   - Continue current management as per HF team  - Monitor and maintain electrolytes  - Dr. Farias discussed with patient regarding risks/benefits and alternatives of ICD implantation.  MUGA scan to measure LVEF  Possible ICD implantation on Friday if EF</= 35%

## 2020-03-12 NOTE — PROGRESS NOTE ADULT - SUBJECTIVE AND OBJECTIVE BOX
No tele events overnight  no cp, no sob, no palpitations    Vital Signs Last 24 Hrs  T(C): 36.7 (12 Mar 2020 11:20), Max: 37.6 (11 Mar 2020 20:40)  T(F): 98 (12 Mar 2020 11:20), Max: 99.6 (11 Mar 2020 20:40)  HR: 68 (12 Mar 2020 11:20) (68 - 74)  BP: 136/86 (12 Mar 2020 11:20) (132/82 - 152/83)  BP(mean): --  RR: 18 (12 Mar 2020 11:20) (16 - 18)  SpO2: 99% (12 Mar 2020 11:20) (96% - 100%)      PHYSICAL EXAM:  GENERAL: NAD, Comfortable  HEAD:  Atraumatic, Normocephalic  EYES: EOMI, PERRLA, conjunctiva and sclera clear  NECK: Supple, No JVD  CHEST/LUNG: Clear to auscultation bilaterally; No wheeze  HEART: Regular rate and rhythm; No murmurs, rubs, or gallops  ABDOMEN: Soft, Nontender, Nondistended; Bowel sounds present  Neuro: AAO x 3, no focal deficit, 5/5 b/l extremities  EXTREMITIES:  2+ Peripheral Pulses, No clubbing, cyanosis, +2 edema  SKIN: No rashes or lesions    LABS:                        10.2   2.65  )-----------( 172      ( 12 Mar 2020 06:55 )             32.2     03-12    136  |  97<L>  |  49<H>  ----------------------------<  93  4.8   |  25  |  9.01<H>    Ca    8.9      12 Mar 2020 06:55  Phos  5.3     03-12  Mg     2.4     03-12        CAPILLARY BLOOD GLUCOSE      POCT Blood Glucose.: 90 mg/dL (12 Mar 2020 07:47)  POCT Blood Glucose.: 91 mg/dL (11 Mar 2020 22:28)  POCT Blood Glucose.: 94 mg/dL (11 Mar 2020 17:45)  POCT Blood Glucose.: 105 mg/dL (11 Mar 2020 12:52)

## 2020-03-12 NOTE — PROGRESS NOTE ADULT - ASSESSMENT
Echo 1/5/20189: EF 45-50%, global LV sys dysfx, mild diastolic dysfx (stgI)  Stress test in 6/2018 normal with no evidence of mi or ischemia   Echo 3/ 22/2019, mild to mod MR, moderate global lv sys dysfx small pericardial effusion to left ventricle EF 41%   Echo 5/5/19: ef 40-45%, mod global lv sys dysfx, small pericardial effusion post to left ventricle  Echo 12/2/19: EF 36%, mild MR, mod to severe global LV sys dysfx , small pericardial effusion posterior  to lV and superior to right atrium         A/P   54 year old woman with history of CHF, nicmp, CVA, depression, GERD, gout, glaucoma, chol, HTN, KERI, asthma, DM, ESRD on HD, recent admit for CP, now returns with back with chest pain/ SOB     1. Chest pain, atypical, resolved   -in the setting of fluid overload, secondary to missed HD session   -HST elevated, type II MI, demand ischemia 2/2 to ESRD, CHF   -cont with BB, ASA, imdur, plavix   -echo with  known mod to sev global LV sys dysfx, mild to mod MR     2. Acute on chronic Systolic chf   -volume overloaded likely secondary to missed HD session  -cont fluid removal with HD/ lasix   -continue bb, imdur, hydral  -no ace/arb hx of hyperkalemia   -LE dopplers neg for dvt  -Echo with moderate to severe LV dysfunction, unable to calculate EF.  -EP f/u noted : plan for MUGA scan to measure LVEF  - Possible ICD implantation tomorrow if EF</= 35%    3. HTN  -stable, continue current meds     4. ESRD on HD  -renal f/u     5. Bigeminy (hx)  -recently evaluated by EP in dec . Per EP etiology of worsening LVEF possibly secondary to increased PVC burden. Patient was planned to follow up with EP for EP study with possible PVC ablation but patient was unable to make appointment due to frequent hospitalization  -cont bb  -ep f/u noted    -pending MUGA to calculate EF; possible ICD implantation for secondary prevention if EF</=35% and possible elective PVC ablation       dvt ppx

## 2020-03-12 NOTE — PROGRESS NOTE ADULT - SUBJECTIVE AND OBJECTIVE BOX
No pain, no shortness of breath. Seen after dialysis had 3 liters of UF off.     Review of systems: All 10 points ROS was obtained except as above.     amLODIPine   Tablet 10 milliGRAM(s) Oral daily  aspirin enteric coated 81 milliGRAM(s) Oral daily  calcium acetate 667 milliGRAM(s) Oral three times a day with meals  carvedilol 12.5 milliGRAM(s) Oral every 12 hours  chlorhexidine 4% Liquid 1 Application(s) Topical daily  cinacalcet 30 milliGRAM(s) Oral daily  dextrose 40% Gel 15 Gram(s) Oral once PRN  dextrose 5%. 1000 milliLiter(s) IV Continuous <Continuous>  dextrose 50% Injectable 12.5 Gram(s) IV Push once  dextrose 50% Injectable 25 Gram(s) IV Push once  dextrose 50% Injectable 25 Gram(s) IV Push once  furosemide   Injectable 20 milliGRAM(s) IV Push daily  glucagon  Injectable 1 milliGRAM(s) IntraMuscular once PRN  heparin  Injectable 5000 Unit(s) SubCutaneous three times a day  hydrALAZINE 100 milliGRAM(s) Oral three times a day  insulin lispro (HumaLOG) corrective regimen sliding scale   SubCutaneous three times a day before meals  insulin lispro (HumaLOG) corrective regimen sliding scale   SubCutaneous at bedtime  isosorbide   dinitrate Tablet (ISORDIL) 20 milliGRAM(s) Oral three times a day  polyethylene glycol 3350 17 Gram(s) Oral daily  sevelamer carbonate 800 milliGRAM(s) Oral three times a day with meals  simvastatin 20 milliGRAM(s) Oral at bedtime      VITAL:  T(C): , Max: 37.6 (03-11-20 @ 20:40)  T(F): , Max: 99.6 (03-11-20 @ 20:40)  HR: 68 (03-12-20 @ 16:56)  BP: 147/83 (03-12-20 @ 16:56)    RR: 18 (03-12-20 @ 16:56)  SpO2: 99% (03-12-20 @ 16:56)      03-11-20 @ 07:01  -  03-12-20 @ 07:00  --------------------------------------------------------  IN: 840 mL / OUT: 500 mL / NET: 340 mL        PHYSICAL EXAM:  General: NAD; Alert  HEENT:  NCAT; PERRLA  Neck: No JVD; supple  Respiratory: CTA-b/l  Cardiac: RRR s1s2  Gastrointestinal: BS+, soft, NT/ND  Urologic: No malcolm  Extremities: No peripheral edema  Access:     LABS:                          10.2   2.65  )-----------( 172      ( 12 Mar 2020 06:55 )             32.2     Na(136)/K(4.8)/Cl(97)/HCO3(25)/BUN(49)/Cr(9.01)Glu(93)/Ca(8.9)/Mg(2.4)/PO4(5.3)    03-12 @ 06:55  Na(136)/K(4.6)/Cl(95)/HCO3(25)/BUN(35)/Cr(7.39)Glu(101)/Ca(8.6)/Mg(2.2)/PO4(4.9)    03-11 @ 07:25  Na(136)/K(4.8)/Cl(94)/HCO3(24)/BUN(48)/Cr(8.99)Glu(118)/Ca(8.8)/Mg(2.3)/PO4(5.5)    03-10 @ 06:35      03-12    136  |  97<L>  |  49<H>  ----------------------------<  93  4.8   |  25  |  9.01<H>    Ca    8.9      12 Mar 2020 06:55  Phos  5.3     03-12  Mg     2.4     03-12    Patient name: MIMI HICKS  YOB: 1965   Age: 54 (F)   MR#: [id number]  Study Date: 3/11/2020  Location: [id number]: Lauren Finkelstein, RDCS  Study quality: Technically good  Referring Physician: Roz Hays MD  Blood Pressure: 141/70 mmHg  Height: 162 cm  Weight: 86 kg  BSA: 1.9 m2  ------------------------------------------------------------------------  PROCEDURE: Transthoracic echocardiogram with 2-D, M-Mode  and complete spectral and color flow Doppler.  INDICATION: Heart failure, unspecified (I50.9)  ------------------------------------------------------------------------  DIMENSIONS:  Dimensions:     Normal Values:  LA:     4.6 cm    2.0 - 4.0 cm  Ao:     2.9 cm    2.0 - 3.8 cm  SEPTUM: 1.0 cm    0.6 - 1.2 cm  PWT:    1.0 cm    0.6 - 1.1 cm  LVIDd:  6.6 cm    3.0 - 5.6 cm  LVIDs:    ---     1.8 - 4.0 cm  Derived Variables:  LVMI: 152 g/m2  RWT: 0.30  ------------------------------------------------------------------------  OBSERVATIONS:  Mitral Valve: Normal mitral valve. Mild-moderate mitral  regurgitation.  Aortic Root: Normal aortic root.  Aortic Valve: Normal trileaflet aortic valve. Minimal  aortic regurgitation.  Left Atrium: Severely dilated left atrium.  LA volume index  = 54 cc/m2.  Left Ventricle: Moderate to severe global left ventricular  systolic dysfunction. Normal left ventricular internal  dimensions and wall thicknesses. Severe diastolic  dysfunction (Stage III).  Right Heart: Normal right atrium. Right ventricular  enlargement with decreased right ventricular systolic  function. Normal tricuspid valve. Mild-moderate tricuspid  regurgitation. Normal pulmonic valve.  Pericardium/PleuraSmall pericardial effusion.  Hemodynamic: Estimated right ventricular systolic pressure  equals 52 mm Hg, assuming right atrial pressure equals 10  mm Hg, consistent with moderate pulmonary hypertension.  ------------------------------------------------------------------------  CONCLUSIONS:  1. Normal mitral valve. Mild-moderate mitral regurgitation.  2. Normal trileaflet aortic valve. Minimal aortic  regurgitation.  3. Severely dilated left atrium.  LA volume index = 54  cc/m2.  4. Normal left ventricular internal dimensions and wall  thicknesses.  5. Moderate to severe global left ventricular systolic  dysfunction.  6. Right ventricular enlargement with decreased right  ventricular systolic function.  7. Estimated pulmonary artery systolic pressure equals 52  mm Hg, assuming right atrial pressure equals 10  mm Hg,  consistent with moderate pulmonary hypertension.  8. Small pericardial effusion.  ------------------------------------------------------------------------  Confirmed on  3/11/2020 - 15:43:14 by JULIEN Ortiz

## 2020-03-12 NOTE — PROGRESS NOTE ADULT - ASSESSMENT
54 y.o female PMHx of CVA 2013, HTN, DM Type II, Systolic CHF (EF 36%), ESRD on HD T/Th/Sat via left fore arm AVF, KERI (not on home CPAP/BIPAP)  presents with shortness of breath after AMA from OSH on 3/7 and missed HD.      Problem/Plan - 1:  ·  Problem: Acute on chronic systolic heart failure.    Troponin elevated without acute EKG changes - likely demand ischemia in setting of ESRD.  Lasix 20mg IV daily  HD for fluid management per renal.  LE Duplex neg for DVT    Daily I+O and weights.  Card eval appreciated  EP on the case for possible PVC ablation inpatient (?worsening LVEF possibly secondary to increased PVC burden) and ICD implantation  Awaiting MUGA     Problem/Plan - 2:  ·  Problem: ESRD (end stage renal disease).  Plan: Nephro (pt of Dr. Francisco)   HD per renal  Avoid nephrotoxins.      Problem/Plan - 3:  ·  Problem: Transaminitis.  Plan: Likely passive hepatic congestion  Trend - if gets worse then Abd US or GI eval.   LFT trending down     Problem/Plan - 4:  ·  Problem: Hyperlipidemia, unspecified hyperlipidemia type.  Plan: Continue statin.      Problem/Plan - 5:  ·  Problem: Type 2 diabetes mellitus with other specified complication, without long-term current use of insulin.  Plan: ISS  FS     Problem/Plan - 6:  Problem: Essential hypertension. Plan: Continue BP meds.

## 2020-03-13 ENCOUNTER — TRANSCRIPTION ENCOUNTER (OUTPATIENT)
Age: 55
End: 2020-03-13

## 2020-03-13 LAB
ANION GAP SERPL CALC-SCNC: 15 MMO/L — HIGH (ref 7–14)
ANION GAP SERPL CALC-SCNC: 15 MMO/L — HIGH (ref 7–14)
BASOPHILS # BLD AUTO: 0.04 K/UL — SIGNIFICANT CHANGE UP (ref 0–0.2)
BASOPHILS NFR BLD AUTO: 1.6 % — SIGNIFICANT CHANGE UP (ref 0–2)
BUN SERPL-MCNC: 36 MG/DL — HIGH (ref 7–23)
BUN SERPL-MCNC: 36 MG/DL — HIGH (ref 7–23)
CALCIUM SERPL-MCNC: 8.9 MG/DL — SIGNIFICANT CHANGE UP (ref 8.4–10.5)
CALCIUM SERPL-MCNC: 8.9 MG/DL — SIGNIFICANT CHANGE UP (ref 8.4–10.5)
CHLORIDE SERPL-SCNC: 94 MMOL/L — LOW (ref 98–107)
CHLORIDE SERPL-SCNC: 94 MMOL/L — LOW (ref 98–107)
CO2 SERPL-SCNC: 26 MMOL/L — SIGNIFICANT CHANGE UP (ref 22–31)
CO2 SERPL-SCNC: 26 MMOL/L — SIGNIFICANT CHANGE UP (ref 22–31)
CREAT SERPL-MCNC: 7.27 MG/DL — HIGH (ref 0.5–1.3)
CREAT SERPL-MCNC: 7.27 MG/DL — HIGH (ref 0.5–1.3)
EOSINOPHIL # BLD AUTO: 0.13 K/UL — SIGNIFICANT CHANGE UP (ref 0–0.5)
EOSINOPHIL NFR BLD AUTO: 5.1 % — SIGNIFICANT CHANGE UP (ref 0–6)
GLUCOSE SERPL-MCNC: 117 MG/DL — HIGH (ref 70–99)
GLUCOSE SERPL-MCNC: 117 MG/DL — HIGH (ref 70–99)
HCT VFR BLD CALC: 34.7 % — SIGNIFICANT CHANGE UP (ref 34.5–45)
HCT VFR BLD CALC: 34.7 % — SIGNIFICANT CHANGE UP (ref 34.5–45)
HGB BLD-MCNC: 11.1 G/DL — LOW (ref 11.5–15.5)
HGB BLD-MCNC: 11.1 G/DL — LOW (ref 11.5–15.5)
IMM GRANULOCYTES NFR BLD AUTO: 0 % — SIGNIFICANT CHANGE UP (ref 0–1.5)
LYMPHOCYTES # BLD AUTO: 0.98 K/UL — LOW (ref 1–3.3)
LYMPHOCYTES # BLD AUTO: 38.6 % — SIGNIFICANT CHANGE UP (ref 13–44)
MAGNESIUM SERPL-MCNC: 2.3 MG/DL — SIGNIFICANT CHANGE UP (ref 1.6–2.6)
MCHC RBC-ENTMCNC: 29.9 PG — SIGNIFICANT CHANGE UP (ref 27–34)
MCHC RBC-ENTMCNC: 29.9 PG — SIGNIFICANT CHANGE UP (ref 27–34)
MCHC RBC-ENTMCNC: 32 % — SIGNIFICANT CHANGE UP (ref 32–36)
MCHC RBC-ENTMCNC: 32 % — SIGNIFICANT CHANGE UP (ref 32–36)
MCV RBC AUTO: 93.5 FL — SIGNIFICANT CHANGE UP (ref 80–100)
MCV RBC AUTO: 93.5 FL — SIGNIFICANT CHANGE UP (ref 80–100)
MONOCYTES # BLD AUTO: 0.35 K/UL — SIGNIFICANT CHANGE UP (ref 0–0.9)
MONOCYTES NFR BLD AUTO: 13.8 % — SIGNIFICANT CHANGE UP (ref 2–14)
NEUTROPHILS # BLD AUTO: 1.04 K/UL — LOW (ref 1.8–7.4)
NEUTROPHILS NFR BLD AUTO: 40.9 % — LOW (ref 43–77)
NRBC # FLD: 0 K/UL — SIGNIFICANT CHANGE UP (ref 0–0)
NRBC # FLD: 0 K/UL — SIGNIFICANT CHANGE UP (ref 0–0)
PHOSPHATE SERPL-MCNC: 5.2 MG/DL — HIGH (ref 2.5–4.5)
PLATELET # BLD AUTO: 174 K/UL — SIGNIFICANT CHANGE UP (ref 150–400)
PLATELET # BLD AUTO: 174 K/UL — SIGNIFICANT CHANGE UP (ref 150–400)
PMV BLD: 11 FL — SIGNIFICANT CHANGE UP (ref 7–13)
PMV BLD: 11 FL — SIGNIFICANT CHANGE UP (ref 7–13)
POTASSIUM SERPL-MCNC: 4.8 MMOL/L — SIGNIFICANT CHANGE UP (ref 3.5–5.3)
POTASSIUM SERPL-MCNC: 4.8 MMOL/L — SIGNIFICANT CHANGE UP (ref 3.5–5.3)
POTASSIUM SERPL-SCNC: 4.8 MMOL/L — SIGNIFICANT CHANGE UP (ref 3.5–5.3)
POTASSIUM SERPL-SCNC: 4.8 MMOL/L — SIGNIFICANT CHANGE UP (ref 3.5–5.3)
RBC # BLD: 3.71 M/UL — LOW (ref 3.8–5.2)
RBC # BLD: 3.71 M/UL — LOW (ref 3.8–5.2)
RBC # FLD: 14.2 % — SIGNIFICANT CHANGE UP (ref 10.3–14.5)
RBC # FLD: 14.2 % — SIGNIFICANT CHANGE UP (ref 10.3–14.5)
SODIUM SERPL-SCNC: 135 MMOL/L — SIGNIFICANT CHANGE UP (ref 135–145)
SODIUM SERPL-SCNC: 135 MMOL/L — SIGNIFICANT CHANGE UP (ref 135–145)
WBC # BLD: 2.54 K/UL — LOW (ref 3.8–10.5)
WBC # BLD: 2.54 K/UL — LOW (ref 3.8–10.5)
WBC # FLD AUTO: 2.54 K/UL — LOW (ref 3.8–10.5)
WBC # FLD AUTO: 2.54 K/UL — LOW (ref 3.8–10.5)

## 2020-03-13 PROCEDURE — 99232 SBSQ HOSP IP/OBS MODERATE 35: CPT

## 2020-03-13 RX ORDER — SIMVASTATIN 20 MG/1
1 TABLET, FILM COATED ORAL
Qty: 30 | Refills: 0
Start: 2020-03-13 | End: 2020-04-11

## 2020-03-13 RX ORDER — POLYETHYLENE GLYCOL 3350 17 G/17G
17 POWDER, FOR SOLUTION ORAL
Qty: 0 | Refills: 0 | DISCHARGE
Start: 2020-03-13

## 2020-03-13 RX ORDER — CARVEDILOL PHOSPHATE 80 MG/1
1 CAPSULE, EXTENDED RELEASE ORAL
Qty: 60 | Refills: 0
Start: 2020-03-13 | End: 2020-04-11

## 2020-03-13 RX ORDER — SEVELAMER CARBONATE 2400 MG/1
1 POWDER, FOR SUSPENSION ORAL
Qty: 90 | Refills: 0
Start: 2020-03-13 | End: 2020-04-11

## 2020-03-13 RX ADMIN — HEPARIN SODIUM 5000 UNIT(S): 5000 INJECTION INTRAVENOUS; SUBCUTANEOUS at 06:35

## 2020-03-13 RX ADMIN — ISOSORBIDE DINITRATE 20 MILLIGRAM(S): 5 TABLET ORAL at 16:17

## 2020-03-13 RX ADMIN — AMLODIPINE BESYLATE 10 MILLIGRAM(S): 2.5 TABLET ORAL at 17:29

## 2020-03-13 RX ADMIN — HEPARIN SODIUM 5000 UNIT(S): 5000 INJECTION INTRAVENOUS; SUBCUTANEOUS at 23:47

## 2020-03-13 RX ADMIN — SEVELAMER CARBONATE 800 MILLIGRAM(S): 2400 POWDER, FOR SUSPENSION ORAL at 16:16

## 2020-03-13 RX ADMIN — CINACALCET 30 MILLIGRAM(S): 30 TABLET, FILM COATED ORAL at 16:17

## 2020-03-13 RX ADMIN — CARVEDILOL PHOSPHATE 12.5 MILLIGRAM(S): 80 CAPSULE, EXTENDED RELEASE ORAL at 17:29

## 2020-03-13 RX ADMIN — Medication 20 MILLIGRAM(S): at 06:34

## 2020-03-13 RX ADMIN — Medication 100 MILLIGRAM(S): at 23:47

## 2020-03-13 RX ADMIN — ISOSORBIDE DINITRATE 20 MILLIGRAM(S): 5 TABLET ORAL at 06:35

## 2020-03-13 RX ADMIN — Medication 100 MILLIGRAM(S): at 16:17

## 2020-03-13 RX ADMIN — Medication 667 MILLIGRAM(S): at 09:11

## 2020-03-13 RX ADMIN — Medication 667 MILLIGRAM(S): at 16:16

## 2020-03-13 RX ADMIN — Medication 81 MILLIGRAM(S): at 16:17

## 2020-03-13 RX ADMIN — SEVELAMER CARBONATE 800 MILLIGRAM(S): 2400 POWDER, FOR SUSPENSION ORAL at 09:11

## 2020-03-13 RX ADMIN — SIMVASTATIN 20 MILLIGRAM(S): 20 TABLET, FILM COATED ORAL at 21:44

## 2020-03-13 RX ADMIN — Medication 100 MILLIGRAM(S): at 06:35

## 2020-03-13 RX ADMIN — HEPARIN SODIUM 5000 UNIT(S): 5000 INJECTION INTRAVENOUS; SUBCUTANEOUS at 16:17

## 2020-03-13 RX ADMIN — CARVEDILOL PHOSPHATE 12.5 MILLIGRAM(S): 80 CAPSULE, EXTENDED RELEASE ORAL at 06:34

## 2020-03-13 RX ADMIN — ISOSORBIDE DINITRATE 20 MILLIGRAM(S): 5 TABLET ORAL at 23:47

## 2020-03-13 NOTE — DISCHARGE NOTE PROVIDER - NSDCFUSCHEDAPPT_GEN_ALL_CORE_FT
MIMI HICKS ; 05/04/2020 ; P Cardio Electro [phone] MIMI HICKS ; 05/04/2020 ; P Cardio Electro [phone]

## 2020-03-13 NOTE — DISCHARGE NOTE PROVIDER - PROVIDER TOKENS
PROVIDER:[TOKEN:[[id number]]],PROVIDER:[TOKEN:[[id number]]] PROVIDER:[TOKEN:[[id number]]],PROVIDER:[TOKEN:[[id number]]] PROVIDER:[TOKEN:[[id number]]],PROVIDER:[TOKEN:[[id number]]],PROVIDER:[TOKEN:[[id number]]]

## 2020-03-13 NOTE — PROGRESS NOTE ADULT - ASSESSMENT
Echo 1/5/20189: EF 45-50%, global LV sys dysfx, mild diastolic dysfx (stgI)  Stress test in 6/2018 normal with no evidence of mi or ischemia   Echo 3/ 22/2019, mild to mod MR, moderate global lv sys dysfx small pericardial effusion to left ventricle EF 41%   Echo 5/5/19: ef 40-45%, mod global lv sys dysfx, small pericardial effusion post to left ventricle  Echo 12/2/19: EF 36%, mild MR, mod to severe global LV sys dysfx , small pericardial effusion posterior  to lV and superior to right atrium         A/P   54 year old woman with history of CHF, nicmp, CVA, depression, GERD, gout, glaucoma, chol, HTN, KERI, asthma, DM, ESRD on HD, recent admit for CP, now returns with back with chest pain/ SOB     1. Chest pain, atypical, resolved   -in the setting of fluid overload, secondary to missed HD session   -HST elevated, type II MI, demand ischemia 2/2 to ESRD, CHF   -cont with BB, ASA, imdur, plavix   -echo with  known mod to sev global LV sys dysfx, mild to mod MR     2. Acute on chronic Systolic / diastolic chf   -volume overloaded likely secondary to missed HD session  -clinically improving, cont fluid removal with HD/ lasix per renal  -continue bb, imdur, hydral  -no ace/arb hx of hyperkalemia   -LE dopplers neg for dvt  -Echo with moderate to severe LV dysfunction, unable to calculate EF.  -MUGA revealed LVEF 47%, mildly reduced lv fx, no segmental wall motion abnl, + pulm htn, diastolic dsyfx   - EP to f/u    3. HTN  -stable, continue current meds     4. ESRD on HD  -renal f/u     5. Bigeminy (hx)  -recently evaluated by EP in dec . Per EP etiology of worsening LVEF possibly secondary to increased PVC burden. Patient was planned to follow up with EP for EP study with possible PVC ablation but patient was unable to make appointment due to frequent hospitalization  -cont bb  -Muga scan as mentioned above; ep f/u      dvt ppx

## 2020-03-13 NOTE — DISCHARGE NOTE PROVIDER - NSDCCPCAREPLAN_GEN_ALL_CORE_FT
PRINCIPAL DISCHARGE DIAGNOSIS  Diagnosis: NICM (nonischemic cardiomyopathy)  Assessment and Plan of Treatment: Low salt diet, fluid restriction to 1500 ml daily, monitor your fluid intake and weight daily, exercise as tolerated 30 minutes daily, and follow up with your physician within 1 to 2 weeks.      SECONDARY DISCHARGE DIAGNOSES  Diagnosis: ESRD (end stage renal disease)  Assessment and Plan of Treatment: Please follow up with your nephrologist for management, and continue you schedule hemodialysis.    Diagnosis: Type 2 diabetes mellitus with other specified complication, without long-term current use of insulin  Assessment and Plan of Treatment: low salt, fat and carbohydrate diet, minimize glucose intake.  Exercise daily for at least 30 minutes and weight loss.  Follow up with primary care physician and endocrinologist for routine Hemoglobin A1C checks and management.  Follow up with your ophthalmologist for routine yearly vision exams.    Diagnosis: Hyperlipidemia, unspecified hyperlipidemia type  Assessment and Plan of Treatment: Low fat diet, exercise daily and continue current medications. Follow up with primary care physician and cardiologist for management.    Diagnosis: Essential hypertension  Assessment and Plan of Treatment: Low sodium and fat diet, continue anti-hypertensive medications, and follow up with primary care physician. PRINCIPAL DISCHARGE DIAGNOSIS  Diagnosis: NICM (nonischemic cardiomyopathy)  Assessment and Plan of Treatment: Low salt diet, fluid restriction to 1500 ml daily, monitor your fluid intake and weight daily, exercise as tolerated 30 minutes daily, and follow up with your physician within 1 to 2 weeks. Continue lasix as prescibed.      SECONDARY DISCHARGE DIAGNOSES  Diagnosis: ESRD (end stage renal disease)  Assessment and Plan of Treatment: Please follow up with your nephrologist for management, and continue you schedule hemodialysis.    Diagnosis: PVC (premature ventricular contraction)  Assessment and Plan of Treatment: Follow up with EP Dr. Farias in 1-2 weeks for monitoring and to discuss PVC abaltion. Please call to make an appointment.    Diagnosis: Type 2 diabetes mellitus with other specified complication, without long-term current use of insulin  Assessment and Plan of Treatment: low salt, fat and carbohydrate diet, minimize glucose intake.  Exercise daily for at least 30 minutes and weight loss.  Follow up with primary care physician and endocrinologist for routine Hemoglobin A1C checks and management.  Follow up with your ophthalmologist for routine yearly vision exams.    Diagnosis: Hyperlipidemia, unspecified hyperlipidemia type  Assessment and Plan of Treatment: Your statin dose was decreased, please continue with lower dose and follow up with your medical doctor to repeat lab work and check liver function tests.   Low fat diet, exercise daily and continue current medications. Follow up with primary care physician and cardiologist for management.    Diagnosis: Essential hypertension  Assessment and Plan of Treatment: Low sodium and fat diet, continue anti-hypertensive medications, and follow up with primary care physician.

## 2020-03-13 NOTE — DISCHARGE NOTE PROVIDER - NSDCFUADDAPPT_GEN_ALL_CORE_FT
Please follow up with EP in 2 weeks for Please follow up with EP in 2 weeks with Dr. Farias for close monitoring, it is very important that you call to set up a follow up appointment Please follow up with EP in 2 weeks with Dr. Farias for close monitoring, it is very important that you call to set up a follow up appointment for monitoring and for possible PVC ablation as outpatient.     Follow up with cardiology and with Dr. Garcia (Primary care doctor) in 1-2 weeks, please call to make an appointment.

## 2020-03-13 NOTE — DISCHARGE NOTE PROVIDER - CARE PROVIDER_API CALL
Uvaldo Andrew (MD)  Cardiovascular Disease; Interventional Cardiology; Nuclear Cardiology  [address] [facility], [address]  [place] [address]  Phone: [phone]  Fax: [phone]  Follow Up Time:     Gurjit Garzon)  Cardiac Electrophysiology; Cardiovascular Disease; Internal Medicine  [address] [place] [address], [address] [address]  [place] [address]  Phone: [phone]  Fax: [phone]  Follow Up Time: Uvaldo Andrew)  Cardiovascular Disease; Interventional Cardiology; Nuclear Cardiology  [address] [facility], [address]  [place] [address]  Phone: [phone]  Fax: [phone]  Follow Up Time:     Enzo Farias)  Cardiac Electrophysiology; Cardiology; Internal Medicine  [address] [place] [address]  Phone: [phone]  Fax: [phone]  Follow Up Time: Uvaldo Andrew)  Cardiovascular Disease; Interventional Cardiology; Nuclear Cardiology  [address] [facility], [address]  [place] [address]  Phone: [phone]  Fax: [phone]  Follow Up Time:     Enzo Farias)  Cardiac Electrophysiology; Cardiology; Internal Medicine  [address] [place] [address]  Phone: [phone]  Fax: [phone]  Follow Up Time:     Wale Francisco)  Internal Medicine; Nephrology  [address] [facility] [address]  [place] [address]  Phone: [phone]  Fax: [phone]  Follow Up Time:

## 2020-03-13 NOTE — PROGRESS NOTE ADULT - SUBJECTIVE AND OBJECTIVE BOX
(+)lumbago  some mild crampy abd pain but no diarrhea    Vital Signs Last 24 Hrs  T(C): 36.7 (13 Mar 2020 16:12), Max: 37.1 (12 Mar 2020 22:23)  T(F): 98 (13 Mar 2020 16:12), Max: 98.7 (12 Mar 2020 22:23)  HR: 68 (13 Mar 2020 16:12) (65 - 79)  BP: 154/79 (13 Mar 2020 16:12) (124/52 - 154/79)  BP(mean): --  RR: 16 (13 Mar 2020 16:12) (16 - 18)  SpO2: 100% (13 Mar 2020 16:12) (99% - 100%)      PHYSICAL EXAM:  GENERAL: NAD, Comfortable  HEAD:  Atraumatic, Normocephalic  EYES: EOMI, PERRLA, conjunctiva and sclera clear  NECK: Supple, No JVD  CHEST/LUNG: Clear to auscultation bilaterally; No wheeze  HEART: Regular rate and rhythm; No murmurs, rubs, or gallops  ABDOMEN: Soft, Nontender, Nondistended; Bowel sounds present  Neuro: AAO x 3, no focal deficit, 5/5 b/l extremities  EXTREMITIES:  2+ Peripheral Pulses, No clubbing, cyanosis, +2 edema  SKIN: No rashes or lesions    LABS:                        11.1   2.54  )-----------( 174      ( 13 Mar 2020 06:50 )             34.7     03-13    135  |  94<L>  |  36<H>  ----------------------------<  117<H>  4.8   |  26  |  7.27<H>    Ca    8.9      13 Mar 2020 06:50  Phos  5.2     03-13  Mg     2.3     03-13        CAPILLARY BLOOD GLUCOSE      POCT Blood Glucose.: 111 mg/dL (13 Mar 2020 12:32)  POCT Blood Glucose.: 116 mg/dL (13 Mar 2020 08:45)  POCT Blood Glucose.: 133 mg/dL (12 Mar 2020 21:47)  POCT Blood Glucose.: 95 mg/dL (12 Mar 2020 17:38)

## 2020-03-13 NOTE — PHARMACOTHERAPY INTERVENTION NOTE - COMMENTS
Patient counseled on medication indications, administration, and side effects. Also discussed fluid and salt restrictions, and maintaining a log of daily weights. Patient verbalized understanding.

## 2020-03-13 NOTE — PROGRESS NOTE ADULT - ASSESSMENT
54 y.o female PMHx of CVA 2013, HTN, DM Type II, Systolic CHF (EF 36%), ESRD on HD T/Th/Sat via left fore arm AVF, KERI (not on home CPAP/BIPAP)  presents with shortness of breath after AMA from OSH on 3/7 and missed HD.      Problem/Plan - 1:  ·  Problem: Acute on chronic systolic heart failure.    Troponin elevated without acute EKG changes - likely demand ischemia in setting of ESRD.  Lasix 20mg IV daily  HD for fluid management per renal.  LE Duplex neg for DVT    Daily I+O and weights.  Card julienne appreciated  EP on the case for possible PVC ablation inpatient (?worsening LVEF possibly secondary to increased PVC burden) and ICD implantation  MUGA 3/12/20 showed EF=47%, no indication for ICD     Problem/Plan - 2:  ·  Problem: ESRD (end stage renal disease).  Plan: Nephro (pt of Dr. Francisco)   HD per renal  Avoid nephrotoxins.      Problem/Plan - 3:  ·  Problem: Transaminitis.  Plan: Likely passive hepatic congestion  LFTs trended down     Problem/Plan - 4:  ·  Problem: Hyperlipidemia, unspecified hyperlipidemia type.  Plan: Continue statin.      Problem/Plan - 5:  ·  Problem: Type 2 diabetes mellitus with other specified complication, without long-term current use of insulin.  Plan: ISS  FS     Problem/Plan - 6:  Problem: Essential hypertension. Plan: Continue BP meds.

## 2020-03-13 NOTE — DISCHARGE NOTE PROVIDER - HOSPITAL COURSE
54 y.o female PMHx of CVA 2013, HTN, DM Type II, Systolic CHF (EF 36%), ESRD on HD T/Th/Sat via left fore arm AVF, KERI (not on home CPAP/BIPAP)  presents with shortness of breath after AMA from OSH on 3/7 and missed HD.         Pt was treated for Acute on chronic systolic heart failure with IV lasix and fluid removal during dialysis.       Troponin elevated without acute EKG changes - likely demand ischemia in setting of ESRD. LE Duplex neg for DVT      Daily I+O and weights.        Cards eval: Chest pain, atypical, resolved - in the setting of fluid overload, secondary to missed HD session     -HST elevated, type II MI, demand ischemia 2/2 to ESRD, CHF - cont with BB, ASA, imdur, plavix     -echo with  known mod to sev global LV sys dysfx, mild to mod MR     -volume overloaded likely secondary to missed HD session    -clinically improving    -no ace/arb hx of hyperkalemia     -Echo with moderate to severe LV dysfunction, unable to calculate EF.    -MUGA revealed LVEF 47%, mildly reduced lv fx, no segmental wall motion abnl, + pulm htn, diastolic dsyfx     -recently evaluated by EP in dec for Bigeminy. Per EP etiology of worsening LVEF possibly secondary to increased PVC burden. Patient was planned to follow up with EP for EP study with possible PVC ablation but patient was unable to make appointment due to frequent hospitalization.             EP on the case as well for possible PVC ablation inpatient and for possible ICD implantation    MUGA 3/12/20 showed EF=47%, no indication for ICD as per EP, hold off on PVC ablation at this time.        No plan for inpt PVC ablation given improvement in EF                 Noted with Transaminitis.  Plan: Likely passive hepatic congestion    LFTs trended down, statin dose decreased 54 y.o female PMHx of CVA 2013, HTN, DM Type II, Systolic CHF (EF 36%), ESRD on HD T/Th/Sat via left fore arm AVF, KERI (not on home CPAP/BIPAP)  presents with shortness of breath after AMA from OSH on 3/7 and missed HD.         Pt was treated for Acute on chronic systolic heart failure with IV lasix and fluid removal during dialysis.       Troponin elevated without acute EKG changes - likely demand ischemia in setting of ESRD. LE Duplex neg for DVT      Daily I+O and weights.        Cards eval: Chest pain, atypical, resolved - in the setting of fluid overload, secondary to missed HD session     -HST elevated, type II MI, demand ischemia 2/2 to ESRD, CHF - cont with BB, ASA, imdur, plavix     -echo with  known mod to sev global LV sys dysfx, mild to mod MR     -volume overloaded likely secondary to missed HD session    -clinically improving    -no ace/arb hx of hyperkalemia     -Echo with moderate to severe LV dysfunction, unable to calculate EF.    -MUGA revealed LVEF 47%, mildly reduced lv fx, no segmental wall motion abnl, + pulm htn, diastolic dsyfx     -recently evaluated by EP in dec for Bigeminy. Per EP etiology of worsening LVEF possibly secondary to increased PVC burden. Patient was planned to follow up with EP for EP study with possible PVC ablation but patient was unable to make appointment due to frequent hospitalization.             EP on the case as well for possible PVC ablation inpatient and for possible ICD implantation    MUGA 3/12/20 showed EF=47%, no indication for ICD as per EP, hold off on PVC ablation at this time.        No plan for inpt PVC ablation given improvement in EF - will need outpt follow up.                Noted with Transaminitis.  Plan: Likely passive hepatic congestion    LFTs trended down, statin dose decreased         Discussed case with Dr. Griggs and cardiology, pt cleared for discharge.

## 2020-03-13 NOTE — PROGRESS NOTE ADULT - SUBJECTIVE AND OBJECTIVE BOX
No pain, no shortness of breath      VITAL:  T(C): , Max: 37.1 (03-12-20 @ 22:23)  T(F): , Max: 98.7 (03-12-20 @ 22:23)  HR: 65 (03-13-20 @ 06:32)  BP: 138/71 (03-13-20 @ 06:32)  RR: 18 (03-13-20 @ 06:32)  SpO2: 100% (03-13-20 @ 06:32)      PHYSICAL EXAM:    Constitutional: NAD; Alert  HEENT:  NCAT; DMM  Neck: No JVD; supple  Respiratory: CTA-b/l  Cardiac: RRR s1s2  Gastrointestinal: BS+, soft, NT/ND  Urologic: No malcolm  Extremities: No peripheral edema  Back: No CVAT b/l    LABS:                        11.1   2.54  )-----------( 174      ( 13 Mar 2020 06:50 )             34.7     Na(135)/K(4.8)/Cl(94)/HCO3(26)/BUN(36)/Cr(7.27)Glu(117)/Ca(8.9)/Mg(2.3)/PO4(5.2)    03-13 @ 06:50  Na(136)/K(4.8)/Cl(97)/HCO3(25)/BUN(49)/Cr(9.01)Glu(93)/Ca(8.9)/Mg(2.4)/PO4(5.3)    03-12 @ 06:55  Na(136)/K(4.6)/Cl(95)/HCO3(25)/BUN(35)/Cr(7.39)Glu(101)/Ca(8.6)/Mg(2.2)/PO4(4.9)    03-11 @ 07:25    IMAGING:  < from: Cardiology MUGA Spect (03.12.20 @ 17:03) >  * Normal LV size.  * Mildly reduced LV function.  *Rest gated blood pool imaging was performed (LVEF = 47  %;LVEDV = 140 ml.), revealing mildy reduced  LV function  with normal LV size. There was no segmental wall motion  abnormality. However, there was paradoxical motion of the  septum, possibly dueto RV pressure overload.  *  The RV was normal in size, but diffusely  hypocontractile.  * The pulmonary artery was enlarged.  * These findings suggest pulmonary hypertension.  * The LV time activity curve suggested diastolic  dysfunction.      ASSESSMENT: 54F w/ HTN, DM2, NICM, CVA, and ESRD-HD TTS, 3/8/20 a/w hypervolemia in setting of missed HD session     (1)Renal - ESRD - HD TTS - due for next HD tomorrow  (2)CV - EF 47% on MUGA - not qualifying for ICD based on EF.    RECOMMEND:  (1)D/C planning per primary team  (2)If here tomorrow, will dialyze for 195minutes, 3-3.5kg UF as able        Wale Francisco MD  [facility]  [phone] No pain, no shortness of breath. Seen on HD      VITAL:  T(C): , Max: 37.1 (03-12-20 @ 22:23)  T(F): , Max: 98.7 (03-12-20 @ 22:23)  HR: 65 (03-13-20 @ 06:32)  BP: 138/71 (03-13-20 @ 06:32)  RR: 18 (03-13-20 @ 06:32)  SpO2: 100% (03-13-20 @ 06:32)      PHYSICAL EXAM:    Constitutional: NAD; Alert  HEENT:  NCAT; DMM  Neck: No JVD; supple  Respiratory: CTA-b/l  Cardiac: RRR s1s2  Gastrointestinal: BS+, soft, NT/ND  Urologic: No malcolm  Extremities: No peripheral edema  Back: No CVAT b/l  AVF - accessed      LABS:                        11.1   2.54  )-----------( 174      ( 13 Mar 2020 06:50 )             34.7     Na(135)/K(4.8)/Cl(94)/HCO3(26)/BUN(36)/Cr(7.27)Glu(117)/Ca(8.9)/Mg(2.3)/PO4(5.2)    03-13 @ 06:50  Na(136)/K(4.8)/Cl(97)/HCO3(25)/BUN(49)/Cr(9.01)Glu(93)/Ca(8.9)/Mg(2.4)/PO4(5.3)    03-12 @ 06:55  Na(136)/K(4.6)/Cl(95)/HCO3(25)/BUN(35)/Cr(7.39)Glu(101)/Ca(8.6)/Mg(2.2)/PO4(4.9)    03-11 @ 07:25    IMAGING:  < from: Cardiology MUGA Spect (03.12.20 @ 17:03) >  * Normal LV size.  * Mildly reduced LV function.  *Rest gated blood pool imaging was performed (LVEF = 47  %;LVEDV = 140 ml.), revealing mildy reduced  LV function  with normal LV size. There was no segmental wall motion  abnormality. However, there was paradoxical motion of the  septum, possibly dueto RV pressure overload.  *  The RV was normal in size, but diffusely  hypocontractile.  * The pulmonary artery was enlarged.  * These findings suggest pulmonary hypertension.  * The LV time activity curve suggested diastolic  dysfunction.      ASSESSMENT: 54F w/ HTN, DM2, NICM, CVA, and ESRD-HD TTS, 3/8/20 a/w hypervolemia in setting of missed HD session     (1)Renal - ESRD - HD TTS - receiving extra treatment now  (2)CV - EF 47% on MUGA - not qualifying for ICD based on EF.    RECOMMEND:  (1)D/C planning per primary team  (2)If here tomorrow, will dialyze for 195minutes, 3-3.5kg UF as able        Wale Francisco MD  [facility]  [phone]

## 2020-03-13 NOTE — DISCHARGE NOTE PROVIDER - NSDCMRMEDTOKEN_GEN_ALL_CORE_FT
amLODIPine 10 mg oral tablet: 1 tab(s) orally once a day  aspirin 81 mg oral delayed release tablet: 1 tab(s) orally once a day  calcium acetate 667 mg oral tablet: 1 tab(s) orally 3 times a day  cinacalcet 30 mg oral tablet: 1 tab(s) orally once a day  ergocalciferol 50,000 intl units (1.25 mg) oral capsule: 1 cap(s) orally once a week  hydrALAZINE 100 mg oral tablet: 1 tab(s) orally 3 times a day  isosorbide dinitrate 20 mg oral tablet: 1  orally 3 times a day  Januvia 25 mg oral tablet: 1 tab(s) orally once a day  Lasix 20 mg oral tablet: 1 tab(s) orally once a day  polyethylene glycol 3350 oral powder for reconstitution: 17 gram(s) orally once a day  simvastatin 40 mg oral tablet: 1 tab(s) orally once a day (at bedtime) amLODIPine 10 mg oral tablet: 1 tab(s) orally once a day  aspirin 81 mg oral delayed release tablet: 1 tab(s) orally once a day  calcium acetate 667 mg oral tablet: 1 tab(s) orally 3 times a day  cinacalcet 30 mg oral tablet: 1 tab(s) orally once a day  hydrALAZINE 100 mg oral tablet: 1 tab(s) orally 3 times a day  isosorbide dinitrate 20 mg oral tablet: 1  orally 3 times a day  Januvia 25 mg oral tablet: 1 tab(s) orally once a day  Lasix 20 mg oral tablet: 1 tab(s) orally once a day  polyethylene glycol 3350 oral powder for reconstitution: 17 gram(s) orally once a day amLODIPine 10 mg oral tablet: 1 tab(s) orally once a day  aspirin 81 mg oral delayed release tablet: 1 tab(s) orally once a day  calcium acetate 667 mg oral tablet: 1 tab(s) orally 3 times a day  carvedilol 12.5 mg oral tablet: 1 tab(s) orally every 12 hours    **per pharmacy last filled 8/7/2019 for a 30 day supply, not filled since   cinacalcet 30 mg oral tablet: 1 tab(s) orally once a day  ergocalciferol 50,000 intl units (1.25 mg) oral capsule: 1 cap(s) orally once a week  hydrALAZINE 100 mg oral tablet: 1 tab(s) orally 3 times a day  isosorbide dinitrate 20 mg oral tablet: 1 tab(s) orally 3 times a day   Januvia 25 mg oral tablet: 1 tab(s) orally once a day  Lasix 20 mg oral tablet: 1 tab(s) orally once a day  polyethylene glycol 3350 oral powder for reconstitution: 17 gram(s) orally once a day  sevelamer carbonate 800 mg oral tablet: 1 tab(s) orally 3 times a day (with meals)  simvastatin 20 mg oral tablet: 1 tab(s) orally once a day (at bedtime)

## 2020-03-13 NOTE — PROGRESS NOTE ADULT - ASSESSMENT
54 y.o female PMHx of HTN, CVA 2013 RLE weakness, HTN, DM Type II, NICM, Systolic CHF (EF 36%), ESRD on HD, KERI (not on home CPAP/BIPAP ), Depression, anemia, gout, glaucoma, dietary non adherence  presented with shortness of breath, PICKARD, mild chest pain in the setting of missing HD. EP consulted for worsening LVEF possibly secondary to increased PVC burden, plan for possible ICD implantation for secondary prevention if EF</=35% and possible elective PVC ablation likely RVOT PVCs   - Continue current management as per HF team  -No ICD indication as MUGA scan revealed mildly reduced LVEF 47%  -No contraindication from EP standpoint for discharge

## 2020-03-13 NOTE — DISCHARGE NOTE PROVIDER - CARE PROVIDERS DIRECT ADDRESSES
,DirectAddress_Unknown,[email or web address] ,DirectAddress_Unknown,[email or web address] ,DirectAddress_Unknown,[email or web address],DirectAddress_Unknown

## 2020-03-13 NOTE — PROGRESS NOTE ADULT - SUBJECTIVE AND OBJECTIVE BOX
Patient is a 54y old  Female who presents with a chief complaint of Shortness of breath (13 Mar 2020 10:49)  Patient denies CP, SOB or palpitations.  States" I want to go home".     PAST MEDICAL & SURGICAL HISTORY:  CHF (congestive heart failure): EF 36 12/19  GERD (gastroesophageal reflux disease)  Anemia of chronic disease  HLD (hyperlipidemia)  NICM (nonischemic cardiomyopathy)  KERI (obstructive sleep apnea)  Chronic CHF  ESRD (end stage renal disease): HD T/T/S  Depression  Gout  Coronary artery disease involving native coronary artery of native heart without angina pectoris  CVA (cerebral vascular accident): 2013- Residual RLE weakness  CHF (congestive heart failure)  DM (diabetes mellitus)  HTN (hypertension)  CVA (cerebral vascular accident)  Glaucoma  Enlarged heart  HTN (hypertension)  Diabetes  Gout  S/P cholecystectomy  S/P partial hysterectomy  History of hysterectomy  Status post glaucoma surgery  History of cholecystectomy  AVF (arteriovenous fistula)  No significant past surgical history      MEDICATIONS  (STANDING):  amLODIPine   Tablet 10 milliGRAM(s) Oral daily  aspirin enteric coated 81 milliGRAM(s) Oral daily  calcium acetate 667 milliGRAM(s) Oral three times a day with meals  carvedilol 12.5 milliGRAM(s) Oral every 12 hours  chlorhexidine 4% Liquid 1 Application(s) Topical daily  cinacalcet 30 milliGRAM(s) Oral daily  dextrose 5%. 1000 milliLiter(s) (50 mL/Hr) IV Continuous <Continuous>  dextrose 50% Injectable 12.5 Gram(s) IV Push once  dextrose 50% Injectable 25 Gram(s) IV Push once  dextrose 50% Injectable 25 Gram(s) IV Push once  furosemide   Injectable 20 milliGRAM(s) IV Push daily  heparin  Injectable 5000 Unit(s) SubCutaneous three times a day  hydrALAZINE 100 milliGRAM(s) Oral three times a day  insulin lispro (HumaLOG) corrective regimen sliding scale   SubCutaneous three times a day before meals  insulin lispro (HumaLOG) corrective regimen sliding scale   SubCutaneous at bedtime  isosorbide   dinitrate Tablet (ISORDIL) 20 milliGRAM(s) Oral three times a day  polyethylene glycol 3350 17 Gram(s) Oral daily  sevelamer carbonate 800 milliGRAM(s) Oral three times a day with meals  simvastatin 20 milliGRAM(s) Oral at bedtime    MEDICATIONS  (PRN):  dextrose 40% Gel 15 Gram(s) Oral once PRN Blood Glucose LESS THAN 70 milliGRAM(s)/deciliter  glucagon  Injectable 1 milliGRAM(s) IntraMuscular once PRN Glucose LESS THAN 70 milligrams/deciliter            Vital Signs Last 24 Hrs  T(C): 36.4 (13 Mar 2020 14:45), Max: 37.1 (12 Mar 2020 22:23)  T(F): 97.5 (13 Mar 2020 14:45), Max: 98.7 (12 Mar 2020 22:23)  HR: 71 (13 Mar 2020 14:45) (65 - 79)  BP: 141/75 (13 Mar 2020 14:45) (124/52 - 147/83)  BP(mean): --  RR: 16 (13 Mar 2020 14:45) (16 - 18)  SpO2: 100% (13 Mar 2020 06:32) (99% - 100%)            INTERPRETATION OF TELEMETRY:  SR 80 bpm; no NSVT/VT    ECG:        LABS:                        11.1   2.54  )-----------( 174      ( 13 Mar 2020 06:50 )             34.7     03-13    135  |  94<L>  |  36<H>  ----------------------------<  117<H>  4.8   |  26  |  7.27<H>    Ca    8.9      13 Mar 2020 06:50  Phos  5.2     03-13  Mg     2.3     03-13                BNP  RADIOLOGY & ADDITIONAL STUDIES:  MPRESSIONS:Abnormal Study  * Normal LV size.  * Mildly reduced LV function.  *Rest gated blood pool imaging was performed (LVEF = 47  %;LVEDV = 140 ml.), revealing mildy reduced  LV function  with normal LV size. There was no segmental wall motion  abnormality. However, there was paradoxical motion of the  septum, possibly dueto RV pressure overload.  *  The RV was normal in size, but diffusely  hypocontractile.  * The pulmonary artery was enlarged.  * These findings suggest pulmonary hypertension.  * The LV time activity curve suggested diastolic  dysfunction.  ------------------------------------------------------------------------      PHYSICAL EXAM:    GENERAL: In no apparent distress, well nourished, and hydrated.  NECK: Supple and normal thyroid.  No JVD or carotid bruit.  Carotid pulse is 2+ bilaterally.  HEART: Regular rate and rhythm; No murmurs, rubs, or gallops. L arm AVF  PULMONARY: Clear to auscultation and perfusion.  No rales, wheezing, or rhonchi bilaterally.  ABDOMEN: Soft, Nontender, Nondistended; Bowel sounds present  EXTREMITIES:  2+ Peripheral Pulses, No clubbing, cyanosis, or edema  NEUROLOGICAL: Grossly nonfocal

## 2020-03-13 NOTE — PROGRESS NOTE ADULT - SUBJECTIVE AND OBJECTIVE BOX
CARDIOLOGY FOLLOW UP - Dr. Andrew    CC no cp or sob        PHYSICAL EXAM:  T(C): 36.7 (03-13-20 @ 06:32), Max: 37.1 (03-12-20 @ 22:23)  HR: 65 (03-13-20 @ 06:32) (65 - 79)  BP: 138/71 (03-13-20 @ 06:32) (124/52 - 147/84)  RR: 18 (03-13-20 @ 06:32) (18 - 18)  SpO2: 100% (03-13-20 @ 06:32) (98% - 100%)  Wt(kg): --  I&O's Summary    12 Mar 2020 07:01  -  13 Mar 2020 07:00  --------------------------------------------------------  IN: 240 mL / OUT: 0 mL / NET: 240 mL        Appearance: Normal	  Cardiovascular: Normal S1 S2,RRR, No JVD, No murmurs  Respiratory: Lungs clear to auscultation	  Gastrointestinal:  Soft, Non-tender, + BS	  Extremities: bl le +  edema        MEDICATIONS  (STANDING):  amLODIPine   Tablet 10 milliGRAM(s) Oral daily  aspirin enteric coated 81 milliGRAM(s) Oral daily  calcium acetate 667 milliGRAM(s) Oral three times a day with meals  carvedilol 12.5 milliGRAM(s) Oral every 12 hours  chlorhexidine 4% Liquid 1 Application(s) Topical daily  cinacalcet 30 milliGRAM(s) Oral daily  dextrose 5%. 1000 milliLiter(s) (50 mL/Hr) IV Continuous <Continuous>  dextrose 50% Injectable 12.5 Gram(s) IV Push once  dextrose 50% Injectable 25 Gram(s) IV Push once  dextrose 50% Injectable 25 Gram(s) IV Push once  furosemide   Injectable 20 milliGRAM(s) IV Push daily  heparin  Injectable 5000 Unit(s) SubCutaneous three times a day  hydrALAZINE 100 milliGRAM(s) Oral three times a day  insulin lispro (HumaLOG) corrective regimen sliding scale   SubCutaneous three times a day before meals  insulin lispro (HumaLOG) corrective regimen sliding scale   SubCutaneous at bedtime  isosorbide   dinitrate Tablet (ISORDIL) 20 milliGRAM(s) Oral three times a day  polyethylene glycol 3350 17 Gram(s) Oral daily  sevelamer carbonate 800 milliGRAM(s) Oral three times a day with meals  simvastatin 20 milliGRAM(s) Oral at bedtime      TELEMETRY: nsr 	    ECG:  	  RADIOLOGY:   DIAGNOSTIC TESTING:  [ ] Echocardiogram:  [ ]  Catheterization:  [ ] Stress Test:    OTHER: 	  < from: Cardiology MUGA Spect (03.12.20 @ 17:03) >  IMPRESSIONS:Abnormal Study  * Normal LV size.  * Mildly reduced LV function.  *Rest gated blood pool imaging was performed (LVEF = 47  %;LVEDV = 140 ml.), revealing mildy reduced  LV function  with normal LV size. There was no segmental wall motion  abnormality. However, there was paradoxical motion of the  septum, possibly dueto RV pressure overload.  *  The RV was normal in size, but diffusely  hypocontractile.  * The pulmonary artery was enlarged.  * These findings suggest pulmonary hypertension.  * The LV time activity curve suggested diastolic  dysfunction.  ------------------------------------------------------------------------  Confirmed on  3/12/2020 - 17:03:58 by Eric Cope,    < end of copied text >    LABS:	 	    Troponin T, High Sensitivity: 94 ng/L [<6 - 14] (03-08 @ 05:56)  Creatine Kinase, Serum: 144 u/L [25 - 170] (03-08 @ 05:56)  CKMB: 2.91 ng/mL [1 - 4.7] (03-08 @ 05:56)  CKMB Relative Index: Test not performed [0.0 - 2.5] (03-08 @ 05:56)  Troponin T, High Sensitivity: 95 ng/L [<6 - 14] (03-08 @ 03:55)                          11.1   2.54  )-----------( 174      ( 13 Mar 2020 06:50 )             34.7     03-13    135  |  94<L>  |  36<H>  ----------------------------<  117<H>  4.8   |  26  |  7.27<H>    Ca    8.9      13 Mar 2020 06:50  Phos  5.2     03-13  Mg     2.3     03-13

## 2020-03-14 ENCOUNTER — TRANSCRIPTION ENCOUNTER (OUTPATIENT)
Age: 55
End: 2020-03-14

## 2020-03-14 VITALS
DIASTOLIC BLOOD PRESSURE: 75 MMHG | RESPIRATION RATE: 16 BRPM | HEART RATE: 67 BPM | OXYGEN SATURATION: 96 % | TEMPERATURE: 98 F | SYSTOLIC BLOOD PRESSURE: 142 MMHG

## 2020-03-14 LAB
ANION GAP SERPL CALC-SCNC: 10 MMO/L — SIGNIFICANT CHANGE UP (ref 7–14)
ANISOCYTOSIS BLD QL: SIGNIFICANT CHANGE UP
BASOPHILS # BLD AUTO: 0.04 K/UL — SIGNIFICANT CHANGE UP (ref 0–0.2)
BASOPHILS NFR BLD AUTO: 1.5 % — SIGNIFICANT CHANGE UP (ref 0–2)
BASOPHILS NFR SPEC: 0 % — SIGNIFICANT CHANGE UP (ref 0–2)
BLASTS # FLD: 0 % — SIGNIFICANT CHANGE UP (ref 0–0)
BUN SERPL-MCNC: 29 MG/DL — HIGH (ref 7–23)
CALCIUM SERPL-MCNC: 8.9 MG/DL — SIGNIFICANT CHANGE UP (ref 8.4–10.5)
CHLORIDE SERPL-SCNC: 93 MMOL/L — LOW (ref 98–107)
CO2 SERPL-SCNC: 29 MMOL/L — SIGNIFICANT CHANGE UP (ref 22–31)
CREAT SERPL-MCNC: 6.26 MG/DL — HIGH (ref 0.5–1.3)
EOSINOPHIL # BLD AUTO: 0.12 K/UL — SIGNIFICANT CHANGE UP (ref 0–0.5)
EOSINOPHIL NFR BLD AUTO: 4.6 % — SIGNIFICANT CHANGE UP (ref 0–6)
EOSINOPHIL NFR FLD: 2.6 % — SIGNIFICANT CHANGE UP (ref 0–6)
GIANT PLATELETS BLD QL SMEAR: PRESENT — SIGNIFICANT CHANGE UP
GLUCOSE SERPL-MCNC: 101 MG/DL — HIGH (ref 70–99)
HCT VFR BLD CALC: 33.2 % — LOW (ref 34.5–45)
HGB BLD-MCNC: 10.7 G/DL — LOW (ref 11.5–15.5)
HYPOCHROMIA BLD QL: SLIGHT — SIGNIFICANT CHANGE UP
IMM GRANULOCYTES NFR BLD AUTO: 0.4 % — SIGNIFICANT CHANGE UP (ref 0–1.5)
LYMPHOCYTES # BLD AUTO: 1.11 K/UL — SIGNIFICANT CHANGE UP (ref 1–3.3)
LYMPHOCYTES # BLD AUTO: 42.4 % — SIGNIFICANT CHANGE UP (ref 13–44)
LYMPHOCYTES NFR SPEC AUTO: 28.1 % — SIGNIFICANT CHANGE UP (ref 13–44)
MCHC RBC-ENTMCNC: 29.9 PG — SIGNIFICANT CHANGE UP (ref 27–34)
MCHC RBC-ENTMCNC: 32.2 % — SIGNIFICANT CHANGE UP (ref 32–36)
MCV RBC AUTO: 92.7 FL — SIGNIFICANT CHANGE UP (ref 80–100)
METAMYELOCYTES # FLD: 0 % — SIGNIFICANT CHANGE UP (ref 0–1)
MICROCYTES BLD QL: SIGNIFICANT CHANGE UP
MONOCYTES # BLD AUTO: 0.35 K/UL — SIGNIFICANT CHANGE UP (ref 0–0.9)
MONOCYTES NFR BLD AUTO: 13.4 % — SIGNIFICANT CHANGE UP (ref 2–14)
MONOCYTES NFR BLD: 8.8 % — SIGNIFICANT CHANGE UP (ref 2–9)
MYELOCYTES NFR BLD: 0 % — SIGNIFICANT CHANGE UP (ref 0–0)
NEUTROPHIL AB SER-ACNC: 56.1 % — SIGNIFICANT CHANGE UP (ref 43–77)
NEUTROPHILS # BLD AUTO: 0.99 K/UL — LOW (ref 1.8–7.4)
NEUTROPHILS NFR BLD AUTO: 37.7 % — LOW (ref 43–77)
NEUTS BAND # BLD: 0 % — SIGNIFICANT CHANGE UP (ref 0–6)
NRBC # BLD: 2 /100WBC — SIGNIFICANT CHANGE UP
NRBC # FLD: 0 K/UL — SIGNIFICANT CHANGE UP (ref 0–0)
OTHER - HEMATOLOGY %: 0 — SIGNIFICANT CHANGE UP
OVALOCYTES BLD QL SMEAR: SLIGHT — SIGNIFICANT CHANGE UP
PHOSPHATE SERPL-MCNC: 4.5 MG/DL — SIGNIFICANT CHANGE UP (ref 2.5–4.5)
PLATELET # BLD AUTO: 175 K/UL — SIGNIFICANT CHANGE UP (ref 150–400)
PLATELET COUNT - ESTIMATE: NORMAL — SIGNIFICANT CHANGE UP
PMV BLD: 11.4 FL — SIGNIFICANT CHANGE UP (ref 7–13)
POIKILOCYTOSIS BLD QL AUTO: SLIGHT — SIGNIFICANT CHANGE UP
POLYCHROMASIA BLD QL SMEAR: SLIGHT — SIGNIFICANT CHANGE UP
POTASSIUM SERPL-MCNC: 4 MMOL/L — SIGNIFICANT CHANGE UP (ref 3.5–5.3)
POTASSIUM SERPL-SCNC: 4 MMOL/L — SIGNIFICANT CHANGE UP (ref 3.5–5.3)
PROMYELOCYTES # FLD: 0 % — SIGNIFICANT CHANGE UP (ref 0–0)
RBC # BLD: 3.58 M/UL — LOW (ref 3.8–5.2)
RBC # FLD: 14.1 % — SIGNIFICANT CHANGE UP (ref 10.3–14.5)
SODIUM SERPL-SCNC: 132 MMOL/L — LOW (ref 135–145)
VARIANT LYMPHS # BLD: 4.4 % — SIGNIFICANT CHANGE UP
WBC # BLD: 2.62 K/UL — LOW (ref 3.8–10.5)
WBC # FLD AUTO: 2.62 K/UL — LOW (ref 3.8–10.5)

## 2020-03-14 PROCEDURE — 99238 HOSP IP/OBS DSCHRG MGMT 30/<: CPT

## 2020-03-14 RX ORDER — FUROSEMIDE 40 MG
1 TABLET ORAL
Qty: 0 | Refills: 0 | DISCHARGE

## 2020-03-14 RX ORDER — ISOSORBIDE DINITRATE 5 MG/1
1 TABLET ORAL
Qty: 90 | Refills: 0
Start: 2020-03-14 | End: 2020-04-12

## 2020-03-14 RX ORDER — AMLODIPINE BESYLATE 2.5 MG/1
1 TABLET ORAL
Qty: 0 | Refills: 0 | DISCHARGE

## 2020-03-14 RX ORDER — SITAGLIPTIN 50 MG/1
1 TABLET, FILM COATED ORAL
Qty: 0 | Refills: 0 | DISCHARGE

## 2020-03-14 RX ORDER — AMLODIPINE BESYLATE 2.5 MG/1
1 TABLET ORAL
Qty: 30 | Refills: 0
Start: 2020-03-14 | End: 2020-04-12

## 2020-03-14 RX ORDER — FUROSEMIDE 40 MG
1 TABLET ORAL
Qty: 30 | Refills: 0
Start: 2020-03-14 | End: 2020-04-12

## 2020-03-14 RX ORDER — HYDRALAZINE HCL 50 MG
1 TABLET ORAL
Qty: 0 | Refills: 0 | DISCHARGE

## 2020-03-14 RX ORDER — HYDRALAZINE HCL 50 MG
1 TABLET ORAL
Qty: 90 | Refills: 0
Start: 2020-03-14 | End: 2020-04-12

## 2020-03-14 RX ORDER — ISOSORBIDE DINITRATE 5 MG/1
1 TABLET ORAL
Qty: 0 | Refills: 0 | DISCHARGE

## 2020-03-14 RX ORDER — ERGOCALCIFEROL 1.25 MG/1
1 CAPSULE ORAL
Qty: 4 | Refills: 0
Start: 2020-03-14 | End: 2020-04-12

## 2020-03-14 RX ORDER — SIMVASTATIN 20 MG/1
1 TABLET, FILM COATED ORAL
Qty: 0 | Refills: 0 | DISCHARGE

## 2020-03-14 RX ORDER — CINACALCET 30 MG/1
1 TABLET, FILM COATED ORAL
Qty: 0 | Refills: 0 | DISCHARGE

## 2020-03-14 RX ORDER — CINACALCET 30 MG/1
1 TABLET, FILM COATED ORAL
Qty: 30 | Refills: 0
Start: 2020-03-14 | End: 2020-04-12

## 2020-03-14 RX ORDER — SITAGLIPTIN 50 MG/1
1 TABLET, FILM COATED ORAL
Qty: 30 | Refills: 0
Start: 2020-03-14 | End: 2020-04-12

## 2020-03-14 RX ORDER — CALCIUM ACETATE 667 MG
1 TABLET ORAL
Qty: 90 | Refills: 0

## 2020-03-14 RX ORDER — CALCIUM ACETATE 667 MG
1 TABLET ORAL
Qty: 90 | Refills: 0
Start: 2020-03-14 | End: 2020-04-12

## 2020-03-14 RX ADMIN — Medication 667 MILLIGRAM(S): at 11:41

## 2020-03-14 RX ADMIN — Medication 100 MILLIGRAM(S): at 10:55

## 2020-03-14 RX ADMIN — POLYETHYLENE GLYCOL 3350 17 GRAM(S): 17 POWDER, FOR SOLUTION ORAL at 11:41

## 2020-03-14 RX ADMIN — ISOSORBIDE DINITRATE 20 MILLIGRAM(S): 5 TABLET ORAL at 10:55

## 2020-03-14 RX ADMIN — CHLORHEXIDINE GLUCONATE 1 APPLICATION(S): 213 SOLUTION TOPICAL at 11:41

## 2020-03-14 RX ADMIN — CINACALCET 30 MILLIGRAM(S): 30 TABLET, FILM COATED ORAL at 11:41

## 2020-03-14 RX ADMIN — SEVELAMER CARBONATE 800 MILLIGRAM(S): 2400 POWDER, FOR SUSPENSION ORAL at 11:41

## 2020-03-14 RX ADMIN — Medication 81 MILLIGRAM(S): at 11:41

## 2020-03-14 NOTE — PROGRESS NOTE ADULT - ATTENDING COMMENTS
SW/CM follow up.    Jose J Jones will be covering for me starting 3/11/20. He can be reached at  if needed.     - Dr. GA Hays ([facility])  - [phone]
pending LE dopplers.  SW/CM follow up.    - Dr. GA Hays ([facility])  - [phone]
54 y.o female PMHx of HTN, CVA 2013 RLE weakness, HTN, DM Type II, NICM, Systolic CHF (EF 36%), ESRD on HD, KERI (not on home CPAP/BIPAP ), Depression, anemia, gout, glaucoma, dietary non adherence  presented with shortness of breath, PICKARD, mild chest pain in the setting of missing HD. EP consulted for worsening LVEF possibly secondary to increased PVC burden, needs fu as outpt. MUGA with EF 47%. No ICD indication for now.
54 y.o female PMHx of HTN, CVA 2013 RLE weakness, HTN, DM Type II, NICM, Systolic CHF (EF 36%), ESRD on HD, KERI (not on home CPAP/BIPAP ), Depression, anemia, gout, glaucoma, dietary non adherence  presented with shortness of breath, PICKARD, mild chest pain in the setting of missing HD. EP consulted for worsening LVEF possibly secondary to increased PVC burden, plan for possible ICD implantation for secondary prevention if EF</=35% and possible elective PVC ablation likely RVOT PVCs. Will follow after TTE.
54 y.o female pmh of HTN, CVA 2013 RLE weakness, HTN, DM Type II, NICM, Systolic CHF (EF 36%), ESRD on HD, KERI (not on home CPAP/BIPAP ), Depression, anemia, gout, glaucoma presents with shortness of breath PICKARD ,mild chest pain in the setting of missing HD. She was recently in [facility] from 3/5 to 3/7 for shortness of breath, leg swelling, and chest pain but on 3/7 she AMA'd due family emergency and missed HD on 3/7.She had HD today . Her recent echo on 12/2/19 showed reduced LVEF of 36% compared to the previous echo done on 5/5/2019 with LVEF 40-45%.  She was evaluated by EP on last admission 2019 for  worsening LVEF possibly secondary to increased PVC burden and was plan for elective EPS/PVC ablation likely RVOT PVCs as outpt but patient never follow up. Plan for repeat echo and possible ICD eval.
Agree with above NP note.  cont aggressive volume removal with HD  tele  cont lv dysfxn meds  remains off ace/arb sec to hx of hyperkalemia  echo w global mod-severe LV dusfunction  Appreciate EP eval awaiting decision on ICD
Agree with above NP note.  cont aggressive volume removal with HD  tele  cont lv dysfxn meds  remains off ace/arb sec to hx of hyperkalemia  repeat echo   Appreciate EP eval
Agree with above NP note.  cv stable  MUGA scan with EF > 45%  no indication for ICD  eps f/u  cont med tx for lv dysfxn
Agree with above NP note.  cv stable  MUGA scan with EF > 45%  no indication for ICD  eps f/u  cont med tx for lv dysfxn
Agree with above NP note.  cv stable  eps f/u  muga for EF  potential icd pending muga
54 y.o female pmh of HTN, CVA 2013 RLE weakness, HTN, DM Type II, NICM, Systolic CHF (EF 36%), ESRD on HD, KERI (not on home CPAP/BIPAP ), Depression, anemia, gout, glaucoma presents with shortness of breath PICKARD ,mild chest pain in the setting of missing HD. She was recently in [facility] from 3/5 to 3/7 for shortness of breath, leg swelling, and chest pain but on 3/7 she AMA'd due family emergency and missed HD on 3/7.She had HD today . Her recent echo on 12/2/19 showed reduced LVEF of 36% compared to the previous echo done on 5/5/2019 with LVEF 40-45%.  She was evaluated by EP on last admission 2019 for  worsening LVEF possibly secondary to increased PVC burden and was plan for elective EPS/PVC ablation likely RVOT PVCs as outpt but patient never follow up. Plan for MUGA scan to accurately delineate the EF and possible ICD eval.

## 2020-03-14 NOTE — PROGRESS NOTE ADULT - ASSESSMENT
Echo 1/5/20189: EF 45-50%, global LV sys dysfx, mild diastolic dysfx (stgI)  Stress test in 6/2018 normal with no evidence of mi or ischemia   Echo 3/ 22/2019, mild to mod MR, moderate global lv sys dysfx small pericardial effusion to left ventricle EF 41%   Echo 5/5/19: ef 40-45%, mod global lv sys dysfx, small pericardial effusion post to left ventricle  Echo 12/2/19: EF 36%, mild MR, mod to severe global LV sys dysfx , small pericardial effusion posterior  to lV and superior to right atrium         A/P   54 year old woman with history of CHF, nicmp, CVA, depression, GERD, gout, glaucoma, chol, HTN, KERI, asthma, DM, ESRD on HD, recent admit for CP, now returns with back with chest pain/ SOB     1. Chest pain, atypical, resolved   -in the setting of fluid overload, secondary to missed HD session   -HST elevated, type II MI, demand ischemia 2/2 to ESRD, CHF   -cont with BB, ASA, imdur, plavix   -echo with  known mod to sev global LV sys dysfx, mild to mod MR     2. Acute on chronic Systolic / diastolic chf   -volume overloaded likely secondary to missed HD session  -clinically improving, cont fluid removal with HD/ lasix per renal  -continue bb, imdur, hydral  -no ace/arb hx of hyperkalemia   -LE dopplers neg for dvt  -Echo with moderate to severe LV dysfunction, unable to calculate EF.  -MUGA revealed LVEF 47%, mildly reduced lv fx, no segmental wall motion abnl, + pulm htn, diastolic dsyfx   - EP to f/u    3. HTN  -stable, continue current meds     4. ESRD on HD  -renal f/u     5. Bigeminy (hx)  -recently evaluated by EP in dec . Per EP etiology of worsening LVEF possibly secondary to increased PVC burden. Patient was planned to follow up with EP for EP study with possible PVC ablation but patient was unable to make appointment due to frequent hospitalization  -cont bb  -Muga scan as mentioned, No ICD indication at this time  -outpt. ep f/u       dvt ppx

## 2020-03-14 NOTE — DISCHARGE NOTE NURSING/CASE MANAGEMENT/SOCIAL WORK - NSDCCRNAME_GEN_ALL_CORE_FT
[facility] ([id number] [facility] [id number]) Next Dialysis Scheduled Tuesday March 17th, 2020 at 7p.m.

## 2020-03-14 NOTE — PROGRESS NOTE ADULT - REASON FOR ADMISSION
Shortness of breath

## 2020-03-14 NOTE — DISCHARGE NOTE NURSING/CASE MANAGEMENT/SOCIAL WORK - PATIENT PORTAL LINK FT
You can access the FollowMyHealth Patient Portal offered by [facility] by registering at the following website: [email or web address]. By joining [employer]’s FollowMyHealth portal, you will also be able to view your health information using other applications (apps) compatible with our system.

## 2020-03-14 NOTE — PROGRESS NOTE ADULT - SUBJECTIVE AND OBJECTIVE BOX
CARDIOLOGY FOLLOW UP - Dr. Andrew    CC seen in dialysis   no cp/sob  c/o abd discomfort       PHYSICAL EXAM:  T(C): 36.6 (03-14-20 @ 06:30), Max: 36.8 (03-13-20 @ 11:30)  HR: 65 (03-14-20 @ 06:30) (65 - 78)  BP: 144/72 (03-14-20 @ 06:30) (121/56 - 154/79)  RR: 16 (03-14-20 @ 06:30) (16 - 17)  SpO2: 100% (03-13-20 @ 23:41) (98% - 100%)  Wt(kg): --  I&O's Summary    13 Mar 2020 07:01  -  14 Mar 2020 07:00  --------------------------------------------------------  IN: 1300 mL / OUT: 2950 mL / NET: -1650 mL        Appearance: Normal	  Cardiovascular: Normal S1 S2,RRR, No JVD, No murmurs  Respiratory: Lungs clear to auscultation	  Gastrointestinal:  Soft, Non-tender, + BS	  Extremities: Normal range of motion, No clubbing, b/l le edema +1      MEDICATIONS  (STANDING):  amLODIPine   Tablet 10 milliGRAM(s) Oral daily  aspirin enteric coated 81 milliGRAM(s) Oral daily  calcium acetate 667 milliGRAM(s) Oral three times a day with meals  carvedilol 12.5 milliGRAM(s) Oral every 12 hours  chlorhexidine 4% Liquid 1 Application(s) Topical daily  cinacalcet 30 milliGRAM(s) Oral daily  dextrose 5%. 1000 milliLiter(s) (50 mL/Hr) IV Continuous <Continuous>  dextrose 50% Injectable 12.5 Gram(s) IV Push once  dextrose 50% Injectable 25 Gram(s) IV Push once  dextrose 50% Injectable 25 Gram(s) IV Push once  furosemide   Injectable 20 milliGRAM(s) IV Push daily  heparin  Injectable 5000 Unit(s) SubCutaneous three times a day  hydrALAZINE 100 milliGRAM(s) Oral three times a day  insulin lispro (HumaLOG) corrective regimen sliding scale   SubCutaneous three times a day before meals  insulin lispro (HumaLOG) corrective regimen sliding scale   SubCutaneous at bedtime  isosorbide   dinitrate Tablet (ISORDIL) 20 milliGRAM(s) Oral three times a day  polyethylene glycol 3350 17 Gram(s) Oral daily  sevelamer carbonate 800 milliGRAM(s) Oral three times a day with meals  simvastatin 20 milliGRAM(s) Oral at bedtime      TELEMETRY: 	    ECG:  	  RADIOLOGY:   DIAGNOSTIC TESTING:  [ ] Echocardiogram:  [ ]  Catheterization:  [ ] Stress Test:    OTHER: 	    LABS:	 	                                10.7   2.62  )-----------( 175      ( 14 Mar 2020 06:38 )             33.2     03-14    132<L>  |  93<L>  |  29<H>  ----------------------------<  101<H>  4.0   |  29  |  6.26<H>    Ca    8.9      14 Mar 2020 06:38  Phos  4.5     03-14  Mg     2.3     03-13

## 2020-03-14 NOTE — DISCHARGE NOTE NURSING/CASE MANAGEMENT/SOCIAL WORK - NSDCFUADDAPPT_GEN_ALL_CORE_FT
Please follow up with EP in 2 weeks with Dr. Farias for close monitoring, it is very important that you call to set up a follow up appointment for monitoring and for possible PVC ablation as outpatient.     Follow up with cardiology and with Dr. Garcia (Primary care doctor) in 1-2 weeks, please call to make an appointment.

## 2020-03-16 PROBLEM — I50.9 HEART FAILURE, UNSPECIFIED: Chronic | Status: ACTIVE | Noted: 2019-09-13

## 2020-04-08 NOTE — PROGRESS NOTE ADULT - PROBLEM SELECTOR PROBLEM 4
DM (diabetes mellitus) 199.9 no back pain, no gout, no musculoskeletal pain, no neck pain, and no weakness.

## 2020-05-04 ENCOUNTER — APPOINTMENT (OUTPATIENT)
Dept: ELECTROPHYSIOLOGY | Facility: CLINIC | Age: 55
End: 2020-05-04

## 2020-05-22 NOTE — ED PROVIDER NOTE - NS ED MD EM SELECTION
"Jadiel Dutton is a 67 y.o. male returns for     Chief Complaint   Patient presents with   • Left Shoulder - Follow-up       HISTORY OF PRESENT ILLNESS:   Follow up left shoulder.  Therapy at [facility].    Pain is improving  No numbness or tingling  No fever or chills.           02/07/20 (105d) Troy Barillas MD    LEFT SHOULDER ARTHROSCOPY with rotator cuff repair, DEDRICK procedure, and Subacromial decompression - Left          CONCURRENT MEDICAL HISTORY:    The following portions of the patient's history were reviewed and updated as appropriate: allergies, current medications, past family history, past medical history, past social history, past surgical history and problem list.     ROS  No fevers or chills.  No chest pain or shortness of air.  No GI or  disturbances.    PHYSICAL EXAMINATION:       Ht 182.9 cm (72\")   Wt 100 kg (221 lb)   BMI 29.97 kg/m²     Physical Exam   Constitutional: He is oriented to person, place, and time. He appears well-developed and well-nourished.   Neurological: He is alert and oriented to person, place, and time.   Psychiatric: He has a normal mood and affect. His behavior is normal. Judgment and thought content normal.       GAIT:     [x]  Normal  []  Antalgic    Assistive device: [x]  None  []  Walker     []  Crutches  []  Cane     []  Wheelchair  []  Stretcher    Left Shoulder Exam     Tenderness   The patient is experiencing tenderness in the acromioclavicular joint.    Range of Motion   Active abduction: 100   Forward flexion: 100     Muscle Strength   Abduction: 4/5   Supraspinatus: 4/5     Tests   Rosales test: negative    Other   Erythema: absent  Sensation: normal  Pulse: present                       ASSESSMENT:    Diagnoses and all orders for this visit:    Traumatic incomplete tear of left rotator cuff, subsequent encounter  -     Ambulatory Referral to Physical Therapy Evaluate and treat    Status post arthroscopy of left " shoulder  -     Ambulatory Referral to Physical Therapy Evaluate and treat    Chronic left shoulder pain  -     Ambulatory Referral to Physical Therapy Evaluate and treat    Type 2 diabetes mellitus with complication, with long-term current use of insulin (CMS/[facility])    Other orders  -     diphenhydrAMINE (BENADRYL) 25 mg capsule; Take 25 mg by mouth Every 6 (Six) Hours As Needed for Itching.  -     nabumetone (RELAFEN) 750 MG tablet; Take 1 tablet by mouth 2 (Two) Times a Day.          PLAN    PT:   Continue progressing Passive motion as tolerated   Maybe 1-2x per week for 4 weeks or so   Can add active motion and strengthening as tolerated.    Continue to progress slowly as tolerated    Progress activity    He is improving.    Recheck in 6 weeks.    Will try nabumetone for NSAIDS    Patient's Body mass index is 29.97 kg/m². BMI is above normal parameters. Recommendations include: exercise counseling and nutrition counseling.      Return in about 6 weeks (around 7/3/2020) for recheck.    Troy Barillas MD   [id number] Detailed

## 2020-06-01 NOTE — ED ADULT NURSE NOTE - NSHOSCREENINGQ1_ED_ALL_ED
-- Elevated at today's visit  -- Patient admits to not taking BP medication this AM.  -- Encouraged patient to take BP medication every morning and to monitor BP at home.  Notify PCP is sustaining >130/80.  -- patient states he will go straight to his truck and get his BP medication   -- Blood pressure goals discussed with patient   No

## 2020-07-12 NOTE — ED ADULT NURSE NOTE - PRIMARY CARE PROVIDER
_           Chief Complaint   Patient presents with    Follow-up     review status of disease    Fatigue     sometimes     DIAGNOSIS:       Normocytic anemia with evidence of iron deficiency. Anemia multifactorial, Anemia of chronic renal insufficiency, iron deficiency and possible bone marrow disease. Gastritis with positive H. pylori status post treatment      CURRENT THERAPY:         Oral iron replacement  Recent treatment for H. Pylori  Status post iron dextran January 2018. IV Iron infusion July 2020. BRIEF CASE HISTORY:      Mr. Rosemary Sun is a very pleasant 80 y.o. male with history of multiple comorbidities as stated below. Patient had history of anemia for the last few months and he was maintained on oral iron. He has some GI symptoms and he is unable to tolerates treatment. Patient denies any active bleeding. No melena or hematochezia. No hematemesis. No history of heartburn or acid reflux. No hematuria. No nausea or vomiting. No weight loss or decreased appetite. No fever or lymphadenopathy. He has increasing weakness and fatigue. No other complaints. INTERIM HISTORY:   The patient is seen for follow-up anemia. He had evidence of iron deficiency. He had oral iron. He has significant side effects related to the oral treatment. He cannot tolerate the pills. He continued to have weakness and fatigue. Recent hemoglobin is worse. No active bleeding. He had GI workup which showed gastritis with no active bleeding. Continues to have heartburn and acid reflux. No melena or hematochezia. No other complaints.       PAST MEDICAL HISTORY: has a past medical history of Acute bronchitis due to infection, Allergic rhinitis due to allergen, Anemia, Aortic stenosis, moderate-severe, Chronic kidney disease, Chronic right-sided low back pain with right-sided sciatica, Diabetes ([address] Diabetic nephropathy associated with type 2 diabetes mellitus ([facility] [address] Diastolic dysfunction without heart failure, Gastroesophageal reflux disease without esophagitis, H. pylori infection, Hearing loss, Helicobacter pylori gastritis, Hyperlipidemia, Hypertension, Hypothyroidism, Iron deficiency anemia, LVH (left ventricular hypertrophy) due to hypertensive disease, Osteoarthritis, Pulmonary hypertension ([facility] [address] [facility]peripheral [facility]) [facility] and Type 2 diabetes mellitus with diabetic polyneuropathy, with long-term current use of insulin ([facility] PAST SURGICAL HISTORY: has a past surgical history that includes hernia repair; pr egd transoral biopsy single/multiple (N/A, 8/29/2017); pr colon ca scrn not hi rsk ind (N/A, 8/29/2017); Upper gastrointestinal endoscopy (08/30/2017); Colonoscopy (08/29/2017); Cataract removal with implant (Left, 10/03/2017); pr xcapsl ctrc rmvl insj io lens prosth w/o ecp (Left, 10/3/2017); Cataract removal with implant (Right, 03/01/2018); and pr xcapsl ctrc rmvl insj io lens prosth w/o ecp (Right, 3/1/2018). CURRENT MEDICATIONS:  has a current medication list which includes the following prescription(s): ipratropium, tamsulosin, accu-chek za plus, levothyroxine, enalapril, amlodipine, atorvastatin, vitamin d3, pantoprazole, camphor-menthol-methyl salicylate, lidocaine, metformin, accu-chek za plus, accu-chek fastclix lancets, lancets 30g, diclofenac sodium, lidocaine, melatonin, cetirizine, accu-chek multiclix lancet dev, and alcohol swabs. ALLERGIES:  is allergic to aspirin and sulfa antibiotics. FAMILY HISTORY: Negative for any hematological or oncological conditions. SOCIAL HISTORY:  reports that he quit smoking about 52 years ago. He has never used smokeless tobacco. He reports that he does not drink alcohol or use drugs. REVIEW OF SYSTEMS:     · General: He has weakness and fatigue. No unanticipated weight loss or decreased appetite. No fever or chills. · Eyes: No blurred vision, eye pain or double vision. · Ears: No hearing problems or drainage. No tinnitus. · Throat: No sore throat, problems with swallowing or dysphagia. · Respiratory: No cough, sputum or hemoptysis. No shortness of breath. No pleuritic chest pain. · Cardiovascular: No chest pain, orthopnea or PND. No lower extremity edema. No palpitation. · Gastrointestinal: No problems with swallowing. No abdominal pain or bloating. No nausea or vomiting. No diarrhea or constipation. No GI bleeding. · Genitourinary: No dysuria, hematuria, frequency or urgency. · Musculoskeletal: No muscle aches or pains. No limitation of movement. No back pain. No gait disturbance, No joint complaints. · Dermatologic: No skin rashes or pruritus. No skin lesions or discolorations. · Psychiatric: No depression, anxiety, or stress or signs of schizophrenia. No change in mood or affect. · Hematologic: No history of bleeding tendency. No bruises or ecchymosis. No history of clotting problems. · Infectious disease: No fever, chills or frequent infections. · Endocrine: No problems with opacity. No polydipsia or polyuria. No temperature intolerance. · Neurologic: No headaches or dizziness. No weakness or numbness of the extremities. No changes in balance, coordination,  memory, mentation, behavior. · Allergic/Immunologic: No nasal congestion or hives. No repeated infections. PHYSICAL EXAM:  The patient is not in acute distress. Vital signs: Blood pressure (!) 139/56, pulse 63, temperature 97.9 °F (36.6 °C), temperature source Oral, resp. rate 18, weight 175 lb 6.4 oz (79.6 kg). HEENT:  Eyes are normal. Ears, nose and throat are normal.  Neck: Supple. No lymph node enlargement. No thyroid enlargement. Trachea is centrally located. Chest:  Clear to auscultation. No wheezes or crepitations. Heart: Regular sinus rhythm. Abdomen: Soft, nontender. No hepatosplenomegaly. No masses.   Extremities: With no edema. Lymph Nodes:  No cervical, axillary or inguinal lymph node enlargement. Neurologic:  Conscious and oriented. No focal neurological deficits. Psychosocial: No depression, anxiety or stress. Skin: No rashes, bruises or ecchymoses. Review of Diagnostic data:   Lab Results   Component Value Date    WBC 4.7 05/26/2020    HGB 10.4 (L) 05/26/2020    HCT 32.7 (L) 05/26/2020    MCV 96.3 05/26/2020     05/26/2020       Chemistry        Component Value Date/Time     05/26/2020 1315    K 4.2 05/26/2020 1315     05/26/2020 1315    CO2 25 05/26/2020 1315    BUN 26 (H) 05/26/2020 1315    CREATININE 1.07 05/26/2020 1315        Component Value Date/Time    CALCIUM 9.2 05/26/2020 1315    ALKPHOS 69 05/26/2020 1315    AST 18 05/26/2020 1315    ALT 12 05/26/2020 1315    BILITOT 0.51 05/26/2020 1315        Lab Results   Component Value Date    IRON 73 07/17/2017    TIBC 212 (L) 07/17/2017    FERRITIN 434 (H) 07/17/2017     Vitamin B12 and folate are normal.    IMPRESSION:   Normocytic anemia with evidence of iron deficiency. Intolerable side effects to oral iron. Anemia multifactorial, Anemia of chronic renal insufficiency, iron deficiency and possible bone marrow disease. Gastritis with positive H. pylori status post treatment    PLAN:   Obviously patient does not tolerate PO IRON or VITAMIN C. He was treated with IV iron infusion in the past.  His hemoglobin and iron studies are dropping. Hemoglobin is dropping. He is currently symptomatic. We will give IV iron. We will continue to monitor. In addition he has evidence of chronic renal insufficiency. And considering his age likely could be having element of MDS. However the present time he has minimal symptoms and I do not see a need to do bone marrow test.  Continue monitoring would be appropriate. He is on ranitidine and omeprazole to be used for GERD and gastritis symptoms.   Recommended follow-up with his gastroenterologist.  We will see him again in 6 months with repeated labs. Patient's questions were answered to the best of his satisfaction and he verbalized full understanding and agreement. julio césar

## 2020-08-19 ENCOUNTER — EMERGENCY (EMERGENCY)
Facility: HOSPITAL | Age: 55
LOS: 1 days | Discharge: HOME | End: 2020-08-19
Attending: EMERGENCY MEDICINE | Admitting: EMERGENCY MEDICINE
Payer: MEDICAID

## 2020-08-19 VITALS
TEMPERATURE: 98 F | RESPIRATION RATE: 16 BRPM | OXYGEN SATURATION: 96 % | SYSTOLIC BLOOD PRESSURE: 129 MMHG | HEART RATE: 73 BPM | DIASTOLIC BLOOD PRESSURE: 73 MMHG

## 2020-08-19 DIAGNOSIS — Z90.49 ACQUIRED ABSENCE OF OTHER SPECIFIED PARTS OF DIGESTIVE TRACT: Chronic | ICD-10-CM

## 2020-08-19 DIAGNOSIS — R06.02 SHORTNESS OF BREATH: ICD-10-CM

## 2020-08-19 DIAGNOSIS — Z98.83 FILTERING (VITREOUS) BLEB AFTER GLAUCOMA SURGERY STATUS: Chronic | ICD-10-CM

## 2020-08-19 DIAGNOSIS — Z90.711 ACQUIRED ABSENCE OF UTERUS WITH REMAINING CERVICAL STUMP: Chronic | ICD-10-CM

## 2020-08-19 DIAGNOSIS — I77.0 ARTERIOVENOUS FISTULA, ACQUIRED: Chronic | ICD-10-CM

## 2020-08-19 PROCEDURE — 71250 CT THORAX DX C-: CPT | Mod: 26

## 2020-08-19 PROCEDURE — 99284 EMERGENCY DEPT VISIT MOD MDM: CPT

## 2020-08-19 RX ORDER — OXYCODONE HYDROCHLORIDE 5 MG/1
5 TABLET ORAL ONCE
Refills: 0 | Status: DISCONTINUED | OUTPATIENT
Start: 2020-08-19 | End: 2020-08-19

## 2020-08-19 RX ORDER — ACETAMINOPHEN 500 MG
650 TABLET ORAL ONCE
Refills: 0 | Status: COMPLETED | OUTPATIENT
Start: 2020-08-19 | End: 2020-08-19

## 2020-08-19 RX ADMIN — Medication 650 MILLIGRAM(S): at 20:35

## 2020-08-19 RX ADMIN — OXYCODONE HYDROCHLORIDE 5 MILLIGRAM(S): 5 TABLET ORAL at 23:09

## 2020-08-19 NOTE — ED PROVIDER NOTE - MUSCULOSKELETAL, MLM
Spine appears normal, range of motion is not limited, no muscle or joint tenderness. No vertebral TTP; mild TTP left lower rib cage no bony crepitus or hematoma.

## 2020-08-19 NOTE — ED PROVIDER NOTE - ATTENDING CONTRIBUTION TO CARE
Patient is a 54 yo F with history of DM, HTN, CVA, CHF, ESRD on HD T/H/S, last had HD yesterday, KERI on CPAP here for evaluation of left rib pain after falling. Patient states she fell twice, both times she slipped. She states around 5 pm, she was hurrying to catch the bus when she stumbled and fell onto her left side. Denies head trauma or loc. She was able to take bus to the emergency department and stumbled again. Denies prior palpitations, dizziness, shortness of breath. She still makes urine. Denies dysuria. No fevers, chills. Denies abdominal pain. She states she was due to have an extra session of HD today because her legs are swollen. She has her regular HD scheduled for tomorrow. She is c/o pain to her left chest, worse with movement and pressure.     VS noted  Gen. no acute distress, Non toxic   HEENT: EOMI, mmm, atraumatic  Spine: No C-T-L spine tenderness  Chest wall: ttp to left lateral chest, no overlaying skin changes  Lungs: CTAB/L no C/ W /R   CVS: RRR   Abd; Soft non tender, non distended   Pelvis stable  Ext: + 4 pitting edema b/l  Skin: no rash  Neuro AAOx3 non focal clear speech  a/p: s/p fall - ttp to left ribs - r/o fracture. plan for pain control and CT Chest.   - Ryanne FUNK

## 2020-08-19 NOTE — ED PROVIDER NOTE - PATIENT PORTAL LINK FT
You can access the FollowMyHealth Patient Portal offered by [facility] by registering at the following website: [email or web address]. By joining [employer]’s FollowMyHealth portal, you will also be able to view your health information using other applications (apps) compatible with our system.

## 2020-08-19 NOTE — ED PROVIDER NOTE - OBJECTIVE STATEMENT
55 F ESRD on HD KERI around 5pm fell while crossing the street and hurrying to catch the bus, stumbled, and fell onto her left side and hit her left torso. Then while outsisde of the ER (took bus to ER)  triped and fell again, again believes it was mechanical and did not have presyncope or CP/SoB before falling. Solis is worse to move.Takes plavix and ASA, does not believe she hit her head. Was on the way to HD did not get HD today. 55 F ESRD on HD KERI around 5pm fell while crossing the street and hurrying to catch the bus, stumbled, and fell onto her left side and hit her left torso. Then while outsisde of the ER (took bus to ER)  triped and fell again, again believes it was mechanical and did not have presyncope or CP/SoB before falling. Solis is worse to move.Takes plavix and ASA, does not believe she hit her head. Was on the way to an extra HD session due to increasing LE edema; did not get HD today. She is going to her regularly scheduled session tomorrow. 55 F  CVA 2013, HTN, DM Type II, Systolic CHF (EF 36%), ESRD on HD T/Th/Sat via left fore arm AVF, KERI (not on home CPAP/BIPAP)  around 5pm fell while crossing the street and hurrying to catch the bus, stumbled, and fell onto her left side and hit her left torso. Then while outsisde of the ER (took bus to ER)  triped and fell again, again believes it was mechanical and did not have presyncope or CP/SoB before falling. Solis is worse to move.Takes plavix and ASA, does not believe she hit her head. Was on the way to an extra HD session due to increasing LE edema; did not get HD today. She is going to her regularly scheduled session tomorrow.

## 2020-08-19 NOTE — ED ADULT TRIAGE NOTE - CHIEF COMPLAINT QUOTE
states" I fell twice today and my left side under ribs hurt a lot" patient walks with cane and fell on the curb .denies LOC. takes Plavix , ASA , on HD had full HD yesterday   and was getting additional HD today for water retention in lower extremities. AV fistula to left arm noted. on HD Tue-Thu-Sat .

## 2020-08-19 NOTE — ED PROVIDER NOTE - CLINICAL SUMMARY MEDICAL DECISION MAKING FREE TEXT BOX
55 F ESRD on HD p/w mechanical fall c/o left chest pain. No significant trauma noted on exam; however given pt on asa/plavix and c/o chest pain will obtain CT chest. ABD soft/nt/nd do not suspect abdominal trauma.. Plan: CT chest, pain medications, reassess 55 F ESRD on HD p/w mechanical fall c/o left chest pain. Pt on asa/plavix and with focal tenderness. plan to obtain CT chest to rule out rib fractures. ABD soft/nt/nd do not suspect abdominal trauma.. Plan: CT chest, pain medications, reassess

## 2020-08-19 NOTE — ED PROVIDER NOTE - PMH
Anemia of chronic disease    CHF (congestive heart failure)  EF 36 12/19  CVA (cerebral vascular accident)  2013- Residual RLE weakness  Depression    DM (diabetes mellitus)    ESRD (end stage renal disease)  HD T/T/S  GERD (gastroesophageal reflux disease)    Glaucoma    Gout    HLD (hyperlipidemia)    HTN (hypertension)    NICM (nonischemic cardiomyopathy)    KERI (obstructive sleep apnea)

## 2020-08-19 NOTE — ED ADULT NURSE NOTE - INTERVENTIONS DEFINITIONS
[place] to call system/Call bell, personal items and telephone within reach/Instruct patient to call for assistance/Physically safe environment: no spills, clutter or unnecessary equipment/Stretcher in lowest position, wheels locked, appropriate side rails in place/Non-slip footwear when patient is off stretcher

## 2020-08-19 NOTE — ED PROVIDER NOTE - NSFOLLOWUPINSTRUCTIONS_ED_ALL_ED_FT
Go to your dialysis session scheduled for tomorrow. Take tylenol as needed for pain and put ice on the area as needed for pain.    Please call or see your doctor or return to the ER if you have new chest pain, shortness of breath, fevers, inability to eat or drink, or worsening of symptoms that brought you to the emergency room today.      Please follow up with your primary care physician in 1-2 days or as soon as possible.

## 2020-08-19 NOTE — ED ADULT NURSE NOTE - OBJECTIVE STATEMENT
Pt. A&Ox4 states she was on her way to dialysis when crossing the street she felt weak and fell on the her left hip. Pt. states she also fell outside of the ER. Pt. denies hitting her head, states she is on plavix and ASA. Left AV fistula noted and heard on forearm. Pt. respirations even and unlabored, abdomen non tender, skin color appropriate, in NAD. will CTM.

## 2020-08-20 VITALS
RESPIRATION RATE: 19 BRPM | DIASTOLIC BLOOD PRESSURE: 70 MMHG | OXYGEN SATURATION: 94 % | SYSTOLIC BLOOD PRESSURE: 136 MMHG | TEMPERATURE: 98 F | HEART RATE: 76 BPM

## 2020-08-20 RX ORDER — OXYCODONE HYDROCHLORIDE 5 MG/1
1 TABLET ORAL
Qty: 4 | Refills: 0
Start: 2020-08-20 | End: 2020-08-21

## 2020-08-20 NOTE — PROVIDER CONTACT NOTE (OTHER) - ASSESSMENT
SW arranged ambulance and received auth from [facility] ([phone]) and spoke to [facility]. Pt is returning to [facility] [address] [place] [address]. Auth . [facility] ([phone]) trip id pickup. SW arranged ambulance and received auth from [facility] ([phone]) and spoke to [facility]. Pt is returning to [facility] [address] [place] [address]. Auth . ISIAH contacted [facility] ([phone]) and spoke to Brennen crain id [id number], pickup 3:30am.

## 2020-08-27 NOTE — PROGRESS NOTE ADULT - SUBJECTIVE AND OBJECTIVE BOX
Pharmacy calling in regards to medication, pt does not have insurance and the medication will cost around 5,000 dollars.       Pt assistance number [phone]  [phone] Patient is a 52y old  Female who presents with a chief complaint of Flank and epigastric pain (16 Oct 2017 17:53)      INTERVAL HPI/OVERNIGHT EVENTS:  T(C): 36.9 (11-05-17 @ 05:15), Max: 37.1 (11-04-17 @ 21:03)  HR: 72 (11-05-17 @ 05:15) (69 - 74)  BP: 146/92 (11-05-17 @ 05:15) (133/79 - 166/96)  RR: 16 (11-05-17 @ 05:15) (16 - 18)  SpO2: 100% (11-05-17 @ 05:15) (99% - 100%)  Wt(kg): --  I&O's Summary    04 Nov 2017 08:01  -  05 Nov 2017 07:00  --------------------------------------------------------  IN: 400 mL / OUT: 1400 mL / NET: -1000 mL        LABS:                        10.2   5.71  )-----------( 248      ( 05 Nov 2017 06:51 )             33.9     11-05    140  |  100  |  41<H>  ----------------------------<  116<H>  4.3   |  26  |  5.29<H>    Ca    9.1      05 Nov 2017 06:45  Phos  4.0     11-05  Mg     2.0     11-05          CAPILLARY BLOOD GLUCOSE      POCT Blood Glucose.: 103 mg/dL (05 Nov 2017 09:03)  POCT Blood Glucose.: 160 mg/dL (04 Nov 2017 21:00)  POCT Blood Glucose.: 203 mg/dL (04 Nov 2017 17:06)  POCT Blood Glucose.: 132 mg/dL (04 Nov 2017 13:19)            MEDICATIONS  (STANDING):  amLODIPine   Tablet 10 milliGRAM(s) Oral daily  aspirin enteric coated 81 milliGRAM(s) Oral daily  clopidogrel Tablet 75 milliGRAM(s) Oral daily  dextrose 5%. 1000 milliLiter(s) (50 mL/Hr) IV Continuous <Continuous>  dextrose 50% Injectable 12.5 Gram(s) IV Push once  dextrose 50% Injectable 25 Gram(s) IV Push once  dextrose 50% Injectable 25 Gram(s) IV Push once  docusate sodium 100 milliGRAM(s) Oral two times a day  furosemide    Tablet 20 milliGRAM(s) Oral two times a day  gabapentin 300 milliGRAM(s) Oral at bedtime  heparin  Injectable 5000 Unit(s) SubCutaneous every 8 hours  influenza   Vaccine 0.5 milliLiter(s) IntraMuscular once  insulin glargine Injectable (LANTUS) 20 Unit(s) SubCutaneous at bedtime  insulin lispro (HumaLOG) corrective regimen sliding scale   SubCutaneous three times a day before meals  insulin lispro (HumaLOG) corrective regimen sliding scale   SubCutaneous at bedtime  pantoprazole    Tablet 40 milliGRAM(s) Oral before breakfast  polyethylene glycol 3350 17 Gram(s) Oral daily  QUEtiapine 50 milliGRAM(s) Oral daily  sertraline 200 milliGRAM(s) Oral daily    MEDICATIONS  (PRN):  acetaminophen   Tablet 650 milliGRAM(s) Oral every 6 hours PRN For Temp greater than 38 C (100.4 F)  acetaminophen   Tablet. 650 milliGRAM(s) Oral every 6 hours PRN mild and Moderate  dextrose Gel 1 Dose(s) Oral once PRN Blood Glucose LESS THAN 70 milliGRAM(s)/deciliter  glucagon  Injectable 1 milliGRAM(s) IntraMuscular once PRN Glucose LESS THAN 70 milligrams/deciliter          PHYSICAL EXAM:  GENERAL: NAD, well-groomed, well-developed  HEAD:  Atraumatic, Normocephalic  CHEST/LUNG: Clear to percussion bilaterally; No rales, rhonchi, wheezing, or rubs  HEART: Regular rate and rhythm; No murmurs, rubs, or gallops  ABDOMEN: Soft, Nontender, Nondistended; Bowel sounds present  EXTREMITIES:  2+ Peripheral Pulses, No clubbing, cyanosis, or edema  LYMPH: No lymphadenopathy noted  SKIN: No rashes or lesions    Care Discussed with Consultants/Other Providers [+ ] YES  [ ] NO

## 2020-09-23 NOTE — H&P ADULT - REASON FOR ADMISSION
Shortness of breath Detail Level: Detailed Depth Of Biopsy: dermis Was A Bandage Applied: Yes Size Of Lesion In Cm: 0.2 X Size Of Lesion In Cm: 0 Biopsy Type: H and E Biopsy Method: Dermablade Anesthesia Type: 1% lidocaine with epinephrine Anesthesia Volume In Cc (Will Not Render If 0): 0.5 Hemostasis: Drysol Wound Care: Petrolatum Dressing: bandage Destruction After The Procedure: No Type Of Destruction Used: Curettage Curettage Text: The wound bed was treated with curettage after the biopsy was performed. Cryotherapy Text: The wound bed was treated with cryotherapy after the biopsy was performed. Electrodesiccation Text: The wound bed was treated with electrodesiccation after the biopsy was performed. Electrodesiccation And Curettage Text: The wound bed was treated with electrodesiccation and curettage after the biopsy was performed. Silver Nitrate Text: The wound bed was treated with silver nitrate after the biopsy was performed. Lab: -98 Lab Facility: 3 Consent: Written consent was obtained and risks were reviewed including but not limited to scarring, infection, bleeding, scabbing, incomplete removal, nerve damage and allergy to anesthesia. Post-Care Instructions: I reviewed with the patient in detail post-care instructions. Patient is to keep the biopsy site dry overnight, and then apply bacitracin twice daily until healed. Patient may apply hydrogen peroxide soaks to remove any crusting. Notification Instructions: Patient will be notified of biopsy results. However, patient instructed to call the office if not contacted within 2 weeks. Billing Type: Third-Party Bill Information: Selecting Yes will display possible errors in your note based on the variables you have selected. This validation is only offered as a suggestion for you. PLEASE NOTE THAT THE VALIDATION TEXT WILL BE REMOVED WHEN YOU FINALIZE YOUR NOTE. IF YOU WANT TO FAX A PRELIMINARY NOTE YOU WILL NEED TO TOGGLE THIS TO 'NO' IF YOU DO NOT WANT IT IN YOUR FAXED NOTE.

## 2021-01-14 NOTE — PROGRESS NOTE ADULT - PROBLEM SELECTOR PROBLEM 5
Detail Level: Zone
Detail Level: Detailed
Moisturizer Recommendations: Cerave
Cleanser Recommendations: Dove soap
DM (diabetes mellitus)
DM (diabetes mellitus)

## 2021-04-12 NOTE — ED ADULT NURSE NOTE - NSFALLRSKASSESSDT_ED_ALL_ED
Plan: Return for shave if it grows back Initiate Treatment: Advised pt. to not touch lesion while healing. She is concerned about the pinkness, explained that it’s appears to be healing well. Detail Level: Zone 14-Apr-2019 02:49

## 2021-10-07 NOTE — DISCHARGE NOTE NURSING/CASE MANAGEMENT/SOCIAL WORK - NSDCDPATPORTLINK_GEN_ALL_CORE
Hospitalist Pharmacy Orthopedics Orthopedics You can access the [facility], offered by [facility], by registering with the following website: [email or web address]

## 2021-10-16 NOTE — PROGRESS NOTE ADULT - PROBLEM SELECTOR PLAN 4
ESRD  Renal consult for HD (Dr Francisco)  Continue calcium acetate, cinacalcet
FS Q6 NISS
Yes, get tested

## 2021-12-21 NOTE — ED ADULT NURSE NOTE - NS ED NURSE RECORD ANOTHER VITAL SIGN
Pt reports being at dance practice and rolled her right ankle.   
Yes, record another set of vital signs

## 2022-03-10 NOTE — ED PROVIDER NOTE - PRINCIPAL DIAGNOSIS
Subjective: Pt was exposed to Covid       Patient ID: Catalina Roland is a 10 y.o. male.    Vitals:  weight is 49.9 kg (110 lb). His temperature is 98.4 °F (36.9 °C). His pulse is 102 (abnormal). His respiration is 16 and oxygen saturation is 98%.     Chief Complaint: COVID-19 Concerns    Catalina Roland presents to clinic with exposure to COVID-19 and needs testing to return to school.  Patient denies any signs or symptoms at this time      Constitution: Negative.   HENT: Negative.    Neck: neck negative.   Cardiovascular: Negative.    Eyes: Negative.    Gastrointestinal: Negative.    Endocrine: negative.   Genitourinary: Negative.    Musculoskeletal: Negative.    Skin: Negative.        Objective:      Physical Exam   Constitutional: He appears well-developed. He is active and cooperative.  Non-toxic appearance. He does not appear ill. No distress.   HENT:   Head: Normocephalic and atraumatic. No signs of injury. There is normal jaw occlusion.   Ears:   Right Ear: Tympanic membrane and external ear normal.   Left Ear: Tympanic membrane and external ear normal.   Nose: Nose normal. No signs of injury. No epistaxis in the right nostril. No epistaxis in the left nostril.   Mouth/Throat: Mucous membranes are moist. Oropharynx is clear.   Eyes: Conjunctivae and lids are normal. Visual tracking is normal. Right eye exhibits no discharge and no exudate. Left eye exhibits no discharge and no exudate. No scleral icterus.   Neck: Trachea normal. Neck supple. No neck rigidity present.   Cardiovascular: Normal rate and regular rhythm. Pulses are strong.   Pulmonary/Chest: Effort normal and breath sounds normal. No respiratory distress. He has no wheezes. He exhibits no retraction.   Abdominal: Bowel sounds are normal. He exhibits no distension. Soft. There is no abdominal tenderness.   Musculoskeletal: Normal range of motion.         General: No tenderness, deformity or signs of injury. Normal range of motion.   Neurological: He is  alert.   Skin: Skin is warm, dry, not diaphoretic and no rash. Capillary refill takes less than 2 seconds. No abrasion, No burn and No bruising   Psychiatric: His speech is normal and behavior is normal.   Nursing note and vitals reviewed.        Assessment:       1. Contact with and (suspected) exposure to covid-19          Plan:         Contact with and (suspected) exposure to covid-19  -     SARS Coronavirus 2 Antigen, POCT Manual Read                    Chest pain

## 2022-06-29 NOTE — ED PROVIDER NOTE - RATE
99 Render Risk Assessment In Note?: no Additional Notes: Pt had pending SCC and was scheduled for mohs today in July. Detail Level: Simple

## 2022-07-20 NOTE — ED PROVIDER NOTE - CROS ED SKIN ALL NEG
Chester Engle 60  PHYSICAL THERAPY MISSED TREATMENT NOTE  [facility]STE[facility]OWN [facility]B    Date: 2022  Patient Name: Afia Bateman        MRN: [id number]   : 1952  (71 y.o.)  Gender: female                REASON FOR MISSED TREATMENT:  Patient refused treatment. Pt adamantly refusing any mobility at this time despite maximal encouragement. negative...

## 2022-09-01 NOTE — ED POST DISCHARGE NOTE - ADDITIONAL DOCUMENTATION
RUFINA Whittaker: Pt called from [facility], spoke with pharmacist they do not have oxycodone 5mg tablets, resent rx for oxycodone to new preferred pharmacy [facility], spoke with pharmacist at [facility] they have this medication in stock soft, nontender, nondistended , normal bowel sounds

## 2022-09-10 NOTE — ED PROVIDER NOTE - PMH
CHF (congestive heart failure)    Coronary artery disease involving native coronary artery of native heart without angina pectoris    CVA (cerebral vascular accident)    Depression    DM (diabetes mellitus)    Enlarged heart    Glaucoma    Gout    Gout    HTN (hypertension)
Awake/Alert/Cooperative

## 2023-03-12 NOTE — H&P ADULT - GASTROINTESTINAL DETAILS
General no rigidity/soft/nontender/no bruit/no guarding/no rebound tenderness/no masses palpable/bowel sounds normal

## 2023-04-13 NOTE — PATIENT PROFILE ADULT. - HEALTHCARE QUESTIONS, PROFILE
Problem: Adult Mental Health  Goal: Reports or exhibits reduction in symptoms associated with elevated or labile mood/anay  Outcome: Outcome Met, Continue evaluating goal progress toward completion  Goal: Reports or exhibits an improvement in mood signs/symptoms associated with anxiety  Outcome: Outcome Met, Continue evaluating goal progress toward completion  Goal: Demonstrates ability to proactively perform self cares  Outcome: Outcome Met, Continue evaluating goal progress toward completion  Goal: Demonstrates improved ability to track conversation, process information  Outcome: Outcome Met, Continue evaluating goal progress toward completion  Goal: Reports decrease in thoughts of violence  Outcome: Outcome Met, Continue evaluating goal progress toward completion  Goal: Verbalizes when symptoms of illness are triggered and reports to staff when experiencing urges/intent of violence  Outcome: Outcome Met, Continue evaluating goal progress toward completion  Goal: Denies having a homicidal plan  Outcome: Outcome Met, Continue evaluating goal progress toward completion  Goal: Verbalizes understanding of positive individual coping skills  Outcome: Outcome Met, Continue evaluating goal progress toward completion  Goal: Demonstrates control of behavior, refraining from hostility, aggression or threats of violence  Outcome: Outcome Met, Continue evaluating goal progress toward completion  Goal: Verbalizes understanding of diagnosis, reason for treament and treatment program  Outcome: Outcome Met, Continue evaluating goal progress toward completion  Goal: Verbalizes understanding of medication management/monitoring/assessment of effectiveness  Outcome: Outcome Met, Continue evaluating goal progress toward completion     Problem: Coping, Ineffective  Goal: Identifies personal stressors  Outcome: Outcome Met, Continue evaluating goal progress toward completion  Goal: Identifies potential coping strategies  Outcome:  Outcome Met, Continue evaluating goal progress toward completion  Goal: Identifies benefits and consequences of choices/behavior(s)  (Patient)  Outcome: Outcome Met, Continue evaluating goal progress toward completion  Goal: Identifies personal or community-based support systems (Patient)  Outcome: Outcome Met, Continue evaluating goal progress toward completion  Goal: Verbalizes understanding of stress reaction/coping  Description: Document education using the patient education activity.   Outcome: Outcome Met, Continue evaluating goal progress toward completion      n/a

## 2023-04-18 NOTE — PATIENT PROFILE ADULT. - FUNCTIONAL SCREEN CURRENT LEVEL: DRESSING, MLM
Detail Level: Zone
Photo Preface (Leave Blank If You Do Not Want): Photographs were obtained today
(0) independent

## 2023-04-26 NOTE — ED ADULT TRIAGE NOTE - BP NONINVASIVE DIASTOLIC (MM HG)
04/26/23 1448   Discharge Assessment   Assessment Type Discharge Planning Assessment   Confirmed/corrected address, phone number and insurance Yes   Confirmed Demographics Correct on Facesheet   Source of Information patient   When was your last doctors appointment?   (PCP: Dr. Dexter)   Communicated RACHAEL with patient/caregiver Date not available/Unable to determine   Reason For Admission stroke   People in Home alone   Do you expect to return to your current living situation? Yes   Do you have help at home or someone to help you manage your care at home? Yes   Who are your caregiver(s) and their phone number(s)? daughter   Prior to hospitilization cognitive status: Alert/Oriented   Current cognitive status: Alert/Oriented   Walking or Climbing Stairs ambulation difficulty, requires equipment   Mobility Management walker   Equipment Currently Used at Home walker, rolling   Readmission within 30 days? No   Patient currently being followed by outpatient case management? No   Do you currently have service(s) that help you manage your care at home? No   Do you take prescription medications? Yes   Do you have prescription coverage? Yes   Coverage [facility] Medicare/Medicaid   Do you have any problems affording any of your prescribed medications? No   Is the patient taking medications as prescribed? yes   Who is going to help you get home at discharge? will need a ride   How do you get to doctors appointments? health plan transportation   Are you on dialysis? No   Do you take coumadin? No   Discharge Plan A Home Health   Discharge Plan B Home Health   DME Needed Upon Discharge  none   Discharge Plan discussed with: Patient   Discharge Barriers Identified None     Met with patient for dc planning. Patient was oriented but quickly fell back asleep while talking so difficult to obtain some information. Will follow for dc needs.   75

## 2023-05-02 NOTE — H&P ADULT - PROBLEM SELECTOR PROBLEM 2
Decreased Lantus 4 units BID to Lantus 3 units BID given low glucose trends.  A will continue to monitor glucose trends & intake.  ELINA Hastings   ESRD (end stage renal disease) Discharged

## 2023-05-26 NOTE — PROGRESS NOTE ADULT - PROBLEM SELECTOR PROBLEM 6
----- Message from LUIS ALBERTO Lopez sent at 5/25/2023  9:36 PM EDT -----  Please let patient know, the scan did not show any bleeds. Thank you.   
No answer.     Spoke with Keon, verbalized understanding.   
Anemia of chronic disease
HTN (hypertension)

## 2023-05-29 NOTE — CONSULT NOTE ADULT - ASSESSMENT
Echo 1/5/20189: EF 45-50%, global LV sys dysfx, mild diastolic dysfx (stgI)  Stress test in 6/2018 normal with no evidence of mi or ischemia   Echo from 3/ 22/2019, mild to mod MR, moderate global lv sys dysfx small pericardial effusion to left ventricle EF 41%     a/p  55 yo Female with PMHx of Systolic CHF, DM, HTN, HLD, ESRD (Tues, Thurs, Sat) , Gout presenting with cp and sob due to CHF from missed HD    1. Chest pain, atypical from CHF  symptoms likely in the setting of fluid overload, secondary to missed HD session   cv stable, no cp/sob, no evidence of acute ischemia   HST elevated, type II MI, demand ischemia 2/2 to ESRD , EKG unchanged   Prior echo performed, pt. with known systolic dysfunction (noted on echo from 1/2018), EF 41% (overall unchanged), small effusion  can check limited Echo to eval pericardial effusion   c/w BB, ASA, imdur, plavix     2. Acute on chronic Systolic chf   fluid overloaded likely secondary to missed HD session  cont fluid removal with HD / lasix po  continue bb, imdur, hydral. lasix   acei or arb per renal     3. HTN  stable   continue current meds     4. ESRD on HD  renal f/u     dvt ppx
(M6) obeys commands

## 2023-06-15 NOTE — ED PROVIDER NOTE - ENMT, MLM
FUTURE VISIT INFORMATION      SURGERY INFORMATION:    Date: 23    Location:  GI    Surgeon:  Rajeev Matson MD    Anesthesia Type:  MAC    Procedure: Esophagoscopy, gastroscopy, duodenoscopy (EGD), combined    RECORDS REQUESTED FROM:       Primary Care Provider: Santiago Guevara [facility]    Pertinent Medical History: JOAN, COPD, Paroxysmal atrial fibrillation, mitral regurgitation    Most recent EKG+ Tracin23    Most recent ECHO: 23    Most recent Cardiac Stress Test: 23      
Airway patent, MMM

## 2023-07-03 NOTE — ED PROVIDER NOTE - ENMT, MLM
room air
Airway patent, Nasal mucosa clear. Mouth with normal mucosa. Throat has no vesicles, no oropharyngeal exudates and uvula is midline.

## 2023-08-09 NOTE — PROGRESS NOTE ADULT - PROBLEM/PLAN-1
Patient requesting new order and new referral to endocronoligist  Dr Gilmore  [address], [place]  Phone [phone]  Fax [phone]  Consult and Treat  Please advise patient   DISPLAY PLAN FREE TEXT

## 2023-09-11 NOTE — ED PROVIDER NOTE - DATE/TIME 1
02-May-2019 06:13 Spironolactone Counseling: Patient advised regarding risks of diarrhea, abdominal pain, hyperkalemia, birth defects (for female patients), liver toxicity and renal toxicity. The patient may need blood work to monitor liver and kidney function and potassium levels while on therapy. The patient verbalized understanding of the proper use and possible adverse effects of spironolactone.  All of the patient's questions and concerns were addressed.

## 2023-10-18 NOTE — H&P ADULT - DOES THIS PATIENT HAVE A HISTORY OF OR HAS BEEN DX WITH HEART FAILURE?
Subjective     Patient ID: Jessica Pope is a 48 y.o. female.    Chief Complaint: Diabetes     The patient location is: home   The chief complaint leading to consultation is: diabetes     Visit type: audiovisual    Face to Face time with patient: 9  20  minutes of total time spent on the encounter, which includes face to face time and non-face to face time preparing to see the patient (eg, review of tests), Obtaining and/or reviewing separately obtained history, Documenting clinical information in the electronic or other health record, Independently interpreting results (not separately reported) and communicating results to the patient/family/caregiver, or Care coordination (not separately reported).     Each patient to whom he or she provides medical services by telemedicine is:  (1) informed of the relationship between the physician and patient and the respective role of any other health care provider with respect to management of the patient; and (2) notified that he or she may decline to receive medical services by telemedicine and may withdraw from such care at any time.    Notes: covid    BRADY  Pt is a 48 y.o. wf  with a diagnosis of Type 2  diabetes mellitus diagnosed approximately 2007 as gestational, and then converted to Type 2 DM --2013. , as well as chronic conditions pending review including hypothryoidism, hlp. .  Other pertinent medical and social information noted includes, but not limited to: none . Works as . [employer]     Interim Events:October 18, 2023:  Pt for 2 week sensor review after adding mounjaro tresiba --with interim downware adjustment, and d/c of glimiperide in interim r/t c/o nocturnal hypoglycemia.  Pt doing well.  Hypoglycemia eliminated.      Current DM meds:   Toujeo 30 units, mounjaro 2.5 mg. Humalog ss,   metformin 750 xr bid --(cannot tolerate higher dose).        Failed DM meds:  glimeride 4 mg bid (d/c r/t hypoglycemia).        Back up plan for  insulin pump hiatus:   Statin: atorvastatin 40mg        Not tolerated statin : na   ACE/ARB:none        Not tolerated ACE/ARB: none   Known Diabetic complications: none     Tech Passord:  Barbie:  [email or web address]  PW:  [email or web address]     Oct 4, 2023:  Pt initially seen and was  pending EN r/t DUB but reported a1c 11%.  Pt started on Tresiba--which she has titrated to 32 units and novolog ss.  Glucoses improved, but still abovoe goal . No hypoglycemia.   Drug costs are a concern as she has marked deductible.      CGMS Interpretation:       Sensor/Pump Interpretation or Prominent Theme: No recent hypoglycemia. Glucoses hovering low 100 range with some  expected prandial glucose elevations.        Aug 9, 2023:  A1c recently per ob gyn--June 2023--11%. Likley pending EN.  Pt is asymptomatic regarding hyperglycemia. On max glimepiride and tolerated metformin.     Avoiding juices, drinking water    Bkft   usually just toast  or  rare cinnamon roll from s--but this is a rarity   Lunch--fast food to left overs    Supper--Tries to cook at [facility],  likes to smoke meat.        Review of Systems   Constitutional:  Negative for activity change and fatigue.   HENT:  Negative for hearing loss and trouble swallowing.    Eyes:  Negative for photophobia and visual disturbance.        Last Eye Exam:  overdue 2 years.     Respiratory:  Negative for cough and shortness of breath.    Cardiovascular:  Negative for chest pain and palpitations.   Gastrointestinal:  Positive for reflux. Negative for constipation and diarrhea.   Genitourinary:  Negative for frequency and urgency.   Musculoskeletal:  Negative for arthralgias and myalgias.   Integumentary:  Negative for rash and wound.   Neurological:  Negative for weakness and numbness.   Psychiatric/Behavioral:  Negative for sleep disturbance. The patient is not nervous/anxious.           Objective     Physical Exam  Constitutional:       Appearance: Normal appearance. She is obese.    HENT:      Head: Normocephalic and atraumatic.      Nose: Nose normal.   Neurological:      General: No focal deficit present.      Mental Status: She is alert and oriented to person, place, and time.   Psychiatric:         Mood and Affect: Mood normal.         Behavior: Behavior normal.         Thought Content: Thought content normal.         Judgment: Judgment normal.            There were no vitals taken for this visit.    Hemoglobin A1C   Date Value Ref Range Status   11/02/2022 8.3 (H) <5.7 % of total Hgb Final     Comment:     For someone without known diabetes, a hemoglobin A1c  value of 6.5% or greater indicates that they may have   diabetes and this should be confirmed with a follow-up   test.     For someone with known diabetes, a value <7% indicates   that their diabetes is well controlled and a value   greater than or equal to 7% indicates suboptimal   control. A1c targets should be individualized based on   duration of diabetes, age, comorbid conditions, and   other considerations.     Currently, no consensus exists regarding use of  hemoglobin A1c for diagnosis of diabetes for children.         08/03/2021 9.5 (H) <5.7 % of total Hgb Final     Comment:     For someone without known diabetes, a hemoglobin A1c  value of 6.5% or greater indicates that they may have   diabetes and this should be confirmed with a follow-up   test.     For someone with known diabetes, a value <7% indicates   that their diabetes is well controlled and a value   greater than or equal to 7% indicates suboptimal   control. A1c targets should be individualized based on   duration of diabetes, age, comorbid conditions, and   other considerations.     Currently, no consensus exists regarding use of  hemoglobin A1c for diagnosis of diabetes for children.         12/18/2020 9.4 (H) <5.7 % of total Hgb Final     Comment:     For someone without known diabetes, a hemoglobin A1c  value of 6.5% or greater indicates that they may have  "  diabetes and this should be confirmed with a follow-up   test.     For someone with known diabetes, a value <7% indicates   that their diabetes is well controlled and a value   greater than or equal to 7% indicates suboptimal   control. A1c targets should be individualized based on   duration of diabetes, age, comorbid conditions, and   other considerations.     Currently, no consensus exists regarding use of  hemoglobin A1c for diagnosis of diabetes for children.             Chemistry        Component Value Date/Time     11/02/2022 0827     06/06/2015 0725    K 4.4 11/02/2022 0827    K 4.2 06/06/2015 0725     11/02/2022 0827     06/06/2015 0725    CO2 24 11/02/2022 0827    BUN 10 11/02/2022 0827    CREATININE 0.67 11/02/2022 0827    CREATININE 0.70 06/06/2015 0725     (H) 11/02/2022 0827        Component Value Date/Time    CALCIUM 9.1 11/02/2022 0827    ALKPHOS 105 10/10/2018 0743    AST 15 11/02/2022 0827    ALT 21 11/02/2022 0827    BILITOT 0.8 11/02/2022 0827    ESTGFRAFRICA 110 12/18/2020 0739    EGFRNONAA 95 12/18/2020 0739          Lab Results   Component Value Date    CHOL 180 11/02/2022     Lab Results   Component Value Date    HDL 75 11/02/2022     Lab Results   Component Value Date    LDLCALC 81 11/02/2022     Lab Results   Component Value Date    TRIG 137 11/02/2022     Lab Results   Component Value Date    CHOLHDL 2.4 11/02/2022     Lab Results   Component Value Date    MICALBCREAT 42 (H) 11/02/2022     Lab Results   Component Value Date    TSH 3.16 11/02/2022     No results found for: "[id number]"    Assessment and Plan     1. Type 2 Diabetes Mellitus                PLAN   On atorvastatin  Should be on ace.     Increase Toujeo from 32 to 36 with parameters for futher titration --30 Toujeo    Start mounjaro 2.5 mg--denies any personal or family hx of thyroid or pancreatic cancer or pancreatitis.  Cont glimepiride --for now.   Cont humalog ss.   Freestyle danny 3 " yes sampled and started and sharing.        ORDERS 10/18/2023     3 mo with a1c prior

## 2023-10-27 NOTE — PATIENT PROFILE ADULT - NSPROMUTANXFEARFT_GEN_A_NUR
Referring Provider (Optional): Dr. Mikel Ken Anesthesia Volume In Cc: 0 Did You Provide Opioid Counseling: No Repair Type: Complex Repair Suturegard Retention Suture: 2-0 Nylon Retention Suture Bite Size: 3 mm Length To Time In Minutes Device Was In Place: 10 Number Of Hemigard Strips Per Side: 1 Simple / Intermediate / Complex Repair - Final Wound Length In Cm: 2.7 Complex Requirements: Extensive Undermining Performed?: Yes Width Of Defect Perpendicular To Closure In Cm (Required): 1.1 Distance Of Undermining In Cm (Required): 2 Undermining Type: Entire Wound Debridement Text: The wound edges were debrided prior to proceeding with the closure to facilitate wound healing. Helical Rim Text: The closure involved the helical rim. Vermilion Border Text: The closure involved the vermilion border. Nostril Rim Text: The closure involved the nostril rim. Retention Suture Text: Retention sutures were placed to support the closure and prevent dehiscence. Primary Defect Length (In Cm): 1.5 Skin Substitute: EpiFix Micronized Suture Removal: 7 days Type Of Previous Surgery (Optional- Ie Mohs Surgery): MOHS Date Of Previous Surgery (Optional): 10/27/23 Mohs Case Number (Optional): [id number] Previous Accession (Optional): [id number] Detail Level: Detailed Anesthesia Type: 1% lidocaine with epinephrine Hemostasis: Manual Pressure Estimated Blood Loss (Cc): minimal Brow Lift Text: A midfrontal incision was made medially to the defect to allow access to the tissues just superior to the left eyebrow. Following careful dissection inferiorly in a supraperiosteal plane to the level of the left eyebrow, several 3-0 monocryl sutures were used to resuspend the eyebrow orbicularis oculi muscular unit to the superior frontal bone periosteum. This resulted in an appropriate reapproximation of static eyebrow symmetry and correction of the left brow ptosis. Deep Sutures: 4-0 Vicryl Epidermal Sutures: 5-0 Prolene Epidermal Closure: simple interrupted Wound Care: No ointment Dressing: steri-strips and pressure dressing Unna Boot Text: An Unna boot was placed to help immobilize the limb and facilitate more rapid healing. Suturegard Intro: Intraoperative tissue expansion was performed, utilizing the SUTUREGARD device, in order to reduce wound tension. Suturegard Body: The suture ends were repeatedly re-tightened and re-clamped to achieve the desired tissue expansion. Hemigard Intro: Due to skin fragility and wound tension, it was decided to use HEMIGARD adhesive retention suture devices to permit a linear closure. The skin was cleaned and dried for a 6cm distance away from the wound. Excessive hair, if present, was removed to allow for adhesion. Hemigard Postcare Instructions: The HEMIGARD strips are to remain completely dry for at least 5-7 days. Graft Donor Site Bandage (Optional-Leave Blank If You Don't Want In Note): Steri-strips and a pressure bandage were applied to the donor site. Closure 2 Information: This tab is for additional flaps and grafts, including complex repair and grafts and complex repair and flaps. You can also specify a different location for the additional defect, if the location is the same you do not need to select a new one. We will insert the automated text for the repair you select below just as we do for solitary flaps and grafts. Please note that at this time if you select a location with a different insurance zone you will need to override the ICD10 and CPT if appropriate. Closure 3 Information: This tab is for additional flaps and grafts above and beyond our usual structured repairs. Please note if you enter information here it will not currently bill and you will need to add the billing information manually. Closure 4 Information: This tab is for additional flaps and grafts above and beyond our usual structured repairs.  Please note if you enter information here it will not currently bill and you will need to add the billing information manually. Complex Repair Preamble Text (Leave Blank If You Do Not Want): Extensive wide undermining was performed. Intermediate Repair Preamble Text (Leave Blank If You Do Not Want): Undermining was performed with blunt dissection. Flap Thinning Complex Repair Preamble Text (Leave Blank If You Do Not Want): An incision was made along the previous flap suture line. Undermining was performed beneath the flap and redundant tissue was removed to restore the normal contour of the skin. Non-Graft Cartilage Fenestration Text: The cartilage was fenestrated with a 2mm punch biopsy to help facilitate healing. Graft Cartilage Fenestration Text: The cartilage was fenestrated with a 2mm punch biopsy to help facilitate graft survival and healing. Preparation Of Recipient Site - Flap: The eschar was removed surgically with sharp dissection to facilitate appropriate wound healing of the following adjacent tissue rearrangement. Preparation Of Recipient Site - Graft: The eschar was removed surgically with sharp dissection to facilitate appropriate survival of the following graft. Preparation Of Recipient Site - Flap Takedown: The eschar and granulation tissue was removed surgically with sharp dissection to facilitate appropriate healing after division and inset of the proximal and distal interpolation flap. Secondary Intention Text (Leave Blank If You Do Not Want): The defect will heal with secondary intention. No Repair - Repaired With Adjacent Surgical Defect Text (Leave Blank If You Do Not Want): After obtaining clear surgical margins the defect was repaired concurrently with another surgical defect which was in close approximation. Referred To Oculoplastics For Closure Text (Leave Blank If You Do Not Want): After obtaining clear surgical margins the patient was sent to oculoplastics for surgical repair. The patient understands they will receive post-surgical care and follow-up from the referring physician's office. Referred To Otolaryngology For Closure Text (Leave Blank If You Do Not Want): After obtaining clear surgical margins the patient was sent to otolaryngology for surgical repair. The patient understands they will receive post-surgical care and follow-up from the referring physician's office. Referred To Plastics For Closure Text (Leave Blank If You Do Not Want): After obtaining clear surgical margins the patient was sent to plastics for surgical repair. The patient understands they will receive post-surgical care and follow-up from the referring physician's office. Referred To Asc For Closure Text (Leave Blank If You Do Not Want): After obtaining clear surgical margins the patient was sent to an [facility] for surgical repair. The patient understands they will receive post-surgical care and follow-up from the [facility] physician. Referred To Mid-Level For Closure Text (Leave Blank If You Do Not Want): After obtaining clear surgical margins the patient was sent to a mid-level provider for surgical repair. The patient understands they will receive post-surgical care and follow-up from the mid-level provider. Repair Performed By Another Provider Text (Leave Blank If You Do Not Want): After obtaining clear surgical margins the defect was repaired by another provider. Adjacent Tissue Transfer Text: The defect edges were debeveled with a #15 scalpel blade. Given the location of the defect and the proximity to free margins an adjacent tissue transfer was deemed most appropriate. Using a sterile surgical marker, an appropriate flap was drawn incorporating the defect and placing the expected incisions within the relaxed skin tension lines where possible. The area thus outlined was incised deep to adipose tissue with a #15 scalpel blade. The skin margins were undermined to an appropriate distance in all directions utilizing iris scissors and carried over to close the primary defect. Advancement Flap (Single) Text: The defect edges were debeveled with a #15 scalpel blade. Given the location of the defect and the proximity to free margins a single advancement flap was deemed most appropriate. Using a sterile surgical marker, an appropriate advancement flap was drawn incorporating the defect and placing the expected incisions within the relaxed skin tension lines where possible. The area thus outlined was incised deep to adipose tissue with a #15 scalpel blade. The skin margins were undermined to an appropriate distance in all directions utilizing iris scissors. Following this, the designed flap was advanced and carried over into the primary defect and sutured into place. Advancement Flap (Double) Text: The defect edges were debeveled with a #15 scalpel blade. Given the location of the defect and the proximity to free margins a double advancement flap was deemed most appropriate. Using a sterile surgical marker, the appropriate advancement flaps were drawn incorporating the defect and placing the expected incisions within the relaxed skin tension lines where possible. The area thus outlined was incised deep to adipose tissue with a #15 scalpel blade. The skin margins were undermined to an appropriate distance in all directions utilizing iris scissors. Following this, the designed flaps were advanced and carried over into the primary defect and sutured into place. Burow's Advancement Flap Text: The defect edges were debeveled with a #15 scalpel blade. Given the location of the defect and the proximity to free margins a Burow's advancement flap was deemed most appropriate. Using a sterile surgical marker, the appropriate advancement flap was drawn incorporating the defect and placing the expected incisions within the relaxed skin tension lines where possible. The area thus outlined was incised deep to adipose tissue with a #15 scalpel blade. The skin margins were undermined to an appropriate distance in all directions utilizing iris scissors. Following this, the designed flap was advanced and carried over into the primary defect and sutured into place. Chonodrocutaneous Helical Advancement Flap Text: The defect edges were debeveled with a #15 scalpel blade. Given the location of the defect and the proximity to free margins a chondrocutaneous helical advancement flap was deemed most appropriate. Using a sterile surgical marker, the appropriate advancement flap was drawn incorporating the defect and placing the expected incisions within the relaxed skin tension lines where possible. The area thus outlined was incised deep to adipose tissue with a #15 scalpel blade. The skin margins were undermined to an appropriate distance in all directions utilizing iris scissors. Following this, the designed flap was advanced and carried over into the primary defect and sutured into place. Crescentic Advancement Flap Text: The defect edges were debeveled with a #15 scalpel blade. Given the location of the defect and the proximity to free margins a crescentic advancement flap was deemed most appropriate. Using a sterile surgical marker, the appropriate advancement flap was drawn incorporating the defect and placing the expected incisions within the relaxed skin tension lines where possible. The area thus outlined was incised deep to adipose tissue with a #15 scalpel blade. The skin margins were undermined to an appropriate distance in all directions utilizing iris scissors. Following this, the designed flap was advanced and carried over into the primary defect and sutured into place. A-T Advancement Flap Text: The defect edges were debeveled with a #15 scalpel blade. Given the location of the defect, shape of the defect and the proximity to free margins an A-T advancement flap was deemed most appropriate. Using a sterile surgical marker, an appropriate advancement flap was drawn incorporating the defect and placing the expected incisions within the relaxed skin tension lines where possible. The area thus outlined was incised deep to adipose tissue with a #15 scalpel blade. The skin margins were undermined to an appropriate distance in all directions utilizing iris scissors. Following this, the designed flap was advanced and carried over into the primary defect and sutured into place. O-T Advancement Flap Text: The defect edges were debeveled with a #15 scalpel blade. Given the location of the defect, shape of the defect and the proximity to free margins an O-T advancement flap was deemed most appropriate. Using a sterile surgical marker, an appropriate advancement flap was drawn incorporating the defect and placing the expected incisions within the relaxed skin tension lines where possible. The area thus outlined was incised deep to adipose tissue with a #15 scalpel blade. The skin margins were undermined to an appropriate distance in all directions utilizing iris scissors. Following this, the designed flap was advanced and carried over into the primary defect and sutured into place. O-L Flap Text: The defect edges were debeveled with a #15 scalpel blade. Given the location of the defect, shape of the defect and the proximity to free margins an O-L flap was deemed most appropriate. Using a sterile surgical marker, an appropriate advancement flap was drawn incorporating the defect and placing the expected incisions within the relaxed skin tension lines where possible. The area thus outlined was incised deep to adipose tissue with a #15 scalpel blade. The skin margins were undermined to an appropriate distance in all directions utilizing iris scissors. Following this, the designed flap was advanced and carried over into the primary defect and sutured into place. O-Z Flap Text: The defect edges were debeveled with a #15 scalpel blade. Given the location of the defect, shape of the defect and the proximity to free margins an O-Z flap was deemed most appropriate. Using a sterile surgical marker, an appropriate transposition flap was drawn incorporating the defect and placing the expected incisions within the relaxed skin tension lines where possible. The area thus outlined was incised deep to adipose tissue with a #15 scalpel blade. The skin margins were undermined to an appropriate distance in all directions utilizing iris scissors. Following this, the designed flap was carried over into the primary defect and sutured into place. Double O-Z Flap Text: The defect edges were debeveled with a #15 scalpel blade. Given the location of the defect, shape of the defect and the proximity to free margins a Double O-Z flap was deemed most appropriate. Using a sterile surgical marker, an appropriate transposition flap was drawn incorporating the defect and placing the expected incisions within the relaxed skin tension lines where possible. The area thus outlined was incised deep to adipose tissue with a #15 scalpel blade. The skin margins were undermined to an appropriate distance in all directions utilizing iris scissors. Following this, the designed flap was carried over into the primary defect and sutured into place. V-Y Flap Text: The defect edges were debeveled with a #15 scalpel blade. Given the location of the defect, shape of the defect and the proximity to free margins a V-Y flap was deemed most appropriate. Using a sterile surgical marker, an appropriate advancement flap was drawn incorporating the defect and placing the expected incisions within the relaxed skin tension lines where possible. The area thus outlined was incised deep to adipose tissue with a #15 scalpel blade. The skin margins were undermined to an appropriate distance in all directions utilizing iris scissors. Following this, the designed flap was advanced and carried over into the primary defect and sutured into place. Advancement-Rotation Flap Text: The defect edges were debeveled with a #15 scalpel blade. Given the location of the defect, shape of the defect and the proximity to free margins an advancement-rotation flap was deemed most appropriate. Using a sterile surgical marker, an appropriate flap was drawn incorporating the defect and placing the expected incisions within the relaxed skin tension lines where possible. The area thus outlined was incised deep to adipose tissue with a #15 scalpel blade. The skin margins were undermined to an appropriate distance in all directions utilizing iris scissors. Following this, the designed flap was carried over into the primary defect and sutured into place. Mercedes Flap Text: The defect edges were debeveled with a #15 scalpel blade. Given the location of the defect, shape of the defect and the proximity to free margins a Mercedes flap was deemed most appropriate. Using a sterile surgical marker, an appropriate advancement flap was drawn incorporating the defect and placing the expected incisions within the relaxed skin tension lines where possible. The area thus outlined was incised deep to adipose tissue with a #15 scalpel blade. The skin margins were undermined to an appropriate distance in all directions utilizing iris scissors. Following this, the designed flap was advanced and carried over into the primary defect and sutured into place. Modified Advancement Flap Text: The defect edges were debeveled with a #15 scalpel blade. Given the location of the defect, shape of the defect and the proximity to free margins a modified advancement flap was deemed most appropriate. Using a sterile surgical marker, an appropriate advancement flap was drawn incorporating the defect and placing the expected incisions within the relaxed skin tension lines where possible. The area thus outlined was incised deep to adipose tissue with a #15 scalpel blade. The skin margins were undermined to an appropriate distance in all directions utilizing iris scissors. Following this, the designed flap was advanced and carried over into the primary defect and sutured into place. Mucosal Advancement Flap Text: Given the location of the defect, shape of the defect and the proximity to free margins a mucosal advancement flap was deemed most appropriate. Incisions were made with a 15 blade scalpel in the appropriate fashion along the cutaneous vermilion border and the mucosal lip. The remaining actinically damaged mucosal tissue was excised. The mucosal advancement flap was then elevated to the gingival sulcus with care taken to preserve the neurovascular structures and advanced into the primary defect. Care was taken to ensure that precise realignment of the vermilion border was achieved. Peng Advancement Flap Text: The defect edges were debeveled with a #15 scalpel blade. Given the location of the defect, shape of the defect and the proximity to free margins a Peng advancement flap was deemed most appropriate. Using a sterile surgical marker, an appropriate advancement flap was drawn incorporating the defect and placing the expected incisions within the relaxed skin tension lines where possible. The area thus outlined was incised deep to adipose tissue with a #15 scalpel blade. The skin margins were undermined to an appropriate distance in all directions utilizing iris scissors. Following this, the designed flap was advanced and carried over into the primary defect and sutured into place. Hatchet Flap Text: The defect edges were debeveled with a #15 scalpel blade. Given the location of the defect, shape of the defect and the proximity to free margins a hatchet flap was deemed most appropriate. Using a sterile surgical marker, an appropriate hatchet flap was drawn incorporating the defect and placing the expected incisions within the relaxed skin tension lines where possible. The area thus outlined was incised deep to adipose tissue with a #15 scalpel blade. The skin margins were undermined to an appropriate distance in all directions utilizing iris scissors. Following this, the designed flap was carried over into the primary defect and sutured into place. Rotation Flap Text: The defect edges were debeveled with a #15 scalpel blade. Given the location of the defect, shape of the defect and the proximity to free margins a rotation flap was deemed most appropriate. Using a sterile surgical marker, an appropriate rotation flap was drawn incorporating the defect and placing the expected incisions within the relaxed skin tension lines where possible. The area thus outlined was incised deep to adipose tissue with a #15 scalpel blade. The skin margins were undermined to an appropriate distance in all directions utilizing iris scissors. Following this, the designed flap was carried over into the primary defect and sutured into place. Bilateral Rotation Flap Text: The defect edges were debeveled with a #15 scalpel blade. Given the location of the defect, shape of the defect and the proximity to free margins a bilateral rotation flap was deemed most appropriate. Using a sterile surgical marker, an appropriate rotation flap was drawn incorporating the defect and placing the expected incisions within the relaxed skin tension lines where possible. The area thus outlined was incised deep to adipose tissue with a #15 scalpel blade. The skin margins were undermined to an appropriate distance in all directions utilizing iris scissors. Following this, the designed flap was carried over into the primary defect and sutured into place. Spiral Flap Text: The defect edges were debeveled with a #15 scalpel blade. Given the location of the defect, shape of the defect and the proximity to free margins a spiral flap was deemed most appropriate. Using a sterile surgical marker, an appropriate rotation flap was drawn incorporating the defect and placing the expected incisions within the relaxed skin tension lines where possible. The area thus outlined was incised deep to adipose tissue with a #15 scalpel blade. The skin margins were undermined to an appropriate distance in all directions utilizing iris scissors. Following this, the designed flap was carried over into the primary defect and sutured into place. Staged Advancement Flap Text: The defect edges were debeveled with a #15 scalpel blade. Given the location of the defect, shape of the defect and the proximity to free margins a staged advancement flap was deemed most appropriate. Using a sterile surgical marker, an appropriate advancement flap was drawn incorporating the defect and placing the expected incisions within the relaxed skin tension lines where possible. The area thus outlined was incised deep to adipose tissue with a #15 scalpel blade. The skin margins were undermined to an appropriate distance in all directions utilizing iris scissors. Following this, the designed flap was carried over into the primary defect and sutured into place. Star Wedge Flap Text: The defect edges were debeveled with a #15 scalpel blade. Given the location of the defect, shape of the defect and the proximity to free margins a star wedge flap was deemed most appropriate. Using a sterile surgical marker, an appropriate rotation flap was drawn incorporating the defect and placing the expected incisions within the relaxed skin tension lines where possible. The area thus outlined was incised deep to adipose tissue with a #15 scalpel blade. The skin margins were undermined to an appropriate distance in all directions utilizing iris scissors. Following this, the designed flap was carried over into the primary defect and sutured into place. n/a Transposition Flap Text: The defect edges were debeveled with a #15 scalpel blade. Given the location of the defect and the proximity to free margins a transposition flap was deemed most appropriate. Using a sterile surgical marker, an appropriate transposition flap was drawn incorporating the defect. The area thus outlined was incised deep to adipose tissue with a #15 scalpel blade. The skin margins were undermined to an appropriate distance in all directions utilizing iris scissors. Following this, the designed flap was carried over into the primary defect and sutured into place. Muscle Hinge Flap Text: The defect edges were debeveled with a #15 scalpel blade. Given the size, depth and location of the defect and the proximity to free margins a muscle hinge flap was deemed most appropriate. Using a sterile surgical marker, an appropriate hinge flap was drawn incorporating the defect. The area thus outlined was incised with a #15 scalpel blade. The skin margins were undermined to an appropriate distance in all directions utilizing iris scissors. Following this, the designed flap was carried into the primary defect and sutured into place. Mustarde Flap Text: The defect edges were debeveled with a #15 scalpel blade. Given the size, depth and location of the defect and the proximity to free margins a Mustarde flap was deemed most appropriate. Using a sterile surgical marker, an appropriate flap was drawn incorporating the defect. The area thus outlined was incised with a #15 scalpel blade. The skin margins were undermined to an appropriate distance in all directions utilizing iris scissors. Following this, the designed flap was carried into the primary defect and sutured into place. Nasal Turnover Hinge Flap Text: The defect edges were debeveled with a #15 scalpel blade. Given the size, depth, location of the defect and the defect being full thickness a nasal turnover hinge flap was deemed most appropriate. Using a sterile surgical marker, an appropriate hinge flap was drawn incorporating the defect. The area thus outlined was incised with a #15 scalpel blade. The flap was designed to recreate the nasal mucosal lining and the alar rim. The skin margins were undermined to an appropriate distance in all directions utilizing iris scissors.  Following this, the designed flap was carried over into the primary defect and sutured into place Nasalis-Muscle-Based Myocutaneous Island Pedicle Flap Text: Using a #15 blade, an incision was made around the donor flap to the level of the nasalis muscle. Wide lateral undermining was then performed in both the subcutaneous plane above the nasalis muscle, and in a submuscular plane just above periosteum. This allowed the formation of a free nasalis muscle axial pedicle (based on the angular artery) which was still attached to the actual cutaneous flap, increasing its mobility and vascular viability. Hemostasis was obtained with pinpoint electrocoagulation. The flap was mobilized into position and the pivotal anchor points positioned and stabilized with buried interrupted sutures. Subcutaneous and dermal tissues were closed in a multilayered fashion with sutures. Tissue redundancies were excised, and the epidermal edges were apposed without significant tension and sutured with sutures. Orbicularis Oris Muscle Flap Text: The defect edges were debeveled with a #15 scalpel blade. Given that the defect affected the competency of the oral sphincter an orbicularis oris muscle flap was deemed most appropriate to restore this competency and normal muscle function. Using a sterile surgical marker, an appropriate flap was drawn incorporating the defect. The area thus outlined was incised with a #15 scalpel blade. Following this, the designed flap was carried over into the primary defect and sutured into place. Melolabial Transposition Flap Text: The defect edges were debeveled with a #15 scalpel blade. Given the location of the defect and the proximity to free margins a melolabial flap was deemed most appropriate. Using a sterile surgical marker, an appropriate melolabial transposition flap was drawn incorporating the defect. The area thus outlined was incised deep to adipose tissue with a #15 scalpel blade. The skin margins were undermined to an appropriate distance in all directions utilizing iris scissors. Following this, the designed flap was carried over into the primary defect and sutured into place. Rhombic Flap Text: The defect edges were debeveled with a #15 scalpel blade. Given the location of the defect and the proximity to free margins a rhombic flap was deemed most appropriate. Using a sterile surgical marker, an appropriate rhombic flap was drawn incorporating the defect. The area thus outlined was incised deep to adipose tissue with a #15 scalpel blade. The skin margins were undermined to an appropriate distance in all directions utilizing iris scissors. Following this, the designed flap was carried over into the primary defect and sutured into place. Rhomboid Transposition Flap Text: The defect edges were debeveled with a #15 scalpel blade. Given the location of the defect and the proximity to free margins a rhomboid transposition flap was deemed most appropriate. Using a sterile surgical marker, an appropriate rhomboid flap was drawn incorporating the defect. The area thus outlined was incised deep to adipose tissue with a #15 scalpel blade. The skin margins were undermined to an appropriate distance in all directions utilizing iris scissors. Following this, the designed flap was carried over into the primary defect and sutured into place. Bi-Rhombic Flap Text: The defect edges were debeveled with a #15 scalpel blade. Given the location of the defect and the proximity to free margins a bi-rhombic flap was deemed most appropriate. Using a sterile surgical marker, an appropriate rhombic flap was drawn incorporating the defect. The area thus outlined was incised deep to adipose tissue with a #15 scalpel blade. The skin margins were undermined to an appropriate distance in all directions utilizing iris scissors. Following this, the designed flap was carried over into the primary defect and sutured into place. Helical Rim Advancement Flap Text: The defect edges were debeveled with a #15 blade scalpel. Given the location of the defect and the proximity to free margins (helical rim) a double helical rim advancement flap was deemed most appropriate. Using a sterile surgical marker, the appropriate advancement flaps were drawn incorporating the defect and placing the expected incisions between the helical rim and antihelix where possible. The area thus outlined was incised through and through with a #15 scalpel blade. With a skin hook and iris scissors, the flaps were gently and sharply undermined and freed up. Folllowing this, the designed flaps were carried over into the primary defect and sutured into place. Bilateral Helical Rim Advancement Flap Text: The defect edges were debeveled with a #15 blade scalpel. Given the location of the defect and the proximity to free margins (helical rim) a bilateral helical rim advancement flap was deemed most appropriate. Using a sterile surgical marker, the appropriate advancement flaps were drawn incorporating the defect and placing the expected incisions between the helical rim and antihelix where possible. The area thus outlined was incised through and through with a #15 scalpel blade. With a skin hook and iris scissors, the flaps were gently and sharply undermined and freed up. Following this, the designed flaps were placed into the primary defect and sutured into place. Ear Star Wedge Flap Text: The defect edges were debeveled with a #15 blade scalpel. Given the location of the defect and the proximity to free margins (helical rim) an ear star wedge flap was deemed most appropriate. Using a sterile surgical marker, the appropriate flap was drawn incorporating the defect and placing the expected incisions between the helical rim and antihelix where possible. The area thus outlined was incised through and through with a #15 scalpel blade. Following this, the designed flap was carried over into the primary defect and sutured into place. Banner Transposition Flap Text: The defect edges were debeveled with a #15 scalpel blade. Given the location of the defect and the proximity to free margins a Banner transposition flap was deemed most appropriate. Using a sterile surgical marker, an appropriate flap was drawn around the defect. The area thus outlined was incised deep to adipose tissue with a #15 scalpel blade. The skin margins were undermined to an appropriate distance in all directions utilizing iris scissors. Following this, the designed flap was carried into the primary defect and sutured into place. Bilobed Flap Text: The defect edges were debeveled with a #15 scalpel blade. Given the location of the defect and the proximity to free margins a bilobe flap was deemed most appropriate. Using a sterile surgical marker, an appropriate bilobe flap drawn around the defect. The area thus outlined was incised deep to adipose tissue with a #15 scalpel blade. The skin margins were undermined to an appropriate distance in all directions utilizing iris scissors. Following this, the designed flap was carried over into the primary defect and sutured into place. Bilobed Transposition Flap Text: The defect edges were debeveled with a #15 scalpel blade. Given the location of the defect and the proximity to free margins a bilobed transposition flap was deemed most appropriate. Using a sterile surgical marker, an appropriate bilobe flap drawn around the defect. The area thus outlined was incised deep to adipose tissue with a #15 scalpel blade. The skin margins were undermined to an appropriate distance in all directions utilizing iris scissors. Following this, the designed flap was carried over into the primary defect and sutured into place. Trilobed Flap Text: The defect edges were debeveled with a #15 scalpel blade. Given the location of the defect and the proximity to free margins a trilobed flap was deemed most appropriate. Using a sterile surgical marker, an appropriate trilobed flap was drawn around the defect. The area thus outlined was incised deep to adipose tissue with a #15 scalpel blade. The skin margins were undermined to an appropriate distance in all directions utilizing iris scissors. Following this, the designed flap was carried into the primary defect and sutured into place. Dorsal Nasal Flap Text: The defect edges were debeveled with a #15 scalpel blade. Given the location of the defect and the proximity to free margins a dorsal nasal flap was deemed most appropriate. Using a sterile surgical marker, an appropriate dorsal nasal flap was drawn around the defect. The area thus outlined was incised deep to adipose tissue with a #15 scalpel blade. The skin margins were undermined to an appropriate distance in all directions utilizing iris scissors. Following this, the designed flap was carried into the primary defect and sutured into place. Island Pedicle Flap Text: The defect edges were debeveled with a #15 scalpel blade. Given the location of the defect, shape of the defect and the proximity to free margins an island pedicle advancement flap was deemed most appropriate. Using a sterile surgical marker, an appropriate advancement flap was drawn incorporating the defect, outlining the appropriate donor tissue and placing the expected incisions within the relaxed skin tension lines where possible. The area thus outlined was incised deep to adipose tissue with a #15 scalpel blade. The skin margins were undermined to an appropriate distance in all directions around the primary defect and laterally outward around the island pedicle utilizing iris scissors. There was minimal undermining beneath the pedicle flap. Following this, the flap was carried over into the primary defect and sutured into place. Island Pedicle Flap With Canthal Suspension Text: The defect edges were debeveled with a #15 scalpel blade. Given the location of the defect, shape of the defect and the proximity to free margins an island pedicle advancement flap was deemed most appropriate. Using a sterile surgical marker, an appropriate advancement flap was drawn incorporating the defect, outlining the appropriate donor tissue and placing the expected incisions within the relaxed skin tension lines where possible. The area thus outlined was incised deep to adipose tissue with a #15 scalpel blade. The skin margins were undermined to an appropriate distance in all directions around the primary defect and laterally outward around the island pedicle utilizing iris scissors. There was minimal undermining beneath the pedicle flap. A suspension suture was placed in the canthal tendon to prevent tension and prevent ectropion. Following this, the designed flap was placed into the primary defect and sutured into place. Alar Island Pedicle Flap Text: The defect edges were debeveled with a #15 scalpel blade. Given the location of the defect, shape of the defect and the proximity to the alar rim an island pedicle advancement flap was deemed most appropriate. Using a sterile surgical marker, an appropriate advancement flap was drawn incorporating the defect, outlining the appropriate donor tissue and placing the expected incisions within the nasal ala running parallel to the alar rim. The area thus outlined was incised with a #15 scalpel blade. The skin margins were undermined minimally to an appropriate distance in all directions around the primary defect and laterally outward around the island pedicle utilizing iris scissors. There was minimal undermining beneath the pedicle flap. Following this, the designed flap was carried over into the primary defect and sutured into place. Double Island Pedicle Flap Text: The defect edges were debeveled with a #15 scalpel blade. Given the location of the defect, shape of the defect and the proximity to free margins a double island pedicle advancement flap was deemed most appropriate. Using a sterile surgical marker, an appropriate advancement flap was drawn incorporating the defect, outlining the appropriate donor tissue and placing the expected incisions within the relaxed skin tension lines where possible. The area thus outlined was incised deep to adipose tissue with a #15 scalpel blade. The skin margins were undermined to an appropriate distance in all directions around the primary defect and laterally outward around the island pedicle utilizing iris scissors. There was minimal undermining beneath the pedicle flap. Following this, the flap was carried over into the primary defect and sutured into place. Island Pedicle Flap-Requiring Vessel Identification Text: The defect edges were debeveled with a #15 scalpel blade. Given the location of the defect, shape of the defect and the proximity to free margins an island pedicle advancement flap was deemed most appropriate. Using a sterile surgical marker, an appropriate advancement flap was drawn, based on the axial vessel mentioned above, incorporating the defect, outlining the appropriate donor tissue and placing the expected incisions within the relaxed skin tension lines where possible. The area thus outlined was incised deep to adipose tissue with a #15 scalpel blade. The skin margins were undermined to an appropriate distance in all directions around the primary defect and laterally outward around the island pedicle utilizing iris scissors. There was minimal undermining beneath the pedicle flap. Following this, the designed flap was carried over into the primary defect and sutured into place. Keystone Flap Text: The defect edges were debeveled with a #15 scalpel blade. Given the location of the defect, shape of the defect a keystone flap was deemed most appropriate. Using a sterile surgical marker, an appropriate keystone flap was drawn incorporating the defect, outlining the appropriate donor tissue and placing the expected incisions within the relaxed skin tension lines where possible. The area thus outlined was incised deep to adipose tissue with a #15 scalpel blade. The skin margins were undermined to an appropriate distance in all directions around the primary defect and laterally outward around the flap utilizing iris scissors. Following this, the designed flap was carried into the primary defect and sutured into place. O-T Plasty Text: The defect edges were debeveled with a #15 scalpel blade. Given the location of the defect, shape of the defect and the proximity to free margins an O-T plasty was deemed most appropriate. Using a sterile surgical marker, an appropriate O-T plasty was drawn incorporating the defect and placing the expected incisions within the relaxed skin tension lines where possible. The area thus outlined was incised deep to adipose tissue with a #15 scalpel blade. The skin margins were undermined to an appropriate distance in all directions utilizing iris scissors. Following this, the designed flap was carried over into the primary defect and sutured into place. O-Z Plasty Text: The defect edges were debeveled with a #15 scalpel blade. Given the location of the defect, shape of the defect and the proximity to free margins an O-Z plasty (double transposition flap) was deemed most appropriate. Using a sterile surgical marker, the appropriate transposition flaps were drawn incorporating the defect and placing the expected incisions within the relaxed skin tension lines where possible. The area thus outlined was incised deep to adipose tissue with a #15 scalpel blade. The skin margins were undermined to an appropriate distance in all directions utilizing iris scissors. Hemostasis was achieved with electrocautery. The flaps were then transposed and carried over into place, one clockwise and the other counterclockwise, and anchored with interrupted buried subcutaneous sutures. Double O-Z Plasty Text: The defect edges were debeveled with a #15 scalpel blade. Given the location of the defect, shape of the defect and the proximity to free margins a Double O-Z plasty (double transposition flap) was deemed most appropriate. Using a sterile surgical marker, the appropriate transposition flaps were drawn incorporating the defect and placing the expected incisions within the relaxed skin tension lines where possible. The area thus outlined was incised deep to adipose tissue with a #15 scalpel blade. The skin margins were undermined to an appropriate distance in all directions utilizing iris scissors. Hemostasis was achieved with electrocautery. The flaps were then transposed and carried over into place, one clockwise and the other counterclockwise, and anchored with interrupted buried subcutaneous sutures. V-Y Plasty Text: The defect edges were debeveled with a #15 scalpel blade. Given the location of the defect, shape of the defect and the proximity to free margins an V-Y advancement flap was deemed most appropriate. Using a sterile surgical marker, an appropriate advancement flap was drawn incorporating the defect and placing the expected incisions within the relaxed skin tension lines where possible. The area thus outlined was incised deep to adipose tissue with a #15 scalpel blade. The skin margins were undermined to an appropriate distance in all directions utilizing iris scissors. Following this, the designed flap was advanced and carried over into the primary defect and sutured into place. H Plasty Text: Given the location of the defect, shape of the defect and the proximity to free margins a H-plasty was deemed most appropriate for repair. Using a sterile surgical marker, the appropriate advancement arms of the H-plasty were drawn incorporating the defect and placing the expected incisions within the relaxed skin tension lines where possible. The area thus outlined was incised deep to adipose tissue with a #15 scalpel blade. The skin margins were undermined to an appropriate distance in all directions utilizing iris scissors. The opposing advancement arms were then advanced and carried over into place in opposite direction and anchored with interrupted buried subcutaneous sutures. W Plasty Text: The lesion was extirpated to the level of the fat with a #15 scalpel blade. Given the location of the defect, shape of the defect and the proximity to free margins a W-plasty was deemed most appropriate for repair. Using a sterile surgical marker, the appropriate transposition arms of the W-plasty were drawn incorporating the defect and placing the expected incisions within the relaxed skin tension lines where possible. The area thus outlined was incised deep to adipose tissue with a #15 scalpel blade. The skin margins were undermined to an appropriate distance in all directions utilizing iris scissors. The opposing transposition arms were then transposed and carried over into place in opposite direction and anchored with interrupted buried subcutaneous sutures. Z Plasty Text: The lesion was extirpated to the level of the fat with a #15 scalpel blade. Given the location of the defect, shape of the defect and the proximity to free margins a Z-plasty was deemed most appropriate for repair. Using a sterile surgical marker, the appropriate transposition arms of the Z-plasty were drawn incorporating the defect and placing the expected incisions within the relaxed skin tension lines where possible. The area thus outlined was incised deep to adipose tissue with a #15 scalpel blade. The skin margins were undermined to an appropriate distance in all directions utilizing iris scissors. The opposing transposition arms were then transposed and carried over into place in opposite direction and anchored with interrupted buried subcutaneous sutures. Double Z Plasty Text: The lesion was extirpated to the level of the fat with a #15 scalpel blade. Given the location of the defect, shape of the defect and the proximity to free margins a double Z-plasty was deemed most appropriate for repair. Using a sterile surgical marker, the appropriate transposition arms of the double Z-plasty were drawn incorporating the defect and placing the expected incisions within the relaxed skin tension lines where possible. The area thus outlined was incised deep to adipose tissue with a #15 scalpel blade. The skin margins were undermined to an appropriate distance in all directions utilizing iris scissors. The opposing transposition arms were then transposed and carried over into place in opposite direction and anchored with interrupted buried subcutaneous sutures. Zygomaticofacial Flap Text: Given the location of the defect, shape of the defect and the proximity to free margins a zygomaticofacial flap was deemed most appropriate for repair. Using a sterile surgical marker, the appropriate flap was drawn incorporating the defect and placing the expected incisions within the relaxed skin tension lines where possible. The area thus outlined was incised deep to adipose tissue with a #15 scalpel blade with preservation of a vascular pedicle. The skin margins were undermined to an appropriate distance in all directions utilizing iris scissors. The flap was then carried over into the defect and anchored with interrupted buried subcutaneous sutures. Cheek Interpolation Flap Text: A decision was made to reconstruct the defect utilizing an interpolation axial flap and a staged reconstruction. A telfa template was made of the defect. This telfa template was then used to outline the Cheek Interpolation flap. The donor area for the pedicle flap was then injected with anesthesia. The flap was excised through the skin and subcutaneous tissue down to the layer of the underlying musculature. The interpolation flap was carefully excised within this deep plane to maintain its blood supply. The edges of the donor site were undermined. The donor site was closed in a primary fashion. The pedicle was then rotated into position and sutured. Once the tube was sutured into place, adequate blood supply was confirmed with blanching and refill. The pedicle was then wrapped with xeroform gauze and dressed appropriately with a telfa and gauze bandage to ensure continued blood supply and protect the attached pedicle. Cheek-To-Nose Interpolation Flap Text: A decision was made to reconstruct the defect utilizing an interpolation axial flap and a staged reconstruction. A telfa template was made of the defect. This telfa template was then used to outline the Cheek-To-Nose Interpolation flap. The donor area for the pedicle flap was then injected with anesthesia. The flap was excised through the skin and subcutaneous tissue down to the layer of the underlying musculature. The interpolation flap was carefully excised within this deep plane to maintain its blood supply. The edges of the donor site were undermined. The donor site was closed in a primary fashion. The pedicle was then rotated into position and sutured. Once the tube was sutured into place, adequate blood supply was confirmed with blanching and refill. The pedicle was then wrapped with xeroform gauze and dressed appropriately with a telfa and gauze bandage to ensure continued blood supply and protect the attached pedicle. Interpolation Flap Text: A decision was made to reconstruct the defect utilizing an interpolation axial flap and a staged reconstruction. A telfa template was made of the defect. This telfa template was then used to outline the interpolation flap. The donor area for the pedicle flap was then injected with anesthesia. The flap was excised through the skin and subcutaneous tissue down to the layer of the underlying musculature. The interpolation flap was carefully excised within this deep plane to maintain its blood supply. The edges of the donor site were undermined. The donor site was closed in a primary fashion. The pedicle was then rotated into position and sutured. Once the tube was sutured into place, adequate blood supply was confirmed with blanching and refill. The pedicle was then wrapped with xeroform gauze and dressed appropriately with a telfa and gauze bandage to ensure continued blood supply and protect the attached pedicle. Melolabial Interpolation Flap Text: A decision was made to reconstruct the defect utilizing an interpolation axial flap and a staged reconstruction. A telfa template was made of the defect. This telfa template was then used to outline the melolabial interpolation flap. The donor area for the pedicle flap was then injected with anesthesia. The flap was excised through the skin and subcutaneous tissue down to the layer of the underlying musculature. The pedicle flap was carefully excised within this deep plane to maintain its blood supply. The edges of the donor site were undermined. The donor site was closed in a primary fashion. The pedicle was then rotated into position and sutured. Once the tube was sutured into place, adequate blood supply was confirmed with blanching and refill. The pedicle was then wrapped with xeroform gauze and dressed appropriately with a telfa and gauze bandage to ensure continued blood supply and protect the attached pedicle. Mastoid Interpolation Flap Text: A decision was made to reconstruct the defect utilizing an interpolation axial flap and a staged reconstruction. A telfa template was made of the defect. This telfa template was then used to outline the mastoid interpolation flap. The donor area for the pedicle flap was then injected with anesthesia. The flap was excised through the skin and subcutaneous tissue down to the layer of the underlying musculature. The pedicle flap was carefully excised within this deep plane to maintain its blood supply. The edges of the donor site were undermined. The donor site was closed in a primary fashion. The pedicle was then rotated into position and sutured. Once the tube was sutured into place, adequate blood supply was confirmed with blanching and refill. The pedicle was then wrapped with xeroform gauze and dressed appropriately with a telfa and gauze bandage to ensure continued blood supply and protect the attached pedicle. Posterior Auricular Interpolation Flap Text: A decision was made to reconstruct the defect utilizing an interpolation axial flap and a staged reconstruction. A telfa template was made of the defect. This telfa template was then used to outline the posterior auricular interpolation flap. The donor area for the pedicle flap was then injected with anesthesia. The flap was excised through the skin and subcutaneous tissue down to the layer of the underlying musculature. The pedicle flap was carefully excised within this deep plane to maintain its blood supply. The edges of the donor site were undermined. The donor site was closed in a primary fashion. The pedicle was then rotated into position and sutured. Once the tube was sutured into place, adequate blood supply was confirmed with blanching and refill. The pedicle was then wrapped with xeroform gauze and dressed appropriately with a telfa and gauze bandage to ensure continued blood supply and protect the attached pedicle. Paramedian Forehead Flap Text: A decision was made to reconstruct the defect utilizing an interpolation axial flap and a staged reconstruction. A telfa template was made of the defect. This telfa template was then used to outline the paramedian forehead pedicle flap. The donor area for the pedicle flap was then injected with anesthesia. The flap was excised through the skin and subcutaneous tissue down to the layer of the underlying musculature. The pedicle flap was carefully excised within this deep plane to maintain its blood supply. The edges of the donor site were undermined. The donor site was closed in a primary fashion. The pedicle was then rotated into position and sutured. Once the tube was sutured into place, adequate blood supply was confirmed with blanching and refill. The pedicle was then wrapped with xeroform gauze and dressed appropriately with a telfa and gauze bandage to ensure continued blood supply and protect the attached pedicle. Abbe Flap (Upper To Lower Lip) Text: The defect of the lower lip was assessed and measured. Given the location and size of the defect, an Abbe flap was deemed most appropriate. Using a sterile surgical marker, an appropriate Abbe flap was measured and drawn on the upper lip. Local anesthesia was then infiltrated. A scalpel was then used to incise the upper lip through and through the skin, vermilion, muscle and mucosa, leaving the flap pedicled on the opposite side. The flap was then rotated and transferred to the lower lip defect. The flap was then sutured into place with a three layer technique, closing the orbicularis oris muscle layer with subcutaneous buried sutures, followed by a mucosal layer and an epidermal layer. Abbe Flap (Lower To Upper Lip) Text: The defect of the upper lip was assessed and measured. Given the location and size of the defect, an Abbe flap was deemed most appropriate. Using a sterile surgical marker, an appropriate Abbe flap was measured and drawn on the lower lip. Local anesthesia was then infiltrated. A scalpel was then used to incise the upper lip through and through the skin, vermilion, muscle and mucosa, leaving the flap pedicled on the opposite side. The flap was then rotated and transferred to the lower lip defect. The flap was then sutured into place with a three layer technique, closing the orbicularis oris muscle layer with subcutaneous buried sutures, followed by a mucosal layer and an epidermal layer. Estlander Flap (Upper To Lower Lip) Text: The defect of the lower lip was assessed and measured. Given the location and size of the defect, an Estlander flap was deemed most appropriate. Using a sterile surgical marker, an appropriate Estlander flap was measured and drawn on the upper lip. Local anesthesia was then infiltrated. A scalpel was then used to incise the lateral aspect of the flap, through skin, muscle and mucosa, leaving the flap pedicled medially. The flap was then rotated and positioned to fill the lower lip defect. The flap was then sutured into place with a three layer technique, closing the orbicularis oris muscle layer with subcutaneous buried sutures, followed by a mucosal layer and an epidermal layer. Estlander Flap (Lower To Upper Lip) Text: The defect of the lower lip was assessed and measured.  Given the location and size of the defect, an Estlander flap was deemed most appropriate. Using a sterile surgical marker, an appropriate Estlander flap was measured and drawn on the upper lip. Local anesthesia was then infiltrated. A scalpel was then used to incise the lateral aspect of the flap, through skin, muscle and mucosa, leaving the flap pedicled medially.  The flap was then rotated and positioned to fill the lower lip defect.  The flap was then sutured into place with a three layer technique, closing the orbicularis oris muscle layer with subcutaneous buried sutures, followed by a mucosal layer and an epidermal layer. Cheiloplasty (Less Than 50%) Text: A decision was made to reconstruct the defect with a  cheiloplasty. The defect was undermined extensively. Additional orbicularis oris muscle was excised with a 15 blade scalpel. The defect was converted into a full thickness wedge, of less than 50% of the vertical height of the lip, to facilite a better cosmetic result. Small vessels were then tied off with 5-0 monocyrl. The orbicularis oris, superficial fascia, adipose and dermis were then reapproximated. After the deeper layers were approximated the epidermis was reapproximated with particular care given to realign the vermilion border. Cheiloplasty (Complex) Text: A decision was made to reconstruct the defect with a  cheiloplasty. The defect was undermined extensively. Additional orbicularis oris muscle was excised with a 15 blade scalpel. The defect was converted into a full thickness wedge to facilite a better cosmetic result. Small vessels were then tied off with 5-0 monocyrl. The orbicularis oris, superficial fascia, adipose and dermis were then reapproximated. After the deeper layers were approximated the epidermis was reapproximated with particular care given to realign the vermilion border. Ear Wedge Repair Text: A wedge excision was completed by carrying down an excision through the full thickness of the ear and cartilage with an inward facing Burow's triangle. The wound was then closed in a layered fashion. Full Thickness Lip Wedge Repair (Flap) Text: Given the location of the defect and the proximity to free margins a full thickness wedge repair was deemed most appropriate. Using a sterile surgical marker, the appropriate repair was drawn incorporating the defect and placing the expected incisions perpendicular to the vermilion border. The vermilion border was also meticulously outlined to ensure appropriate reapproximation during the repair. The area thus outlined was incised through and through with a #15 scalpel blade. The muscularis and dermis were reaproximated with deep sutures following hemostasis. Care was taken to realign the vermilion border before proceeding with the superficial closure. Once the vermilion was realigned the superfical and mucosal closure was finished. Ftsg Text: The defect edges were debeveled with a #15 scalpel blade. Given the location of the defect, shape of the defect and the proximity to free margins a full thickness skin graft was deemed most appropriate. Using a sterile surgical marker, the primary defect shape was transferred to the donor site. The area thus outlined was incised deep to adipose tissue with a #15 scalpel blade. The harvested graft was then trimmed of adipose tissue until only dermis and epidermis was left. The skin margins of the secondary defect were undermined to an appropriate distance in all directions utilizing iris scissors. The secondary defect was closed with interrupted buried subcutaneous sutures. The skin edges were then re-apposed with running  sutures. The skin graft was then placed in the primary defect and oriented appropriately. Split-Thickness Skin Graft Text: The defect edges were debeveled with a #15 scalpel blade. Given the location of the defect, shape of the defect and the proximity to free margins a split thickness skin graft was deemed most appropriate. Using a sterile surgical marker, the primary defect shape was transferred to the donor site. The split thickness graft was then harvested. The skin graft was then placed in the primary defect and oriented appropriately. Pinch Graft Text: The defect edges were debeveled with a #15 scalpel blade. Given the location of the defect, shape of the defect and the proximity to free margins a pinch graft was deemed most appropriate. Using a sterile surgical marker, the primary defect shape was transferred to the donor site. The area thus outlined was incised deep to adipose tissue with a #15 scalpel blade. The harvested graft was then trimmed of adipose tissue until only dermis and epidermis was left. The skin margins of the secondary defect were undermined to an appropriate distance in all directions utilizing iris scissors. The secondary defect was closed with interrupted buried subcutaneous sutures. The skin edges were then re-apposed with running  sutures. The skin graft was then placed in the primary defect and oriented appropriately. Burow's Graft Text: The defect edges were debeveled with a #15 scalpel blade. Given the location of the defect, shape of the defect, the proximity to free margins and the presence of a standing cone deformity a Burow's skin graft was deemed most appropriate. The standing cone was removed and this tissue was then trimmed to the shape of the primary defect. The adipose tissue was also removed until only dermis and epidermis were left. The skin margins of the secondary defect were undermined to an appropriate distance in all directions utilizing iris scissors. The secondary defect was closed with interrupted buried subcutaneous sutures. The skin edges were then re-apposed with running  sutures. The skin graft was then placed in the primary defect and oriented appropriately. Cartilage Graft Text: The defect edges were debeveled with a #15 scalpel blade. Given the location of the defect, shape of the defect, the fact the defect involved a full thickness cartilage defect a cartilage graft was deemed most appropriate. An appropriate donor site was identified, cleansed, and anesthetized. The cartilage graft was then harvested and transferred to the recipient site, oriented appropriately and then sutured into place. The secondary defect was then repaired using a primary closure. Composite Graft Text: The defect edges were debeveled with a #15 scalpel blade. Given the location of the defect, shape of the defect, the proximity to free margins and the fact the defect was full thickness a composite graft was deemed most appropriate. The defect was outline and then transferred to the donor site. A full thickness graft was then excised from the donor site. The graft was then placed in the primary defect, oriented appropriately and then sutured into place. The secondary defect was then repaired using a primary closure. Epidermal Autograft Text: The defect edges were debeveled with a #15 scalpel blade. Given the location of the defect, shape of the defect and the proximity to free margins an epidermal autograft was deemed most appropriate. Using a sterile surgical marker, the primary defect shape was transferred to the donor site. The epidermal graft was then harvested. The skin graft was then placed in the primary defect and oriented appropriately. Dermal Autograft Text: The defect edges were debeveled with a #15 scalpel blade. Given the location of the defect, shape of the defect and the proximity to free margins a dermal autograft was deemed most appropriate. Using a sterile surgical marker, the primary defect shape was transferred to the donor site. The area thus outlined was incised deep to adipose tissue with a #15 scalpel blade. The harvested graft was then trimmed of adipose and epidermal tissue until only dermis was left. The skin graft was then placed in the primary defect and oriented appropriately. Skin Substitute Text: The defect edges were debeveled with a #15 scalpel blade. Given the location of the defect, shape of the defect and the proximity to free margins a skin substitute graft was deemed most appropriate. The graft material was trimmed to fit the size of the defect. The graft was then placed in the primary defect and oriented appropriately. Skin Substitute Paste Text: The defect edges were debeveled with a #15 scalpel blade. Given the location of the defect, shape of the defect and the proximity to free margins a skin substitute micronized graft was deemed most appropriate. The entire vial contents were admixed with 0.5ccs of sterile saline, formed into a paste and then evenly spread over the entire wound bed. Skin Substitute Injection Text: The defect edges were debeveled with a #15 scalpel blade. Given the location of the defect, shape of the defect and the proximity to free margins a skin substitute micronized graft was deemed most appropriate. The entire vial contents were admixed with 3.0ccs of sterile saline and then injected subcutaneously throughout the entire wound bed. Tissue Cultured Epidermal Autograft Text: The defect edges were debeveled with a #15 scalpel blade. Given the location of the defect, shape of the defect and the proximity to free margins a tissue cultured epidermal autograft was deemed most appropriate. The graft was then trimmed to fit the size of the defect. The graft was then placed in the primary defect and oriented appropriately. Xenograft Text: The defect edges were debeveled with a #15 scalpel blade. Given the location of the defect, shape of the defect and the proximity to free margins a xenograft was deemed most appropriate. The graft was then trimmed to fit the size of the defect. The graft was then placed in the primary defect and oriented appropriately. Purse String (Simple) Text: Given the location of the defect and the characteristics of the surrounding skin a purse string closure was deemed most appropriate. Undermining was performed circumferentially around the surgical defect. A purse string suture was then placed and tightened. Purse String (Intermediate) Text: Given the location of the defect and the characteristics of the surrounding skin a purse string intermediate closure was deemed most appropriate. Undermining was performed circumferentially around the surgical defect. A purse string suture was then placed and tightened. Partial Purse String (Simple) Text: Given the location of the defect and the characteristics of the surrounding skin a simple purse string closure was deemed most appropriate. Undermining was performed circumferentially around the surgical defect. A purse string suture was then placed and tightened. Wound tension only allowed a partial closure of the circular defect. Partial Purse String (Intermediate) Text: Given the location of the defect and the characteristics of the surrounding skin an intermediate purse string closure was deemed most appropriate. Undermining was performed circumferentially around the surgical defect. A purse string suture was then placed and tightened. Wound tension only allowed a partial closure of the circular defect. Localized Dermabrasion Text: The patient was draped in routine manner. Localized dermabrasion using 3 x 17 mm wire brush was performed in routine manner to papillary dermis. This spot dermabrasion is being performed to complete skin cancer reconstruction. It also will eliminate the other sun damaged precancerous cells that are known to be part of the regional effect of a lifetime's worth of sun exposure. This localized dermabrasion is therapeutic and should not be considered cosmetic in any regard. Tarsorrhaphy Text: A tarsorrhaphy was performed using Frost sutures. Intermediate Repair And Flap Additional Text (Will Appearing After The Standard Complex Repair Text): The intermediate repair was not sufficient to completely close the primary defect. The remaining additional defect was repaired with the flap mentioned below. Intermediate Repair And Graft Additional Text (Will Appearing After The Standard Complex Repair Text): The intermediate repair was not sufficient to completely close the primary defect. The remaining additional defect was repaired with the graft mentioned below. Complex Repair And Flap Additional Text (Will Appearing After The Standard Complex Repair Text): The complex repair was not sufficient to completely close the primary defect. The remaining additional defect was repaired with the flap mentioned below. Complex Repair And Graft Additional Text (Will Appearing After The Standard Complex Repair Text): The complex repair was not sufficient to completely close the primary defect. The remaining additional defect was repaired with the graft mentioned below. Cheek Interpolation Flap Division And Inset Text: Division and inset of the cheek interpolation flap was performed to achieve optimal aesthetic result, restore normal anatomic appearance and avoid distortion of normal anatomy, expedite and facilitate wound healing, achieve optimal functional result and because linear closure either not possible or would produce suboptimal result. The patient was prepped and draped in the usual manner. The pedicle was infiltrated with local anesthesia. The pedicle was sectioned with a #15 blade. The pedicle was de-bulked and trimmed to match the shape of the defect. Hemostasis was achieved. The flap donor site and free margin of the flap were secured with deep buried sutures and the wound edges were re-approximated. Cheek To Nose Interpolation Flap Division And Inset Text: Division and inset of the cheek to nose interpolation flap was performed to achieve optimal aesthetic result, restore normal anatomic appearance and avoid distortion of normal anatomy, expedite and facilitate wound healing, achieve optimal functional result and because linear closure either not possible or would produce suboptimal result. The patient was prepped and draped in the usual manner. The pedicle was infiltrated with local anesthesia. The pedicle was sectioned with a #15 blade. The pedicle was de-bulked and trimmed to match the shape of the defect. Hemostasis was achieved. The flap donor site and free margin of the flap were secured with deep buried sutures and the wound edges were re-approximated. Melolabial Interpolation Flap Division And Inset Text: Division and inset of the melolabial interpolation flap was performed to achieve optimal aesthetic result, restore normal anatomic appearance and avoid distortion of normal anatomy, expedite and facilitate wound healing, achieve optimal functional result and because linear closure either not possible or would produce suboptimal result. The patient was prepped and draped in the usual manner. The pedicle was infiltrated with local anesthesia. The pedicle was sectioned with a #15 blade. The pedicle was de-bulked and trimmed to match the shape of the defect. Hemostasis was achieved. The flap donor site and free margin of the flap were secured with deep buried sutures and the wound edges were re-approximated. Mastoid Interpolation Flap Division And Inset Text: Division and inset of the mastoid interpolation flap was performed to achieve optimal aesthetic result, restore normal anatomic appearance and avoid distortion of normal anatomy, expedite and facilitate wound healing, achieve optimal functional result and because linear closure either not possible or would produce suboptimal result. The patient was prepped and draped in the usual manner. The pedicle was infiltrated with local anesthesia. The pedicle was sectioned with a #15 blade. The pedicle was de-bulked and trimmed to match the shape of the defect. Hemostasis was achieved. The flap donor site and free margin of the flap were secured with deep buried sutures and the wound edges were re-approximated. Paramedian Forehead Flap Division And Inset Text: Division and inset of the paramedian forehead flap was performed to achieve optimal aesthetic result, restore normal anatomic appearance and avoid distortion of normal anatomy, expedite and facilitate wound healing, achieve optimal functional result and because linear closure either not possible or would produce suboptimal result. The patient was prepped and draped in the usual manner. The pedicle was infiltrated with local anesthesia. The pedicle was sectioned with a #15 blade. The pedicle was de-bulked and trimmed to match the shape of the defect. Hemostasis was achieved. The flap donor site and free margin of the flap were secured with deep buried sutures and the wound edges were re-approximated. Posterior Auricular Interpolation Flap Division And Inset Text: Division and inset of the posterior auricular interpolation flap was performed to achieve optimal aesthetic result, restore normal anatomic appearance and avoid distortion of normal anatomy, expedite and facilitate wound healing, achieve optimal functional result and because linear closure either not possible or would produce suboptimal result. The patient was prepped and draped in the usual manner. The pedicle was infiltrated with local anesthesia. The pedicle was sectioned with a #15 blade. The pedicle was de-bulked and trimmed to match the shape of the defect. Hemostasis was achieved. The flap donor site and free margin of the flap were secured with deep buried sutures and the wound edges were re-approximated. Manual Repair Warning Statement: We plan on removing the manually selected variable below in favor of our much easier automatic structured text blocks found in the previous tab. We decided to do this to help make the flow better and give you the full power of structured data. Manual selection is never going to be ideal in our platform and I would encourage you to avoid using manual selection from this point on, especially since I will be sunsetting this feature. It is important that you do one of two things with the customized text below. First, you can save all of the text in a word file so you can have it for future reference. Second, transfer the text to the appropriate area in the Library tab. Lastly, if there is a flap or graft type which we do not have you need to let us know right away so I can add it in before the variable is hidden. No need to panic, we plan to give you roughly 6 months to make the change. Consent: The rationale for the repair was explained to the patient and consent was obtained. The risks, benefits and alternatives to therapy were discussed in detail. Specifically, the risks of infection, scarring, bleeding, prolonged wound healing, incomplete removal, allergy to anesthesia, nerve injury and recurrence were addressed. Prior to the procedure, the treatment site was clearly identified and confirmed by the patient. All components of Universal Protocol/PAUSE Rule completed. Post-Care Instructions: I reviewed with the patient in detail post-care instructions. Patient is not to engage in any heavy lifting, exercise, or swimming for the next 14 days. Should the patient develop any fevers, chills, bleeding, severe pain patient will contact the office immediately. Pain Refusal Text: I offered to prescribe pain medication but the patient refused to take this medication. Where Do You Want The Question To Include Opioid Counseling Located?: Case Summary Tab Information: Selecting Yes will display possible errors in your note based on the variables you have selected. This validation is only offered as a suggestion for you. PLEASE NOTE THAT THE VALIDATION TEXT WILL BE REMOVED WHEN YOU FINALIZE YOUR NOTE. IF YOU WANT TO FAX A PRELIMINARY NOTE YOU WILL NEED TO TOGGLE THIS TO 'NO' IF YOU DO NOT WANT IT IN YOUR FAXED NOTE.

## 2023-11-07 NOTE — ED ADULT TRIAGE NOTE - CHIEF COMPLAINT QUOTE
Pt is receiving paclitaxel on 11/13 and zometa on 11/14. She would like to know if she would get both medications on 11/13 at [facility] and then just have FUV with KHALIF Spears CNP on 11/14.      pt BIBA from home, pt c/o left sided pain x 2 days with intermittent dizziness.  pt fell 2 weeks ago and had no injuries at time of fall.  pt is ambulatory to triage

## 2024-01-01 NOTE — SBIRT NOTE ADULT - NSSBIRTSAVECONSULT_GEN_A_CORE
Complete Routine care of . Please follow up with your pediatrician in 1-2days.   Please make sure to feed your  every 3 hours or sooner as baby demands. Breast milk is preferable, either through breastfeeding or via pumping of breast milk. If you do not have enough breast milk please supplement with formula. Please seek immediate medical attention is your baby seems to not be feeding well or has persistent vomiting. If baby appears yellow or jaundiced please consult with your pediatrician. You must follow up with your pediatrician in 1-2 days. If your baby has a fever of 100.4F or more you must seek medical care in an emergency room immediately. Please call [facility] or your pediatrician if you should have any other questions or concerns. CBC, retic and bilirubin drawn. Bilirubin monitored closely per protocol. Patient followed per NOWS protocol, IFRAH scoring performed by Modified Theodora scores, due to elevated scores morphine was started. Patient was weaned and clinically stable prior to dc. due to withdrawal . now taking ad natalie sim 20cal feeding 65ml-199ml q 3 hr.

## 2024-01-10 NOTE — PHYSICAL THERAPY INITIAL EVALUATION ADULT - GENERAL OBSERVATIONS, REHAB EVAL
Pt encountered in semisupine position, no distress, AxOx4, with +IV, and cardiac monitor (4) no apparent problem

## 2024-02-09 NOTE — ED ADULT NURSE NOTE - PMH
1 Centimeter bladder stone seen on prostate MRI, images reviewed with patient  -Patient has no symptoms, counseled I would recommend cystoscopy in the office to assess further and he may need laser lithotripsy of stone and removal in OR if found  -Patient agreeable, will set up for cystoscopy in the office   CVA (cerebral vascular accident)    Depression    DM (diabetes mellitus)    Glaucoma    Gout    HTN (hypertension)

## 2024-03-01 NOTE — ED PROVIDER NOTE - PMH
Anemia of chronic disease    CVA (cerebral vascular accident)  2013- Residual RLE weakness  Depression    DM (diabetes mellitus)    ESRD (end stage renal disease)  HD T/T/S  GERD (gastroesophageal reflux disease)    Glaucoma    Gout    HLD (hyperlipidemia)    HTN (hypertension)    NICM (nonischemic cardiomyopathy)    KERI (obstructive sleep apnea)
complains of pain/discomfort

## 2024-03-29 NOTE — PROGRESS NOTE ADULT - PROBLEM SELECTOR PROBLEM 2
Potential access sites were evaluated for patency using ultrasound.   The right femoral vein was selected. Access was obtained under with Sonosite guidance using a standard 18 guage needle with direct visualization of needle entry.    Angina pectoris

## 2024-04-08 NOTE — ED PROVIDER NOTE - CADM POA CENTRAL LINE
[de-identified] : Ms. GARIMA ADAMS is a 86 year old woman presents today for initial evaluation of a right shoulder injury sustained on 1/30/24 after a fall at home. She was seen by Dr. Pop on 2/7/24 where x-rays were performed in the office. She notes pain at the right shoulder with movements. 
No

## 2024-04-13 NOTE — PHYSICAL THERAPY INITIAL EVALUATION ADULT - REHAB POTENTIAL, PT EVAL
History of Present Illness  Cardiac Surgery Phone Follow-up:    This phone call was in regards to post-operative care  Procedure: CABG  9/25/17   Hospital Discharge Date: 9/28/17  Surgeon: Leo Watt MD    Office Visit Pending: Yes   10/26/17  The patient has an appointment with their PCP on: 10/10/17  The patient has an appointment with their cardiologist on: 11/3/17  Date of Call: 10/10/2017, after leaving several voicemail messages, patient has returned our call  Symptom review with the patient is as follows: no shortness of breath, no chest pain, no leg swelling, pain is controlled, incision stable, no bowel problems, stable appetite, no lightheaded/dizziness, blood pressure stable, heart rate stable, activity: tolerating walks without lightheadedness, angina or SOB, patient's reported pre-op weight was 141 lbs, patient's reported weight at discharge was 133 lbs, patient's reported current weight is 132 lbs and medication review  Comments: Patient finally called back after several voicemail messages left  he states he is doing well  Just had an appt today with PCP  Offers no complaints  Active Problems    1  Asthma (493 90) (J45 909)   2  CAD (coronary artery disease) (414 00) (I25 10)   3  Colon polyps (211 3) (K63 5)   4  Diverticulitis of colon (562 11) (K57 32)   5  GERD (gastroesophageal reflux disease) (530 81) (K21 9)   6  Hyperlipidemia (272 4) (E78 5)   7  Nicotine dependence (305 1) (F17 200)   8  NSTEMI (non-ST elevated myocardial infarction) (410 70) (I21 4)    Past Medical History    1  History of Cough (786 2) (R05)   2  History of Groin discomfort (789 09) (R10 30)   3  History of acute bronchitis (V12 69) (Z87 09)   4  History of acute sinusitis (V12 69) (Z87 09)   5  History of bronchitis (V12 69) (Z87 09)   6  History of otitis media (V12 49) (Z86 69)   7  History of Skin lesion (709 9) (L98 9)   8  History of Vasovagal syncope (780 2) (R55)    Surgical History    1  51 y/o , LMP 3/10/24 female with Hx of borderline hypothyroidism, uterine fibroids, heavy periods, and uterine wall thickening, She had a discussion with the surgeon to have a hysterectomy due to hx of large fibroids that she is also thinking of having done, she has   crampy pain during menstruation for over 2 years, she came in here for elective Total Abdominal Hysterectomy with B/l Salpingectomy, anterior adjacent tissue rearrangement, abdominal wall reconstruction on 4/10/24 with MD [place] s/p procedure, post operative she was noted to have SOB, EKG done as well XR, Xray shows left small pleural effusion, she denies Hx of CHF, she has no cough, medicine consulted for medical management of SOB.       Post op SOB:   she is satting well on RA   EKG shows some t inversion on lead III, flipped t waves in V2, V3 and V4, repeat EKG looks ok   cardiac monitor no alarms, trops negative   BNP is normal and TTE normal LV and EF   D dimers mildly elevated, CTA and US dopper of legs negative for PE and DVT  has some atelectasis encourage for IS, feels better   CXR findings are likely from atelectasis       Uterine fibroids s/p  Total Abdominal Hysterectomy with B/l Salpingectomy, anterior adjacent tissue rearrangement, abdominal wall reconstruction on 04/10  Pain meds   wound care  bowel meds   biopsy to follow   further management as per Primary team     Hypothyroidism: not on any meds, TSH is ok     Prediabetes: Hb a1c is 5.8, counselled for low carb diet and excercise      Acute blood loss anemia due to procedure: Iron supplement, will monitor CBC     constipation: bowel meds.     Plan of care discussed with Gyn team  ok to discharge from medicine point of view if no further needs for Gyn side  Medicine team is signing off, please call as needed        History of Appendectomy   2  History of CABG    Family History  Problems    1  Family history of Adenocarcinoma In Situ Of The Testis : Father   2  Family history of Benign Essential Hypertension : Mother   3  Family history of Cholecystectomy : Father   4  Denied: Family history of colon cancer : Mother, Father   5  Denied: Family history of colonic polyps : Mother, Father   10  Denied: Family history of liver disease : Mother, Father   7  Family history of Heart Surgery : Father   6  Family history of Type 2 Diabetes Mellitus : Father    Social History    · Denied: History of Being A Social Drinker   · History of Current Every Day Smoker (305 1)   · Feels safe at home   · Former smoker (V15 82) (L64 584)    Current Meds    1  Metoprolol Tartrate 25 MG Oral Tablet; take one tablet by mouth twice daily; Therapy: 25MDR2989 to (Evaluate:09Nov2017); Last Rx:10Oct2017 Ordered    2  ProAir  (90 Base) MCG/ACT Inhalation Aerosol Solution; 2  PUFFS Q 4-6 HOURS   PRN  ELBERT;   Therapy: 14LTI7089 to (Last Deborah [place])  Requested for: 45WTT8721 Ordered    3  Aspirin 325 MG Oral Tablet; Therapy: ([phone]) to Recorded   4  Atorvastatin Calcium 80 MG Oral Tablet; Therapy: ([phone]) to Recorded   5  Omeprazole 20 MG Oral Capsule Delayed Release;    Therapy: ([phone]) to Recorded    Signatures   Electronically signed by : HERMES Garcia; Oct 10 2017 11:22AM EST                       (Author) good, to achieve stated therapy goals

## 2024-05-30 NOTE — H&P ADULT - NSCORESITESY/N_GEN_A_CORE_RD

## 2024-08-09 NOTE — ED ADULT NURSE NOTE - NS ED NOTE ABUSE SUSPICION NEGLECT YN
History right lobectomy in 2003, 2015 ultrasound with 4 mm thyroid nodule. We will check a tsh today and plan on repeat ultrasound if clinical change or if it appears proceeding with surgical intervention if she indeed has primary hyperparathyroidism.   No

## 2024-08-13 NOTE — H&P ADULT - PEDAL EDEMA SEVERITY
Ms. Wood was admitted to the general surgery service and underwent open extended right hemicolectomy with end ileostomy and biopsies of liver lesion on 8/6 without issue. Her course was relatively uncomplicated with some new onset HTN that resolved with amlodipine 5mg while inpatient. She was able to tolerate a regular diet without issue and ostomy functioning well. She was able to progress with PT/OT and will undergo outpatient therapy. On 8/13 she is stable for discharge with follow in clinic in 1 weeks. Pathology still pending.  
3+

## 2024-10-08 NOTE — ED ADULT TRIAGE NOTE - PAIN RATING/NUMBER SCALE (0-10): REST
[address]  [facility]  [address], [address] [address]  Phone: [phone]  Fax: [phone]  Follow Up Time:     Carloz Mitchell  Foot and Ankle Surgery  [address], [address]  [place] [address]  Phone: [phone]  Fax: [phone]  Follow Up Time:

## 2024-11-19 NOTE — ED ADULT NURSE NOTE - HOW MANY DRINKS CONTAINING ALCOHOL DO YOU HAVE ON A TYPICAL DAY WHEN YOU ARE DRINKING?
WHAT IS COLONOSCOPY?  Colonoscopy is an effective procedure to diagnose abnormalities of the large intestine and to screen for colorectal cancer and colorectal polyps. A colonoscope is a long, thin flexible instrument that provides magnified views of the colon and rectum. The procedure is frequently performed in an outpatient setting with minimal discomfort and inconvenience. Because colonoscopy allows doctors to identify and remove certain types of colon polyps that may develop into cancer, colonoscopy can be a therapeutic and even life-saving procedure.   WHO SHOULD HAVE A COLONOSCOPY?  Screening refers to the process of examining otherwise healthy patients in an effort to detect previously undiagnosed colon polyps or cancer. The goal of a screening program is to detect disease at its earliest stages to allow for successful treatment. As part of a colorectal cancer screening program, colonoscopy is routinely recommended to adults starting at age 50. Patients who have a family history of colon or rectal cancer or polyps may be recommended for a colonoscopy earlier and more frequently than those without a family history of cancer. Your doctor may also recommend a colonoscopy to evaluate symptoms such as rectal bleeding, a change in bowel habits, or unexplained abdominal pains.  COLONOSCOPY MAY ALSO BE RECOMMENDED FOR:  Follow-up examinations for patients who have a personal history of colon or rectal polyps or cancer  Patients with acute or chronic anemia  Patients with inflammatory bowel disease (e.g., Crohn’s disease or ulcerative colitis)  Patients with certain familial hereditary conditions such as hereditary nonpolyposis colorectal cancer (also known as Ramirez syndrome)  WHO CAN PERFORM A COLONOSCOPY?  A colonoscopy is performed by experienced physicians who are specially trained in this type of procedure. Typically, colonoscopy is performed by gastroenterologists, colorectal surgeons, or general surgeons.  HOW  IS COLONOSCOPY PERFORMED?  One or two days prior to the procedure, most patients must complete a bowel “prep”- a prescribed preparation consisting of liquids that will cleanse the bowels of stool and other residue. This allows for complete visualization of the bowel surface during the procedure. Your doctor will most likely give you a list of dietary and medication restrictions to adhere to in the days leading up to the procedure. The most important part of the procedure is your completion of the cleansing process as requested by your physician. If you have any questions at all, do not hesitate to discuss your concerns with your physician before the day of the procedure.  During the colonoscopy, most patients receive intravenous sedation. One or more medications are administered to help patients remain comfortable for the duration of the procedure. The colonoscope is inserted via the rectum and advanced to the first portion of the colon, where it is connected to the end of the small intestine. Any polyps or other abnormalities encountered during the colonoscopy will be removed and/or biopsied and sent for analysis.  For most patients, the entire procedure takes less than an hour. After the colonoscopy is completed some patients may experience slight discomfort in the form of abdominal cramping and “gas pains,” though this quickly resolves by passing any gas/air that was insufflated during the procedure. In many cases, patients do not recall specifics of the procedure itself due to the sedation. It is always important to have the individual who will be taking you home be there to discuss the discharge instructions with the physician and nurse before discharge.  Following a colonoscopy, patients usually resume their regular diet. Resumption of your pre-procedure medications will be determined by your physician. Some restrictions for driving and activity levels apply when intravenous sedation medications are given to  sedate patients immediately prior to colonoscopy. These medications affect judgment and coordination for variable amounts of time following the procedure. Most patients are able to resume normal activity the morning following the colonoscopy.  WHAT ARE THE BENEFITS OF COLONOSCOPY?  Colonoscopy is the recommended means of colorectal cancer screening. The procedure allows for detection and removal of colon polyps that are prone to transform into cancer.  WHAT ARE THE RISKS OF COLONOSCOPY?  Colonoscopy is a very safe procedure with few complications, occurring in less than 1% of patients. Infrequent risks include bleeding, perforation (a tear in the intestine), rare side effects from sedation medicines, and inability to visualize the entire colon for polyps or other conditions. For anatomical reasons your physician may deem it unsafe to complete the colonoscopy and your physician will therefore terminate the examination. In such instances, your physician will discuss with you whether or not additional or alternative examinations are indicated.  DISCLAIMER  The American Society of Colon and Rectal Surgeons is dedicated to ensuring high-quality patient care by advancing the science, prevention and management of disorders and diseases of the colon, rectum and anus. These brochures are inclusive but not prescriptive. Their purpose is to provide information on diseases and processes, rather than dictate a specific form of treatment. They are intended for the use of all practitioners, health care workers and patients who desire information about the management of the conditions addressed. It should be recognized that these brochures should not be deemed inclusive of all proper methods of care or exclusive of methods of care reasonably directed to obtain the same results. The ultimate judgment regarding the propriety of any specific procedure must be made by the physician in light of all the circumstances presented by the  individual patient.     [facility]  What You Need to Know for Your Procedure with Dr. NGUYEN    We’re happy you and your physician have chosen [facility] for your care. Your procedure has been scheduled through your physician’s office. There are a few things you will need to know prior to arriving for your procedure.    Procedure date and time:     Please arrive at:     ++Please note that your procedure time may change due to adjustments in the schedule. This will also change your arrival time. We will contact you with any changes.     If you need to cancel or reschedule your procedure, please call our office within 3 business days of your scheduled procedure day or you may be subject to a cancellation fee. Failure to contact our office to cancel your procedure will result in a no-show fee.     The last day to cancel or reschedule your procedure without penalty is:     Your Registration   Please arrive for registration prior to your appointment to [facility] [place], [place]   Someone will direct you to [facility] on the 1st floor of the [facility].    You’ll be asked to complete a few registration forms, as well as provide photo identification, such as a ’s license or state ID, and insurance information.    Make Sure You Have an Escort:To ensure your safety, you must have a responsible adult to accompany you home following your procedure. A member of our staff will call to verify your escort home, should they not arrive with you. We take your safety seriously and, if you do not have an appropriate escort, we will make arrangements for transportation or reschedule your appointment. Please be aware that you will not be allowed to drive yourself home, take a cab or take public transportation unescorted following your procedure.  Your friend or relative is welcome to wait during your procedure in your patient room  or in our comfortable waiting area. Visitors are also encouraged to check out our [employer] café for a meal, light snack or beverage while they wait.    What to Expect   You will be asked to change into a hospital gown and your personal belongings will be kept with you. However, please leave your valuables, such as jewelry or personal electronics, at home.   Prior to your procedure, you will receive an IV for procedural sedation to ensure your comfort.   Expect your procedure to take approximately 30 to 45 minutes. During the procedure, your physician may take tissue samples, or biopsies, or remove polyps, growths in your colon’s lining.   Typically, you will be asked to lie on your left side for the procedure; however, that depends on your case.  Recovery Period: Following your procedure, you will be taken to the recovery area, where you will stay for approximately another 30 to 45 minutes. Your visitors will not be able to see you yet. Your colon will have been expanded with air during your procedure, so you will be encouraged to pass gas while in recovery.    Going Home: You will receive instructions and important contact information before you are sent home. You will not be able to drive, operate machinery or return to work until the next day. We’ll ask that you go home, relax and continue to recover. Also, please be aware that you should not drink alcoholic beverages for 24 hours following your procedure.    INSURANCE COVERAGE REGARDING PAYMENT  FOR YOUR COLONOSCOPY        Colon Cancer is the second leading cause of death among cancers, per the American Cancer Society. It is preventable. Early detection is the key. Your doctor will determine which tests need to be done for prevention and/or treatment. However, colonoscopies can be performed for several reasons:     Screening To screen for any problems within the colon. In this case, the patient is not symptomatic and does not have a personal history of previous  colon cancer/condition or abnormal findings. Billed as screening.   Surveillance To monitor for a previously diagnosed colon condition (such as polyps) or when performed at more frequent intervals than every ten years because the patient has a personal/family history of colonic polyps or colon cancer. Billed as diagnostic.   Diagnostic Patient with symptoms, used to evaluate the colon. Billed as diagnostic.       If during a screening colonoscopy, our physician finds an abnormality, performs a biopsy or polypectomy (removal of polyp), your insurance company may consider the procedure to be a diagnostic exam and no longer a screening procedure.     Every insurance company is different. We encourage you to call your insurance company regarding plan benefits. Generally, screening benefits and diagnostic benefits may be paid at different levels. Charges associated with anesthesia or type of facility may also be processed differently. This varies with each insurance company, so we want you to be aware of this prior to your procedure. You do not have to call your insurance company if you have Medicare. For an estimate of potential charges, you may call the Patient Contact Center at [phone], option 5.     The authorization staff at [facility] will contact your insurance company to check precertification requirements for your colonoscopy. However, precertification, which serves as notification is never a guarantee of payment. If you have questions regarding precertification for your procedure, please contact your insurance company.    BOWEL PREPARATION FOR COLONOSCOPY/SURGERY  INSTRUCTIONS    If you are on blood thinning medication, please contact your doctor regarding specific instructions on when to hold the medication prior to your procedure.   It is ok to take aspirin or clopidogrel prior to your procedure.     The day before your scheduled colonoscopy/surgery:  As soon as you wake up in the morning,  you must stop all solid foods and begin a clear liquid diet. A clear liquid diet is any liquid you can see through. This includes apple juice, lemonade, 7-up or Sprite, vitamin water, water flavor boosters, Gatorade, popsicles, Jell-O, chicken broth, beef broth, vegetable broth, black coffee and tea.  You cannot have dairy, orange juice or any juice with pulp or sediment. Do not drink anything red or purple until after the procedure. Do not drink any alcoholic beverages.   Continue the clear liquid diet for the rest of the day.   At 6PM, take the first dose of your SuPrep Bowel Preparation.  Pour one 6-ounce bottle of SuPrep liquid into the mixing container provided  Add cool drinking water to the 16-ounce line on the container and mix  Drink all the liquid in the container  You MUST drink 2 more 16-ounce containers of water over the next 1 HOUR   5 hours prior to your scheduled procedure, take the second dose of your SuPrep Bowel Preparation. Follow the same instructions.   Continue drinking clear liquids during your prep. Nothing to eat or drink four hours prior to your procedure.    The day of your colonoscopy:  Do not eat or drink anything for 4 hours prior to your procedure.  If you are taking medication for your blood pressure or heart, take your morning medications with a sip of water. If you have diabetes, do not take your diabetic medication the morning of your procedure.              1 or 2

## 2025-01-03 NOTE — ED ADULT NURSE NOTE - NSFALLRSKINDICATORS_ED_ALL_ED
Duration Of Freeze Thaw-Cycle (Seconds): 5 Post-Care Instructions: I reviewed with the patient in detail post-care instructions. Patient is to wear sunprotection, and avoid picking at any of the treated lesions. Pt may apply Vaseline to crusted or scabbing areas. Number Of Freeze-Thaw Cycles: 2 freeze-thaw cycles Render Post-Care Instructions In Note?: no Detail Level: Detailed Consent: The patient's consent was obtained including but not limited to risks of crusting, scabbing, blistering, scarring, darker or lighter pigmentary change, recurrence, incomplete removal and infection. Show Applicator Variable?: Yes yes

## 2025-01-30 NOTE — ED PROVIDER NOTE - CPE EDP RESP NORM
Physical Therapy Daily Treatment Note    Date:  2025    Patient Name:  Ramonita Porter    :  1935  MRN: [id number]  Restrictions/Precautions:     Medical/Treatment Diagnosis Information:   Diagnosis: vertigo, neck pain , posture abnormality  Treatment Diagnosis: cervical pain  Insurance/Certification information:   [facility] Medicare  Physician Information:   Darren Mosqueda  Plan of care signed (Y/N):  N  Visit# / total visits: 2/ 4  Pain level: 0/10 at rest       Time In: 231  Time Out:   305    Progress Note: [x]  Yes  [x]  No  Next due by: Visit #10  Or by    Subjective:   Denies dizziness but states she took a \"dizzy pill\" earlier in week due to \"not feeling quite right.\" No pain noted this date. Denies pain recently.     Objective: DWAYNE complete per flow chart to facilitate strength, motion and stability to allow ease with daily activities and driving. Initiated and advanced several exercises to improve motion and postural strength. No increase in pain noted. Reviewed HEp with understanding noted.     Observation:   Test measurements:    AROM rotation R/L: 43° / 48°    Exercises:   Exercise/Equipment Resistance/Repetitions Other comments   Scap retaction 5  sec 10    Cerv retraction 5 sec 10    Cerv rot/SB 5 sec 10x + Sbing today   Rows/Lats Red 15x    Hor abd /ER Red 10x    SASH Yellow 10x    Bicep curls 1# 15x         Supine Press 15x ea Head,    T-stretch                         [x] Provided verbal/tactile cueing for activities related to strengthening, flexibility, endurance, ROM. (20891)  [] Provided verbal/tactile cueing for activities related to improving balance, coordination, kinesthetic sense, posture, motor skill, proprioception. (17101)    Therapeutic Activities:     [] Therapeutic activities, direct (one-on-one) patient contact (use of dynamic activities to improve functional performance). (30913)    Gait:   [] Provided training and instruction to the patient for ambulation re-education. 
normal...

## 2025-02-21 NOTE — DISCHARGE NOTE PROVIDER - NS AS DC PROVIDER CONTACT Y/N MULTI
Spoke to Loly from [employer] to inform us about the monitor report. She states pt had and an onset Afib or flutter that last 1 minute and 43 seconds. She said it is uploaded to [email or web address].     Printed report and gave it to B[facility]N/NPSR.   Yes

## 2025-03-14 NOTE — PROGRESS NOTE ADULT - PROBLEM SELECTOR PROBLEM 3
Addended by: ANTONELLA GONZALES on: 3/14/2025 02:47 PM     Modules accepted: Orders     Chronic systolic (congestive) heart failure

## 2025-03-29 NOTE — PRE-OP CHECKLIST - ISOLATION PRECAUTIONS
[de-identified] : sensitive, tender possibly infected wound site on top right buttock  [FreeTextEntry1] : A detailed set of ROS were asked and negative except those outlined in HPI. none
